# Patient Record
Sex: FEMALE | Race: WHITE | NOT HISPANIC OR LATINO | Employment: OTHER | URBAN - METROPOLITAN AREA
[De-identification: names, ages, dates, MRNs, and addresses within clinical notes are randomized per-mention and may not be internally consistent; named-entity substitution may affect disease eponyms.]

---

## 2017-01-12 ENCOUNTER — GENERIC CONVERSION - ENCOUNTER (OUTPATIENT)
Dept: OTHER | Facility: OTHER | Age: 72
End: 2017-01-12

## 2017-01-31 ENCOUNTER — ALLSCRIPTS OFFICE VISIT (OUTPATIENT)
Dept: OTHER | Facility: OTHER | Age: 72
End: 2017-01-31

## 2017-02-03 ENCOUNTER — LAB CONVERSION - ENCOUNTER (OUTPATIENT)
Dept: OTHER | Facility: OTHER | Age: 72
End: 2017-02-03

## 2017-02-14 ENCOUNTER — ALLSCRIPTS OFFICE VISIT (OUTPATIENT)
Dept: OTHER | Facility: OTHER | Age: 72
End: 2017-02-14

## 2017-03-21 ENCOUNTER — LAB CONVERSION - ENCOUNTER (OUTPATIENT)
Dept: OTHER | Facility: OTHER | Age: 72
End: 2017-03-21

## 2017-03-21 LAB
ALT SERPL W P-5'-P-CCNC: 25 U/L (ref 6–29)
AST SERPL W P-5'-P-CCNC: 34 U/L (ref 10–35)
BUN SERPL-MCNC: 50 MG/DL (ref 7–25)
BUN/CREA RATIO (HISTORICAL): 30 (CALC) (ref 6–22)
CALCIUM SERPL-MCNC: 9.8 MG/DL (ref 8.6–10.4)
CHLORIDE SERPL-SCNC: 106 MMOL/L (ref 98–110)
CO2 SERPL-SCNC: 28 MMOL/L (ref 20–31)
CREAT SERPL-MCNC: 1.64 MG/DL (ref 0.6–0.93)
DEPRECATED RDW RBC AUTO: 13.9 % (ref 11–15)
EGFR AFRICAN AMERICAN (HISTORICAL): 36 ML/MIN/1.73M2
EGFR-AMERICAN CALC (HISTORICAL): 31 ML/MIN/1.73M2
GLUCOSE (HISTORICAL): 67 MG/DL (ref 65–99)
HBA1C MFR BLD HPLC: 6.8 % OF TOTAL HGB
HCT VFR BLD AUTO: 37.3 % (ref 35–45)
HGB BLD-MCNC: 12.1 G/DL (ref 11.7–15.5)
MCH RBC QN AUTO: 30 PG (ref 27–33)
MCHC RBC AUTO-ENTMCNC: 32.5 G/DL (ref 32–36)
MCV RBC AUTO: 92.5 FL (ref 80–100)
PLATELET # BLD AUTO: 214 THOUSAND/UL (ref 140–400)
PMV BLD AUTO: 9.9 FL (ref 7.5–12.5)
POTASSIUM SERPL-SCNC: 4.5 MMOL/L (ref 3.5–5.3)
RBC # BLD AUTO: 4.03 MILLION/UL (ref 3.8–5.1)
SODIUM SERPL-SCNC: 142 MMOL/L (ref 135–146)
TSH SERPL DL<=0.05 MIU/L-ACNC: 4.05 MIU/L (ref 0.4–4.5)
WBC # BLD AUTO: 5.2 THOUSAND/UL (ref 3.8–10.8)

## 2017-03-22 ENCOUNTER — GENERIC CONVERSION - ENCOUNTER (OUTPATIENT)
Dept: OTHER | Facility: OTHER | Age: 72
End: 2017-03-22

## 2017-03-22 ENCOUNTER — ALLSCRIPTS OFFICE VISIT (OUTPATIENT)
Dept: OTHER | Facility: OTHER | Age: 72
End: 2017-03-22

## 2017-03-22 DIAGNOSIS — D63.8 ANEMIA IN OTHER CHRONIC DISEASES CLASSIFIED ELSEWHERE: ICD-10-CM

## 2017-03-22 DIAGNOSIS — N18.30 CHRONIC KIDNEY DISEASE, STAGE III (MODERATE) (HCC): ICD-10-CM

## 2017-03-22 DIAGNOSIS — I10 ESSENTIAL (PRIMARY) HYPERTENSION: ICD-10-CM

## 2017-03-22 DIAGNOSIS — E11.9 TYPE 2 DIABETES MELLITUS WITHOUT COMPLICATIONS (HCC): ICD-10-CM

## 2017-03-23 ENCOUNTER — GENERIC CONVERSION - ENCOUNTER (OUTPATIENT)
Dept: OTHER | Facility: OTHER | Age: 72
End: 2017-03-23

## 2017-03-24 ENCOUNTER — GENERIC CONVERSION - ENCOUNTER (OUTPATIENT)
Dept: OTHER | Facility: OTHER | Age: 72
End: 2017-03-24

## 2017-05-08 ENCOUNTER — ALLSCRIPTS OFFICE VISIT (OUTPATIENT)
Dept: OTHER | Facility: OTHER | Age: 72
End: 2017-05-08

## 2017-06-06 ENCOUNTER — GENERIC CONVERSION - ENCOUNTER (OUTPATIENT)
Dept: OTHER | Facility: OTHER | Age: 72
End: 2017-06-06

## 2017-06-30 ENCOUNTER — DOCTOR'S OFFICE (OUTPATIENT)
Dept: URBAN - METROPOLITAN AREA CLINIC 137 | Facility: CLINIC | Age: 72
Setting detail: OPHTHALMOLOGY
End: 2017-06-30
Payer: COMMERCIAL

## 2017-06-30 ENCOUNTER — GENERIC CONVERSION - ENCOUNTER (OUTPATIENT)
Dept: OTHER | Facility: OTHER | Age: 72
End: 2017-06-30

## 2017-06-30 DIAGNOSIS — H52.4: ICD-10-CM

## 2017-06-30 PROCEDURE — 92014 COMPRE OPH EXAM EST PT 1/>: CPT | Performed by: OPHTHALMOLOGY

## 2017-06-30 ASSESSMENT — REFRACTION_MANIFEST
OS_VA3: 20/
OS_VA2: 20/
OD_VA3: 20/
OU_VA: 20/
OD_VA1: 20/
OS_VA1: 20/
OS_VA1: 20/
OD_VA2: 20/
OS_VA2: 20/
OU_VA: 20/
OD_VA2: 20/
OD_VA3: 20/
OS_VA3: 20/
OD_VA1: 20/

## 2017-06-30 ASSESSMENT — AXIALLENGTH_DERIVED
OS_AL: 23.3278
OD_AL: 24.0559

## 2017-06-30 ASSESSMENT — REFRACTION_OUTSIDERX
OS_SPHERE: -0.75
OD_VA2: 20/30(J2)
OU_VA: 20/
OD_ADD: +2.50
OS_VA3: 20/
OD_SPHERE: -2.50
OS_CYLINDER: SPH
OS_VA1: 20/60-1
OS_ADD: +2.50
OD_VA3: 20/
OS_VA2: 20/30(J2)
OD_VA1: 20/30-1
OD_AXIS: 036
OD_CYLINDER: +0.50

## 2017-06-30 ASSESSMENT — SPHEQUIV_DERIVED
OD_SPHEQUIV: -1.875
OS_SPHEQUIV: -0.375

## 2017-06-30 ASSESSMENT — REFRACTION_AUTOREFRACTION
OS_SPHERE: -0.75
OD_AXIS: 025
OS_CYLINDER: +0.75
OD_SPHERE: -2.25
OD_CYLINDER: +0.75
OS_AXIS: 157

## 2017-06-30 ASSESSMENT — REFRACTION_CURRENTRX
OS_ADD: +3.00
OS_VPRISM_DIRECTION: TRF
OS_OVR_VA: 20/
OD_OVR_VA: 20/
OS_OVR_VA: 20/
OD_OVR_VA: 20/
OD_SPHERE: -2.00
OS_SPHERE: +0.50
OD_ADD: +3.00
OD_AXIS: 030
OS_OVR_VA: 20/
OD_OVR_VA: 20/
OD_VPRISM_DIRECTION: TRF
OD_CYLINDER: +0.25
OS_CYLINDER: SPH

## 2017-06-30 ASSESSMENT — KERATOMETRY
OD_AXISANGLE_DEGREES: 049
OD_K1POWER_DIOPTERS: 44.00
OS_AXISANGLE_DEGREES: 083
OS_K2POWER_DIOPTERS: 45.00
OD_K2POWER_DIOPTERS: 44.50
OS_K1POWER_DIOPTERS: 44.25

## 2017-06-30 ASSESSMENT — CONFRONTATIONAL VISUAL FIELD TEST (CVF)
OD_FINDINGS: FULL
OS_FINDINGS: FULL

## 2017-06-30 ASSESSMENT — VISUAL ACUITY
OD_BCVA: 20/80
OS_BCVA: 20/30-2

## 2017-07-03 ENCOUNTER — GENERIC CONVERSION - ENCOUNTER (OUTPATIENT)
Dept: OTHER | Facility: OTHER | Age: 72
End: 2017-07-03

## 2017-07-11 ENCOUNTER — LAB CONVERSION - ENCOUNTER (OUTPATIENT)
Dept: OTHER | Facility: OTHER | Age: 72
End: 2017-07-11

## 2017-07-11 LAB
BUN SERPL-MCNC: 56 MG/DL (ref 7–25)
BUN/CREA RATIO (HISTORICAL): 30 (CALC) (ref 6–22)
CALCIUM SERPL-MCNC: 9.9 MG/DL (ref 8.6–10.4)
CHLORIDE SERPL-SCNC: 105 MMOL/L (ref 98–110)
CO2 SERPL-SCNC: 25 MMOL/L (ref 20–31)
CREAT SERPL-MCNC: 1.85 MG/DL (ref 0.6–0.93)
EGFR AFRICAN AMERICAN (HISTORICAL): 31 ML/MIN/1.73M2
EGFR-AMERICAN CALC (HISTORICAL): 27 ML/MIN/1.73M2
GLUCOSE (HISTORICAL): 125 MG/DL (ref 65–99)
HBA1C MFR BLD HPLC: 6.6 % OF TOTAL HGB
POTASSIUM SERPL-SCNC: 4.7 MMOL/L (ref 3.5–5.3)
SODIUM SERPL-SCNC: 138 MMOL/L (ref 135–146)

## 2017-07-21 ENCOUNTER — ALLSCRIPTS OFFICE VISIT (OUTPATIENT)
Dept: OTHER | Facility: OTHER | Age: 72
End: 2017-07-21

## 2017-07-21 DIAGNOSIS — Z12.31 ENCOUNTER FOR SCREENING MAMMOGRAM FOR MALIGNANT NEOPLASM OF BREAST: ICD-10-CM

## 2017-07-27 ENCOUNTER — GENERIC CONVERSION - ENCOUNTER (OUTPATIENT)
Dept: OTHER | Facility: OTHER | Age: 72
End: 2017-07-27

## 2017-10-10 ENCOUNTER — LAB CONVERSION - ENCOUNTER (OUTPATIENT)
Dept: OTHER | Facility: OTHER | Age: 72
End: 2017-10-10

## 2017-10-10 ENCOUNTER — GENERIC CONVERSION - ENCOUNTER (OUTPATIENT)
Dept: OTHER | Facility: OTHER | Age: 72
End: 2017-10-10

## 2017-10-10 LAB
BUN SERPL-MCNC: 50 MG/DL (ref 7–25)
BUN/CREA RATIO (HISTORICAL): 27 (CALC) (ref 6–22)
CALCIUM SERPL-MCNC: 9.7 MG/DL (ref 8.6–10.4)
CHLORIDE SERPL-SCNC: 107 MMOL/L (ref 98–110)
CO2 SERPL-SCNC: 26 MMOL/L (ref 20–31)
CREAT SERPL-MCNC: 1.85 MG/DL (ref 0.6–0.93)
EGFR AFRICAN AMERICAN (HISTORICAL): 31 ML/MIN/1.73M2
EGFR-AMERICAN CALC (HISTORICAL): 26 ML/MIN/1.73M2
GLUCOSE (HISTORICAL): 95 MG/DL (ref 65–99)
HBA1C MFR BLD HPLC: 6.9 % OF TOTAL HGB
POTASSIUM SERPL-SCNC: 4.8 MMOL/L (ref 3.5–5.3)
SODIUM SERPL-SCNC: 140 MMOL/L (ref 135–146)
TSH SERPL DL<=0.05 MIU/L-ACNC: 2.81 MIU/L (ref 0.4–4.5)

## 2017-10-16 ENCOUNTER — ALLSCRIPTS OFFICE VISIT (OUTPATIENT)
Dept: OTHER | Facility: OTHER | Age: 72
End: 2017-10-16

## 2017-10-16 DIAGNOSIS — N18.30 CHRONIC KIDNEY DISEASE, STAGE III (MODERATE) (HCC): ICD-10-CM

## 2017-10-16 DIAGNOSIS — J45.20 MILD INTERMITTENT ASTHMA, UNCOMPLICATED: ICD-10-CM

## 2017-10-16 DIAGNOSIS — I10 ESSENTIAL (PRIMARY) HYPERTENSION: ICD-10-CM

## 2017-10-16 DIAGNOSIS — E03.9 HYPOTHYROIDISM: ICD-10-CM

## 2017-10-16 DIAGNOSIS — E11.9 TYPE 2 DIABETES MELLITUS WITHOUT COMPLICATIONS (HCC): ICD-10-CM

## 2017-10-16 DIAGNOSIS — E78.2 MIXED HYPERLIPIDEMIA: ICD-10-CM
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
## 2017-11-16 ENCOUNTER — GENERIC CONVERSION - ENCOUNTER (OUTPATIENT)
Dept: OTHER | Facility: OTHER | Age: 72
End: 2017-11-16

## 2018-01-05 ENCOUNTER — GENERIC CONVERSION - ENCOUNTER (OUTPATIENT)
Dept: OTHER | Facility: OTHER | Age: 73
End: 2018-01-05

## 2018-01-12 VITALS
TEMPERATURE: 97.6 F | BODY MASS INDEX: 30.67 KG/M2 | HEART RATE: 76 BPM | DIASTOLIC BLOOD PRESSURE: 60 MMHG | WEIGHT: 156.25 LBS | HEIGHT: 60 IN | SYSTOLIC BLOOD PRESSURE: 140 MMHG | OXYGEN SATURATION: 95 %

## 2018-01-12 VITALS
HEART RATE: 68 BPM | HEIGHT: 59 IN | OXYGEN SATURATION: 97 % | WEIGHT: 152 LBS | BODY MASS INDEX: 30.64 KG/M2 | TEMPERATURE: 97.6 F | DIASTOLIC BLOOD PRESSURE: 58 MMHG | SYSTOLIC BLOOD PRESSURE: 144 MMHG

## 2018-01-14 VITALS
HEART RATE: 80 BPM | SYSTOLIC BLOOD PRESSURE: 134 MMHG | DIASTOLIC BLOOD PRESSURE: 62 MMHG | RESPIRATION RATE: 16 BRPM | WEIGHT: 156 LBS | BODY MASS INDEX: 30.63 KG/M2 | HEIGHT: 60 IN

## 2018-01-14 VITALS
DIASTOLIC BLOOD PRESSURE: 78 MMHG | BODY MASS INDEX: 30.43 KG/M2 | HEART RATE: 68 BPM | WEIGHT: 155 LBS | OXYGEN SATURATION: 95 % | SYSTOLIC BLOOD PRESSURE: 130 MMHG | TEMPERATURE: 98 F | HEIGHT: 60 IN

## 2018-01-14 VITALS
SYSTOLIC BLOOD PRESSURE: 150 MMHG | WEIGHT: 156.25 LBS | HEART RATE: 81 BPM | HEIGHT: 60 IN | OXYGEN SATURATION: 96 % | BODY MASS INDEX: 30.67 KG/M2 | TEMPERATURE: 98.2 F | DIASTOLIC BLOOD PRESSURE: 60 MMHG

## 2018-01-14 VITALS
BODY MASS INDEX: 30.66 KG/M2 | WEIGHT: 157 LBS | TEMPERATURE: 96.6 F | HEART RATE: 72 BPM | SYSTOLIC BLOOD PRESSURE: 140 MMHG | OXYGEN SATURATION: 96 % | DIASTOLIC BLOOD PRESSURE: 70 MMHG
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
## 2018-01-15 NOTE — RESULT NOTES
Verified Results  (1) BASIC METABOLIC PROFILE 58EWZ2828 07:51AM Makayla Hobbs     Test Name Result Flag Reference   GLUCOSE 95 mg/dL  65-99   Fasting reference interval   UREA NITROGEN (BUN) 50 mg/dL H 7-25   CREATININE 1 85 mg/dL H 0 60-0 93   For patients >52years of age, the reference limit  for Creatinine is approximately 13% higher for people  identified as -American  eGFR NON-AFR  AMERICAN 26 mL/min/1 73m2 L > OR = 60   eGFR AFRICAN AMERICAN 31 mL/min/1 73m2 L > OR = 60   BUN/CREATININE RATIO 27 (calc) H 6-22   SODIUM 140 mmol/L  135-146   POTASSIUM 4 8 mmol/L  3 5-5 3   CHLORIDE 107 mmol/L     CARBON DIOXIDE 26 mmol/L  20-31   CALCIUM 9 7 mg/dL  8 6-10 4     (Q) HEMOGLOBIN A1c 09Oct2017 07:51AM Daniel Ratliff   REPORT COMMENT:  FASTING:YES     Test Name Result Flag Reference   HEMOGLOBIN A1c 6 9 % of total Hgb H <5 7   For someone without known diabetes, a hemoglobin A1c  value of 6 5% or greater indicates that they may have   diabetes and this should be confirmed with a follow-up   test      For someone with known diabetes, a value <7% indicates   that their diabetes is well controlled and a value   greater than or equal to 7% indicates suboptimal   control  A1c targets should be individualized based on   duration of diabetes, age, comorbid conditions, and   other considerations  Currently, no consensus exists regarding use of  hemoglobin A1c for diagnosis of diabetes for children       (Q) TSH, 3RD GENERATION W/REFLEX TO FT4 09Oct2017 07:51AM Makayla Hobbs     Test Name Result Flag Reference   TSH W/REFLEX TO FT4 2 81 mIU/L  0 40-4 50

## 2018-01-22 ENCOUNTER — LAB CONVERSION - ENCOUNTER (OUTPATIENT)
Dept: OTHER | Facility: OTHER | Age: 73
End: 2018-01-22

## 2018-01-22 LAB
BUN SERPL-MCNC: 53 MG/DL (ref 7–25)
BUN/CREA RATIO (HISTORICAL): 33 (CALC) (ref 6–22)
CALCIUM SERPL-MCNC: 9.7 MG/DL (ref 8.6–10.4)
CHLORIDE SERPL-SCNC: 104 MMOL/L (ref 98–110)
CHOLEST SERPL-MCNC: 119 MG/DL
CHOLEST/HDLC SERPL: 2.5 (CALC)
CO2 SERPL-SCNC: 29 MMOL/L (ref 20–31)
CREAT SERPL-MCNC: 1.59 MG/DL (ref 0.6–0.93)
EGFR AFRICAN AMERICAN (HISTORICAL): 37 ML/MIN/1.73M2
EGFR-AMERICAN CALC (HISTORICAL): 32 ML/MIN/1.73M2
GLUCOSE (HISTORICAL): 117 MG/DL (ref 65–99)
HDLC SERPL-MCNC: 48 MG/DL
LDL CHOLESTEROL (HISTORICAL): 47 MG/DL (CALC)
NON-HDL-CHOL (CHOL-HDL) (HISTORICAL): 71 MG/DL (CALC)
POTASSIUM SERPL-SCNC: 4.6 MMOL/L (ref 3.5–5.3)
SODIUM SERPL-SCNC: 140 MMOL/L (ref 135–146)
TRIGL SERPL-MCNC: 158 MG/DL

## 2018-01-23 ENCOUNTER — LAB CONVERSION - ENCOUNTER (OUTPATIENT)
Dept: OTHER | Facility: OTHER | Age: 73
End: 2018-01-23

## 2018-01-23 LAB
ALBUMIN/CREAT UR: 39 MCG/MG CREAT
ALT SERPL W P-5'-P-CCNC: 23 U/L (ref 6–29)
ALT SERPL-CCNC: 23 U/L (ref 6–29)
AST SERPL W P-5'-P-CCNC: 31 U/L (ref 10–35)
AST SERPL-CCNC: 31 U/L (ref 10–35)
BUN SERPL-MCNC: 53 MG/DL (ref 7–25)
BUN SERPL-MCNC: 53 MG/DL (ref 7–25)
BUN/CREA RATIO (HISTORICAL): 32 (CALC) (ref 6–22)
BUN/CREAT SERPL: 32 (CALC) (ref 6–22)
CALCIUM SERPL-MCNC: 9.7 MG/DL (ref 8.6–10.4)
CALCIUM SERPL-MCNC: 9.7 MG/DL (ref 8.6–10.4)
CHLORIDE SERPL-SCNC: 106 MMOL/L (ref 98–110)
CHLORIDE SERPL-SCNC: 106 MMOL/L (ref 98–110)
CHOLEST SERPL-MCNC: 119 MG/DL
CHOLEST SERPL-MCNC: 119 MG/DL
CHOLEST/HDLC SERPL: 2.5 (CALC)
CHOLEST/HDLC SERPL: 2.5 (CALC)
CO2 SERPL-SCNC: 27 MMOL/L (ref 20–31)
CO2 SERPL-SCNC: 27 MMOL/L (ref 20–31)
CREAT SERPL-MCNC: 1.64 MG/DL (ref 0.6–0.93)
CREAT SERPL-MCNC: 1.64 MG/DL (ref 0.6–0.93)
CREAT UR-MCNC: 88 MG/DL (ref 20–320)
CREATININE, RANDOM URINE (HISTORICAL): 88 MG/DL (ref 20–320)
EGFR AFRICAN AMERICAN (HISTORICAL): 35 ML/MIN/1.73M2
EGFR-AMERICAN CALC (HISTORICAL): 30 ML/MIN/1.73M2
ERYTHROCYTE [DISTWIDTH] IN BLOOD BY AUTOMATED COUNT: 12.4 % (ref 11–15)
GLUCOSE (HISTORICAL): 122 MG/DL (ref 65–99)
GLUCOSE SERPL-MCNC: 122 MG/DL (ref 65–99)
HBA1C MFR BLD HPLC: 6.4 % OF TOTAL HGB
HBA1C MFR BLD: 6.4 % OF TOTAL HGB
HCT VFR BLD AUTO: 37.3 % (ref 35–45)
HDLC SERPL-MCNC: 48 MG/DL
HDLC SERPL-MCNC: 48 MG/DL
HGB BLD-MCNC: 12 G/DL (ref 11.7–15.5)
LDL CHOLESTEROL (HISTORICAL): 47 MG/DL (CALC)
LDLC SERPL CALC-MCNC: 47 MG/DL (CALC)
MAGNESIUM, UR (HISTORICAL): 3.4 MG/DL
MCH RBC QN AUTO: 30.1 PG (ref 27–33)
MCHC RBC AUTO-ENTMCNC: 32.2 G/DL (ref 32–36)
MCV RBC AUTO: 93.5 FL (ref 80–100)
MICROALBUMIN UR-MCNC: 3.4 MG/DL
MICROALBUMIN/CREATININE RATIO (HISTORICAL): 39 MCG/MG CREAT
NON-HDL-CHOL (CHOL-HDL) (HISTORICAL): 71 MG/DL (CALC)
NONHDLC SERPL-MCNC: 71 MG/DL (CALC)
PLATELET # BLD AUTO: 207 THOUSAND/UL (ref 140–400)
PMV BLD REES-ECKER: 11.8 FL (ref 7.5–12.5)
POTASSIUM SERPL-SCNC: 4.6 MMOL/L (ref 3.5–5.3)
POTASSIUM SERPL-SCNC: 4.6 MMOL/L (ref 3.5–5.3)
RBC # BLD AUTO: 3.99 MILLION/UL (ref 3.8–5.1)
SL AMB EGFR AFRICAN AMERICAN: 35 ML/MIN/1.73M2
SL AMB EGFR NON AFRICAN AMERICAN: 30 ML/MIN/1.73M2
SODIUM SERPL-SCNC: 142 MMOL/L (ref 135–146)
SODIUM SERPL-SCNC: 142 MMOL/L (ref 135–146)
TRIGL SERPL-MCNC: 158 MG/DL
TRIGL SERPL-MCNC: 158 MG/DL
WBC # BLD AUTO: 4.2 THOUSAND/UL (ref 3.8–10.8)

## 2018-01-29 ENCOUNTER — OFFICE VISIT (OUTPATIENT)
Dept: INTERNAL MEDICINE CLINIC | Facility: CLINIC | Age: 73
End: 2018-01-29
Payer: COMMERCIAL

## 2018-01-29 VITALS
OXYGEN SATURATION: 96 % | HEIGHT: 60 IN | SYSTOLIC BLOOD PRESSURE: 132 MMHG | HEART RATE: 77 BPM | BODY MASS INDEX: 29.29 KG/M2 | DIASTOLIC BLOOD PRESSURE: 56 MMHG | TEMPERATURE: 97.7 F | WEIGHT: 149.2 LBS

## 2018-01-29 DIAGNOSIS — E11.22 TYPE 2 DIABETES MELLITUS WITH STAGE 3 CHRONIC KIDNEY DISEASE, WITH LONG-TERM CURRENT USE OF INSULIN (HCC): Primary | ICD-10-CM

## 2018-01-29 DIAGNOSIS — K21.9 GERD WITHOUT ESOPHAGITIS: ICD-10-CM

## 2018-01-29 DIAGNOSIS — E78.2 MIXED HYPERLIPIDEMIA: ICD-10-CM

## 2018-01-29 DIAGNOSIS — Z12.11 SCREENING FOR COLON CANCER: ICD-10-CM

## 2018-01-29 DIAGNOSIS — N93.9 VAGINAL BLEEDING: ICD-10-CM

## 2018-01-29 DIAGNOSIS — N18.30 TYPE 2 DIABETES MELLITUS WITH STAGE 3 CHRONIC KIDNEY DISEASE, WITH LONG-TERM CURRENT USE OF INSULIN (HCC): Primary | ICD-10-CM

## 2018-01-29 DIAGNOSIS — Z79.4 TYPE 2 DIABETES MELLITUS WITH STAGE 3 CHRONIC KIDNEY DISEASE, WITH LONG-TERM CURRENT USE OF INSULIN (HCC): Primary | ICD-10-CM

## 2018-01-29 DIAGNOSIS — I10 ESSENTIAL HYPERTENSION: ICD-10-CM

## 2018-01-29 PROCEDURE — 99214 OFFICE O/P EST MOD 30 MIN: CPT | Performed by: INTERNAL MEDICINE

## 2018-01-29 RX ORDER — CYCLOBENZAPRINE HCL 5 MG
1 TABLET ORAL 3 TIMES DAILY PRN
COMMUNITY
Start: 2016-02-02 | End: 2018-01-30 | Stop reason: SDUPTHER

## 2018-01-29 RX ORDER — QUINIDINE GLUCONATE 324 MG
1 TABLET, EXTENDED RELEASE ORAL DAILY
Status: ON HOLD | COMMUNITY
End: 2022-06-29 | Stop reason: ALTCHOICE

## 2018-01-29 RX ORDER — PANTOPRAZOLE SODIUM 40 MG/1
40 TABLET, DELAYED RELEASE ORAL DAILY
COMMUNITY
Start: 2013-12-19 | End: 2018-01-29 | Stop reason: ALTCHOICE

## 2018-01-29 RX ORDER — FEBUXOSTAT 40 MG/1
40 TABLET, FILM COATED ORAL EVERY OTHER DAY
COMMUNITY
Start: 2013-12-19 | End: 2018-01-30 | Stop reason: SDUPTHER

## 2018-01-29 RX ORDER — NITROGLYCERIN 0.4 MG/1
1 TABLET SUBLINGUAL
COMMUNITY
End: 2022-02-01 | Stop reason: SDUPTHER

## 2018-01-29 RX ORDER — SYRINGE-NEEDLE,INSULIN,0.5 ML 31 GX5/16"
SYRINGE, EMPTY DISPOSABLE MISCELLANEOUS
COMMUNITY
Start: 2017-11-30 | End: 2018-01-30 | Stop reason: SDUPTHER

## 2018-01-29 RX ORDER — LOSARTAN POTASSIUM 50 MG/1
1 TABLET ORAL DAILY
COMMUNITY
End: 2018-01-30 | Stop reason: SDUPTHER

## 2018-01-29 RX ORDER — BIOTIN 1 MG
1 TABLET ORAL DAILY
Status: ON HOLD | COMMUNITY
End: 2022-06-29 | Stop reason: ALTCHOICE

## 2018-01-29 RX ORDER — MONTELUKAST SODIUM 10 MG/1
10 TABLET ORAL
COMMUNITY
Start: 2013-12-19 | End: 2018-05-11 | Stop reason: SDUPTHER

## 2018-01-29 RX ORDER — BUMETANIDE 2 MG/1
2 TABLET ORAL DAILY
COMMUNITY
Start: 2013-12-19 | End: 2018-01-30 | Stop reason: SDUPTHER

## 2018-01-29 RX ORDER — TRAMADOL HYDROCHLORIDE 50 MG/1
50 TABLET ORAL DAILY
COMMUNITY
Start: 2013-12-19 | End: 2018-01-30 | Stop reason: SDUPTHER

## 2018-01-29 RX ORDER — FLUTICASONE PROPIONATE 50 MCG
2 SPRAY, SUSPENSION (ML) NASAL DAILY
COMMUNITY
End: 2018-05-11 | Stop reason: SDUPTHER

## 2018-01-29 RX ORDER — ATORVASTATIN CALCIUM 80 MG/1
80 TABLET, FILM COATED ORAL DAILY
COMMUNITY
Start: 2013-12-19 | End: 2018-06-01 | Stop reason: SDUPTHER

## 2018-01-29 RX ORDER — INSULIN GLARGINE 100 [IU]/ML
INJECTION, SOLUTION SUBCUTANEOUS 2 TIMES DAILY
COMMUNITY
End: 2018-01-29 | Stop reason: HOSPADM

## 2018-01-29 RX ORDER — MULTIVIT-MIN/FOLIC ACID/LUTEIN 500-250MCG
2 TABLET,CHEWABLE ORAL DAILY
Status: ON HOLD | COMMUNITY
End: 2022-06-29 | Stop reason: ALTCHOICE

## 2018-01-29 RX ORDER — ALBUTEROL SULFATE 90 UG/1
2 AEROSOL, METERED RESPIRATORY (INHALATION)
COMMUNITY
Start: 2017-10-16 | End: 2018-09-11 | Stop reason: SDUPTHER

## 2018-01-29 RX ORDER — METOPROLOL SUCCINATE 100 MG/1
100 TABLET, EXTENDED RELEASE ORAL DAILY
COMMUNITY
Start: 2013-12-19 | End: 2018-01-30 | Stop reason: SDUPTHER

## 2018-01-29 RX ORDER — LEVOTHYROXINE SODIUM 0.07 MG/1
1 TABLET ORAL DAILY
COMMUNITY
End: 2018-01-30 | Stop reason: SDUPTHER

## 2018-01-29 RX ORDER — ASCORBIC ACID 500 MG
1 TABLET ORAL DAILY
Status: ON HOLD | COMMUNITY
End: 2022-06-29 | Stop reason: ALTCHOICE

## 2018-01-29 RX ORDER — ASCORBIC ACID
1 CRYSTALS ORAL DAILY
Status: ON HOLD | COMMUNITY
End: 2022-06-29 | Stop reason: ALTCHOICE

## 2018-01-29 RX ORDER — FAMOTIDINE 20 MG/1
20 TABLET, FILM COATED ORAL DAILY
Qty: 30 TABLET | Refills: 6 | Status: SHIPPED | OUTPATIENT
Start: 2018-01-29 | End: 2018-03-02 | Stop reason: SDUPTHER

## 2018-01-29 RX ORDER — FENOFIBRATE 145 MG/1
145 TABLET, COATED ORAL DAILY
COMMUNITY
Start: 2013-12-19 | End: 2018-01-30 | Stop reason: SDUPTHER

## 2018-01-29 RX ORDER — DIPHENOXYLATE HYDROCHLORIDE AND ATROPINE SULFATE 2.5; .025 MG/1; MG/1
1 TABLET ORAL DAILY
Status: ON HOLD | COMMUNITY
Start: 2013-12-19 | End: 2022-06-29 | Stop reason: ALTCHOICE
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
## 2018-01-30 DIAGNOSIS — E78.2 MIXED HYPERLIPIDEMIA: ICD-10-CM

## 2018-01-30 DIAGNOSIS — E11.9 TYPE 2 DIABETES MELLITUS WITHOUT COMPLICATION, UNSPECIFIED LONG TERM INSULIN USE STATUS: ICD-10-CM

## 2018-01-30 DIAGNOSIS — I10 HYPERTENSION, UNSPECIFIED TYPE: ICD-10-CM

## 2018-01-30 DIAGNOSIS — E03.9 HYPOTHYROIDISM, UNSPECIFIED TYPE: ICD-10-CM

## 2018-01-30 DIAGNOSIS — M10.9 GOUT, UNSPECIFIED CAUSE, UNSPECIFIED CHRONICITY, UNSPECIFIED SITE: ICD-10-CM

## 2018-01-30 DIAGNOSIS — M54.5 LOW BACK PAIN, UNSPECIFIED BACK PAIN LATERALITY, UNSPECIFIED CHRONICITY, WITH SCIATICA PRESENCE UNSPECIFIED: Primary | ICD-10-CM

## 2018-01-30 RX ORDER — LEVOTHYROXINE SODIUM 0.07 MG/1
75 TABLET ORAL DAILY
Qty: 30 TABLET | Refills: 5 | Status: SHIPPED | OUTPATIENT
Start: 2018-01-30 | End: 2018-05-11 | Stop reason: SDUPTHER

## 2018-01-30 RX ORDER — BUMETANIDE 2 MG/1
2 TABLET ORAL DAILY
Qty: 30 TABLET | Refills: 5 | Status: SHIPPED | OUTPATIENT
Start: 2018-01-30 | End: 2018-08-23 | Stop reason: SDUPTHER

## 2018-01-30 RX ORDER — METOPROLOL SUCCINATE 100 MG/1
100 TABLET, EXTENDED RELEASE ORAL DAILY
Qty: 30 TABLET | Refills: 5 | Status: SHIPPED | OUTPATIENT
Start: 2018-01-30 | End: 2018-09-11 | Stop reason: SDUPTHER

## 2018-01-30 RX ORDER — SYRINGE-NEEDLE,INSULIN,0.5 ML 31 GX5/16"
SYRINGE, EMPTY DISPOSABLE MISCELLANEOUS 4 TIMES DAILY
Qty: 200 EACH | Refills: 5 | Status: SHIPPED | OUTPATIENT
Start: 2018-01-30 | End: 2018-09-11 | Stop reason: SDUPTHER

## 2018-01-30 RX ORDER — FEBUXOSTAT 40 MG/1
40 TABLET, FILM COATED ORAL EVERY OTHER DAY
Qty: 15 TABLET | Refills: 5 | Status: SHIPPED | OUTPATIENT
Start: 2018-01-30 | End: 2018-09-11 | Stop reason: SDUPTHER

## 2018-01-30 RX ORDER — CYCLOBENZAPRINE HCL 5 MG
5 TABLET ORAL 3 TIMES DAILY PRN
Qty: 30 TABLET | Refills: 5 | Status: SHIPPED | OUTPATIENT
Start: 2018-01-30 | End: 2018-05-11 | Stop reason: SDUPTHER

## 2018-01-30 RX ORDER — TRAMADOL HYDROCHLORIDE 50 MG/1
50 TABLET ORAL DAILY
Qty: 30 TABLET | Refills: 0 | Status: SHIPPED | OUTPATIENT
Start: 2018-01-30 | End: 2018-02-28 | Stop reason: SDUPTHER

## 2018-01-30 RX ORDER — FENOFIBRATE 145 MG/1
145 TABLET, COATED ORAL DAILY
Qty: 30 TABLET | Refills: 5 | Status: SHIPPED | OUTPATIENT
Start: 2018-01-30 | End: 2018-02-14 | Stop reason: CLARIF

## 2018-01-30 RX ORDER — LOSARTAN POTASSIUM 50 MG/1
50 TABLET ORAL DAILY
Qty: 30 TABLET | Refills: 5 | Status: SHIPPED | OUTPATIENT
Start: 2018-01-30 | End: 2018-08-07 | Stop reason: SDUPTHER

## 2018-02-01 DIAGNOSIS — N18.9 CHRONIC KIDNEY DISEASE: ICD-10-CM

## 2018-02-02 DIAGNOSIS — E11.9 TYPE 2 DIABETES MELLITUS WITHOUT COMPLICATION, UNSPECIFIED LONG TERM INSULIN USE STATUS: Primary | ICD-10-CM

## 2018-02-05 ENCOUNTER — TELEPHONE (OUTPATIENT)
Dept: INTERNAL MEDICINE CLINIC | Facility: CLINIC | Age: 73
End: 2018-02-05

## 2018-02-05 ENCOUNTER — OFFICE VISIT (OUTPATIENT)
Dept: GYNECOLOGY | Facility: CLINIC | Age: 73
End: 2018-02-05
Payer: COMMERCIAL

## 2018-02-05 VITALS
HEIGHT: 59 IN | BODY MASS INDEX: 30.16 KG/M2 | DIASTOLIC BLOOD PRESSURE: 60 MMHG | SYSTOLIC BLOOD PRESSURE: 132 MMHG | WEIGHT: 149.6 LBS

## 2018-02-05 DIAGNOSIS — N90.89 VULVAR LESION: ICD-10-CM

## 2018-02-05 DIAGNOSIS — N92.4 ABNORMAL PERIMENOPAUSAL BLEEDING: Primary | ICD-10-CM

## 2018-02-05 PROCEDURE — 99202 OFFICE O/P NEW SF 15 MIN: CPT | Performed by: NURSE PRACTITIONER

## 2018-02-05 RX ORDER — PANTOPRAZOLE SODIUM 40 MG/1
40 TABLET, DELAYED RELEASE ORAL DAILY
COMMUNITY
End: 2018-05-11 | Stop reason: ALTCHOICE

## 2018-02-05 NOTE — PROGRESS NOTES
Assessment/Plan:    No problem-specific Assessment & Plan notes found for this encounter  Diagnoses and all orders for this visit:    Abnormal perimenopausal bleeding  -     US pelvis complete non OB; Future    Vulvar lesion    Other orders  -     pantoprazole (PROTONIX) 40 mg tablet; Take 40 mg by mouth daily  -     traMADol-acetaminophen (ULTRACET) 37 5-325 mg per tablet; Take 1-2 tablets by mouth every 12 (twelve) hours as needed for moderate pain      RTO for vulvar biopsy  Review with PCP if she is allowed to discontinue her daily ASA prior to vulvar biopsy  Bathe once daily gently with liquid dial soap  Rinse well  Avoid scrubbing or scratching  Req given for TA TV US and am aware she may not be able to tolerate TV US  She can decline  Call office with any increase/worsening of bleeding  Subjective:      Patient ID: Krysta Moralez is a 74 y o  female  New patient here for problem visit  Last gyn exam was 18 years ago  LMP 20 years ago  State that she started with internal and external vaginal itching aprox 5 weeks ago  Denies scratching  Bathed daily with dial liquid soap  3 weeks ago she noticed blood on her underwear  Saturating a panty liner in 3 hours x 7 days then intermittent spotting until now  Denies pelvic pain, bloating or bowel changes,  complaints  Moves bowel every other day-formed, normal, brown  Takes daily stool softener  Does not believe blood is rectal  Admits to frequent occurrences of vuvar itching in her 30's and 40's with no treatment  Denies urinary incontinence  Not sexually active  Last coitus 14 years ago   x 56 years, Alfonso Aleman who is in poor health  Feels safe in her home  Vaginal Bleeding   The patient's pertinent negatives include no pelvic pain  Pertinent negatives include no abdominal pain, dysuria, hematuria, nausea, urgency or vomiting         The following portions of the patient's history were reviewed and updated as appropriate: allergies, current medications, past family history, past medical history, past social history, past surgical history and problem list     Review of Systems   Constitutional: Negative  Gastrointestinal: Negative for abdominal distention, abdominal pain, anal bleeding, blood in stool, nausea and vomiting  Genitourinary: Positive for vaginal bleeding  Negative for decreased urine volume, difficulty urinating, dysuria, hematuria, pelvic pain, urgency and vaginal pain  Skin: Negative  Hematological: Negative for adenopathy  Psychiatric/Behavioral: Negative  Objective:     Physical Exam   Constitutional: She is oriented to person, place, and time  She appears well-developed and well-nourished  Abdominal: Soft  Genitourinary: Uterus normal  Rectal exam shows no external hemorrhoid  Cervix exhibits no motion tenderness and no discharge  Right adnexum displays no mass, no tenderness and no fullness  Left adnexum displays no mass, no tenderness and no fullness  Genitourinary Comments: Atrophic vaginal  Guarding with use  Of pediatric speculum  No bleeding or other abnormalities  Unable to see full face of cervix due to guarding  1 cm round slightly blood lesion noted on left mid vestiblue  Labia minora are fused with majora 1-1 5 cm round slightly bloody lesion noted at base of vagina  Labia majora and minora are hypopigmented  Lymphadenopathy:        Right: No inguinal adenopathy present  Left: No inguinal adenopathy present  Neurological: She is alert and oriented to person, place, and time  Skin: Skin is warm and dry  Psychiatric: She has a normal mood and affect

## 2018-02-05 NOTE — TELEPHONE ENCOUNTER
Pt called indicating that her Cyclobenzaprine needed prior authorization  Called [facility] to confirm and they indicated that no prior [facility] is needed, pt just can't have med filled until 2/14/2018  That is the earliest med can be filled per her insurance  Rejection is coming back as refill too soon

## 2018-02-05 NOTE — PATIENT INSTRUCTIONS
RTO for vulvar biopsy  Review with PCP if she is allowed to discontinue her daily ASA prior to vulvar biopsy  Bathe once daily gently with liquid dial soap  Rinse well  Avoid scrubbing or scratching  Req given for TA TV US and am aware she may not be able to tolerate TV US  She can decline  Call office with any increase/worsening of bleeding

## 2018-02-09 ENCOUNTER — HOSPITAL ENCOUNTER (OUTPATIENT)
Dept: ULTRASOUND IMAGING | Facility: HOSPITAL | Age: 73
Discharge: HOME | End: 2018-02-09
Payer: COMMERCIAL

## 2018-02-09 DIAGNOSIS — N92.4 ABNORMAL PERIMENOPAUSAL BLEEDING: ICD-10-CM

## 2018-02-09 PROCEDURE — 76830 TRANSVAGINAL US NON-OB: CPT

## 2018-02-09 PROCEDURE — 76856 US EXAM PELVIC COMPLETE: CPT

## 2018-02-12 ENCOUNTER — OFFICE VISIT (OUTPATIENT)
Dept: INTERNAL MEDICINE CLINIC | Age: 73
End: 2018-02-12
Payer: COMMERCIAL

## 2018-02-12 VITALS
BODY MASS INDEX: 29.41 KG/M2 | TEMPERATURE: 97.7 F | OXYGEN SATURATION: 97 % | WEIGHT: 149.8 LBS | HEIGHT: 60 IN | SYSTOLIC BLOOD PRESSURE: 126 MMHG | DIASTOLIC BLOOD PRESSURE: 40 MMHG | HEART RATE: 69 BPM

## 2018-02-12 DIAGNOSIS — R68.89 FLU-LIKE SYMPTOMS: Primary | ICD-10-CM

## 2018-02-12 PROCEDURE — 99213 OFFICE O/P EST LOW 20 MIN: CPT | Performed by: NURSE PRACTITIONER

## 2018-02-12 RX ORDER — OSELTAMIVIR PHOSPHATE 75 MG/1
75 CAPSULE ORAL DAILY
Qty: 5 CAPSULE | Refills: 0 | Status: SHIPPED | OUTPATIENT
Start: 2018-02-12 | End: 2018-02-17

## 2018-02-12 NOTE — PATIENT INSTRUCTIONS
Flu-like Symtpoms:  Will start tamiflu  As discussed your dosing was reduced based on your Creat Clear of 26 (modified by weight)  Rest  Stay well hydrated  Return for new/worsening s/sx  Influenza   AMBULATORY CARE:   Influenza  (the flu) is an infection caused by the influenza virus  The flu is easily spread when an infected person coughs, sneezes, or has close contact with others  You may be able to spread the flu to others for 1 week or longer after signs or symptoms appear  Common signs and symptoms include the following:   · Fever and chills    · Headaches, body aches, and muscle or joint pain    · Cough, runny nose, and sore throat    · Loss of appetite, nausea, vomiting, or diarrhea    · Tiredness    · Trouble breathing  Call 911 for any of the following:   · You have trouble breathing, and your lips look purple or blue  · You have a seizure  Seek care immediately if:   · You are dizzy, or you are urinating less or not at all  · You have a headache with a stiff neck, and you feel tired or confused  · You have new pain or pressure in your chest     · Your symptoms, such as shortness of breath, vomiting, or diarrhea, get worse  · Your symptoms, such as fever and coughing, seem to get better, but then get worse  Contact your healthcare provider if:   · You have new muscle pain or weakness  · You have questions or concerns about your condition or care  Treatment for influenza  may include any of the following:  · Acetaminophen  decreases pain and fever  It is available without a doctor's order  Ask how much to take and how often to take it  Follow directions  Acetaminophen can cause liver damage if not taken correctly  · NSAIDs , such as ibuprofen, help decrease swelling, pain, and fever  This medicine is available with or without a doctor's order  NSAIDs can cause stomach bleeding or kidney problems in certain people   If you take blood thinner medicine, always ask your healthcare provider if NSAIDs are safe for you  Always read the medicine label and follow directions  · Antivirals  help fight a viral infection  Manage your symptoms:   · Rest  as much as you can to help you recover  · Drink liquids as directed  to help prevent dehydration  Ask how much liquid to drink each day and which liquids are best for you  Prevent the spread of the flu:   · Wash your hands often  Use soap and water  Wash your hands after you use the bathroom, change a child's diapers, or sneeze  Wash your hands before you prepare or eat food  Use gel hand cleanser when soap and water are not available  Do not touch your eyes, nose, or mouth unless you have washed your hands first            · Cover your mouth when you sneeze or cough  Cough into a tissue or the bend of your arm  · Clean shared items with a germ-killing   Clean table surfaces, doorknobs, and light switches  Do not share towels, silverware, and dishes with people who are sick  Wash bed sheets, towels, silverware, and dishes with soap and water  · Wear a mask  over your mouth and nose if you are sick or are near anyone who is sick  · Stay away from others  if you are sick  · Influenza vaccine  helps prevent influenza (flu)  Everyone older than 6 months should get a yearly influenza vaccine  Get the vaccine as soon as it is available, usually in September or October each year  Follow up with your healthcare provider as directed:  Write down your questions so you remember to ask them during your visits  © 2017 [employer] Information is for End User's use only and may not be sold, redistributed or otherwise used for commercial purposes  All illustrations and images included in CareNotes® are the copyrighted property of [employer]  or [employer]  The above information is an  only  It is not intended as medical advice for individual conditions or treatments   Talk to your doctor, nurse or pharmacist before following any medical regimen to see if it is safe and effective for you
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
## 2018-02-13 ENCOUNTER — TELEPHONE (OUTPATIENT)
Dept: INTERNAL MEDICINE CLINIC | Age: 73
End: 2018-02-13

## 2018-02-13 NOTE — TELEPHONE ENCOUNTER
Wanted to know what she should be taking, you prescribed her the Tramadol 50mg (generic for ultram) 1 tablet daily, and the old one she was taking was Tramadol/Acetaminoph 37 5-325mg (generic for ultracet) 1 to 2 tablets twice daily  She wants to know the reason for the change and what she should be taking  If you could please call her back and let her know, spoke with Raegan Walton and they wanted to hear from the Doctor  Thank you

## 2018-02-14 ENCOUNTER — OFFICE VISIT (OUTPATIENT)
Dept: NEPHROLOGY | Facility: CLINIC | Age: 73
End: 2018-02-14
Payer: COMMERCIAL

## 2018-02-14 VITALS
DIASTOLIC BLOOD PRESSURE: 52 MMHG | BODY MASS INDEX: 28.47 KG/M2 | HEIGHT: 61 IN | HEART RATE: 80 BPM | SYSTOLIC BLOOD PRESSURE: 112 MMHG | WEIGHT: 150.8 LBS

## 2018-02-14 DIAGNOSIS — N18.9 CHRONIC RENAL IMPAIRMENT, UNSPECIFIED CKD STAGE: Primary | ICD-10-CM

## 2018-02-14 PROBLEM — N18.30 CKD (CHRONIC KIDNEY DISEASE) STAGE 3, GFR 30-59 ML/MIN (HCC): Status: RESOLVED | Noted: 2018-01-29 | Resolved: 2018-02-14

## 2018-02-14 PROBLEM — I10 ESSENTIAL HYPERTENSION: Status: RESOLVED | Noted: 2018-01-29 | Resolved: 2018-02-14

## 2018-02-14 PROCEDURE — 99213 OFFICE O/P EST LOW 20 MIN: CPT | Performed by: INTERNAL MEDICINE

## 2018-02-14 RX ORDER — FENOFIBRATE 67 MG/1
67 CAPSULE ORAL
COMMUNITY
Start: 2018-02-06 | End: 2018-02-14 | Stop reason: SDUPTHER

## 2018-02-14 RX ORDER — INSULIN GLARGINE 100 [IU]/ML
INJECTION, SOLUTION SUBCUTANEOUS
COMMUNITY
Start: 2018-02-01 | End: 2018-05-11 | Stop reason: ALTCHOICE

## 2018-02-14 NOTE — LETTER
February 14, 2018     Glenny Courtney MD  [address] [place] [address]    Patient: Noris Dukes   YOB: 1945   Date of Visit: 2/14/2018       Dear Dr Stanislav Yu: Thank you for referring Mirna Ackerman to me for evaluation  Below are my notes for this consultation  If you have questions, please do not hesitate to call me  I look forward to following your patient along with you  Sincerely,        Jacob Figueroa MD        CC: No Recipients  Jacob Figueroa MD  2/14/2018 12:13 PM  Sign at close encounter  [address] 74 y o  female MRN: [id number]  Unit/Bed#:  Encounter: [id number]  Reason for Consult:   Chronic renal insufficiency    ASSESSMENT/PLAN:  1  Renal     Patient's chronic renal insufficiency due to nephrosclerosis and I say this was there is no significant proteinuria and in fact her urine microalbumin is still 30 %period% creatinine is 1 6 likely due to fluctuations due to effective volume depletion as her diuretics work better at other times and she also was likely ill with the flu at that time  Overall proteinuria has an increased continue current medications creatinine will likely fluctuate  She will continue with routine follow-up as scheduled  Her blood pressure is excellent  SUBJECTIVE:  Review of Systems   Constitution: Negative  HENT: Negative  Eyes: Negative  Cardiovascular: Negative  Respiratory: Positive for cough  Negative for shortness of breath  Chest congestion   Gastrointestinal: Negative  Genitourinary: Negative  Neurological: Negative  OBJECTIVE:  Current Weight: Weight - Scale: 68 4 kg (150 lb 12 8 oz)  [email or web address]     Blood pressure 112/52, pulse 80, height 5' 0 51" (1 537 m), weight 68 4 kg (150 lb 12 8 oz)  , Body mass index is 28 95 kg/m²      [unfilled]    Physical Exam: /52 (BP Location: Left arm, Patient Position: Sitting, Cuff Size: Standard)   Pulse 80  5' 0 51" (1 537 m)   Wt 68 4 kg (150 lb 12 8 oz)   BMI 28 95 kg/m²    Physical Exam   Constitutional: She is oriented to person, place, and time  She appears well-nourished  No distress  HENT:   Head: Atraumatic  Mouth/Throat: No oropharyngeal exudate  Eyes: Conjunctivae are normal    Neck: Neck supple  No JVD present  Cardiovascular: Normal rate  Exam reveals no friction rub  No edema  Pulmonary/Chest: Effort normal  No respiratory distress  She has no wheezes  She has no rales  Few rhonchi left side greater than right   Abdominal: Soft  Bowel sounds are normal  She exhibits no distension  There is no tenderness  There is no rebound  Neurological: She is alert and oriented to person, place, and time         Medications:    Current Outpatient Prescriptions:     albuterol (VENTOLIN HFA) 90 mcg/act inhaler, Inhale 2 puffs, Disp: , Rfl:     ascorbic acid (VITAMIN C) 500 mg tablet, Take 1 tablet by mouth daily, Disp: , Rfl:     aspirin 81 MG tablet, Take 1 tablet by mouth 2 (two) times a day, Disp: , Rfl:     atorvastatin (LIPITOR) 80 mg tablet, Take 80 mg by mouth daily, Disp: , Rfl:     bumetanide (BUMEX) 2 mg tablet, Take 1 tablet (2 mg total) by mouth daily, Disp: 30 tablet, Rfl: 5    Calcium Carbonate-Vit D-Min (CALCIUM 600+D PLUS MINERALS) 600-400 MG-UNIT CHEW, Chew 2 tablets daily, Disp: , Rfl:     Cholecalciferol (VITAMIN D3) 1000 units CAPS, Take 1 tablet by mouth daily, Disp: , Rfl:     cyclobenzaprine (FLEXERIL) 5 mg tablet, Take 1 tablet (5 mg total) by mouth 3 (three) times a day as needed for muscle spasms, Disp: 30 tablet, Rfl: 5    famotidine (PEPCID) 20 mg tablet, Take 1 tablet (20 mg total) by mouth daily for 90 days, Disp: 30 tablet, Rfl: 6    febuxostat (ULORIC) 40 mg tablet, Take 1 tablet (40 mg total) by mouth every other day (Patient taking differently: Take 40 mg by mouth daily  ), Disp: 15 tablet, Rfl: 5    ferrous gluconate (FERATE) 240 (27 FE) MG tablet, Take 1 tablet by mouth daily, Disp: , Rfl:     fluticasone (FLONASE) 50 mcg/act nasal spray, 2 sprays into each nostril daily, Disp: , Rfl:     fluticasone-salmeterol (ADVAIR DISKUS) 250-50 mcg/dose inhaler, Inhale 1 puff 2 (two) times a day, Disp: , Rfl:     Glucose Blood (ONETOUCH ULTRA BLUE VI), 2-3 application by In Vitro route daily, Disp: , Rfl:     insulin detemir (LEVEMIR FLEXTOUCH) subcutaneous injection pen 100 units/mL, Inject 35 Units under the skin 2 (two) times a day, Disp: 7 pen, Rfl: 0    insulin regular (HumuLIN R,NovoLIN R) 100 units/mL injection, Inject 10 Units under the skin 2 (two) times a day with lunch and dinner, Disp: , Rfl:     LANTUS 100 UNIT/ML subcutaneous injection, , Disp: , Rfl:     levothyroxine 75 mcg tablet, Take 1 tablet (75 mcg total) by mouth daily, Disp: 30 tablet, Rfl: 5    losartan (COZAAR) 50 mg tablet, Take 1 tablet (50 mg total) by mouth daily, Disp: 30 tablet, Rfl: 5    Magnesium 400 MG CAPS, Take 2 tablets by mouth daily, Disp: , Rfl:     metoprolol succinate (TOPROL-XL) 100 mg 24 hr tablet, Take 1 tablet (100 mg total) by mouth daily, Disp: 30 tablet, Rfl: 5    montelukast (SINGULAIR) 10 mg tablet, Take 10 mg by mouth daily at bedtime, Disp: , Rfl:     multivitamin (THERAGRAN) TABS, Take 1 tablet by mouth, Disp: , Rfl:     nitroglycerin (NITROSTAT) 0 4 mg SL tablet, Place 1 tablet under the tongue every 5 (five) minutes as needed, Disp: , Rfl:     Omega-3 Fatty Acids (OMEGA 3 500 PO), Take 1 capsule by mouth daily, Disp: , Rfl:     ONE TOUCH LANCETS MISC, by Does not apply route daily Test 2-3 times daily, Disp: , Rfl:     oseltamivir (TAMIFLU) 75 mg capsule, Take 1 capsule (75 mg total) by mouth daily for 5 days, Disp: 5 capsule, Rfl: 0    pantoprazole (PROTONIX) 40 mg tablet, Take 40 mg by mouth daily, Disp: , Rfl:     sitaGLIPtin (JANUVIA) 50 mg tablet, Take 1 tablet by mouth daily, Disp: , Rfl:     traMADol (ULTRAM) 50 mg tablet, Take 1 tablet (50 mg total) by mouth daily, Disp: 30 tablet, Rfl: 0    traMADol-acetaminophen (ULTRACET) 37 5-325 mg per tablet, Take 1-2 tablets by mouth every 12 (twelve) hours as needed for moderate pain, Disp: , Rfl:     TRUEPLUS INSULIN SYRINGE 31G X 5/16" 0 5 ML MISC, Inject under the skin 4 (four) times a day, Disp: 200 each, Rfl: 5    Vitamin E 200 units TABS, Take 1 capsule by mouth daily, Disp: , Rfl:     Current Facility-Administered Medications:     insulin detemir (LEVEMIR) subcutaneous injection 35 Units, 35 Units, Subcutaneous, Q12H [facility], Avi Reina MD    Laboratory Results:  Lab Results   Component Value Date    WBC 4 2 01/22/2018    HGB 12 0 01/22/2018    HGB 12 0 01/22/2018    HCT 37 3 01/22/2018    HCT 37 3 01/22/2018    MCV 93 5 01/22/2018    MCV 93 5 01/22/2018     01/22/2018     01/22/2018     Lab Results   Component Value Date    CALCIUM 9 7 01/22/2018    BUN 53 (H) 01/22/2018    CREATININE 1 64 (H) 01/22/2018     Lab Results   Component Value Date    CALCIUM 9 7 01/22/2018     No results found for: LABPROT

## 2018-02-14 NOTE — PROGRESS NOTES
NEPHROLOGY PROGRESS NOTE    Noris Dukes 74 y o  female MRN: [id number]  Unit/Bed#:  Encounter: [id number]  Reason for Consult:   Chronic renal insufficiency    ASSESSMENT/PLAN:  1  Renal     Patient's chronic renal insufficiency due to nephrosclerosis and I say this was there is no significant proteinuria and in fact her urine microalbumin is still 30 %period% creatinine is 1 6 likely due to fluctuations due to effective volume depletion as her diuretics work better at other times and she also was likely ill with the flu at that time  Overall proteinuria has an increased continue current medications creatinine will likely fluctuate  She will continue with routine follow-up as scheduled  Her blood pressure is excellent  SUBJECTIVE:  Review of Systems   Constitution: Negative  HENT: Negative  Eyes: Negative  Cardiovascular: Negative  Respiratory: Positive for cough  Negative for shortness of breath  Chest congestion   Gastrointestinal: Negative  Genitourinary: Negative  Neurological: Negative  OBJECTIVE:  Current Weight: Weight - Scale: 68 4 kg (150 lb 12 8 oz)  [email or web address]     Blood pressure 112/52, pulse 80, height 5' 0 51" (1 537 m), weight 68 4 kg (150 lb 12 8 oz)  , Body mass index is 28 95 kg/m²  [unfilled]    Physical Exam: /52 (BP Location: Left arm, Patient Position: Sitting, Cuff Size: Standard)   Pulse 80   Ht 5' 0 51" (1 537 m)   Wt 68 4 kg (150 lb 12 8 oz)   BMI 28 95 kg/m²   Physical Exam   Constitutional: She is oriented to person, place, and time  She appears well-nourished  No distress  HENT:   Head: Atraumatic  Mouth/Throat: No oropharyngeal exudate  Eyes: Conjunctivae are normal    Neck: Neck supple  No JVD present  Cardiovascular: Normal rate  Exam reveals no friction rub  No edema  Pulmonary/Chest: Effort normal  No respiratory distress  She has no wheezes  She has no rales     Few rhonchi left side greater than right Abdominal: Soft  Bowel sounds are normal  She exhibits no distension  There is no tenderness  There is no rebound  Neurological: She is alert and oriented to person, place, and time         Medications:    Current Outpatient Prescriptions:     albuterol (VENTOLIN HFA) 90 mcg/act inhaler, Inhale 2 puffs, Disp: , Rfl:     ascorbic acid (VITAMIN C) 500 mg tablet, Take 1 tablet by mouth daily, Disp: , Rfl:     aspirin 81 MG tablet, Take 1 tablet by mouth 2 (two) times a day, Disp: , Rfl:     atorvastatin (LIPITOR) 80 mg tablet, Take 80 mg by mouth daily, Disp: , Rfl:     bumetanide (BUMEX) 2 mg tablet, Take 1 tablet (2 mg total) by mouth daily, Disp: 30 tablet, Rfl: 5    Calcium Carbonate-Vit D-Min (CALCIUM 600+D PLUS MINERALS) 600-400 MG-UNIT CHEW, Chew 2 tablets daily, Disp: , Rfl:     Cholecalciferol (VITAMIN D3) 1000 units CAPS, Take 1 tablet by mouth daily, Disp: , Rfl:     cyclobenzaprine (FLEXERIL) 5 mg tablet, Take 1 tablet (5 mg total) by mouth 3 (three) times a day as needed for muscle spasms, Disp: 30 tablet, Rfl: 5    famotidine (PEPCID) 20 mg tablet, Take 1 tablet (20 mg total) by mouth daily for 90 days, Disp: 30 tablet, Rfl: 6    febuxostat (ULORIC) 40 mg tablet, Take 1 tablet (40 mg total) by mouth every other day (Patient taking differently: Take 40 mg by mouth daily  ), Disp: 15 tablet, Rfl: 5    ferrous gluconate (FERATE) 240 (27 FE) MG tablet, Take 1 tablet by mouth daily, Disp: , Rfl:     fluticasone (FLONASE) 50 mcg/act nasal spray, 2 sprays into each nostril daily, Disp: , Rfl:     fluticasone-salmeterol (ADVAIR DISKUS) 250-50 mcg/dose inhaler, Inhale 1 puff 2 (two) times a day, Disp: , Rfl:     Glucose Blood (ONETOUCH ULTRA BLUE VI), 2-3 application by In Vitro route daily, Disp: , Rfl:     insulin detemir (LEVEMIR FLEXTOUCH) subcutaneous injection pen 100 units/mL, Inject 35 Units under the skin 2 (two) times a day, Disp: 7 pen, Rfl: 0    insulin regular (HumuLIN R,NovoLIN R) 100 units/mL injection, Inject 10 Units under the skin 2 (two) times a day with lunch and dinner, Disp: , Rfl:     LANTUS 100 UNIT/ML subcutaneous injection, , Disp: , Rfl:     levothyroxine 75 mcg tablet, Take 1 tablet (75 mcg total) by mouth daily, Disp: 30 tablet, Rfl: 5    losartan (COZAAR) 50 mg tablet, Take 1 tablet (50 mg total) by mouth daily, Disp: 30 tablet, Rfl: 5    Magnesium 400 MG CAPS, Take 2 tablets by mouth daily, Disp: , Rfl:     metoprolol succinate (TOPROL-XL) 100 mg 24 hr tablet, Take 1 tablet (100 mg total) by mouth daily, Disp: 30 tablet, Rfl: 5    montelukast (SINGULAIR) 10 mg tablet, Take 10 mg by mouth daily at bedtime, Disp: , Rfl:     multivitamin (THERAGRAN) TABS, Take 1 tablet by mouth, Disp: , Rfl:     nitroglycerin (NITROSTAT) 0 4 mg SL tablet, Place 1 tablet under the tongue every 5 (five) minutes as needed, Disp: , Rfl:     Omega-3 Fatty Acids (OMEGA 3 500 PO), Take 1 capsule by mouth daily, Disp: , Rfl:     ONE TOUCH LANCETS MISC, by Does not apply route daily Test 2-3 times daily, Disp: , Rfl:     oseltamivir (TAMIFLU) 75 mg capsule, Take 1 capsule (75 mg total) by mouth daily for 5 days, Disp: 5 capsule, Rfl: 0    pantoprazole (PROTONIX) 40 mg tablet, Take 40 mg by mouth daily, Disp: , Rfl:     sitaGLIPtin (JANUVIA) 50 mg tablet, Take 1 tablet by mouth daily, Disp: , Rfl:     traMADol (ULTRAM) 50 mg tablet, Take 1 tablet (50 mg total) by mouth daily, Disp: 30 tablet, Rfl: 0    traMADol-acetaminophen (ULTRACET) 37 5-325 mg per tablet, Take 1-2 tablets by mouth every 12 (twelve) hours as needed for moderate pain, Disp: , Rfl:     TRUEPLUS INSULIN SYRINGE 31G X 5/16" 0 5 ML MISC, Inject under the skin 4 (four) times a day, Disp: 200 each, Rfl: 5    Vitamin E 200 units TABS, Take 1 capsule by mouth daily, Disp: , Rfl:     Current Facility-Administered Medications:     insulin detemir (LEVEMIR) subcutaneous injection 35 Units, 35 Units, Subcutaneous, Q12H [address], Walt Mckeon MD    Laboratory Results:  Lab Results   Component Value Date    WBC 4 2 01/22/2018    HGB 12 0 01/22/2018    HGB 12 0 01/22/2018    HCT 37 3 01/22/2018    HCT 37 3 01/22/2018    MCV 93 5 01/22/2018    MCV 93 5 01/22/2018     01/22/2018     01/22/2018     Lab Results   Component Value Date    CALCIUM 9 7 01/22/2018    BUN 53 (H) 01/22/2018    CREATININE 1 64 (H) 01/22/2018     Lab Results   Component Value Date    CALCIUM 9 7 01/22/2018     No results found for: LABPROT

## 2018-02-14 NOTE — PATIENT INSTRUCTIONS
As we discussed the creatinine level was 1 6 that is a measure of your kidney function  Last year was around 1 3 but there is no evidence of protein in the urine still so I do not see signs of aggressive kidney disease  I think the fluctuations could be due to medicines such as your diuretic as we discussed some days it works better than others an you might be little subtly dehydrated that can make a change  Other than that even doing very well  You also may have been ill around that time his urine output recovering from the flu  Otherwise look great blood pressure is great continue current medications and follow up with her family doctor and I will follow up with you as we discussed

## 2018-02-22 ENCOUNTER — TELEPHONE (OUTPATIENT)
Dept: INTERNAL MEDICINE CLINIC | Facility: CLINIC | Age: 73
End: 2018-02-22

## 2018-02-23 ENCOUNTER — PROCEDURE VISIT (OUTPATIENT)
Dept: GYNECOLOGY | Facility: CLINIC | Age: 73
End: 2018-02-23

## 2018-02-23 VITALS
DIASTOLIC BLOOD PRESSURE: 62 MMHG | HEART RATE: 78 BPM | WEIGHT: 146 LBS | HEIGHT: 60 IN | SYSTOLIC BLOOD PRESSURE: 132 MMHG | BODY MASS INDEX: 28.66 KG/M2

## 2018-02-23 NOTE — PROGRESS NOTES
Skin excision  Date/Time: 2/23/2018 2:05 PM  Performed by: Mc Oswald  Authorized by: Mc Oswald     Lesion 6:          Patient here for vulvar biopsy  LMP 20 years ago  State that she started with internal and external vaginal itching aprox 5 weeks ago  Denies scratching  Bathes daily with dial liquid soap  5 weeks ago she noticed blood on her underwear  Saturating a panty liner in 3 hours x 7 days then intermittent spotting until now  Denies pelvic pain, bloating or bowel changes,  complaints  Moves bowel every other day-formed, normal, brown  Takes daily stool softener  Does not believe blood is rectal  Admits to frequent occurrences of vuvar itching in her 30's and 40's with no treatment  Denies urinary incontinence  Not sexually active  Last coitus 14 years ago   x 56 years, Yue Duran who is in poor health  Pelvic US on 2/9/18 showed atrophic uterus with normal endo stripe with trace endometrial canal fluid likely due to a stenotic cervical os

## 2018-02-28 DIAGNOSIS — M54.5 LOW BACK PAIN, UNSPECIFIED BACK PAIN LATERALITY, UNSPECIFIED CHRONICITY, WITH SCIATICA PRESENCE UNSPECIFIED: ICD-10-CM

## 2018-02-28 DIAGNOSIS — E11.8 TYPE 2 DIABETES MELLITUS WITH COMPLICATION, WITH LONG-TERM CURRENT USE OF INSULIN (HCC): Primary | ICD-10-CM

## 2018-02-28 DIAGNOSIS — Z79.4 TYPE 2 DIABETES MELLITUS WITH COMPLICATION, WITH LONG-TERM CURRENT USE OF INSULIN (HCC): Primary | ICD-10-CM

## 2018-02-28 RX ORDER — TRAMADOL HYDROCHLORIDE 50 MG/1
50 TABLET ORAL 2 TIMES DAILY
Qty: 60 TABLET | Refills: 3 | Status: SHIPPED | OUTPATIENT
Start: 2018-02-28 | End: 2018-06-26 | Stop reason: SDUPTHER

## 2018-03-01 ENCOUNTER — TELEPHONE (OUTPATIENT)
Dept: INTERNAL MEDICINE CLINIC | Age: 73
End: 2018-03-01

## 2018-03-01 ENCOUNTER — TELEPHONE (OUTPATIENT)
Dept: GYNECOLOGY | Facility: CLINIC | Age: 73
End: 2018-03-01

## 2018-03-01 NOTE — TELEPHONE ENCOUNTER
Patient's spouse was in the [facility] office and wanted you to know that Pepcid is not strong enough for the patient  What can she do? Patient can be reached at home number  Thank you!

## 2018-03-02 DIAGNOSIS — K21.9 GERD WITHOUT ESOPHAGITIS: ICD-10-CM

## 2018-03-02 RX ORDER — FAMOTIDINE 20 MG/1
20 TABLET, FILM COATED ORAL 2 TIMES DAILY
Qty: 180 TABLET | Refills: 1 | Status: SHIPPED | OUTPATIENT
Start: 2018-03-02 | End: 2018-09-11 | Stop reason: SDUPTHER

## 2018-03-13 ENCOUNTER — TELEPHONE (OUTPATIENT)
Dept: INTERNAL MEDICINE CLINIC | Age: 73
End: 2018-03-13

## 2018-03-13 NOTE — TELEPHONE ENCOUNTER
[facility] and spoke to the pharmacist to find out what was going on with the pt's Rx  She indicated that a prior [address] is not required  She is not sure if the pt is confused b/c now the quantity has changed from 90 to 30  She said she was going to call the pt and call me back tomorrow

## 2018-03-13 NOTE — TELEPHONE ENCOUNTER
Renato Hernandez came in and said that the Cyclobenzapr tab 5MG is not covered through the insurance, She needs approval for coverage or an exception from coverage criteria  She stated that she is gets it from the pharmacy it will cost her over $60  If you could please look into this for Renato Hernandez and let her know  Thank you

## 2018-03-19 ENCOUNTER — TELEPHONE (OUTPATIENT)
Dept: INTERNAL MEDICINE CLINIC | Age: 73
End: 2018-03-19

## 2018-03-19 ENCOUNTER — TELEPHONE (OUTPATIENT)
Dept: GYNECOLOGY | Facility: CLINIC | Age: 73
End: 2018-03-19

## 2018-03-19 NOTE — TELEPHONE ENCOUNTER
Patient phoned to schedule appointment-we will call her at [phone] to scheduled the appointment once we receive the paperwork from her dentist's office

## 2018-03-19 NOTE — TELEPHONE ENCOUNTER
Discussed with Dr Renea Arzola  Will increase dosing to 36 units twice daily and monitor effect  LMOM to discuss

## 2018-03-19 NOTE — TELEPHONE ENCOUNTER
----- Message from Itzel Montanez MA sent at 3/2/2018  9:07 AM EST -----  Regarding: RE: Anesthesia  I called her dentist which is closed until 03/19/2018 due to remodeling  On the recording they gave another number to a different office incase of an emergency  I called that office and spoke to the  (gave her patient info) she said she will see if she can get a hold of someone from that office and then give me a call back  Waiting to hear back from them     ----- Message -----  From: Tasia Reina  Sent: 3/1/2018   4:48 PM  To: Cynthia [facility]  Subject: Anesthesia                                       Braden Chacon ([phone]) phoned to let you know she is working on locating the allergy in patient's file at the dentist's office  She will probably have the information on Monday, 3/5/2018

## 2018-03-19 NOTE — TELEPHONE ENCOUNTER
Patients of Dr Christiano Winchester switched her to Levemir and her sugar is not dropping  Last night it was 241 and this morning 162  Not sure what to do?   Please call her       Cell# [phone]  Pharmacy# [facility]

## 2018-03-21 NOTE — TELEPHONE ENCOUNTER
Called again and spoke to pharmacist at [facility] and he indicated that no prior [address] [facility] is needed for pt's medication  Pt's med went through the insurance with no problem

## 2018-03-26 ENCOUNTER — TELEPHONE (OUTPATIENT)
Dept: GYNECOLOGY | Facility: CLINIC | Age: 73
End: 2018-03-26

## 2018-03-27 ENCOUNTER — TELEPHONE (OUTPATIENT)
Dept: INTERNAL MEDICINE CLINIC | Facility: CLINIC | Age: 73
End: 2018-03-27

## 2018-04-09 ENCOUNTER — TELEPHONE (OUTPATIENT)
Dept: GYNECOLOGY | Facility: CLINIC | Age: 73
End: 2018-04-09

## 2018-04-09 NOTE — TELEPHONE ENCOUNTER
Please call the patient to schedule her vulvar biopsy  Please verify her current medications  I want to make sure she is not taking a blood thinner  Dentist office said she is not allergic to the anesthesia they gave her but it elevated her heart rate  (lidocaine with epi)  We will not use epinephrine

## 2018-04-17 ENCOUNTER — PROCEDURE VISIT (OUTPATIENT)
Dept: GYNECOLOGY | Facility: CLINIC | Age: 73
End: 2018-04-17
Payer: COMMERCIAL

## 2018-04-17 VITALS
HEART RATE: 70 BPM | RESPIRATION RATE: 14 BRPM | HEIGHT: 60 IN | SYSTOLIC BLOOD PRESSURE: 128 MMHG | BODY MASS INDEX: 28.35 KG/M2 | DIASTOLIC BLOOD PRESSURE: 62 MMHG | WEIGHT: 144.4 LBS

## 2018-04-17 DIAGNOSIS — N90.89 VULVAR LESION: Primary | ICD-10-CM

## 2018-04-17 PROCEDURE — 56605 BIOPSY OF VULVA/PERINEUM: CPT | Performed by: NURSE PRACTITIONER

## 2018-04-17 PROCEDURE — 88312 SPECIAL STAINS GROUP 1: CPT | Performed by: PATHOLOGY

## 2018-04-17 PROCEDURE — 88305 TISSUE EXAM BY PATHOLOGIST: CPT | Performed by: PATHOLOGY

## 2018-04-17 NOTE — PATIENT INSTRUCTIONS
Eligio Elsa presented for left lower vestibule biopsy  Area was cleaned with betadine  Local anesthesia was achieved with injecting a small amount of 2% lidocaine without epinephrine  A Keyes type sterile punch biopsy was obtained and submitted to pathology 4 mm sized  Hemostasis was obtained with pressure and use of silver nitrate  Porsha tolerated procedure well  Biopsy care discussed -keep surgical site clean and dry  Dry sterile dressing applied and extras given  Use antibacterial soap to clean once daily  Can use antibiotic ointment to area once daily  Call office with sudden increase in pain, bleeding, malodorous discharge  No sex until healed and avoid rubbing  Porsha instructed to call with signs or symptoms of infection and to follow up as instructed

## 2018-04-17 NOTE — PROGRESS NOTES
Biopsy  Date/Time: 4/17/2018 2:21 PM  Performed by: Sameera Spence  Authorized by: Sameera Spence     Procedure Details - Lesion Biopsy:     Skin lesion 1 location: left lower vestibule  Initial size (mm):  4    Final defect size (mm):  4    Destruction method: punch biopsy       Eligio Hunter presented for left lower vestibule biopsy  Area was cleaned with betadine  Local anesthesia was achieved with injecting a small amount of 2% lidocaine without epinephrine  A Keyes type sterile punch biopsy was obtained and submitted to pathology 4 mm sized  Hemostasis was obtained with pressure and use of silver nitrate  Porsha tolerated procedure well  Biopsy care discussed -keep surgical site clean and dry  Dry sterile dressing applied and extras given  Use antibacterial soap to clean once daily  Can use antibiotic ointment to area once daily  Call office with sudden increase in pain, bleeding, malodorous discharge  No sex until healed and avoid rubbing  Porsha instructed to call with signs or symptoms of infection and to follow up as instructed

## 2018-04-24 NOTE — PROGRESS NOTES
Assessment/Plan   Results of biopsy will be called  Keep area clean and dry  Apply antibiotic ointment twice daily until completely healed  Call with any signs of infection such as redness, pain, drainage of fever  Diagnoses and all orders for this visit:    Vulvar lesion          Subjective:      Patient ID: Rahul Trevino is a 74 y o  female  Patient here for follow up from left lower vestibule biopsy completed on 4/17/18  No complaints of pain, drainage or fever  She is bathing daily and applying antibiotic ointment  No concerns offered  The following portions of the patient's history were reviewed and updated as appropriate: allergies, current medications, past medical history and problem list     Review of Systems   Constitutional: Negative  Gastrointestinal: Negative for nausea and vomiting  Genitourinary: Negative for pelvic pain, vaginal bleeding, vaginal discharge and vaginal pain  Skin: Negative  Hematological: Negative for adenopathy  Objective: There were no vitals taken for this visit  Physical Exam   Constitutional: She is oriented to person, place, and time  She appears well-developed and well-nourished  Genitourinary:   Genitourinary Comments: Left lower vestibule biopsy site is clean and without signs of infection  Area cleaned with H2O2 and then NSS  Gently dried and antibiotic ointment applied  Neurological: She is alert and oriented to person, place, and time  Skin: Skin is warm and dry  Psychiatric: She has a normal mood and affect  Nursing note and vitals reviewed

## 2018-04-25 ENCOUNTER — OFFICE VISIT (OUTPATIENT)
Dept: GYNECOLOGY | Facility: CLINIC | Age: 73
End: 2018-04-25
Payer: COMMERCIAL

## 2018-04-25 VITALS
SYSTOLIC BLOOD PRESSURE: 122 MMHG | BODY MASS INDEX: 28.39 KG/M2 | HEIGHT: 60 IN | HEART RATE: 57 BPM | DIASTOLIC BLOOD PRESSURE: 60 MMHG | WEIGHT: 144.6 LBS

## 2018-04-25 DIAGNOSIS — N90.89 VULVAR LESION: Primary | ICD-10-CM

## 2018-04-25 PROCEDURE — 99212 OFFICE O/P EST SF 10 MIN: CPT | Performed by: NURSE PRACTITIONER

## 2018-04-25 NOTE — PATIENT INSTRUCTIONS
Results of biopsy will be called  Keep area clean and dry  Apply antibiotic ointment twice daily until completely healed  Call with any signs of infection such as redness, pain, drainage of fever

## 2018-05-02 ENCOUNTER — TELEPHONE (OUTPATIENT)
Dept: GYNECOLOGY | Facility: CLINIC | Age: 73
End: 2018-05-02

## 2018-05-02 DIAGNOSIS — N90.89 VULVAR LESION: Primary | ICD-10-CM

## 2018-05-02 RX ORDER — CLOBETASOL PROPIONATE 0.5 MG/G
OINTMENT TOPICAL 2 TIMES DAILY
Qty: 30 G | Refills: 0 | Status: SHIPPED | OUTPATIENT
Start: 2018-05-02 | End: 2018-06-28 | Stop reason: SDUPTHER

## 2018-05-02 NOTE — TELEPHONE ENCOUNTER
Clobetasol cream is not covered  They do no know what is covered, but asked that you try an alternative

## 2018-05-08 LAB
BASOPHILS # BLD AUTO: 19 CELLS/UL (ref 0–200)
BASOPHILS NFR BLD AUTO: 0.4 %
BUN SERPL-MCNC: 50 MG/DL (ref 7–25)
BUN/CREAT SERPL: 29 (CALC) (ref 6–22)
CALCIUM SERPL-MCNC: 10.2 MG/DL (ref 8.6–10.4)
CHLORIDE SERPL-SCNC: 102 MMOL/L (ref 98–110)
CO2 SERPL-SCNC: 29 MMOL/L (ref 20–31)
CREAT SERPL-MCNC: 1.74 MG/DL (ref 0.6–0.93)
EOSINOPHIL # BLD AUTO: 259 CELLS/UL (ref 15–500)
EOSINOPHIL NFR BLD AUTO: 5.4 %
ERYTHROCYTE [DISTWIDTH] IN BLOOD BY AUTOMATED COUNT: 12.4 % (ref 11–15)
GLUCOSE SERPL-MCNC: 89 MG/DL (ref 65–99)
HBA1C MFR BLD: 7.3 % OF TOTAL HGB
HCT VFR BLD AUTO: 38.1 % (ref 35–45)
HGB BLD-MCNC: 12.5 G/DL (ref 11.7–15.5)
LYMPHOCYTES # BLD AUTO: 1680 CELLS/UL (ref 850–3900)
LYMPHOCYTES NFR BLD AUTO: 35 %
MCH RBC QN AUTO: 30.6 PG (ref 27–33)
MCHC RBC AUTO-ENTMCNC: 32.8 G/DL (ref 32–36)
MCV RBC AUTO: 93.2 FL (ref 80–100)
MONOCYTES # BLD AUTO: 739 CELLS/UL (ref 200–950)
MONOCYTES NFR BLD AUTO: 15.4 %
NEUTROPHILS # BLD AUTO: 2102 CELLS/UL (ref 1500–7800)
NEUTROPHILS NFR BLD AUTO: 43.8 %
PLATELET # BLD AUTO: 234 THOUSAND/UL (ref 140–400)
PMV BLD REES-ECKER: 11.5 FL (ref 7.5–12.5)
POTASSIUM SERPL-SCNC: 4.7 MMOL/L (ref 3.5–5.3)
RBC # BLD AUTO: 4.09 MILLION/UL (ref 3.8–5.1)
SL AMB EGFR AFRICAN AMERICAN: 33 ML/MIN/1.73M2
SL AMB EGFR NON AFRICAN AMERICAN: 28 ML/MIN/1.73M2
SODIUM SERPL-SCNC: 137 MMOL/L (ref 135–146)
WBC # BLD AUTO: 4.8 THOUSAND/UL (ref 3.8–10.8)

## 2018-05-09 DIAGNOSIS — Z12.11 SCREENING FOR COLON CANCER: Primary | ICD-10-CM

## 2018-05-11 ENCOUNTER — OFFICE VISIT (OUTPATIENT)
Dept: INTERNAL MEDICINE CLINIC | Age: 73
End: 2018-05-11
Payer: COMMERCIAL

## 2018-05-11 VITALS
TEMPERATURE: 96.8 F | HEART RATE: 96 BPM | DIASTOLIC BLOOD PRESSURE: 64 MMHG | WEIGHT: 144.2 LBS | SYSTOLIC BLOOD PRESSURE: 106 MMHG | OXYGEN SATURATION: 96 % | BODY MASS INDEX: 29.07 KG/M2 | HEIGHT: 59 IN

## 2018-05-11 DIAGNOSIS — E03.9 HYPOTHYROIDISM, UNSPECIFIED TYPE: ICD-10-CM

## 2018-05-11 DIAGNOSIS — J31.0 RHINITIS, UNSPECIFIED TYPE: ICD-10-CM

## 2018-05-11 DIAGNOSIS — J45.41 MODERATE PERSISTENT ASTHMA WITH ACUTE EXACERBATION: ICD-10-CM

## 2018-05-11 DIAGNOSIS — M54.5 LOW BACK PAIN, UNSPECIFIED BACK PAIN LATERALITY, UNSPECIFIED CHRONICITY, WITH SCIATICA PRESENCE UNSPECIFIED: ICD-10-CM

## 2018-05-11 DIAGNOSIS — Z79.4 TYPE 2 DIABETES MELLITUS WITH COMPLICATION, WITH LONG-TERM CURRENT USE OF INSULIN (HCC): Primary | ICD-10-CM

## 2018-05-11 DIAGNOSIS — N18.30 CKD (CHRONIC KIDNEY DISEASE) STAGE 3, GFR 30-59 ML/MIN (HCC): ICD-10-CM

## 2018-05-11 DIAGNOSIS — E11.8 TYPE 2 DIABETES MELLITUS WITH COMPLICATION, WITH LONG-TERM CURRENT USE OF INSULIN (HCC): Primary | ICD-10-CM

## 2018-05-11 PROCEDURE — 1101F PT FALLS ASSESS-DOCD LE1/YR: CPT | Performed by: INTERNAL MEDICINE

## 2018-05-11 PROCEDURE — 99214 OFFICE O/P EST MOD 30 MIN: CPT | Performed by: INTERNAL MEDICINE

## 2018-05-11 RX ORDER — FLUTICASONE PROPIONATE 50 MCG
2 SPRAY, SUSPENSION (ML) NASAL DAILY
Qty: 16 G | Refills: 1 | Status: SHIPPED | OUTPATIENT
Start: 2018-05-11 | End: 2018-09-11 | Stop reason: SDUPTHER

## 2018-05-11 RX ORDER — MONTELUKAST SODIUM 10 MG/1
10 TABLET ORAL
Qty: 30 TABLET | Refills: 5 | Status: SHIPPED | OUTPATIENT
Start: 2018-05-11 | End: 2018-09-11 | Stop reason: SDUPTHER

## 2018-05-11 RX ORDER — LEVOTHYROXINE SODIUM 0.07 MG/1
75 TABLET ORAL DAILY
Qty: 30 TABLET | Refills: 3 | Status: SHIPPED | OUTPATIENT
Start: 2018-05-11 | End: 2018-09-11 | Stop reason: SDUPTHER

## 2018-05-11 RX ORDER — CYCLOBENZAPRINE HCL 5 MG
5 TABLET ORAL 3 TIMES DAILY PRN
Qty: 90 TABLET | Refills: 3 | Status: SHIPPED | OUTPATIENT
Start: 2018-05-11 | End: 2018-09-11 | Stop reason: SDUPTHER

## 2018-05-11 RX ORDER — FENOFIBRATE 145 MG/1
67 TABLET, COATED ORAL DAILY
Status: ON HOLD | COMMUNITY
End: 2022-06-29 | Stop reason: ALTCHOICE
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
## 2018-05-16 RX ORDER — ATORVASTATIN CALCIUM 80 MG/1
TABLET, FILM COATED ORAL
Qty: 30 TABLET | Refills: 5 | OUTPATIENT
Start: 2018-05-16

## 2018-06-01 DIAGNOSIS — E78.5 HYPERLIPIDEMIA, UNSPECIFIED HYPERLIPIDEMIA TYPE: Primary | ICD-10-CM

## 2018-06-01 RX ORDER — ATORVASTATIN CALCIUM 80 MG/1
80 TABLET, FILM COATED ORAL DAILY
Qty: 30 TABLET | Refills: 5 | Status: SHIPPED | OUTPATIENT
Start: 2018-06-01 | End: 2018-09-11 | Stop reason: SDUPTHER

## 2018-06-01 RX ORDER — ATORVASTATIN CALCIUM 80 MG/1
TABLET, FILM COATED ORAL
Qty: 30 TABLET | Refills: 5 | OUTPATIENT
Start: 2018-06-01

## 2018-06-14 ENCOUNTER — DOCTOR'S OFFICE (OUTPATIENT)
Dept: URBAN - METROPOLITAN AREA CLINIC 137 | Facility: CLINIC | Age: 73
Setting detail: OPHTHALMOLOGY
End: 2018-06-14
Payer: COMMERCIAL

## 2018-06-14 DIAGNOSIS — H52.4: ICD-10-CM

## 2018-06-14 PROCEDURE — 92014 COMPRE OPH EXAM EST PT 1/>: CPT | Performed by: OPHTHALMOLOGY

## 2018-06-14 ASSESSMENT — REFRACTION_MANIFEST
OD_VA2: 20/
OU_VA: 20/
OS_VA2: 20/
OD_VA1: 20/
OS_VA1: 20/
OS_VA3: 20/
OD_VA3: 20/

## 2018-06-14 ASSESSMENT — REFRACTION_OUTSIDERX
OD_VA1: 20/30-1
OS_VA1: 20/60-1
OD_VA1: 20/60+1
OS_VA3: 20/
OS_VA3: 20/
OU_VA: 20/60-1
OS_VA2: 20/30(J2)
OS_ADD: +2.50
OD_VA2: 20/25
OS_ADD: +3.25
OS_VA2: 20/25
OD_VA2: 20/30(J2)
OU_VA: 20/
OD_AXIS: 036
OS_VA1: 20/80+1
OD_CYLINDER: +0.50
OS_SPHERE: -0.75
OS_SPHERE: -0.75
OD_VA3: 20/
OD_CYLINDER: +0.50
OS_CYLINDER: SPH
OD_AXIS: 036
OD_VA3: 20/
OS_CYLINDER: SPH
OD_SPHERE: -2.50
OD_ADD: +3.25
OD_SPHERE: -2.75
OD_ADD: +2.50

## 2018-06-14 ASSESSMENT — REFRACTION_CURRENTRX
OD_OVR_VA: 20/
OD_SPHERE: -2.00
OD_SPHERE: -2.50
OS_SPHERE: +0.50
OD_VPRISM_DIRECTION: TRF
OS_CYLINDER: SPH
OS_VPRISM_DIRECTION: TRF
OS_OVR_VA: 20/
OD_AXIS: 036
OD_OVR_VA: 20/
OS_OVR_VA: 20/
OD_CYLINDER: +0.25
OS_OVR_VA: 20/
OD_ADD: +2.50
OD_VPRISM_DIRECTION: TRF
OS_SPHERE: -0.75
OD_OVR_VA: 20/
OS_ADD: +2.50
OS_ADD: +3.00
OD_AXIS: 030
OS_VPRISM_DIRECTION: TRF
OD_CYLINDER: +0.50
OD_ADD: +3.00
OS_CYLINDER: SPH

## 2018-06-14 ASSESSMENT — REFRACTION_AUTOREFRACTION
OD_AXIS: 025
OS_CYLINDER: +0.75
OD_SPHERE: -2.25
OS_SPHERE: -0.75
OS_AXIS: 157
OD_CYLINDER: +0.75

## 2018-06-14 ASSESSMENT — VISUAL ACUITY
OS_BCVA: 20/60
OD_BCVA: 20/80+2

## 2018-06-14 ASSESSMENT — CONFRONTATIONAL VISUAL FIELD TEST (CVF)
OS_FINDINGS: FULL
OD_FINDINGS: FULL

## 2018-06-14 ASSESSMENT — SPHEQUIV_DERIVED
OD_SPHEQUIV: -1.875
OS_SPHEQUIV: -0.375

## 2018-06-26 DIAGNOSIS — M54.5 LOW BACK PAIN, UNSPECIFIED BACK PAIN LATERALITY, UNSPECIFIED CHRONICITY, WITH SCIATICA PRESENCE UNSPECIFIED: ICD-10-CM

## 2018-06-26 DIAGNOSIS — J45.909 ASTHMA WITHOUT STATUS ASTHMATICUS WITHOUT COMPLICATION, UNSPECIFIED ASTHMA SEVERITY, UNSPECIFIED WHETHER PERSISTENT: Primary | ICD-10-CM

## 2018-06-26 RX ORDER — TRAMADOL HYDROCHLORIDE 50 MG/1
50 TABLET ORAL 2 TIMES DAILY
Qty: 60 TABLET | Refills: 0 | Status: SHIPPED | OUTPATIENT
Start: 2018-06-26 | End: 2018-07-25 | Stop reason: SDUPTHER

## 2018-06-28 ENCOUNTER — OFFICE VISIT (OUTPATIENT)
Dept: GYNECOLOGY | Facility: CLINIC | Age: 73
End: 2018-06-28
Payer: COMMERCIAL

## 2018-06-28 VITALS
HEIGHT: 58 IN | WEIGHT: 143.8 LBS | SYSTOLIC BLOOD PRESSURE: 130 MMHG | BODY MASS INDEX: 30.19 KG/M2 | DIASTOLIC BLOOD PRESSURE: 58 MMHG | HEART RATE: 70 BPM

## 2018-06-28 DIAGNOSIS — N90.89 VULVAR LESION: Primary | ICD-10-CM

## 2018-06-28 PROCEDURE — 99213 OFFICE O/P EST LOW 20 MIN: CPT | Performed by: NURSE PRACTITIONER

## 2018-06-28 RX ORDER — CLOBETASOL PROPIONATE 0.5 MG/G
OINTMENT TOPICAL 2 TIMES DAILY
Qty: 30 G | Refills: 1 | Status: ON HOLD | OUTPATIENT
Start: 2018-06-28 | End: 2022-06-29 | Stop reason: ALTCHOICE

## 2018-06-28 NOTE — PATIENT INSTRUCTIONS
Continue to use clobetasol ointment to affected area twice daily for 2 more months then 3 times per week  RTO in 2 months for re evaluation and annual visit  Call office with any concerns

## 2018-06-28 NOTE — PROGRESS NOTES
Assessment/Plan:     Continue to use clobetasol ointment to affected area twice daily for 2 more months then 3 times per week  RTO in 2 months for re evaluation and annual visit  Call office with any concerns  Diagnoses and all orders for this visit:    Vulvar lesion  -     clobetasol (TEMOVATE) 0 05 % ointment; Apply topically 2 (two) times a day Until skin texture normalizes another 2 months  then use three times weekly on affected area              Subjective:      Patient ID: Harjit Diaz is a 74 y o  female  Harjit Diaz is a 74 y o  female who is here today for a follow up visit  States vulva is "feeling much better with no bleeding" since using clobetasol twice daily  Pleased with improvement  Last annual gyn exam was > 18 years ago  No paps since then  The following portions of the patient's history were reviewed and updated as appropriate: allergies, current medications, past family history, past medical history, past social history, past surgical history and problem list     Review of Systems   Constitutional: Negative  Genitourinary: Negative for genital sores, pelvic pain, vaginal bleeding, vaginal discharge and vaginal pain  Hematological: Negative for adenopathy  Psychiatric/Behavioral: Negative  Objective:      /58 (BP Location: Left arm, Patient Position: Sitting)   Pulse 70   Ht 4' 10" (1 473 m)   Wt 65 2 kg (143 lb 12 8 oz)   LMP  (LMP Unknown)   BMI 30 05 kg/m²          Physical Exam   Constitutional: She is oriented to person, place, and time  She appears well-developed and well-nourished  Genitourinary: No labial fusion  There is lesion (areas of slight hypopigmentation on vestibule  No areas of excoriation  ) on the right labia  There is no rash, tenderness or injury on the right labia  There is lesion on the left labia  There is no rash, tenderness or injury on the left labia     Neurological: She is alert and oriented to person, place, and time    Skin: Skin is warm and dry  Psychiatric: She has a normal mood and affect  Nursing note and vitals reviewed

## 2018-07-12 LAB
LEFT EYE DIABETIC RETINOPATHY: NORMAL
RIGHT EYE DIABETIC RETINOPATHY: NORMAL

## 2018-07-12 PROCEDURE — 3072F LOW RISK FOR RETINOPATHY: CPT | Performed by: INTERNAL MEDICINE

## 2018-07-19 DIAGNOSIS — Z79.4 TYPE 2 DIABETES MELLITUS WITH COMPLICATION, WITH LONG-TERM CURRENT USE OF INSULIN (HCC): ICD-10-CM

## 2018-07-19 DIAGNOSIS — E11.8 TYPE 2 DIABETES MELLITUS WITH COMPLICATION, WITH LONG-TERM CURRENT USE OF INSULIN (HCC): ICD-10-CM

## 2018-07-25 DIAGNOSIS — M54.5 LOW BACK PAIN, UNSPECIFIED BACK PAIN LATERALITY, UNSPECIFIED CHRONICITY, WITH SCIATICA PRESENCE UNSPECIFIED: ICD-10-CM

## 2018-07-25 RX ORDER — TRAMADOL HYDROCHLORIDE 50 MG/1
50 TABLET ORAL 2 TIMES DAILY
Qty: 60 TABLET | Refills: 0 | Status: SHIPPED | OUTPATIENT
Start: 2018-07-25 | End: 2018-08-23 | Stop reason: SDUPTHER

## 2018-08-07 DIAGNOSIS — I10 HYPERTENSION, UNSPECIFIED TYPE: ICD-10-CM

## 2018-08-07 PROCEDURE — 4010F ACE/ARB THERAPY RXD/TAKEN: CPT | Performed by: INTERNAL MEDICINE

## 2018-08-07 RX ORDER — LOSARTAN POTASSIUM 50 MG/1
50 TABLET ORAL DAILY
Qty: 30 TABLET | Refills: 5 | Status: SHIPPED | OUTPATIENT
Start: 2018-08-07 | End: 2018-12-31 | Stop reason: SDUPTHER

## 2018-08-17 DIAGNOSIS — E11.8 TYPE 2 DIABETES MELLITUS WITH COMPLICATION, WITH LONG-TERM CURRENT USE OF INSULIN (HCC): ICD-10-CM

## 2018-08-17 DIAGNOSIS — Z79.4 TYPE 2 DIABETES MELLITUS WITH COMPLICATION, WITH LONG-TERM CURRENT USE OF INSULIN (HCC): ICD-10-CM

## 2018-08-17 RX ORDER — INSULIN DETEMIR 100 [IU]/ML
INJECTION, SOLUTION SUBCUTANEOUS
Qty: 20 ML | Refills: 1 | OUTPATIENT
Start: 2018-08-17

## 2018-08-23 DIAGNOSIS — M54.5 LOW BACK PAIN, UNSPECIFIED BACK PAIN LATERALITY, UNSPECIFIED CHRONICITY, WITH SCIATICA PRESENCE UNSPECIFIED: ICD-10-CM

## 2018-08-23 DIAGNOSIS — I10 HYPERTENSION, UNSPECIFIED TYPE: ICD-10-CM

## 2018-08-23 RX ORDER — TRAMADOL HYDROCHLORIDE 50 MG/1
50 TABLET ORAL 2 TIMES DAILY
Qty: 60 TABLET | Refills: 0 | Status: SHIPPED | OUTPATIENT
Start: 2018-08-23 | End: 2018-09-11 | Stop reason: SDUPTHER

## 2018-08-23 RX ORDER — BUMETANIDE 2 MG/1
2 TABLET ORAL DAILY
Qty: 30 TABLET | Refills: 5 | Status: SHIPPED | OUTPATIENT
Start: 2018-08-23 | End: 2018-12-31 | Stop reason: SDUPTHER

## 2018-08-28 ENCOUNTER — ANNUAL EXAM (OUTPATIENT)
Dept: GYNECOLOGY | Facility: CLINIC | Age: 73
End: 2018-08-28
Payer: COMMERCIAL

## 2018-08-28 VITALS
SYSTOLIC BLOOD PRESSURE: 122 MMHG | HEIGHT: 58 IN | HEART RATE: 69 BPM | BODY MASS INDEX: 30.77 KG/M2 | WEIGHT: 146.6 LBS | DIASTOLIC BLOOD PRESSURE: 70 MMHG

## 2018-08-28 DIAGNOSIS — Z01.411 ENCNTR FOR GYN EXAM (GENERAL) (ROUTINE) W ABNORMAL FINDINGS: Primary | ICD-10-CM

## 2018-08-28 DIAGNOSIS — N95.2 VAGINAL ATROPHY: ICD-10-CM

## 2018-08-28 DIAGNOSIS — Z12.31 ENCOUNTER FOR SCREENING MAMMOGRAM FOR BREAST CANCER: ICD-10-CM

## 2018-08-28 PROCEDURE — 3725F SCREEN DEPRESSION PERFORMED: CPT | Performed by: NURSE PRACTITIONER

## 2018-08-28 PROCEDURE — S0612 ANNUAL GYNECOLOGICAL EXAMINA: HCPCS | Performed by: NURSE PRACTITIONER

## 2018-08-28 PROCEDURE — G0145 SCR C/V CYTO,THINLAYER,RESCR: HCPCS | Performed by: NURSE PRACTITIONER

## 2018-08-28 PROCEDURE — 87624 HPV HI-RISK TYP POOLED RSLT: CPT | Performed by: NURSE PRACTITIONER

## 2018-08-28 NOTE — PROGRESS NOTES
Assessment/Plan:     Calcium 1200-1500mg + 1000 IU Vit D daily  Pap with HR HPV q 3 years-done due to irregular history  Annual mammogram  DEXA scan and colonoscopy-followed by PCP  Monthly BSE  Exercise as tolerated Kegels 20 times twice daily  Negative depression screen  Plans to have PCP order next DEXA  Diagnoses and all orders for this visit:    Encntr for gyn exam (general) (routine) w abnormal findings  -     Liquid-based pap, screening    Vaginal atrophy    Encounter for screening mammogram for breast cancer              Subjective:      Patient ID: Forest Marin is a 74 y o  female  Forest Marin is a 74 y o  female who is here today for her annual visit  No health concerns  Denies vaginal bleeding or abnormal vaginal discharge  States vulva has healed well with use of clobetasol 3 times per week  Forest Marin is not sexually active with male partner/Kevin of 62 years  He was just treated for poison ivy  Last pap > 18 years ago  Admits to paps every 3 years prior to that which were always normal  Mammo done annually and just [facility] with no results yet  Last colonoscopy 3 years ago  Due for next 12/18 due to benign polyps  DEXA scan done 2 years ago  Ordered by PCP  Does not exercise  Admits to unsteady gait at times  The following portions of the patient's history were reviewed and updated as appropriate: allergies, current medications, past family history, past medical history, past social history, past surgical history and problem list     Review of Systems   Constitutional: Negative  Negative for activity change, appetite change, chills, diaphoresis, fatigue, fever and unexpected weight change  HENT: Negative for congestion, dental problem, sneezing, sore throat and trouble swallowing  Dry mouth   Eyes: Negative for visual disturbance  Respiratory: Negative for chest tightness and shortness of breath      Cardiovascular: Negative for chest pain and leg swelling  Gastrointestinal: Negative for abdominal pain, constipation, diarrhea, nausea and vomiting  Genitourinary: Negative for difficulty urinating, dysuria, frequency, hematuria, pelvic pain, urgency, vaginal bleeding, vaginal discharge and vaginal pain  Musculoskeletal: Positive for gait problem (slightly unsteady at times, PCP evaluating)  Negative for back pain and neck pain  Skin: Negative  Allergic/Immunologic: Negative  Neurological: Negative for weakness and headaches  Hematological: Negative for adenopathy  Psychiatric/Behavioral: Negative  Objective:      /70 (BP Location: Left arm, Patient Position: Sitting)   Pulse 69   Ht 4' 10" (1 473 m)   Wt 66 5 kg (146 lb 9 6 oz)   LMP  (LMP Unknown)   BMI 30 64 kg/m²          Physical Exam   Constitutional: She is oriented to person, place, and time  She appears well-developed and well-nourished  HENT:   Head: Normocephalic and atraumatic  Eyes: Right eye exhibits no discharge  Left eye exhibits no discharge  Neck: Normal range of motion  Neck supple  Cardiovascular: Normal rate, regular rhythm, normal heart sounds and intact distal pulses  Pulmonary/Chest: Effort normal and breath sounds normal    Abdominal: Soft  Genitourinary: Uterus normal  Rectal exam shows no external hemorrhoid  No breast swelling, tenderness, discharge or bleeding  No labial fusion  There is no rash, tenderness, lesion or injury on the right labia  There is no rash, tenderness, lesion or injury on the left labia  Cervix exhibits no motion tenderness, no discharge and no friability  Right adnexum displays no mass, no tenderness and no fullness  Left adnexum displays no mass, no tenderness and no fullness  No erythema, tenderness or bleeding in the vagina  No foreign body in the vagina  No signs of injury around the vagina  No vaginal discharge found     Genitourinary Comments: Dense breast tissue  Vulvovaginal atrophy  Cervix posterior and faces posterior     Musculoskeletal: Normal range of motion  Lymphadenopathy:     She has no cervical adenopathy  Right: No inguinal adenopathy present  Left: No inguinal adenopathy present  Neurological: She is alert and oriented to person, place, and time  Skin: Skin is warm and dry  Psychiatric: She has a normal mood and affect  Nursing note and vitals reviewed

## 2018-08-28 NOTE — PATIENT INSTRUCTIONS
Calcium 1200-1500mg + 1000 IU Vit D daily  Pap with HR HPV q 3 years-done due to irregular history  Annual mammogram  DEXA scan and colonoscopy-followed by PCP  Monthly BSE  Exercise as tolerated Kegels 20 times twice daily  Negative depression screen  Plans to have PCP order next DEXA

## 2018-08-31 LAB
HPV HR 12 DNA CVX QL NAA+PROBE: NEGATIVE
HPV16 DNA CVX QL NAA+PROBE: NEGATIVE
HPV18 DNA CVX QL NAA+PROBE: NEGATIVE

## 2018-09-04 LAB
LAB AP GYN PRIMARY INTERPRETATION: NORMAL
Lab: NORMAL

## 2018-09-11 ENCOUNTER — OFFICE VISIT (OUTPATIENT)
Dept: INTERNAL MEDICINE CLINIC | Age: 73
End: 2018-09-11
Payer: COMMERCIAL

## 2018-09-11 VITALS
BODY MASS INDEX: 29.19 KG/M2 | OXYGEN SATURATION: 99 % | SYSTOLIC BLOOD PRESSURE: 132 MMHG | TEMPERATURE: 96.8 F | HEART RATE: 62 BPM | DIASTOLIC BLOOD PRESSURE: 56 MMHG | WEIGHT: 144.8 LBS | HEIGHT: 59 IN

## 2018-09-11 DIAGNOSIS — J31.0 RHINITIS, UNSPECIFIED TYPE: ICD-10-CM

## 2018-09-11 DIAGNOSIS — J45.41 MODERATE PERSISTENT ASTHMA WITH ACUTE EXACERBATION: ICD-10-CM

## 2018-09-11 DIAGNOSIS — I10 HYPERTENSION, UNSPECIFIED TYPE: ICD-10-CM

## 2018-09-11 DIAGNOSIS — E11.8 TYPE 2 DIABETES MELLITUS WITH COMPLICATION, WITH LONG-TERM CURRENT USE OF INSULIN (HCC): ICD-10-CM

## 2018-09-11 DIAGNOSIS — Z79.4 TYPE 2 DIABETES MELLITUS WITH COMPLICATION, WITH LONG-TERM CURRENT USE OF INSULIN (HCC): ICD-10-CM

## 2018-09-11 DIAGNOSIS — E03.9 HYPOTHYROIDISM, UNSPECIFIED TYPE: ICD-10-CM

## 2018-09-11 DIAGNOSIS — J45.909 ASTHMA WITHOUT STATUS ASTHMATICUS WITHOUT COMPLICATION, UNSPECIFIED ASTHMA SEVERITY, UNSPECIFIED WHETHER PERSISTENT: ICD-10-CM

## 2018-09-11 DIAGNOSIS — K21.9 GERD WITHOUT ESOPHAGITIS: ICD-10-CM

## 2018-09-11 DIAGNOSIS — Z23 NEED FOR INFLUENZA VACCINATION: ICD-10-CM

## 2018-09-11 DIAGNOSIS — E78.5 HYPERLIPIDEMIA, UNSPECIFIED HYPERLIPIDEMIA TYPE: ICD-10-CM

## 2018-09-11 DIAGNOSIS — Z13.820 SCREENING FOR OSTEOPOROSIS: Primary | ICD-10-CM

## 2018-09-11 DIAGNOSIS — M10.9 GOUT, UNSPECIFIED CAUSE, UNSPECIFIED CHRONICITY, UNSPECIFIED SITE: ICD-10-CM

## 2018-09-11 DIAGNOSIS — M54.5 LOW BACK PAIN, UNSPECIFIED BACK PAIN LATERALITY, UNSPECIFIED CHRONICITY, WITH SCIATICA PRESENCE UNSPECIFIED: ICD-10-CM

## 2018-09-11 LAB
BASOPHILS # BLD AUTO: 38 CELLS/UL (ref 0–200)
BASOPHILS NFR BLD AUTO: 0.6 %
BUN SERPL-MCNC: 56 MG/DL (ref 7–25)
BUN/CREAT SERPL: 30 (CALC) (ref 6–22)
CALCIUM SERPL-MCNC: 10.2 MG/DL (ref 8.6–10.4)
CHLORIDE SERPL-SCNC: 103 MMOL/L (ref 98–110)
CHOLEST SERPL-MCNC: 131 MG/DL
CHOLEST/HDLC SERPL: 2.2 (CALC)
CO2 SERPL-SCNC: 29 MMOL/L (ref 20–32)
CREAT SERPL-MCNC: 1.84 MG/DL (ref 0.6–0.93)
EOSINOPHIL # BLD AUTO: 202 CELLS/UL (ref 15–500)
EOSINOPHIL NFR BLD AUTO: 3.2 %
ERYTHROCYTE [DISTWIDTH] IN BLOOD BY AUTOMATED COUNT: 12.5 % (ref 11–15)
EST. AVERAGE GLUCOSE BLD GHB EST-MCNC: 137 (CALC)
EST. AVERAGE GLUCOSE BLD GHB EST-SCNC: 7.6 (CALC)
GLUCOSE SERPL-MCNC: 73 MG/DL (ref 65–99)
HBA1C MFR BLD: 6.4 % OF TOTAL HGB
HCT VFR BLD AUTO: 35.9 % (ref 35–45)
HDLC SERPL-MCNC: 59 MG/DL
HGB BLD-MCNC: 11.8 G/DL (ref 11.7–15.5)
LDLC SERPL CALC-MCNC: 49 MG/DL (CALC)
LYMPHOCYTES # BLD AUTO: 2048 CELLS/UL (ref 850–3900)
LYMPHOCYTES NFR BLD AUTO: 32.5 %
MCH RBC QN AUTO: 30.9 PG (ref 27–33)
MCHC RBC AUTO-ENTMCNC: 32.9 G/DL (ref 32–36)
MCV RBC AUTO: 94 FL (ref 80–100)
MONOCYTES # BLD AUTO: 844 CELLS/UL (ref 200–950)
MONOCYTES NFR BLD AUTO: 13.4 %
NEUTROPHILS # BLD AUTO: 3169 CELLS/UL (ref 1500–7800)
NEUTROPHILS NFR BLD AUTO: 50.3 %
NONHDLC SERPL-MCNC: 72 MG/DL (CALC)
PLATELET # BLD AUTO: 216 THOUSAND/UL (ref 140–400)
PMV BLD REES-ECKER: 11.5 FL (ref 7.5–12.5)
POTASSIUM SERPL-SCNC: 4.8 MMOL/L (ref 3.5–5.3)
RBC # BLD AUTO: 3.82 MILLION/UL (ref 3.8–5.1)
SL AMB EGFR AFRICAN AMERICAN: 31 ML/MIN/1.73M2
SL AMB EGFR NON AFRICAN AMERICAN: 26 ML/MIN/1.73M2
SODIUM SERPL-SCNC: 139 MMOL/L (ref 135–146)
TRIGL SERPL-MCNC: 142 MG/DL
WBC # BLD AUTO: 6.3 THOUSAND/UL (ref 3.8–10.8)

## 2018-09-11 PROCEDURE — 3078F DIAST BP <80 MM HG: CPT | Performed by: INTERNAL MEDICINE

## 2018-09-11 PROCEDURE — 1036F TOBACCO NON-USER: CPT | Performed by: INTERNAL MEDICINE

## 2018-09-11 PROCEDURE — 99214 OFFICE O/P EST MOD 30 MIN: CPT | Performed by: INTERNAL MEDICINE

## 2018-09-11 PROCEDURE — 3075F SYST BP GE 130 - 139MM HG: CPT | Performed by: INTERNAL MEDICINE

## 2018-09-11 PROCEDURE — 4040F PNEUMOC VAC/ADMIN/RCVD: CPT

## 2018-09-11 PROCEDURE — G0008 ADMIN INFLUENZA VIRUS VAC: HCPCS

## 2018-09-11 PROCEDURE — 90662 IIV NO PRSV INCREASED AG IM: CPT

## 2018-09-11 RX ORDER — METOPROLOL SUCCINATE 100 MG/1
100 TABLET, EXTENDED RELEASE ORAL DAILY
Qty: 30 TABLET | Refills: 5 | Status: SHIPPED | OUTPATIENT
Start: 2018-09-11 | End: 2018-12-31 | Stop reason: SDUPTHER

## 2018-09-11 RX ORDER — FAMOTIDINE 10 MG
10 TABLET ORAL 2 TIMES DAILY
Qty: 60 TABLET | Refills: 9 | Status: SHIPPED | OUTPATIENT
Start: 2018-09-11

## 2018-09-11 RX ORDER — ALBUTEROL SULFATE 90 UG/1
2 AEROSOL, METERED RESPIRATORY (INHALATION) 4 TIMES DAILY
Qty: 1 INHALER | Refills: 5 | Status: SHIPPED | OUTPATIENT
Start: 2018-09-11 | End: 2019-10-25 | Stop reason: SDUPTHER

## 2018-09-11 RX ORDER — ATORVASTATIN CALCIUM 80 MG/1
80 TABLET, FILM COATED ORAL DAILY
Qty: 30 TABLET | Refills: 5 | Status: SHIPPED | OUTPATIENT
Start: 2018-09-11 | End: 2018-12-31 | Stop reason: SDUPTHER

## 2018-09-11 RX ORDER — SYRINGE-NEEDLE,INSULIN,0.5 ML 31 GX5/16"
SYRINGE, EMPTY DISPOSABLE MISCELLANEOUS 4 TIMES DAILY
Qty: 200 EACH | Refills: 5 | Status: SHIPPED | OUTPATIENT
Start: 2018-09-11 | End: 2019-10-25 | Stop reason: SDUPTHER

## 2018-09-11 RX ORDER — LEVOTHYROXINE SODIUM 0.07 MG/1
75 TABLET ORAL DAILY
Qty: 30 TABLET | Refills: 5 | Status: SHIPPED | OUTPATIENT
Start: 2018-09-11 | End: 2018-12-31 | Stop reason: SDUPTHER

## 2018-09-11 RX ORDER — CYCLOBENZAPRINE HCL 5 MG
5 TABLET ORAL 3 TIMES DAILY PRN
Qty: 90 TABLET | Refills: 2 | Status: SHIPPED | OUTPATIENT
Start: 2018-09-11 | End: 2018-12-31 | Stop reason: SDUPTHER

## 2018-09-11 RX ORDER — FENOFIBRATE 67 MG/1
CAPSULE ORAL
COMMUNITY
Start: 2018-09-04 | End: 2020-07-08 | Stop reason: SDUPTHER

## 2018-09-11 RX ORDER — FEBUXOSTAT 40 MG/1
40 TABLET, FILM COATED ORAL EVERY OTHER DAY
Qty: 15 TABLET | Refills: 5 | Status: SHIPPED | OUTPATIENT
Start: 2018-09-11 | End: 2018-12-31 | Stop reason: SDUPTHER

## 2018-09-11 RX ORDER — TRAMADOL HYDROCHLORIDE 50 MG/1
50 TABLET ORAL 2 TIMES DAILY
Qty: 60 TABLET | Refills: 0 | Status: SHIPPED | OUTPATIENT
Start: 2018-09-11 | End: 2018-10-22 | Stop reason: SDUPTHER

## 2018-09-11 RX ORDER — FLUTICASONE PROPIONATE 50 MCG
2 SPRAY, SUSPENSION (ML) NASAL DAILY
Qty: 16 G | Refills: 5 | Status: SHIPPED | OUTPATIENT
Start: 2018-09-11 | End: 2018-12-31 | Stop reason: SDUPTHER

## 2018-09-11 RX ORDER — MONTELUKAST SODIUM 10 MG/1
10 TABLET ORAL
Qty: 30 TABLET | Refills: 5 | Status: SHIPPED | OUTPATIENT
Start: 2018-09-11 | End: 2018-12-31 | Stop reason: SDUPTHER
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
## 2018-09-19 ENCOUNTER — TELEPHONE (OUTPATIENT)
Dept: INTERNAL MEDICINE CLINIC | Age: 73
End: 2018-09-19

## 2018-09-19 NOTE — TELEPHONE ENCOUNTER
Patient came into the office today because she recently received a bill from her insurance company for a lipid panel test she got done  The insurance denied the bill because she had "reached her maximum number of allowances" for these tests for the year under her insurance  According to the denial letter she needs a note from you sent to the insurance explaining why this test was needed again before the year was up so it is covered  There is a copy of the letter in / [place] [phone] office  Please let us know if you can write the letter explaining the need of this for the patient

## 2018-10-04 ENCOUNTER — HOSPITAL ENCOUNTER (OUTPATIENT)
Dept: RADIOLOGY | Age: 73
Discharge: HOME | End: 2018-10-04
Payer: COMMERCIAL

## 2018-10-04 DIAGNOSIS — Z13.820 SCREENING FOR OSTEOPOROSIS: ICD-10-CM

## 2018-10-04 PROCEDURE — 77080 DXA BONE DENSITY AXIAL: CPT

## 2018-10-22 DIAGNOSIS — M54.5 LOW BACK PAIN, UNSPECIFIED BACK PAIN LATERALITY, UNSPECIFIED CHRONICITY, WITH SCIATICA PRESENCE UNSPECIFIED: ICD-10-CM

## 2018-10-23 RX ORDER — TRAMADOL HYDROCHLORIDE 50 MG/1
50 TABLET ORAL 2 TIMES DAILY
Qty: 60 TABLET | Refills: 0 | Status: SHIPPED | OUTPATIENT
Start: 2018-10-23 | End: 2018-11-23 | Stop reason: SDUPTHER

## 2018-11-23 DIAGNOSIS — M54.5 LOW BACK PAIN, UNSPECIFIED BACK PAIN LATERALITY, UNSPECIFIED CHRONICITY, WITH SCIATICA PRESENCE UNSPECIFIED: ICD-10-CM

## 2018-11-23 RX ORDER — TRAMADOL HYDROCHLORIDE 50 MG/1
50 TABLET ORAL 2 TIMES DAILY
Qty: 60 TABLET | Refills: 0 | Status: SHIPPED | OUTPATIENT
Start: 2018-11-23 | End: 2018-12-21 | Stop reason: SDUPTHER

## 2018-12-19 LAB
BASOPHILS # BLD AUTO: 32 CELLS/UL (ref 0–200)
BASOPHILS NFR BLD AUTO: 0.6 %
BUN SERPL-MCNC: 41 MG/DL (ref 7–25)
BUN/CREAT SERPL: 29 (CALC) (ref 6–22)
CALCIUM SERPL-MCNC: 10.5 MG/DL (ref 8.6–10.4)
CALCIUM SERPL-MCNC: 10.5 MG/DL (ref 8.6–10.4)
CHLORIDE SERPL-SCNC: 103 MMOL/L (ref 98–110)
CO2 SERPL-SCNC: 32 MMOL/L (ref 20–32)
CREAT SERPL-MCNC: 1.41 MG/DL (ref 0.6–0.93)
EOSINOPHIL # BLD AUTO: 159 CELLS/UL (ref 15–500)
EOSINOPHIL NFR BLD AUTO: 3 %
ERYTHROCYTE [DISTWIDTH] IN BLOOD BY AUTOMATED COUNT: 11.9 % (ref 11–15)
GLUCOSE SERPL-MCNC: 179 MG/DL (ref 65–99)
HBA1C MFR BLD: 7.1 % OF TOTAL HGB
HCT VFR BLD AUTO: 37 % (ref 35–45)
HGB BLD-MCNC: 12.3 G/DL (ref 11.7–15.5)
LYMPHOCYTES # BLD AUTO: 1638 CELLS/UL (ref 850–3900)
LYMPHOCYTES NFR BLD AUTO: 30.9 %
MAGNESIUM SERPL-MCNC: 2.2 MG/DL (ref 1.5–2.5)
MCH RBC QN AUTO: 30.7 PG (ref 27–33)
MCHC RBC AUTO-ENTMCNC: 33.2 G/DL (ref 32–36)
MCV RBC AUTO: 92.3 FL (ref 80–100)
MONOCYTES # BLD AUTO: 663 CELLS/UL (ref 200–950)
MONOCYTES NFR BLD AUTO: 12.5 %
NEUTROPHILS # BLD AUTO: 2809 CELLS/UL (ref 1500–7800)
NEUTROPHILS NFR BLD AUTO: 53 %
PHOSPHATE SERPL-MCNC: 4.6 MG/DL (ref 2.1–4.3)
PLATELET # BLD AUTO: 228 THOUSAND/UL (ref 140–400)
PMV BLD REES-ECKER: 11.6 FL (ref 7.5–12.5)
POTASSIUM SERPL-SCNC: 5.1 MMOL/L (ref 3.5–5.3)
PTH-INTACT SERPL-MCNC: 9 PG/ML (ref 14–64)
RBC # BLD AUTO: 4.01 MILLION/UL (ref 3.8–5.1)
SL AMB EGFR AFRICAN AMERICAN: 42 ML/MIN/1.73M2
SL AMB EGFR NON AFRICAN AMERICAN: 36 ML/MIN/1.73M2
SODIUM SERPL-SCNC: 141 MMOL/L (ref 135–146)
WBC # BLD AUTO: 5.3 THOUSAND/UL (ref 3.8–10.8)

## 2018-12-21 DIAGNOSIS — M54.5 LOW BACK PAIN, UNSPECIFIED BACK PAIN LATERALITY, UNSPECIFIED CHRONICITY, WITH SCIATICA PRESENCE UNSPECIFIED: ICD-10-CM

## 2018-12-21 RX ORDER — TRAMADOL HYDROCHLORIDE 50 MG/1
50 TABLET ORAL 2 TIMES DAILY
Qty: 60 TABLET | Refills: 0 | Status: SHIPPED | OUTPATIENT
Start: 2018-12-21 | End: 2019-01-21 | Stop reason: SDUPTHER

## 2018-12-31 ENCOUNTER — OFFICE VISIT (OUTPATIENT)
Dept: INTERNAL MEDICINE CLINIC | Age: 73
End: 2018-12-31
Payer: COMMERCIAL

## 2018-12-31 VITALS
HEART RATE: 72 BPM | DIASTOLIC BLOOD PRESSURE: 62 MMHG | WEIGHT: 145.6 LBS | HEIGHT: 59 IN | OXYGEN SATURATION: 98 % | BODY MASS INDEX: 29.35 KG/M2 | TEMPERATURE: 96.4 F | SYSTOLIC BLOOD PRESSURE: 144 MMHG

## 2018-12-31 DIAGNOSIS — N18.30 TYPE 2 DIABETES MELLITUS WITH STAGE 3 CHRONIC KIDNEY DISEASE, WITH LONG-TERM CURRENT USE OF INSULIN (HCC): ICD-10-CM

## 2018-12-31 DIAGNOSIS — M10.9 GOUT, UNSPECIFIED CAUSE, UNSPECIFIED CHRONICITY, UNSPECIFIED SITE: ICD-10-CM

## 2018-12-31 DIAGNOSIS — J45.41 MODERATE PERSISTENT ASTHMA WITH ACUTE EXACERBATION: ICD-10-CM

## 2018-12-31 DIAGNOSIS — Z12.11 SCREENING FOR COLON CANCER: Primary | ICD-10-CM

## 2018-12-31 DIAGNOSIS — J31.0 RHINITIS, UNSPECIFIED TYPE: ICD-10-CM

## 2018-12-31 DIAGNOSIS — E11.22 TYPE 2 DIABETES MELLITUS WITH STAGE 3 CHRONIC KIDNEY DISEASE, WITH LONG-TERM CURRENT USE OF INSULIN (HCC): ICD-10-CM

## 2018-12-31 DIAGNOSIS — E78.5 HYPERLIPIDEMIA, UNSPECIFIED HYPERLIPIDEMIA TYPE: ICD-10-CM

## 2018-12-31 DIAGNOSIS — E11.8 TYPE 2 DIABETES MELLITUS WITH COMPLICATION, WITH LONG-TERM CURRENT USE OF INSULIN (HCC): ICD-10-CM

## 2018-12-31 DIAGNOSIS — Z79.4 TYPE 2 DIABETES MELLITUS WITH COMPLICATION, WITH LONG-TERM CURRENT USE OF INSULIN (HCC): ICD-10-CM

## 2018-12-31 DIAGNOSIS — M54.5 LOW BACK PAIN, UNSPECIFIED BACK PAIN LATERALITY, UNSPECIFIED CHRONICITY, WITH SCIATICA PRESENCE UNSPECIFIED: ICD-10-CM

## 2018-12-31 DIAGNOSIS — E03.9 HYPOTHYROIDISM, UNSPECIFIED TYPE: ICD-10-CM

## 2018-12-31 DIAGNOSIS — J45.909 ASTHMA WITHOUT STATUS ASTHMATICUS WITHOUT COMPLICATION, UNSPECIFIED ASTHMA SEVERITY, UNSPECIFIED WHETHER PERSISTENT: ICD-10-CM

## 2018-12-31 DIAGNOSIS — N18.30 CKD (CHRONIC KIDNEY DISEASE) STAGE 3, GFR 30-59 ML/MIN (HCC): ICD-10-CM

## 2018-12-31 DIAGNOSIS — Z79.4 TYPE 2 DIABETES MELLITUS WITH STAGE 3 CHRONIC KIDNEY DISEASE, WITH LONG-TERM CURRENT USE OF INSULIN (HCC): ICD-10-CM

## 2018-12-31 DIAGNOSIS — I10 HYPERTENSION, UNSPECIFIED TYPE: ICD-10-CM

## 2018-12-31 DIAGNOSIS — E78.2 MIXED HYPERLIPIDEMIA: ICD-10-CM

## 2018-12-31 PROBLEM — M54.50 LOW BACK PAIN: Status: ACTIVE | Noted: 2018-12-31

## 2018-12-31 PROCEDURE — 99214 OFFICE O/P EST MOD 30 MIN: CPT | Performed by: INTERNAL MEDICINE

## 2018-12-31 RX ORDER — METOPROLOL SUCCINATE 100 MG/1
100 TABLET, EXTENDED RELEASE ORAL DAILY
Qty: 30 TABLET | Refills: 5 | Status: SHIPPED | OUTPATIENT
Start: 2018-12-31 | End: 2019-07-03 | Stop reason: SDUPTHER

## 2018-12-31 RX ORDER — FEBUXOSTAT 40 MG/1
40 TABLET, FILM COATED ORAL EVERY OTHER DAY
Qty: 15 TABLET | Refills: 5 | Status: SHIPPED | OUTPATIENT
Start: 2018-12-31 | End: 2019-09-11 | Stop reason: SDUPTHER

## 2018-12-31 RX ORDER — LEVOTHYROXINE SODIUM 0.07 MG/1
75 TABLET ORAL DAILY
Qty: 30 TABLET | Refills: 5 | Status: SHIPPED | OUTPATIENT
Start: 2018-12-31 | End: 2019-07-03 | Stop reason: SDUPTHER

## 2018-12-31 RX ORDER — FLUTICASONE PROPIONATE 50 MCG
2 SPRAY, SUSPENSION (ML) NASAL DAILY
Qty: 16 G | Refills: 1 | Status: SHIPPED | OUTPATIENT
Start: 2018-12-31 | End: 2019-04-02 | Stop reason: SDUPTHER

## 2018-12-31 RX ORDER — CYCLOBENZAPRINE HCL 5 MG
5 TABLET ORAL 3 TIMES DAILY PRN
Qty: 90 TABLET | Refills: 0 | Status: SHIPPED | OUTPATIENT
Start: 2018-12-31 | End: 2019-01-21 | Stop reason: SDUPTHER

## 2018-12-31 RX ORDER — MONTELUKAST SODIUM 10 MG/1
10 TABLET ORAL
Qty: 30 TABLET | Refills: 5 | Status: SHIPPED | OUTPATIENT
Start: 2018-12-31 | End: 2019-09-11 | Stop reason: SDUPTHER

## 2018-12-31 RX ORDER — BUMETANIDE 2 MG/1
2 TABLET ORAL DAILY
Qty: 30 TABLET | Refills: 5 | Status: SHIPPED | OUTPATIENT
Start: 2018-12-31 | End: 2019-07-03 | Stop reason: SDUPTHER

## 2018-12-31 RX ORDER — LOSARTAN POTASSIUM 50 MG/1
50 TABLET ORAL DAILY
Qty: 30 TABLET | Refills: 5 | Status: SHIPPED | OUTPATIENT
Start: 2018-12-31 | End: 2019-04-02 | Stop reason: SDUPTHER

## 2018-12-31 RX ORDER — ATORVASTATIN CALCIUM 80 MG/1
80 TABLET, FILM COATED ORAL DAILY
Qty: 30 TABLET | Refills: 5 | Status: SHIPPED | OUTPATIENT
Start: 2018-12-31 | End: 2019-07-03 | Stop reason: SDUPTHER
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
## 2019-01-21 DIAGNOSIS — M54.5 LOW BACK PAIN, UNSPECIFIED BACK PAIN LATERALITY, UNSPECIFIED CHRONICITY, WITH SCIATICA PRESENCE UNSPECIFIED: ICD-10-CM

## 2019-01-21 RX ORDER — CYCLOBENZAPRINE HCL 5 MG
5 TABLET ORAL 3 TIMES DAILY PRN
Qty: 90 TABLET | Refills: 0 | Status: SHIPPED | OUTPATIENT
Start: 2019-01-21 | End: 2019-04-02 | Stop reason: SDUPTHER

## 2019-01-21 RX ORDER — TRAMADOL HYDROCHLORIDE 50 MG/1
50 TABLET ORAL 2 TIMES DAILY
Qty: 60 TABLET | Refills: 0 | Status: SHIPPED | OUTPATIENT
Start: 2019-01-21 | End: 2019-02-22 | Stop reason: SDUPTHER

## 2019-01-29 LAB
ALBUMIN/CREAT UR: 71 MCG/MG CREAT
BUN SERPL-MCNC: 43 MG/DL (ref 7–25)
BUN/CREAT SERPL: 29 (CALC) (ref 6–22)
CALCIUM SERPL-MCNC: 9.6 MG/DL (ref 8.6–10.4)
CHLORIDE SERPL-SCNC: 105 MMOL/L (ref 98–110)
CO2 SERPL-SCNC: 31 MMOL/L (ref 20–32)
CREAT SERPL-MCNC: 1.47 MG/DL (ref 0.6–0.93)
CREAT UR-MCNC: 49 MG/DL (ref 20–275)
GLUCOSE SERPL-MCNC: 132 MG/DL (ref 65–99)
MICROALBUMIN UR-MCNC: 3.5 MG/DL
POTASSIUM SERPL-SCNC: 5.1 MMOL/L (ref 3.5–5.3)
SL AMB EGFR AFRICAN AMERICAN: 40 ML/MIN/1.73M2
SL AMB EGFR NON AFRICAN AMERICAN: 35 ML/MIN/1.73M2
SODIUM SERPL-SCNC: 141 MMOL/L (ref 135–146)

## 2019-02-13 ENCOUNTER — TELEPHONE (OUTPATIENT)
Dept: NEPHROLOGY | Facility: CLINIC | Age: 74
End: 2019-02-13

## 2019-02-14 ENCOUNTER — OFFICE VISIT (OUTPATIENT)
Dept: NEPHROLOGY | Facility: CLINIC | Age: 74
End: 2019-02-14
Payer: COMMERCIAL

## 2019-02-14 VITALS
DIASTOLIC BLOOD PRESSURE: 70 MMHG | SYSTOLIC BLOOD PRESSURE: 124 MMHG | HEART RATE: 70 BPM | WEIGHT: 145 LBS | HEIGHT: 60 IN | BODY MASS INDEX: 28.47 KG/M2

## 2019-02-14 DIAGNOSIS — N28.9 RENAL INSUFFICIENCY: Primary | ICD-10-CM

## 2019-02-14 PROBLEM — N18.30 CKD (CHRONIC KIDNEY DISEASE) STAGE 3, GFR 30-59 ML/MIN (HCC): Status: RESOLVED | Noted: 2018-01-29 | Resolved: 2019-02-14

## 2019-02-14 PROCEDURE — 99213 OFFICE O/P EST LOW 20 MIN: CPT | Performed by: INTERNAL MEDICINE

## 2019-02-14 NOTE — PROGRESS NOTES
NEPHROLOGY PROGRESS NOTE    Nona Stanford 74 y o  female MRN: [id number]  Unit/Bed#:  Encounter: [id number]  Reason for Consult:  Renal insufficiency    The patient is here for her yearly follow-up visit she has had a very good year and states she really did not have any health issues she could recall on no hospitalizations and she has been tolerating her medications  ASSESSMENT/PLAN:  1  Renal  The patient has renal insufficiency due to nephrosclerosis and her latest creatinine is 1 4 which is even a little better than it was a year ago when it was 1 6  I could explain this likely related to subtle changes in effective volume as she is on a loop diuretic  Protein estimation is still very low in the microalbumin range so I can say with certainty this is not diabetic kidney disease  For now continue with glycemic control continue current medications blood pressure control which is excellent  She will continue to see me yearly at the request I told her to call me if there is any problems in between the visits  She is also on losartan  SUBJECTIVE:  Review of Systems   Constitution: Negative  HENT: Negative  Eyes: Negative  Cardiovascular: Negative  Negative for chest pain, dyspnea on exertion, leg swelling and orthopnea  Respiratory: Negative  Negative for cough and shortness of breath  Gastrointestinal: Negative  Genitourinary: Negative  Neurological: Negative  OBJECTIVE:  Current Weight: Weight - Scale: 65 8 kg (145 lb)  [email or web address]:     Blood pressure 124/70, pulse 70, height 5' (1 524 m), weight 65 8 kg (145 lb)  , Body mass index is 28 32 kg/m²  [unfilled]    Physical Exam: /70 (BP Location: Right arm, Patient Position: Sitting, Cuff Size: Standard)   Pulse 70   Ht 5' (1 524 m)   Wt 65 8 kg (145 lb)   LMP  (LMP Unknown)   BMI 28 32 kg/m²   Physical Exam   Constitutional: She is oriented to person, place, and time  No distress  Neck: Neck supple  No JVD present  Cardiovascular: Normal rate and regular rhythm  Exam reveals no friction rub  No murmur heard  Pulmonary/Chest: Effort normal and breath sounds normal  No respiratory distress  She has no wheezes  She has no rales  Abdominal: Soft  Bowel sounds are normal  She exhibits no distension  There is no tenderness  Neurological: She is alert and oriented to person, place, and time         Medications:    Current Outpatient Medications:     albuterol (VENTOLIN HFA) 90 mcg/act inhaler, Inhale 2 puffs 4 (four) times a day, Disp: 1 Inhaler, Rfl: 5    ascorbic acid (VITAMIN C) 500 mg tablet, Take 1 tablet by mouth daily, Disp: , Rfl:     aspirin 81 MG tablet, Take 1 tablet by mouth 2 (two) times a day, Disp: , Rfl:     atorvastatin (LIPITOR) 80 mg tablet, Take 1 tablet (80 mg total) by mouth daily, Disp: 30 tablet, Rfl: 5    bumetanide (BUMEX) 2 mg tablet, Take 1 tablet (2 mg total) by mouth daily, Disp: 30 tablet, Rfl: 5    Calcium Carbonate-Vit D-Min (CALCIUM 600+D PLUS MINERALS) 600-400 MG-UNIT CHEW, Chew 2 tablets daily, Disp: , Rfl:     Cholecalciferol (VITAMIN D3) 1000 units CAPS, Take 1 tablet by mouth daily, Disp: , Rfl:     clobetasol (TEMOVATE) 0 05 % ointment, Apply topically 2 (two) times a day Until skin texture normalizes another 2 months  then use three times weekly on affected area, Disp: 30 g, Rfl: 1    cyclobenzaprine (FLEXERIL) 5 mg tablet, Take 1 tablet (5 mg total) by mouth 3 (three) times a day as needed for muscle spasms, Disp: 90 tablet, Rfl: 0    febuxostat (ULORIC) 40 mg tablet, Take 1 tablet (40 mg total) by mouth every other day, Disp: 15 tablet, Rfl: 5    fenofibrate (TRICOR) 145 mg tablet, Take by mouth daily  , Disp: , Rfl:     fenofibrate micronized (LOFIBRA) 67 MG capsule, , Disp: , Rfl:     ferrous gluconate (FERATE) 240 (27 FE) MG tablet, Take 1 tablet by mouth daily, Disp: , Rfl:     Ferrous Sulfate 27 MG TABS, Take 27 mg by mouth daily  , Disp: , Rfl:    fluticasone (FLONASE) 50 mcg/act nasal spray, 2 sprays into each nostril daily, Disp: 16 g, Rfl: 1    fluticasone-salmeterol (ADVAIR DISKUS) 250-50 mcg/dose inhaler, Inhale 1 puff 2 (two) times a day, Disp: 1 Inhaler, Rfl: 3    glucose blood (ONE TOUCH ULTRA TEST) test strip, Test blood sugars 3 to 4 times a day, Disp: 400 each, Rfl: 1    insulin detemir (LEVEMIR) 100 units/mL subcutaneous injection, Inject 36 Units under the skin 2 (two) times a day, Disp: 200 Units, Rfl: 5    insulin regular (HumuLIN R,NovoLIN R) 100 units/mL injection, Inject 0 1 mL (10 Units total) under the skin 2 (two) times a day with lunch and dinner, Disp: 10 mL, Rfl: 5    levothyroxine 75 mcg tablet, Take 1 tablet (75 mcg total) by mouth daily, Disp: 30 tablet, Rfl: 5    losartan (COZAAR) 50 mg tablet, Take 1 tablet (50 mg total) by mouth daily, Disp: 30 tablet, Rfl: 5    Magnesium 400 MG CAPS, Take 2 tablets by mouth daily, Disp: , Rfl:     metoprolol succinate (TOPROL-XL) 100 mg 24 hr tablet, Take 1 tablet (100 mg total) by mouth daily, Disp: 30 tablet, Rfl: 5    montelukast (SINGULAIR) 10 mg tablet, Take 1 tablet (10 mg total) by mouth daily at bedtime, Disp: 30 tablet, Rfl: 5    multivitamin (THERAGRAN) TABS, Take 1 tablet by mouth daily  , Disp: , Rfl:     nitroglycerin (NITROSTAT) 0 4 mg SL tablet, Place 1 tablet under the tongue every 5 (five) minutes as needed, Disp: , Rfl:     Omega-3 Fatty Acids (OMEGA 3 500 PO), Take 1 capsule by mouth daily, Disp: , Rfl:     ONE TOUCH LANCETS MISC, by Does not apply route daily Test 2-3 times daily, Disp: , Rfl:     sitaGLIPtin (JANUVIA) 50 mg tablet, Take 1 tablet (50 mg total) by mouth daily, Disp: 30 tablet, Rfl: 5    traMADol (ULTRAM) 50 mg tablet, Take 1 tablet (50 mg total) by mouth 2 (two) times a day Fill with directions from Dr Doris Miranda, Disp: 60 tablet, Rfl: 0    TRUEPLUS INSULIN SYRINGE 31G X 5/16" 0 5 ML MISC, Inject under the skin 4 (four) times a day, Disp: 200 each, Rfl: 5    Vitamin E 200 units TABS, Take 1 capsule by mouth daily, Disp: , Rfl:     famotidine (PEPCID) 10 mg tablet, Take 1 tablet (10 mg total) by mouth 2 (two) times a day for 90 days, Disp: 60 tablet, Rfl: 9    Current Facility-Administered Medications:     insulin detemir (LEVEMIR) subcutaneous injection 35 Units, 35 Units, Subcutaneous, Q12H [facility], Paras Lemus MD    Laboratory Results:  Lab Results   Component Value Date    WBC 5 3 12/18/2018    HGB 12 3 12/18/2018    HCT 37 0 12/18/2018    MCV 92 3 12/18/2018     12/18/2018     Lab Results   Component Value Date    CALCIUM 9 6 01/28/2019     01/22/2018    K 5 1 01/28/2019    CO2 31 01/28/2019     01/28/2019    BUN 43 (H) 01/28/2019    CREATININE 1 64 (H) 01/22/2018     Lab Results   Component Value Date    CALCIUM 9 6 01/28/2019     No results found for: LABPROT

## 2019-02-14 NOTE — LETTER
February 14, 2019     Crisitna Alonso MD  [address] [place] [address]    Patient: Damon Duran   YOB: 1945   Date of Visit: 2/14/2019       Dear Dr Doris Miranda: Thank you for referring Mikey Molina to me for evaluation  Below are my notes for this consultation  If you have questions, please do not hesitate to call me  I look forward to following your patient along with you  Sincerely,        Tracie Bautista MD        CC: No Recipients  Tracie Bautista MD  2/14/2019 12:04 PM  Sign at close encounter  [address] 74 y o  female MRN: [id number]  Unit/Bed#:  Encounter: [id number]  Reason for Consult:  Renal insufficiency    The patient is here for her yearly follow-up visit she has had a very good year and states she really did not have any health issues she could recall on no hospitalizations and she has been tolerating her medications  ASSESSMENT/PLAN:  1  Renal  The patient has renal insufficiency due to nephrosclerosis and her latest creatinine is 1 4 which is even a little better than it was a year ago when it was 1 6  I could explain this likely related to subtle changes in effective volume as she is on a loop diuretic  Protein estimation is still very low in the microalbumin range so I can say with certainty this is not diabetic kidney disease  For now continue with glycemic control continue current medications blood pressure control which is excellent  She will continue to see me yearly at the request I told her to call me if there is any problems in between the visits  She is also on losartan  SUBJECTIVE:  Review of Systems   Constitution: Negative  HENT: Negative  Eyes: Negative  Cardiovascular: Negative  Negative for chest pain, dyspnea on exertion, leg swelling and orthopnea  Respiratory: Negative  Negative for cough and shortness of breath  Gastrointestinal: Negative  Genitourinary: Negative  Neurological: Negative  OBJECTIVE:  Current Weight: Weight - Scale: 65 8 kg (145 lb)  [email or web address] com:     Blood pressure 124/70, pulse 70, height 5' (1 524 m), weight 65 8 kg (145 lb)  , Body mass index is 28 32 kg/m²  [unfilled]    Physical Exam: /70 (BP Location: Right arm, Patient Position: Sitting, Cuff Size: Standard)   Pulse 70   Ht 5' (1 524 m)   Wt 65 8 kg (145 lb)   LMP  (LMP Unknown)   BMI 28 32 kg/m²    Physical Exam   Constitutional: She is oriented to person, place, and time  No distress  Neck: Neck supple  No JVD present  Cardiovascular: Normal rate and regular rhythm  Exam reveals no friction rub  No murmur heard  Pulmonary/Chest: Effort normal and breath sounds normal  No respiratory distress  She has no wheezes  She has no rales  Abdominal: Soft  Bowel sounds are normal  She exhibits no distension  There is no tenderness  Neurological: She is alert and oriented to person, place, and time         Medications:    Current Outpatient Medications:     albuterol (VENTOLIN HFA) 90 mcg/act inhaler, Inhale 2 puffs 4 (four) times a day, Disp: 1 Inhaler, Rfl: 5    ascorbic acid (VITAMIN C) 500 mg tablet, Take 1 tablet by mouth daily, Disp: , Rfl:     aspirin 81 MG tablet, Take 1 tablet by mouth 2 (two) times a day, Disp: , Rfl:     atorvastatin (LIPITOR) 80 mg tablet, Take 1 tablet (80 mg total) by mouth daily, Disp: 30 tablet, Rfl: 5    bumetanide (BUMEX) 2 mg tablet, Take 1 tablet (2 mg total) by mouth daily, Disp: 30 tablet, Rfl: 5    Calcium Carbonate-Vit D-Min (CALCIUM 600+D PLUS MINERALS) 600-400 MG-UNIT CHEW, Chew 2 tablets daily, Disp: , Rfl:     Cholecalciferol (VITAMIN D3) 1000 units CAPS, Take 1 tablet by mouth daily, Disp: , Rfl:     clobetasol (TEMOVATE) 0 05 % ointment, Apply topically 2 (two) times a day Until skin texture normalizes another 2 months  then use three times weekly on affected area, Disp: 30 g, Rfl: 1    cyclobenzaprine (FLEXERIL) 5 mg tablet, Take 1 tablet (5 mg total) by mouth 3 (three) times a day as needed for muscle spasms, Disp: 90 tablet, Rfl: 0    febuxostat (ULORIC) 40 mg tablet, Take 1 tablet (40 mg total) by mouth every other day, Disp: 15 tablet, Rfl: 5    fenofibrate (TRICOR) 145 mg tablet, Take by mouth daily  , Disp: , Rfl:     fenofibrate micronized (LOFIBRA) 67 MG capsule, , Disp: , Rfl:     ferrous gluconate (FERATE) 240 (27 FE) MG tablet, Take 1 tablet by mouth daily, Disp: , Rfl:     Ferrous Sulfate 27 MG TABS, Take 27 mg by mouth daily  , Disp: , Rfl:     fluticasone (FLONASE) 50 mcg/act nasal spray, 2 sprays into each nostril daily, Disp: 16 g, Rfl: 1    fluticasone-salmeterol (ADVAIR DISKUS) 250-50 mcg/dose inhaler, Inhale 1 puff 2 (two) times a day, Disp: 1 Inhaler, Rfl: 3    glucose blood (ONE TOUCH ULTRA TEST) test strip, Test blood sugars 3 to 4 times a day, Disp: 400 each, Rfl: 1    insulin detemir (LEVEMIR) 100 units/mL subcutaneous injection, Inject 36 Units under the skin 2 (two) times a day, Disp: 200 Units, Rfl: 5    insulin regular (HumuLIN R,NovoLIN R) 100 units/mL injection, Inject 0 1 mL (10 Units total) under the skin 2 (two) times a day with lunch and dinner, Disp: 10 mL, Rfl: 5    levothyroxine 75 mcg tablet, Take 1 tablet (75 mcg total) by mouth daily, Disp: 30 tablet, Rfl: 5    losartan (COZAAR) 50 mg tablet, Take 1 tablet (50 mg total) by mouth daily, Disp: 30 tablet, Rfl: 5    Magnesium 400 MG CAPS, Take 2 tablets by mouth daily, Disp: , Rfl:     metoprolol succinate (TOPROL-XL) 100 mg 24 hr tablet, Take 1 tablet (100 mg total) by mouth daily, Disp: 30 tablet, Rfl: 5    montelukast (SINGULAIR) 10 mg tablet, Take 1 tablet (10 mg total) by mouth daily at bedtime, Disp: 30 tablet, Rfl: 5    multivitamin (THERAGRAN) TABS, Take 1 tablet by mouth daily  , Disp: , Rfl:     nitroglycerin (NITROSTAT) 0 4 mg SL tablet, Place 1 tablet under the tongue every 5 (five) minutes as needed, Disp: , Rfl:     Omega-3 Fatty Acids (OMEGA 3 500 PO), Take 1 capsule by mouth daily, Disp: , Rfl:     ONE TOUCH LANCETS MISC, by Does not apply route daily Test 2-3 times daily, Disp: , Rfl:     sitaGLIPtin (JANUVIA) 50 mg tablet, Take 1 tablet (50 mg total) by mouth daily, Disp: 30 tablet, Rfl: 5    traMADol (ULTRAM) 50 mg tablet, Take 1 tablet (50 mg total) by mouth 2 (two) times a day Fill with directions from Dr Aditya Braun, Disp: 60 tablet, Rfl: 0    TRUEPLUS INSULIN SYRINGE 31G X 5/16" 0 5 ML MISC, Inject under the skin 4 (four) times a day, Disp: 200 each, Rfl: 5    Vitamin E 200 units TABS, Take 1 capsule by mouth daily, Disp: , Rfl:     famotidine (PEPCID) 10 mg tablet, Take 1 tablet (10 mg total) by mouth 2 (two) times a day for 90 days, Disp: 60 tablet, Rfl: 9    Current Facility-Administered Medications:     insulin detemir (LEVEMIR) subcutaneous injection 35 Units, 35 Units, Subcutaneous, Q12H [facility], Jose M Syed MD    Laboratory Results:  Lab Results   Component Value Date    WBC 5 3 12/18/2018    HGB 12 3 12/18/2018    HCT 37 0 12/18/2018    MCV 92 3 12/18/2018     12/18/2018     Lab Results   Component Value Date    CALCIUM 9 6 01/28/2019     01/22/2018    K 5 1 01/28/2019    CO2 31 01/28/2019     01/28/2019    BUN 43 (H) 01/28/2019    CREATININE 1 64 (H) 01/22/2018     Lab Results   Component Value Date    CALCIUM 9 6 01/28/2019     No results found for: LABPROT

## 2019-02-22 DIAGNOSIS — M54.5 LOW BACK PAIN, UNSPECIFIED BACK PAIN LATERALITY, UNSPECIFIED CHRONICITY, WITH SCIATICA PRESENCE UNSPECIFIED: ICD-10-CM

## 2019-02-22 RX ORDER — TRAMADOL HYDROCHLORIDE 50 MG/1
50 TABLET ORAL 2 TIMES DAILY
Qty: 60 TABLET | Refills: 0 | Status: SHIPPED | OUTPATIENT
Start: 2019-02-22 | End: 2019-03-26 | Stop reason: SDUPTHER

## 2019-03-06 ENCOUNTER — TELEPHONE (OUTPATIENT)
Dept: INTERNAL MEDICINE CLINIC | Age: 74
End: 2019-03-06

## 2019-03-06 DIAGNOSIS — J45.41 MODERATE PERSISTENT ASTHMA WITH ACUTE EXACERBATION: Primary | ICD-10-CM

## 2019-03-06 DIAGNOSIS — J45.909 MODERATE ASTHMA WITHOUT COMPLICATION, UNSPECIFIED WHETHER PERSISTENT: Primary | ICD-10-CM

## 2019-03-06 RX ORDER — FLUTICASONE FUROATE AND VILANTEROL 100; 25 UG/1; UG/1
1 POWDER RESPIRATORY (INHALATION) DAILY
Qty: 1 INHALER | Refills: 5 | Status: SHIPPED | OUTPATIENT
Start: 2019-03-06 | End: 2019-10-25 | Stop reason: SDUPTHER

## 2019-03-06 NOTE — TELEPHONE ENCOUNTER
Received fax from [facility] Advair is not covered by insurance  Formulary alternatives are Thdemond Blare, Dulera, symbicort and Trelegy Ellipta    Please adivse what alternative to switch to     Pt  Uses [facility]     Thank you

## 2019-03-06 NOTE — TELEPHONE ENCOUNTER
Pt is followed by Dr Steve Luna  Will forward to him for his preference of alternative    Also cc'ing [facility] clinical pool as he is working in [address] [facility] today

## 2019-03-13 ENCOUNTER — TELEPHONE (OUTPATIENT)
Dept: INTERNAL MEDICINE CLINIC | Age: 74
End: 2019-03-13

## 2019-03-26 DIAGNOSIS — M54.5 LOW BACK PAIN, UNSPECIFIED BACK PAIN LATERALITY, UNSPECIFIED CHRONICITY, WITH SCIATICA PRESENCE UNSPECIFIED: ICD-10-CM

## 2019-03-26 RX ORDER — TRAMADOL HYDROCHLORIDE 50 MG/1
50 TABLET ORAL 2 TIMES DAILY
Qty: 60 TABLET | Refills: 0 | Status: SHIPPED | OUTPATIENT
Start: 2019-03-26 | End: 2019-04-30 | Stop reason: SDUPTHER

## 2019-03-28 LAB
BASOPHILS # BLD AUTO: 41 CELLS/UL (ref 0–200)
BASOPHILS NFR BLD AUTO: 0.8 %
BUN SERPL-MCNC: 49 MG/DL (ref 7–25)
BUN/CREAT SERPL: 30 (CALC) (ref 6–22)
CALCIUM SERPL-MCNC: 10.2 MG/DL (ref 8.6–10.4)
CHLORIDE SERPL-SCNC: 103 MMOL/L (ref 98–110)
CHOLEST SERPL-MCNC: 130 MG/DL
CHOLEST/HDLC SERPL: 2.5 (CALC)
CO2 SERPL-SCNC: 32 MMOL/L (ref 20–32)
CREAT SERPL-MCNC: 1.65 MG/DL (ref 0.6–0.93)
EOSINOPHIL # BLD AUTO: 179 CELLS/UL (ref 15–500)
EOSINOPHIL NFR BLD AUTO: 3.5 %
ERYTHROCYTE [DISTWIDTH] IN BLOOD BY AUTOMATED COUNT: 12 % (ref 11–15)
GLUCOSE SERPL-MCNC: 131 MG/DL (ref 65–99)
HBA1C MFR BLD: 7.5 % OF TOTAL HGB
HCT VFR BLD AUTO: 39.5 % (ref 35–45)
HDLC SERPL-MCNC: 53 MG/DL
HGB BLD-MCNC: 13.1 G/DL (ref 11.7–15.5)
LDLC SERPL CALC-MCNC: 52 MG/DL (CALC)
LYMPHOCYTES # BLD AUTO: 1423 CELLS/UL (ref 850–3900)
LYMPHOCYTES NFR BLD AUTO: 27.9 %
MCH RBC QN AUTO: 30.3 PG (ref 27–33)
MCHC RBC AUTO-ENTMCNC: 33.2 G/DL (ref 32–36)
MCV RBC AUTO: 91.4 FL (ref 80–100)
MONOCYTES # BLD AUTO: 587 CELLS/UL (ref 200–950)
MONOCYTES NFR BLD AUTO: 11.5 %
NEUTROPHILS # BLD AUTO: 2871 CELLS/UL (ref 1500–7800)
NEUTROPHILS NFR BLD AUTO: 56.3 %
NONHDLC SERPL-MCNC: 77 MG/DL (CALC)
PLATELET # BLD AUTO: 242 THOUSAND/UL (ref 140–400)
PMV BLD REES-ECKER: 11.5 FL (ref 7.5–12.5)
POTASSIUM SERPL-SCNC: 5.2 MMOL/L (ref 3.5–5.3)
RBC # BLD AUTO: 4.32 MILLION/UL (ref 3.8–5.1)
SL AMB EGFR AFRICAN AMERICAN: 35 ML/MIN/1.73M2
SL AMB EGFR NON AFRICAN AMERICAN: 30 ML/MIN/1.73M2
SODIUM SERPL-SCNC: 141 MMOL/L (ref 135–146)
TRIGL SERPL-MCNC: 175 MG/DL
TSH SERPL-ACNC: 3.01 MIU/L (ref 0.4–4.5)
WBC # BLD AUTO: 5.1 THOUSAND/UL (ref 3.8–10.8)

## 2019-04-02 ENCOUNTER — OFFICE VISIT (OUTPATIENT)
Dept: INTERNAL MEDICINE CLINIC | Age: 74
End: 2019-04-02
Payer: COMMERCIAL

## 2019-04-02 VITALS
TEMPERATURE: 96.5 F | DIASTOLIC BLOOD PRESSURE: 60 MMHG | BODY MASS INDEX: 28.2 KG/M2 | OXYGEN SATURATION: 96 % | SYSTOLIC BLOOD PRESSURE: 140 MMHG | WEIGHT: 144.4 LBS | HEART RATE: 68 BPM

## 2019-04-02 DIAGNOSIS — E11.8 TYPE 2 DIABETES MELLITUS WITH COMPLICATION, WITH LONG-TERM CURRENT USE OF INSULIN (HCC): ICD-10-CM

## 2019-04-02 DIAGNOSIS — M54.5 LOW BACK PAIN, UNSPECIFIED BACK PAIN LATERALITY, UNSPECIFIED CHRONICITY, WITH SCIATICA PRESENCE UNSPECIFIED: ICD-10-CM

## 2019-04-02 DIAGNOSIS — E78.2 MIXED HYPERLIPIDEMIA: ICD-10-CM

## 2019-04-02 DIAGNOSIS — Z79.4 TYPE 2 DIABETES MELLITUS WITH COMPLICATION, WITH LONG-TERM CURRENT USE OF INSULIN (HCC): ICD-10-CM

## 2019-04-02 DIAGNOSIS — J31.0 RHINITIS, UNSPECIFIED TYPE: ICD-10-CM

## 2019-04-02 DIAGNOSIS — N18.30 TYPE 2 DIABETES MELLITUS WITH STAGE 3 CHRONIC KIDNEY DISEASE, WITH LONG-TERM CURRENT USE OF INSULIN (HCC): Primary | ICD-10-CM

## 2019-04-02 DIAGNOSIS — Z79.4 TYPE 2 DIABETES MELLITUS WITH STAGE 3 CHRONIC KIDNEY DISEASE, WITH LONG-TERM CURRENT USE OF INSULIN (HCC): Primary | ICD-10-CM

## 2019-04-02 DIAGNOSIS — Z00.00 MEDICARE ANNUAL WELLNESS VISIT, INITIAL: ICD-10-CM

## 2019-04-02 DIAGNOSIS — N18.30 CKD (CHRONIC KIDNEY DISEASE) STAGE 3, GFR 30-59 ML/MIN (HCC): ICD-10-CM

## 2019-04-02 DIAGNOSIS — I10 HYPERTENSION, UNSPECIFIED TYPE: ICD-10-CM

## 2019-04-02 DIAGNOSIS — E11.22 TYPE 2 DIABETES MELLITUS WITH STAGE 3 CHRONIC KIDNEY DISEASE, WITH LONG-TERM CURRENT USE OF INSULIN (HCC): Primary | ICD-10-CM

## 2019-04-02 PROBLEM — N92.4 ABNORMAL PERIMENOPAUSAL BLEEDING: Status: RESOLVED | Noted: 2018-02-05 | Resolved: 2019-04-02

## 2019-04-02 PROBLEM — R68.89 FLU-LIKE SYMPTOMS: Status: RESOLVED | Noted: 2018-02-12 | Resolved: 2019-04-02

## 2019-04-02 PROBLEM — N28.9 RENAL INSUFFICIENCY: Status: RESOLVED | Noted: 2019-02-14 | Resolved: 2019-04-02

## 2019-04-02 PROCEDURE — 1160F RVW MEDS BY RX/DR IN RCRD: CPT | Performed by: INTERNAL MEDICINE

## 2019-04-02 PROCEDURE — 1125F AMNT PAIN NOTED PAIN PRSNT: CPT | Performed by: INTERNAL MEDICINE

## 2019-04-02 PROCEDURE — 1036F TOBACCO NON-USER: CPT | Performed by: INTERNAL MEDICINE

## 2019-04-02 PROCEDURE — G0438 PPPS, INITIAL VISIT: HCPCS | Performed by: INTERNAL MEDICINE

## 2019-04-02 PROCEDURE — 99214 OFFICE O/P EST MOD 30 MIN: CPT | Performed by: INTERNAL MEDICINE

## 2019-04-02 PROCEDURE — 1170F FXNL STATUS ASSESSED: CPT | Performed by: INTERNAL MEDICINE

## 2019-04-02 RX ORDER — CYCLOBENZAPRINE HCL 5 MG
5 TABLET ORAL 3 TIMES DAILY PRN
Qty: 90 TABLET | Refills: 0 | Status: SHIPPED | OUTPATIENT
Start: 2019-04-02 | End: 2019-04-30 | Stop reason: SDUPTHER

## 2019-04-02 RX ORDER — LOSARTAN POTASSIUM 50 MG/1
50 TABLET ORAL DAILY
Qty: 30 TABLET | Refills: 5 | Status: SHIPPED | OUTPATIENT
Start: 2019-04-02 | End: 2019-10-25 | Stop reason: SDUPTHER

## 2019-04-02 RX ORDER — FLUTICASONE PROPIONATE 50 MCG
2 SPRAY, SUSPENSION (ML) NASAL DAILY
Qty: 16 G | Refills: 1 | Status: SHIPPED | OUTPATIENT
Start: 2019-04-02 | End: 2019-07-03 | Stop reason: SDUPTHER

## 2019-04-27 DIAGNOSIS — M54.5 LOW BACK PAIN, UNSPECIFIED BACK PAIN LATERALITY, UNSPECIFIED CHRONICITY, WITH SCIATICA PRESENCE UNSPECIFIED: ICD-10-CM

## 2019-04-28 RX ORDER — TRAMADOL HYDROCHLORIDE 50 MG/1
TABLET ORAL
Qty: 60 TABLET | OUTPATIENT
Start: 2019-04-28

## 2019-04-28 RX ORDER — CYCLOBENZAPRINE HCL 5 MG
5 TABLET ORAL 3 TIMES DAILY PRN
Qty: 90 TABLET | Refills: 0 | OUTPATIENT
Start: 2019-04-28

## 2019-04-30 DIAGNOSIS — M54.5 LOW BACK PAIN, UNSPECIFIED BACK PAIN LATERALITY, UNSPECIFIED CHRONICITY, WITH SCIATICA PRESENCE UNSPECIFIED: ICD-10-CM

## 2019-04-30 RX ORDER — TRAMADOL HYDROCHLORIDE 50 MG/1
50 TABLET ORAL 2 TIMES DAILY
Qty: 60 TABLET | Refills: 0 | Status: SHIPPED | OUTPATIENT
Start: 2019-04-30 | End: 2019-05-29 | Stop reason: SDUPTHER

## 2019-04-30 RX ORDER — CYCLOBENZAPRINE HCL 5 MG
5 TABLET ORAL 3 TIMES DAILY PRN
Qty: 90 TABLET | Refills: 1 | Status: SHIPPED | OUTPATIENT
Start: 2019-04-30 | End: 2019-07-09 | Stop reason: SDUPTHER

## 2019-05-20 ENCOUNTER — APPOINTMENT (OUTPATIENT)
Dept: RADIOLOGY | Age: 74
End: 2019-05-20
Payer: COMMERCIAL

## 2019-05-20 ENCOUNTER — OFFICE VISIT (OUTPATIENT)
Dept: INTERNAL MEDICINE CLINIC | Age: 74
End: 2019-05-20
Payer: COMMERCIAL

## 2019-05-20 ENCOUNTER — TELEPHONE (OUTPATIENT)
Dept: INTERNAL MEDICINE CLINIC | Age: 74
End: 2019-05-20

## 2019-05-20 VITALS
HEIGHT: 60 IN | TEMPERATURE: 97.8 F | DIASTOLIC BLOOD PRESSURE: 62 MMHG | WEIGHT: 145.4 LBS | OXYGEN SATURATION: 98 % | HEART RATE: 77 BPM | BODY MASS INDEX: 28.54 KG/M2 | SYSTOLIC BLOOD PRESSURE: 110 MMHG

## 2019-05-20 DIAGNOSIS — Z79.4 TYPE 2 DIABETES MELLITUS WITH STAGE 3 CHRONIC KIDNEY DISEASE, WITH LONG-TERM CURRENT USE OF INSULIN (HCC): ICD-10-CM

## 2019-05-20 DIAGNOSIS — M25.511 ACUTE PAIN OF RIGHT SHOULDER DUE TO TRAUMA: ICD-10-CM
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
## 2019-05-20 DIAGNOSIS — G89.11 ACUTE PAIN OF RIGHT SHOULDER DUE TO TRAUMA: ICD-10-CM

## 2019-05-20 DIAGNOSIS — R20.2 NUMBNESS AND TINGLING OF BOTH UPPER EXTREMITIES: ICD-10-CM
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
## 2019-05-20 DIAGNOSIS — G89.11 ACUTE PAIN OF RIGHT SHOULDER DUE TO TRAUMA: Primary | ICD-10-CM

## 2019-05-20 DIAGNOSIS — M25.511 ACUTE PAIN OF RIGHT SHOULDER: ICD-10-CM

## 2019-05-20 DIAGNOSIS — N18.30 TYPE 2 DIABETES MELLITUS WITH STAGE 3 CHRONIC KIDNEY DISEASE, WITH LONG-TERM CURRENT USE OF INSULIN (HCC): ICD-10-CM

## 2019-05-20 DIAGNOSIS — M25.511 ACUTE PAIN OF RIGHT SHOULDER DUE TO TRAUMA: Primary | ICD-10-CM

## 2019-05-20 DIAGNOSIS — R26.89 IMBALANCE: ICD-10-CM

## 2019-05-20 DIAGNOSIS — M25.521 RIGHT ELBOW PAIN: ICD-10-CM

## 2019-05-20 DIAGNOSIS — R20.0 NUMBNESS AND TINGLING OF BOTH UPPER EXTREMITIES: ICD-10-CM

## 2019-05-20 DIAGNOSIS — E11.22 TYPE 2 DIABETES MELLITUS WITH STAGE 3 CHRONIC KIDNEY DISEASE, WITH LONG-TERM CURRENT USE OF INSULIN (HCC): ICD-10-CM

## 2019-05-20 PROCEDURE — 73030 X-RAY EXAM OF SHOULDER: CPT

## 2019-05-20 PROCEDURE — 99214 OFFICE O/P EST MOD 30 MIN: CPT | Performed by: INTERNAL MEDICINE

## 2019-05-20 PROCEDURE — 1160F RVW MEDS BY RX/DR IN RCRD: CPT | Performed by: INTERNAL MEDICINE

## 2019-05-23 ENCOUNTER — OFFICE VISIT (OUTPATIENT)
Dept: PODIATRY | Facility: CLINIC | Age: 74
End: 2019-05-23
Payer: COMMERCIAL

## 2019-05-23 VITALS
BODY MASS INDEX: 27.88 KG/M2 | HEIGHT: 60 IN | DIASTOLIC BLOOD PRESSURE: 60 MMHG | SYSTOLIC BLOOD PRESSURE: 131 MMHG | WEIGHT: 142 LBS

## 2019-05-23 DIAGNOSIS — E11.42 DIABETIC POLYNEUROPATHY ASSOCIATED WITH TYPE 2 DIABETES MELLITUS (HCC): Primary | ICD-10-CM

## 2019-05-23 PROCEDURE — 99213 OFFICE O/P EST LOW 20 MIN: CPT | Performed by: PODIATRIST

## 2019-05-29 DIAGNOSIS — M54.5 LOW BACK PAIN, UNSPECIFIED BACK PAIN LATERALITY, UNSPECIFIED CHRONICITY, WITH SCIATICA PRESENCE UNSPECIFIED: ICD-10-CM

## 2019-05-29 RX ORDER — TRAMADOL HYDROCHLORIDE 50 MG/1
50 TABLET ORAL 2 TIMES DAILY
Qty: 60 TABLET | Refills: 0 | Status: SHIPPED | OUTPATIENT
Start: 2019-05-29 | End: 2019-07-03 | Stop reason: SDUPTHER

## 2019-06-07 ENCOUNTER — DOCTOR'S OFFICE (OUTPATIENT)
Dept: URBAN - METROPOLITAN AREA CLINIC 137 | Facility: CLINIC | Age: 74
Setting detail: OPHTHALMOLOGY
End: 2019-06-07
Payer: COMMERCIAL

## 2019-06-07 ENCOUNTER — OPTICAL OFFICE (OUTPATIENT)
Dept: URBAN - METROPOLITAN AREA CLINIC 146 | Facility: CLINIC | Age: 74
Setting detail: OPHTHALMOLOGY
End: 2019-06-07
Payer: COMMERCIAL

## 2019-06-07 DIAGNOSIS — H52.4: ICD-10-CM

## 2019-06-07 DIAGNOSIS — H52.13: ICD-10-CM

## 2019-06-07 LAB
LEFT EYE DIABETIC RETINOPATHY: NORMAL
RIGHT EYE DIABETIC RETINOPATHY: NORMAL

## 2019-06-07 PROCEDURE — V2303 LENS SPHCY TRIFOCAL 4.0/.12-: HCPCS | Performed by: OPTOMETRIST

## 2019-06-07 PROCEDURE — V2025 EYEGLASSES DELUX FRAMES: HCPCS | Performed by: OPTOMETRIST

## 2019-06-07 PROCEDURE — V2744 TINT PHOTOCHROMATIC LENS/ES: HCPCS | Performed by: OPTOMETRIST

## 2019-06-07 PROCEDURE — 92015 DETERMINE REFRACTIVE STATE: CPT | Performed by: OPTOMETRIST

## 2019-06-07 PROCEDURE — V2020 VISION SVCS FRAMES PURCHASES: HCPCS | Performed by: OPTOMETRIST

## 2019-06-07 PROCEDURE — V2300 LENS SPHERE TRIFOCAL 4.00D: HCPCS | Performed by: OPTOMETRIST

## 2019-06-07 ASSESSMENT — REFRACTION_CURRENTRX
OD_ADD: +3.00
OD_SPHERE: -1.75
OD_VPRISM_DIRECTION: TRF
OD_AXIS: 166
OS_VPRISM_DIRECTION: TRF
OD_ADD: +2.50
OD_CYLINDER: -0.25
OD_CYLINDER: +0.50
OD_OVR_VA: 20/
OS_ADD: +2.50
OD_OVR_VA: 20/
OS_ADD: +3.00
OD_AXIS: 036
OS_OVR_VA: 20/
OS_SPHERE: +0.50
OS_CYLINDER: SPH
OD_OVR_VA: 20/
OS_OVR_VA: 20/
OS_SPHERE: -0.75
OD_SPHERE: -2.50
OS_VPRISM_DIRECTION: TRF
OD_VPRISM_DIRECTION: TRF
OS_CYLINDER: SPH
OS_OVR_VA: 20/

## 2019-06-07 ASSESSMENT — REFRACTION_MANIFEST
OS_VA2: 20/30(J2)
OD_VA2: 20/
OD_VA2: 20/30(J2)
OD_ADD: +2.75
OD_SPHERE: -1.75
OU_VA: 20/
OD_CYLINDER: -0.50
OS_ADD: +2.75
OS_CYLINDER: SPH
OS_VA2: 20/
OD_VA1: 20/40
OD_VA3: 20/
OU_VA: 20/
OD_VA3: 20/
OS_SPHERE: -0.75
OS_VA1: 20/70
OD_VA1: 20/
OD_AXIS: 090
OS_VA1: 20/
OS_VA3: 20/
OS_VA3: 20/

## 2019-06-07 ASSESSMENT — SPHEQUIV_DERIVED
OD_SPHEQUIV: -1.375
OS_SPHEQUIV: -0.75
OD_SPHEQUIV: -2

## 2019-06-07 ASSESSMENT — KERATOMETRY
OD_K1POWER_DIOPTERS: 44.00
OD_K2POWER_DIOPTERS: 44.50
OS_K1POWER_DIOPTERS: 44.25
OS_K2POWER_DIOPTERS: 45.00
OS_AXISANGLE_DEGREES: 083
OD_AXISANGLE_DEGREES: 049

## 2019-06-07 ASSESSMENT — VISUAL ACUITY
OS_BCVA: 20/50
OD_BCVA: 20/100

## 2019-06-07 ASSESSMENT — REFRACTION_AUTOREFRACTION
OD_CYLINDER: -0.75
OS_AXIS: 158
OD_SPHERE: -1.00
OS_SPHERE: -0.50
OD_AXIS: 92
OS_CYLINDER: -0.50

## 2019-06-07 ASSESSMENT — AXIALLENGTH_DERIVED
OD_AL: 24.1066
OS_AL: 23.4715
OD_AL: 23.855

## 2019-06-07 ASSESSMENT — CONFRONTATIONAL VISUAL FIELD TEST (CVF)
OS_FINDINGS: FULL
OD_FINDINGS: FULL

## 2019-06-25 LAB
BUN SERPL-MCNC: 47 MG/DL (ref 7–25)
BUN/CREAT SERPL: 35 (CALC) (ref 6–22)
CALCIUM SERPL-MCNC: 9.8 MG/DL (ref 8.6–10.4)
CHLORIDE SERPL-SCNC: 104 MMOL/L (ref 98–110)
CO2 SERPL-SCNC: 28 MMOL/L (ref 20–32)
CREAT SERPL-MCNC: 1.34 MG/DL (ref 0.6–0.93)
GLUCOSE SERPL-MCNC: 156 MG/DL (ref 65–99)
HBA1C MFR BLD: 7.1 % OF TOTAL HGB
MAGNESIUM SERPL-MCNC: 2.2 MG/DL (ref 1.5–2.5)
PHOSPHATE SERPL-MCNC: 3.9 MG/DL (ref 2.1–4.3)
POTASSIUM SERPL-SCNC: 4.5 MMOL/L (ref 3.5–5.3)
SL AMB EGFR AFRICAN AMERICAN: 45 ML/MIN/1.73M2
SL AMB EGFR NON AFRICAN AMERICAN: 39 ML/MIN/1.73M2
SODIUM SERPL-SCNC: 140 MMOL/L (ref 135–146)
VIT B12 SERPL-MCNC: 698 PG/ML (ref 200–1100)

## 2019-07-03 ENCOUNTER — OFFICE VISIT (OUTPATIENT)
Dept: INTERNAL MEDICINE CLINIC | Age: 74
End: 2019-07-03
Payer: COMMERCIAL

## 2019-07-03 VITALS
DIASTOLIC BLOOD PRESSURE: 58 MMHG | WEIGHT: 143 LBS | HEART RATE: 73 BPM | OXYGEN SATURATION: 95 % | TEMPERATURE: 97.4 F | SYSTOLIC BLOOD PRESSURE: 108 MMHG | BODY MASS INDEX: 27.93 KG/M2

## 2019-07-03 DIAGNOSIS — E78.5 HYPERLIPIDEMIA, UNSPECIFIED HYPERLIPIDEMIA TYPE: ICD-10-CM

## 2019-07-03 DIAGNOSIS — N18.30 CKD (CHRONIC KIDNEY DISEASE) STAGE 3, GFR 30-59 ML/MIN (HCC): ICD-10-CM

## 2019-07-03 DIAGNOSIS — M54.5 LOW BACK PAIN, UNSPECIFIED BACK PAIN LATERALITY, UNSPECIFIED CHRONICITY, WITH SCIATICA PRESENCE UNSPECIFIED: ICD-10-CM

## 2019-07-03 DIAGNOSIS — E03.9 HYPOTHYROIDISM, UNSPECIFIED TYPE: ICD-10-CM

## 2019-07-03 DIAGNOSIS — I10 HYPERTENSION, UNSPECIFIED TYPE: ICD-10-CM

## 2019-07-03 DIAGNOSIS — N18.30 TYPE 2 DIABETES MELLITUS WITH STAGE 3 CHRONIC KIDNEY DISEASE, WITH LONG-TERM CURRENT USE OF INSULIN (HCC): Primary | ICD-10-CM

## 2019-07-03 DIAGNOSIS — J31.0 RHINITIS, UNSPECIFIED TYPE: ICD-10-CM

## 2019-07-03 DIAGNOSIS — Z79.4 TYPE 2 DIABETES MELLITUS WITH STAGE 3 CHRONIC KIDNEY DISEASE, WITH LONG-TERM CURRENT USE OF INSULIN (HCC): Primary | ICD-10-CM

## 2019-07-03 DIAGNOSIS — E11.22 TYPE 2 DIABETES MELLITUS WITH STAGE 3 CHRONIC KIDNEY DISEASE, WITH LONG-TERM CURRENT USE OF INSULIN (HCC): Primary | ICD-10-CM

## 2019-07-03 DIAGNOSIS — E78.2 MIXED HYPERLIPIDEMIA: ICD-10-CM

## 2019-07-03 PROCEDURE — 1036F TOBACCO NON-USER: CPT | Performed by: INTERNAL MEDICINE

## 2019-07-03 PROCEDURE — 99214 OFFICE O/P EST MOD 30 MIN: CPT | Performed by: INTERNAL MEDICINE

## 2019-07-03 RX ORDER — ATORVASTATIN CALCIUM 80 MG/1
80 TABLET, FILM COATED ORAL DAILY
Qty: 30 TABLET | Refills: 5 | Status: SHIPPED | OUTPATIENT
Start: 2019-07-03 | End: 2020-01-22 | Stop reason: SDUPTHER

## 2019-07-03 RX ORDER — TRAMADOL HYDROCHLORIDE 50 MG/1
50 TABLET ORAL 2 TIMES DAILY
Qty: 60 TABLET | Refills: 0 | Status: SHIPPED | OUTPATIENT
Start: 2019-07-03 | End: 2019-07-31 | Stop reason: SDUPTHER

## 2019-07-03 RX ORDER — BUMETANIDE 2 MG/1
2 TABLET ORAL DAILY
Qty: 30 TABLET | Refills: 5 | Status: SHIPPED | OUTPATIENT
Start: 2019-07-03 | End: 2020-01-22 | Stop reason: SDUPTHER

## 2019-07-03 RX ORDER — LEVOTHYROXINE SODIUM 0.07 MG/1
75 TABLET ORAL DAILY
Qty: 30 TABLET | Refills: 5 | Status: SHIPPED | OUTPATIENT
Start: 2019-07-03 | End: 2020-01-22 | Stop reason: SDUPTHER

## 2019-07-03 RX ORDER — FLUTICASONE PROPIONATE 50 MCG
2 SPRAY, SUSPENSION (ML) NASAL DAILY
Qty: 16 G | Refills: 1 | Status: SHIPPED | OUTPATIENT
Start: 2019-07-03 | End: 2019-08-22 | Stop reason: SDUPTHER

## 2019-07-03 RX ORDER — METOPROLOL SUCCINATE 100 MG/1
100 TABLET, EXTENDED RELEASE ORAL DAILY
Qty: 30 TABLET | Refills: 5 | Status: SHIPPED | OUTPATIENT
Start: 2019-07-03 | End: 2020-01-22 | Stop reason: SDUPTHER
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
## 2019-07-03 NOTE — PROGRESS NOTES
Diabetic Foot Exam    Patient's shoes and socks removed  Right Foot/Ankle   Right Foot Inspection  Skin Exam: dry skin                            Sensory       Monofilament testing: diminished  Vascular    The right DP pulse is 2+  The right PT pulse is 2+  Left Foot/Ankle  Left Foot Inspection  Skin Exam: dry skin                                         Sensory       Monofilament: diminished  Vascular    The left DP pulse is 2+  The left PT pulse is 2+  Assign Risk Category:  No deformity present; Loss of protective sensation;  No weak pulses       Risk: 1

## 2019-07-08 DIAGNOSIS — M54.5 LOW BACK PAIN, UNSPECIFIED BACK PAIN LATERALITY, UNSPECIFIED CHRONICITY, WITH SCIATICA PRESENCE UNSPECIFIED: ICD-10-CM

## 2019-07-08 RX ORDER — CYCLOBENZAPRINE HCL 5 MG
5 TABLET ORAL 3 TIMES DAILY PRN
Qty: 90 TABLET | Refills: 0 | OUTPATIENT
Start: 2019-07-08

## 2019-07-09 DIAGNOSIS — M54.5 LOW BACK PAIN, UNSPECIFIED BACK PAIN LATERALITY, UNSPECIFIED CHRONICITY, WITH SCIATICA PRESENCE UNSPECIFIED: ICD-10-CM

## 2019-07-09 RX ORDER — CYCLOBENZAPRINE HCL 5 MG
5 TABLET ORAL 3 TIMES DAILY PRN
Qty: 90 TABLET | Refills: 0 | Status: SHIPPED | OUTPATIENT
Start: 2019-07-09 | End: 2019-07-31 | Stop reason: SDUPTHER

## 2019-07-09 NOTE — TELEPHONE ENCOUNTER
Patient stopped by the office and stated that she needs this medication Flexeril before the end of today  Patient will be out of the medication by tomorrow  It looks like it was sent to Dr Abdulaziz Castro, but can someone please send it to someone else or let Dr Abdulaziz Castro to send it to the pharmacy by today      Please call them back at the home number

## 2019-07-25 ENCOUNTER — OFFICE VISIT (OUTPATIENT)
Dept: PODIATRY | Facility: CLINIC | Age: 74
End: 2019-07-25
Payer: COMMERCIAL

## 2019-07-25 VITALS
DIASTOLIC BLOOD PRESSURE: 73 MMHG | WEIGHT: 140 LBS | HEIGHT: 60 IN | HEART RATE: 66 BPM | BODY MASS INDEX: 27.48 KG/M2 | SYSTOLIC BLOOD PRESSURE: 123 MMHG

## 2019-07-25 DIAGNOSIS — E11.42 DIABETIC POLYNEUROPATHY ASSOCIATED WITH TYPE 2 DIABETES MELLITUS (HCC): Primary | ICD-10-CM

## 2019-07-25 PROCEDURE — 99212 OFFICE O/P EST SF 10 MIN: CPT | Performed by: PODIATRIST

## 2019-07-25 NOTE — PROGRESS NOTES
Patient presents for palliative diabetic foot care  Patient has known diabetic neuropathy and poor circulation  There are no palpable pedal pulses  Chief complaint involves long toenails that she is unable to cut  There are no other foot problems bothering the patient today  Treatment consisted of nail trimming  Patient is rescheduled in 10 weeks

## 2019-07-31 ENCOUNTER — TELEPHONE (OUTPATIENT)
Dept: INTERNAL MEDICINE CLINIC | Age: 74
End: 2019-07-31

## 2019-07-31 DIAGNOSIS — M54.5 LOW BACK PAIN, UNSPECIFIED BACK PAIN LATERALITY, UNSPECIFIED CHRONICITY, WITH SCIATICA PRESENCE UNSPECIFIED: ICD-10-CM

## 2019-07-31 DIAGNOSIS — Z12.39 SCREENING FOR MALIGNANT NEOPLASM OF BREAST: Primary | ICD-10-CM

## 2019-07-31 RX ORDER — TRAMADOL HYDROCHLORIDE 50 MG/1
50 TABLET ORAL 2 TIMES DAILY
Qty: 60 TABLET | Refills: 0 | Status: SHIPPED | OUTPATIENT
Start: 2019-07-31 | End: 2019-09-04 | Stop reason: SDUPTHER

## 2019-07-31 RX ORDER — CYCLOBENZAPRINE HCL 5 MG
5 TABLET ORAL 3 TIMES DAILY PRN
Qty: 90 TABLET | Refills: 0 | Status: SHIPPED | OUTPATIENT
Start: 2019-07-31 | End: 2019-09-03 | Stop reason: SDUPTHER

## 2019-07-31 NOTE — TELEPHONE ENCOUNTER
Dr Ammy Hi, PCP not in office will send to [facility] provider pool to sign off on

## 2019-08-22 DIAGNOSIS — J31.0 RHINITIS, UNSPECIFIED TYPE: ICD-10-CM

## 2019-08-22 RX ORDER — FLUTICASONE PROPIONATE 50 MCG
2 SPRAY, SUSPENSION (ML) NASAL DAILY
Qty: 16 G | Refills: 3 | Status: SHIPPED | OUTPATIENT
Start: 2019-08-22 | End: 2020-03-31 | Stop reason: SDUPTHER

## 2019-09-03 DIAGNOSIS — M54.5 LOW BACK PAIN, UNSPECIFIED BACK PAIN LATERALITY, UNSPECIFIED CHRONICITY, WITH SCIATICA PRESENCE UNSPECIFIED: ICD-10-CM

## 2019-09-03 RX ORDER — CYCLOBENZAPRINE HCL 5 MG
5 TABLET ORAL 3 TIMES DAILY PRN
Qty: 90 TABLET | Refills: 0 | Status: SHIPPED | OUTPATIENT
Start: 2019-09-03 | End: 2019-10-01 | Stop reason: SDUPTHER

## 2019-09-04 DIAGNOSIS — M54.5 LOW BACK PAIN, UNSPECIFIED BACK PAIN LATERALITY, UNSPECIFIED CHRONICITY, WITH SCIATICA PRESENCE UNSPECIFIED: ICD-10-CM

## 2019-09-04 RX ORDER — TRAMADOL HYDROCHLORIDE 50 MG/1
50 TABLET ORAL 2 TIMES DAILY
Qty: 60 TABLET | Refills: 0 | Status: SHIPPED | OUTPATIENT
Start: 2019-09-04 | End: 2019-10-01 | Stop reason: SDUPTHER

## 2019-09-11 DIAGNOSIS — Z79.4 TYPE 2 DIABETES MELLITUS WITH COMPLICATION, WITH LONG-TERM CURRENT USE OF INSULIN (HCC): ICD-10-CM

## 2019-09-11 DIAGNOSIS — E11.8 TYPE 2 DIABETES MELLITUS WITH COMPLICATION, WITH LONG-TERM CURRENT USE OF INSULIN (HCC): ICD-10-CM

## 2019-09-11 DIAGNOSIS — M10.9 GOUT, UNSPECIFIED CAUSE, UNSPECIFIED CHRONICITY, UNSPECIFIED SITE: ICD-10-CM

## 2019-09-11 DIAGNOSIS — J45.41 MODERATE PERSISTENT ASTHMA WITH ACUTE EXACERBATION: ICD-10-CM

## 2019-09-11 RX ORDER — FEBUXOSTAT 40 MG/1
40 TABLET, FILM COATED ORAL EVERY OTHER DAY
Qty: 15 TABLET | Refills: 5 | Status: SHIPPED | OUTPATIENT
Start: 2019-09-11 | End: 2019-12-12

## 2019-09-11 RX ORDER — MONTELUKAST SODIUM 10 MG/1
10 TABLET ORAL
Qty: 30 TABLET | Refills: 5 | Status: SHIPPED | OUTPATIENT
Start: 2019-09-11 | End: 2020-01-22 | Stop reason: SDUPTHER

## 2019-09-24 DIAGNOSIS — E78.5 HYPERLIPIDEMIA, UNSPECIFIED HYPERLIPIDEMIA TYPE: ICD-10-CM

## 2019-09-24 DIAGNOSIS — E11.8 TYPE 2 DIABETES MELLITUS WITH COMPLICATION, WITH LONG-TERM CURRENT USE OF INSULIN (HCC): ICD-10-CM

## 2019-09-24 DIAGNOSIS — Z79.4 TYPE 2 DIABETES MELLITUS WITH COMPLICATION, WITH LONG-TERM CURRENT USE OF INSULIN (HCC): ICD-10-CM

## 2019-09-24 RX ORDER — ATORVASTATIN CALCIUM 80 MG/1
80 TABLET, FILM COATED ORAL DAILY
Qty: 30 TABLET | Refills: 5 | OUTPATIENT
Start: 2019-09-24

## 2019-10-01 DIAGNOSIS — M54.50 LOW BACK PAIN: ICD-10-CM

## 2019-10-01 RX ORDER — CYCLOBENZAPRINE HCL 5 MG
5 TABLET ORAL 3 TIMES DAILY PRN
Qty: 90 TABLET | Refills: 0 | Status: SHIPPED | OUTPATIENT
Start: 2019-10-01 | End: 2019-10-25 | Stop reason: SDUPTHER

## 2019-10-01 RX ORDER — TRAMADOL HYDROCHLORIDE 50 MG/1
50 TABLET ORAL 2 TIMES DAILY
Qty: 60 TABLET | Refills: 0 | Status: SHIPPED | OUTPATIENT
Start: 2019-10-01 | End: 2019-10-31 | Stop reason: SDUPTHER

## 2019-10-03 ENCOUNTER — OFFICE VISIT (OUTPATIENT)
Dept: PODIATRY | Facility: CLINIC | Age: 74
End: 2019-10-03
Payer: COMMERCIAL

## 2019-10-03 VITALS
BODY MASS INDEX: 27.48 KG/M2 | DIASTOLIC BLOOD PRESSURE: 69 MMHG | SYSTOLIC BLOOD PRESSURE: 121 MMHG | WEIGHT: 140 LBS | HEART RATE: 65 BPM | HEIGHT: 60 IN

## 2019-10-03 DIAGNOSIS — E11.42 DIABETIC POLYNEUROPATHY ASSOCIATED WITH TYPE 2 DIABETES MELLITUS (HCC): Primary | ICD-10-CM

## 2019-10-03 PROCEDURE — 99212 OFFICE O/P EST SF 10 MIN: CPT | Performed by: PODIATRIST

## 2019-10-03 NOTE — PROGRESS NOTES
Patient presents for palliative diabetic foot care  No acute disorder noted  Treatment consisted of nail trimming

## 2019-10-22 LAB
BASOPHILS # BLD AUTO: 32 CELLS/UL (ref 0–200)
BASOPHILS NFR BLD AUTO: 0.5 %
BUN SERPL-MCNC: 47 MG/DL (ref 7–25)
BUN/CREAT SERPL: 31 (CALC) (ref 6–22)
CALCIUM SERPL-MCNC: 9.7 MG/DL (ref 8.6–10.4)
CHLORIDE SERPL-SCNC: 102 MMOL/L (ref 98–110)
CHOLEST SERPL-MCNC: 110 MG/DL
CHOLEST/HDLC SERPL: 2.3 (CALC)
CO2 SERPL-SCNC: 31 MMOL/L (ref 20–32)
CREAT SERPL-MCNC: 1.53 MG/DL (ref 0.6–0.93)
EOSINOPHIL # BLD AUTO: 239 CELLS/UL (ref 15–500)
EOSINOPHIL NFR BLD AUTO: 3.8 %
ERYTHROCYTE [DISTWIDTH] IN BLOOD BY AUTOMATED COUNT: 12.3 % (ref 11–15)
GLUCOSE SERPL-MCNC: 100 MG/DL (ref 65–99)
HBA1C MFR BLD: 6.9 % OF TOTAL HGB
HCT VFR BLD AUTO: 35.9 % (ref 35–45)
HDLC SERPL-MCNC: 47 MG/DL
HGB BLD-MCNC: 11.6 G/DL (ref 11.7–15.5)
LDLC SERPL CALC-MCNC: 41 MG/DL (CALC)
LYMPHOCYTES # BLD AUTO: 1985 CELLS/UL (ref 850–3900)
LYMPHOCYTES NFR BLD AUTO: 31.5 %
MCH RBC QN AUTO: 30.2 PG (ref 27–33)
MCHC RBC AUTO-ENTMCNC: 32.3 G/DL (ref 32–36)
MCV RBC AUTO: 93.5 FL (ref 80–100)
MONOCYTES # BLD AUTO: 788 CELLS/UL (ref 200–950)
MONOCYTES NFR BLD AUTO: 12.5 %
NEUTROPHILS # BLD AUTO: 3257 CELLS/UL (ref 1500–7800)
NEUTROPHILS NFR BLD AUTO: 51.7 %
NONHDLC SERPL-MCNC: 63 MG/DL (CALC)
PLATELET # BLD AUTO: 231 THOUSAND/UL (ref 140–400)
PMV BLD REES-ECKER: 11.3 FL (ref 7.5–12.5)
POTASSIUM SERPL-SCNC: 5.3 MMOL/L (ref 3.5–5.3)
RBC # BLD AUTO: 3.84 MILLION/UL (ref 3.8–5.1)
SL AMB EGFR AFRICAN AMERICAN: 38 ML/MIN/1.73M2
SL AMB EGFR NON AFRICAN AMERICAN: 33 ML/MIN/1.73M2
SODIUM SERPL-SCNC: 139 MMOL/L (ref 135–146)
TRIGL SERPL-MCNC: 140 MG/DL
WBC # BLD AUTO: 6.3 THOUSAND/UL (ref 3.8–10.8)

## 2019-10-25 ENCOUNTER — OFFICE VISIT (OUTPATIENT)
Dept: INTERNAL MEDICINE CLINIC | Age: 74
End: 2019-10-25
Payer: COMMERCIAL

## 2019-10-25 VITALS
OXYGEN SATURATION: 98 % | TEMPERATURE: 97.1 F | SYSTOLIC BLOOD PRESSURE: 140 MMHG | HEART RATE: 60 BPM | DIASTOLIC BLOOD PRESSURE: 58 MMHG | WEIGHT: 140.8 LBS | BODY MASS INDEX: 27.64 KG/M2 | HEIGHT: 60 IN

## 2019-10-25 DIAGNOSIS — J45.41 MODERATE PERSISTENT ASTHMA WITH ACUTE EXACERBATION: ICD-10-CM

## 2019-10-25 DIAGNOSIS — Z79.4 TYPE 2 DIABETES MELLITUS WITH STAGE 3 CHRONIC KIDNEY DISEASE, WITH LONG-TERM CURRENT USE OF INSULIN (HCC): ICD-10-CM

## 2019-10-25 DIAGNOSIS — I10 HYPERTENSION, UNSPECIFIED TYPE: ICD-10-CM

## 2019-10-25 DIAGNOSIS — E78.2 MIXED HYPERLIPIDEMIA: ICD-10-CM

## 2019-10-25 DIAGNOSIS — N18.30 CKD (CHRONIC KIDNEY DISEASE) STAGE 3, GFR 30-59 ML/MIN (HCC): ICD-10-CM

## 2019-10-25 DIAGNOSIS — J45.909 ASTHMA WITHOUT STATUS ASTHMATICUS WITHOUT COMPLICATION, UNSPECIFIED ASTHMA SEVERITY, UNSPECIFIED WHETHER PERSISTENT: ICD-10-CM

## 2019-10-25 DIAGNOSIS — Z23 NEED FOR INFLUENZA VACCINATION: ICD-10-CM

## 2019-10-25 DIAGNOSIS — Z79.4 TYPE 2 DIABETES MELLITUS WITH COMPLICATION, WITH LONG-TERM CURRENT USE OF INSULIN (HCC): ICD-10-CM

## 2019-10-25 DIAGNOSIS — E11.8 TYPE 2 DIABETES MELLITUS WITH COMPLICATION, WITH LONG-TERM CURRENT USE OF INSULIN (HCC): ICD-10-CM

## 2019-10-25 DIAGNOSIS — E11.22 TYPE 2 DIABETES MELLITUS WITH STAGE 3 CHRONIC KIDNEY DISEASE, WITH LONG-TERM CURRENT USE OF INSULIN (HCC): ICD-10-CM

## 2019-10-25 DIAGNOSIS — N18.30 TYPE 2 DIABETES MELLITUS WITH STAGE 3 CHRONIC KIDNEY DISEASE, WITH LONG-TERM CURRENT USE OF INSULIN (HCC): ICD-10-CM

## 2019-10-25 DIAGNOSIS — M54.50 LOW BACK PAIN: ICD-10-CM

## 2019-10-25 DIAGNOSIS — Z12.11 SCREENING FOR COLON CANCER: Primary | ICD-10-CM

## 2019-10-25 PROCEDURE — 99214 OFFICE O/P EST MOD 30 MIN: CPT | Performed by: INTERNAL MEDICINE

## 2019-10-25 PROCEDURE — 90662 IIV NO PRSV INCREASED AG IM: CPT

## 2019-10-25 PROCEDURE — G0008 ADMIN INFLUENZA VIRUS VAC: HCPCS

## 2019-10-25 RX ORDER — ALBUTEROL SULFATE 90 UG/1
2 AEROSOL, METERED RESPIRATORY (INHALATION) 4 TIMES DAILY
Qty: 1 INHALER | Refills: 5 | Status: SHIPPED | OUTPATIENT
Start: 2019-10-25 | End: 2021-05-25 | Stop reason: SDUPTHER

## 2019-10-25 RX ORDER — CYCLOBENZAPRINE HCL 5 MG
5 TABLET ORAL 3 TIMES DAILY PRN
Qty: 90 TABLET | Refills: 0 | Status: SHIPPED | OUTPATIENT
Start: 2019-10-25 | End: 2019-12-04 | Stop reason: SDUPTHER

## 2019-10-25 RX ORDER — SYRINGE-NEEDLE,INSULIN,0.5 ML 31 GX5/16"
SYRINGE, EMPTY DISPOSABLE MISCELLANEOUS 4 TIMES DAILY
Qty: 200 EACH | Refills: 5 | Status: SHIPPED | OUTPATIENT
Start: 2019-10-25 | End: 2020-09-09 | Stop reason: SDUPTHER

## 2019-10-25 RX ORDER — FLUTICASONE FUROATE AND VILANTEROL 100; 25 UG/1; UG/1
1 POWDER RESPIRATORY (INHALATION) DAILY
Qty: 1 INHALER | Refills: 5 | Status: SHIPPED | OUTPATIENT
Start: 2019-10-25 | End: 2020-05-20

## 2019-10-25 RX ORDER — LOSARTAN POTASSIUM 50 MG/1
50 TABLET ORAL DAILY
Qty: 30 TABLET | Refills: 5 | Status: SHIPPED | OUTPATIENT
Start: 2019-10-25 | End: 2020-05-06 | Stop reason: SDUPTHER
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
## 2019-10-31 DIAGNOSIS — M54.50 LOW BACK PAIN: ICD-10-CM

## 2019-10-31 RX ORDER — TRAMADOL HYDROCHLORIDE 50 MG/1
50 TABLET ORAL 2 TIMES DAILY
Qty: 60 TABLET | Refills: 0 | Status: SHIPPED | OUTPATIENT
Start: 2019-10-31 | End: 2019-12-04 | Stop reason: SDUPTHER

## 2019-11-26 ENCOUNTER — TELEPHONE (OUTPATIENT)
Dept: INTERNAL MEDICINE CLINIC | Age: 74
End: 2019-11-26

## 2019-11-26 NOTE — TELEPHONE ENCOUNTER
Pt needs refills for   cyclobenzaprine (FLEXERIL) 5 mg tablet     traMADol (ULTRAM) 50 mg tablet     Sent to [employer]

## 2019-12-04 DIAGNOSIS — M54.50 LOW BACK PAIN: ICD-10-CM

## 2019-12-04 RX ORDER — TRAMADOL HYDROCHLORIDE 50 MG/1
50 TABLET ORAL 2 TIMES DAILY
Qty: 60 TABLET | Refills: 0 | Status: SHIPPED | OUTPATIENT
Start: 2019-12-04 | End: 2020-01-02 | Stop reason: SDUPTHER

## 2019-12-04 RX ORDER — CYCLOBENZAPRINE HCL 5 MG
5 TABLET ORAL 3 TIMES DAILY PRN
Qty: 90 TABLET | Refills: 0 | Status: SHIPPED | OUTPATIENT
Start: 2019-12-04 | End: 2020-01-02 | Stop reason: SDUPTHER

## 2019-12-11 ENCOUNTER — TELEPHONE (OUTPATIENT)
Dept: INTERNAL MEDICINE CLINIC | Age: 74
End: 2019-12-11

## 2019-12-11 NOTE — TELEPHONE ENCOUNTER
Pt received a letter, as of 2020 uloric is not covered under her insurance  She is asking that we put in an alternative   Pt provided a list of alternatives which include allopurinol sodium for inj 500, allopurinol tabs 100-300, colchicine w probenecid tab 0 5-500mg, cholecyst colchicine tabs 0 6 and probenecid tabs 500 mg

## 2019-12-12 ENCOUNTER — OFFICE VISIT (OUTPATIENT)
Dept: PODIATRY | Facility: CLINIC | Age: 74
End: 2019-12-12
Payer: COMMERCIAL

## 2019-12-12 VITALS
HEIGHT: 60 IN | WEIGHT: 138.2 LBS | DIASTOLIC BLOOD PRESSURE: 72 MMHG | BODY MASS INDEX: 27.13 KG/M2 | HEART RATE: 65 BPM | SYSTOLIC BLOOD PRESSURE: 107 MMHG

## 2019-12-12 DIAGNOSIS — E79.0 HYPERURICEMIA: Primary | ICD-10-CM

## 2019-12-12 DIAGNOSIS — E11.42 DIABETIC POLYNEUROPATHY ASSOCIATED WITH TYPE 2 DIABETES MELLITUS (HCC): Primary | ICD-10-CM

## 2019-12-12 PROCEDURE — 99212 OFFICE O/P EST SF 10 MIN: CPT | Performed by: PODIATRIST

## 2019-12-12 RX ORDER — ALLOPURINOL 100 MG/1
200 TABLET ORAL DAILY
Qty: 180 TABLET | Refills: 1 | Status: SHIPPED | OUTPATIENT
Start: 2019-12-12 | End: 2020-07-08 | Stop reason: SDUPTHER

## 2019-12-12 NOTE — PROGRESS NOTES
Patient presents for palliative diabetic foot care  No palpable pedal pulses  Toenails are elongated and heels are dry  Advised patient utilize a moisturizer b i d  Trimmed elongated toenails

## 2020-01-02 DIAGNOSIS — M54.50 LOW BACK PAIN: ICD-10-CM

## 2020-01-02 NOTE — TELEPHONE ENCOUNTER
Patient's is requesting the following medication and would need it done before the weekend:    Cyclobenzaprine HCL 5 mg one tablet three times a day    Tramadol HCL 50 mg one tablet twice a day      MICHELLE Pham

## 2020-01-03 RX ORDER — TRAMADOL HYDROCHLORIDE 50 MG/1
50 TABLET ORAL 2 TIMES DAILY
Qty: 60 TABLET | Refills: 0 | Status: SHIPPED | OUTPATIENT
Start: 2020-01-03 | End: 2020-01-31 | Stop reason: SDUPTHER

## 2020-01-03 RX ORDER — CYCLOBENZAPRINE HCL 5 MG
5 TABLET ORAL 3 TIMES DAILY PRN
Qty: 90 TABLET | Refills: 0 | Status: SHIPPED | OUTPATIENT
Start: 2020-01-03 | End: 2020-01-31 | Stop reason: SDUPTHER

## 2020-01-03 NOTE — TELEPHONE ENCOUNTER
1/2/20 pt last seen and reviewed 10/25/19, na 1/22/20, checked the PDMP, both last filled 12/4/19 cyclobenzaprine amt 90/0 takes tid, tramadol amt 60/0 takes bid , Dr Alexei Du and Dr Donald Herrera are not in office

## 2020-01-07 ENCOUNTER — TELEPHONE (OUTPATIENT)
Dept: INTERNAL MEDICINE CLINIC | Age: 75
End: 2020-01-07

## 2020-01-07 NOTE — TELEPHONE ENCOUNTER
Pt needs refill for   fluticasone-vilanterol (BREO ELLIPTA) 100-25 mcg/inh inhaler  losartan (COZAAR) 50 mg tablet   Sent to [facility] please

## 2020-01-07 NOTE — TELEPHONE ENCOUNTER
Last O/V: 10/25/19  Next O/V: 1/22/20  Pt has refills left on requested medication     Called pharmacy she has refills on file

## 2020-01-14 LAB
BASOPHILS # BLD AUTO: 58 CELLS/UL (ref 0–200)
BASOPHILS NFR BLD AUTO: 0.9 %
BUN SERPL-MCNC: 37 MG/DL (ref 7–25)
BUN/CREAT SERPL: 29 (CALC) (ref 6–22)
CALCIUM SERPL-MCNC: 9.9 MG/DL (ref 8.6–10.4)
CHLORIDE SERPL-SCNC: 104 MMOL/L (ref 98–110)
CO2 SERPL-SCNC: 31 MMOL/L (ref 20–32)
CREAT SERPL-MCNC: 1.26 MG/DL (ref 0.6–0.93)
EOSINOPHIL # BLD AUTO: 403 CELLS/UL (ref 15–500)
EOSINOPHIL NFR BLD AUTO: 6.3 %
ERYTHROCYTE [DISTWIDTH] IN BLOOD BY AUTOMATED COUNT: 12.6 % (ref 11–15)
GLUCOSE SERPL-MCNC: 70 MG/DL (ref 65–99)
HBA1C MFR BLD: 6.7 % OF TOTAL HGB
HCT VFR BLD AUTO: 36.4 % (ref 35–45)
HGB BLD-MCNC: 11.8 G/DL (ref 11.7–15.5)
LYMPHOCYTES # BLD AUTO: 2566 CELLS/UL (ref 850–3900)
LYMPHOCYTES NFR BLD AUTO: 40.1 %
MCH RBC QN AUTO: 30 PG (ref 27–33)
MCHC RBC AUTO-ENTMCNC: 32.4 G/DL (ref 32–36)
MCV RBC AUTO: 92.6 FL (ref 80–100)
MONOCYTES # BLD AUTO: 890 CELLS/UL (ref 200–950)
MONOCYTES NFR BLD AUTO: 13.9 %
NEUTROPHILS # BLD AUTO: 2483 CELLS/UL (ref 1500–7800)
NEUTROPHILS NFR BLD AUTO: 38.8 %
PLATELET # BLD AUTO: 197 THOUSAND/UL (ref 140–400)
PMV BLD REES-ECKER: 12.1 FL (ref 7.5–12.5)
POTASSIUM SERPL-SCNC: 4.6 MMOL/L (ref 3.5–5.3)
RBC # BLD AUTO: 3.93 MILLION/UL (ref 3.8–5.1)
SL AMB EGFR AFRICAN AMERICAN: 48 ML/MIN/1.73M2
SL AMB EGFR NON AFRICAN AMERICAN: 41 ML/MIN/1.73M2
SODIUM SERPL-SCNC: 141 MMOL/L (ref 135–146)
WBC # BLD AUTO: 6.4 THOUSAND/UL (ref 3.8–10.8)

## 2020-01-22 ENCOUNTER — OFFICE VISIT (OUTPATIENT)
Dept: INTERNAL MEDICINE CLINIC | Age: 75
End: 2020-01-22
Payer: COMMERCIAL

## 2020-01-22 VITALS
WEIGHT: 138.2 LBS | BODY MASS INDEX: 27.13 KG/M2 | DIASTOLIC BLOOD PRESSURE: 64 MMHG | SYSTOLIC BLOOD PRESSURE: 138 MMHG | HEIGHT: 60 IN | HEART RATE: 60 BPM | OXYGEN SATURATION: 97 % | TEMPERATURE: 96.4 F

## 2020-01-22 DIAGNOSIS — Z12.11 SCREENING FOR COLON CANCER: Primary | ICD-10-CM

## 2020-01-22 DIAGNOSIS — J45.41 MODERATE PERSISTENT ASTHMA WITH ACUTE EXACERBATION: ICD-10-CM

## 2020-01-22 DIAGNOSIS — E11.22 TYPE 2 DIABETES MELLITUS WITH STAGE 3 CHRONIC KIDNEY DISEASE, WITH LONG-TERM CURRENT USE OF INSULIN (HCC): ICD-10-CM

## 2020-01-22 DIAGNOSIS — N18.30 CKD (CHRONIC KIDNEY DISEASE) STAGE 3, GFR 30-59 ML/MIN (HCC): ICD-10-CM

## 2020-01-22 DIAGNOSIS — E78.5 HYPERLIPIDEMIA, UNSPECIFIED HYPERLIPIDEMIA TYPE: ICD-10-CM

## 2020-01-22 DIAGNOSIS — E03.9 HYPOTHYROIDISM, UNSPECIFIED TYPE: ICD-10-CM

## 2020-01-22 DIAGNOSIS — Z79.4 TYPE 2 DIABETES MELLITUS WITH COMPLICATION, WITH LONG-TERM CURRENT USE OF INSULIN (HCC): ICD-10-CM

## 2020-01-22 DIAGNOSIS — E11.8 TYPE 2 DIABETES MELLITUS WITH COMPLICATION, WITH LONG-TERM CURRENT USE OF INSULIN (HCC): ICD-10-CM

## 2020-01-22 DIAGNOSIS — Z79.4 TYPE 2 DIABETES MELLITUS WITH STAGE 3 CHRONIC KIDNEY DISEASE, WITH LONG-TERM CURRENT USE OF INSULIN (HCC): ICD-10-CM

## 2020-01-22 DIAGNOSIS — Z00.00 MEDICARE ANNUAL WELLNESS VISIT, SUBSEQUENT: ICD-10-CM

## 2020-01-22 DIAGNOSIS — E78.2 MIXED HYPERLIPIDEMIA: ICD-10-CM

## 2020-01-22 DIAGNOSIS — N18.30 TYPE 2 DIABETES MELLITUS WITH STAGE 3 CHRONIC KIDNEY DISEASE, WITH LONG-TERM CURRENT USE OF INSULIN (HCC): ICD-10-CM

## 2020-01-22 DIAGNOSIS — M54.50 LOW BACK PAIN, UNSPECIFIED BACK PAIN LATERALITY, UNSPECIFIED CHRONICITY, UNSPECIFIED WHETHER SCIATICA PRESENT: ICD-10-CM

## 2020-01-22 DIAGNOSIS — I10 HYPERTENSION, UNSPECIFIED TYPE: ICD-10-CM

## 2020-01-22 PROCEDURE — 1170F FXNL STATUS ASSESSED: CPT | Performed by: INTERNAL MEDICINE

## 2020-01-22 PROCEDURE — G0439 PPPS, SUBSEQ VISIT: HCPCS | Performed by: INTERNAL MEDICINE

## 2020-01-22 PROCEDURE — 3075F SYST BP GE 130 - 139MM HG: CPT | Performed by: INTERNAL MEDICINE

## 2020-01-22 PROCEDURE — 3725F SCREEN DEPRESSION PERFORMED: CPT | Performed by: INTERNAL MEDICINE

## 2020-01-22 PROCEDURE — 1160F RVW MEDS BY RX/DR IN RCRD: CPT | Performed by: INTERNAL MEDICINE

## 2020-01-22 PROCEDURE — 1125F AMNT PAIN NOTED PAIN PRSNT: CPT | Performed by: INTERNAL MEDICINE

## 2020-01-22 PROCEDURE — 3078F DIAST BP <80 MM HG: CPT | Performed by: INTERNAL MEDICINE

## 2020-01-22 PROCEDURE — 99214 OFFICE O/P EST MOD 30 MIN: CPT | Performed by: INTERNAL MEDICINE

## 2020-01-22 RX ORDER — METOPROLOL SUCCINATE 100 MG/1
100 TABLET, EXTENDED RELEASE ORAL DAILY
Qty: 30 TABLET | Refills: 5 | Status: SHIPPED | OUTPATIENT
Start: 2020-01-22 | End: 2020-08-10 | Stop reason: SDUPTHER

## 2020-01-22 RX ORDER — ATORVASTATIN CALCIUM 80 MG/1
80 TABLET, FILM COATED ORAL DAILY
Qty: 30 TABLET | Refills: 5 | Status: SHIPPED | OUTPATIENT
Start: 2020-01-22 | End: 2020-05-26 | Stop reason: SDUPTHER

## 2020-01-22 RX ORDER — MONTELUKAST SODIUM 10 MG/1
10 TABLET ORAL
Qty: 30 TABLET | Refills: 5 | Status: SHIPPED | OUTPATIENT
Start: 2020-01-22 | End: 2020-09-09 | Stop reason: SDUPTHER

## 2020-01-22 RX ORDER — BUMETANIDE 2 MG/1
2 TABLET ORAL DAILY
Qty: 30 TABLET | Refills: 5 | Status: SHIPPED | OUTPATIENT
Start: 2020-01-22 | End: 2020-08-18 | Stop reason: SDUPTHER

## 2020-01-22 RX ORDER — LEVOTHYROXINE SODIUM 0.07 MG/1
75 TABLET ORAL DAILY
Qty: 30 TABLET | Refills: 5 | Status: SHIPPED | OUTPATIENT
Start: 2020-01-22 | End: 2020-09-09 | Stop reason: SDUPTHER

## 2020-01-22 NOTE — PATIENT INSTRUCTIONS
Medicare Preventive Visit Patient Instructions  Thank you for completing your Welcome to Medicare Visit or Medicare Annual Wellness Visit today  Your next wellness visit will be due in one year (1/22/2021)  The screening/preventive services that you may require over the next 5-10 years are detailed below  Some tests may not apply to you based off risk factors and/or age  Screening tests ordered at today's visit but not completed yet may show as past due  Also, please note that scanned in results may not display below  Preventive Screenings:  Service Recommendations Previous Testing/Comments   Colorectal Cancer Screening  * Colonoscopy    * Fecal Occult Blood Test (FOBT)/Fecal Immunochemical Test (FIT)  * Fecal DNA/Cologuard Test  * Flexible Sigmoidoscopy Age: 54-65 years old   Colonoscopy: every 10 years (may be performed more frequently if at higher risk)  OR  FOBT/FIT: every 1 year  OR  Cologuard: every 3 years  OR  Sigmoidoscopy: every 5 years  Screening may be recommended earlier than age 48 if at higher risk for colorectal cancer  Also, an individualized decision between you and your healthcare provider will decide whether screening between the ages of 74-80 would be appropriate  Colonoscopy: 12/22/2015  FOBT/FIT: Not on file  Cologuard: Not on file  Sigmoidoscopy: Not on file         Breast Cancer Screening Age: 36 years old  Frequency: every 1-2 years  Not required if history of left and right mastectomy Mammogram: 06/11/2013       Cervical Cancer Screening Between the ages of 21-29, pap smear recommended once every 3 years  Between the ages of 33-67, can perform pap smear with HPV co-testing every 5 years     Recommendations may differ for women with a history of total hysterectomy, cervical cancer, or abnormal pap smears in past  Pap Smear: 08/28/2018    Screening Not Indicated   Hepatitis C Screening Once for adults born between 1945 and 1965  More frequently in patients at high risk for Hepatitis C Hep C Antibody: Not on file       Diabetes Screening 1-2 times per year if you're at risk for diabetes or have pre-diabetes Fasting glucose: No results in last 5 years   A1C: 6 7 % of total Hgb    Screening Not Indicated  History Diabetes   Cholesterol Screening Once every 5 years if you don't have a lipid disorder  May order more often based on risk factors  Lipid panel: 10/21/2019    Screening Not Indicated  History Lipid Disorder     Other Preventive Screenings Covered by Medicare:  1  Abdominal Aortic Aneurysm (AAA) Screening: covered once if your at risk  You're considered to be at risk if you have a family history of AAA  2  Lung Cancer Screening: covers low dose CT scan once per year if you meet all of the following conditions: (1) Age 50-69; (2) No signs or symptoms of lung cancer; (3) Current smoker or have quit smoking within the last 15 years; (4) You have a tobacco smoking history of at least 30 pack years (packs per day multiplied by number of years you smoked); (5) You get a written order from a healthcare provider  3  Glaucoma Screening: covered annually if you're considered high risk: (1) You have diabetes OR (2) Family history of glaucoma OR (3)  aged 48 and older OR (3)  American aged 72 and older  3  Osteoporosis Screening: covered every 2 years if you meet one of the following conditions: (1) You're estrogen deficient and at risk for osteoporosis based off medical history and other findings; (2) Have a vertebral abnormality; (3) On glucocorticoid therapy for more than 3 months; (4) Have primary hyperparathyroidism; (5) On osteoporosis medications and need to assess response to drug therapy  · Last bone density test (DXA Scan): 10/04/2018  5  HIV Screening: covered annually if you're between the age of 12-76  Also covered annually if you are younger than 13 and older than 72 with risk factors for HIV infection   For pregnant patients, it is covered up to 3 times per pregnancy  Immunizations:  Immunization Recommendations   Influenza Vaccine Annual influenza vaccination during flu season is recommended for all persons aged >= 6 months who do not have contraindications   Pneumococcal Vaccine (Prevnar and Pneumovax)  * Prevnar = PCV13  * Pneumovax = PPSV23   Adults 25-60 years old: 1-3 doses may be recommended based on certain risk factors  Adults 72 years old: Prevnar (PCV13) vaccine recommended followed by Pneumovax (PPSV23) vaccine  If already received PPSV23 since turning 65, then PCV13 recommended at least one year after PPSV23 dose  Hepatitis B Vaccine 3 dose series if at intermediate or high risk (ex: diabetes, end stage renal disease, liver disease)   Tetanus (Td) Vaccine - COST NOT COVERED BY MEDICARE PART B Following completion of primary series, a booster dose should be given every 10 years to maintain immunity against tetanus  Td may also be given as tetanus wound prophylaxis  Tdap Vaccine - COST NOT COVERED BY MEDICARE PART B Recommended at least once for all adults  For pregnant patients, recommended with each pregnancy  Shingles Vaccine (Shingrix) - COST NOT COVERED BY MEDICARE PART B  2 shot series recommended in those aged 48 and above     Health Maintenance Due:      Topic Date Due    CRC Screening: Colonoscopy  12/22/2018    DXA SCAN  10/04/2020     Immunizations Due:      Topic Date Due    Hepatitis B Vaccine (1 of 3 - Risk 3-dose series) 08/22/1964    DTaP,Tdap,and Td Vaccines (2 - Td) 11/12/2019     Advance Directives   What are advance directives? Advance directives are legal documents that state your wishes and plans for medical care  These plans are made ahead of time in case you lose your ability to make decisions for yourself  Advance directives can apply to any medical decision, such as the treatments you want, and if you want to donate organs  What are the types of advance directives?   There are many types of advance directives, and each state has rules about how to use them  You may choose a combination of any of the following:  · Living will: This is a written record of the treatment you want  You can also choose which treatments you do not want, which to limit, and which to stop at a certain time  This includes surgery, medicine, IV fluid, and tube feedings  · Durable power of  for healthcare [facility]): This is a written record that states who you want to make healthcare choices for you when you are unable to make them for yourself  This person, called a proxy, is usually a family member or a friend  You may choose more than 1 proxy  · Do not resuscitate (DNR) order:  A DNR order is used in case your heart stops beating or you stop breathing  It is a request not to have certain forms of treatment, such as CPR  A DNR order may be included in other types of advance directives  · Medical directive: This covers the care that you want if you are in a coma, near death, or unable to make decisions for yourself  You can list the treatments you want for each condition  Treatment may include pain medicine, surgery, blood transfusions, dialysis, IV or tube feedings, and a ventilator (breathing machine)  · Values history: This document has questions about your views, beliefs, and how you feel and think about life  This information can help others choose the care that you would choose  Why are advance directives important? An advance directive helps you control your care  Although spoken wishes may be used, it is better to have your wishes written down  Spoken wishes can be misunderstood, or not followed  Treatments may be given even if you do not want them  An advance directive may make it easier for your family to make difficult choices about your care  Fall Prevention    Fall prevention  includes ways to make your home and other areas safer  It also includes ways you can move more carefully to prevent a fall   Health conditions that cause changes in your blood pressure, vision, or muscle strength and coordination may increase your risk for falls  Medicines may also increase your risk for falls if they make you dizzy, weak, or sleepy  Fall prevention tips:   · Stand or sit up slowly  · Use assistive devices as directed  · Wear shoes that fit well and have soles that   · Wear a personal alarm  · Stay active  · Manage your medical conditions  Home Safety Tips:  · Add items to prevent falls in the bathroom  · Keep paths clear  · Install bright lights in your home  · Keep items you use often on shelves within reach  · Paint or place reflective tape on the edges of your stairs  Weight Management   Why it is important to manage your weight:  Being overweight increases your risk of health conditions such as heart disease, high blood pressure, type 2 diabetes, and certain types of cancer  It can also increase your risk for osteoarthritis, sleep apnea, and other respiratory problems  Aim for a slow, steady weight loss  Even a small amount of weight loss can lower your risk of health problems  How to lose weight safely:  A safe and healthy way to lose weight is to eat fewer calories and get regular exercise  You can lose up about 1 pound a week by decreasing the number of calories you eat by 500 calories each day  Healthy meal plan for weight management:  A healthy meal plan includes a variety of foods, contains fewer calories, and helps you stay healthy  A healthy meal plan includes the following:  · Eat whole-grain foods more often  A healthy meal plan should contain fiber  Fiber is the part of grains, fruits, and vegetables that is not broken down by your body  Whole-grain foods are healthy and provide extra fiber in your diet  Some examples of whole-grain foods are whole-wheat breads and pastas, oatmeal, brown rice, and bulgur  · Eat a variety of vegetables every day    Include dark, leafy greens such as spinach, kale, nadege greens, and mustard greens  Eat yellow and orange vegetables such as carrots, sweet potatoes, and winter squash  · Eat a variety of fruits every day  Choose fresh or canned fruit (canned in its own juice or light syrup) instead of juice  Fruit juice has very little or no fiber  · Eat low-fat dairy foods  Drink fat-free (skim) milk or 1% milk  Eat fat-free yogurt and low-fat cottage cheese  Try low-fat cheeses such as mozzarella and other reduced-fat cheeses  · Choose meat and other protein foods that are low in fat  Choose beans or other legumes such as split peas or lentils  Choose fish, skinless poultry (chicken or turkey), or lean cuts of red meat (beef or pork)  Before you cook meat or poultry, cut off any visible fat  · Use less fat and oil  Try baking foods instead of frying them  Add less fat, such as margarine, sour cream, regular salad dressing and mayonnaise to foods  Eat fewer high-fat foods  Some examples of high-fat foods include french fries, doughnuts, ice cream, and cakes  · Eat fewer sweets  Limit foods and drinks that are high in sugar  This includes candy, cookies, regular soda, and sweetened drinks  Exercise:  Exercise at least 30 minutes per day on most days of the week  Some examples of exercise include walking, biking, dancing, and swimming  You can also fit in more physical activity by taking the stairs instead of the elevator or parking farther away from stores  Ask your healthcare provider about the best exercise plan for you  © Copyright [employer] 2018 Information is for End User's use only and may not be sold, redistributed or otherwise used for commercial purposes   All illustrations and images included in CareNotes® are the copyrighted property of [employer]  or [address] [place] [address] [employer]
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
## 2020-01-31 DIAGNOSIS — M54.50 LOW BACK PAIN: ICD-10-CM

## 2020-02-03 RX ORDER — CYCLOBENZAPRINE HCL 5 MG
5 TABLET ORAL 3 TIMES DAILY PRN
Qty: 90 TABLET | Refills: 0 | Status: SHIPPED | OUTPATIENT
Start: 2020-02-03 | End: 2020-03-31 | Stop reason: SDUPTHER

## 2020-02-03 RX ORDER — TRAMADOL HYDROCHLORIDE 50 MG/1
50 TABLET ORAL 2 TIMES DAILY
Qty: 60 TABLET | Refills: 0 | Status: SHIPPED | OUTPATIENT
Start: 2020-02-03 | End: 2020-03-03 | Stop reason: SDUPTHER

## 2020-02-04 LAB
ALBUMIN/CREAT UR: 78 MCG/MG CREAT
BUN SERPL-MCNC: 51 MG/DL (ref 7–25)
BUN/CREAT SERPL: 34 (CALC) (ref 6–22)
CALCIUM SERPL-MCNC: 9.9 MG/DL (ref 8.6–10.4)
CHLORIDE SERPL-SCNC: 102 MMOL/L (ref 98–110)
CO2 SERPL-SCNC: 28 MMOL/L (ref 20–32)
CREAT SERPL-MCNC: 1.51 MG/DL (ref 0.6–0.93)
CREAT UR-MCNC: 54 MG/DL (ref 20–275)
GLUCOSE SERPL-MCNC: 151 MG/DL (ref 65–99)
MICROALBUMIN UR-MCNC: 4.2 MG/DL
POTASSIUM SERPL-SCNC: 4.5 MMOL/L (ref 3.5–5.3)
SL AMB EGFR AFRICAN AMERICAN: 39 ML/MIN/1.73M2
SL AMB EGFR NON AFRICAN AMERICAN: 33 ML/MIN/1.73M2
SODIUM SERPL-SCNC: 139 MMOL/L (ref 135–146)

## 2020-02-06 ENCOUNTER — OFFICE VISIT (OUTPATIENT)
Dept: NEPHROLOGY | Facility: CLINIC | Age: 75
End: 2020-02-06
Payer: COMMERCIAL

## 2020-02-06 VITALS
HEART RATE: 70 BPM | WEIGHT: 136 LBS | BODY MASS INDEX: 26.7 KG/M2 | RESPIRATION RATE: 16 BRPM | HEIGHT: 60 IN | DIASTOLIC BLOOD PRESSURE: 60 MMHG | SYSTOLIC BLOOD PRESSURE: 140 MMHG

## 2020-02-06 DIAGNOSIS — N18.9 CHRONIC RENAL IMPAIRMENT, UNSPECIFIED CKD STAGE: Primary | ICD-10-CM

## 2020-02-06 DIAGNOSIS — R80.9 MICROALBUMINURIA: ICD-10-CM

## 2020-02-06 PROBLEM — N18.30 CKD (CHRONIC KIDNEY DISEASE) STAGE 3, GFR 30-59 ML/MIN (HCC): Status: RESOLVED | Noted: 2018-01-29 | Resolved: 2020-02-06

## 2020-02-06 PROBLEM — E11.22 TYPE 2 DIABETES MELLITUS WITH STAGE 3 CHRONIC KIDNEY DISEASE, WITH LONG-TERM CURRENT USE OF INSULIN (HCC): Status: RESOLVED | Noted: 2018-01-29 | Resolved: 2020-02-06

## 2020-02-06 PROBLEM — Z79.4 TYPE 2 DIABETES MELLITUS WITH STAGE 3 CHRONIC KIDNEY DISEASE, WITH LONG-TERM CURRENT USE OF INSULIN (HCC): Status: RESOLVED | Noted: 2018-01-29 | Resolved: 2020-02-06

## 2020-02-06 PROBLEM — N18.30 TYPE 2 DIABETES MELLITUS WITH STAGE 3 CHRONIC KIDNEY DISEASE, WITH LONG-TERM CURRENT USE OF INSULIN (HCC): Status: RESOLVED | Noted: 2018-01-29 | Resolved: 2020-02-06

## 2020-02-06 PROCEDURE — 3066F NEPHROPATHY DOC TX: CPT | Performed by: INTERNAL MEDICINE

## 2020-02-06 PROCEDURE — 99214 OFFICE O/P EST MOD 30 MIN: CPT | Performed by: INTERNAL MEDICINE

## 2020-02-06 PROCEDURE — 3077F SYST BP >= 140 MM HG: CPT | Performed by: INTERNAL MEDICINE

## 2020-02-06 PROCEDURE — 1036F TOBACCO NON-USER: CPT | Performed by: INTERNAL MEDICINE

## 2020-02-06 PROCEDURE — 1160F RVW MEDS BY RX/DR IN RCRD: CPT | Performed by: INTERNAL MEDICINE

## 2020-02-06 PROCEDURE — 4040F PNEUMOC VAC/ADMIN/RCVD: CPT | Performed by: INTERNAL MEDICINE

## 2020-02-06 PROCEDURE — 3078F DIAST BP <80 MM HG: CPT | Performed by: INTERNAL MEDICINE

## 2020-02-06 PROCEDURE — 1170F FXNL STATUS ASSESSED: CPT | Performed by: INTERNAL MEDICINE

## 2020-02-06 PROCEDURE — 3008F BODY MASS INDEX DOCD: CPT | Performed by: INTERNAL MEDICINE

## 2020-02-06 NOTE — PATIENT INSTRUCTIONS
You are here for a yearly follow-up it sounds like you health has been good you had a good year no medication changes  Your creatinine which is the blood test for the kidney function was 1 5 which I explained you is just as good as it was 3 years ago so there has been no progression  I do not think you of diabetic kidney disease as I explained you have very very low levels of protein and for diabetes to really affect the kidney function patients have much more than that so that is a good thing  Regardless the treatment to prevent is blood pressure control sugar control and cholesterol control and your on losartan as well  Continue current medications no changes please call if there is any problems or questions before your next visit

## 2020-02-06 NOTE — PROGRESS NOTES
NEPHROLOGY PROGRESS NOTE    Brooke Stone 66 y o  female MRN: [id number]  Unit/Bed#:  Encounter: [id number]  Reason for Consult:  Renal insufficiency and microalbumin    The patient is here for her yearly follow-up she is here with her  states that the year has been good with respect to her health  She has had no hospitalizations no changes are medical condition and no new medicines  She has no specific complaints today and is here for review of her labs  ASSESSMENT/PLAN:  1  Renal    The patient is a longstanding diabetic and she was referred for renal insufficiency  At this point the patient still only has microalbumin levels of proteinuria so diabetes is not responsible for her elevated creatinine  Patients with diabetic nephropathy have overt proteinuria before the renal dysfunction begin so 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  Creatinine is stable at 1 5 from 3 years ago with no significant worsening  Microalbumin levels are still less than 100  She has excellent glycemic control as her A1cs less than 7%  Blood pressure is acceptable she is on an ARB and lipid management as well  She will continue with yearly follow-up in no changes today  SUBJECTIVE:  Review of Systems   Constitution: Negative for chills, decreased appetite, fever and malaise/fatigue  HENT: Negative  Eyes: Negative  Cardiovascular: Negative  Negative for chest pain, dyspnea on exertion, leg swelling, orthopnea and palpitations  Respiratory: Negative  Negative for cough, shortness of breath, sputum production and wheezing  Gastrointestinal: Negative  Negative for bloating, abdominal pain, diarrhea, nausea and vomiting  Genitourinary: Negative for bladder incontinence, dysuria, flank pain, hematuria and incomplete emptying  Neurological: Negative  Psychiatric/Behavioral: Negative  OBJECTIVE:  Current Weight: Weight - Scale: 61 7 kg (136 lb)  [email or web address] com:     Resp   rate 16, height 5' (1 524 m), weight 61 7 kg (136 lb)  , Body mass index is 26 56 kg/m²  [unfilled]    Physical Exam: Resp 16   Ht 5' (1 524 m)   Wt 61 7 kg (136 lb)   LMP  (LMP Unknown)   BMI 26 56 kg/m²   Physical Exam   Constitutional: She is oriented to person, place, and time  No distress  HENT:   Head: Atraumatic  Mouth/Throat: Oropharynx is clear and moist    Eyes: Conjunctivae and EOM are normal  No scleral icterus  Neck: Neck supple  No JVD present  Cardiovascular: Normal rate and regular rhythm  Exam reveals no gallop and no friction rub  No edema  Pulmonary/Chest: Effort normal and breath sounds normal  No respiratory distress  She has no wheezes  She has no rales  Abdominal: Soft  Bowel sounds are normal  She exhibits no distension  There is no tenderness  There is no rebound  Neurological: She is alert and oriented to person, place, and time  Skin: She is not diaphoretic  Psychiatric: She has a normal mood and affect         Medications:    Current Outpatient Medications:     albuterol (VENTOLIN HFA) 90 mcg/act inhaler, Inhale 2 puffs 4 (four) times a day, Disp: 1 Inhaler, Rfl: 5    allopurinol (ZYLOPRIM) 100 mg tablet, Take 2 tablets (200 mg total) by mouth daily, Disp: 180 tablet, Rfl: 1    ascorbic acid (VITAMIN C) 500 mg tablet, Take 1 tablet by mouth daily, Disp: , Rfl:     aspirin 81 MG tablet, Take 1 tablet by mouth 2 (two) times a day, Disp: , Rfl:     atorvastatin (LIPITOR) 80 mg tablet, Take 1 tablet (80 mg total) by mouth daily, Disp: 30 tablet, Rfl: 5    bumetanide (BUMEX) 2 mg tablet, Take 1 tablet (2 mg total) by mouth daily, Disp: 30 tablet, Rfl: 5    Calcium Carbonate-Vit D-Min (CALCIUM 600+D PLUS MINERALS) 600-400 MG-UNIT CHEW, Chew 2 tablets daily, Disp: , Rfl:     Cholecalciferol (VITAMIN D3) 1000 units CAPS, Take 1 tablet by mouth daily, Disp: , Rfl:     clobetasol (TEMOVATE) 0 05 % ointment, Apply topically 2 (two) times a day Until skin texture normalizes another 2 months  then use three times weekly on affected area, Disp: 30 g, Rfl: 1    cyclobenzaprine (FLEXERIL) 5 mg tablet, Take 1 tablet (5 mg total) by mouth 3 (three) times a day as needed for muscle spasms, Disp: 90 tablet, Rfl: 0    fenofibrate (TRICOR) 145 mg tablet, Take by mouth daily  , Disp: , Rfl:     fenofibrate micronized (LOFIBRA) 67 MG capsule, , Disp: , Rfl:     ferrous gluconate (FERATE) 240 (27 FE) MG tablet, Take 1 tablet by mouth daily, Disp: , Rfl:     Ferrous Sulfate 27 MG TABS, Take 27 mg by mouth daily  , Disp: , Rfl:     fluticasone (FLONASE) 50 mcg/act nasal spray, 2 sprays into each nostril daily, Disp: 16 g, Rfl: 3    fluticasone-vilanterol (BREO ELLIPTA) 100-25 mcg/inh inhaler, Inhale 1 puff daily Rinse mouth after use , Disp: 1 Inhaler, Rfl: 5    glucose blood (ONE TOUCH ULTRA TEST) test strip, Test blood sugars 3 to 4 times a day, Disp: 400 each, Rfl: 1    insulin detemir (LEVEMIR) 100 units/mL subcutaneous injection, Inject 36 Units under the skin 2 (two) times a day, Disp: 200 Units, Rfl: 5    insulin regular (HumuLIN R,NovoLIN R) 100 units/mL injection, Inject 0 1 mL (10 Units total) under the skin 2 (two) times a day with lunch and dinner, Disp: 10 mL, Rfl: 5    levothyroxine 75 mcg tablet, Take 1 tablet (75 mcg total) by mouth daily, Disp: 30 tablet, Rfl: 5    losartan (COZAAR) 50 mg tablet, Take 1 tablet (50 mg total) by mouth daily, Disp: 30 tablet, Rfl: 5    Magnesium 400 MG CAPS, Take 2 tablets by mouth daily, Disp: , Rfl:     metoprolol succinate (TOPROL-XL) 100 mg 24 hr tablet, Take 1 tablet (100 mg total) by mouth daily, Disp: 30 tablet, Rfl: 5    montelukast (SINGULAIR) 10 mg tablet, Take 1 tablet (10 mg total) by mouth daily at bedtime, Disp: 30 tablet, Rfl: 5    multivitamin (THERAGRAN) TABS, Take 1 tablet by mouth daily  , Disp: , Rfl:     Omega-3 Fatty Acids (OMEGA 3 500 PO), Take 1 capsule by mouth daily, Disp: , Rfl:     ONE TOUCH LANCETS MISC, by Does not apply route daily Test 2-3 times daily, Disp: , Rfl:     sitaGLIPtin (JANUVIA) 50 mg tablet, Take 1 tablet (50 mg total) by mouth daily, Disp: 30 tablet, Rfl: 5    traMADol (ULTRAM) 50 mg tablet, Take 1 tablet (50 mg total) by mouth 2 (two) times a day Fill with directions from Dr Eliot Guerrero, Disp: 60 tablet, Rfl: 0    TRUEPLUS INSULIN SYRINGE 31G X 5/16" 0 5 ML MISC, Inject under the skin 4 (four) times a day, Disp: 200 each, Rfl: 5    Vitamin E 200 units TABS, Take 1 capsule by mouth daily, Disp: , Rfl:     famotidine (PEPCID) 10 mg tablet, Take 1 tablet (10 mg total) by mouth 2 (two) times a day for 90 days, Disp: 60 tablet, Rfl: 9    nitroglycerin (NITROSTAT) 0 4 mg SL tablet, Place 1 tablet under the tongue every 5 (five) minutes as needed, Disp: , Rfl:     Laboratory Results:  Lab Results   Component Value Date    WBC 6 4 01/13/2020    HGB 11 8 01/13/2020    HCT 36 4 01/13/2020    MCV 92 6 01/13/2020     01/13/2020     Lab Results   Component Value Date    SODIUM 139 02/03/2020    K 4 5 02/03/2020     02/03/2020    CO2 28 02/03/2020    BUN 51 (H) 02/03/2020    CREATININE 1 51 (H) 02/03/2020    GLUC 151 (H) 02/03/2020    CALCIUM 9 9 02/03/2020     Lab Results   Component Value Date    CALCIUM 9 9 02/03/2020     No results found for: LABPROT

## 2020-02-06 NOTE — LETTER
February 6, 2020     Perico Vega MD  [address] [place] [address]    Patient: Ashkan Alejandre   YOB: 1945   Date of Visit: 2/6/2020       Dear Dr Jesu Henson: Thank you for referring Humberto Galvez to me for evaluation  Below are my notes for this consultation  If you have questions, please do not hesitate to call me  I look forward to following your patient along with you  Sincerely,        Rosario Alicea MD        CC: No Recipients  Rosario Alicea MD  2/6/2020 11:24 AM  Sign at close encounter  [address] 66 y o  female MRN: [id number]  Unit/Bed#:  Encounter: [id number]  Reason for Consult:  Renal insufficiency and microalbumin    The patient is here for her yearly follow-up she is here with her  states that the year has been good with respect to her health  She has had no hospitalizations no changes are medical condition and no new medicines  She has no specific complaints today and is here for review of her labs  ASSESSMENT/PLAN:  1  Renal    The patient is a longstanding diabetic and she was referred for renal insufficiency  At this point the patient still only has microalbumin levels of proteinuria so diabetes is not responsible for her elevated creatinine  Patients with diabetic nephropathy have overt proteinuria before the renal dysfunction begin so 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  Creatinine is stable at 1 5 from 3 years ago with no significant worsening  Microalbumin levels are still less than 100  She has excellent glycemic control as her A1cs less than 7%  Blood pressure is acceptable she is on an ARB and lipid management as well  She will continue with yearly follow-up in no changes today  SUBJECTIVE:  Review of Systems   Constitution: Negative for chills, decreased appetite, fever and malaise/fatigue  HENT: Negative  Eyes: Negative  Cardiovascular: Negative    Negative for chest pain, dyspnea on exertion, leg swelling, orthopnea and palpitations  Respiratory: Negative  Negative for cough, shortness of breath, sputum production and wheezing  Gastrointestinal: Negative  Negative for bloating, abdominal pain, diarrhea, nausea and vomiting  Genitourinary: Negative for bladder incontinence, dysuria, flank pain, hematuria and incomplete emptying  Neurological: Negative  Psychiatric/Behavioral: Negative  OBJECTIVE:  Current Weight: Weight - Scale: 61 7 kg (136 lb)  [email or web address] com:     Resp  rate 16, height 5' (1 524 m), weight 61 7 kg (136 lb)  , Body mass index is 26 56 kg/m²  [unfilled]    Physical Exam: Resp 16   Ht 5' (1 524 m)   Wt 61 7 kg (136 lb)   LMP  (LMP Unknown)   BMI 26 56 kg/m²    Physical Exam   Constitutional: She is oriented to person, place, and time  No distress  HENT:   Head: Atraumatic  Mouth/Throat: Oropharynx is clear and moist    Eyes: Conjunctivae and EOM are normal  No scleral icterus  Neck: Neck supple  No JVD present  Cardiovascular: Normal rate and regular rhythm  Exam reveals no gallop and no friction rub  No edema  Pulmonary/Chest: Effort normal and breath sounds normal  No respiratory distress  She has no wheezes  She has no rales  Abdominal: Soft  Bowel sounds are normal  She exhibits no distension  There is no tenderness  There is no rebound  Neurological: She is alert and oriented to person, place, and time  Skin: She is not diaphoretic  Psychiatric: She has a normal mood and affect         Medications:    Current Outpatient Medications:     albuterol (VENTOLIN HFA) 90 mcg/act inhaler, Inhale 2 puffs 4 (four) times a day, Disp: 1 Inhaler, Rfl: 5    allopurinol (ZYLOPRIM) 100 mg tablet, Take 2 tablets (200 mg total) by mouth daily, Disp: 180 tablet, Rfl: 1    ascorbic acid (VITAMIN C) 500 mg tablet, Take 1 tablet by mouth daily, Disp: , Rfl:     aspirin 81 MG tablet, Take 1 tablet by mouth 2 (two) times a day, Disp: , Rfl:     atorvastatin (LIPITOR) 80 mg tablet, Take 1 tablet (80 mg total) by mouth daily, Disp: 30 tablet, Rfl: 5    bumetanide (BUMEX) 2 mg tablet, Take 1 tablet (2 mg total) by mouth daily, Disp: 30 tablet, Rfl: 5    Calcium Carbonate-Vit D-Min (CALCIUM 600+D PLUS MINERALS) 600-400 MG-UNIT CHEW, Chew 2 tablets daily, Disp: , Rfl:     Cholecalciferol (VITAMIN D3) 1000 units CAPS, Take 1 tablet by mouth daily, Disp: , Rfl:     clobetasol (TEMOVATE) 0 05 % ointment, Apply topically 2 (two) times a day Until skin texture normalizes another 2 months  then use three times weekly on affected area, Disp: 30 g, Rfl: 1    cyclobenzaprine (FLEXERIL) 5 mg tablet, Take 1 tablet (5 mg total) by mouth 3 (three) times a day as needed for muscle spasms, Disp: 90 tablet, Rfl: 0    fenofibrate (TRICOR) 145 mg tablet, Take by mouth daily  , Disp: , Rfl:     fenofibrate micronized (LOFIBRA) 67 MG capsule, , Disp: , Rfl:     ferrous gluconate (FERATE) 240 (27 FE) MG tablet, Take 1 tablet by mouth daily, Disp: , Rfl:     Ferrous Sulfate 27 MG TABS, Take 27 mg by mouth daily  , Disp: , Rfl:     fluticasone (FLONASE) 50 mcg/act nasal spray, 2 sprays into each nostril daily, Disp: 16 g, Rfl: 3    fluticasone-vilanterol (BREO ELLIPTA) 100-25 mcg/inh inhaler, Inhale 1 puff daily Rinse mouth after use , Disp: 1 Inhaler, Rfl: 5    glucose blood (ONE TOUCH ULTRA TEST) test strip, Test blood sugars 3 to 4 times a day, Disp: 400 each, Rfl: 1    insulin detemir (LEVEMIR) 100 units/mL subcutaneous injection, Inject 36 Units under the skin 2 (two) times a day, Disp: 200 Units, Rfl: 5    insulin regular (HumuLIN R,NovoLIN R) 100 units/mL injection, Inject 0 1 mL (10 Units total) under the skin 2 (two) times a day with lunch and dinner, Disp: 10 mL, Rfl: 5    levothyroxine 75 mcg tablet, Take 1 tablet (75 mcg total) by mouth daily, Disp: 30 tablet, Rfl: 5    losartan (COZAAR) 50 mg tablet, Take 1 tablet (50 mg total) by mouth

## 2020-02-20 ENCOUNTER — OFFICE VISIT (OUTPATIENT)
Dept: PODIATRY | Facility: CLINIC | Age: 75
End: 2020-02-20
Payer: COMMERCIAL

## 2020-02-20 VITALS
WEIGHT: 138.8 LBS | DIASTOLIC BLOOD PRESSURE: 69 MMHG | HEART RATE: 64 BPM | HEIGHT: 60 IN | BODY MASS INDEX: 27.25 KG/M2 | SYSTOLIC BLOOD PRESSURE: 146 MMHG

## 2020-02-20 DIAGNOSIS — E11.42 DIABETIC POLYNEUROPATHY ASSOCIATED WITH TYPE 2 DIABETES MELLITUS (HCC): Primary | ICD-10-CM

## 2020-02-20 PROCEDURE — 4040F PNEUMOC VAC/ADMIN/RCVD: CPT | Performed by: PODIATRIST

## 2020-02-20 PROCEDURE — 1170F FXNL STATUS ASSESSED: CPT | Performed by: PODIATRIST

## 2020-02-20 PROCEDURE — 3077F SYST BP >= 140 MM HG: CPT | Performed by: PODIATRIST

## 2020-02-20 PROCEDURE — 1036F TOBACCO NON-USER: CPT | Performed by: PODIATRIST

## 2020-02-20 PROCEDURE — 1160F RVW MEDS BY RX/DR IN RCRD: CPT | Performed by: PODIATRIST

## 2020-02-20 PROCEDURE — 3078F DIAST BP <80 MM HG: CPT | Performed by: PODIATRIST

## 2020-02-20 PROCEDURE — 3066F NEPHROPATHY DOC TX: CPT | Performed by: PODIATRIST

## 2020-02-20 PROCEDURE — 3008F BODY MASS INDEX DOCD: CPT | Performed by: PODIATRIST

## 2020-02-20 PROCEDURE — 99212 OFFICE O/P EST SF 10 MIN: CPT | Performed by: PODIATRIST

## 2020-02-20 NOTE — PROGRESS NOTES
Patient presents for palliative foot care  No acute disorder noted  Trace pedal pulses are palpable  Treatment consisted of nail trimming  Reappoint 10 weeks

## 2020-03-03 DIAGNOSIS — M54.50 LOW BACK PAIN: ICD-10-CM

## 2020-03-03 RX ORDER — TRAMADOL HYDROCHLORIDE 50 MG/1
50 TABLET ORAL 2 TIMES DAILY
Qty: 60 TABLET | Refills: 0 | Status: SHIPPED | OUTPATIENT
Start: 2020-03-03 | End: 2020-03-31 | Stop reason: SDUPTHER

## 2020-03-03 NOTE — TELEPHONE ENCOUNTER
Patient needs Tramadol 50mg refilled for a 30 day supply -please send to [facility]

## 2020-03-31 DIAGNOSIS — M54.50 LOW BACK PAIN: ICD-10-CM

## 2020-03-31 DIAGNOSIS — J31.0 RHINITIS, UNSPECIFIED TYPE: ICD-10-CM

## 2020-04-03 RX ORDER — TRAMADOL HYDROCHLORIDE 50 MG/1
50 TABLET ORAL 2 TIMES DAILY
Qty: 60 TABLET | Refills: 0 | Status: SHIPPED | OUTPATIENT
Start: 2020-04-03 | End: 2020-04-29 | Stop reason: SDUPTHER

## 2020-04-03 RX ORDER — FLUTICASONE PROPIONATE 50 MCG
2 SPRAY, SUSPENSION (ML) NASAL DAILY
Qty: 16 G | Refills: 3 | Status: SHIPPED | OUTPATIENT
Start: 2020-04-03 | End: 2020-09-09 | Stop reason: SDUPTHER

## 2020-04-03 RX ORDER — CYCLOBENZAPRINE HCL 5 MG
5 TABLET ORAL 3 TIMES DAILY PRN
Qty: 90 TABLET | Refills: 0 | Status: SHIPPED | OUTPATIENT
Start: 2020-04-03 | End: 2020-04-29 | Stop reason: SDUPTHER

## 2020-04-29 DIAGNOSIS — M54.50 LOW BACK PAIN: ICD-10-CM

## 2020-04-29 RX ORDER — CYCLOBENZAPRINE HCL 5 MG
5 TABLET ORAL 3 TIMES DAILY PRN
Qty: 90 TABLET | Refills: 0 | Status: SHIPPED | OUTPATIENT
Start: 2020-04-29 | End: 2020-05-26 | Stop reason: SDUPTHER

## 2020-04-29 RX ORDER — TRAMADOL HYDROCHLORIDE 50 MG/1
50 TABLET ORAL 2 TIMES DAILY
Qty: 60 TABLET | Refills: 0 | Status: SHIPPED | OUTPATIENT
Start: 2020-04-29 | End: 2020-05-26 | Stop reason: SDUPTHER

## 2020-05-05 LAB
BASOPHILS # BLD AUTO: 28 CELLS/UL (ref 0–200)
BASOPHILS NFR BLD AUTO: 0.6 %
BUN SERPL-MCNC: 52 MG/DL (ref 7–25)
BUN/CREAT SERPL: 32 (CALC) (ref 6–22)
CALCIUM SERPL-MCNC: 9.4 MG/DL (ref 8.6–10.4)
CHLORIDE SERPL-SCNC: 103 MMOL/L (ref 98–110)
CHOLEST SERPL-MCNC: 121 MG/DL
CHOLEST/HDLC SERPL: 2.7 (CALC)
CO2 SERPL-SCNC: 30 MMOL/L (ref 20–32)
CREAT SERPL-MCNC: 1.62 MG/DL (ref 0.6–0.93)
EOSINOPHIL # BLD AUTO: 212 CELLS/UL (ref 15–500)
EOSINOPHIL NFR BLD AUTO: 4.5 %
ERYTHROCYTE [DISTWIDTH] IN BLOOD BY AUTOMATED COUNT: 13.3 % (ref 11–15)
GLUCOSE SERPL-MCNC: 188 MG/DL (ref 65–99)
HBA1C MFR BLD: 7.7 % OF TOTAL HGB
HCT VFR BLD AUTO: 35.4 % (ref 35–45)
HDLC SERPL-MCNC: 45 MG/DL
HGB BLD-MCNC: 11.4 G/DL (ref 11.7–15.5)
LDLC SERPL CALC-MCNC: 49 MG/DL (CALC)
LYMPHOCYTES # BLD AUTO: 1706 CELLS/UL (ref 850–3900)
LYMPHOCYTES NFR BLD AUTO: 36.3 %
MCH RBC QN AUTO: 30.2 PG (ref 27–33)
MCHC RBC AUTO-ENTMCNC: 32.2 G/DL (ref 32–36)
MCV RBC AUTO: 93.9 FL (ref 80–100)
MONOCYTES # BLD AUTO: 644 CELLS/UL (ref 200–950)
MONOCYTES NFR BLD AUTO: 13.7 %
NEUTROPHILS # BLD AUTO: 2110 CELLS/UL (ref 1500–7800)
NEUTROPHILS NFR BLD AUTO: 44.9 %
NONHDLC SERPL-MCNC: 76 MG/DL (CALC)
PLATELET # BLD AUTO: 191 THOUSAND/UL (ref 140–400)
PMV BLD REES-ECKER: 12.1 FL (ref 7.5–12.5)
POTASSIUM SERPL-SCNC: 4.4 MMOL/L (ref 3.5–5.3)
RBC # BLD AUTO: 3.77 MILLION/UL (ref 3.8–5.1)
SL AMB EGFR AFRICAN AMERICAN: 35 ML/MIN/1.73M2
SL AMB EGFR NON AFRICAN AMERICAN: 31 ML/MIN/1.73M2
SODIUM SERPL-SCNC: 140 MMOL/L (ref 135–146)
TRIGL SERPL-MCNC: 208 MG/DL
WBC # BLD AUTO: 4.7 THOUSAND/UL (ref 3.8–10.8)

## 2020-05-06 ENCOUNTER — TELEMEDICINE (OUTPATIENT)
Dept: INTERNAL MEDICINE CLINIC | Facility: CLINIC | Age: 75
End: 2020-05-06
Payer: COMMERCIAL

## 2020-05-06 VITALS
HEART RATE: 58 BPM | SYSTOLIC BLOOD PRESSURE: 159 MMHG | DIASTOLIC BLOOD PRESSURE: 59 MMHG | WEIGHT: 137 LBS | BODY MASS INDEX: 26.76 KG/M2 | TEMPERATURE: 96.2 F

## 2020-05-06 DIAGNOSIS — E78.2 MIXED HYPERLIPIDEMIA: Primary | ICD-10-CM

## 2020-05-06 DIAGNOSIS — N18.30 TYPE 2 DIABETES MELLITUS WITH STAGE 3 CHRONIC KIDNEY DISEASE, WITH LONG-TERM CURRENT USE OF INSULIN (HCC): ICD-10-CM

## 2020-05-06 DIAGNOSIS — I10 HYPERTENSION, UNSPECIFIED TYPE: ICD-10-CM

## 2020-05-06 DIAGNOSIS — E11.22 TYPE 2 DIABETES MELLITUS WITH STAGE 3 CHRONIC KIDNEY DISEASE, WITH LONG-TERM CURRENT USE OF INSULIN (HCC): ICD-10-CM

## 2020-05-06 DIAGNOSIS — Z79.4 TYPE 2 DIABETES MELLITUS WITH STAGE 3 CHRONIC KIDNEY DISEASE, WITH LONG-TERM CURRENT USE OF INSULIN (HCC): ICD-10-CM

## 2020-05-06 PROCEDURE — 99214 OFFICE O/P EST MOD 30 MIN: CPT | Performed by: INTERNAL MEDICINE

## 2020-05-06 PROCEDURE — 1160F RVW MEDS BY RX/DR IN RCRD: CPT | Performed by: INTERNAL MEDICINE

## 2020-05-06 RX ORDER — LOSARTAN POTASSIUM 50 MG/1
50 TABLET ORAL DAILY
Qty: 30 TABLET | Refills: 5 | Status: SHIPPED | OUTPATIENT
Start: 2020-05-06 | End: 2020-07-08 | Stop reason: SDUPTHER

## 2020-05-19 ENCOUNTER — TELEPHONE (OUTPATIENT)
Dept: INTERNAL MEDICINE CLINIC | Facility: CLINIC | Age: 75
End: 2020-05-19

## 2020-05-19 DIAGNOSIS — J45.909 ASTHMA WITHOUT STATUS ASTHMATICUS WITHOUT COMPLICATION, UNSPECIFIED ASTHMA SEVERITY, UNSPECIFIED WHETHER PERSISTENT: Primary | ICD-10-CM

## 2020-05-20 RX ORDER — BUDESONIDE AND FORMOTEROL FUMARATE DIHYDRATE 160; 4.5 UG/1; UG/1
2 AEROSOL RESPIRATORY (INHALATION) 2 TIMES DAILY
Qty: 1 INHALER | Refills: 1 | Status: SHIPPED | OUTPATIENT
Start: 2020-05-20 | End: 2020-08-10 | Stop reason: SDUPTHER

## 2020-05-26 DIAGNOSIS — E78.5 HYPERLIPIDEMIA, UNSPECIFIED HYPERLIPIDEMIA TYPE: ICD-10-CM

## 2020-05-26 DIAGNOSIS — M54.50 LOW BACK PAIN: ICD-10-CM

## 2020-05-26 RX ORDER — ATORVASTATIN CALCIUM 80 MG/1
80 TABLET, FILM COATED ORAL DAILY
Qty: 30 TABLET | Refills: 5 | Status: SHIPPED | OUTPATIENT
Start: 2020-05-26 | End: 2020-09-09 | Stop reason: SDUPTHER

## 2020-05-26 RX ORDER — TRAMADOL HYDROCHLORIDE 50 MG/1
50 TABLET ORAL 2 TIMES DAILY
Qty: 60 TABLET | Refills: 0 | Status: SHIPPED | OUTPATIENT
Start: 2020-05-26 | End: 2020-06-25 | Stop reason: SDUPTHER

## 2020-05-26 RX ORDER — CYCLOBENZAPRINE HCL 5 MG
5 TABLET ORAL 3 TIMES DAILY PRN
Qty: 90 TABLET | Refills: 0 | Status: SHIPPED | OUTPATIENT
Start: 2020-05-26 | End: 2020-06-25 | Stop reason: SDUPTHER

## 2020-06-02 LAB
LEFT EYE DIABETIC RETINOPATHY: NORMAL
RIGHT EYE DIABETIC RETINOPATHY: NORMAL

## 2020-06-11 ENCOUNTER — DOCTOR'S OFFICE (OUTPATIENT)
Dept: URBAN - METROPOLITAN AREA CLINIC 137 | Facility: CLINIC | Age: 75
Setting detail: OPHTHALMOLOGY
End: 2020-06-11
Payer: COMMERCIAL

## 2020-06-11 VITALS — HEIGHT: 55 IN

## 2020-06-11 DIAGNOSIS — H52.4: ICD-10-CM

## 2020-06-11 PROBLEM — H04.123 DRY EYE; BOTH EYES: Status: ACTIVE | Noted: 2017-06-30

## 2020-06-11 PROBLEM — Z96.1 PSEUDOPHAKIA ; BOTH EYES: Status: ACTIVE | Noted: 2017-06-30

## 2020-06-11 PROCEDURE — 92014 COMPRE OPH EXAM EST PT 1/>: CPT | Performed by: OPTOMETRIST

## 2020-06-11 ASSESSMENT — REFRACTION_MANIFEST
OD_ADD: +2.75
OS_CYLINDER: SPH
OD_CYLINDER: -0.50
OS_ADD: +2.75
OS_SPHERE: -0.50
OD_AXIS: 90
OD_ADD: +2.75
OD_AXIS: 090
OD_SPHERE: -1.75
OS_ADD: +2.75
OS_VA2: 20/30(J2)
OS_VA1: 20/60
OD_CYLINDER: -1.00
OS_VA1: 20/70
OD_VA2: 20/30(J2)
OD_SPHERE: -1.25
OS_SPHERE: -0.75
OD_VA1: 20/40
OS_CYLINDER: SPH
OD_VA1: 20/40

## 2020-06-11 ASSESSMENT — REFRACTION_CURRENTRX
OD_OVR_VA: 20/
OD_CYLINDER: +0.50
OD_AXIS: 036
OS_ADD: +2.50
OS_OVR_VA: 20/
OD_ADD: +2.50
OD_VPRISM_DIRECTION: TRF
OS_VPRISM_DIRECTION: TRF
OS_ADD: +2.50
OD_VPRISM_DIRECTION: TRF
OS_SPHERE: -0.75
OD_CYLINDER: SPH
OD_SPHERE: -2.00
OS_CYLINDER: SPH
OD_ADD: +2.50
OS_VPRISM_DIRECTION: TRF
OS_OVR_VA: 20/
OD_OVR_VA: 20/
OS_CYLINDER: SPH
OD_SPHERE: -2.50
OS_SPHERE: -0.75

## 2020-06-11 ASSESSMENT — KERATOMETRY
OD_K1POWER_DIOPTERS: 44.00
OS_K2POWER_DIOPTERS: 45.00
OD_K2POWER_DIOPTERS: 44.50
OS_AXISANGLE_DEGREES: 083
OS_K1POWER_DIOPTERS: 44.25
OD_AXISANGLE_DEGREES: 049

## 2020-06-11 ASSESSMENT — REFRACTION_AUTOREFRACTION
OD_CYLINDER: -0.50
OS_CYLINDER: SPH
OD_AXIS: 072
OD_SPHERE: -1.25
OS_SPHERE: -1.00

## 2020-06-11 ASSESSMENT — AXIALLENGTH_DERIVED
OD_AL: 24.1066
OD_AL: 23.9049
OD_AL: 24.0054

## 2020-06-11 ASSESSMENT — SPHEQUIV_DERIVED
OD_SPHEQUIV: -1.5
OD_SPHEQUIV: -2
OD_SPHEQUIV: -1.75

## 2020-06-11 ASSESSMENT — CONFRONTATIONAL VISUAL FIELD TEST (CVF)
OD_FINDINGS: FULL
OS_FINDINGS: FULL

## 2020-06-11 ASSESSMENT — VISUAL ACUITY
OS_BCVA: 20/50-2
OD_BCVA: 20/200

## 2020-06-16 ENCOUNTER — OFFICE VISIT (OUTPATIENT)
Dept: PODIATRY | Facility: CLINIC | Age: 75
End: 2020-06-16
Payer: COMMERCIAL

## 2020-06-16 VITALS — HEIGHT: 60 IN | BODY MASS INDEX: 26.7 KG/M2 | WEIGHT: 136 LBS

## 2020-06-16 DIAGNOSIS — E11.42 DIABETIC POLYNEUROPATHY ASSOCIATED WITH TYPE 2 DIABETES MELLITUS (HCC): Primary | ICD-10-CM

## 2020-06-16 PROCEDURE — 3078F DIAST BP <80 MM HG: CPT | Performed by: PODIATRIST

## 2020-06-16 PROCEDURE — 2026F EYE IMG VALID EVC RTNOPTHY: CPT | Performed by: PODIATRIST

## 2020-06-16 PROCEDURE — 3077F SYST BP >= 140 MM HG: CPT | Performed by: PODIATRIST

## 2020-06-16 PROCEDURE — 99213 OFFICE O/P EST LOW 20 MIN: CPT | Performed by: PODIATRIST

## 2020-06-16 PROCEDURE — 3008F BODY MASS INDEX DOCD: CPT | Performed by: PODIATRIST

## 2020-06-16 PROCEDURE — 3066F NEPHROPATHY DOC TX: CPT | Performed by: PODIATRIST

## 2020-06-16 PROCEDURE — 4040F PNEUMOC VAC/ADMIN/RCVD: CPT | Performed by: PODIATRIST

## 2020-06-16 PROCEDURE — 1036F TOBACCO NON-USER: CPT | Performed by: PODIATRIST

## 2020-06-16 PROCEDURE — 1160F RVW MEDS BY RX/DR IN RCRD: CPT | Performed by: PODIATRIST

## 2020-06-25 DIAGNOSIS — M54.50 LOW BACK PAIN: ICD-10-CM

## 2020-06-25 RX ORDER — CYCLOBENZAPRINE HCL 5 MG
5 TABLET ORAL 3 TIMES DAILY PRN
Qty: 90 TABLET | Refills: 0 | Status: SHIPPED | OUTPATIENT
Start: 2020-06-25 | End: 2020-07-28 | Stop reason: SDUPTHER

## 2020-06-25 RX ORDER — TRAMADOL HYDROCHLORIDE 50 MG/1
50 TABLET ORAL 2 TIMES DAILY
Qty: 60 TABLET | Refills: 0 | Status: SHIPPED | OUTPATIENT
Start: 2020-06-25 | End: 2020-07-28 | Stop reason: SDUPTHER

## 2020-07-08 ENCOUNTER — OFFICE VISIT (OUTPATIENT)
Dept: INTERNAL MEDICINE CLINIC | Age: 75
End: 2020-07-08
Payer: COMMERCIAL

## 2020-07-08 VITALS
TEMPERATURE: 97.2 F | DIASTOLIC BLOOD PRESSURE: 60 MMHG | WEIGHT: 138.4 LBS | OXYGEN SATURATION: 98 % | HEART RATE: 84 BPM | BODY MASS INDEX: 27.17 KG/M2 | SYSTOLIC BLOOD PRESSURE: 126 MMHG | HEIGHT: 60 IN

## 2020-07-08 DIAGNOSIS — N18.30 TYPE 2 DIABETES MELLITUS WITH STAGE 3 CHRONIC KIDNEY DISEASE, WITH LONG-TERM CURRENT USE OF INSULIN (HCC): Primary | ICD-10-CM

## 2020-07-08 DIAGNOSIS — I10 HYPERTENSION, UNSPECIFIED TYPE: ICD-10-CM

## 2020-07-08 DIAGNOSIS — E11.22 TYPE 2 DIABETES MELLITUS WITH STAGE 3 CHRONIC KIDNEY DISEASE, WITH LONG-TERM CURRENT USE OF INSULIN (HCC): Primary | ICD-10-CM

## 2020-07-08 DIAGNOSIS — Z79.4 TYPE 2 DIABETES MELLITUS WITH STAGE 3 CHRONIC KIDNEY DISEASE, WITH LONG-TERM CURRENT USE OF INSULIN (HCC): Primary | ICD-10-CM

## 2020-07-08 DIAGNOSIS — Z79.4 TYPE 2 DIABETES MELLITUS WITH COMPLICATION, WITH LONG-TERM CURRENT USE OF INSULIN (HCC): ICD-10-CM

## 2020-07-08 DIAGNOSIS — E11.8 TYPE 2 DIABETES MELLITUS WITH COMPLICATION, WITH LONG-TERM CURRENT USE OF INSULIN (HCC): ICD-10-CM

## 2020-07-08 DIAGNOSIS — E78.2 MIXED HYPERLIPIDEMIA: ICD-10-CM

## 2020-07-08 DIAGNOSIS — E79.0 HYPERURICEMIA: ICD-10-CM

## 2020-07-08 DIAGNOSIS — N18.30 CKD (CHRONIC KIDNEY DISEASE) STAGE 3, GFR 30-59 ML/MIN (HCC): ICD-10-CM

## 2020-07-08 PROCEDURE — 2026F EYE IMG VALID EVC RTNOPTHY: CPT | Performed by: INTERNAL MEDICINE

## 2020-07-08 PROCEDURE — 1036F TOBACCO NON-USER: CPT | Performed by: INTERNAL MEDICINE

## 2020-07-08 PROCEDURE — 3078F DIAST BP <80 MM HG: CPT | Performed by: INTERNAL MEDICINE

## 2020-07-08 PROCEDURE — 4010F ACE/ARB THERAPY RXD/TAKEN: CPT | Performed by: INTERNAL MEDICINE

## 2020-07-08 PROCEDURE — 3074F SYST BP LT 130 MM HG: CPT | Performed by: INTERNAL MEDICINE

## 2020-07-08 PROCEDURE — 3008F BODY MASS INDEX DOCD: CPT | Performed by: INTERNAL MEDICINE

## 2020-07-08 PROCEDURE — 4040F PNEUMOC VAC/ADMIN/RCVD: CPT | Performed by: INTERNAL MEDICINE

## 2020-07-08 PROCEDURE — 1160F RVW MEDS BY RX/DR IN RCRD: CPT | Performed by: INTERNAL MEDICINE

## 2020-07-08 PROCEDURE — 3066F NEPHROPATHY DOC TX: CPT | Performed by: INTERNAL MEDICINE

## 2020-07-08 PROCEDURE — 99214 OFFICE O/P EST MOD 30 MIN: CPT | Performed by: INTERNAL MEDICINE

## 2020-07-08 RX ORDER — ALLOPURINOL 100 MG/1
200 TABLET ORAL DAILY
Qty: 180 TABLET | Refills: 1 | Status: SHIPPED | OUTPATIENT
Start: 2020-07-08 | End: 2020-08-03

## 2020-07-08 RX ORDER — LOSARTAN POTASSIUM 50 MG/1
50 TABLET ORAL DAILY
Qty: 30 TABLET | Refills: 5 | Status: SHIPPED | OUTPATIENT
Start: 2020-07-08 | End: 2020-12-29 | Stop reason: SDUPTHER
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
## 2020-07-28 DIAGNOSIS — M54.50 LOW BACK PAIN: ICD-10-CM

## 2020-07-28 RX ORDER — TRAMADOL HYDROCHLORIDE 50 MG/1
50 TABLET ORAL 2 TIMES DAILY
Qty: 60 TABLET | Refills: 0 | Status: SHIPPED | OUTPATIENT
Start: 2020-07-28 | End: 2020-08-31 | Stop reason: SDUPTHER

## 2020-07-28 RX ORDER — CYCLOBENZAPRINE HCL 5 MG
5 TABLET ORAL 3 TIMES DAILY PRN
Qty: 90 TABLET | Refills: 0 | Status: SHIPPED | OUTPATIENT
Start: 2020-07-28 | End: 2020-08-31 | Stop reason: SDUPTHER

## 2020-07-30 DIAGNOSIS — E11.8 TYPE 2 DIABETES MELLITUS WITH COMPLICATION, WITH LONG-TERM CURRENT USE OF INSULIN (HCC): ICD-10-CM

## 2020-07-30 DIAGNOSIS — E79.0 HYPERURICEMIA: ICD-10-CM

## 2020-07-30 DIAGNOSIS — Z79.4 TYPE 2 DIABETES MELLITUS WITH COMPLICATION, WITH LONG-TERM CURRENT USE OF INSULIN (HCC): ICD-10-CM

## 2020-08-02 RX ORDER — SITAGLIPTIN 50 MG/1
TABLET, FILM COATED ORAL
Qty: 30 TABLET | Refills: 4 | OUTPATIENT
Start: 2020-08-02

## 2020-08-03 RX ORDER — ALLOPURINOL 100 MG/1
200 TABLET ORAL DAILY
Qty: 180 TABLET | Refills: 0 | Status: SHIPPED | OUTPATIENT
Start: 2020-08-03 | End: 2020-09-09 | Stop reason: SDUPTHER

## 2020-08-10 DIAGNOSIS — I10 HYPERTENSION, UNSPECIFIED TYPE: ICD-10-CM

## 2020-08-10 DIAGNOSIS — J45.909 ASTHMA WITHOUT STATUS ASTHMATICUS WITHOUT COMPLICATION, UNSPECIFIED ASTHMA SEVERITY, UNSPECIFIED WHETHER PERSISTENT: ICD-10-CM

## 2020-08-10 RX ORDER — METOPROLOL SUCCINATE 100 MG/1
100 TABLET, EXTENDED RELEASE ORAL DAILY
Qty: 30 TABLET | Refills: 5 | Status: SHIPPED | OUTPATIENT
Start: 2020-08-10 | End: 2020-12-29 | Stop reason: SDUPTHER

## 2020-08-10 RX ORDER — BUDESONIDE AND FORMOTEROL FUMARATE DIHYDRATE 160; 4.5 UG/1; UG/1
2 AEROSOL RESPIRATORY (INHALATION) 2 TIMES DAILY
Qty: 1 INHALER | Refills: 2 | Status: SHIPPED | OUTPATIENT
Start: 2020-08-10 | End: 2020-11-02 | Stop reason: SDUPTHER

## 2020-08-13 ENCOUNTER — TELEPHONE (OUTPATIENT)
Dept: INTERNAL MEDICINE CLINIC | Age: 75
End: 2020-08-13

## 2020-08-13 NOTE — TELEPHONE ENCOUNTER
PT has her mammogram scheduled but needs the script put into the system  Can we please put mammogram script into her chart?

## 2020-08-14 DIAGNOSIS — Z12.39 BREAST CANCER SCREENING: Primary | ICD-10-CM

## 2020-08-14 NOTE — TELEPHONE ENCOUNTER
Order placed for mammogram     In future these kind of of orders can be placed by nurse  So please refer to appropriate personal   Thanks

## 2020-08-18 DIAGNOSIS — I10 HYPERTENSION, UNSPECIFIED TYPE: ICD-10-CM

## 2020-08-18 RX ORDER — BUMETANIDE 2 MG/1
2 TABLET ORAL DAILY
Qty: 30 TABLET | Refills: 4 | OUTPATIENT
Start: 2020-08-18

## 2020-08-18 RX ORDER — BUMETANIDE 2 MG/1
2 TABLET ORAL DAILY
Qty: 30 TABLET | Refills: 5 | Status: SHIPPED | OUTPATIENT
Start: 2020-08-18 | End: 2020-12-29 | Stop reason: SDUPTHER

## 2020-08-25 ENCOUNTER — OFFICE VISIT (OUTPATIENT)
Dept: PODIATRY | Facility: CLINIC | Age: 75
End: 2020-08-25
Payer: COMMERCIAL

## 2020-08-25 VITALS
BODY MASS INDEX: 27.17 KG/M2 | SYSTOLIC BLOOD PRESSURE: 146 MMHG | HEART RATE: 79 BPM | WEIGHT: 138.4 LBS | TEMPERATURE: 97 F | DIASTOLIC BLOOD PRESSURE: 72 MMHG | HEIGHT: 60 IN

## 2020-08-25 DIAGNOSIS — E11.42 DIABETIC POLYNEUROPATHY ASSOCIATED WITH TYPE 2 DIABETES MELLITUS (HCC): Primary | ICD-10-CM

## 2020-08-25 PROCEDURE — 3066F NEPHROPATHY DOC TX: CPT | Performed by: PODIATRIST

## 2020-08-25 PROCEDURE — 3008F BODY MASS INDEX DOCD: CPT | Performed by: PODIATRIST

## 2020-08-25 PROCEDURE — 4040F PNEUMOC VAC/ADMIN/RCVD: CPT | Performed by: PODIATRIST

## 2020-08-25 PROCEDURE — 1160F RVW MEDS BY RX/DR IN RCRD: CPT | Performed by: PODIATRIST

## 2020-08-25 PROCEDURE — 99212 OFFICE O/P EST SF 10 MIN: CPT | Performed by: PODIATRIST

## 2020-08-25 PROCEDURE — 2026F EYE IMG VALID EVC RTNOPTHY: CPT | Performed by: PODIATRIST

## 2020-08-25 PROCEDURE — 1036F TOBACCO NON-USER: CPT | Performed by: PODIATRIST

## 2020-08-25 PROCEDURE — 3078F DIAST BP <80 MM HG: CPT | Performed by: PODIATRIST

## 2020-08-25 PROCEDURE — 3077F SYST BP >= 140 MM HG: CPT | Performed by: PODIATRIST

## 2020-08-25 NOTE — PROGRESS NOTES
Patient presents for palliative diabetic foot care  No acute disorder noted  Trace pedal pulses present bilateral   Treatment consisted of nail trimming

## 2020-08-31 DIAGNOSIS — E11.8 TYPE 2 DIABETES MELLITUS WITH COMPLICATION, WITH LONG-TERM CURRENT USE OF INSULIN (HCC): ICD-10-CM

## 2020-08-31 DIAGNOSIS — M54.50 LOW BACK PAIN: ICD-10-CM

## 2020-08-31 DIAGNOSIS — Z79.4 TYPE 2 DIABETES MELLITUS WITH COMPLICATION, WITH LONG-TERM CURRENT USE OF INSULIN (HCC): ICD-10-CM

## 2020-09-01 RX ORDER — INSULIN DETEMIR 100 [IU]/ML
36 INJECTION, SOLUTION SUBCUTANEOUS 2 TIMES DAILY
Qty: 200 UNITS | Refills: 5 | Status: SHIPPED | OUTPATIENT
Start: 2020-09-01 | End: 2021-05-25 | Stop reason: SDUPTHER

## 2020-09-01 RX ORDER — TRAMADOL HYDROCHLORIDE 50 MG/1
50 TABLET ORAL 2 TIMES DAILY
Qty: 60 TABLET | Refills: 0 | Status: SHIPPED | OUTPATIENT
Start: 2020-09-01 | End: 2020-10-01 | Stop reason: SDUPTHER

## 2020-09-01 RX ORDER — CYCLOBENZAPRINE HCL 5 MG
5 TABLET ORAL 3 TIMES DAILY PRN
Qty: 90 TABLET | Refills: 0 | Status: SHIPPED | OUTPATIENT
Start: 2020-09-01 | End: 2020-10-01 | Stop reason: SDUPTHER

## 2020-09-02 LAB
BASOPHILS # BLD AUTO: 39 CELLS/UL (ref 0–200)
BASOPHILS NFR BLD AUTO: 0.7 %
BUN SERPL-MCNC: 42 MG/DL (ref 7–25)
BUN/CREAT SERPL: 30 (CALC) (ref 6–22)
CALCIUM SERPL-MCNC: 10 MG/DL (ref 8.6–10.4)
CHLORIDE SERPL-SCNC: 105 MMOL/L (ref 98–110)
CHOLEST SERPL-MCNC: 103 MG/DL
CHOLEST/HDLC SERPL: 2.3 (CALC)
CO2 SERPL-SCNC: 30 MMOL/L (ref 20–32)
CREAT SERPL-MCNC: 1.4 MG/DL (ref 0.6–0.93)
EOSINOPHIL # BLD AUTO: 274 CELLS/UL (ref 15–500)
EOSINOPHIL NFR BLD AUTO: 4.9 %
ERYTHROCYTE [DISTWIDTH] IN BLOOD BY AUTOMATED COUNT: 13.2 % (ref 11–15)
GLUCOSE SERPL-MCNC: 117 MG/DL (ref 65–99)
HBA1C MFR BLD: 7.4 % OF TOTAL HGB
HCT VFR BLD AUTO: 35.6 % (ref 35–45)
HDLC SERPL-MCNC: 44 MG/DL
HGB BLD-MCNC: 11.6 G/DL (ref 11.7–15.5)
LDLC SERPL CALC-MCNC: 39 MG/DL (CALC)
LYMPHOCYTES # BLD AUTO: 1854 CELLS/UL (ref 850–3900)
LYMPHOCYTES NFR BLD AUTO: 33.1 %
MCH RBC QN AUTO: 30.9 PG (ref 27–33)
MCHC RBC AUTO-ENTMCNC: 32.6 G/DL (ref 32–36)
MCV RBC AUTO: 94.9 FL (ref 80–100)
MONOCYTES # BLD AUTO: 678 CELLS/UL (ref 200–950)
MONOCYTES NFR BLD AUTO: 12.1 %
NEUTROPHILS # BLD AUTO: 2755 CELLS/UL (ref 1500–7800)
NEUTROPHILS NFR BLD AUTO: 49.2 %
NONHDLC SERPL-MCNC: 59 MG/DL (CALC)
PLATELET # BLD AUTO: 212 THOUSAND/UL (ref 140–400)
PMV BLD REES-ECKER: 12 FL (ref 7.5–12.5)
POTASSIUM SERPL-SCNC: 5 MMOL/L (ref 3.5–5.3)
RBC # BLD AUTO: 3.75 MILLION/UL (ref 3.8–5.1)
SL AMB EGFR AFRICAN AMERICAN: 42 ML/MIN/1.73M2
SL AMB EGFR NON AFRICAN AMERICAN: 36 ML/MIN/1.73M2
SODIUM SERPL-SCNC: 141 MMOL/L (ref 135–146)
TRIGL SERPL-MCNC: 115 MG/DL
WBC # BLD AUTO: 5.6 THOUSAND/UL (ref 3.8–10.8)

## 2020-09-03 ENCOUNTER — TELEPHONE (OUTPATIENT)
Dept: INTERNAL MEDICINE CLINIC | Age: 75
End: 2020-09-03

## 2020-09-03 DIAGNOSIS — Z12.39 BREAST CANCER SCREENING: ICD-10-CM

## 2020-09-03 NOTE — TELEPHONE ENCOUNTER
The following medication needs a prior authorization, cyclobenzaprine (FLEXERIL) 5 mg tablet, from [facility]

## 2020-09-09 ENCOUNTER — OFFICE VISIT (OUTPATIENT)
Dept: INTERNAL MEDICINE CLINIC | Age: 75
End: 2020-09-09
Payer: COMMERCIAL

## 2020-09-09 VITALS
WEIGHT: 137 LBS | TEMPERATURE: 98.4 F | BODY MASS INDEX: 26.9 KG/M2 | HEIGHT: 60 IN | OXYGEN SATURATION: 98 % | SYSTOLIC BLOOD PRESSURE: 130 MMHG | DIASTOLIC BLOOD PRESSURE: 58 MMHG | HEART RATE: 70 BPM

## 2020-09-09 DIAGNOSIS — J31.0 RHINITIS, UNSPECIFIED TYPE: ICD-10-CM

## 2020-09-09 DIAGNOSIS — J45.41 MODERATE PERSISTENT ASTHMA WITH ACUTE EXACERBATION: ICD-10-CM

## 2020-09-09 DIAGNOSIS — M54.50 LOW BACK PAIN, UNSPECIFIED BACK PAIN LATERALITY, UNSPECIFIED CHRONICITY, UNSPECIFIED WHETHER SCIATICA PRESENT: ICD-10-CM

## 2020-09-09 DIAGNOSIS — Z13.820 ENCOUNTER FOR SCREENING FOR OSTEOPOROSIS: Primary | ICD-10-CM

## 2020-09-09 DIAGNOSIS — E78.5 HYPERLIPIDEMIA, UNSPECIFIED HYPERLIPIDEMIA TYPE: ICD-10-CM

## 2020-09-09 DIAGNOSIS — E11.8 TYPE 2 DIABETES MELLITUS WITH COMPLICATION, WITH LONG-TERM CURRENT USE OF INSULIN (HCC): ICD-10-CM

## 2020-09-09 DIAGNOSIS — E03.9 HYPOTHYROIDISM, UNSPECIFIED TYPE: ICD-10-CM

## 2020-09-09 DIAGNOSIS — Z79.4 TYPE 2 DIABETES MELLITUS WITH COMPLICATION, WITH LONG-TERM CURRENT USE OF INSULIN (HCC): ICD-10-CM

## 2020-09-09 DIAGNOSIS — E79.0 HYPERURICEMIA: ICD-10-CM

## 2020-09-09 PROCEDURE — 1160F RVW MEDS BY RX/DR IN RCRD: CPT | Performed by: INTERNAL MEDICINE

## 2020-09-09 PROCEDURE — 1036F TOBACCO NON-USER: CPT | Performed by: INTERNAL MEDICINE

## 2020-09-09 PROCEDURE — 3078F DIAST BP <80 MM HG: CPT | Performed by: INTERNAL MEDICINE

## 2020-09-09 PROCEDURE — 3075F SYST BP GE 130 - 139MM HG: CPT | Performed by: INTERNAL MEDICINE

## 2020-09-09 PROCEDURE — 99214 OFFICE O/P EST MOD 30 MIN: CPT | Performed by: INTERNAL MEDICINE

## 2020-09-09 RX ORDER — SYRINGE-NEEDLE,INSULIN,0.5 ML 31 GX5/16"
SYRINGE, EMPTY DISPOSABLE MISCELLANEOUS 4 TIMES DAILY
Qty: 200 EACH | Refills: 5 | Status: SHIPPED | OUTPATIENT
Start: 2020-09-09 | End: 2021-05-25 | Stop reason: SDUPTHER

## 2020-09-09 RX ORDER — MONTELUKAST SODIUM 10 MG/1
10 TABLET ORAL
Qty: 30 TABLET | Refills: 4 | OUTPATIENT
Start: 2020-09-09

## 2020-09-09 RX ORDER — MONTELUKAST SODIUM 10 MG/1
10 TABLET ORAL
Qty: 30 TABLET | Refills: 5 | Status: SHIPPED | OUTPATIENT
Start: 2020-09-09 | End: 2020-12-29 | Stop reason: SDUPTHER

## 2020-09-09 RX ORDER — LEVOTHYROXINE SODIUM 0.07 MG/1
75 TABLET ORAL DAILY
Qty: 30 TABLET | Refills: 5 | Status: SHIPPED | OUTPATIENT
Start: 2020-09-09 | End: 2020-12-29 | Stop reason: SDUPTHER

## 2020-09-09 RX ORDER — FLUTICASONE PROPIONATE 50 MCG
SPRAY, SUSPENSION (ML) NASAL
Refills: 2 | OUTPATIENT
Start: 2020-09-09

## 2020-09-09 RX ORDER — ATORVASTATIN CALCIUM 80 MG/1
80 TABLET, FILM COATED ORAL DAILY
Qty: 30 TABLET | Refills: 5 | Status: SHIPPED | OUTPATIENT
Start: 2020-09-09 | End: 2020-12-29 | Stop reason: SDUPTHER

## 2020-09-09 RX ORDER — LEVOTHYROXINE SODIUM 0.07 MG/1
75 TABLET ORAL DAILY
Qty: 30 TABLET | Refills: 4 | OUTPATIENT
Start: 2020-09-09

## 2020-09-09 RX ORDER — FLUTICASONE PROPIONATE 50 MCG
2 SPRAY, SUSPENSION (ML) NASAL DAILY
Qty: 16 G | Refills: 3 | Status: SHIPPED | OUTPATIENT
Start: 2020-09-09 | End: 2020-12-29 | Stop reason: SDUPTHER

## 2020-09-09 RX ORDER — ALLOPURINOL 100 MG/1
200 TABLET ORAL DAILY
Qty: 180 TABLET | Refills: 0 | Status: SHIPPED | OUTPATIENT
Start: 2020-09-09 | End: 2020-12-29 | Stop reason: SDUPTHER
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
## 2020-09-10 RX ORDER — INSULIN HUMAN 100 [IU]/ML
INJECTION, SOLUTION PARENTERAL
Qty: 10 ML | Refills: 4 | OUTPATIENT
Start: 2020-09-10

## 2020-10-01 DIAGNOSIS — M54.50 LOW BACK PAIN: ICD-10-CM

## 2020-10-02 RX ORDER — CYCLOBENZAPRINE HCL 5 MG
5 TABLET ORAL 3 TIMES DAILY PRN
Qty: 90 TABLET | Refills: 0 | Status: SHIPPED | OUTPATIENT
Start: 2020-10-02 | End: 2020-11-02 | Stop reason: SDUPTHER

## 2020-10-02 RX ORDER — TRAMADOL HYDROCHLORIDE 50 MG/1
50 TABLET ORAL 2 TIMES DAILY
Qty: 60 TABLET | Refills: 0 | Status: SHIPPED | OUTPATIENT
Start: 2020-10-02 | End: 2020-11-02 | Stop reason: SDUPTHER

## 2020-10-08 ENCOUNTER — CLINICAL SUPPORT (OUTPATIENT)
Dept: INTERNAL MEDICINE CLINIC | Age: 75
End: 2020-10-08
Payer: COMMERCIAL

## 2020-10-08 DIAGNOSIS — Z23 NEED FOR INFLUENZA VACCINATION: Primary | ICD-10-CM

## 2020-10-08 PROCEDURE — 90662 IIV NO PRSV INCREASED AG IM: CPT

## 2020-10-08 PROCEDURE — G0008 ADMIN INFLUENZA VIRUS VAC: HCPCS

## 2020-10-20 ENCOUNTER — HOSPITAL ENCOUNTER (OUTPATIENT)
Dept: RADIOLOGY | Age: 75
Discharge: HOME | End: 2020-10-20
Payer: COMMERCIAL

## 2020-10-20 DIAGNOSIS — Z13.820 ENCOUNTER FOR SCREENING FOR OSTEOPOROSIS: ICD-10-CM

## 2020-10-20 PROCEDURE — 77080 DXA BONE DENSITY AXIAL: CPT

## 2020-10-26 DIAGNOSIS — E11.8 TYPE 2 DIABETES MELLITUS WITH COMPLICATION, WITH LONG-TERM CURRENT USE OF INSULIN (HCC): ICD-10-CM

## 2020-10-26 DIAGNOSIS — J45.41 MODERATE PERSISTENT ASTHMA WITH ACUTE EXACERBATION: ICD-10-CM

## 2020-10-26 DIAGNOSIS — Z79.4 TYPE 2 DIABETES MELLITUS WITH COMPLICATION, WITH LONG-TERM CURRENT USE OF INSULIN (HCC): ICD-10-CM

## 2020-10-27 RX ORDER — SITAGLIPTIN 50 MG/1
TABLET, FILM COATED ORAL
Qty: 30 TABLET | Refills: 4 | OUTPATIENT
Start: 2020-10-27

## 2020-10-27 RX ORDER — BLOOD SUGAR DIAGNOSTIC
STRIP MISCELLANEOUS
Refills: 1 | OUTPATIENT
Start: 2020-10-27

## 2020-10-27 RX ORDER — FLUTICASONE FUROATE AND VILANTEROL TRIFENATATE 100; 25 UG/1; UG/1
POWDER RESPIRATORY (INHALATION)
Qty: 60 INHALER | Refills: 4 | OUTPATIENT
Start: 2020-10-27

## 2020-11-02 DIAGNOSIS — J45.909 ASTHMA WITHOUT STATUS ASTHMATICUS WITHOUT COMPLICATION, UNSPECIFIED ASTHMA SEVERITY, UNSPECIFIED WHETHER PERSISTENT: ICD-10-CM

## 2020-11-02 DIAGNOSIS — M54.50 LOW BACK PAIN: ICD-10-CM

## 2020-11-03 ENCOUNTER — OFFICE VISIT (OUTPATIENT)
Dept: PODIATRY | Facility: CLINIC | Age: 75
End: 2020-11-03
Payer: COMMERCIAL

## 2020-11-03 VITALS
HEIGHT: 60 IN | BODY MASS INDEX: 26.97 KG/M2 | HEART RATE: 73 BPM | WEIGHT: 137.4 LBS | SYSTOLIC BLOOD PRESSURE: 156 MMHG | DIASTOLIC BLOOD PRESSURE: 68 MMHG

## 2020-11-03 DIAGNOSIS — E11.42 DIABETIC POLYNEUROPATHY ASSOCIATED WITH TYPE 2 DIABETES MELLITUS (HCC): Primary | ICD-10-CM

## 2020-11-03 PROCEDURE — 1160F RVW MEDS BY RX/DR IN RCRD: CPT | Performed by: PODIATRIST

## 2020-11-03 PROCEDURE — 3077F SYST BP >= 140 MM HG: CPT | Performed by: PODIATRIST

## 2020-11-03 PROCEDURE — 3078F DIAST BP <80 MM HG: CPT | Performed by: PODIATRIST

## 2020-11-03 PROCEDURE — 1036F TOBACCO NON-USER: CPT | Performed by: PODIATRIST

## 2020-11-03 PROCEDURE — 99212 OFFICE O/P EST SF 10 MIN: CPT | Performed by: PODIATRIST

## 2020-11-03 RX ORDER — CYCLOBENZAPRINE HCL 5 MG
5 TABLET ORAL 3 TIMES DAILY PRN
Qty: 90 TABLET | Refills: 0 | Status: SHIPPED | OUTPATIENT
Start: 2020-11-03 | End: 2020-12-03 | Stop reason: SDUPTHER

## 2020-11-03 RX ORDER — TRAMADOL HYDROCHLORIDE 50 MG/1
50 TABLET ORAL 2 TIMES DAILY
Qty: 60 TABLET | Refills: 0 | Status: SHIPPED | OUTPATIENT
Start: 2020-11-03 | End: 2020-12-03 | Stop reason: SDUPTHER

## 2020-11-03 RX ORDER — BUDESONIDE AND FORMOTEROL FUMARATE DIHYDRATE 160; 4.5 UG/1; UG/1
2 AEROSOL RESPIRATORY (INHALATION) 2 TIMES DAILY
Qty: 1 INHALER | Refills: 2 | Status: SHIPPED | OUTPATIENT
Start: 2020-11-03 | End: 2020-11-12

## 2020-11-12 DIAGNOSIS — J45.909 ASTHMA WITHOUT STATUS ASTHMATICUS WITHOUT COMPLICATION, UNSPECIFIED ASTHMA SEVERITY, UNSPECIFIED WHETHER PERSISTENT: ICD-10-CM

## 2020-11-12 RX ORDER — BUDESONIDE AND FORMOTEROL FUMARATE DIHYDRATE 160; 4.5 UG/1; UG/1
AEROSOL RESPIRATORY (INHALATION)
Qty: 10.2 INHALER | Refills: 1 | Status: SHIPPED | OUTPATIENT
Start: 2020-11-12 | End: 2020-12-29 | Stop reason: SDUPTHER

## 2020-12-03 DIAGNOSIS — M54.50 LOW BACK PAIN: ICD-10-CM

## 2020-12-07 RX ORDER — CYCLOBENZAPRINE HCL 5 MG
5 TABLET ORAL 3 TIMES DAILY PRN
Qty: 90 TABLET | Refills: 0 | Status: SHIPPED | OUTPATIENT
Start: 2020-12-07 | End: 2021-01-04 | Stop reason: SDUPTHER

## 2020-12-07 RX ORDER — TRAMADOL HYDROCHLORIDE 50 MG/1
50 TABLET ORAL 2 TIMES DAILY
Qty: 60 TABLET | Refills: 0 | Status: SHIPPED | OUTPATIENT
Start: 2020-12-07 | End: 2021-01-04 | Stop reason: SDUPTHER

## 2020-12-09 ENCOUNTER — OFFICE VISIT (OUTPATIENT)
Dept: INTERNAL MEDICINE CLINIC | Facility: CLINIC | Age: 75
End: 2020-12-09
Payer: COMMERCIAL

## 2020-12-09 VITALS
HEIGHT: 60 IN | DIASTOLIC BLOOD PRESSURE: 66 MMHG | HEART RATE: 66 BPM | BODY MASS INDEX: 27.05 KG/M2 | WEIGHT: 137.8 LBS | TEMPERATURE: 96.6 F | SYSTOLIC BLOOD PRESSURE: 134 MMHG | OXYGEN SATURATION: 99 %

## 2020-12-09 DIAGNOSIS — Z79.4 TYPE 2 DIABETES MELLITUS WITH STAGE 3B CHRONIC KIDNEY DISEASE, WITH LONG-TERM CURRENT USE OF INSULIN (HCC): ICD-10-CM

## 2020-12-09 DIAGNOSIS — S52.032B: Primary | ICD-10-CM

## 2020-12-09 DIAGNOSIS — N18.32 TYPE 2 DIABETES MELLITUS WITH STAGE 3B CHRONIC KIDNEY DISEASE, WITH LONG-TERM CURRENT USE OF INSULIN (HCC): ICD-10-CM

## 2020-12-09 DIAGNOSIS — M54.50 LOW BACK PAIN, UNSPECIFIED BACK PAIN LATERALITY, UNSPECIFIED CHRONICITY, UNSPECIFIED WHETHER SCIATICA PRESENT: ICD-10-CM

## 2020-12-09 DIAGNOSIS — N18.32 STAGE 3B CHRONIC KIDNEY DISEASE (HCC): ICD-10-CM

## 2020-12-09 DIAGNOSIS — M70.22 OLECRANON BURSITIS OF LEFT ELBOW: ICD-10-CM

## 2020-12-09 DIAGNOSIS — E11.22 TYPE 2 DIABETES MELLITUS WITH STAGE 3B CHRONIC KIDNEY DISEASE, WITH LONG-TERM CURRENT USE OF INSULIN (HCC): ICD-10-CM

## 2020-12-09 PROCEDURE — 99214 OFFICE O/P EST MOD 30 MIN: CPT | Performed by: INTERNAL MEDICINE

## 2020-12-09 PROCEDURE — 1036F TOBACCO NON-USER: CPT | Performed by: INTERNAL MEDICINE

## 2020-12-09 PROCEDURE — 3075F SYST BP GE 130 - 139MM HG: CPT | Performed by: INTERNAL MEDICINE

## 2020-12-09 PROCEDURE — 3078F DIAST BP <80 MM HG: CPT | Performed by: INTERNAL MEDICINE

## 2020-12-09 PROCEDURE — 1160F RVW MEDS BY RX/DR IN RCRD: CPT | Performed by: INTERNAL MEDICINE

## 2020-12-09 RX ORDER — FENOFIBRATE 67 MG/1
CAPSULE ORAL
COMMUNITY
Start: 2020-12-07 | End: 2021-03-31 | Stop reason: CLARIF

## 2020-12-15 LAB
LEFT EYE DIABETIC RETINOPATHY: NORMAL
RIGHT EYE DIABETIC RETINOPATHY: NORMAL

## 2020-12-23 LAB
BASOPHILS # BLD AUTO: 33 CELLS/UL (ref 0–200)
BASOPHILS NFR BLD AUTO: 0.6 %
BUN SERPL-MCNC: 48 MG/DL (ref 7–25)
BUN/CREAT SERPL: 39 (CALC) (ref 6–22)
CALCIUM SERPL-MCNC: 10 MG/DL (ref 8.6–10.4)
CHLORIDE SERPL-SCNC: 103 MMOL/L (ref 98–110)
CO2 SERPL-SCNC: 31 MMOL/L (ref 20–32)
CREAT SERPL-MCNC: 1.22 MG/DL (ref 0.6–0.93)
EOSINOPHIL # BLD AUTO: 292 CELLS/UL (ref 15–500)
EOSINOPHIL NFR BLD AUTO: 5.3 %
ERYTHROCYTE [DISTWIDTH] IN BLOOD BY AUTOMATED COUNT: 13.1 % (ref 11–15)
GLUCOSE SERPL-MCNC: 174 MG/DL (ref 65–99)
HBA1C MFR BLD: 7.2 % OF TOTAL HGB
HCT VFR BLD AUTO: 36.8 % (ref 35–45)
HGB BLD-MCNC: 11.9 G/DL (ref 11.7–15.5)
LYMPHOCYTES # BLD AUTO: 1837 CELLS/UL (ref 850–3900)
LYMPHOCYTES NFR BLD AUTO: 33.4 %
MCH RBC QN AUTO: 30.7 PG (ref 27–33)
MCHC RBC AUTO-ENTMCNC: 32.3 G/DL (ref 32–36)
MCV RBC AUTO: 94.8 FL (ref 80–100)
MONOCYTES # BLD AUTO: 677 CELLS/UL (ref 200–950)
MONOCYTES NFR BLD AUTO: 12.3 %
NEUTROPHILS # BLD AUTO: 2662 CELLS/UL (ref 1500–7800)
NEUTROPHILS NFR BLD AUTO: 48.4 %
PLATELET # BLD AUTO: 206 THOUSAND/UL (ref 140–400)
PMV BLD REES-ECKER: 12.2 FL (ref 7.5–12.5)
POTASSIUM SERPL-SCNC: 4.6 MMOL/L (ref 3.5–5.3)
RBC # BLD AUTO: 3.88 MILLION/UL (ref 3.8–5.1)
SL AMB EGFR AFRICAN AMERICAN: 49 ML/MIN/1.73M2
SL AMB EGFR NON AFRICAN AMERICAN: 43 ML/MIN/1.73M2
SODIUM SERPL-SCNC: 141 MMOL/L (ref 135–146)
T4 FREE SERPL-MCNC: 1.4 NG/DL (ref 0.8–1.8)
TSH SERPL-ACNC: 4.59 MIU/L (ref 0.4–4.5)
WBC # BLD AUTO: 5.5 THOUSAND/UL (ref 3.8–10.8)

## 2020-12-29 ENCOUNTER — OFFICE VISIT (OUTPATIENT)
Dept: INTERNAL MEDICINE CLINIC | Age: 75
End: 2020-12-29
Payer: COMMERCIAL

## 2020-12-29 VITALS
DIASTOLIC BLOOD PRESSURE: 50 MMHG | HEIGHT: 60 IN | OXYGEN SATURATION: 98 % | TEMPERATURE: 98.3 F | HEART RATE: 72 BPM | BODY MASS INDEX: 27.09 KG/M2 | WEIGHT: 138 LBS | SYSTOLIC BLOOD PRESSURE: 110 MMHG

## 2020-12-29 DIAGNOSIS — K21.9 GERD WITHOUT ESOPHAGITIS: ICD-10-CM

## 2020-12-29 DIAGNOSIS — J45.41 MODERATE PERSISTENT ASTHMA WITH ACUTE EXACERBATION: ICD-10-CM

## 2020-12-29 DIAGNOSIS — E11.8 TYPE 2 DIABETES MELLITUS WITH COMPLICATION, WITH LONG-TERM CURRENT USE OF INSULIN (HCC): ICD-10-CM

## 2020-12-29 DIAGNOSIS — E79.0 HYPERURICEMIA: ICD-10-CM

## 2020-12-29 DIAGNOSIS — M54.50 LOW BACK PAIN: ICD-10-CM

## 2020-12-29 DIAGNOSIS — Z79.4 TYPE 2 DIABETES MELLITUS WITH COMPLICATION, WITH LONG-TERM CURRENT USE OF INSULIN (HCC): ICD-10-CM

## 2020-12-29 DIAGNOSIS — Z79.4 TYPE 2 DIABETES MELLITUS WITH STAGE 3B CHRONIC KIDNEY DISEASE, WITH LONG-TERM CURRENT USE OF INSULIN (HCC): Primary | ICD-10-CM

## 2020-12-29 DIAGNOSIS — E78.2 MIXED HYPERLIPIDEMIA: ICD-10-CM

## 2020-12-29 DIAGNOSIS — J31.0 RHINITIS, UNSPECIFIED TYPE: ICD-10-CM

## 2020-12-29 DIAGNOSIS — I10 HYPERTENSION, UNSPECIFIED TYPE: ICD-10-CM

## 2020-12-29 DIAGNOSIS — E78.5 HYPERLIPIDEMIA, UNSPECIFIED HYPERLIPIDEMIA TYPE: ICD-10-CM

## 2020-12-29 DIAGNOSIS — E11.22 TYPE 2 DIABETES MELLITUS WITH STAGE 3B CHRONIC KIDNEY DISEASE, WITH LONG-TERM CURRENT USE OF INSULIN (HCC): Primary | ICD-10-CM

## 2020-12-29 DIAGNOSIS — N18.32 TYPE 2 DIABETES MELLITUS WITH STAGE 3B CHRONIC KIDNEY DISEASE, WITH LONG-TERM CURRENT USE OF INSULIN (HCC): Primary | ICD-10-CM

## 2020-12-29 DIAGNOSIS — J45.909 ASTHMA WITHOUT STATUS ASTHMATICUS WITHOUT COMPLICATION, UNSPECIFIED ASTHMA SEVERITY, UNSPECIFIED WHETHER PERSISTENT: ICD-10-CM

## 2020-12-29 DIAGNOSIS — N18.32 STAGE 3B CHRONIC KIDNEY DISEASE (HCC): ICD-10-CM

## 2020-12-29 DIAGNOSIS — M54.50 LOW BACK PAIN, UNSPECIFIED BACK PAIN LATERALITY, UNSPECIFIED CHRONICITY, UNSPECIFIED WHETHER SCIATICA PRESENT: ICD-10-CM

## 2020-12-29 DIAGNOSIS — E03.9 HYPOTHYROIDISM, UNSPECIFIED TYPE: ICD-10-CM

## 2020-12-29 PROCEDURE — 99214 OFFICE O/P EST MOD 30 MIN: CPT | Performed by: INTERNAL MEDICINE

## 2020-12-29 PROCEDURE — T1015 CLINIC SERVICE: HCPCS | Performed by: INTERNAL MEDICINE

## 2020-12-29 RX ORDER — CYCLOBENZAPRINE HCL 5 MG
5 TABLET ORAL 3 TIMES DAILY PRN
Qty: 90 TABLET | Refills: 0 | Status: CANCELLED | OUTPATIENT
Start: 2020-12-29

## 2020-12-29 RX ORDER — BUMETANIDE 2 MG/1
2 TABLET ORAL DAILY
Qty: 30 TABLET | Refills: 5 | Status: SHIPPED | OUTPATIENT
Start: 2020-12-29 | End: 2021-05-25 | Stop reason: SDUPTHER

## 2020-12-29 RX ORDER — ATORVASTATIN CALCIUM 80 MG/1
80 TABLET, FILM COATED ORAL DAILY
Qty: 30 TABLET | Refills: 5 | Status: SHIPPED | OUTPATIENT
Start: 2020-12-29 | End: 2021-08-30 | Stop reason: SDUPTHER

## 2020-12-29 RX ORDER — ALBUTEROL SULFATE 90 UG/1
2 AEROSOL, METERED RESPIRATORY (INHALATION) 4 TIMES DAILY
Qty: 1 INHALER | Refills: 5 | Status: CANCELLED | OUTPATIENT
Start: 2020-12-29

## 2020-12-29 RX ORDER — METOPROLOL SUCCINATE 100 MG/1
100 TABLET, EXTENDED RELEASE ORAL DAILY
Qty: 30 TABLET | Refills: 5 | Status: SHIPPED | OUTPATIENT
Start: 2020-12-29 | End: 2021-01-02

## 2020-12-29 RX ORDER — INSULIN DETEMIR 100 [IU]/ML
36 INJECTION, SOLUTION SUBCUTANEOUS 2 TIMES DAILY
Qty: 20 ML | Refills: 5 | Status: CANCELLED | OUTPATIENT
Start: 2020-12-29

## 2020-12-29 RX ORDER — MONTELUKAST SODIUM 10 MG/1
10 TABLET ORAL
Qty: 30 TABLET | Refills: 5 | Status: SHIPPED | OUTPATIENT
Start: 2020-12-29 | End: 2021-05-25 | Stop reason: SDUPTHER

## 2020-12-29 RX ORDER — LOSARTAN POTASSIUM 50 MG/1
50 TABLET ORAL DAILY
Qty: 30 TABLET | Refills: 5 | Status: SHIPPED | OUTPATIENT
Start: 2020-12-29 | End: 2021-05-25 | Stop reason: SDUPTHER

## 2020-12-29 RX ORDER — FAMOTIDINE 10 MG
10 TABLET ORAL DAILY
Qty: 30 TABLET | Refills: 5 | Status: CANCELLED | OUTPATIENT
Start: 2020-12-29 | End: 2021-03-29

## 2020-12-29 RX ORDER — LEVOTHYROXINE SODIUM 88 UG/1
88 TABLET ORAL DAILY
Qty: 90 TABLET | Refills: 2 | Status: SHIPPED | OUTPATIENT
Start: 2020-12-29 | End: 2021-05-25 | Stop reason: SDUPTHER

## 2020-12-29 RX ORDER — BUDESONIDE AND FORMOTEROL FUMARATE DIHYDRATE 160; 4.5 UG/1; UG/1
2 AEROSOL RESPIRATORY (INHALATION) 2 TIMES DAILY
Qty: 3 INHALER | Refills: 1 | Status: SHIPPED | OUTPATIENT
Start: 2020-12-29 | End: 2020-12-30

## 2020-12-29 RX ORDER — FLUTICASONE PROPIONATE 50 MCG
2 SPRAY, SUSPENSION (ML) NASAL DAILY
Qty: 16 G | Refills: 3 | Status: SHIPPED | OUTPATIENT
Start: 2020-12-29 | End: 2021-05-25 | Stop reason: SDUPTHER

## 2020-12-29 RX ORDER — ALLOPURINOL 100 MG/1
200 TABLET ORAL DAILY
Qty: 60 TABLET | Refills: 5 | Status: SHIPPED | OUTPATIENT
Start: 2020-12-29 | End: 2021-10-27 | Stop reason: SDUPTHER

## 2020-12-30 RX ORDER — BUDESONIDE AND FORMOTEROL FUMARATE DIHYDRATE 160; 4.5 UG/1; UG/1
AEROSOL RESPIRATORY (INHALATION)
Qty: 10.2 INHALER | Refills: 0 | Status: SHIPPED | OUTPATIENT
Start: 2020-12-30 | End: 2021-09-27 | Stop reason: SDUPTHER

## 2020-12-31 DIAGNOSIS — I10 HYPERTENSION, UNSPECIFIED TYPE: ICD-10-CM

## 2020-12-31 DIAGNOSIS — J31.0 RHINITIS, UNSPECIFIED TYPE: ICD-10-CM

## 2021-01-01 RX ORDER — LOSARTAN POTASSIUM 50 MG/1
50 TABLET ORAL DAILY
Qty: 30 TABLET | Refills: 4 | OUTPATIENT
Start: 2021-01-01

## 2021-01-01 RX ORDER — FLUTICASONE PROPIONATE 50 MCG
SPRAY, SUSPENSION (ML) NASAL
Refills: 2 | OUTPATIENT
Start: 2021-01-01

## 2021-01-02 RX ORDER — METOPROLOL SUCCINATE 100 MG/1
100 TABLET, EXTENDED RELEASE ORAL DAILY
Qty: 30 TABLET | Refills: 4 | Status: SHIPPED | OUTPATIENT
Start: 2021-01-02 | End: 2021-05-25 | Stop reason: SDUPTHER

## 2021-01-04 DIAGNOSIS — M54.50 LOW BACK PAIN: ICD-10-CM

## 2021-01-04 RX ORDER — CYCLOBENZAPRINE HCL 5 MG
5 TABLET ORAL 3 TIMES DAILY PRN
Qty: 90 TABLET | Refills: 0 | Status: SHIPPED | OUTPATIENT
Start: 2021-01-04 | End: 2021-03-04 | Stop reason: SDUPTHER

## 2021-01-04 RX ORDER — TRAMADOL HYDROCHLORIDE 50 MG/1
50 TABLET ORAL 2 TIMES DAILY
Qty: 60 TABLET | Refills: 0 | Status: SHIPPED | OUTPATIENT
Start: 2021-01-04 | End: 2021-02-04 | Stop reason: SDUPTHER

## 2021-01-12 ENCOUNTER — OFFICE VISIT (OUTPATIENT)
Dept: PODIATRY | Facility: CLINIC | Age: 76
End: 2021-01-12
Payer: COMMERCIAL

## 2021-01-12 VITALS — WEIGHT: 140.4 LBS | HEIGHT: 60 IN | BODY MASS INDEX: 27.56 KG/M2

## 2021-01-12 DIAGNOSIS — E11.42 DIABETIC POLYNEUROPATHY ASSOCIATED WITH TYPE 2 DIABETES MELLITUS (HCC): Primary | ICD-10-CM

## 2021-01-12 PROCEDURE — 1036F TOBACCO NON-USER: CPT | Performed by: PODIATRIST

## 2021-01-12 PROCEDURE — 1160F RVW MEDS BY RX/DR IN RCRD: CPT | Performed by: PODIATRIST

## 2021-01-12 PROCEDURE — 99212 OFFICE O/P EST SF 10 MIN: CPT | Performed by: PODIATRIST

## 2021-01-12 NOTE — PROGRESS NOTES
Patient presents for palliative diabetic foot care  No acute disorder noted  Treatment consisted of nail trimming  No palpable pedal pulses

## 2021-01-21 DIAGNOSIS — Z79.4 TYPE 2 DIABETES MELLITUS WITH COMPLICATION, WITH LONG-TERM CURRENT USE OF INSULIN (HCC): ICD-10-CM

## 2021-01-21 DIAGNOSIS — E11.8 TYPE 2 DIABETES MELLITUS WITH COMPLICATION, WITH LONG-TERM CURRENT USE OF INSULIN (HCC): ICD-10-CM

## 2021-01-21 RX ORDER — BLOOD SUGAR DIAGNOSTIC
STRIP MISCELLANEOUS
Refills: 5 | OUTPATIENT
Start: 2021-01-21

## 2021-02-04 DIAGNOSIS — M54.50 LOW BACK PAIN: ICD-10-CM

## 2021-02-04 NOTE — TELEPHONE ENCOUNTER
Med Refill Request    MED: traMADol (ULTRAM) 50 mg tablet  SUPPLY: 30 day  PHARMACY: [facility]

## 2021-02-05 RX ORDER — TRAMADOL HYDROCHLORIDE 50 MG/1
50 TABLET ORAL 2 TIMES DAILY
Qty: 60 TABLET | Refills: 0 | Status: SHIPPED | OUTPATIENT
Start: 2021-02-05 | End: 2021-03-04 | Stop reason: SDUPTHER

## 2021-02-16 LAB
ALBUMIN/CREAT UR: 258 MCG/MG CREAT
BUN SERPL-MCNC: 49 MG/DL (ref 7–25)
BUN/CREAT SERPL: 37 (CALC) (ref 6–22)
CALCIUM SERPL-MCNC: 9.9 MG/DL (ref 8.6–10.4)
CHLORIDE SERPL-SCNC: 102 MMOL/L (ref 98–110)
CO2 SERPL-SCNC: 31 MMOL/L (ref 20–32)
CREAT SERPL-MCNC: 1.33 MG/DL (ref 0.6–0.93)
CREAT UR-MCNC: 40 MG/DL (ref 20–275)
GLUCOSE SERPL-MCNC: 236 MG/DL (ref 65–99)
MICROALBUMIN UR-MCNC: 10.3 MG/DL
POTASSIUM SERPL-SCNC: 4.9 MMOL/L (ref 3.5–5.3)
SL AMB EGFR AFRICAN AMERICAN: 45 ML/MIN/1.73M2
SL AMB EGFR NON AFRICAN AMERICAN: 38 ML/MIN/1.73M2
SODIUM SERPL-SCNC: 139 MMOL/L (ref 135–146)

## 2021-02-26 ENCOUNTER — TELEPHONE (OUTPATIENT)
Dept: INTERNAL MEDICINE CLINIC | Age: 76
End: 2021-02-26

## 2021-02-26 DIAGNOSIS — E11.8 TYPE 2 DIABETES MELLITUS WITH COMPLICATION, WITH LONG-TERM CURRENT USE OF INSULIN (HCC): ICD-10-CM

## 2021-02-26 DIAGNOSIS — Z79.4 TYPE 2 DIABETES MELLITUS WITH COMPLICATION, WITH LONG-TERM CURRENT USE OF INSULIN (HCC): ICD-10-CM

## 2021-02-26 NOTE — TELEPHONE ENCOUNTER
Received a request from [place] for the this patient for medical records to be sent to them     Sent the request to [facility] for them to process this request

## 2021-02-26 NOTE — TELEPHONE ENCOUNTER
Patient's insurance is not covering the Humlin R vials anymore  They will cover the Novolin R Vials instead with her plan  Can we send a new RX to the [facility] for her to       Please advise     Patient is aware that you are not in till Monday and does have enough of the medication     Please call her back and leave a message per patient if she doesn't answer the phone

## 2021-03-01 ENCOUNTER — IMMUNIZATIONS (OUTPATIENT)
Dept: FAMILY MEDICINE CLINIC | Facility: HOSPITAL | Age: 76
End: 2021-03-01

## 2021-03-01 DIAGNOSIS — Z23 ENCOUNTER FOR IMMUNIZATION: Primary | ICD-10-CM

## 2021-03-01 PROCEDURE — 91300 SARS-COV-2 / COVID-19 MRNA VACCINE (PFIZER-BIONTECH) 30 MCG: CPT

## 2021-03-01 PROCEDURE — 0001A SARS-COV-2 / COVID-19 MRNA VACCINE (PFIZER-BIONTECH) 30 MCG: CPT

## 2021-03-04 DIAGNOSIS — M54.50 LOW BACK PAIN: ICD-10-CM

## 2021-03-04 NOTE — TELEPHONE ENCOUNTER
Please send all refill requests to clinical pool  Will forward  Please complete and send to provider refill pool

## 2021-03-04 NOTE — TELEPHONE ENCOUNTER
stopped in to request two medications to be refilled for patient  traMADol (ULTRAM) 50 mg tablet Take 1 tablet (50 mg total) by mouth 2 (two) times a day Fill with directions from Dr Lisa Vizcaino and cyclobenzaprine (FLEXERIL) 5 mg tablet Take 1 tablet (5 mg total) by mouth 3 (three) times a day as needed for muscle spasms  Please send to [address]

## 2021-03-09 RX ORDER — TRAMADOL HYDROCHLORIDE 50 MG/1
50 TABLET ORAL 2 TIMES DAILY
Qty: 60 TABLET | Refills: 0 | Status: SHIPPED | OUTPATIENT
Start: 2021-03-09 | End: 2021-04-06 | Stop reason: SDUPTHER

## 2021-03-09 RX ORDER — CYCLOBENZAPRINE HCL 5 MG
5 TABLET ORAL 3 TIMES DAILY PRN
Qty: 90 TABLET | Refills: 1 | Status: SHIPPED | OUTPATIENT
Start: 2021-03-09 | End: 2021-05-04 | Stop reason: SDUPTHER

## 2021-03-22 ENCOUNTER — IMMUNIZATIONS (OUTPATIENT)
Dept: FAMILY MEDICINE CLINIC | Facility: HOSPITAL | Age: 76
End: 2021-03-22

## 2021-03-22 DIAGNOSIS — Z23 ENCOUNTER FOR IMMUNIZATION: Primary | ICD-10-CM

## 2021-03-22 PROCEDURE — 91300 SARS-COV-2 / COVID-19 MRNA VACCINE (PFIZER-BIONTECH) 30 MCG: CPT

## 2021-03-22 PROCEDURE — 0002A SARS-COV-2 / COVID-19 MRNA VACCINE (PFIZER-BIONTECH) 30 MCG: CPT

## 2021-03-23 ENCOUNTER — OFFICE VISIT (OUTPATIENT)
Dept: PODIATRY | Facility: CLINIC | Age: 76
End: 2021-03-23
Payer: COMMERCIAL

## 2021-03-23 VITALS
DIASTOLIC BLOOD PRESSURE: 71 MMHG | WEIGHT: 143 LBS | HEART RATE: 87 BPM | BODY MASS INDEX: 28.07 KG/M2 | SYSTOLIC BLOOD PRESSURE: 156 MMHG | HEIGHT: 60 IN

## 2021-03-23 DIAGNOSIS — E11.42 DIABETIC POLYNEUROPATHY ASSOCIATED WITH TYPE 2 DIABETES MELLITUS (HCC): Primary | ICD-10-CM

## 2021-03-23 PROCEDURE — 99212 OFFICE O/P EST SF 10 MIN: CPT | Performed by: PODIATRIST

## 2021-03-23 NOTE — PROGRESS NOTES
Patient presents for palliative diabetic foot care  There are no palpable pedal pulses  Patient has a painful corn on the tip of the left 2nd toe that was trimmed  All elongated toenails were trimmed  Patient is rescheduled in 10 weeks

## 2021-03-31 ENCOUNTER — OFFICE VISIT (OUTPATIENT)
Dept: NEPHROLOGY | Facility: CLINIC | Age: 76
End: 2021-03-31
Payer: COMMERCIAL

## 2021-03-31 VITALS
DIASTOLIC BLOOD PRESSURE: 72 MMHG | RESPIRATION RATE: 16 BRPM | WEIGHT: 140 LBS | HEART RATE: 70 BPM | BODY MASS INDEX: 27.48 KG/M2 | SYSTOLIC BLOOD PRESSURE: 130 MMHG | HEIGHT: 60 IN

## 2021-03-31 DIAGNOSIS — N18.9 CHRONIC RENAL IMPAIRMENT, UNSPECIFIED CKD STAGE: Primary | ICD-10-CM

## 2021-03-31 PROCEDURE — 1036F TOBACCO NON-USER: CPT | Performed by: INTERNAL MEDICINE

## 2021-03-31 PROCEDURE — 3078F DIAST BP <80 MM HG: CPT | Performed by: INTERNAL MEDICINE

## 2021-03-31 PROCEDURE — 99214 OFFICE O/P EST MOD 30 MIN: CPT | Performed by: INTERNAL MEDICINE

## 2021-03-31 PROCEDURE — 3075F SYST BP GE 130 - 139MM HG: CPT | Performed by: INTERNAL MEDICINE

## 2021-03-31 PROCEDURE — 1160F RVW MEDS BY RX/DR IN RCRD: CPT | Performed by: INTERNAL MEDICINE

## 2021-03-31 NOTE — LETTER
March 31, 2021     Pawan Jerez MD  [address] [place] [address]    Patient: Regina Gray   YOB: 1945   Date of Visit: 3/31/2021       Dear Dr Dmitry Mojica: Thank you for referring Jose Lim to me for evaluation  Below are my notes for this consultation  If you have questions, please do not hesitate to call me  I look forward to following your patient along with you  Sincerely,        Paramjit Roca MD        CC: No Recipients  Paramjit Roca MD  3/31/2021  5:21 PM  Sign when Signing Visit  NEPHROLOGY PROGRESS NOTE    Regina Gray 66 y o  female MRN: [id number]  Unit/Bed#:  Encounter: [id number]  Reason for Consult:  Renal insufficiency    Patient is here for routine follow-up for her yearly visit with her   Patient has been doing well reports that she staying safe with COVID-19 pandemic and has received her vaccine  Other than that no hospitalizations she has monitor her sugars and they are usually running very well  We reviewed her medications  ASSESSMENT/PLAN:  1  Renal    Patient has mild renal insufficiency due to nephrosclerosis  Even though she is a diabetic she does not have diabetic nephropathy as her protein estimation is are still in the microalbumin range  Even though this once a little bit higher it still below 300 mg  Her creatinine stable at her baseline of around 1 3 and she likely just has underlying nephrosclerosis  She will continue with her current medications I encouraged her to continue with good glycemic control as her A1c is around 7%  Blood pressure is well controlled she is on losartan  We will continue to monitor on current treatment and with follow-up in 1 year at her request     Continue current medications  BMP in urine microalbumin screen in 1 year    She was told to call if there is any problems or concerns before next visit      SUBJECTIVE:  Review of Systems   Constitution: Negative for chills, fever, malaise/fatigue and night sweats  HENT: Negative  Eyes: Negative  Cardiovascular: Negative for chest pain, dyspnea on exertion, leg swelling and orthopnea  Respiratory: Negative  Negative for cough, shortness of breath, sputum production and wheezing  Gastrointestinal: Negative for abdominal pain, diarrhea, nausea and vomiting  Genitourinary: Negative for dysuria, flank pain, hematuria and incomplete emptying  Neurological: Negative for dizziness, focal weakness, headaches and weakness  Psychiatric/Behavioral: Negative for altered mental status, depression, hallucinations and hypervigilance  OBJECTIVE:  Current Weight: Weight - Scale: 63 5 kg (140 lb)  [email or web address]:     Blood pressure 130/72, pulse 70, resp  rate 16, height 5' (1 524 m), weight 63 5 kg (140 lb)  , Body mass index is 27 34 kg/m²  [unfilled]    Physical Exam: /72 (BP Location: Left arm, Patient Position: Sitting, Cuff Size: Standard)   Pulse 70   Resp 16   Ht 5' (1 524 m)   Wt 63 5 kg (140 lb)   LMP  (LMP Unknown)   BMI 27 34 kg/m²   Physical Exam  Constitutional:       General: She is not in acute distress  Appearance: She is not ill-appearing or diaphoretic  HENT:      Head: Normocephalic and atraumatic  Nose:      Comments: Mask     Mouth/Throat:      Comments: Mask  Eyes:      General: No scleral icterus  Extraocular Movements: Extraocular movements intact  Neck:      Musculoskeletal: Normal range of motion and neck supple  Cardiovascular:      Rate and Rhythm: Normal rate and regular rhythm  Heart sounds: No friction rub  No gallop  Comments: No edema  Pulmonary:      Effort: Pulmonary effort is normal  No respiratory distress  Breath sounds: No wheezing, rhonchi or rales  Abdominal:      General: Bowel sounds are normal  There is no distension  Palpations: Abdomen is soft  Tenderness: There is no abdominal tenderness  There is no rebound     Neurological: General: No focal deficit present  Mental Status: She is alert and oriented to person, place, and time  Mental status is at baseline  Psychiatric:         Mood and Affect: Mood normal          Behavior: Behavior normal          Thought Content:  Thought content normal          Judgment: Judgment normal          Medications:    Current Outpatient Medications:     albuterol (VENTOLIN HFA) 90 mcg/act inhaler, Inhale 2 puffs 4 (four) times a day, Disp: 1 Inhaler, Rfl: 5    allopurinol (ZYLOPRIM) 100 mg tablet, Take 2 tablets (200 mg total) by mouth daily, Disp: 60 tablet, Rfl: 5    ascorbic acid (VITAMIN C) 500 mg tablet, Take 1 tablet by mouth daily, Disp: , Rfl:     aspirin 81 MG tablet, Take 1 tablet by mouth daily , Disp: , Rfl:     atorvastatin (LIPITOR) 80 mg tablet, Take 1 tablet (80 mg total) by mouth daily, Disp: 30 tablet, Rfl: 5    bumetanide (BUMEX) 2 mg tablet, Take 1 tablet (2 mg total) by mouth daily, Disp: 30 tablet, Rfl: 5    Calcium Carbonate-Vit D-Min (CALCIUM 600+D PLUS MINERALS) 600-400 MG-UNIT CHEW, Chew 2 tablets daily, Disp: , Rfl:     Cholecalciferol (VITAMIN D3) 1000 units CAPS, Take 1 tablet by mouth daily, Disp: , Rfl:     clobetasol (TEMOVATE) 0 05 % ointment, Apply topically 2 (two) times a day Until skin texture normalizes another 2 months  then use three times weekly on affected area, Disp: 30 g, Rfl: 1    cyclobenzaprine (FLEXERIL) 5 mg tablet, Take 1 tablet (5 mg total) by mouth 3 (three) times a day as needed for muscle spasms, Disp: 90 tablet, Rfl: 1    famotidine (PEPCID) 10 mg tablet, Take 1 tablet (10 mg total) by mouth 2 (two) times a day for 90 days (Patient taking differently: Take 10 mg by mouth daily ), Disp: 60 tablet, Rfl: 9    fenofibrate (TRICOR) 145 mg tablet, Take 145 mg by mouth daily , Disp: , Rfl:     Ferrous Sulfate 27 MG TABS, Take 27 mg by mouth daily  , Disp: , Rfl:     fluticasone (FLONASE) 50 mcg/act nasal spray, 2 sprays into each nostril daily, Disp: 16 g, Rfl: 3    glucose blood (ONE TOUCH ULTRA TEST) test strip, Test blood sugars 3 to 4 times a day, Disp: 400 each, Rfl: 1    insulin aspart (NovoLOG) 100 units/mL injection, Inject 50 Units under the skin 2 (two) times a day with meals, Disp: , Rfl:     insulin detemir (Levemir) 100 units/mL subcutaneous injection, Inject 36 Units under the skin 2 (two) times a day, Disp: 200 Units, Rfl: 5    levothyroxine 88 mcg tablet, Take 1 tablet (88 mcg total) by mouth daily, Disp: 90 tablet, Rfl: 2    losartan (COZAAR) 50 mg tablet, Take 1 tablet (50 mg total) by mouth daily, Disp: 30 tablet, Rfl: 5    Magnesium 400 MG CAPS, Take 1 tablet by mouth daily , Disp: , Rfl:     metoprolol succinate (TOPROL-XL) 100 mg 24 hr tablet, TAKE 1 TABLET (100 MG TOTAL) BY MOUTH DAILY, Disp: 30 tablet, Rfl: 4    montelukast (SINGULAIR) 10 mg tablet, Take 1 tablet (10 mg total) by mouth daily at bedtime, Disp: 30 tablet, Rfl: 5    multivitamin (THERAGRAN) TABS, Take 1 tablet by mouth daily  , Disp: , Rfl:     nitroglycerin (NITROSTAT) 0 4 mg SL tablet, Place 1 tablet under the tongue every 5 (five) minutes as needed, Disp: , Rfl:     Omega-3 Fatty Acids (OMEGA 3 500 PO), Take 1 capsule by mouth daily, Disp: , Rfl:     ONE TOUCH LANCETS MISC, by Does not apply route daily Test 2-3 times daily, Disp: , Rfl:     sitaGLIPtin (Januvia) 50 mg tablet, Take 1 tablet (50 mg total) by mouth daily, Disp: 30 tablet, Rfl: 5    Symbicort 160-4 5 MCG/ACT inhaler, INHALE 2 PUFFS 2 (TWO) TIMES A DAY RINSE MOUTH AFTER USE , Disp: 10 2 Inhaler, Rfl: 0    traMADol (ULTRAM) 50 mg tablet, Take 1 tablet (50 mg total) by mouth 2 (two) times a day Fill with directions from Dr Simeon Vasquez, Disp: 60 tablet, Rfl: 0    TRUEplus Insulin Syringe 31G X 5/16" 0 5 ML MISC, Inject under the skin 4 (four) times a day, Disp: 200 each, Rfl: 5    Vitamin E 200 units TABS, Take 1 capsule by mouth daily, Disp: , Rfl:     ferrous gluconate (FERATE) 240 (27 FE) MG tablet, Take 1 tablet by mouth daily, Disp: , Rfl:     insulin regular (HumuLIN R,NovoLIN R) 100 units/mL injection, Inject 0 1 mL (10 Units total) under the skin 2 (two) times a day with lunch and dinner (Patient not taking: Reported on 3/31/2021), Disp: 10 mL, Rfl: 5    Laboratory Results:  Lab Results   Component Value Date    WBC 5 5 12/22/2020    HGB 11 9 12/22/2020    HCT 36 8 12/22/2020    MCV 94 8 12/22/2020     12/22/2020     Lab Results   Component Value Date    SODIUM 139 02/15/2021    K 4 9 02/15/2021     02/15/2021    CO2 31 02/15/2021    BUN 49 (H) 02/15/2021    CREATININE 1 33 (H) 02/15/2021    GLUC 236 (H) 02/15/2021    CALCIUM 9 9 02/15/2021     Lab Results   Component Value Date    CALCIUM 9 9 02/15/2021     No results found for: LABPROT

## 2021-03-31 NOTE — PROGRESS NOTES
NEPHROLOGY PROGRESS NOTE    Yinka Calderón 66 y o  female MRN: [id number]  Unit/Bed#:  Encounter: [id number]  Reason for Consult:  Renal insufficiency    Patient is here for routine follow-up for her yearly visit with her   Patient has been doing well reports that she staying safe with COVID-19 pandemic and has received her vaccine  Other than that no hospitalizations she has monitor her sugars and they are usually running very well  We reviewed her medications  ASSESSMENT/PLAN:  1  Renal    Patient has mild renal insufficiency due to nephrosclerosis  Even though she is a diabetic she does not have diabetic nephropathy as her protein estimation is are still in the microalbumin range  Even though this once a little bit higher it still below 300 mg  Her creatinine stable at her baseline of around 1 3 and she likely just has underlying nephrosclerosis  She will continue with her current medications I encouraged her to continue with good glycemic control as her A1c is around 7%  Blood pressure is well controlled she is on losartan  We will continue to monitor on current treatment and with follow-up in 1 year at her request     Continue current medications  BMP in urine microalbumin screen in 1 year    She was told to call if there is any problems or concerns before next visit  SUBJECTIVE:  Review of Systems   Constitution: Negative for chills, fever, malaise/fatigue and night sweats  HENT: Negative  Eyes: Negative  Cardiovascular: Negative for chest pain, dyspnea on exertion, leg swelling and orthopnea  Respiratory: Negative  Negative for cough, shortness of breath, sputum production and wheezing  Gastrointestinal: Negative for abdominal pain, diarrhea, nausea and vomiting  Genitourinary: Negative for dysuria, flank pain, hematuria and incomplete emptying  Neurological: Negative for dizziness, focal weakness, headaches and weakness     Psychiatric/Behavioral: Negative for altered mental status, depression, hallucinations and hypervigilance  OBJECTIVE:  Current Weight: Weight - Scale: 63 5 kg (140 lb)  [email or web address]     Blood pressure 130/72, pulse 70, resp  rate 16, height 5' (1 524 m), weight 63 5 kg (140 lb)  , Body mass index is 27 34 kg/m²  [unfilled]    Physical Exam: /72 (BP Location: Left arm, Patient Position: Sitting, Cuff Size: Standard)   Pulse 70   Resp 16   Ht 5' (1 524 m)   Wt 63 5 kg (140 lb)   LMP  (LMP Unknown)   BMI 27 34 kg/m²   Physical Exam  Constitutional:       General: She is not in acute distress  Appearance: She is not ill-appearing or diaphoretic  HENT:      Head: Normocephalic and atraumatic  Nose:      Comments: Mask     Mouth/Throat:      Comments: Mask  Eyes:      General: No scleral icterus  Extraocular Movements: Extraocular movements intact  Neck:      Musculoskeletal: Normal range of motion and neck supple  Cardiovascular:      Rate and Rhythm: Normal rate and regular rhythm  Heart sounds: No friction rub  No gallop  Comments: No edema  Pulmonary:      Effort: Pulmonary effort is normal  No respiratory distress  Breath sounds: No wheezing, rhonchi or rales  Abdominal:      General: Bowel sounds are normal  There is no distension  Palpations: Abdomen is soft  Tenderness: There is no abdominal tenderness  There is no rebound  Neurological:      General: No focal deficit present  Mental Status: She is alert and oriented to person, place, and time  Mental status is at baseline  Psychiatric:         Mood and Affect: Mood normal          Behavior: Behavior normal          Thought Content:  Thought content normal          Judgment: Judgment normal          Medications:    Current Outpatient Medications:     albuterol (VENTOLIN HFA) 90 mcg/act inhaler, Inhale 2 puffs 4 (four) times a day, Disp: 1 Inhaler, Rfl: 5    allopurinol (ZYLOPRIM) 100 mg tablet, Take 2 tablets (200 mg total) by mouth daily, Disp: 60 tablet, Rfl: 5    ascorbic acid (VITAMIN C) 500 mg tablet, Take 1 tablet by mouth daily, Disp: , Rfl:     aspirin 81 MG tablet, Take 1 tablet by mouth daily , Disp: , Rfl:     atorvastatin (LIPITOR) 80 mg tablet, Take 1 tablet (80 mg total) by mouth daily, Disp: 30 tablet, Rfl: 5    bumetanide (BUMEX) 2 mg tablet, Take 1 tablet (2 mg total) by mouth daily, Disp: 30 tablet, Rfl: 5    Calcium Carbonate-Vit D-Min (CALCIUM 600+D PLUS MINERALS) 600-400 MG-UNIT CHEW, Chew 2 tablets daily, Disp: , Rfl:     Cholecalciferol (VITAMIN D3) 1000 units CAPS, Take 1 tablet by mouth daily, Disp: , Rfl:     clobetasol (TEMOVATE) 0 05 % ointment, Apply topically 2 (two) times a day Until skin texture normalizes another 2 months  then use three times weekly on affected area, Disp: 30 g, Rfl: 1    cyclobenzaprine (FLEXERIL) 5 mg tablet, Take 1 tablet (5 mg total) by mouth 3 (three) times a day as needed for muscle spasms, Disp: 90 tablet, Rfl: 1    famotidine (PEPCID) 10 mg tablet, Take 1 tablet (10 mg total) by mouth 2 (two) times a day for 90 days (Patient taking differently: Take 10 mg by mouth daily ), Disp: 60 tablet, Rfl: 9    fenofibrate (TRICOR) 145 mg tablet, Take 145 mg by mouth daily , Disp: , Rfl:     Ferrous Sulfate 27 MG TABS, Take 27 mg by mouth daily  , Disp: , Rfl:     fluticasone (FLONASE) 50 mcg/act nasal spray, 2 sprays into each nostril daily, Disp: 16 g, Rfl: 3    glucose blood (ONE TOUCH ULTRA TEST) test strip, Test blood sugars 3 to 4 times a day, Disp: 400 each, Rfl: 1    insulin aspart (NovoLOG) 100 units/mL injection, Inject 50 Units under the skin 2 (two) times a day with meals, Disp: , Rfl:     insulin detemir (Levemir) 100 units/mL subcutaneous injection, Inject 36 Units under the skin 2 (two) times a day, Disp: 200 Units, Rfl: 5    levothyroxine 88 mcg tablet, Take 1 tablet (88 mcg total) by mouth daily, Disp: 90 tablet, Rfl: 2    losartan (COZAAR) 50 mg tablet, Take 1 tablet (50 mg total) by mouth daily, Disp: 30 tablet, Rfl: 5    Magnesium 400 MG CAPS, Take 1 tablet by mouth daily , Disp: , Rfl:     metoprolol succinate (TOPROL-XL) 100 mg 24 hr tablet, TAKE 1 TABLET (100 MG TOTAL) BY MOUTH DAILY, Disp: 30 tablet, Rfl: 4    montelukast (SINGULAIR) 10 mg tablet, Take 1 tablet (10 mg total) by mouth daily at bedtime, Disp: 30 tablet, Rfl: 5    multivitamin (THERAGRAN) TABS, Take 1 tablet by mouth daily  , Disp: , Rfl:     nitroglycerin (NITROSTAT) 0 4 mg SL tablet, Place 1 tablet under the tongue every 5 (five) minutes as needed, Disp: , Rfl:     Omega-3 Fatty Acids (OMEGA 3 500 PO), Take 1 capsule by mouth daily, Disp: , Rfl:     ONE TOUCH LANCETS MISC, by Does not apply route daily Test 2-3 times daily, Disp: , Rfl:     sitaGLIPtin (Januvia) 50 mg tablet, Take 1 tablet (50 mg total) by mouth daily, Disp: 30 tablet, Rfl: 5    Symbicort 160-4 5 MCG/ACT inhaler, INHALE 2 PUFFS 2 (TWO) TIMES A DAY RINSE MOUTH AFTER USE , Disp: 10 2 Inhaler, Rfl: 0    traMADol (ULTRAM) 50 mg tablet, Take 1 tablet (50 mg total) by mouth 2 (two) times a day Fill with directions from Dr Onur Campbell, Disp: 60 tablet, Rfl: 0    TRUEplus Insulin Syringe 31G X 5/16" 0 5 ML MISC, Inject under the skin 4 (four) times a day, Disp: 200 each, Rfl: 5    Vitamin E 200 units TABS, Take 1 capsule by mouth daily, Disp: , Rfl:     ferrous gluconate (FERATE) 240 (27 FE) MG tablet, Take 1 tablet by mouth daily, Disp: , Rfl:     insulin regular (HumuLIN R,NovoLIN R) 100 units/mL injection, Inject 0 1 mL (10 Units total) under the skin 2 (two) times a day with lunch and dinner (Patient not taking: Reported on 3/31/2021), Disp: 10 mL, Rfl: 5    Laboratory Results:  Lab Results   Component Value Date    WBC 5 5 12/22/2020    HGB 11 9 12/22/2020    HCT 36 8 12/22/2020    MCV 94 8 12/22/2020     12/22/2020     Lab Results   Component Value Date    SODIUM 139 02/15/2021    K 4 9 02/15/2021    CL 102 02/15/2021    CO2 31 02/15/2021    BUN 49 (H) 02/15/2021    CREATININE 1 33 (H) 02/15/2021    GLUC 236 (H) 02/15/2021    CALCIUM 9 9 02/15/2021     Lab Results   Component Value Date    CALCIUM 9 9 02/15/2021     No results found for: LABPROT

## 2021-03-31 NOTE — PATIENT INSTRUCTIONS
You are here for follow-up for your yearly visit  You look great and glad to hear that her health has been good there has been no other illnesses or changes  I reviewed her medications no changes  Your doing well with her blood sugar control because the A1c was around 7%  With respect your kidney function the creatinine which is the blood test is 1 3 which is stable so it is not worse  Normal is a 1 so it is not very bad  I do not think that you have diabetic kidney disease as we discussed because you have low levels of protein in the urine which is a good thing  So for now continue with sugar control blood pressure control lipid treatment and we will monitor this yearly  Labs and follow-up as scheduled  Please call if you have any questions or concerns before the next visit

## 2021-03-31 NOTE — LETTER
March 31, 2021     Tamanna Nation MD  [address] [place] [address]    Patient: Carrie Tovar   YOB: 1945   Date of Visit: 3/31/2021       Dear Dr Carlos Valles: Thank you for referring Curtis Rubio to me for evaluation  Below are my notes for this consultation  If you have questions, please do not hesitate to call me  I look forward to following your patient along with you  Sincerely,        Ethan Lara MD        CC: No Recipients  Ethan Lara MD  3/31/2021  5:21 PM  Sign when Signing Visit  NEPHROLOGY PROGRESS NOTE    Carrie Tovar 66 y o  female MRN: [id number]  Unit/Bed#:  Encounter: [id number]  Reason for Consult:  Renal insufficiency    Patient is here for routine follow-up for her yearly visit with her   Patient has been doing well reports that she staying safe with COVID-19 pandemic and has received her vaccine  Other than that no hospitalizations she has monitor her sugars and they are usually running very well  We reviewed her medications  ASSESSMENT/PLAN:  1  Renal    Patient has mild renal insufficiency due to nephrosclerosis  Even though she is a diabetic she does not have diabetic nephropathy as her protein estimation is are still in the microalbumin range  Even though this once a little bit higher it still below 300 mg  Her creatinine stable at her baseline of around 1 3 and she likely just has underlying nephrosclerosis  She will continue with her current medications I encouraged her to continue with good glycemic control as her A1c is around 7%  Blood pressure is well controlled she is on losartan  We will continue to monitor on current treatment and with follow-up in 1 year at her request     Continue current medications  BMP in urine microalbumin screen in 1 year    She was told to call if there is any problems or concerns before next visit      SUBJECTIVE:  Review of Systems   Constitution: Negative for chills, fever, malaise/fatigue and night sweats  HENT: Negative  Eyes: Negative  Cardiovascular: Negative for chest pain, dyspnea on exertion, leg swelling and orthopnea  Respiratory: Negative  Negative for cough, shortness of breath, sputum production and wheezing  Gastrointestinal: Negative for abdominal pain, diarrhea, nausea and vomiting  Genitourinary: Negative for dysuria, flank pain, hematuria and incomplete emptying  Neurological: Negative for dizziness, focal weakness, headaches and weakness  Psychiatric/Behavioral: Negative for altered mental status, depression, hallucinations and hypervigilance  OBJECTIVE:  Current Weight: Weight - Scale: 63 5 kg (140 lb)  [email or web address] com:     Blood pressure 130/72, pulse 70, resp  rate 16, height 5' (1 524 m), weight 63 5 kg (140 lb)  , Body mass index is 27 34 kg/m²  [unfilled]    Physical Exam: /72 (BP Location: Left arm, Patient Position: Sitting, Cuff Size: Standard)   Pulse 70   Resp 16   Ht 5' (1 524 m)   Wt 63 5 kg (140 lb)   LMP  (LMP Unknown)   BMI 27 34 kg/m²   Physical Exam  Constitutional:       General: She is not in acute distress  Appearance: She is not ill-appearing or diaphoretic  HENT:      Head: Normocephalic and atraumatic  Nose:      Comments: Mask     Mouth/Throat:      Comments: Mask  Eyes:      General: No scleral icterus  Extraocular Movements: Extraocular movements intact  Neck:      Musculoskeletal: Normal range of motion and neck supple  Cardiovascular:      Rate and Rhythm: Normal rate and regular rhythm  Heart sounds: No friction rub  No gallop  Comments: No edema  Pulmonary:      Effort: Pulmonary effort is normal  No respiratory distress  Breath sounds: No wheezing, rhonchi or rales  Abdominal:      General: Bowel sounds are normal  There is no distension  Palpations: Abdomen is soft  Tenderness: There is no abdominal tenderness  There is no rebound     Neurological: General: No focal deficit present  Mental Status: She is alert and oriented to person, place, and time  Mental status is at baseline  Psychiatric:         Mood and Affect: Mood normal          Behavior: Behavior normal          Thought Content:  Thought content normal          Judgment: Judgment normal          Medications:    Current Outpatient Medications:     albuterol (VENTOLIN HFA) 90 mcg/act inhaler, Inhale 2 puffs 4 (four) times a day, Disp: 1 Inhaler, Rfl: 5    allopurinol (ZYLOPRIM) 100 mg tablet, Take 2 tablets (200 mg total) by mouth daily, Disp: 60 tablet, Rfl: 5    ascorbic acid (VITAMIN C) 500 mg tablet, Take 1 tablet by mouth daily, Disp: , Rfl:     aspirin 81 MG tablet, Take 1 tablet by mouth daily , Disp: , Rfl:     atorvastatin (LIPITOR) 80 mg tablet, Take 1 tablet (80 mg total) by mouth daily, Disp: 30 tablet, Rfl: 5    bumetanide (BUMEX) 2 mg tablet, Take 1 tablet (2 mg total) by mouth daily, Disp: 30 tablet, Rfl: 5    Calcium Carbonate-Vit D-Min (CALCIUM 600+D PLUS MINERALS) 600-400 MG-UNIT CHEW, Chew 2 tablets daily, Disp: , Rfl:     Cholecalciferol (VITAMIN D3) 1000 units CAPS, Take 1 tablet by mouth daily, Disp: , Rfl:     clobetasol (TEMOVATE) 0 05 % ointment, Apply topically 2 (two) times a day Until skin texture normalizes another 2 months  then use three times weekly on affected area, Disp: 30 g, Rfl: 1    cyclobenzaprine (FLEXERIL) 5 mg tablet, Take 1 tablet (5 mg total) by mouth 3 (three) times a day as needed for muscle spasms, Disp: 90 tablet, Rfl: 1    famotidine (PEPCID) 10 mg tablet, Take 1 tablet (10 mg total) by mouth 2 (two) times a day for 90 days (Patient taking differently: Take 10 mg by mouth daily ), Disp: 60 tablet, Rfl: 9    fenofibrate (TRICOR) 145 mg tablet, Take 145 mg by mouth daily , Disp: , Rfl:     Ferrous Sulfate 27 MG TABS, Take 27 mg by mouth daily  , Disp: , Rfl:     fluticasone (FLONASE) 50 mcg/act nasal spray, 2 sprays into each nostril daily, Disp: 16 g, Rfl: 3    glucose blood (ONE TOUCH ULTRA TEST) test strip, Test blood sugars 3 to 4 times a day, Disp: 400 each, Rfl: 1    insulin aspart (NovoLOG) 100 units/mL injection, Inject 50 Units under the skin 2 (two) times a day with meals, Disp: , Rfl:     insulin detemir (Levemir) 100 units/mL subcutaneous injection, Inject 36 Units under the skin 2 (two) times a day, Disp: 200 Units, Rfl: 5    levothyroxine 88 mcg tablet, Take 1 tablet (88 mcg total) by mouth daily, Disp: 90 tablet, Rfl: 2    losartan (COZAAR) 50 mg tablet, Take 1 tablet (50 mg total) by mouth daily, Disp: 30 tablet, Rfl: 5    Magnesium 400 MG CAPS, Take 1 tablet by mouth daily , Disp: , Rfl:     metoprolol succinate (TOPROL-XL) 100 mg 24 hr tablet, TAKE 1 TABLET (100 MG TOTAL) BY MOUTH DAILY, Disp: 30 tablet, Rfl: 4    montelukast (SINGULAIR) 10 mg tablet, Take 1 tablet (10 mg total) by mouth daily at bedtime, Disp: 30 tablet, Rfl: 5    multivitamin (THERAGRAN) TABS, Take 1 tablet by mouth daily  , Disp: , Rfl:     nitroglycerin (NITROSTAT) 0 4 mg SL tablet, Place 1 tablet under the tongue every 5 (five) minutes as needed, Disp: , Rfl:     Omega-3 Fatty Acids (OMEGA 3 500 PO), Take 1 capsule by mouth daily, Disp: , Rfl:     ONE TOUCH LANCETS MISC, by Does not apply route daily Test 2-3 times daily, Disp: , Rfl:     sitaGLIPtin (Januvia) 50 mg tablet, Take 1 tablet (50 mg total) by mouth daily, Disp: 30 tablet, Rfl: 5    Symbicort 160-4 5 MCG/ACT inhaler, INHALE 2 PUFFS 2 (TWO) TIMES A DAY RINSE MOUTH AFTER USE , Disp: 10 2 Inhaler, Rfl: 0    traMADol (ULTRAM) 50 mg tablet, Take 1 tablet (50 mg total) by mouth 2 (two) times a day Fill with directions from Dr Deedee Mishra, Disp: 60 tablet, Rfl: 0    TRUEplus Insulin Syringe 31G X 5/16" 0 5 ML MISC, Inject under the skin 4 (four) times a day, Disp: 200 each, Rfl: 5    Vitamin E 200 units TABS, Take 1 capsule by mouth daily, Disp: , Rfl:     ferrous gluconate (FERATE) 240 (27 FE) MG tablet, Take 1 tablet by mouth daily, Disp: , Rfl:     insulin regular (HumuLIN R,NovoLIN R) 100 units/mL injection, Inject 0 1 mL (10 Units total) under the skin 2 (two) times a day with lunch and dinner (Patient not taking: Reported on 3/31/2021), Disp: 10 mL, Rfl: 5    Laboratory Results:  Lab Results   Component Value Date    WBC 5 5 12/22/2020    HGB 11 9 12/22/2020    HCT 36 8 12/22/2020    MCV 94 8 12/22/2020     12/22/2020     Lab Results   Component Value Date    SODIUM 139 02/15/2021    K 4 9 02/15/2021     02/15/2021    CO2 31 02/15/2021    BUN 49 (H) 02/15/2021    CREATININE 1 33 (H) 02/15/2021    GLUC 236 (H) 02/15/2021    CALCIUM 9 9 02/15/2021     Lab Results   Component Value Date    CALCIUM 9 9 02/15/2021     No results found for: LABPROT

## 2021-04-05 DIAGNOSIS — M54.50 LOW BACK PAIN: ICD-10-CM

## 2021-04-05 NOTE — TELEPHONE ENCOUNTER
Patient needs refill on tramadol 50 mg and cyclobenzapine 5 mg to be sent to [facility]

## 2021-04-06 RX ORDER — TRAMADOL HYDROCHLORIDE 50 MG/1
50 TABLET ORAL 2 TIMES DAILY
Qty: 60 TABLET | Refills: 0 | Status: SHIPPED | OUTPATIENT
Start: 2021-04-06 | End: 2021-05-04 | Stop reason: SDUPTHER

## 2021-05-04 DIAGNOSIS — M54.50 LOW BACK PAIN: ICD-10-CM

## 2021-05-04 DIAGNOSIS — Z79.4 TYPE 2 DIABETES MELLITUS WITH COMPLICATION, WITH LONG-TERM CURRENT USE OF INSULIN (HCC): ICD-10-CM

## 2021-05-04 DIAGNOSIS — E11.8 TYPE 2 DIABETES MELLITUS WITH COMPLICATION, WITH LONG-TERM CURRENT USE OF INSULIN (HCC): ICD-10-CM

## 2021-05-04 RX ORDER — SITAGLIPTIN 50 MG/1
TABLET, FILM COATED ORAL
Qty: 30 TABLET | Refills: 4 | OUTPATIENT
Start: 2021-05-04

## 2021-05-04 RX ORDER — TRAMADOL HYDROCHLORIDE 50 MG/1
50 TABLET ORAL 2 TIMES DAILY
Qty: 60 TABLET | Refills: 0 | Status: SHIPPED | OUTPATIENT
Start: 2021-05-04 | End: 2021-06-02 | Stop reason: SDUPTHER

## 2021-05-04 RX ORDER — CYCLOBENZAPRINE HCL 5 MG
5 TABLET ORAL 3 TIMES DAILY PRN
Qty: 90 TABLET | Refills: 1 | Status: SHIPPED | OUTPATIENT
Start: 2021-05-04 | End: 2021-07-01 | Stop reason: SDUPTHER

## 2021-05-18 ENCOUNTER — RA CDI HCC (OUTPATIENT)
Dept: OTHER | Facility: HOSPITAL | Age: 76
End: 2021-05-18

## 2021-05-18 NOTE — PROGRESS NOTES
[place] [id number]  coding opportunities             Chart reviewed, (number of) suggestions sent to provider: 5     Problem listed updated   Provider Accepted, (number of) suggestions accepted: 5        Patients insurance company: [employer] (Medicare Advantage and Commercial)   all dx sent are on the bill  Visit status: Patient arrived for their scheduled appointment        [facility]  coding opportunities             Chart reviewed, (number of) suggestions sent to provider: 5      DX:  E11 22-Type 2 diabetes mellitus with diabetic chronic kidney disease  N18 32-Chronic kidney disease, stage 3b-Gfr-[id number]  E11 42-Type 2 diabetes mellitus with diabetic polyneuropathy-per podiatry notes  Z79 4-Long term (current) use of insulin  E11 3553-Type 2 diabetes mellitus with stable proliferative diabetic retinopathy, bilateral-per opth note Dec-2020       Patients insurance company: [employer] ([employer])

## 2021-05-19 LAB
BUN SERPL-MCNC: 50 MG/DL (ref 7–25)
BUN/CREAT SERPL: 41 (CALC) (ref 6–22)
CALCIUM SERPL-MCNC: 10 MG/DL (ref 8.6–10.4)
CHLORIDE SERPL-SCNC: 103 MMOL/L (ref 98–110)
CHOLEST SERPL-MCNC: 132 MG/DL
CHOLEST/HDLC SERPL: 2.6 (CALC)
CO2 SERPL-SCNC: 30 MMOL/L (ref 20–32)
CREAT SERPL-MCNC: 1.21 MG/DL (ref 0.6–0.93)
GLUCOSE SERPL-MCNC: 158 MG/DL (ref 65–99)
HBA1C MFR BLD: 8.3 % OF TOTAL HGB
HDLC SERPL-MCNC: 50 MG/DL
LDLC SERPL CALC-MCNC: 55 MG/DL (CALC)
NONHDLC SERPL-MCNC: 82 MG/DL (CALC)
POTASSIUM SERPL-SCNC: 5 MMOL/L (ref 3.5–5.3)
SL AMB EGFR AFRICAN AMERICAN: 50 ML/MIN/1.73M2
SL AMB EGFR NON AFRICAN AMERICAN: 43 ML/MIN/1.73M2
SODIUM SERPL-SCNC: 140 MMOL/L (ref 135–146)
TRIGL SERPL-MCNC: 203 MG/DL
TSH SERPL-ACNC: 3.87 MIU/L (ref 0.4–4.5)

## 2021-05-20 PROBLEM — E11.22 TYPE 2 DIABETES MELLITUS WITH DIABETIC CHRONIC KIDNEY DISEASE (HCC): Status: ACTIVE | Noted: 2021-05-20

## 2021-05-20 PROBLEM — E11.42 TYPE 2 DIABETES MELLITUS WITH DIABETIC POLYNEUROPATHY (HCC): Status: ACTIVE | Noted: 2021-05-20

## 2021-05-20 PROBLEM — Z79.4 LONG TERM (CURRENT) USE OF INSULIN (HCC): Status: ACTIVE | Noted: 2021-05-20

## 2021-05-20 PROBLEM — E11.3553: Status: ACTIVE | Noted: 2021-05-20

## 2021-05-20 PROBLEM — N18.32 CHRONIC KIDNEY DISEASE, STAGE 3B (HCC): Status: ACTIVE | Noted: 2021-05-20

## 2021-05-25 ENCOUNTER — OFFICE VISIT (OUTPATIENT)
Dept: INTERNAL MEDICINE CLINIC | Facility: CLINIC | Age: 76
End: 2021-05-25
Payer: COMMERCIAL

## 2021-05-25 VITALS
WEIGHT: 141.2 LBS | HEART RATE: 79 BPM | BODY MASS INDEX: 27.72 KG/M2 | OXYGEN SATURATION: 98 % | DIASTOLIC BLOOD PRESSURE: 60 MMHG | HEIGHT: 60 IN | TEMPERATURE: 97.7 F | SYSTOLIC BLOOD PRESSURE: 144 MMHG

## 2021-05-25 DIAGNOSIS — I10 HYPERTENSION, UNSPECIFIED TYPE: ICD-10-CM

## 2021-05-25 DIAGNOSIS — Z79.4 TYPE 2 DIABETES MELLITUS WITH DIABETIC POLYNEUROPATHY, WITH LONG-TERM CURRENT USE OF INSULIN (HCC): ICD-10-CM

## 2021-05-25 DIAGNOSIS — J45.41 MODERATE PERSISTENT ASTHMA WITH ACUTE EXACERBATION: ICD-10-CM

## 2021-05-25 DIAGNOSIS — E11.8 TYPE 2 DIABETES MELLITUS WITH COMPLICATION, WITH LONG-TERM CURRENT USE OF INSULIN (HCC): ICD-10-CM

## 2021-05-25 DIAGNOSIS — Z00.00 MEDICARE ANNUAL WELLNESS VISIT, SUBSEQUENT: ICD-10-CM

## 2021-05-25 DIAGNOSIS — Z79.4 TYPE 2 DIABETES MELLITUS WITH COMPLICATION, WITH LONG-TERM CURRENT USE OF INSULIN (HCC): ICD-10-CM

## 2021-05-25 DIAGNOSIS — Z12.11 ENCOUNTER FOR SCREENING FOR MALIGNANT NEOPLASM OF COLON: Primary | ICD-10-CM

## 2021-05-25 DIAGNOSIS — E11.22 TYPE 2 DIABETES MELLITUS WITH STAGE 3B CHRONIC KIDNEY DISEASE, WITH LONG-TERM CURRENT USE OF INSULIN (HCC): ICD-10-CM

## 2021-05-25 DIAGNOSIS — Z79.4 TYPE 2 DIABETES MELLITUS WITH STABLE PROLIFERATIVE RETINOPATHY OF BOTH EYES, WITH LONG-TERM CURRENT USE OF INSULIN (HCC): ICD-10-CM

## 2021-05-25 DIAGNOSIS — Z79.4 TYPE 2 DIABETES MELLITUS WITH STAGE 3B CHRONIC KIDNEY DISEASE, WITH LONG-TERM CURRENT USE OF INSULIN (HCC): ICD-10-CM

## 2021-05-25 DIAGNOSIS — R80.9 MICROALBUMINURIA: ICD-10-CM

## 2021-05-25 DIAGNOSIS — N18.32 TYPE 2 DIABETES MELLITUS WITH STAGE 3B CHRONIC KIDNEY DISEASE, WITH LONG-TERM CURRENT USE OF INSULIN (HCC): ICD-10-CM

## 2021-05-25 DIAGNOSIS — N18.32 STAGE 3B CHRONIC KIDNEY DISEASE (HCC): ICD-10-CM

## 2021-05-25 DIAGNOSIS — M54.50 LOW BACK PAIN, UNSPECIFIED BACK PAIN LATERALITY, UNSPECIFIED CHRONICITY, UNSPECIFIED WHETHER SCIATICA PRESENT: ICD-10-CM

## 2021-05-25 DIAGNOSIS — J45.909 ASTHMA WITHOUT STATUS ASTHMATICUS WITHOUT COMPLICATION, UNSPECIFIED ASTHMA SEVERITY, UNSPECIFIED WHETHER PERSISTENT: ICD-10-CM

## 2021-05-25 DIAGNOSIS — E11.3553 TYPE 2 DIABETES MELLITUS WITH STABLE PROLIFERATIVE RETINOPATHY OF BOTH EYES, WITH LONG-TERM CURRENT USE OF INSULIN (HCC): ICD-10-CM

## 2021-05-25 DIAGNOSIS — E03.9 HYPOTHYROIDISM, UNSPECIFIED TYPE: ICD-10-CM

## 2021-05-25 DIAGNOSIS — E11.42 TYPE 2 DIABETES MELLITUS WITH DIABETIC POLYNEUROPATHY, WITH LONG-TERM CURRENT USE OF INSULIN (HCC): ICD-10-CM

## 2021-05-25 DIAGNOSIS — Z86.010 HISTORY OF COLON POLYPS: ICD-10-CM

## 2021-05-25 DIAGNOSIS — E78.2 MIXED HYPERLIPIDEMIA: ICD-10-CM

## 2021-05-25 DIAGNOSIS — J31.0 RHINITIS, UNSPECIFIED TYPE: ICD-10-CM

## 2021-05-25 PROCEDURE — G0439 PPPS, SUBSEQ VISIT: HCPCS | Performed by: INTERNAL MEDICINE

## 2021-05-25 PROCEDURE — 99214 OFFICE O/P EST MOD 30 MIN: CPT | Performed by: INTERNAL MEDICINE

## 2021-05-25 PROCEDURE — 1125F AMNT PAIN NOTED PAIN PRSNT: CPT | Performed by: INTERNAL MEDICINE

## 2021-05-25 PROCEDURE — 3078F DIAST BP <80 MM HG: CPT | Performed by: INTERNAL MEDICINE

## 2021-05-25 PROCEDURE — 3725F SCREEN DEPRESSION PERFORMED: CPT | Performed by: INTERNAL MEDICINE

## 2021-05-25 PROCEDURE — 3288F FALL RISK ASSESSMENT DOCD: CPT | Performed by: INTERNAL MEDICINE

## 2021-05-25 PROCEDURE — 3077F SYST BP >= 140 MM HG: CPT | Performed by: INTERNAL MEDICINE

## 2021-05-25 PROCEDURE — 1170F FXNL STATUS ASSESSED: CPT | Performed by: INTERNAL MEDICINE

## 2021-05-25 RX ORDER — LEVOTHYROXINE SODIUM 88 UG/1
88 TABLET ORAL DAILY
Qty: 30 TABLET | Refills: 5 | Status: SHIPPED | OUTPATIENT
Start: 2021-05-25 | End: 2021-08-30 | Stop reason: SDUPTHER

## 2021-05-25 RX ORDER — MONTELUKAST SODIUM 10 MG/1
10 TABLET ORAL
Qty: 30 TABLET | Refills: 5 | Status: SHIPPED | OUTPATIENT
Start: 2021-05-25 | End: 2022-02-01 | Stop reason: SDUPTHER

## 2021-05-25 RX ORDER — ALBUTEROL SULFATE 90 UG/1
2 AEROSOL, METERED RESPIRATORY (INHALATION) 4 TIMES DAILY
Qty: 18 G | Refills: 5 | Status: SHIPPED | OUTPATIENT
Start: 2021-05-25

## 2021-05-25 RX ORDER — FLUTICASONE PROPIONATE 50 MCG
2 SPRAY, SUSPENSION (ML) NASAL DAILY
Qty: 16 G | Refills: 3 | Status: SHIPPED | OUTPATIENT
Start: 2021-05-25 | End: 2021-09-27 | Stop reason: SDUPTHER

## 2021-05-25 RX ORDER — METOPROLOL SUCCINATE 100 MG/1
100 TABLET, EXTENDED RELEASE ORAL DAILY
Qty: 30 TABLET | Refills: 5 | Status: SHIPPED | OUTPATIENT
Start: 2021-05-25 | End: 2022-02-01 | Stop reason: SDUPTHER

## 2021-05-25 RX ORDER — LOSARTAN POTASSIUM 50 MG/1
50 TABLET ORAL DAILY
Qty: 30 TABLET | Refills: 5 | Status: SHIPPED | OUTPATIENT
Start: 2021-05-25 | End: 2021-12-27 | Stop reason: SDUPTHER

## 2021-05-25 RX ORDER — FENOFIBRATE 67 MG/1
CAPSULE ORAL
COMMUNITY
Start: 2021-05-11

## 2021-05-25 RX ORDER — INSULIN DETEMIR 100 [IU]/ML
40 INJECTION, SOLUTION SUBCUTANEOUS EVERY 12 HOURS SCHEDULED
Qty: 10 ML | Refills: 5 | Status: SHIPPED | OUTPATIENT
Start: 2021-05-25 | End: 2022-02-01 | Stop reason: SDUPTHER

## 2021-05-25 RX ORDER — BUMETANIDE 2 MG/1
2 TABLET ORAL DAILY
Qty: 30 TABLET | Refills: 5 | Status: SHIPPED | OUTPATIENT
Start: 2021-05-25 | End: 2021-12-30 | Stop reason: SDUPTHER

## 2021-05-25 RX ORDER — SYRINGE-NEEDLE,INSULIN,0.5 ML 31 GX5/16"
SYRINGE, EMPTY DISPOSABLE MISCELLANEOUS 4 TIMES DAILY
Qty: 200 EACH | Refills: 5 | Status: SHIPPED | OUTPATIENT
Start: 2021-05-25 | End: 2022-06-08 | Stop reason: SDUPTHER
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
## 2021-05-25 NOTE — PATIENT INSTRUCTIONS
Medicare Preventive Visit Patient Instructions  Thank you for completing your Welcome to Medicare Visit or Medicare Annual Wellness Visit today  Your next wellness visit will be due in one year (5/26/2022)  The screening/preventive services that you may require over the next 5-10 years are detailed below  Some tests may not apply to you based off risk factors and/or age  Screening tests ordered at today's visit but not completed yet may show as past due  Also, please note that scanned in results may not display below  Preventive Screenings:  Service Recommendations Previous Testing/Comments   Colorectal Cancer Screening  * Colonoscopy    * Fecal Occult Blood Test (FOBT)/Fecal Immunochemical Test (FIT)  * Fecal DNA/Cologuard Test  * Flexible Sigmoidoscopy Age: 54-65 years old   Colonoscopy: every 10 years (may be performed more frequently if at higher risk)  OR  FOBT/FIT: every 1 year  OR  Cologuard: every 3 years  OR  Sigmoidoscopy: every 5 years  Screening may be recommended earlier than age 48 if at higher risk for colorectal cancer  Also, an individualized decision between you and your healthcare provider will decide whether screening between the ages of 74-80 would be appropriate  Colonoscopy: 12/22/2015  FOBT/FIT: Not on file  Cologuard: Not on file  Sigmoidoscopy: Not on file          Breast Cancer Screening Age: 36 years old  Frequency: every 1-2 years  Not required if history of left and right mastectomy Mammogram: 08/31/2020    Screening Current   Cervical Cancer Screening Between the ages of 21-29, pap smear recommended once every 3 years  Between the ages of 33-67, can perform pap smear with HPV co-testing every 5 years     Recommendations may differ for women with a history of total hysterectomy, cervical cancer, or abnormal pap smears in past  Pap Smear: 08/28/2018    Screening Not Indicated   Hepatitis C Screening Once for adults born between 1945 and 1965  More frequently in patients at high risk for Hepatitis C Hep C Antibody: Not on file        Diabetes Screening 1-2 times per year if you're at risk for diabetes or have pre-diabetes Fasting glucose: No results in last 5 years   A1C: 8 3 % of total Hgb    Screening Not Indicated  History Diabetes   Cholesterol Screening Once every 5 years if you don't have a lipid disorder  May order more often based on risk factors  Lipid panel: 05/18/2021    Screening Not Indicated  History Lipid Disorder     Other Preventive Screenings Covered by Medicare:  1  Abdominal Aortic Aneurysm (AAA) Screening: covered once if your at risk  You're considered to be at risk if you have a family history of AAA  2  Lung Cancer Screening: covers low dose CT scan once per year if you meet all of the following conditions: (1) Age 50-69; (2) No signs or symptoms of lung cancer; (3) Current smoker or have quit smoking within the last 15 years; (4) You have a tobacco smoking history of at least 30 pack years (packs per day multiplied by number of years you smoked); (5) You get a written order from a healthcare provider  3  Glaucoma Screening: covered annually if you're considered high risk: (1) You have diabetes OR (2) Family history of glaucoma OR (3)  aged 48 and older OR (3)  American aged 72 and older  3  Osteoporosis Screening: covered every 2 years if you meet one of the following conditions: (1) You're estrogen deficient and at risk for osteoporosis based off medical history and other findings; (2) Have a vertebral abnormality; (3) On glucocorticoid therapy for more than 3 months; (4) Have primary hyperparathyroidism; (5) On osteoporosis medications and need to assess response to drug therapy  · Last bone density test (DXA Scan): 10/20/2020   5  HIV Screening: covered annually if you're between the age of 15-65  Also covered annually if you are younger than 13 and older than 72 with risk factors for HIV infection   For pregnant patients, it is covered up to 3 times per pregnancy  Immunizations:  Immunization Recommendations   Influenza Vaccine Annual influenza vaccination during flu season is recommended for all persons aged >= 6 months who do not have contraindications   Pneumococcal Vaccine (Prevnar and Pneumovax)  * Prevnar = PCV13  * Pneumovax = PPSV23   Adults 25-60 years old: 1-3 doses may be recommended based on certain risk factors  Adults 72 years old: Prevnar (PCV13) vaccine recommended followed by Pneumovax (PPSV23) vaccine  If already received PPSV23 since turning 65, then PCV13 recommended at least one year after PPSV23 dose  Hepatitis B Vaccine 3 dose series if at intermediate or high risk (ex: diabetes, end stage renal disease, liver disease)   Tetanus (Td) Vaccine - COST NOT COVERED BY MEDICARE PART B Following completion of primary series, a booster dose should be given every 10 years to maintain immunity against tetanus  Td may also be given as tetanus wound prophylaxis  Tdap Vaccine - COST NOT COVERED BY MEDICARE PART B Recommended at least once for all adults  For pregnant patients, recommended with each pregnancy  Shingles Vaccine (Shingrix) - COST NOT COVERED BY MEDICARE PART B  2 shot series recommended in those aged 48 and above     Health Maintenance Due:      Topic Date Due    Colonoscopy Surveillance  12/22/2018    DXA SCAN  10/20/2022     Immunizations Due:      Topic Date Due    DTaP,Tdap,and Td Vaccines (2 - Td) 11/12/2019     Advance Directives   What are advance directives? Advance directives are legal documents that state your wishes and plans for medical care  These plans are made ahead of time in case you lose your ability to make decisions for yourself  Advance directives can apply to any medical decision, such as the treatments you want, and if you want to donate organs  What are the types of advance directives? There are many types of advance directives, and each state has rules about how to use them   You may choose a combination of any of the following:  · Living will: This is a written record of the treatment you want  You can also choose which treatments you do not want, which to limit, and which to stop at a certain time  This includes surgery, medicine, IV fluid, and tube feedings  · Durable power of  for healthcare [employer] [facility]): This is a written record that states who you want to make healthcare choices for you when you are unable to make them for yourself  This person, called a proxy, is usually a family member or a friend  You may choose more than 1 proxy  · Do not resuscitate (DNR) order:  A DNR order is used in case your heart stops beating or you stop breathing  It is a request not to have certain forms of treatment, such as CPR  A DNR order may be included in other types of advance directives  · Medical directive: This covers the care that you want if you are in a coma, near death, or unable to make decisions for yourself  You can list the treatments you want for each condition  Treatment may include pain medicine, surgery, blood transfusions, dialysis, IV or tube feedings, and a ventilator (breathing machine)  · Values history: This document has questions about your views, beliefs, and how you feel and think about life  This information can help others choose the care that you would choose  Why are advance directives important? An advance directive helps you control your care  Although spoken wishes may be used, it is better to have your wishes written down  Spoken wishes can be misunderstood, or not followed  Treatments may be given even if you do not want them  An advance directive may make it easier for your family to make difficult choices about your care  Weight Management   Why it is important to manage your weight:  Being overweight increases your risk of health conditions such as heart disease, high blood pressure, type 2 diabetes, and certain types of cancer   It can also increase your risk for osteoarthritis, sleep apnea, and other respiratory problems  Aim for a slow, steady weight loss  Even a small amount of weight loss can lower your risk of health problems  How to lose weight safely:  A safe and healthy way to lose weight is to eat fewer calories and get regular exercise  You can lose up about 1 pound a week by decreasing the number of calories you eat by 500 calories each day  Healthy meal plan for weight management:  A healthy meal plan includes a variety of foods, contains fewer calories, and helps you stay healthy  A healthy meal plan includes the following:  · Eat whole-grain foods more often  A healthy meal plan should contain fiber  Fiber is the part of grains, fruits, and vegetables that is not broken down by your body  Whole-grain foods are healthy and provide extra fiber in your diet  Some examples of whole-grain foods are whole-wheat breads and pastas, oatmeal, brown rice, and bulgur  · Eat a variety of vegetables every day  Include dark, leafy greens such as spinach, kale, nadege greens, and mustard greens  Eat yellow and orange vegetables such as carrots, sweet potatoes, and winter squash  · Eat a variety of fruits every day  Choose fresh or canned fruit (canned in its own juice or light syrup) instead of juice  Fruit juice has very little or no fiber  · Eat low-fat dairy foods  Drink fat-free (skim) milk or 1% milk  Eat fat-free yogurt and low-fat cottage cheese  Try low-fat cheeses such as mozzarella and other reduced-fat cheeses  · Choose meat and other protein foods that are low in fat  Choose beans or other legumes such as split peas or lentils  Choose fish, skinless poultry (chicken or turkey), or lean cuts of red meat (beef or pork)  Before you cook meat or poultry, cut off any visible fat  · Use less fat and oil  Try baking foods instead of frying them  Add less fat, such as margarine, sour cream, regular salad dressing and mayonnaise to foods   Eat fewer high-fat foods  Some examples of high-fat foods include french fries, doughnuts, ice cream, and cakes  · Eat fewer sweets  Limit foods and drinks that are high in sugar  This includes candy, cookies, regular soda, and sweetened drinks  Exercise:  Exercise at least 30 minutes per day on most days of the week  Some examples of exercise include walking, biking, dancing, and swimming  You can also fit in more physical activity by taking the stairs instead of the elevator or parking farther away from stores  Ask your healthcare provider about the best exercise plan for you  Narcotic (Opioid) Safety    Use narcotics safely:  · Take prescribed narcotics exactly as directed  · Do not give narcotics to others or take narcotics that belong to someone else  · Do not mix narcotics without medicines or alcohol  · Do not drive or operate heavy machinery after you take the narcotic  · Monitor for side effects and notify your healthcare provider if you experienced side effects such as nausea, sleepiness, itching, or trouble thinking clearly  Manage constipation:    Constipation is the most common side effect of narcotic medicine  Constipation is when you have hard, dry bowel movements, or you go longer than usual between bowel movements  Tell your healthcare provider about all changes in your bowel movements while you are taking narcotics  He or she may recommend laxative medicine to help you have a bowel movement  He or she may also change the kind of narcotic you are taking, or change when you take it  The following are more ways you can prevent or relieve constipation:    · Drink liquids as directed  You may need to drink extra liquids to help soften and move your bowels  Ask how much liquid to drink each day and which liquids are best for you  · Eat high-fiber foods  This may help decrease constipation by adding bulk to your bowel movements   High-fiber foods include fruits, vegetables, whole-grain breads and cereals, and beans  Your healthcare provider or dietitian can help you create a high-fiber meal plan  Your provider may also recommend a fiber supplement if you cannot get enough fiber from food  · Exercise regularly  Regular physical activity can help stimulate your intestines  Walking is a good exercise to prevent or relieve constipation  Ask which exercises are best for you  · Schedule a time each day to have a bowel movement  This may help train your body to have regular bowel movements  Bend forward while you are on the toilet to help move the bowel movement out  Sit on the toilet for at least 10 minutes, even if you do not have a bowel movement  Store narcotics safely:   · Store narcotics where others cannot easily get them  Keep them in a locked cabinet or secure area  Do not  keep them in a purse or other bag you carry with you  A person may be looking for something else and find the narcotics  · Make sure narcotics are stored out of the reach of children  A child can easily overdose on narcotics  Narcotics may look like candy to a small child  The best way to dispose of narcotics: The laws vary by country and area  In the [facility], the best way is to return the narcotics through a take-back program  This program is offered by the [employer]  The following are options for using the program:  · Take the narcotics to a ROWDY collection site  The site is often a law enforcement center  Call your local law enforcement center for scheduled take-back days in your area  You will be given information on where to go if the collection site is in a different location  · Take the narcotics to an approved pharmacy or hospital   A pharmacy or hospital may be set up as a collection site  You will need to ask if it is a ROWDY collection site if you were not directed there  A pharmacy or doctor's office may not be able to take back narcotics unless it is a ROWDY site    · Use a mail-back system  This means you are given containers to put the narcotics into  You will then mail them in the containers  · Use a take-back drop box  This is a place to leave the narcotics at any time  People and animals will not be able to get into the box  Your local law enforcement agency can tell you where to find a drop box in your area  Other ways to manage pain:   · Ask your healthcare provider about non-narcotic medicines to control pain  Nonprescription medicines include NSAIDs (such as ibuprofen) and acetaminophen  Prescription medicines include muscle relaxers, antidepressants, and steroids  · Pain may be managed without any medicines  Some ways to relieve pain include massage, aromatherapy, or meditation  Physical or occupational therapy may also help  For more information:   · Drug Enforcement Administration  [facility]  [address] [place] [address]  Phone: 3- 833 - 326-9809  Web Address: [facility]/drug_disposal/    · [address]  and Drug Administration  [facility] , [address] [place] [address]  Phone: 1- 114 - 757-3478  Web Address: http://[employer]/     © Copyright [employer] 2018 Information is for End User's use only and may not be sold, redistributed or otherwise used for commercial purposes   All illustrations and images included in CareNotes® are the copyrighted property of A D A M , Inc  or [address] [place] [address] [employer]

## 2021-05-26 RX ORDER — INSULIN DETEMIR 100 [IU]/ML
INJECTION, SOLUTION SUBCUTANEOUS
Qty: 200 ML | Refills: 7 | OUTPATIENT
Start: 2021-05-26

## 2021-05-27 ENCOUNTER — TELEPHONE (OUTPATIENT)
Dept: ADMINISTRATIVE | Facility: OTHER | Age: 76
End: 2021-05-27

## 2021-05-27 NOTE — TELEPHONE ENCOUNTER
----- Message from Kailee Akers sent at 5/25/2021  3:49 PM EDT -----  Regarding: colo  05/25/21 3:49 PM    Hello, our patient Rigoberto Rondon has had CRC: Colonoscopy completed/performed  Please assist in updating the patient chart by making an External outreach to DR FRANKO SELBY [facility] facility located in [place], [place]  The date of service is [address]OX 2016      Thank you,  Stefani Double, [address]   [address]

## 2021-06-02 DIAGNOSIS — Z79.4 TYPE 2 DIABETES MELLITUS WITH COMPLICATION, WITH LONG-TERM CURRENT USE OF INSULIN (HCC): ICD-10-CM

## 2021-06-02 DIAGNOSIS — M54.50 LOW BACK PAIN: ICD-10-CM

## 2021-06-02 DIAGNOSIS — E11.8 TYPE 2 DIABETES MELLITUS WITH COMPLICATION, WITH LONG-TERM CURRENT USE OF INSULIN (HCC): ICD-10-CM

## 2021-06-02 RX ORDER — TRAMADOL HYDROCHLORIDE 50 MG/1
50 TABLET ORAL 2 TIMES DAILY
Qty: 60 TABLET | Refills: 0 | Status: SHIPPED | OUTPATIENT
Start: 2021-06-02 | End: 2021-07-01 | Stop reason: SDUPTHER

## 2021-06-02 RX ORDER — BLOOD SUGAR DIAGNOSTIC
STRIP MISCELLANEOUS
Qty: 400 STRIP | Refills: 0 | OUTPATIENT
Start: 2021-06-02

## 2021-06-02 NOTE — TELEPHONE ENCOUNTER
Last O/V: 5/25/21  Next O/V: 9/27/21    Cyclobenzaprine  Not due for refill last filled 5/4/21 #90 with 1 refill

## 2021-06-02 NOTE — TELEPHONE ENCOUNTER
Upon review of the request/inquiry, we are reaching out to you to ask for assistance  We have reviewed all Chart Review tabs, Care Everywhere (CE), completed a chart search and were unable to locate requested item(s)  I have reached to our MR dept as Dr Gabriel Mtz is an [facility] physician to assure I did not overlook result  To respond with requested information, open this Encounter, navigate to the Routing section, add your response to the routing comments, add my name to the Recipient field, and select Send and Close Workspace      Thank you  Mayra Kang good

## 2021-06-02 NOTE — TELEPHONE ENCOUNTER
Upon review of the In Basket request we were not able to locate a more recent result than the one attached to HM  Received note back from [address]  Attached to this emcounter  Any additional questions or concerns should be emailed to the Practice Liaisons via [email or web address] email, please do not reply via In Basket      Thank you  Katerina Allen

## 2021-06-02 NOTE — TELEPHONE ENCOUNTER
Patient needs refills of Cyclobenzaprine 5mg tablet and Tramadol 50mg tablet   Please send to [facility]

## 2021-06-03 ENCOUNTER — OFFICE VISIT (OUTPATIENT)
Dept: PODIATRY | Facility: CLINIC | Age: 76
End: 2021-06-03
Payer: COMMERCIAL

## 2021-06-03 VITALS
SYSTOLIC BLOOD PRESSURE: 165 MMHG | BODY MASS INDEX: 27.68 KG/M2 | HEIGHT: 60 IN | DIASTOLIC BLOOD PRESSURE: 76 MMHG | HEART RATE: 75 BPM | WEIGHT: 141 LBS

## 2021-06-03 DIAGNOSIS — E11.42 DIABETIC POLYNEUROPATHY ASSOCIATED WITH TYPE 2 DIABETES MELLITUS (HCC): Primary | ICD-10-CM

## 2021-06-03 PROCEDURE — 1036F TOBACCO NON-USER: CPT | Performed by: PODIATRIST

## 2021-06-03 PROCEDURE — 1160F RVW MEDS BY RX/DR IN RCRD: CPT | Performed by: PODIATRIST

## 2021-06-03 PROCEDURE — 99213 OFFICE O/P EST LOW 20 MIN: CPT | Performed by: PODIATRIST

## 2021-06-03 NOTE — PROGRESS NOTES
Assessment/Plan:     Discussed principles of diabetic foot care  Today, vascular status is within normal limits and sensorium is intact  In prior visits sensorium was diminished and there were no palpable pedal pulses  Treatment consisted of lesion trimming  Patient knows to refrain from walking barefoot  She will be reassessed in 10 weeks  No problem-specific Assessment & Plan notes found for this encounter  Diagnoses and all orders for this visit:    Diabetic polyneuropathy associated with type 2 diabetes mellitus ([facility] [address] )          Subjective:      Patient ID: Tomer Sanchez is a 66 y o  female  HPI       Patient, a 75-year-old female who is a type 2 diabetic presents for her yearly diabetic foot exam and care  Patient denies any foot disorders  She denies numbness or tingling in her feet  She does have elongated toenails that she has difficulty trimming  I personally reviewed A1c dated 05/18/2021  It was elevated at 8 3  I personally reviewed basic metabolic panel dated 67/44/6861  BUN is markedly elevated at 50 and creatinine is 1 21  Blood glucose was 158  The following portions of the patient's history were reviewed and updated as appropriate: allergies, current medications, past family history, past medical history, past social history, past surgical history and problem list     Review of Systems   Genitourinary:        Stage III renal disease   Musculoskeletal: Positive for back pain  Neurological: Negative for numbness  Objective:      /76   Pulse 75   Ht 5' (1 524 m)   Wt 64 kg (141 lb)   LMP  (LMP Unknown)   BMI 27 54 kg/m²          Physical Exam  Cardiovascular:      Pulses: no weak pulses          Dorsalis pedis pulses are 2+ on the right side and 2+ on the left side  Posterior tibial pulses are 2+ on the right side and 2+ on the left side     Feet:      Right foot:      Skin integrity: No ulcer, skin breakdown, erythema, warmth, callus or dry skin  Left foot:      Skin integrity: No ulcer, skin breakdown, erythema, warmth, callus or dry skin  Diabetic Foot Exam    Patient's shoes and socks removed  Right Foot/Ankle   Right Foot Inspection  Skin Exam: skin normal and skin intact no dry skin, no warmth, no callus, no erythema, no maceration, no abnormal color, no pre-ulcer, no ulcer and no callus                          Toe Exam: ROM and strength within normal limits  Sensory   Vibration: intact  Proprioception: intact   Monofilament testing: intact  Vascular  Capillary refills: < 3 seconds  The right DP pulse is 2+  The right PT pulse is 2+  Right Toe  - Comprehensive Exam  Ecchymosis: none  Arch: normal  Hammertoes: absent  Claw Toes: absent  Swelling: none   Tenderness: none         Left Foot/Ankle  Left Foot Inspection  Skin Exam: skin normal and skin intactno dry skin, no warmth, no erythema, no maceration, normal color, no pre-ulcer, no ulcer and no callus                         Toe Exam: ROM and strength within normal limits                   Sensory   Vibration: intact  Proprioception: intact  Monofilament: intact  Vascular  Capillary refills: < 3 seconds  The left DP pulse is 2+  The left PT pulse is 2+  Left Toe  - Comprehensive Exam  Ecchymosis: none  Arch: normal  Hammertoes: absent  Claw toes: absent  Swelling: none   Tenderness: none       Assign Risk Category:  No deformity present; No loss of protective sensation;  No weak pulses       Risk: 0

## 2021-06-15 LAB
LEFT EYE DIABETIC RETINOPATHY: NORMAL
RIGHT EYE DIABETIC RETINOPATHY: NORMAL

## 2021-07-01 DIAGNOSIS — M54.50 LOW BACK PAIN: ICD-10-CM

## 2021-07-01 RX ORDER — TRAMADOL HYDROCHLORIDE 50 MG/1
50 TABLET ORAL 2 TIMES DAILY
Qty: 60 TABLET | Refills: 0 | Status: SHIPPED | OUTPATIENT
Start: 2021-07-01 | End: 2021-07-29 | Stop reason: SDUPTHER

## 2021-07-01 RX ORDER — CYCLOBENZAPRINE HCL 5 MG
5 TABLET ORAL 3 TIMES DAILY PRN
Qty: 90 TABLET | Refills: 1 | Status: SHIPPED | OUTPATIENT
Start: 2021-07-01 | End: 2021-08-30 | Stop reason: SDUPTHER

## 2021-07-29 DIAGNOSIS — M54.50 LOW BACK PAIN: ICD-10-CM

## 2021-07-29 DIAGNOSIS — E78.5 HYPERLIPIDEMIA, UNSPECIFIED HYPERLIPIDEMIA TYPE: ICD-10-CM

## 2021-07-29 RX ORDER — ATORVASTATIN CALCIUM 80 MG/1
80 TABLET, FILM COATED ORAL DAILY
Qty: 30 TABLET | Refills: 4 | OUTPATIENT
Start: 2021-07-29

## 2021-07-30 RX ORDER — TRAMADOL HYDROCHLORIDE 50 MG/1
50 TABLET ORAL 2 TIMES DAILY
Qty: 60 TABLET | Refills: 0 | Status: SHIPPED | OUTPATIENT
Start: 2021-07-30 | End: 2021-08-30 | Stop reason: SDUPTHER

## 2021-08-12 ENCOUNTER — OFFICE VISIT (OUTPATIENT)
Dept: PODIATRY | Facility: CLINIC | Age: 76
End: 2021-08-12
Payer: COMMERCIAL

## 2021-08-12 VITALS
BODY MASS INDEX: 27.54 KG/M2 | DIASTOLIC BLOOD PRESSURE: 68 MMHG | HEART RATE: 71 BPM | HEIGHT: 60 IN | SYSTOLIC BLOOD PRESSURE: 151 MMHG

## 2021-08-12 DIAGNOSIS — E11.42 DIABETIC POLYNEUROPATHY ASSOCIATED WITH TYPE 2 DIABETES MELLITUS (HCC): Primary | ICD-10-CM

## 2021-08-12 PROCEDURE — 3078F DIAST BP <80 MM HG: CPT | Performed by: PODIATRIST

## 2021-08-12 PROCEDURE — 3077F SYST BP >= 140 MM HG: CPT | Performed by: PODIATRIST

## 2021-08-12 PROCEDURE — 99212 OFFICE O/P EST SF 10 MIN: CPT | Performed by: PODIATRIST

## 2021-08-12 NOTE — PROGRESS NOTES
Patient presents for palliative diabetic foot care  No acute disorder noted  Treatment consisted of nail trimming  No palpable pedal pulses  Patient notes that she stepped on a sharp piece of plastic in the shower approximately 2 weeks ago  She had slight bleeding  There is no evidence of infection at the present time nor evidence of a retained foreign body  Patient told to contact me should she developed symptoms

## 2021-08-30 DIAGNOSIS — E78.5 HYPERLIPIDEMIA, UNSPECIFIED HYPERLIPIDEMIA TYPE: ICD-10-CM

## 2021-08-30 DIAGNOSIS — E03.9 HYPOTHYROIDISM, UNSPECIFIED TYPE: ICD-10-CM

## 2021-08-30 DIAGNOSIS — M54.50 LOW BACK PAIN: ICD-10-CM

## 2021-08-30 RX ORDER — LEVOTHYROXINE SODIUM 88 UG/1
88 TABLET ORAL DAILY
Qty: 90 TABLET | Refills: 1 | OUTPATIENT
Start: 2021-08-30

## 2021-08-30 RX ORDER — LEVOTHYROXINE SODIUM 88 UG/1
88 TABLET ORAL DAILY
Qty: 30 TABLET | Refills: 5 | Status: SHIPPED | OUTPATIENT
Start: 2021-08-30 | End: 2022-02-01 | Stop reason: SDUPTHER

## 2021-08-30 RX ORDER — CYCLOBENZAPRINE HCL 5 MG
5 TABLET ORAL 3 TIMES DAILY PRN
Qty: 90 TABLET | Refills: 1 | Status: SHIPPED | OUTPATIENT
Start: 2021-08-30 | End: 2021-09-27 | Stop reason: SDUPTHER

## 2021-08-30 RX ORDER — ATORVASTATIN CALCIUM 80 MG/1
80 TABLET, FILM COATED ORAL DAILY
Qty: 90 TABLET | Refills: 1 | Status: SHIPPED | OUTPATIENT
Start: 2021-08-30 | End: 2021-09-27 | Stop reason: SDUPTHER

## 2021-08-30 RX ORDER — TRAMADOL HYDROCHLORIDE 50 MG/1
50 TABLET ORAL 2 TIMES DAILY
Qty: 60 TABLET | Refills: 0 | Status: SHIPPED | OUTPATIENT
Start: 2021-08-30 | End: 2021-09-27 | Stop reason: SDUPTHER

## 2021-08-30 RX ORDER — ATORVASTATIN CALCIUM 80 MG/1
80 TABLET, FILM COATED ORAL DAILY
Qty: 30 TABLET | Refills: 4 | OUTPATIENT
Start: 2021-08-30

## 2021-09-07 ENCOUNTER — TELEPHONE (OUTPATIENT)
Dept: INTERNAL MEDICINE CLINIC | Age: 76
End: 2021-09-07

## 2021-09-07 DIAGNOSIS — Z12.31 ENCOUNTER FOR SCREENING MAMMOGRAM FOR MALIGNANT NEOPLASM OF BREAST: Primary | ICD-10-CM

## 2021-09-21 LAB
BASOPHILS # BLD AUTO: 42 CELLS/UL (ref 0–200)
BASOPHILS NFR BLD AUTO: 0.7 %
BUN SERPL-MCNC: 49 MG/DL (ref 7–25)
BUN/CREAT SERPL: 34 (CALC) (ref 6–22)
CALCIUM SERPL-MCNC: 10 MG/DL (ref 8.6–10.4)
CHLORIDE SERPL-SCNC: 102 MMOL/L (ref 98–110)
CO2 SERPL-SCNC: 30 MMOL/L (ref 20–32)
CREAT SERPL-MCNC: 1.43 MG/DL (ref 0.6–0.93)
EOSINOPHIL # BLD AUTO: 306 CELLS/UL (ref 15–500)
EOSINOPHIL NFR BLD AUTO: 5.1 %
ERYTHROCYTE [DISTWIDTH] IN BLOOD BY AUTOMATED COUNT: 13.7 % (ref 11–15)
GLUCOSE SERPL-MCNC: 141 MG/DL (ref 65–99)
HBA1C MFR BLD: 7.4 % OF TOTAL HGB
HCT VFR BLD AUTO: 36.8 % (ref 35–45)
HGB BLD-MCNC: 11.9 G/DL (ref 11.7–15.5)
LYMPHOCYTES # BLD AUTO: 2004 CELLS/UL (ref 850–3900)
LYMPHOCYTES NFR BLD AUTO: 33.4 %
MCH RBC QN AUTO: 30.5 PG (ref 27–33)
MCHC RBC AUTO-ENTMCNC: 32.3 G/DL (ref 32–36)
MCV RBC AUTO: 94.4 FL (ref 80–100)
MONOCYTES # BLD AUTO: 816 CELLS/UL (ref 200–950)
MONOCYTES NFR BLD AUTO: 13.6 %
NEUTROPHILS # BLD AUTO: 2832 CELLS/UL (ref 1500–7800)
NEUTROPHILS NFR BLD AUTO: 47.2 %
PLATELET # BLD AUTO: 199 THOUSAND/UL (ref 140–400)
PMV BLD REES-ECKER: 12.2 FL (ref 7.5–12.5)
POTASSIUM SERPL-SCNC: 4.6 MMOL/L (ref 3.5–5.3)
RBC # BLD AUTO: 3.9 MILLION/UL (ref 3.8–5.1)
SL AMB EGFR AFRICAN AMERICAN: 41 ML/MIN/1.73M2
SL AMB EGFR NON AFRICAN AMERICAN: 35 ML/MIN/1.73M2
SODIUM SERPL-SCNC: 138 MMOL/L (ref 135–146)
WBC # BLD AUTO: 6 THOUSAND/UL (ref 3.8–10.8)

## 2021-09-27 ENCOUNTER — OFFICE VISIT (OUTPATIENT)
Dept: INTERNAL MEDICINE CLINIC | Facility: CLINIC | Age: 76
End: 2021-09-27
Payer: COMMERCIAL

## 2021-09-27 VITALS
HEART RATE: 73 BPM | TEMPERATURE: 98.2 F | WEIGHT: 140.2 LBS | DIASTOLIC BLOOD PRESSURE: 62 MMHG | SYSTOLIC BLOOD PRESSURE: 136 MMHG | BODY MASS INDEX: 27.52 KG/M2 | HEIGHT: 60 IN | OXYGEN SATURATION: 98 %

## 2021-09-27 DIAGNOSIS — E11.3553 TYPE 2 DIABETES MELLITUS WITH STABLE PROLIFERATIVE RETINOPATHY OF BOTH EYES, WITH LONG-TERM CURRENT USE OF INSULIN (HCC): ICD-10-CM

## 2021-09-27 DIAGNOSIS — E11.42 TYPE 2 DIABETES MELLITUS WITH DIABETIC POLYNEUROPATHY, WITH LONG-TERM CURRENT USE OF INSULIN (HCC): ICD-10-CM

## 2021-09-27 DIAGNOSIS — J31.0 RHINITIS, UNSPECIFIED TYPE: ICD-10-CM

## 2021-09-27 DIAGNOSIS — E11.8 TYPE 2 DIABETES MELLITUS WITH COMPLICATION, WITH LONG-TERM CURRENT USE OF INSULIN (HCC): ICD-10-CM

## 2021-09-27 DIAGNOSIS — Z23 NEEDS FLU SHOT: ICD-10-CM

## 2021-09-27 DIAGNOSIS — N18.32 STAGE 3B CHRONIC KIDNEY DISEASE (HCC): ICD-10-CM

## 2021-09-27 DIAGNOSIS — Z79.4 TYPE 2 DIABETES MELLITUS WITH STABLE PROLIFERATIVE RETINOPATHY OF BOTH EYES, WITH LONG-TERM CURRENT USE OF INSULIN (HCC): ICD-10-CM

## 2021-09-27 DIAGNOSIS — J45.909 ASTHMA WITHOUT STATUS ASTHMATICUS WITHOUT COMPLICATION, UNSPECIFIED ASTHMA SEVERITY, UNSPECIFIED WHETHER PERSISTENT: ICD-10-CM

## 2021-09-27 DIAGNOSIS — Z79.4 TYPE 2 DIABETES MELLITUS WITH COMPLICATION, WITH LONG-TERM CURRENT USE OF INSULIN (HCC): ICD-10-CM

## 2021-09-27 DIAGNOSIS — Z79.4 TYPE 2 DIABETES MELLITUS WITH DIABETIC POLYNEUROPATHY, WITH LONG-TERM CURRENT USE OF INSULIN (HCC): ICD-10-CM

## 2021-09-27 DIAGNOSIS — Z79.4 TYPE 2 DIABETES MELLITUS WITH STAGE 3B CHRONIC KIDNEY DISEASE, WITH LONG-TERM CURRENT USE OF INSULIN (HCC): Primary | ICD-10-CM

## 2021-09-27 DIAGNOSIS — N18.32 TYPE 2 DIABETES MELLITUS WITH STAGE 3B CHRONIC KIDNEY DISEASE, WITH LONG-TERM CURRENT USE OF INSULIN (HCC): Primary | ICD-10-CM

## 2021-09-27 DIAGNOSIS — R80.9 MICROALBUMINURIA: ICD-10-CM

## 2021-09-27 DIAGNOSIS — E11.22 TYPE 2 DIABETES MELLITUS WITH STAGE 3B CHRONIC KIDNEY DISEASE, WITH LONG-TERM CURRENT USE OF INSULIN (HCC): Primary | ICD-10-CM

## 2021-09-27 DIAGNOSIS — E03.9 HYPOTHYROIDISM, UNSPECIFIED TYPE: ICD-10-CM

## 2021-09-27 DIAGNOSIS — M54.50 LOW BACK PAIN: ICD-10-CM

## 2021-09-27 DIAGNOSIS — E78.2 MIXED HYPERLIPIDEMIA: ICD-10-CM

## 2021-09-27 DIAGNOSIS — E78.5 HYPERLIPIDEMIA, UNSPECIFIED HYPERLIPIDEMIA TYPE: ICD-10-CM

## 2021-09-27 PROCEDURE — G0008 ADMIN INFLUENZA VIRUS VAC: HCPCS

## 2021-09-27 PROCEDURE — 1036F TOBACCO NON-USER: CPT | Performed by: INTERNAL MEDICINE

## 2021-09-27 PROCEDURE — 3075F SYST BP GE 130 - 139MM HG: CPT | Performed by: INTERNAL MEDICINE

## 2021-09-27 PROCEDURE — 3078F DIAST BP <80 MM HG: CPT | Performed by: INTERNAL MEDICINE

## 2021-09-27 PROCEDURE — 90662 IIV NO PRSV INCREASED AG IM: CPT

## 2021-09-27 PROCEDURE — 99214 OFFICE O/P EST MOD 30 MIN: CPT | Performed by: INTERNAL MEDICINE

## 2021-09-27 PROCEDURE — 1160F RVW MEDS BY RX/DR IN RCRD: CPT | Performed by: INTERNAL MEDICINE

## 2021-09-27 RX ORDER — CYCLOBENZAPRINE HCL 5 MG
5 TABLET ORAL 3 TIMES DAILY PRN
Qty: 90 TABLET | Refills: 1 | Status: SHIPPED | OUTPATIENT
Start: 2021-09-27 | End: 2021-10-27 | Stop reason: SDUPTHER

## 2021-09-27 RX ORDER — TRAMADOL HYDROCHLORIDE 50 MG/1
50 TABLET ORAL 2 TIMES DAILY
Qty: 60 TABLET | Refills: 0 | Status: SHIPPED | OUTPATIENT
Start: 2021-09-27 | End: 2021-10-27 | Stop reason: SDUPTHER

## 2021-09-27 RX ORDER — FLUTICASONE PROPIONATE 50 MCG
2 SPRAY, SUSPENSION (ML) NASAL DAILY
Qty: 16 G | Refills: 3 | Status: SHIPPED | OUTPATIENT
Start: 2021-09-27 | End: 2022-06-08 | Stop reason: SDUPTHER

## 2021-09-27 RX ORDER — ATORVASTATIN CALCIUM 80 MG/1
80 TABLET, FILM COATED ORAL DAILY
Qty: 90 TABLET | Refills: 1 | Status: SHIPPED | OUTPATIENT
Start: 2021-09-27 | End: 2022-06-08 | Stop reason: SDUPTHER

## 2021-09-27 RX ORDER — BUDESONIDE AND FORMOTEROL FUMARATE DIHYDRATE 160; 4.5 UG/1; UG/1
2 AEROSOL RESPIRATORY (INHALATION) 2 TIMES DAILY
Qty: 120 G | Refills: 1 | Status: SHIPPED | OUTPATIENT
Start: 2021-09-27 | End: 2022-03-23 | Stop reason: SDUPTHER
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
## 2021-10-21 ENCOUNTER — OFFICE VISIT (OUTPATIENT)
Dept: PODIATRY | Facility: CLINIC | Age: 76
End: 2021-10-21
Payer: COMMERCIAL

## 2021-10-21 VITALS
WEIGHT: 142 LBS | HEART RATE: 71 BPM | DIASTOLIC BLOOD PRESSURE: 60 MMHG | BODY MASS INDEX: 27.73 KG/M2 | SYSTOLIC BLOOD PRESSURE: 147 MMHG

## 2021-10-21 DIAGNOSIS — E11.42 DIABETIC POLYNEUROPATHY ASSOCIATED WITH TYPE 2 DIABETES MELLITUS (HCC): Primary | ICD-10-CM

## 2021-10-21 DIAGNOSIS — I73.9 PERIPHERAL VASCULAR DISEASE, UNSPECIFIED (HCC): ICD-10-CM

## 2021-10-21 PROCEDURE — 3078F DIAST BP <80 MM HG: CPT | Performed by: PODIATRIST

## 2021-10-21 PROCEDURE — 99212 OFFICE O/P EST SF 10 MIN: CPT | Performed by: PODIATRIST

## 2021-10-21 PROCEDURE — 3077F SYST BP >= 140 MM HG: CPT | Performed by: PODIATRIST

## 2021-10-21 PROCEDURE — 1036F TOBACCO NON-USER: CPT | Performed by: PODIATRIST

## 2021-10-21 PROCEDURE — 1160F RVW MEDS BY RX/DR IN RCRD: CPT | Performed by: PODIATRIST

## 2021-10-27 DIAGNOSIS — E79.0 HYPERURICEMIA: ICD-10-CM

## 2021-10-27 DIAGNOSIS — M54.50 LOW BACK PAIN: ICD-10-CM

## 2021-10-27 RX ORDER — TRAMADOL HYDROCHLORIDE 50 MG/1
50 TABLET ORAL 2 TIMES DAILY
Qty: 60 TABLET | Refills: 0 | Status: SHIPPED | OUTPATIENT
Start: 2021-10-27 | End: 2021-12-07 | Stop reason: SDUPTHER

## 2021-10-27 RX ORDER — CYCLOBENZAPRINE HCL 5 MG
5 TABLET ORAL 3 TIMES DAILY PRN
Qty: 90 TABLET | Refills: 1 | Status: SHIPPED | OUTPATIENT
Start: 2021-10-27 | End: 2021-11-05 | Stop reason: ALTCHOICE

## 2021-10-27 RX ORDER — ALLOPURINOL 100 MG/1
200 TABLET ORAL DAILY
Qty: 60 TABLET | Refills: 5 | Status: SHIPPED | OUTPATIENT
Start: 2021-10-27 | End: 2022-04-21 | Stop reason: SDUPTHER

## 2021-10-28 ENCOUNTER — TELEPHONE (OUTPATIENT)
Dept: INTERNAL MEDICINE CLINIC | Age: 76
End: 2021-10-28

## 2021-10-28 DIAGNOSIS — M54.50 ACUTE MIDLINE LOW BACK PAIN, UNSPECIFIED WHETHER SCIATICA PRESENT: Primary | ICD-10-CM

## 2021-11-01 ENCOUNTER — TELEPHONE (OUTPATIENT)
Dept: INTERNAL MEDICINE CLINIC | Age: 76
End: 2021-11-01

## 2021-11-05 RX ORDER — METHOCARBAMOL 500 MG/1
500 TABLET, FILM COATED ORAL 2 TIMES DAILY
Qty: 60 TABLET | Refills: 1 | Status: SHIPPED | OUTPATIENT
Start: 2021-11-05 | End: 2021-12-27 | Stop reason: SDUPTHER

## 2021-11-23 DIAGNOSIS — Z79.4 TYPE 2 DIABETES MELLITUS WITH COMPLICATION, WITH LONG-TERM CURRENT USE OF INSULIN (HCC): ICD-10-CM

## 2021-11-23 DIAGNOSIS — E11.8 TYPE 2 DIABETES MELLITUS WITH COMPLICATION, WITH LONG-TERM CURRENT USE OF INSULIN (HCC): ICD-10-CM

## 2021-12-07 DIAGNOSIS — M54.50 LOW BACK PAIN: ICD-10-CM

## 2021-12-07 RX ORDER — TRAMADOL HYDROCHLORIDE 50 MG/1
50 TABLET ORAL 2 TIMES DAILY
Qty: 60 TABLET | Refills: 0 | Status: SHIPPED | OUTPATIENT
Start: 2021-12-07 | End: 2022-01-06 | Stop reason: SDUPTHER

## 2021-12-21 ENCOUNTER — OFFICE VISIT (OUTPATIENT)
Dept: URGENT CARE | Facility: MEDICAL CENTER | Age: 76
End: 2021-12-21
Payer: COMMERCIAL

## 2021-12-21 VITALS — OXYGEN SATURATION: 99 % | TEMPERATURE: 97.6 F | HEART RATE: 84 BPM

## 2021-12-21 DIAGNOSIS — R68.89 FLU-LIKE SYMPTOMS: Primary | ICD-10-CM

## 2021-12-21 PROCEDURE — G0382 LEV 3 HOSP TYPE B ED VISIT: HCPCS | Performed by: PHYSICIAN ASSISTANT

## 2021-12-21 PROCEDURE — S9083 URGENT CARE CENTER GLOBAL: HCPCS | Performed by: PHYSICIAN ASSISTANT

## 2021-12-21 PROCEDURE — 0241U HB NFCT DS VIR RESP RNA 4 TRGT: CPT | Performed by: PHYSICIAN ASSISTANT

## 2021-12-21 RX ORDER — OSELTAMIVIR PHOSPHATE 75 MG/1
75 CAPSULE ORAL 2 TIMES DAILY
Qty: 10 CAPSULE | Refills: 0 | Status: SHIPPED | OUTPATIENT
Start: 2021-12-21 | End: 2021-12-26

## 2021-12-21 RX ORDER — ONDANSETRON 4 MG/1
4 TABLET, FILM COATED ORAL EVERY 8 HOURS PRN
Qty: 20 TABLET | Refills: 0 | Status: SHIPPED | OUTPATIENT
Start: 2021-12-21

## 2021-12-23 LAB
FLUAV RNA RESP QL NAA+PROBE: NEGATIVE
FLUBV RNA RESP QL NAA+PROBE: NEGATIVE
RSV RNA RESP QL NAA+PROBE: NEGATIVE
SARS-COV-2 RNA RESP QL NAA+PROBE: NEGATIVE

## 2021-12-27 ENCOUNTER — OFFICE VISIT (OUTPATIENT)
Dept: INTERNAL MEDICINE CLINIC | Facility: CLINIC | Age: 76
End: 2021-12-27
Payer: COMMERCIAL

## 2021-12-27 VITALS
DIASTOLIC BLOOD PRESSURE: 68 MMHG | OXYGEN SATURATION: 98 % | HEIGHT: 60 IN | WEIGHT: 137 LBS | BODY MASS INDEX: 26.9 KG/M2 | TEMPERATURE: 98 F | SYSTOLIC BLOOD PRESSURE: 158 MMHG | HEART RATE: 76 BPM

## 2021-12-27 DIAGNOSIS — F11.20 CONTINUOUS OPIOID DEPENDENCE (HCC): ICD-10-CM

## 2021-12-27 DIAGNOSIS — E03.9 HYPOTHYROIDISM, UNSPECIFIED TYPE: Primary | ICD-10-CM

## 2021-12-27 DIAGNOSIS — M54.50 LOW BACK PAIN, UNSPECIFIED BACK PAIN LATERALITY, UNSPECIFIED CHRONICITY, UNSPECIFIED WHETHER SCIATICA PRESENT: ICD-10-CM

## 2021-12-27 DIAGNOSIS — E78.2 MIXED HYPERLIPIDEMIA: ICD-10-CM

## 2021-12-27 DIAGNOSIS — M54.50 ACUTE MIDLINE LOW BACK PAIN, UNSPECIFIED WHETHER SCIATICA PRESENT: ICD-10-CM

## 2021-12-27 DIAGNOSIS — N18.32 STAGE 3B CHRONIC KIDNEY DISEASE (HCC): ICD-10-CM

## 2021-12-27 DIAGNOSIS — I10 HYPERTENSION, UNSPECIFIED TYPE: ICD-10-CM

## 2021-12-27 DIAGNOSIS — I10 PRIMARY HYPERTENSION: ICD-10-CM

## 2021-12-27 PROCEDURE — 1160F RVW MEDS BY RX/DR IN RCRD: CPT | Performed by: INTERNAL MEDICINE

## 2021-12-27 PROCEDURE — 99214 OFFICE O/P EST MOD 30 MIN: CPT | Performed by: INTERNAL MEDICINE

## 2021-12-27 PROCEDURE — 3725F SCREEN DEPRESSION PERFORMED: CPT | Performed by: INTERNAL MEDICINE

## 2021-12-27 PROCEDURE — 3078F DIAST BP <80 MM HG: CPT | Performed by: INTERNAL MEDICINE

## 2021-12-27 PROCEDURE — 1036F TOBACCO NON-USER: CPT | Performed by: INTERNAL MEDICINE

## 2021-12-27 PROCEDURE — 3077F SYST BP >= 140 MM HG: CPT | Performed by: INTERNAL MEDICINE

## 2021-12-27 RX ORDER — METHOCARBAMOL 500 MG/1
500 TABLET, FILM COATED ORAL 3 TIMES DAILY
Qty: 90 TABLET | Refills: 1 | Status: SHIPPED | OUTPATIENT
Start: 2021-12-27 | End: 2022-03-31 | Stop reason: SDUPTHER

## 2021-12-27 RX ORDER — LOSARTAN POTASSIUM 100 MG/1
100 TABLET ORAL DAILY
Qty: 90 TABLET | Refills: 1 | Status: SHIPPED | OUTPATIENT
Start: 2021-12-27 | End: 2022-06-08 | Stop reason: SDUPTHER

## 2021-12-30 DIAGNOSIS — I10 HYPERTENSION, UNSPECIFIED TYPE: ICD-10-CM

## 2021-12-30 RX ORDER — BUMETANIDE 2 MG/1
2 TABLET ORAL DAILY
Qty: 30 TABLET | Refills: 5 | Status: SHIPPED | OUTPATIENT
Start: 2021-12-30 | End: 2022-06-08 | Stop reason: SDUPTHER

## 2022-01-06 ENCOUNTER — VBI (OUTPATIENT)
Dept: ADMINISTRATIVE | Facility: OTHER | Age: 77
End: 2022-01-06

## 2022-01-06 DIAGNOSIS — M54.50 LOW BACK PAIN: ICD-10-CM

## 2022-01-07 RX ORDER — TRAMADOL HYDROCHLORIDE 50 MG/1
50 TABLET ORAL 2 TIMES DAILY
Qty: 60 TABLET | Refills: 0 | Status: SHIPPED | OUTPATIENT
Start: 2022-01-07 | End: 2022-02-01 | Stop reason: SDUPTHER

## 2022-01-25 LAB
ALT SERPL-CCNC: 21 U/L (ref 6–29)
AST SERPL-CCNC: 29 U/L (ref 10–35)
BUN SERPL-MCNC: 39 MG/DL (ref 7–25)
BUN/CREAT SERPL: 38 (CALC) (ref 6–22)
CALCIUM SERPL-MCNC: 9.9 MG/DL (ref 8.6–10.4)
CHLORIDE SERPL-SCNC: 106 MMOL/L (ref 98–110)
CHOLEST SERPL-MCNC: 132 MG/DL
CHOLEST/HDLC SERPL: 2.8 (CALC)
CO2 SERPL-SCNC: 30 MMOL/L (ref 20–32)
CREAT SERPL-MCNC: 1.04 MG/DL (ref 0.6–0.93)
GLUCOSE SERPL-MCNC: 153 MG/DL (ref 65–99)
HBA1C MFR BLD: 7.3 % OF TOTAL HGB
HDLC SERPL-MCNC: 48 MG/DL
LDLC SERPL CALC-MCNC: 59 MG/DL (CALC)
NONHDLC SERPL-MCNC: 84 MG/DL (CALC)
POTASSIUM SERPL-SCNC: 4.5 MMOL/L (ref 3.5–5.3)
SL AMB EGFR AFRICAN AMERICAN: 60 ML/MIN/1.73M2
SL AMB EGFR NON AFRICAN AMERICAN: 51 ML/MIN/1.73M2
SODIUM SERPL-SCNC: 141 MMOL/L (ref 135–146)
T4 FREE SERPL-MCNC: 1.2 NG/DL (ref 0.8–1.8)
TRIGL SERPL-MCNC: 177 MG/DL
TSH SERPL-ACNC: 4.69 MIU/L (ref 0.4–4.5)

## 2022-01-27 ENCOUNTER — OFFICE VISIT (OUTPATIENT)
Dept: PODIATRY | Facility: CLINIC | Age: 77
End: 2022-01-27
Payer: COMMERCIAL

## 2022-01-27 VITALS
WEIGHT: 138.6 LBS | DIASTOLIC BLOOD PRESSURE: 68 MMHG | HEART RATE: 77 BPM | BODY MASS INDEX: 27.21 KG/M2 | HEIGHT: 60 IN | SYSTOLIC BLOOD PRESSURE: 173 MMHG

## 2022-01-27 DIAGNOSIS — E11.42 DIABETIC POLYNEUROPATHY ASSOCIATED WITH TYPE 2 DIABETES MELLITUS (HCC): Primary | ICD-10-CM

## 2022-01-27 DIAGNOSIS — I73.9 PERIPHERAL VASCULAR DISEASE, UNSPECIFIED (HCC): ICD-10-CM

## 2022-01-27 PROCEDURE — 1160F RVW MEDS BY RX/DR IN RCRD: CPT | Performed by: PODIATRIST

## 2022-01-27 PROCEDURE — 99212 OFFICE O/P EST SF 10 MIN: CPT | Performed by: PODIATRIST

## 2022-01-27 PROCEDURE — 3077F SYST BP >= 140 MM HG: CPT | Performed by: PODIATRIST

## 2022-01-27 PROCEDURE — 1036F TOBACCO NON-USER: CPT | Performed by: PODIATRIST

## 2022-01-27 PROCEDURE — 3078F DIAST BP <80 MM HG: CPT | Performed by: PODIATRIST

## 2022-01-27 NOTE — PROGRESS NOTES
Patient presents for palliative foot care  No acute disorder noted  There are no palpable pedal pulses  Chief podiatric complaint involves long toenails  Treatment consisted of nail trimming

## 2022-02-01 ENCOUNTER — OFFICE VISIT (OUTPATIENT)
Dept: INTERNAL MEDICINE CLINIC | Facility: OTHER | Age: 77
End: 2022-02-01
Payer: COMMERCIAL

## 2022-02-01 VITALS
HEART RATE: 62 BPM | DIASTOLIC BLOOD PRESSURE: 70 MMHG | TEMPERATURE: 98.3 F | BODY MASS INDEX: 27.29 KG/M2 | OXYGEN SATURATION: 97 % | HEIGHT: 60 IN | WEIGHT: 139 LBS | SYSTOLIC BLOOD PRESSURE: 140 MMHG

## 2022-02-01 DIAGNOSIS — F11.20 CONTINUOUS OPIOID DEPENDENCE (HCC): ICD-10-CM

## 2022-02-01 DIAGNOSIS — E11.22 TYPE 2 DIABETES MELLITUS WITH STAGE 3B CHRONIC KIDNEY DISEASE, WITH LONG-TERM CURRENT USE OF INSULIN (HCC): ICD-10-CM

## 2022-02-01 DIAGNOSIS — K63.5 POLYP OF COLON, UNSPECIFIED PART OF COLON, UNSPECIFIED TYPE: Primary | ICD-10-CM

## 2022-02-01 DIAGNOSIS — J45.41 MODERATE PERSISTENT ASTHMA WITH ACUTE EXACERBATION: ICD-10-CM

## 2022-02-01 DIAGNOSIS — Z79.4 TYPE 2 DIABETES MELLITUS WITH COMPLICATION, WITH LONG-TERM CURRENT USE OF INSULIN (HCC): ICD-10-CM

## 2022-02-01 DIAGNOSIS — Z79.4 TYPE 2 DIABETES MELLITUS WITH STABLE PROLIFERATIVE RETINOPATHY OF BOTH EYES, WITH LONG-TERM CURRENT USE OF INSULIN (HCC): ICD-10-CM

## 2022-02-01 DIAGNOSIS — E11.3553 TYPE 2 DIABETES MELLITUS WITH STABLE PROLIFERATIVE RETINOPATHY OF BOTH EYES, WITH LONG-TERM CURRENT USE OF INSULIN (HCC): ICD-10-CM

## 2022-02-01 DIAGNOSIS — E03.9 HYPOTHYROIDISM, UNSPECIFIED TYPE: ICD-10-CM

## 2022-02-01 DIAGNOSIS — I10 PRIMARY HYPERTENSION: ICD-10-CM

## 2022-02-01 DIAGNOSIS — E11.42 TYPE 2 DIABETES MELLITUS WITH DIABETIC POLYNEUROPATHY, WITH LONG-TERM CURRENT USE OF INSULIN (HCC): ICD-10-CM

## 2022-02-01 DIAGNOSIS — N18.32 STAGE 3B CHRONIC KIDNEY DISEASE (HCC): ICD-10-CM

## 2022-02-01 DIAGNOSIS — M54.50 LOW BACK PAIN: ICD-10-CM

## 2022-02-01 DIAGNOSIS — N18.32 TYPE 2 DIABETES MELLITUS WITH STAGE 3B CHRONIC KIDNEY DISEASE, WITH LONG-TERM CURRENT USE OF INSULIN (HCC): ICD-10-CM

## 2022-02-01 DIAGNOSIS — Z79.4 TYPE 2 DIABETES MELLITUS WITH STAGE 3B CHRONIC KIDNEY DISEASE, WITH LONG-TERM CURRENT USE OF INSULIN (HCC): ICD-10-CM

## 2022-02-01 DIAGNOSIS — R80.9 MICROALBUMINURIA: ICD-10-CM

## 2022-02-01 DIAGNOSIS — I10 HYPERTENSION, UNSPECIFIED TYPE: ICD-10-CM

## 2022-02-01 DIAGNOSIS — E11.8 TYPE 2 DIABETES MELLITUS WITH COMPLICATION, WITH LONG-TERM CURRENT USE OF INSULIN (HCC): ICD-10-CM

## 2022-02-01 DIAGNOSIS — Z79.4 TYPE 2 DIABETES MELLITUS WITH DIABETIC POLYNEUROPATHY, WITH LONG-TERM CURRENT USE OF INSULIN (HCC): ICD-10-CM

## 2022-02-01 DIAGNOSIS — E78.2 MIXED HYPERLIPIDEMIA: ICD-10-CM

## 2022-02-01 PROCEDURE — 99214 OFFICE O/P EST MOD 30 MIN: CPT | Performed by: INTERNAL MEDICINE

## 2022-02-01 RX ORDER — MONTELUKAST SODIUM 10 MG/1
10 TABLET ORAL
Qty: 30 TABLET | Refills: 5 | Status: SHIPPED | OUTPATIENT
Start: 2022-02-01 | End: 2022-06-08 | Stop reason: SDUPTHER

## 2022-02-01 RX ORDER — TRAMADOL HYDROCHLORIDE 50 MG/1
50 TABLET ORAL 2 TIMES DAILY
Qty: 60 TABLET | Refills: 0 | Status: SHIPPED | OUTPATIENT
Start: 2022-02-01 | End: 2022-03-03 | Stop reason: SDUPTHER

## 2022-02-01 RX ORDER — INSULIN DETEMIR 100 [IU]/ML
40 INJECTION, SOLUTION SUBCUTANEOUS EVERY 12 HOURS SCHEDULED
Qty: 10 ML | Refills: 5 | Status: SHIPPED | OUTPATIENT
Start: 2022-02-01 | End: 2022-04-21 | Stop reason: SDUPTHER

## 2022-02-01 RX ORDER — METOPROLOL SUCCINATE 100 MG/1
100 TABLET, EXTENDED RELEASE ORAL DAILY
Qty: 30 TABLET | Refills: 5 | Status: SHIPPED | OUTPATIENT
Start: 2022-02-01 | End: 2022-03-23 | Stop reason: ALTCHOICE

## 2022-02-01 RX ORDER — NITROGLYCERIN 0.4 MG/1
0.4 TABLET SUBLINGUAL
Qty: 10 TABLET | Refills: 0 | Status: SHIPPED | OUTPATIENT
Start: 2022-02-01

## 2022-02-01 RX ORDER — LEVOTHYROXINE SODIUM 88 UG/1
88 TABLET ORAL DAILY
Qty: 30 TABLET | Refills: 5 | Status: SHIPPED | OUTPATIENT
Start: 2022-02-01
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
## 2022-02-01 NOTE — PATIENT INSTRUCTIONS
10% - bad control"> 10% - bad control,Hemoglobin A1c (HbA1c) greater than 10% indicating poor diabetic control,Haemoglobin A1c greater than 10% indicating poor diabetic control">   Diabetes Mellitus Type 2 in Adults, Ambulatory Care   GENERAL INFORMATION:   Diabetes mellitus type 2  is a disease that affects how your body uses glucose (sugar)  Insulin helps move sugar out of the blood so it can be used for energy  Normally, when the blood sugar level increases, the pancreas makes more insulin  Type 2 diabetes develops because either the body cannot make enough insulin, or it cannot use the insulin correctly  After many years, your pancreas may stop making insulin  Common symptoms include the following:   · More hunger or thirst than usual     · Frequent urination     · Weight loss without trying     · Blurred vision  Seek immediate care for the following symptoms:   · Severe abdominal pain, or pain that spreads to your back  You may also be vomiting  · Trouble staying awake or focusing    · Shaking or sweating    · Blurred or double vision    · Breath has a fruity, sweet smell    · Breathing is deep and labored, or rapid and shallow    · Heartbeat is fast and weak  Treatment for diabetes mellitus type 2  includes keeping your blood sugar at a normal level  You must eat the right foods, and exercise regularly  You may also need medicine if you cannot control your blood sugar level with nutrition and exercise  Manage diabetes mellitus type 2:   · Check your blood sugar level  You will be taught how to check a small drop of blood in a glucose monitor  Ask your healthcare provider when and how often to check during the day  Ask your healthcare provider what your blood sugar levels should be when you check them  · Keep track of carbohydrates (sugar and starchy foods)  Your blood sugar level can get too high if you eat too many carbohydrates   Your dietitian will help you plan meals and snacks that have the right amount of carbohydrates  · Eat low-fat foods  Some examples are skinless chicken and low-fat milk  · Eat less sodium (salt)  Some examples of high-sodium foods to limit are soy sauce, potato chips, and soup  Do not add salt to food you cook  Limit your use of table salt  · Eat high-fiber foods  Foods that are a good source of fiber include vegetables, whole grain bread, and beans  · Limit alcohol  Alcohol affects your blood sugar level and can make it harder to manage your diabetes  Women should limit alcohol to 1 drink a day  Men should limit alcohol to 2 drinks a day  A drink of alcohol is 12 ounces of beer, 5 ounces of wine, or 1½ ounces of liquor  · Get regular exercise  Exercise can help keep your blood sugar level steady, decrease your risk of heart disease, and help you lose weight  Exercise for at least 30 minutes, 5 days a week  Include muscle strengthening activities 2 days each week  Work with your healthcare provider to create an exercise plan  · Check your feet each day  for injuries or open sores  Ask your healthcare provider for activities you can do if you have an open sore  · Quit smoking  If you smoke, it is never too late to quit  Smoking can worsen the problems that may occur with diabetes  Ask your healthcare provider for information about how to stop smoking if you are having trouble quitting  · Ask about your weight:  Ask healthcare providers if you need to lose weight, and how much to lose  Ask them to help you with a weight loss program  Even a 10 to 15 pound weight loss can help you manage your blood sugar level  · Carry medical alert identification  Wear medical alert jewelry or carry a card that says you have diabetes  Ask your healthcare provider where to get these items  · Ask about vaccines  Diabetes puts you at risk of serious illness if you get the flu, pneumonia, or hepatitis   Ask your healthcare provider if you should get a flu, pneumonia, or hepatitis B vaccine, and when to get the vaccine  Follow up with your healthcare provider as directed:  Write down your questions so you remember to ask them during your visits  CARE AGREEMENT:   You have the right to help plan your care  Learn about your health condition and how it may be treated  Discuss treatment options with your caregivers to decide what care you want to receive  You always have the right to refuse treatment  The above information is an  only  It is not intended as medical advice for individual conditions or treatments  Talk to your doctor, nurse or pharmacist before following any medical regimen to see if it is safe and effective for you  © 2014 [address] is for End User's use only and may not be sold, redistributed or otherwise used for commercial purposes  All illustrations and images included in CareNotes® are the copyrighted property of A D A M , Inc  or Corby Coronado

## 2022-02-02 ENCOUNTER — TELEPHONE (OUTPATIENT)
Dept: ADMINISTRATIVE | Facility: OTHER | Age: 77
End: 2022-02-02

## 2022-02-02 NOTE — TELEPHONE ENCOUNTER
----- Message from Wadsworth Amanda, [address] [place] sent at 2/1/2022  1:04 PM EST -----  Regarding: MAMMO  02/01/22 1:04 PM    Hello, our patient Khang Gilmore has had Mammogram completed/performed  Please assist in updating the patient chart by pulling the Care Everywhere (CE) document  The date of service is 09/2021       Thank you,  Cinthia Amanda, [address] [place]   Doming[address] Krishnamurthy [address]

## 2022-02-02 NOTE — TELEPHONE ENCOUNTER
Upon review of the In Basket request we were able to locate, review, and update the patient chart as requested for Mammogram     Any additional questions or concerns should be emailed to the Practice Liaisons via [email or web address] email, please do not reply via In Basket      Thank you  José Cooper

## 2022-02-03 ENCOUNTER — DOCUMENTATION (OUTPATIENT)
Dept: NEPHROLOGY | Facility: CLINIC | Age: 77
End: 2022-02-03

## 2022-02-03 PROBLEM — N18.30 BENIGN HYPERTENSION WITH CKD (CHRONIC KIDNEY DISEASE) STAGE III (HCC): Status: ACTIVE | Noted: 2022-02-03

## 2022-02-03 PROBLEM — I12.9 BENIGN HYPERTENSION WITH CKD (CHRONIC KIDNEY DISEASE) STAGE III (HCC): Status: ACTIVE | Noted: 2022-02-03

## 2022-02-03 LAB
CHOLEST SERPL-MCNC: 132 MG/DL
CHOLEST/HDLC SERPL: 2.8 {RATIO}
EXT GLUCOSE BLD: 153
EXTERNAL BUN: 39
EXTERNAL CALCIUM: 9.9
EXTERNAL CHLORIDE: 106
EXTERNAL CO2: 30
EXTERNAL CREATININE: 1.04
EXTERNAL EGFR: 51
EXTERNAL POTASSIUM: 4.5
EXTERNAL SODIUM: 141
HBA1C MFR BLD HPLC: 7.3 %
HDLC SERPL-MCNC: 48 MG/DL (ref 50–?)
LDLC SERPL DIRECT ASSAY-MCNC: 59 MG/DL
NONHDLC SERPL-MCNC: 84 MG/DL
TRIGL SERPL-MCNC: 177 MG/DL (ref ?–150)
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
## 2022-02-03 NOTE — PATIENT INSTRUCTIONS
 All questions asked and answered  The patient has been instructed to call office at [phone] with any questions or concerns  Please obtain prescribed blood work and urine studies prior to next appointment  Avoid NSAID products which include:  Motrin, Advil, Ibuprofen, Aleve, Naprosyn, Naproxen, Mobic or Celebrex   2 g sodium diet no more than 2000 mg salt per day  A kidney function remains stable along with electrolytes  Return in one year with updated blood work and urine studies  Monitor BP at home and with family physician

## 2022-02-04 ENCOUNTER — CONSULT (OUTPATIENT)
Dept: GASTROENTEROLOGY | Facility: AMBULARY SURGERY CENTER | Age: 77
End: 2022-02-04
Payer: COMMERCIAL

## 2022-02-04 ENCOUNTER — TELEPHONE (OUTPATIENT)
Dept: NEPHROLOGY | Facility: CLINIC | Age: 77
End: 2022-02-04

## 2022-02-04 VITALS
SYSTOLIC BLOOD PRESSURE: 120 MMHG | OXYGEN SATURATION: 100 % | BODY MASS INDEX: 27.82 KG/M2 | HEART RATE: 52 BPM | WEIGHT: 138 LBS | HEIGHT: 59 IN | DIASTOLIC BLOOD PRESSURE: 62 MMHG

## 2022-02-04 DIAGNOSIS — K21.9 GASTROESOPHAGEAL REFLUX DISEASE, UNSPECIFIED WHETHER ESOPHAGITIS PRESENT: Primary | ICD-10-CM

## 2022-02-04 DIAGNOSIS — K63.5 POLYP OF COLON, UNSPECIFIED PART OF COLON, UNSPECIFIED TYPE: ICD-10-CM

## 2022-02-04 PROCEDURE — 99204 OFFICE O/P NEW MOD 45 MIN: CPT | Performed by: INTERNAL MEDICINE
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
## 2022-02-04 NOTE — TELEPHONE ENCOUNTER
Appointment Confirmation   Person confirmed appointment with  If not patient, name of the person Patient's    Date and time of appointment 02/07/22 1:00   Patient acknowledged and will be at appointment Yes   Additional Information       COVID Screening   When was screening done? 02/04/22   Does patient have any symptoms? No   Was patient told to let us know if they develop symptoms?  Yes   Patient voiced understanding
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
## 2022-02-07 ENCOUNTER — OFFICE VISIT (OUTPATIENT)
Dept: NEPHROLOGY | Facility: CLINIC | Age: 77
End: 2022-02-07
Payer: COMMERCIAL

## 2022-02-07 ENCOUNTER — TELEPHONE (OUTPATIENT)
Dept: GASTROENTEROLOGY | Facility: AMBULARY SURGERY CENTER | Age: 77
End: 2022-02-07

## 2022-02-07 VITALS
WEIGHT: 138 LBS | HEIGHT: 59 IN | BODY MASS INDEX: 27.82 KG/M2 | SYSTOLIC BLOOD PRESSURE: 176 MMHG | DIASTOLIC BLOOD PRESSURE: 72 MMHG | HEART RATE: 66 BPM

## 2022-02-07 DIAGNOSIS — Z79.4 TYPE 2 DIABETES MELLITUS WITH STAGE 3B CHRONIC KIDNEY DISEASE, WITH LONG-TERM CURRENT USE OF INSULIN (HCC): ICD-10-CM

## 2022-02-07 DIAGNOSIS — N18.32 TYPE 2 DIABETES MELLITUS WITH STAGE 3B CHRONIC KIDNEY DISEASE, WITH LONG-TERM CURRENT USE OF INSULIN (HCC): ICD-10-CM

## 2022-02-07 DIAGNOSIS — N18.30 BENIGN HYPERTENSION WITH CKD (CHRONIC KIDNEY DISEASE) STAGE III (HCC): ICD-10-CM

## 2022-02-07 DIAGNOSIS — I12.9 BENIGN HYPERTENSION WITH CKD (CHRONIC KIDNEY DISEASE) STAGE III (HCC): ICD-10-CM

## 2022-02-07 DIAGNOSIS — R80.9 MICROALBUMINURIA: ICD-10-CM

## 2022-02-07 DIAGNOSIS — E11.22 TYPE 2 DIABETES MELLITUS WITH STAGE 3B CHRONIC KIDNEY DISEASE, WITH LONG-TERM CURRENT USE OF INSULIN (HCC): ICD-10-CM

## 2022-02-07 DIAGNOSIS — N18.32 CHRONIC KIDNEY DISEASE, STAGE 3B (HCC): Primary | ICD-10-CM

## 2022-02-07 PROCEDURE — 99213 OFFICE O/P EST LOW 20 MIN: CPT | Performed by: NURSE PRACTITIONER

## 2022-02-07 NOTE — PATIENT INSTRUCTIONS
Scheduled date of EGD/colonoscopy (as of today):4/25/2022  Physician performing EGD/colonoscopy:DR BYRNES  Location of EGD[facility]colonoscopy[facility]  Desired bowel prep reviewed with patient:MIRALAX/MAG CITRATE PER DR YOUNG  Instructions reviewed with patient by:CIELO BIRD  Clearances:  REQUEST FOR CARDIAC CLEARANCE FAXED TO DR GARCIA ( CARDIOLOGY)

## 2022-02-07 NOTE — Clinical Note
Patient was seen in Nephrology office today  Blood pressure is elevated however patient reports being under stress with her son in the hospital   She would like to follow up with you in a couple weeks for blood pressure check    Please review office note for recommendation

## 2022-02-07 NOTE — TELEPHONE ENCOUNTER
Request for cardiac clearance faxed to Dr Stanislav Sanchez ([facility]) per dr Easton Bagley is scheduled for egd/colon 4/25/2022 @ [facility]

## 2022-02-23 ENCOUNTER — OFFICE VISIT (OUTPATIENT)
Dept: INTERNAL MEDICINE CLINIC | Age: 77
End: 2022-02-23
Payer: COMMERCIAL

## 2022-02-23 VITALS
WEIGHT: 139.3 LBS | TEMPERATURE: 97.8 F | HEIGHT: 59 IN | HEART RATE: 68 BPM | SYSTOLIC BLOOD PRESSURE: 174 MMHG | OXYGEN SATURATION: 100 % | BODY MASS INDEX: 28.08 KG/M2 | DIASTOLIC BLOOD PRESSURE: 72 MMHG

## 2022-02-23 DIAGNOSIS — N18.32 CHRONIC KIDNEY DISEASE, STAGE 3B (HCC): ICD-10-CM

## 2022-02-23 DIAGNOSIS — Z79.4 TYPE 2 DIABETES MELLITUS WITH STAGE 3B CHRONIC KIDNEY DISEASE, WITH LONG-TERM CURRENT USE OF INSULIN (HCC): ICD-10-CM

## 2022-02-23 DIAGNOSIS — I12.9 BENIGN HYPERTENSION WITH CKD (CHRONIC KIDNEY DISEASE) STAGE III (HCC): ICD-10-CM

## 2022-02-23 DIAGNOSIS — N18.30 BENIGN HYPERTENSION WITH CKD (CHRONIC KIDNEY DISEASE) STAGE III (HCC): ICD-10-CM

## 2022-02-23 DIAGNOSIS — E03.9 HYPOTHYROIDISM, UNSPECIFIED TYPE: ICD-10-CM

## 2022-02-23 DIAGNOSIS — E11.22 TYPE 2 DIABETES MELLITUS WITH STAGE 3B CHRONIC KIDNEY DISEASE, WITH LONG-TERM CURRENT USE OF INSULIN (HCC): ICD-10-CM

## 2022-02-23 DIAGNOSIS — K21.9 GASTROESOPHAGEAL REFLUX DISEASE, UNSPECIFIED WHETHER ESOPHAGITIS PRESENT: Primary | ICD-10-CM

## 2022-02-23 DIAGNOSIS — F11.20 CONTINUOUS OPIOID DEPENDENCE (HCC): ICD-10-CM

## 2022-02-23 DIAGNOSIS — N18.32 TYPE 2 DIABETES MELLITUS WITH STAGE 3B CHRONIC KIDNEY DISEASE, WITH LONG-TERM CURRENT USE OF INSULIN (HCC): ICD-10-CM

## 2022-02-23 PROCEDURE — 1160F RVW MEDS BY RX/DR IN RCRD: CPT | Performed by: INTERNAL MEDICINE

## 2022-02-23 PROCEDURE — 99213 OFFICE O/P EST LOW 20 MIN: CPT | Performed by: INTERNAL MEDICINE

## 2022-02-23 PROCEDURE — 3078F DIAST BP <80 MM HG: CPT | Performed by: INTERNAL MEDICINE

## 2022-02-23 PROCEDURE — 3077F SYST BP >= 140 MM HG: CPT | Performed by: INTERNAL MEDICINE

## 2022-02-23 PROCEDURE — 1036F TOBACCO NON-USER: CPT | Performed by: INTERNAL MEDICINE

## 2022-02-23 RX ORDER — AMLODIPINE BESYLATE 5 MG/1
5 TABLET ORAL DAILY
Qty: 90 TABLET | Refills: 2 | Status: SHIPPED | OUTPATIENT
Start: 2022-02-23 | End: 2022-04-21 | Stop reason: SDUPTHER
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
## 2022-03-03 DIAGNOSIS — M54.50 LOW BACK PAIN: ICD-10-CM

## 2022-03-03 RX ORDER — TRAMADOL HYDROCHLORIDE 50 MG/1
50 TABLET ORAL 2 TIMES DAILY
Qty: 60 TABLET | Refills: 0 | Status: SHIPPED | OUTPATIENT
Start: 2022-03-03 | End: 2022-03-31 | Stop reason: SDUPTHER

## 2022-03-23 ENCOUNTER — OFFICE VISIT (OUTPATIENT)
Dept: INTERNAL MEDICINE CLINIC | Age: 77
End: 2022-03-23
Payer: COMMERCIAL

## 2022-03-23 VITALS
BODY MASS INDEX: 28.48 KG/M2 | HEIGHT: 59 IN | SYSTOLIC BLOOD PRESSURE: 160 MMHG | OXYGEN SATURATION: 98 % | DIASTOLIC BLOOD PRESSURE: 70 MMHG | WEIGHT: 141.3 LBS | TEMPERATURE: 98.1 F | HEART RATE: 68 BPM

## 2022-03-23 DIAGNOSIS — E78.2 MIXED HYPERLIPIDEMIA: ICD-10-CM

## 2022-03-23 DIAGNOSIS — I10 PRIMARY HYPERTENSION: Primary | ICD-10-CM

## 2022-03-23 DIAGNOSIS — N18.32 STAGE 3B CHRONIC KIDNEY DISEASE (HCC): ICD-10-CM

## 2022-03-23 DIAGNOSIS — E03.9 HYPOTHYROIDISM, UNSPECIFIED TYPE: ICD-10-CM

## 2022-03-23 DIAGNOSIS — J45.909 ASTHMA WITHOUT STATUS ASTHMATICUS WITHOUT COMPLICATION, UNSPECIFIED ASTHMA SEVERITY, UNSPECIFIED WHETHER PERSISTENT: ICD-10-CM

## 2022-03-23 PROCEDURE — 3078F DIAST BP <80 MM HG: CPT | Performed by: STUDENT IN AN ORGANIZED HEALTH CARE EDUCATION/TRAINING PROGRAM

## 2022-03-23 PROCEDURE — 99214 OFFICE O/P EST MOD 30 MIN: CPT | Performed by: STUDENT IN AN ORGANIZED HEALTH CARE EDUCATION/TRAINING PROGRAM

## 2022-03-23 PROCEDURE — 1036F TOBACCO NON-USER: CPT | Performed by: STUDENT IN AN ORGANIZED HEALTH CARE EDUCATION/TRAINING PROGRAM

## 2022-03-23 PROCEDURE — 1160F RVW MEDS BY RX/DR IN RCRD: CPT | Performed by: STUDENT IN AN ORGANIZED HEALTH CARE EDUCATION/TRAINING PROGRAM

## 2022-03-23 PROCEDURE — 3077F SYST BP >= 140 MM HG: CPT | Performed by: STUDENT IN AN ORGANIZED HEALTH CARE EDUCATION/TRAINING PROGRAM

## 2022-03-23 RX ORDER — BUDESONIDE AND FORMOTEROL FUMARATE DIHYDRATE 160; 4.5 UG/1; UG/1
2 AEROSOL RESPIRATORY (INHALATION) 2 TIMES DAILY
Qty: 120 G | Refills: 1 | Status: SHIPPED | OUTPATIENT
Start: 2022-03-23 | End: 2022-06-08 | Stop reason: SDUPTHER

## 2022-03-23 RX ORDER — LOSARTAN POTASSIUM 50 MG/1
TABLET ORAL
COMMUNITY
Start: 2022-03-17 | End: 2022-04-21 | Stop reason: DRUGHIGH

## 2022-03-23 RX ORDER — NEBIVOLOL 20 MG/1
20 TABLET ORAL DAILY
Qty: 60 TABLET | Refills: 2 | Status: ON HOLD | OUTPATIENT
Start: 2022-03-23 | End: 2022-06-29 | Stop reason: ALTCHOICE
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
## 2022-03-23 NOTE — PATIENT INSTRUCTIONS
Your blood pressure was elevated at today's visit  Continue taking Losartan 100 mg daily along with amlodipine 5 mg  Stop taking Metoprolol Also start taking Bystolic 20 mg daily

## 2022-03-31 DIAGNOSIS — M54.50 LOW BACK PAIN: ICD-10-CM

## 2022-03-31 DIAGNOSIS — M54.50 ACUTE MIDLINE LOW BACK PAIN, UNSPECIFIED WHETHER SCIATICA PRESENT: ICD-10-CM

## 2022-03-31 DIAGNOSIS — J45.41 MODERATE PERSISTENT ASTHMA WITH ACUTE EXACERBATION: ICD-10-CM

## 2022-03-31 NOTE — TELEPHONE ENCOUNTER
Patient's  stopped by with a refill request for the following two medication    Tramadol 50 mg 1-2 tablets a day PRN    Methocarbamol 500 mg 1 tablet 3 times a day    MICHELLE Pham

## 2022-04-01 RX ORDER — METHOCARBAMOL 500 MG/1
500 TABLET, FILM COATED ORAL 3 TIMES DAILY
Qty: 90 TABLET | Refills: 1 | Status: SHIPPED | OUTPATIENT
Start: 2022-04-01 | End: 2022-06-08 | Stop reason: SDUPTHER

## 2022-04-01 RX ORDER — TRAMADOL HYDROCHLORIDE 50 MG/1
50 TABLET ORAL 2 TIMES DAILY
Qty: 60 TABLET | Refills: 0 | Status: SHIPPED | OUTPATIENT
Start: 2022-04-01 | End: 2022-05-09 | Stop reason: SDUPTHER

## 2022-04-07 ENCOUNTER — OFFICE VISIT (OUTPATIENT)
Dept: PODIATRY | Facility: CLINIC | Age: 77
End: 2022-04-07
Payer: COMMERCIAL

## 2022-04-07 VITALS
WEIGHT: 143 LBS | HEIGHT: 59 IN | SYSTOLIC BLOOD PRESSURE: 163 MMHG | DIASTOLIC BLOOD PRESSURE: 62 MMHG | HEART RATE: 67 BPM | BODY MASS INDEX: 28.83 KG/M2

## 2022-04-07 DIAGNOSIS — E11.42 DIABETIC POLYNEUROPATHY ASSOCIATED WITH TYPE 2 DIABETES MELLITUS (HCC): Primary | ICD-10-CM

## 2022-04-07 DIAGNOSIS — I73.9 PERIPHERAL VASCULAR DISEASE, UNSPECIFIED (HCC): ICD-10-CM

## 2022-04-07 PROCEDURE — 99212 OFFICE O/P EST SF 10 MIN: CPT | Performed by: PODIATRIST

## 2022-04-07 NOTE — PROGRESS NOTES
Patient presents for palliative foot care  No acute disorder noted  There are no palpable pedal pulses  Treatment consisted of nail trimming

## 2022-04-11 LAB
ALDOST SERPL-MCNC: <5 NG/DL
ALDOST/RENIN PLAS-RTO: NORMAL RATIO (ref 0.9–28.9)
BUN SERPL-MCNC: 51 MG/DL (ref 7–25)
BUN/CREAT SERPL: 39 (CALC) (ref 6–22)
CALCIUM SERPL-MCNC: 9.7 MG/DL (ref 8.6–10.4)
CHLORIDE SERPL-SCNC: 106 MMOL/L (ref 98–110)
CO2 SERPL-SCNC: 28 MMOL/L (ref 20–32)
CREAT SERPL-MCNC: 1.31 MG/DL (ref 0.6–0.93)
GLUCOSE SERPL-MCNC: 108 MG/DL (ref 65–99)
POTASSIUM SERPL-SCNC: 4.4 MMOL/L (ref 3.5–5.3)
RENIN PLAS-CCNC: 0.8 NG/ML/H (ref 0.25–5.82)
SL AMB EGFR AFRICAN AMERICAN: 45 ML/MIN/1.73M2
SL AMB EGFR NON AFRICAN AMERICAN: 39 ML/MIN/1.73M2
SODIUM SERPL-SCNC: 141 MMOL/L (ref 135–146)

## 2022-04-18 ENCOUNTER — TELEPHONE (OUTPATIENT)
Dept: GASTROENTEROLOGY | Facility: CLINIC | Age: 77
End: 2022-04-18

## 2022-04-18 NOTE — TELEPHONE ENCOUNTER
Patient canceled EGD/colonoscopy with Dr Nelly López at [address] [place] [address] on 4/25/22 due to blood pressure issues  Patient will call back to reschedule

## 2022-04-21 ENCOUNTER — OFFICE VISIT (OUTPATIENT)
Dept: INTERNAL MEDICINE CLINIC | Age: 77
End: 2022-04-21
Payer: COMMERCIAL

## 2022-04-21 VITALS
WEIGHT: 141 LBS | SYSTOLIC BLOOD PRESSURE: 180 MMHG | DIASTOLIC BLOOD PRESSURE: 66 MMHG | TEMPERATURE: 98.7 F | BODY MASS INDEX: 28.43 KG/M2 | HEIGHT: 59 IN | HEART RATE: 67 BPM | OXYGEN SATURATION: 99 %

## 2022-04-21 DIAGNOSIS — E11.42 TYPE 2 DIABETES MELLITUS WITH DIABETIC POLYNEUROPATHY, WITH LONG-TERM CURRENT USE OF INSULIN (HCC): ICD-10-CM

## 2022-04-21 DIAGNOSIS — Z79.4 TYPE 2 DIABETES MELLITUS WITH COMPLICATION, WITH LONG-TERM CURRENT USE OF INSULIN (HCC): ICD-10-CM

## 2022-04-21 DIAGNOSIS — E11.22 TYPE 2 DIABETES MELLITUS WITH STAGE 3B CHRONIC KIDNEY DISEASE, WITH LONG-TERM CURRENT USE OF INSULIN (HCC): Primary | ICD-10-CM

## 2022-04-21 DIAGNOSIS — Z79.4 TYPE 2 DIABETES MELLITUS WITH DIABETIC POLYNEUROPATHY, WITH LONG-TERM CURRENT USE OF INSULIN (HCC): ICD-10-CM

## 2022-04-21 DIAGNOSIS — E79.0 HYPERURICEMIA: ICD-10-CM

## 2022-04-21 DIAGNOSIS — Z79.4 TYPE 2 DIABETES MELLITUS WITH STAGE 3B CHRONIC KIDNEY DISEASE, WITH LONG-TERM CURRENT USE OF INSULIN (HCC): Primary | ICD-10-CM

## 2022-04-21 DIAGNOSIS — M54.50 LOW BACK PAIN, UNSPECIFIED BACK PAIN LATERALITY, UNSPECIFIED CHRONICITY, UNSPECIFIED WHETHER SCIATICA PRESENT: ICD-10-CM

## 2022-04-21 DIAGNOSIS — E11.8 TYPE 2 DIABETES MELLITUS WITH COMPLICATION, WITH LONG-TERM CURRENT USE OF INSULIN (HCC): ICD-10-CM

## 2022-04-21 DIAGNOSIS — N18.32 TYPE 2 DIABETES MELLITUS WITH STAGE 3B CHRONIC KIDNEY DISEASE, WITH LONG-TERM CURRENT USE OF INSULIN (HCC): Primary | ICD-10-CM

## 2022-04-21 DIAGNOSIS — I12.9 BENIGN HYPERTENSION WITH CKD (CHRONIC KIDNEY DISEASE) STAGE III (HCC): ICD-10-CM

## 2022-04-21 DIAGNOSIS — N18.30 BENIGN HYPERTENSION WITH CKD (CHRONIC KIDNEY DISEASE) STAGE III (HCC): ICD-10-CM

## 2022-04-21 PROCEDURE — 3725F SCREEN DEPRESSION PERFORMED: CPT | Performed by: INTERNAL MEDICINE

## 2022-04-21 PROCEDURE — 3077F SYST BP >= 140 MM HG: CPT | Performed by: INTERNAL MEDICINE

## 2022-04-21 PROCEDURE — 1036F TOBACCO NON-USER: CPT | Performed by: INTERNAL MEDICINE

## 2022-04-21 PROCEDURE — 1160F RVW MEDS BY RX/DR IN RCRD: CPT | Performed by: INTERNAL MEDICINE

## 2022-04-21 PROCEDURE — 99214 OFFICE O/P EST MOD 30 MIN: CPT | Performed by: INTERNAL MEDICINE

## 2022-04-21 PROCEDURE — 3078F DIAST BP <80 MM HG: CPT | Performed by: INTERNAL MEDICINE

## 2022-04-21 RX ORDER — ALLOPURINOL 100 MG/1
200 TABLET ORAL DAILY
Qty: 60 TABLET | Refills: 5 | Status: SHIPPED | OUTPATIENT
Start: 2022-04-21

## 2022-04-21 RX ORDER — AMLODIPINE BESYLATE 10 MG/1
5 TABLET ORAL DAILY
Qty: 90 TABLET | Refills: 1 | Status: SHIPPED | OUTPATIENT
Start: 2022-04-21 | End: 2022-04-25 | Stop reason: DRUGHIGH

## 2022-04-21 RX ORDER — INSULIN DETEMIR 100 [IU]/ML
40 INJECTION, SOLUTION SUBCUTANEOUS EVERY 12 HOURS SCHEDULED
Qty: 20 ML | Refills: 5 | Status: SHIPPED | OUTPATIENT
Start: 2022-04-21

## 2022-04-21 NOTE — PATIENT INSTRUCTIONS
10% - bad control"> 10% - bad control,Hemoglobin A1c (HbA1c) greater than 10% indicating poor diabetic control,Haemoglobin A1c greater than 10% indicating poor diabetic control">   Diabetes Mellitus Type 2 in Adults, Ambulatory Care   GENERAL INFORMATION:   Diabetes mellitus type 2  is a disease that affects how your body uses glucose (sugar)  Insulin helps move sugar out of the blood so it can be used for energy  Normally, when the blood sugar level increases, the pancreas makes more insulin  Type 2 diabetes develops because either the body cannot make enough insulin, or it cannot use the insulin correctly  After many years, your pancreas may stop making insulin  Common symptoms include the following:   · More hunger or thirst than usual     · Frequent urination     · Weight loss without trying     · Blurred vision  Seek immediate care for the following symptoms:   · Severe abdominal pain, or pain that spreads to your back  You may also be vomiting  · Trouble staying awake or focusing    · Shaking or sweating    · Blurred or double vision    · Breath has a fruity, sweet smell    · Breathing is deep and labored, or rapid and shallow    · Heartbeat is fast and weak  Treatment for diabetes mellitus type 2  includes keeping your blood sugar at a normal level  You must eat the right foods, and exercise regularly  You may also need medicine if you cannot control your blood sugar level with nutrition and exercise  Manage diabetes mellitus type 2:   · Check your blood sugar level  You will be taught how to check a small drop of blood in a glucose monitor  Ask your healthcare provider when and how often to check during the day  Ask your healthcare provider what your blood sugar levels should be when you check them  · Keep track of carbohydrates (sugar and starchy foods)  Your blood sugar level can get too high if you eat too many carbohydrates   Your dietitian will help you plan meals and snacks that have the right amount of carbohydrates  · Eat low-fat foods  Some examples are skinless chicken and low-fat milk  · Eat less sodium (salt)  Some examples of high-sodium foods to limit are soy sauce, potato chips, and soup  Do not add salt to food you cook  Limit your use of table salt  · Eat high-fiber foods  Foods that are a good source of fiber include vegetables, whole grain bread, and beans  · Limit alcohol  Alcohol affects your blood sugar level and can make it harder to manage your diabetes  Women should limit alcohol to 1 drink a day  Men should limit alcohol to 2 drinks a day  A drink of alcohol is 12 ounces of beer, 5 ounces of wine, or 1½ ounces of liquor  · Get regular exercise  Exercise can help keep your blood sugar level steady, decrease your risk of heart disease, and help you lose weight  Exercise for at least 30 minutes, 5 days a week  Include muscle strengthening activities 2 days each week  Work with your healthcare provider to create an exercise plan  · Check your feet each day  for injuries or open sores  Ask your healthcare provider for activities you can do if you have an open sore  · Quit smoking  If you smoke, it is never too late to quit  Smoking can worsen the problems that may occur with diabetes  Ask your healthcare provider for information about how to stop smoking if you are having trouble quitting  · Ask about your weight:  Ask healthcare providers if you need to lose weight, and how much to lose  Ask them to help you with a weight loss program  Even a 10 to 15 pound weight loss can help you manage your blood sugar level  · Carry medical alert identification  Wear medical alert jewelry or carry a card that says you have diabetes  Ask your healthcare provider where to get these items  · Ask about vaccines  Diabetes puts you at risk of serious illness if you get the flu, pneumonia, or hepatitis   Ask your healthcare provider if you should get a flu, pneumonia, or hepatitis B vaccine, and when to get the vaccine  Follow up with your healthcare provider as directed:  Write down your questions so you remember to ask them during your visits  CARE AGREEMENT:   You have the right to help plan your care  Learn about your health condition and how it may be treated  Discuss treatment options with your caregivers to decide what care you want to receive  You always have the right to refuse treatment  The above information is an  only  It is not intended as medical advice for individual conditions or treatments  Talk to your doctor, nurse or pharmacist before following any medical regimen to see if it is safe and effective for you  © 2014 [address] is for End User's use only and may not be sold, redistributed or otherwise used for commercial purposes  All illustrations and images included in CareNotes® are the copyrighted property of A D A M , Inc  or Corby Coronado
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
## 2022-04-21 NOTE — PROGRESS NOTES
Diabetic Foot Exam    Patient's shoes and socks removed  Right Foot/Ankle   Right Foot Inspection      Sensory   Monofilament testing: intact    Vascular  The right DP pulse is 2+  The right PT pulse is 2+  Left Foot/Ankle  Left Foot Inspection      Sensory   Monofilament testing: diminished    Vascular  The left DP pulse is 2+  The left PT pulse is 2+       Assign Risk Category  No deformity present  Loss of protective sensation  No weak pulses  Risk: 1

## 2022-04-22 ENCOUNTER — TELEPHONE (OUTPATIENT)
Dept: INTERNAL MEDICINE CLINIC | Age: 77
End: 2022-04-22

## 2022-04-22 ENCOUNTER — TELEPHONE (OUTPATIENT)
Dept: NEPHROLOGY | Facility: CLINIC | Age: 77
End: 2022-04-22

## 2022-04-22 NOTE — TELEPHONE ENCOUNTER
Patient is calling back to let you know that she did call the Nephrologist office to make the appointment  The first available date that they have is not till 07/07/2022 and that is with a CRNP      Please call her back to let her know if this is ok or not    Home phone

## 2022-04-25 ENCOUNTER — TELEPHONE (OUTPATIENT)
Dept: NEPHROLOGY | Facility: CLINIC | Age: 77
End: 2022-04-25

## 2022-04-25 DIAGNOSIS — I12.9 BENIGN HYPERTENSION WITH CKD (CHRONIC KIDNEY DISEASE) STAGE III (HCC): ICD-10-CM

## 2022-04-25 DIAGNOSIS — N18.30 BENIGN HYPERTENSION WITH CKD (CHRONIC KIDNEY DISEASE) STAGE III (HCC): ICD-10-CM

## 2022-04-25 DIAGNOSIS — I12.9 BENIGN HYPERTENSION WITH CKD (CHRONIC KIDNEY DISEASE) STAGE III (HCC): Primary | ICD-10-CM

## 2022-04-25 DIAGNOSIS — N18.30 BENIGN HYPERTENSION WITH CKD (CHRONIC KIDNEY DISEASE) STAGE III (HCC): Primary | ICD-10-CM

## 2022-04-25 RX ORDER — AMLODIPINE BESYLATE 10 MG/1
10 TABLET ORAL DAILY
Qty: 90 TABLET | Refills: 1 | Status: ON HOLD | OUTPATIENT
Start: 2022-04-25 | End: 2022-06-29 | Stop reason: ALTCHOICE

## 2022-04-25 RX ORDER — AMLODIPINE BESYLATE 10 MG/1
10 TABLET ORAL DAILY
COMMUNITY
End: 2022-04-25 | Stop reason: SDUPTHER

## 2022-04-25 NOTE — TELEPHONE ENCOUNTER
I received a call today from Hannah Salinas- Dr Adenike Ritter- asking that Porsha's appt 7/7 be moved up due to her labs  HELP- there is nothing open AO, KADI or EO with you or an AP   Please advise

## 2022-04-25 NOTE — TELEPHONE ENCOUNTER
Good morning, please try to call nephrologist office and try to get her as early as possible      Thanks

## 2022-04-25 NOTE — TELEPHONE ENCOUNTER
Called the nephrologist office and spoke with Anil Ferrari  She will look at all the offices and see if there is any openings  If she can't find any, she will send her  a message and also the provider to find out where they can put her in    She will call me to let me know and then call the patient

## 2022-04-26 NOTE — TELEPHONE ENCOUNTER
Spoke with Kaitlin Pruitt today and she stated that Dr Adelaida Ragland states that patient doesn't need an earlier appointment with them  She can keep the one in July  She would need a repeat BMP done in May      Will call patient and let her know this as well

## 2022-05-09 DIAGNOSIS — M54.50 LOW BACK PAIN: ICD-10-CM

## 2022-05-09 RX ORDER — TRAMADOL HYDROCHLORIDE 50 MG/1
50 TABLET ORAL 2 TIMES DAILY
Qty: 60 TABLET | Refills: 0 | Status: SHIPPED | OUTPATIENT
Start: 2022-05-09 | End: 2022-06-08 | Stop reason: SDUPTHER

## 2022-05-16 ENCOUNTER — OFFICE VISIT (OUTPATIENT)
Dept: INTERNAL MEDICINE CLINIC | Age: 77
End: 2022-05-16
Payer: COMMERCIAL

## 2022-05-16 VITALS
TEMPERATURE: 98.2 F | WEIGHT: 144 LBS | BODY MASS INDEX: 29.03 KG/M2 | SYSTOLIC BLOOD PRESSURE: 162 MMHG | HEIGHT: 59 IN | HEART RATE: 74 BPM | DIASTOLIC BLOOD PRESSURE: 68 MMHG | OXYGEN SATURATION: 97 %

## 2022-05-16 DIAGNOSIS — I12.9 BENIGN HYPERTENSION WITH CKD (CHRONIC KIDNEY DISEASE) STAGE III (HCC): ICD-10-CM

## 2022-05-16 DIAGNOSIS — E03.9 ACQUIRED HYPOTHYROIDISM: ICD-10-CM

## 2022-05-16 DIAGNOSIS — N18.30 BENIGN HYPERTENSION WITH CKD (CHRONIC KIDNEY DISEASE) STAGE III (HCC): ICD-10-CM

## 2022-05-16 DIAGNOSIS — R60.0 PEDAL EDEMA: Primary | ICD-10-CM

## 2022-05-16 DIAGNOSIS — N18.32 CHRONIC KIDNEY DISEASE, STAGE 3B (HCC): ICD-10-CM

## 2022-05-16 PROCEDURE — 1101F PT FALLS ASSESS-DOCD LE1/YR: CPT | Performed by: INTERNAL MEDICINE

## 2022-05-16 PROCEDURE — 99214 OFFICE O/P EST MOD 30 MIN: CPT | Performed by: INTERNAL MEDICINE

## 2022-05-16 PROCEDURE — 3077F SYST BP >= 140 MM HG: CPT | Performed by: INTERNAL MEDICINE

## 2022-05-16 PROCEDURE — 1036F TOBACCO NON-USER: CPT | Performed by: INTERNAL MEDICINE

## 2022-05-16 PROCEDURE — 1160F RVW MEDS BY RX/DR IN RCRD: CPT | Performed by: INTERNAL MEDICINE

## 2022-05-16 PROCEDURE — 3288F FALL RISK ASSESSMENT DOCD: CPT | Performed by: INTERNAL MEDICINE

## 2022-05-16 PROCEDURE — 3078F DIAST BP <80 MM HG: CPT | Performed by: INTERNAL MEDICINE

## 2022-05-16 PROCEDURE — 3725F SCREEN DEPRESSION PERFORMED: CPT | Performed by: INTERNAL MEDICINE

## 2022-05-16 NOTE — PROGRESS NOTES
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 conditions, and   other considerations  Currently, no consensus exists regarding use of  hemoglobin A1c for diagnosis of diabetes for children  Orders Only on 06/15/2021   Component Date Value Ref Range Status    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   Final    Left Eye Diabetic Retinopathy 06/15/2021 Proliferative   Final   Orders Only on 05/18/2021   Component Date Value Ref Range Status    Total Cholesterol 05/18/2021 132  <200 mg/dL Final    HDL 05/18/2021 50  > OR = 50 mg/dL Final    Triglycerides 05/18/2021 203 (A) <150 mg/dL Final    Comment:    If a non-fasting specimen was collected, consider  repeat triglyceride testing on a fasting specimen  if clinically indicated  Hilaria Thomas et al  J  of Clin  Lipidol  [id number]   LDL Calculated 05/18/2021 55  mg/dL (calc) Final    Comment: Reference range: <100     Desirable range <100 mg/dL for primary prevention;    <70 mg/dL for patients with CHD or diabetic patients   with > or = 2 CHD risk factors       LDL-C is now calculated using the Mark-Altman   calculation, which is a validated novel method providing   better accuracy than the Friedewald equation in the   estimation of LDL-C  Wong Burns  [place] [id number];801(35): 7630-9113   (http://[employer]/faq/EVQ602)      Chol HDLC Ratio 05/18/2021 2 6  <5 0 (calc) Final    Non-HDL Cholesterol 05/18/2021 82  <130 mg/dL (calc) Final    Comment: For patients with diabetes plus 1 major ASCVD risk   factor, treating to a non-HDL-C goal of <100 mg/dL   (LDL-C of <70 mg/dL) is considered a therapeutic   option   Glucose, Random 05/18/2021 158 (A) 65 - 99 mg/dL Final    Comment:               Fasting reference interval     For someone without known diabetes, a glucose  value >125 mg/dL indicates that they may have  diabetes and this should be confirmed with a  follow-up test          BUN 05/18/2021 50 (A) 7 - 25 mg/dL Final    Creatinine 05/18/2021 1 21 (A) 0 60 - 0 93 mg/dL Final    Comment: For patients >52years of age, the reference limit  for Creatinine is approximately 13% higher for people  identified as -American           eGFR Non  05/18/2021 43 (A) > OR = 60 mL/min/1 73m2 Final    eGFR  05/18/2021 50 (A) > OR = 60 mL/min/1 73m2 Final    SL AMB BUN/CREATININE RATIO 05/18/2021 41 (A) 6 - 22 (calc) Final    Sodium 05/18/2021 140  135 - 146 mmol/L Final    Potassium 05/18/2021 5 0  3 5 - 5 3 mmol/L Final    Chloride 05/18/2021 103  98 - 110 mmol/L Final    CO2 05/18/2021 30  20 - 32 mmol/L Final    Calcium 05/18/2021 10 0  8 6 - 10 4 mg/dL Final    TSH W/RFX TO FREE T4 05/18/2021 3 87  0 40 - 4 50 mIU/L Final    Hemoglobin A1C 05/18/2021 8 3 (A) <5 7 % of total Hgb Final    Comment: For someone without known diabetes, a hemoglobin A1c  value of 6 5% or greater indicates that they may have   diabetes and this should be confirmed with a follow-up   test      For someone with known diabetes, a value <7% indicates   that their diabetes is well controlled and a value   greater than or equal to 7% indicates suboptimal   control  A1c targets should be individualized based on   duration of diabetes, age, comorbid conditions, and   other considerations  Currently, no consensus exists regarding use of  hemoglobin A1c for diagnosis of diabetes for children

## 2022-05-27 ENCOUNTER — RA CDI HCC (OUTPATIENT)
Dept: OTHER | Facility: HOSPITAL | Age: 77
End: 2022-05-27

## 2022-05-27 NOTE — PROGRESS NOTES
[facility]  coding opportunities       Chart reviewed, no opportunity found: CHART REVIEWED, NO OPPORTUNITY FOUND        Patients Insurance     Medicare Insurance: [facility] Advantage

## 2022-05-31 LAB
BUN SERPL-MCNC: 62 MG/DL (ref 7–25)
BUN/CREAT SERPL: 42 (CALC) (ref 6–22)
CALCIUM SERPL-MCNC: 10.2 MG/DL (ref 8.6–10.4)
CHLORIDE SERPL-SCNC: 105 MMOL/L (ref 98–110)
CO2 SERPL-SCNC: 28 MMOL/L (ref 20–32)
CREAT SERPL-MCNC: 1.49 MG/DL (ref 0.6–0.93)
GLUCOSE SERPL-MCNC: 154 MG/DL (ref 65–99)
HBA1C MFR BLD: 6.1 % OF TOTAL HGB
POTASSIUM SERPL-SCNC: 4.9 MMOL/L (ref 3.5–5.3)
SL AMB EGFR AFRICAN AMERICAN: 39 ML/MIN/1.73M2
SL AMB EGFR NON AFRICAN AMERICAN: 33 ML/MIN/1.73M2
SODIUM SERPL-SCNC: 139 MMOL/L (ref 135–146)

## 2022-06-08 ENCOUNTER — OFFICE VISIT (OUTPATIENT)
Dept: INTERNAL MEDICINE CLINIC | Age: 77
End: 2022-06-08
Payer: COMMERCIAL

## 2022-06-08 VITALS
TEMPERATURE: 98.3 F | OXYGEN SATURATION: 96 % | SYSTOLIC BLOOD PRESSURE: 180 MMHG | BODY MASS INDEX: 29.11 KG/M2 | HEART RATE: 75 BPM | HEIGHT: 59 IN | DIASTOLIC BLOOD PRESSURE: 72 MMHG | WEIGHT: 144.4 LBS

## 2022-06-08 DIAGNOSIS — E11.22 TYPE 2 DIABETES MELLITUS WITH STAGE 3B CHRONIC KIDNEY DISEASE, WITH LONG-TERM CURRENT USE OF INSULIN (HCC): ICD-10-CM

## 2022-06-08 DIAGNOSIS — E78.2 MIXED HYPERLIPIDEMIA: ICD-10-CM

## 2022-06-08 DIAGNOSIS — M54.50 LOW BACK PAIN: ICD-10-CM

## 2022-06-08 DIAGNOSIS — J45.909 ASTHMA WITHOUT STATUS ASTHMATICUS WITHOUT COMPLICATION, UNSPECIFIED ASTHMA SEVERITY, UNSPECIFIED WHETHER PERSISTENT: ICD-10-CM

## 2022-06-08 DIAGNOSIS — Z79.4 TYPE 2 DIABETES MELLITUS WITH STAGE 3B CHRONIC KIDNEY DISEASE, WITH LONG-TERM CURRENT USE OF INSULIN (HCC): ICD-10-CM

## 2022-06-08 DIAGNOSIS — J31.0 RHINITIS, UNSPECIFIED TYPE: ICD-10-CM

## 2022-06-08 DIAGNOSIS — J45.41 MODERATE PERSISTENT ASTHMA WITH ACUTE EXACERBATION: ICD-10-CM

## 2022-06-08 DIAGNOSIS — I10 HYPERTENSION, UNSPECIFIED TYPE: ICD-10-CM

## 2022-06-08 DIAGNOSIS — E78.5 HYPERLIPIDEMIA, UNSPECIFIED HYPERLIPIDEMIA TYPE: ICD-10-CM

## 2022-06-08 DIAGNOSIS — Z00.00 MEDICARE ANNUAL WELLNESS VISIT, SUBSEQUENT: ICD-10-CM

## 2022-06-08 DIAGNOSIS — N18.32 STAGE 3B CHRONIC KIDNEY DISEASE (HCC): ICD-10-CM

## 2022-06-08 DIAGNOSIS — E11.3553 TYPE 2 DIABETES MELLITUS WITH STABLE PROLIFERATIVE RETINOPATHY OF BOTH EYES, WITH LONG-TERM CURRENT USE OF INSULIN (HCC): ICD-10-CM

## 2022-06-08 DIAGNOSIS — Z79.4 TYPE 2 DIABETES MELLITUS WITH STABLE PROLIFERATIVE RETINOPATHY OF BOTH EYES, WITH LONG-TERM CURRENT USE OF INSULIN (HCC): ICD-10-CM

## 2022-06-08 DIAGNOSIS — N18.32 TYPE 2 DIABETES MELLITUS WITH STAGE 3B CHRONIC KIDNEY DISEASE, WITH LONG-TERM CURRENT USE OF INSULIN (HCC): ICD-10-CM

## 2022-06-08 DIAGNOSIS — E03.9 ACQUIRED HYPOTHYROIDISM: ICD-10-CM

## 2022-06-08 DIAGNOSIS — K21.9 GASTROESOPHAGEAL REFLUX DISEASE, UNSPECIFIED WHETHER ESOPHAGITIS PRESENT: Primary | ICD-10-CM

## 2022-06-08 DIAGNOSIS — N18.30 BENIGN HYPERTENSION WITH CKD (CHRONIC KIDNEY DISEASE) STAGE III (HCC): ICD-10-CM

## 2022-06-08 DIAGNOSIS — I12.9 BENIGN HYPERTENSION WITH CKD (CHRONIC KIDNEY DISEASE) STAGE III (HCC): ICD-10-CM

## 2022-06-08 DIAGNOSIS — M54.50 ACUTE MIDLINE LOW BACK PAIN, UNSPECIFIED WHETHER SCIATICA PRESENT: ICD-10-CM

## 2022-06-08 DIAGNOSIS — E11.8 TYPE 2 DIABETES MELLITUS WITH COMPLICATION, WITH LONG-TERM CURRENT USE OF INSULIN (HCC): ICD-10-CM

## 2022-06-08 DIAGNOSIS — Z79.4 TYPE 2 DIABETES MELLITUS WITH COMPLICATION, WITH LONG-TERM CURRENT USE OF INSULIN (HCC): ICD-10-CM

## 2022-06-08 PROCEDURE — 1125F AMNT PAIN NOTED PAIN PRSNT: CPT | Performed by: INTERNAL MEDICINE

## 2022-06-08 PROCEDURE — 1170F FXNL STATUS ASSESSED: CPT | Performed by: INTERNAL MEDICINE

## 2022-06-08 PROCEDURE — 99214 OFFICE O/P EST MOD 30 MIN: CPT | Performed by: INTERNAL MEDICINE

## 2022-06-08 PROCEDURE — 3725F SCREEN DEPRESSION PERFORMED: CPT | Performed by: INTERNAL MEDICINE

## 2022-06-08 PROCEDURE — 3288F FALL RISK ASSESSMENT DOCD: CPT | Performed by: INTERNAL MEDICINE

## 2022-06-08 PROCEDURE — G0439 PPPS, SUBSEQ VISIT: HCPCS | Performed by: INTERNAL MEDICINE

## 2022-06-08 RX ORDER — SYRINGE-NEEDLE,INSULIN,0.5 ML 31 GX5/16"
SYRINGE, EMPTY DISPOSABLE MISCELLANEOUS 4 TIMES DAILY
Qty: 200 EACH | Refills: 5 | Status: SHIPPED | OUTPATIENT
Start: 2022-06-08

## 2022-06-08 RX ORDER — ATORVASTATIN CALCIUM 80 MG/1
80 TABLET, FILM COATED ORAL DAILY
Qty: 90 TABLET | Refills: 1 | Status: SHIPPED | OUTPATIENT
Start: 2022-06-08

## 2022-06-08 RX ORDER — CARVEDILOL 12.5 MG/1
12.5 TABLET ORAL 2 TIMES DAILY WITH MEALS
COMMUNITY
Start: 2022-06-03 | End: 2023-06-03

## 2022-06-08 RX ORDER — METHOCARBAMOL 500 MG/1
500 TABLET, FILM COATED ORAL 3 TIMES DAILY
Qty: 90 TABLET | Refills: 1 | Status: ON HOLD | OUTPATIENT
Start: 2022-06-08 | End: 2022-07-03 | Stop reason: SDUPTHER

## 2022-06-08 RX ORDER — NIFEDIPINE 60 MG/1
60 TABLET, EXTENDED RELEASE ORAL DAILY
Status: ON HOLD | COMMUNITY
Start: 2022-06-03 | End: 2022-06-29 | Stop reason: ALTCHOICE

## 2022-06-08 RX ORDER — MONTELUKAST SODIUM 10 MG/1
10 TABLET ORAL
Qty: 30 TABLET | Refills: 5 | Status: SHIPPED | OUTPATIENT
Start: 2022-06-08

## 2022-06-08 RX ORDER — BUMETANIDE 2 MG/1
2 TABLET ORAL DAILY
Qty: 30 TABLET | Refills: 5 | Status: SHIPPED | OUTPATIENT
Start: 2022-06-08

## 2022-06-08 RX ORDER — FLUTICASONE PROPIONATE 50 MCG
2 SPRAY, SUSPENSION (ML) NASAL DAILY
Qty: 16 G | Refills: 3 | Status: SHIPPED | OUTPATIENT
Start: 2022-06-08

## 2022-06-08 RX ORDER — DOXAZOSIN 2 MG/1
2 TABLET ORAL
COMMUNITY
Start: 2022-05-20 | End: 2023-05-20

## 2022-06-08 RX ORDER — TRAMADOL HYDROCHLORIDE 50 MG/1
50 TABLET ORAL 2 TIMES DAILY
Qty: 60 TABLET | Refills: 0 | Status: SHIPPED | OUTPATIENT
Start: 2022-06-08 | End: 2022-07-14 | Stop reason: SDUPTHER

## 2022-06-08 RX ORDER — LOSARTAN POTASSIUM 100 MG/1
100 TABLET ORAL DAILY
Qty: 90 TABLET | Refills: 1 | Status: SHIPPED | OUTPATIENT
Start: 2022-06-08

## 2022-06-08 RX ORDER — BUDESONIDE AND FORMOTEROL FUMARATE DIHYDRATE 160; 4.5 UG/1; UG/1
2 AEROSOL RESPIRATORY (INHALATION) 2 TIMES DAILY
Qty: 120 G | Refills: 1 | Status: SHIPPED | OUTPATIENT
Start: 2022-06-08 | End: 2022-09-06

## 2022-06-08 NOTE — PATIENT INSTRUCTIONS
Medicare Preventive Visit Patient Instructions  Thank you for completing your Welcome to Medicare Visit or Medicare Annual Wellness Visit today  Your next wellness visit will be due in one year (6/9/2023)  The screening/preventive services that you may require over the next 5-10 years are detailed below  Some tests may not apply to you based off risk factors and/or age  Screening tests ordered at today's visit but not completed yet may show as past due  Also, please note that scanned in results may not display below  Preventive Screenings:  Service Recommendations Previous Testing/Comments   Colorectal Cancer Screening  * Colonoscopy    * Fecal Occult Blood Test (FOBT)/Fecal Immunochemical Test (FIT)  * Fecal DNA/Cologuard Test  * Flexible Sigmoidoscopy Age: 54-65 years old   Colonoscopy: every 10 years (may be performed more frequently if at higher risk)  OR  FOBT/FIT: every 1 year  OR  Cologuard: every 3 years  OR  Sigmoidoscopy: every 5 years  Screening may be recommended earlier than age 48 if at higher risk for colorectal cancer  Also, an individualized decision between you and your healthcare provider will decide whether screening between the ages of 74-80 would be appropriate  Colonoscopy: 12/22/2015  FOBT/FIT: Not on file  Cologuard: Not on file  Sigmoidoscopy: Not on file          Breast Cancer Screening Age: 36 years old  Frequency: every 1-2 years  Not required if history of left and right mastectomy Mammogram: 09/14/2021    Screening Current   Cervical Cancer Screening Between the ages of 21-29, pap smear recommended once every 3 years  Between the ages of 33-67, can perform pap smear with HPV co-testing every 5 years     Recommendations may differ for women with a history of total hysterectomy, cervical cancer, or abnormal pap smears in past  Pap Smear: 08/28/2018    Screening Not Indicated   Hepatitis C Screening Once for adults born between 1945 and 1965  More frequently in patients at high risk for Hepatitis C Hep C Antibody: Not on file        Diabetes Screening 1-2 times per year if you're at risk for diabetes or have pre-diabetes Fasting glucose: No results in last 5 years   A1C: 6 1 % of total Hgb    Screening Not Indicated  History Diabetes   Cholesterol Screening Once every 5 years if you don't have a lipid disorder  May order more often based on risk factors  Lipid panel: 01/25/2022    Screening Not Indicated  History Lipid Disorder     Other Preventive Screenings Covered by Medicare:  1  Abdominal Aortic Aneurysm (AAA) Screening: covered once if your at risk  You're considered to be at risk if you have a family history of AAA  2  Lung Cancer Screening: covers low dose CT scan once per year if you meet all of the following conditions: (1) Age 50-69; (2) No signs or symptoms of lung cancer; (3) Current smoker or have quit smoking within the last 15 years; (4) You have a tobacco smoking history of at least 30 pack years (packs per day multiplied by number of years you smoked); (5) You get a written order from a healthcare provider  3  Glaucoma Screening: covered annually if you're considered high risk: (1) You have diabetes OR (2) Family history of glaucoma OR (3)  aged 48 and older OR (3)  American aged 72 and older  3  Osteoporosis Screening: covered every 2 years if you meet one of the following conditions: (1) You're estrogen deficient and at risk for osteoporosis based off medical history and other findings; (2) Have a vertebral abnormality; (3) On glucocorticoid therapy for more than 3 months; (4) Have primary hyperparathyroidism; (5) On osteoporosis medications and need to assess response to drug therapy  · Last bone density test (DXA Scan): 10/20/2020   5  HIV Screening: covered annually if you're between the age of 15-65  Also covered annually if you are younger than 13 and older than 72 with risk factors for HIV infection   For pregnant patients, it is covered up to 3 times per pregnancy  Immunizations:  Immunization Recommendations   Influenza Vaccine Annual influenza vaccination during flu season is recommended for all persons aged >= 6 months who do not have contraindications   Pneumococcal Vaccine (Prevnar and Pneumovax)  * Prevnar = PCV13  * Pneumovax = PPSV23   Adults 25-60 years old: 1-3 doses may be recommended based on certain risk factors  Adults 72 years old: Prevnar (PCV13) vaccine recommended followed by Pneumovax (PPSV23) vaccine  If already received PPSV23 since turning 65, then PCV13 recommended at least one year after PPSV23 dose  Hepatitis B Vaccine 3 dose series if at intermediate or high risk (ex: diabetes, end stage renal disease, liver disease)   Tetanus (Td) Vaccine - COST NOT COVERED BY MEDICARE PART B Following completion of primary series, a booster dose should be given every 10 years to maintain immunity against tetanus  Td may also be given as tetanus wound prophylaxis  Tdap Vaccine - COST NOT COVERED BY MEDICARE PART B Recommended at least once for all adults  For pregnant patients, recommended with each pregnancy  Shingles Vaccine (Shingrix) - COST NOT COVERED BY MEDICARE PART B  2 shot series recommended in those aged 48 and above     Health Maintenance Due:      Topic Date Due    Hepatitis C Screening  Never done    Colorectal Cancer Screening  12/22/2018    Breast Cancer Screening: Mammogram  09/14/2022    DXA SCAN  10/20/2022     Immunizations Due:      Topic Date Due    DTaP,Tdap,and Td Vaccines (2 - Td or Tdap) 11/12/2019     Advance Directives   What are advance directives? Advance directives are legal documents that state your wishes and plans for medical care  These plans are made ahead of time in case you lose your ability to make decisions for yourself  Advance directives can apply to any medical decision, such as the treatments you want, and if you want to donate organs  What are the types of advance directives? There are many types of advance directives, and each state has rules about how to use them  You may choose a combination of any of the following:  · Living will: This is a written record of the treatment you want  You can also choose which treatments you do not want, which to limit, and which to stop at a certain time  This includes surgery, medicine, IV fluid, and tube feedings  · Durable power of  for healthcare [facility]): This is a written record that states who you want to make healthcare choices for you when you are unable to make them for yourself  This person, called a proxy, is usually a family member or a friend  You may choose more than 1 proxy  · Do not resuscitate (DNR) order:  A DNR order is used in case your heart stops beating or you stop breathing  It is a request not to have certain forms of treatment, such as CPR  A DNR order may be included in other types of advance directives  · Medical directive: This covers the care that you want if you are in a coma, near death, or unable to make decisions for yourself  You can list the treatments you want for each condition  Treatment may include pain medicine, surgery, blood transfusions, dialysis, IV or tube feedings, and a ventilator (breathing machine)  · Values history: This document has questions about your views, beliefs, and how you feel and think about life  This information can help others choose the care that you would choose  Why are advance directives important? An advance directive helps you control your care  Although spoken wishes may be used, it is better to have your wishes written down  Spoken wishes can be misunderstood, or not followed  Treatments may be given even if you do not want them  An advance directive may make it easier for your family to make difficult choices about your care     Weight Management   Why it is important to manage your weight:  Being overweight increases your risk of health conditions such as heart disease, high blood pressure, type 2 diabetes, and certain types of cancer  It can also increase your risk for osteoarthritis, sleep apnea, and other respiratory problems  Aim for a slow, steady weight loss  Even a small amount of weight loss can lower your risk of health problems  How to lose weight safely:  A safe and healthy way to lose weight is to eat fewer calories and get regular exercise  You can lose up about 1 pound a week by decreasing the number of calories you eat by 500 calories each day  Healthy meal plan for weight management:  A healthy meal plan includes a variety of foods, contains fewer calories, and helps you stay healthy  A healthy meal plan includes the following:  · Eat whole-grain foods more often  A healthy meal plan should contain fiber  Fiber is the part of grains, fruits, and vegetables that is not broken down by your body  Whole-grain foods are healthy and provide extra fiber in your diet  Some examples of whole-grain foods are whole-wheat breads and pastas, oatmeal, brown rice, and bulgur  · Eat a variety of vegetables every day  Include dark, leafy greens such as spinach, kale, nadege greens, and mustard greens  Eat yellow and orange vegetables such as carrots, sweet potatoes, and winter squash  · Eat a variety of fruits every day  Choose fresh or canned fruit (canned in its own juice or light syrup) instead of juice  Fruit juice has very little or no fiber  · Eat low-fat dairy foods  Drink fat-free (skim) milk or 1% milk  Eat fat-free yogurt and low-fat cottage cheese  Try low-fat cheeses such as mozzarella and other reduced-fat cheeses  · Choose meat and other protein foods that are low in fat  Choose beans or other legumes such as split peas or lentils  Choose fish, skinless poultry (chicken or turkey), or lean cuts of red meat (beef or pork)  Before you cook meat or poultry, cut off any visible fat  · Use less fat and oil  Try baking foods instead of frying them  Add less fat, such as margarine, sour cream, regular salad dressing and mayonnaise to foods  Eat fewer high-fat foods  Some examples of high-fat foods include french fries, doughnuts, ice cream, and cakes  · Eat fewer sweets  Limit foods and drinks that are high in sugar  This includes candy, cookies, regular soda, and sweetened drinks  Exercise:  Exercise at least 30 minutes per day on most days of the week  Some examples of exercise include walking, biking, dancing, and swimming  You can also fit in more physical activity by taking the stairs instead of the elevator or parking farther away from stores  Ask your healthcare provider about the best exercise plan for you  Narcotic (Opioid) Safety    Use narcotics safely:  · Take prescribed narcotics exactly as directed  · Do not give narcotics to others or take narcotics that belong to someone else  · Do not mix narcotics without medicines or alcohol  · Do not drive or operate heavy machinery after you take the narcotic  · Monitor for side effects and notify your healthcare provider if you experienced side effects such as nausea, sleepiness, itching, or trouble thinking clearly  Manage constipation:    Constipation is the most common side effect of narcotic medicine  Constipation is when you have hard, dry bowel movements, or you go longer than usual between bowel movements  Tell your healthcare provider about all changes in your bowel movements while you are taking narcotics  He or she may recommend laxative medicine to help you have a bowel movement  He or she may also change the kind of narcotic you are taking, or change when you take it  The following are more ways you can prevent or relieve constipation:    · Drink liquids as directed  You may need to drink extra liquids to help soften and move your bowels  Ask how much liquid to drink each day and which liquids are best for you  · Eat high-fiber foods    This may help decrease constipation by adding bulk to your bowel movements  High-fiber foods include fruits, vegetables, whole-grain breads and cereals, and beans  Your healthcare provider or dietitian can help you create a high-fiber meal plan  Your provider may also recommend a fiber supplement if you cannot get enough fiber from food  · Exercise regularly  Regular physical activity can help stimulate your intestines  Walking is a good exercise to prevent or relieve constipation  Ask which exercises are best for you  · Schedule a time each day to have a bowel movement  This may help train your body to have regular bowel movements  Bend forward while you are on the toilet to help move the bowel movement out  Sit on the toilet for at least 10 minutes, even if you do not have a bowel movement  Store narcotics safely:   · Store narcotics where others cannot easily get them  Keep them in a locked cabinet or secure area  Do not  keep them in a purse or other bag you carry with you  A person may be looking for something else and find the narcotics  · Make sure narcotics are stored out of the reach of children  A child can easily overdose on narcotics  Narcotics may look like candy to a small child  The best way to dispose of narcotics: The laws vary by country and area  In the [place], the best way is to return the narcotics through a take-back program  This program is offered by the [employer])  The following are options for using the program:  · Take the narcotics to a ROWDY collection site  The site is often a law enforcement center  Call your local law enforcement center for scheduled take-back days in your area  You will be given information on where to go if the collection site is in a different location  · Take the narcotics to an approved pharmacy or hospital   A pharmacy or hospital may be set up as a collection site  You will need to ask if it is a ROWDY collection site if you were not directed there   A pharmacy or doctor's office may not be able to take back narcotics unless it is a ROWDY site  · Use a mail-back system  This means you are given containers to put the narcotics into  You will then mail them in the containers  · Use a take-back drop box  This is a place to leave the narcotics at any time  People and animals will not be able to get into the box  Your local law enforcement agency can tell you where to find a drop box in your area  Other ways to manage pain:   · Ask your healthcare provider about non-narcotic medicines to control pain  Nonprescription medicines include NSAIDs (such as ibuprofen) and acetaminophen  Prescription medicines include muscle relaxers, antidepressants, and steroids  · Pain may be managed without any medicines  Some ways to relieve pain include massage, aromatherapy, or meditation  Physical or occupational therapy may also help  For more information:   · Drug Enforcement Administration  [facility] [address]  Phone: 1- 018 - [phone]  Web Address: [facility]/drug_disposal/    · [address]  and Drug Administration  [facility] , [address] [place] [address]  Phone: 3- 542 - [phone]  Web Address: http://SYSTRAN/     © Copyright [employer] 2018 Information is for End User's use only and may not be sold, redistributed or otherwise used for commercial purposes  All illustrations and images included in CareNotes® are the copyrighted property of [employer]  or Jose Mora     10% - bad control"> 10% - bad control,Hemoglobin A1c (HbA1c) greater than 10% indicating poor diabetic control,Haemoglobin A1c greater than 10% indicating poor diabetic control">   Diabetes Mellitus Type 2 in Adults, Ambulatory Care   GENERAL INFORMATION:   Diabetes mellitus type 2  is a disease that affects how your body uses glucose (sugar)  Insulin helps move sugar out of the blood so it can be used for energy   Normally, when the blood sugar level increases, the pancreas makes more insulin  Type 2 diabetes develops because either the body cannot make enough insulin, or it cannot use the insulin correctly  After many years, your pancreas may stop making insulin  Common symptoms include the following:   · More hunger or thirst than usual     · Frequent urination     · Weight loss without trying     · Blurred vision  Seek immediate care for the following symptoms:   · Severe abdominal pain, or pain that spreads to your back  You may also be vomiting  · Trouble staying awake or focusing    · Shaking or sweating    · Blurred or double vision    · Breath has a fruity, sweet smell    · Breathing is deep and labored, or rapid and shallow    · Heartbeat is fast and weak  Treatment for diabetes mellitus type 2  includes keeping your blood sugar at a normal level  You must eat the right foods, and exercise regularly  You may also need medicine if you cannot control your blood sugar level with nutrition and exercise  Manage diabetes mellitus type 2:   · Check your blood sugar level  You will be taught how to check a small drop of blood in a glucose monitor  Ask your healthcare provider when and how often to check during the day  Ask your healthcare provider what your blood sugar levels should be when you check them  · Keep track of carbohydrates (sugar and starchy foods)  Your blood sugar level can get too high if you eat too many carbohydrates  Your dietitian will help you plan meals and snacks that have the right amount of carbohydrates  · Eat low-fat foods  Some examples are skinless chicken and low-fat milk  · Eat less sodium (salt)  Some examples of high-sodium foods to limit are soy sauce, potato chips, and soup  Do not add salt to food you cook  Limit your use of table salt  · Eat high-fiber foods  Foods that are a good source of fiber include vegetables, whole grain bread, and beans  · Limit alcohol    Alcohol affects your blood sugar level and can make it harder to manage your diabetes  Women should limit alcohol to 1 drink a day  Men should limit alcohol to 2 drinks a day  A drink of alcohol is 12 ounces of beer, 5 ounces of wine, or 1½ ounces of liquor  · Get regular exercise  Exercise can help keep your blood sugar level steady, decrease your risk of heart disease, and help you lose weight  Exercise for at least 30 minutes, 5 days a week  Include muscle strengthening activities 2 days each week  Work with your healthcare provider to create an exercise plan  · Check your feet each day  for injuries or open sores  Ask your healthcare provider for activities you can do if you have an open sore  · Quit smoking  If you smoke, it is never too late to quit  Smoking can worsen the problems that may occur with diabetes  Ask your healthcare provider for information about how to stop smoking if you are having trouble quitting  · Ask about your weight:  Ask healthcare providers if you need to lose weight, and how much to lose  Ask them to help you with a weight loss program  Even a 10 to 15 pound weight loss can help you manage your blood sugar level  · Carry medical alert identification  Wear medical alert jewelry or carry a card that says you have diabetes  Ask your healthcare provider where to get these items  · Ask about vaccines  Diabetes puts you at risk of serious illness if you get the flu, pneumonia, or hepatitis  Ask your healthcare provider if you should get a flu, pneumonia, or hepatitis B vaccine, and when to get the vaccine  Follow up with your healthcare provider as directed:  Write down your questions so you remember to ask them during your visits  CARE AGREEMENT:   You have the right to help plan your care  Learn about your health condition and how it may be treated  Discuss treatment options with your caregivers to decide what care you want to receive   You always have the right to refuse treatment  The above information is an  only  It is not intended as medical advice for individual conditions or treatments  Talk to your doctor, nurse or pharmacist before following any medical regimen to see if it is safe and effective for you  © 2014 [address] is for End User's use only and may not be sold, redistributed or otherwise used for commercial purposes  All illustrations and images included in CareNotes® are the copyrighted property of [employer]  or Corby Coronado
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
## 2022-06-16 ENCOUNTER — OFFICE VISIT (OUTPATIENT)
Dept: PODIATRY | Facility: CLINIC | Age: 77
End: 2022-06-16
Payer: COMMERCIAL

## 2022-06-16 VITALS
HEIGHT: 59 IN | SYSTOLIC BLOOD PRESSURE: 160 MMHG | WEIGHT: 144 LBS | DIASTOLIC BLOOD PRESSURE: 63 MMHG | BODY MASS INDEX: 29.03 KG/M2 | HEART RATE: 79 BPM

## 2022-06-16 DIAGNOSIS — E11.42 DIABETIC POLYNEUROPATHY ASSOCIATED WITH TYPE 2 DIABETES MELLITUS (HCC): Primary | ICD-10-CM

## 2022-06-16 PROCEDURE — 1036F TOBACCO NON-USER: CPT | Performed by: PODIATRIST

## 2022-06-16 PROCEDURE — 99212 OFFICE O/P EST SF 10 MIN: CPT | Performed by: PODIATRIST

## 2022-06-16 PROCEDURE — 1160F RVW MEDS BY RX/DR IN RCRD: CPT | Performed by: PODIATRIST

## 2022-06-16 RX ORDER — HYDRALAZINE HYDROCHLORIDE 25 MG/1
25 TABLET, FILM COATED ORAL
COMMUNITY
Start: 2022-06-09 | End: 2022-07-03

## 2022-06-16 NOTE — PROGRESS NOTES
Patient presents for palliative diabetic foot care  No acute disorder noted  Pedal pulses are within normal limits today  Treatment consisted of nail trimming

## 2022-06-25 ENCOUNTER — APPOINTMENT (EMERGENCY)
Dept: RADIOLOGY | Facility: HOSPITAL | Age: 77
DRG: 948 | End: 2022-06-25
Payer: COMMERCIAL

## 2022-06-25 ENCOUNTER — HOSPITAL ENCOUNTER (EMERGENCY)
Facility: HOSPITAL | Age: 77
Discharge: HOME | DRG: 948 | End: 2022-06-25
Attending: EMERGENCY MEDICINE | Admitting: EMERGENCY MEDICINE
Payer: COMMERCIAL

## 2022-06-25 VITALS
HEIGHT: 59 IN | RESPIRATION RATE: 18 BRPM | HEART RATE: 79 BPM | WEIGHT: 144 LBS | OXYGEN SATURATION: 98 % | DIASTOLIC BLOOD PRESSURE: 63 MMHG | BODY MASS INDEX: 29.03 KG/M2 | SYSTOLIC BLOOD PRESSURE: 138 MMHG | TEMPERATURE: 100.9 F

## 2022-06-25 DIAGNOSIS — K52.9 GASTROENTERITIS: ICD-10-CM

## 2022-06-25 DIAGNOSIS — R50.9 FEVER: Primary | ICD-10-CM

## 2022-06-25 LAB
2HR DELTA HS TROPONIN: 3 NG/L
ALBUMIN SERPL BCP-MCNC: 3.4 G/DL (ref 3.5–5)
ALP SERPL-CCNC: 71 U/L (ref 46–116)
ALT SERPL W P-5'-P-CCNC: 162 U/L (ref 12–78)
ANION GAP SERPL CALCULATED.3IONS-SCNC: 9 MMOL/L (ref 4–13)
APTT PPP: 26 SECONDS (ref 23–37)
AST SERPL W P-5'-P-CCNC: 189 U/L (ref 5–45)
ATRIAL RATE: 90 BPM
BACTERIA UR QL AUTO: NORMAL /HPF
BASOPHILS # BLD AUTO: 0 THOUSANDS/ΜL (ref 0–0.1)
BASOPHILS NFR BLD AUTO: 0 % (ref 0–1)
BILIRUB SERPL-MCNC: 0.43 MG/DL (ref 0.2–1)
BILIRUB UR QL STRIP: NEGATIVE
BUN SERPL-MCNC: 54 MG/DL (ref 5–25)
CALCIUM ALBUM COR SERPL-MCNC: 10 MG/DL (ref 8.3–10.1)
CALCIUM SERPL-MCNC: 9.5 MG/DL (ref 8.3–10.1)
CARDIAC TROPONIN I PNL SERPL HS: 19 NG/L
CARDIAC TROPONIN I PNL SERPL HS: 22 NG/L
CHLORIDE SERPL-SCNC: 103 MMOL/L (ref 100–108)
CLARITY UR: CLEAR
CO2 SERPL-SCNC: 24 MMOL/L (ref 21–32)
COLOR UR: ABNORMAL
CREAT SERPL-MCNC: 1.88 MG/DL (ref 0.6–1.3)
EOSINOPHIL # BLD AUTO: 0.19 THOUSAND/ΜL (ref 0–0.61)
EOSINOPHIL NFR BLD AUTO: 4 % (ref 0–6)
ERYTHROCYTE [DISTWIDTH] IN BLOOD BY AUTOMATED COUNT: 14.3 % (ref 11.6–15.1)
GFR SERPL CREATININE-BSD FRML MDRD: 25 ML/MIN/1.73SQ M
GLUCOSE SERPL-MCNC: 139 MG/DL (ref 65–140)
GLUCOSE UR STRIP-MCNC: NEGATIVE MG/DL
HCT VFR BLD AUTO: 30.9 % (ref 34.8–46.1)
HGB BLD-MCNC: 10.2 G/DL (ref 11.5–15.4)
HGB UR QL STRIP.AUTO: NEGATIVE
IMM GRANULOCYTES # BLD AUTO: 0.03 THOUSAND/UL (ref 0–0.2)
IMM GRANULOCYTES NFR BLD AUTO: 1 % (ref 0–2)
INR PPP: 1.1 (ref 0.84–1.19)
KETONES UR STRIP-MCNC: NEGATIVE MG/DL
LACTATE SERPL-SCNC: 1 MMOL/L (ref 0.5–2)
LEUKOCYTE ESTERASE UR QL STRIP: NEGATIVE
LYMPHOCYTES # BLD AUTO: 0.3 THOUSANDS/ΜL (ref 0.6–4.47)
LYMPHOCYTES NFR BLD AUTO: 7 % (ref 14–44)
MCH RBC QN AUTO: 30.9 PG (ref 26.8–34.3)
MCHC RBC AUTO-ENTMCNC: 33 G/DL (ref 31.4–37.4)
MCV RBC AUTO: 94 FL (ref 82–98)
MONOCYTES # BLD AUTO: 0.39 THOUSAND/ΜL (ref 0.17–1.22)
MONOCYTES NFR BLD AUTO: 8 % (ref 4–12)
NEUTROPHILS # BLD AUTO: 3.71 THOUSANDS/ΜL (ref 1.85–7.62)
NEUTS SEG NFR BLD AUTO: 80 % (ref 43–75)
NITRITE UR QL STRIP: NEGATIVE
NON-SQ EPI CELLS URNS QL MICRO: NORMAL /HPF
NRBC BLD AUTO-RTO: 0 /100 WBCS
P AXIS: 17 DEGREES
PH UR STRIP.AUTO: 7 [PH]
PLATELET # BLD AUTO: 133 THOUSANDS/UL (ref 149–390)
PMV BLD AUTO: 11.1 FL (ref 8.9–12.7)
POTASSIUM SERPL-SCNC: 4 MMOL/L (ref 3.5–5.3)
PR INTERVAL: 212 MS
PROCALCITONIN SERPL-MCNC: 0.29 NG/ML
PROT SERPL-MCNC: 6.8 G/DL (ref 6.4–8.2)
PROT UR STRIP-MCNC: ABNORMAL MG/DL
PROTHROMBIN TIME: 13.8 SECONDS (ref 11.6–14.5)
QRS AXIS: -10 DEGREES
QRSD INTERVAL: 82 MS
QT INTERVAL: 326 MS
QTC INTERVAL: 398 MS
RBC # BLD AUTO: 3.3 MILLION/UL (ref 3.81–5.12)
RBC #/AREA URNS AUTO: NORMAL /HPF
SODIUM SERPL-SCNC: 136 MMOL/L (ref 136–145)
SP GR UR STRIP.AUTO: 1.01 (ref 1–1.03)
T WAVE AXIS: 179 DEGREES
UROBILINOGEN UR STRIP-ACNC: <2 MG/DL
VENTRICULAR RATE: 90 BPM
WBC # BLD AUTO: 4.62 THOUSAND/UL (ref 4.31–10.16)
WBC #/AREA URNS AUTO: NORMAL /HPF

## 2022-06-25 PROCEDURE — 83605 ASSAY OF LACTIC ACID: CPT | Performed by: EMERGENCY MEDICINE

## 2022-06-25 PROCEDURE — 93005 ELECTROCARDIOGRAM TRACING: CPT

## 2022-06-25 PROCEDURE — 81001 URINALYSIS AUTO W/SCOPE: CPT | Performed by: EMERGENCY MEDICINE

## 2022-06-25 PROCEDURE — G1004 CDSM NDSC: HCPCS

## 2022-06-25 PROCEDURE — 99285 EMERGENCY DEPT VISIT HI MDM: CPT | Performed by: EMERGENCY MEDICINE

## 2022-06-25 PROCEDURE — 84484 ASSAY OF TROPONIN QUANT: CPT | Performed by: EMERGENCY MEDICINE

## 2022-06-25 PROCEDURE — 99284 EMERGENCY DEPT VISIT MOD MDM: CPT

## 2022-06-25 PROCEDURE — 96374 THER/PROPH/DIAG INJ IV PUSH: CPT

## 2022-06-25 PROCEDURE — 96361 HYDRATE IV INFUSION ADD-ON: CPT

## 2022-06-25 PROCEDURE — 80053 COMPREHEN METABOLIC PANEL: CPT | Performed by: EMERGENCY MEDICINE

## 2022-06-25 PROCEDURE — 74176 CT ABD & PELVIS W/O CONTRAST: CPT

## 2022-06-25 PROCEDURE — 87040 BLOOD CULTURE FOR BACTERIA: CPT | Performed by: EMERGENCY MEDICINE

## 2022-06-25 PROCEDURE — 84145 PROCALCITONIN (PCT): CPT | Performed by: EMERGENCY MEDICINE

## 2022-06-25 PROCEDURE — 85730 THROMBOPLASTIN TIME PARTIAL: CPT | Performed by: EMERGENCY MEDICINE

## 2022-06-25 PROCEDURE — 71045 X-RAY EXAM CHEST 1 VIEW: CPT

## 2022-06-25 PROCEDURE — 85610 PROTHROMBIN TIME: CPT | Performed by: EMERGENCY MEDICINE

## 2022-06-25 PROCEDURE — 93010 ELECTROCARDIOGRAM REPORT: CPT | Performed by: INTERNAL MEDICINE

## 2022-06-25 PROCEDURE — 85025 COMPLETE CBC W/AUTO DIFF WBC: CPT | Performed by: EMERGENCY MEDICINE

## 2022-06-25 PROCEDURE — 36415 COLL VENOUS BLD VENIPUNCTURE: CPT | Performed by: EMERGENCY MEDICINE

## 2022-06-25 RX ORDER — MORPHINE SULFATE 4 MG/ML
4 INJECTION, SOLUTION INTRAMUSCULAR; INTRAVENOUS ONCE
Status: COMPLETED | OUTPATIENT
Start: 2022-06-25 | End: 2022-06-25

## 2022-06-25 RX ORDER — ACETAMINOPHEN 325 MG/1
975 TABLET ORAL ONCE
Status: COMPLETED | OUTPATIENT
Start: 2022-06-25 | End: 2022-06-25

## 2022-06-25 RX ADMIN — MORPHINE SULFATE 4 MG: 4 INJECTION INTRAVENOUS at 19:35

## 2022-06-25 RX ADMIN — SODIUM CHLORIDE 1000 ML: 0.9 INJECTION, SOLUTION INTRAVENOUS at 19:36

## 2022-06-25 RX ADMIN — ACETAMINOPHEN 975 MG: 325 TABLET, FILM COATED ORAL at 19:35

## 2022-06-25 NOTE — ED ATTENDING ATTESTATION
6/25/2022  Andrea Singh DO, saw and evaluated the patient  I have discussed the patient with the resident/non-physician practitioner and agree with the resident's/non-physician practitioner's findings, Plan of Care, and MDM as documented in the resident's/non-physician practitioner's note, except where noted  All available labs and Radiology studies were reviewed  I was present for key portions of any procedure(s) performed by the resident/non-physician practitioner and I was immediately available to provide assistance  At this point I agree with the current assessment done in the Emergency Department  I have conducted an independent evaluation of this patient a history and physical is as follows:      63 yo female c/o 2d hx back pain, mostly L side, radiates toward LLQ  Doesn't report any fever/chills but febrile here in ED  Has been having change in stools saying "they just aren't right" but can't elaborate any further  Symptoms moderate to severe intensity  abd snd mod TTP LUQ, suprapubic area  No r/g bs+ has some L CVAT  No C/T/L spine TTP    Imp: low back, abd pain  ddx broad plan:  CT abd/pelvis, tx sx, abd labs, UA, CXR      ED Course         Critical Care Time  Procedures

## 2022-06-25 NOTE — ED NOTES
Patient transported to [facility], [address] [facility]  06/25/22 1914
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
## 2022-06-26 ENCOUNTER — APPOINTMENT (EMERGENCY)
Dept: RADIOLOGY | Facility: HOSPITAL | Age: 77
DRG: 948 | End: 2022-06-26
Payer: COMMERCIAL

## 2022-06-26 ENCOUNTER — HOSPITAL ENCOUNTER (INPATIENT)
Facility: HOSPITAL | Age: 77
LOS: 5 days | Discharge: HOME | DRG: 948 | End: 2022-07-03
Attending: EMERGENCY MEDICINE | Admitting: INTERNAL MEDICINE
Payer: COMMERCIAL

## 2022-06-26 ENCOUNTER — OFFICE VISIT (OUTPATIENT)
Dept: URGENT CARE | Facility: MEDICAL CENTER | Age: 77
End: 2022-06-26
Payer: COMMERCIAL

## 2022-06-26 VITALS
TEMPERATURE: 98.3 F | HEART RATE: 86 BPM | WEIGHT: 144 LBS | OXYGEN SATURATION: 95 % | BODY MASS INDEX: 28.27 KG/M2 | DIASTOLIC BLOOD PRESSURE: 72 MMHG | HEIGHT: 60 IN | RESPIRATION RATE: 20 BRPM | SYSTOLIC BLOOD PRESSURE: 162 MMHG

## 2022-06-26 DIAGNOSIS — R79.89 ELEVATED LFTS: ICD-10-CM

## 2022-06-26 DIAGNOSIS — E56.9 VITAMIN DEFICIENCY: ICD-10-CM

## 2022-06-26 DIAGNOSIS — M54.50 ACUTE MIDLINE LOW BACK PAIN, UNSPECIFIED WHETHER SCIATICA PRESENT: ICD-10-CM

## 2022-06-26 DIAGNOSIS — N17.9 AKI (ACUTE KIDNEY INJURY) (HCC): ICD-10-CM

## 2022-06-26 DIAGNOSIS — R10.9 INTRACTABLE ABDOMINAL PAIN: Primary | ICD-10-CM

## 2022-06-26 DIAGNOSIS — D64.9 ANEMIA, UNSPECIFIED TYPE: ICD-10-CM

## 2022-06-26 DIAGNOSIS — K81.0 CHOLECYSTITIS, ACUTE: ICD-10-CM

## 2022-06-26 DIAGNOSIS — N18.30 STAGE 3 CHRONIC KIDNEY DISEASE, UNSPECIFIED WHETHER STAGE 3A OR 3B CKD (HCC): ICD-10-CM

## 2022-06-26 DIAGNOSIS — R10.84 GENERALIZED ABDOMINAL PAIN: Primary | ICD-10-CM

## 2022-06-26 LAB
ALBUMIN SERPL BCP-MCNC: 3.2 G/DL (ref 3.5–5)
ALP SERPL-CCNC: 75 U/L (ref 46–116)
ALT SERPL W P-5'-P-CCNC: 264 U/L (ref 12–78)
ANION GAP SERPL CALCULATED.3IONS-SCNC: 8 MMOL/L (ref 4–13)
AST SERPL W P-5'-P-CCNC: 294 U/L (ref 5–45)
BASOPHILS # BLD AUTO: 0.01 THOUSANDS/ΜL (ref 0–0.1)
BASOPHILS NFR BLD AUTO: 0 % (ref 0–1)
BILIRUB SERPL-MCNC: 1.11 MG/DL (ref 0.2–1)
BUN SERPL-MCNC: 48 MG/DL (ref 5–25)
CALCIUM ALBUM COR SERPL-MCNC: 9.6 MG/DL (ref 8.3–10.1)
CALCIUM SERPL-MCNC: 9 MG/DL (ref 8.3–10.1)
CHLORIDE SERPL-SCNC: 102 MMOL/L (ref 100–108)
CO2 SERPL-SCNC: 25 MMOL/L (ref 21–32)
CREAT SERPL-MCNC: 1.91 MG/DL (ref 0.6–1.3)
EOSINOPHIL # BLD AUTO: 0.1 THOUSAND/ΜL (ref 0–0.61)
EOSINOPHIL NFR BLD AUTO: 2 % (ref 0–6)
ERYTHROCYTE [DISTWIDTH] IN BLOOD BY AUTOMATED COUNT: 14.2 % (ref 11.6–15.1)
FLUAV RNA RESP QL NAA+PROBE: NEGATIVE
FLUBV RNA RESP QL NAA+PROBE: NEGATIVE
GFR SERPL CREATININE-BSD FRML MDRD: 24 ML/MIN/1.73SQ M
GLUCOSE SERPL-MCNC: 154 MG/DL (ref 65–140)
HCT VFR BLD AUTO: 30.6 % (ref 34.8–46.1)
HGB BLD-MCNC: 10.1 G/DL (ref 11.5–15.4)
IMM GRANULOCYTES # BLD AUTO: 0.03 THOUSAND/UL (ref 0–0.2)
IMM GRANULOCYTES NFR BLD AUTO: 1 % (ref 0–2)
LACTATE SERPL-SCNC: 0.9 MMOL/L (ref 0.5–2)
LIPASE SERPL-CCNC: 115 U/L (ref 73–393)
LYMPHOCYTES # BLD AUTO: 0.15 THOUSANDS/ΜL (ref 0.6–4.47)
LYMPHOCYTES NFR BLD AUTO: 4 % (ref 14–44)
MCH RBC QN AUTO: 31.1 PG (ref 26.8–34.3)
MCHC RBC AUTO-ENTMCNC: 33 G/DL (ref 31.4–37.4)
MCV RBC AUTO: 94 FL (ref 82–98)
MONOCYTES # BLD AUTO: 0.31 THOUSAND/ΜL (ref 0.17–1.22)
MONOCYTES NFR BLD AUTO: 7 % (ref 4–12)
NEUTROPHILS # BLD AUTO: 3.65 THOUSANDS/ΜL (ref 1.85–7.62)
NEUTS SEG NFR BLD AUTO: 86 % (ref 43–75)
NRBC BLD AUTO-RTO: 0 /100 WBCS
PLATELET # BLD AUTO: 116 THOUSANDS/UL (ref 149–390)
PMV BLD AUTO: 11.4 FL (ref 8.9–12.7)
POTASSIUM SERPL-SCNC: 4.1 MMOL/L (ref 3.5–5.3)
PROCALCITONIN SERPL-MCNC: 0.6 NG/ML
PROT SERPL-MCNC: 6.6 G/DL (ref 6.4–8.2)
RBC # BLD AUTO: 3.25 MILLION/UL (ref 3.81–5.12)
RSV RNA RESP QL NAA+PROBE: NEGATIVE
SARS-COV-2 RNA RESP QL NAA+PROBE: NEGATIVE
SODIUM SERPL-SCNC: 135 MMOL/L (ref 136–145)
WBC # BLD AUTO: 4.25 THOUSAND/UL (ref 4.31–10.16)

## 2022-06-26 PROCEDURE — 96375 TX/PRO/DX INJ NEW DRUG ADDON: CPT

## 2022-06-26 PROCEDURE — 85046 RETICYTE/HGB CONCENTRATE: CPT | Performed by: STUDENT IN AN ORGANIZED HEALTH CARE EDUCATION/TRAINING PROGRAM

## 2022-06-26 PROCEDURE — 83605 ASSAY OF LACTIC ACID: CPT | Performed by: EMERGENCY MEDICINE

## 2022-06-26 PROCEDURE — 84145 PROCALCITONIN (PCT): CPT | Performed by: EMERGENCY MEDICINE

## 2022-06-26 PROCEDURE — 36415 COLL VENOUS BLD VENIPUNCTURE: CPT | Performed by: EMERGENCY MEDICINE

## 2022-06-26 PROCEDURE — 96365 THER/PROPH/DIAG IV INF INIT: CPT

## 2022-06-26 PROCEDURE — 83690 ASSAY OF LIPASE: CPT | Performed by: EMERGENCY MEDICINE

## 2022-06-26 PROCEDURE — 80053 COMPREHEN METABOLIC PANEL: CPT | Performed by: EMERGENCY MEDICINE

## 2022-06-26 PROCEDURE — 99285 EMERGENCY DEPT VISIT HI MDM: CPT | Performed by: EMERGENCY MEDICINE

## 2022-06-26 PROCEDURE — 99285 EMERGENCY DEPT VISIT HI MDM: CPT

## 2022-06-26 PROCEDURE — 74176 CT ABD & PELVIS W/O CONTRAST: CPT

## 2022-06-26 PROCEDURE — 0241U HB NFCT DS VIR RESP RNA 4 TRGT: CPT | Performed by: EMERGENCY MEDICINE

## 2022-06-26 PROCEDURE — 99213 OFFICE O/P EST LOW 20 MIN: CPT | Performed by: PHYSICIAN ASSISTANT

## 2022-06-26 PROCEDURE — 85025 COMPLETE CBC W/AUTO DIFF WBC: CPT | Performed by: EMERGENCY MEDICINE

## 2022-06-26 PROCEDURE — 96366 THER/PROPH/DIAG IV INF ADDON: CPT

## 2022-06-26 RX ORDER — FENTANYL CITRATE 50 UG/ML
50 INJECTION, SOLUTION INTRAMUSCULAR; INTRAVENOUS ONCE
Status: COMPLETED | OUTPATIENT
Start: 2022-06-26 | End: 2022-06-26

## 2022-06-26 RX ORDER — SODIUM CHLORIDE, SODIUM GLUCONATE, SODIUM ACETATE, POTASSIUM CHLORIDE, MAGNESIUM CHLORIDE, SODIUM PHOSPHATE, DIBASIC, AND POTASSIUM PHOSPHATE .53; .5; .37; .037; .03; .012; .00082 G/100ML; G/100ML; G/100ML; G/100ML; G/100ML; G/100ML; G/100ML
1000 INJECTION, SOLUTION INTRAVENOUS ONCE
Status: COMPLETED | OUTPATIENT
Start: 2022-06-26 | End: 2022-06-26

## 2022-06-26 RX ADMIN — FENTANYL CITRATE 50 MCG: 50 INJECTION INTRAMUSCULAR; INTRAVENOUS at 21:21

## 2022-06-26 RX ADMIN — SODIUM CHLORIDE, SODIUM GLUCONATE, SODIUM ACETATE, POTASSIUM CHLORIDE, MAGNESIUM CHLORIDE, SODIUM PHOSPHATE, DIBASIC, AND POTASSIUM PHOSPHATE 1000 ML: .53; .5; .37; .037; .03; .012; .00082 INJECTION, SOLUTION INTRAVENOUS at 22:13

## 2022-06-26 NOTE — DISCHARGE INSTRUCTIONS
You were seen in the ED today for your symptoms  Continue tylenol every 6 hours for your fever and pain  Follow up with your Primary care provider  Return to the ED for chest pain, shortness of breath, or worsening abdominal pain, if you have vomiting that is continuous
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
## 2022-06-27 ENCOUNTER — APPOINTMENT (OUTPATIENT)
Dept: RADIOLOGY | Facility: HOSPITAL | Age: 77
DRG: 948 | End: 2022-06-27
Payer: COMMERCIAL

## 2022-06-27 ENCOUNTER — APPOINTMENT (EMERGENCY)
Dept: RADIOLOGY | Facility: HOSPITAL | Age: 77
DRG: 948 | End: 2022-06-27
Payer: COMMERCIAL

## 2022-06-27 PROBLEM — N17.9 AKI (ACUTE KIDNEY INJURY) (HCC): Status: ACTIVE | Noted: 2022-06-27

## 2022-06-27 PROBLEM — D61.818 PANCYTOPENIA (HCC): Status: ACTIVE | Noted: 2022-06-27

## 2022-06-27 PROBLEM — R79.89 ELEVATED LFTS: Status: ACTIVE | Noted: 2022-06-27

## 2022-06-27 LAB
BILIRUB DIRECT SERPL-MCNC: 0.75 MG/DL (ref 0–0.2)
GLUCOSE SERPL-MCNC: 102 MG/DL (ref 65–140)
GLUCOSE SERPL-MCNC: 111 MG/DL (ref 65–140)
GLUCOSE SERPL-MCNC: 113 MG/DL (ref 65–140)
HAV IGM SER QL: NORMAL
HBV CORE IGM SER QL: NORMAL
HBV SURFACE AG SER QL: NORMAL
HCV AB SER QL: NORMAL
HGB RETIC QN AUTO: 32.7 PG (ref 30–38.3)
IMM EOSINOPHIL NFR BLD MANUAL: 5 % (ref 0–6)
IMM RETICS NFR: 8.8 % (ref 0–14)
INR PPP: 1.31 (ref 0.84–1.19)
LACTATE SERPL-SCNC: 0.8 MMOL/L (ref 0.5–2)
LYMPHOCYTES NFR BLD: 7 % (ref 14–44)
MONOCYTES NFR BLD AUTO: 7 % (ref 4–12)
NEUTS BAND NFR BLD MANUAL: 15 THOUSAND/UL
NEUTS SEG NFR BLD AUTO: 66 % (ref 45–77)
OVALOCYTES BLD QL SMEAR: PRESENT
PLATELET BLD QL SMEAR: ABNORMAL
POIKILOCYTOSIS BLD QL SMEAR: PRESENT
PROTHROMBIN TIME: 15.7 SECONDS (ref 11.6–14.5)
RETICS # AUTO: NORMAL 10*3/UL (ref 14097–95744)
RETICS # CALC: 0.92 % (ref 0.37–1.87)
TOTAL CELLS COUNTED SPEC: 100

## 2022-06-27 PROCEDURE — 86664 EPSTEIN-BARR NUCLEAR ANTIGEN: CPT | Performed by: STUDENT IN AN ORGANIZED HEALTH CARE EDUCATION/TRAINING PROGRAM

## 2022-06-27 PROCEDURE — 86665 EPSTEIN-BARR CAPSID VCA: CPT | Performed by: STUDENT IN AN ORGANIZED HEALTH CARE EDUCATION/TRAINING PROGRAM

## 2022-06-27 PROCEDURE — 82248 BILIRUBIN DIRECT: CPT | Performed by: STUDENT IN AN ORGANIZED HEALTH CARE EDUCATION/TRAINING PROGRAM

## 2022-06-27 PROCEDURE — 85610 PROTHROMBIN TIME: CPT | Performed by: STUDENT IN AN ORGANIZED HEALTH CARE EDUCATION/TRAINING PROGRAM

## 2022-06-27 PROCEDURE — 80074 ACUTE HEPATITIS PANEL: CPT | Performed by: STUDENT IN AN ORGANIZED HEALTH CARE EDUCATION/TRAINING PROGRAM

## 2022-06-27 PROCEDURE — 36415 COLL VENOUS BLD VENIPUNCTURE: CPT | Performed by: STUDENT IN AN ORGANIZED HEALTH CARE EDUCATION/TRAINING PROGRAM

## 2022-06-27 PROCEDURE — 85007 BL SMEAR W/DIFF WBC COUNT: CPT | Performed by: STUDENT IN AN ORGANIZED HEALTH CARE EDUCATION/TRAINING PROGRAM

## 2022-06-27 PROCEDURE — G1004 CDSM NDSC: HCPCS

## 2022-06-27 PROCEDURE — 86663 EPSTEIN-BARR ANTIBODY: CPT | Performed by: STUDENT IN AN ORGANIZED HEALTH CARE EDUCATION/TRAINING PROGRAM

## 2022-06-27 PROCEDURE — 82948 REAGENT STRIP/BLOOD GLUCOSE: CPT

## 2022-06-27 PROCEDURE — NC001 PR NO CHARGE: Performed by: INTERNAL MEDICINE

## 2022-06-27 PROCEDURE — 74174 CTA ABD&PLVS W/CONTRAST: CPT

## 2022-06-27 PROCEDURE — 76705 ECHO EXAM OF ABDOMEN: CPT

## 2022-06-27 PROCEDURE — 87040 BLOOD CULTURE FOR BACTERIA: CPT | Performed by: STUDENT IN AN ORGANIZED HEALTH CARE EDUCATION/TRAINING PROGRAM

## 2022-06-27 PROCEDURE — 99222 1ST HOSP IP/OBS MODERATE 55: CPT | Performed by: STUDENT IN AN ORGANIZED HEALTH CARE EDUCATION/TRAINING PROGRAM

## 2022-06-27 PROCEDURE — 99218 PR INITIAL OBSERVATION CARE/DAY 30 MINUTES: CPT | Performed by: INTERNAL MEDICINE

## 2022-06-27 RX ORDER — HYDRALAZINE HYDROCHLORIDE 50 MG/1
50 TABLET, FILM COATED ORAL EVERY 12 HOURS
Status: DISCONTINUED | OUTPATIENT
Start: 2022-06-27 | End: 2022-07-02

## 2022-06-27 RX ORDER — FENOFIBRATE 48 MG/1
48 TABLET, COATED ORAL DAILY
Status: DISCONTINUED | OUTPATIENT
Start: 2022-06-27 | End: 2022-06-27

## 2022-06-27 RX ORDER — CHLORAL HYDRATE 500 MG
1000 CAPSULE ORAL DAILY
Status: DISCONTINUED | OUTPATIENT
Start: 2022-06-27 | End: 2022-07-03 | Stop reason: HOSPADM

## 2022-06-27 RX ORDER — FENOFIBRATE 48 MG/1
67 TABLET, COATED ORAL DAILY
Status: DISCONTINUED | OUTPATIENT
Start: 2022-06-27 | End: 2022-06-29

## 2022-06-27 RX ORDER — SODIUM CHLORIDE 9 MG/ML
125 INJECTION, SOLUTION INTRAVENOUS CONTINUOUS
Status: DISCONTINUED | OUTPATIENT
Start: 2022-06-27 | End: 2022-06-29

## 2022-06-27 RX ORDER — LEVOTHYROXINE SODIUM 0.07 MG/1
75 TABLET ORAL
Status: DISCONTINUED | OUTPATIENT
Start: 2022-06-27 | End: 2022-07-03 | Stop reason: HOSPADM

## 2022-06-27 RX ORDER — NITROGLYCERIN 0.4 MG/1
0.4 TABLET SUBLINGUAL
Status: DISCONTINUED | OUTPATIENT
Start: 2022-06-27 | End: 2022-07-03 | Stop reason: HOSPADM

## 2022-06-27 RX ORDER — BUMETANIDE 2 MG/1
2 TABLET ORAL DAILY
Status: DISCONTINUED | OUTPATIENT
Start: 2022-06-27 | End: 2022-06-27

## 2022-06-27 RX ORDER — PANTOPRAZOLE SODIUM 40 MG/1
40 TABLET, DELAYED RELEASE ORAL
Status: DISCONTINUED | OUTPATIENT
Start: 2022-06-27 | End: 2022-07-03 | Stop reason: HOSPADM

## 2022-06-27 RX ORDER — MELATONIN
1000 DAILY
Status: DISCONTINUED | OUTPATIENT
Start: 2022-06-28 | End: 2022-07-03 | Stop reason: HOSPADM

## 2022-06-27 RX ORDER — ALLOPURINOL 100 MG/1
200 TABLET ORAL DAILY
Status: DISCONTINUED | OUTPATIENT
Start: 2022-06-28 | End: 2022-06-27

## 2022-06-27 RX ORDER — LEVOTHYROXINE SODIUM 88 UG/1
88 TABLET ORAL DAILY
Status: DISCONTINUED | OUTPATIENT
Start: 2022-06-27 | End: 2022-06-27

## 2022-06-27 RX ORDER — INSULIN LISPRO 100 [IU]/ML
1-5 INJECTION, SOLUTION INTRAVENOUS; SUBCUTANEOUS
Status: DISCONTINUED | OUTPATIENT
Start: 2022-06-27 | End: 2022-06-27

## 2022-06-27 RX ORDER — DOXAZOSIN 2 MG/1
2 TABLET ORAL DAILY
Status: DISCONTINUED | OUTPATIENT
Start: 2022-06-27 | End: 2022-06-27

## 2022-06-27 RX ORDER — NIFEDIPINE 60 MG/1
60 TABLET, EXTENDED RELEASE ORAL DAILY
Status: DISCONTINUED | OUTPATIENT
Start: 2022-06-27 | End: 2022-06-28

## 2022-06-27 RX ORDER — INSULIN LISPRO 100 [IU]/ML
15 INJECTION, SOLUTION INTRAVENOUS; SUBCUTANEOUS
Status: DISCONTINUED | OUTPATIENT
Start: 2022-06-27 | End: 2022-06-27

## 2022-06-27 RX ORDER — FLUTICASONE PROPIONATE 50 MCG
2 SPRAY, SUSPENSION (ML) NASAL DAILY
Status: DISCONTINUED | OUTPATIENT
Start: 2022-06-27 | End: 2022-07-03 | Stop reason: HOSPADM

## 2022-06-27 RX ORDER — MONTELUKAST SODIUM 10 MG/1
10 TABLET ORAL
Status: DISCONTINUED | OUTPATIENT
Start: 2022-06-27 | End: 2022-07-03 | Stop reason: HOSPADM

## 2022-06-27 RX ORDER — METHOCARBAMOL 500 MG/1
500 TABLET, FILM COATED ORAL 3 TIMES DAILY
Status: DISCONTINUED | OUTPATIENT
Start: 2022-06-27 | End: 2022-07-03 | Stop reason: HOSPADM

## 2022-06-27 RX ORDER — HEPARIN SODIUM 5000 [USP'U]/ML
5000 INJECTION, SOLUTION INTRAVENOUS; SUBCUTANEOUS EVERY 8 HOURS SCHEDULED
Status: DISCONTINUED | OUTPATIENT
Start: 2022-06-27 | End: 2022-07-03 | Stop reason: HOSPADM

## 2022-06-27 RX ORDER — DOXYCYCLINE HYCLATE 50 MG/1
325 CAPSULE, GELATIN COATED ORAL DAILY
Status: DISCONTINUED | OUTPATIENT
Start: 2022-06-27 | End: 2022-07-03 | Stop reason: HOSPADM

## 2022-06-27 RX ORDER — ATORVASTATIN CALCIUM 80 MG/1
80 TABLET, FILM COATED ORAL
Status: DISCONTINUED | OUTPATIENT
Start: 2022-06-27 | End: 2022-06-29

## 2022-06-27 RX ORDER — CARVEDILOL 12.5 MG/1
12.5 TABLET ORAL DAILY
Status: DISCONTINUED | OUTPATIENT
Start: 2022-06-27 | End: 2022-06-27

## 2022-06-27 RX ORDER — ASPIRIN 81 MG/1
81 TABLET ORAL DAILY
Status: DISCONTINUED | OUTPATIENT
Start: 2022-06-27 | End: 2022-07-03 | Stop reason: HOSPADM

## 2022-06-27 RX ORDER — HYDRALAZINE HYDROCHLORIDE 20 MG/ML
5 INJECTION INTRAMUSCULAR; INTRAVENOUS EVERY 6 HOURS PRN
Status: DISCONTINUED | OUTPATIENT
Start: 2022-06-27 | End: 2022-07-03 | Stop reason: HOSPADM

## 2022-06-27 RX ORDER — FAMOTIDINE 20 MG/1
10 TABLET, FILM COATED ORAL 2 TIMES DAILY
Status: DISCONTINUED | OUTPATIENT
Start: 2022-06-27 | End: 2022-07-03 | Stop reason: HOSPADM

## 2022-06-27 RX ORDER — BUDESONIDE AND FORMOTEROL FUMARATE DIHYDRATE 160; 4.5 UG/1; UG/1
2 AEROSOL RESPIRATORY (INHALATION) 2 TIMES DAILY
Status: DISCONTINUED | OUTPATIENT
Start: 2022-06-27 | End: 2022-07-03 | Stop reason: HOSPADM

## 2022-06-27 RX ORDER — DOXAZOSIN 2 MG/1
2 TABLET ORAL
Status: DISCONTINUED | OUTPATIENT
Start: 2022-06-27 | End: 2022-07-03 | Stop reason: HOSPADM

## 2022-06-27 RX ORDER — ALBUTEROL SULFATE 90 UG/1
2 AEROSOL, METERED RESPIRATORY (INHALATION) 4 TIMES DAILY
Status: DISCONTINUED | OUTPATIENT
Start: 2022-06-27 | End: 2022-06-29

## 2022-06-27 RX ORDER — ALLOPURINOL 100 MG/1
100 TABLET ORAL DAILY
Status: DISCONTINUED | OUTPATIENT
Start: 2022-06-28 | End: 2022-07-03 | Stop reason: HOSPADM

## 2022-06-27 RX ORDER — HYDRALAZINE HYDROCHLORIDE 25 MG/1
25 TABLET, FILM COATED ORAL DAILY
Status: DISCONTINUED | OUTPATIENT
Start: 2022-06-27 | End: 2022-06-27

## 2022-06-27 RX ORDER — TRAMADOL HYDROCHLORIDE 50 MG/1
50 TABLET ORAL 2 TIMES DAILY
Status: DISCONTINUED | OUTPATIENT
Start: 2022-06-27 | End: 2022-06-29

## 2022-06-27 RX ORDER — LOSARTAN POTASSIUM 50 MG/1
100 TABLET ORAL DAILY
Status: DISCONTINUED | OUTPATIENT
Start: 2022-06-27 | End: 2022-06-27

## 2022-06-27 RX ORDER — INSULIN LISPRO 100 [IU]/ML
1-5 INJECTION, SOLUTION INTRAVENOUS; SUBCUTANEOUS EVERY 6 HOURS SCHEDULED
Status: DISCONTINUED | OUTPATIENT
Start: 2022-06-28 | End: 2022-06-28

## 2022-06-27 RX ORDER — ASCORBIC ACID 500 MG
500 TABLET ORAL DAILY
Status: DISCONTINUED | OUTPATIENT
Start: 2022-06-27 | End: 2022-07-03 | Stop reason: HOSPADM

## 2022-06-27 RX ORDER — INSULIN LISPRO 100 [IU]/ML
40 INJECTION, SOLUTION INTRAVENOUS; SUBCUTANEOUS 2 TIMES DAILY
Status: DISCONTINUED | OUTPATIENT
Start: 2022-06-27 | End: 2022-06-27

## 2022-06-27 RX ORDER — CARVEDILOL 12.5 MG/1
12.5 TABLET ORAL EVERY 12 HOURS
Status: DISCONTINUED | OUTPATIENT
Start: 2022-06-27 | End: 2022-07-03 | Stop reason: HOSPADM

## 2022-06-27 RX ADMIN — BUDESONIDE AND FORMOTEROL FUMARATE DIHYDRATE 2 PUFF: 160; 4.5 AEROSOL RESPIRATORY (INHALATION) at 23:25

## 2022-06-27 RX ADMIN — FAMOTIDINE 10 MG: 20 TABLET ORAL at 17:09

## 2022-06-27 RX ADMIN — MONTELUKAST 10 MG: 10 TABLET, FILM COATED ORAL at 20:46

## 2022-06-27 RX ADMIN — HEPARIN SODIUM 5000 UNITS: 5000 INJECTION INTRAVENOUS; SUBCUTANEOUS at 09:17

## 2022-06-27 RX ADMIN — PANTOPRAZOLE SODIUM 40 MG: 40 TABLET, DELAYED RELEASE ORAL at 09:17

## 2022-06-27 RX ADMIN — HYDRALAZINE HYDROCHLORIDE 50 MG: 50 TABLET, FILM COATED ORAL at 08:46

## 2022-06-27 RX ADMIN — MAGNESIUM OXIDE TAB 400 MG (241.3 MG ELEMENTAL MG) 400 MG: 400 (241.3 MG) TAB at 08:46

## 2022-06-27 RX ADMIN — INSULIN DETEMIR 10 UNITS: 100 INJECTION, SOLUTION SUBCUTANEOUS at 20:46

## 2022-06-27 RX ADMIN — CARVEDILOL 12.5 MG: 12.5 TABLET, FILM COATED ORAL at 09:17

## 2022-06-27 RX ADMIN — METHOCARBAMOL 500 MG: 500 TABLET, FILM COATED ORAL at 08:52

## 2022-06-27 RX ADMIN — TRAMADOL HYDROCHLORIDE 50 MG: 50 TABLET, COATED ORAL at 08:52

## 2022-06-27 RX ADMIN — FAMOTIDINE 10 MG: 20 TABLET ORAL at 08:52

## 2022-06-27 RX ADMIN — FENOFIBRATE 72 MG: 48 TABLET ORAL at 10:03

## 2022-06-27 RX ADMIN — HYDRALAZINE HYDROCHLORIDE 50 MG: 50 TABLET, FILM COATED ORAL at 20:46

## 2022-06-27 RX ADMIN — DOXAZOSIN 2 MG: 2 TABLET ORAL at 20:46

## 2022-06-27 RX ADMIN — OMEGA-3 FATTY ACIDS CAP 1000 MG 1000 MG: 1000 CAP at 08:46

## 2022-06-27 RX ADMIN — TRAMADOL HYDROCHLORIDE 50 MG: 50 TABLET, COATED ORAL at 17:09

## 2022-06-27 RX ADMIN — ATORVASTATIN CALCIUM 80 MG: 80 TABLET, FILM COATED ORAL at 17:09

## 2022-06-27 RX ADMIN — LEVOTHYROXINE SODIUM 75 MCG: 75 TABLET ORAL at 08:46

## 2022-06-27 RX ADMIN — HEPARIN SODIUM 5000 UNITS: 5000 INJECTION INTRAVENOUS; SUBCUTANEOUS at 16:04

## 2022-06-27 RX ADMIN — FERROUS GLUCONATE 325 MG: 324 TABLET ORAL at 08:52

## 2022-06-27 RX ADMIN — METHOCARBAMOL 500 MG: 500 TABLET, FILM COATED ORAL at 16:04

## 2022-06-27 RX ADMIN — ALBUTEROL SULFATE 2 PUFF: 90 AEROSOL, METERED RESPIRATORY (INHALATION) at 09:23

## 2022-06-27 RX ADMIN — HEPARIN SODIUM 5000 UNITS: 5000 INJECTION INTRAVENOUS; SUBCUTANEOUS at 20:47

## 2022-06-27 RX ADMIN — ASPIRIN 81 MG: 81 TABLET, COATED ORAL at 08:46

## 2022-06-27 RX ADMIN — Medication 1 TABLET: at 08:46

## 2022-06-27 RX ADMIN — OXYCODONE HYDROCHLORIDE AND ACETAMINOPHEN 500 MG: 500 TABLET ORAL at 08:52

## 2022-06-27 RX ADMIN — ALBUTEROL SULFATE 2 PUFF: 90 AEROSOL, METERED RESPIRATORY (INHALATION) at 23:25

## 2022-06-27 RX ADMIN — IOHEXOL 65 ML: 350 INJECTION, SOLUTION INTRAVENOUS at 00:11

## 2022-06-27 RX ADMIN — CARVEDILOL 12.5 MG: 12.5 TABLET, FILM COATED ORAL at 20:46

## 2022-06-27 RX ADMIN — METHOCARBAMOL 500 MG: 500 TABLET, FILM COATED ORAL at 20:46

## 2022-06-27 RX ADMIN — SODIUM CHLORIDE 125 ML/HR: 0.9 INJECTION, SOLUTION INTRAVENOUS at 09:17

## 2022-06-27 NOTE — ED ATTENDING ATTESTATION
6/26/2022  IAndressa DO, saw and evaluated the patient  I have discussed the patient with the resident/non-physician practitioner and agree with the resident's/non-physician practitioner's findings, Plan of Care, and MDM as documented in the resident's/non-physician practitioner's note, except where noted  All available labs and Radiology studies were reviewed  I was present for key portions of any procedure(s) performed by the resident/non-physician practitioner and I was immediately available to provide assistance  At this point I agree with the current assessment done in the Emergency Department  I have conducted an independent evaluation of this patient a history and physical is as follows:    68-year-old female presents with fever and abdominal pain  Patient was seen here yesterday and diagnosed with possible gastroenteritis  Patient states she has not had a bowel movement for 4-5 days, has nausea, has passed very little gas  On exam-no acute distress, heart regular, no respiratory distress, abdomen soft with diffuse tenderness    Plan-check labs, check urine, CT abdomen    ED Course         Critical Care Time  Procedures

## 2022-06-27 NOTE — ASSESSMENT & PLAN NOTE
Blood pressure ranging from 136W-548N systolic, 73N-54B diastolic over interval period      - Continue home coreg 12 5 mg q12 hr, cardura 2 mg HS,   - Stopped hydralazine 50 mg q12h (per patient's son request) and Procardia XL 60 mg qd  - Given work of breathing and edema, restarted on Bumex 1mg PO daily, will follow BMPs  - Restarted home Losartan 100mg daily as kidney function stable  - PRN hydralazine for SBP >180
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
## 2022-06-27 NOTE — ASSESSMENT & PLAN NOTE
Pre-renal CINDY on CKD 2/2 dehydration and decreased p o  intake is now resolved  Creatinine at 1 32 today  Baseline Cr around 1-1 4, 1 91 on admission       Started patient on 2 mg bumex 6/29, then transitioned to 1 mg bumex due to renal function     -Holding IVF at this time  -Trend BMP  -Replete to keep K>4  -Monitor I&O and daily weights  -Restarted home losartan

## 2022-06-27 NOTE — PROGRESS NOTES
INTERNAL MEDICINE RESIDENCY PROGRESS NOTE     Name: Sweetie Andrews   Age & Sex: 64 y o  female   MRN: [id number]  Unit/Bed#: ED 25   Encounter: [id number]  Team: SOD Team C     PATIENT INFORMATION     Name: Sweetie Andrews   Age & Sex: 64 y o  female   MRN: [id number]  Hospital Stay Days: 0    ASSESSMENT/PLAN     Principal Problem:    Elevated LFTs  Active Problems:    Hypertension    Mixed hyperlipidemia    Type 2 diabetes mellitus with stage 3 chronic kidney disease, with long-term current use of insulin ([facility])    CKD (chronic kidney disease) stage 3, GFR 30-59 ml/min ([facility])    Low back pain    Continuous opioid dependence ([facility])    Gastroesophageal reflux disease    CINDY (acute kidney injury) ([facility] )    Pancytopenia ([facility])      Pancytopenia ([facility])  Assessment & Plan  Baseline CBC WNL  Likely 2/2 acute viral illness  F/u peripheral smear and reticulocyte count    CINDY (acute kidney injury) ([facility] )  Assessment & Plan  Pre-renal CINDY on CKD 2/2 dehydration and decreased p o  intake  Baseline Cr around 1-1 4, 1 91 on admission  Gentle IVF hydration with NS  Trend BMP  Monitor I&O and daily weights  Hold home losartan 75 mg qd and bumex 2 mg qd until Cr starts to trend down      Gastroesophageal reflux disease  Assessment & Plan  Continue pepcid 10 mg bid and protonix 40    Continuous opioid dependence ([facility] )  Assessment & Plan  On tramadol outpatient for chronic back pain    Low back pain  Assessment & Plan  Chronic, on tramadol and robaxin daily  Pain currently at baseline  CKD (chronic kidney disease) stage 3, GFR 30-59 ml/min ([facility])  Assessment & Plan  In setting of T2DM and HTN  See plans for DM, HTN, and CINDY    Type 2 diabetes mellitus with stage 3 chronic kidney disease, with long-term current use of insulin ([facility])  Assessment & Plan  A1c 6 1%, Fingerstick glucose 102 this morning    Outpt: detemir 40 units BID; novolin 15 units bid meals (lunch and dinner)    Plan:  - on admission, and patient to be made NPO for abdominal pain and GI work-up, so will hold scheduled meal-time novolin and reduce basal to 10 units q12 hr with SSI coverage  -If surgery clears for clear liquid diet, increase detemir to 16 units, Q12h, with sliding scale   -goal -180 inpt    Mixed hyperlipidemia  Assessment & Plan  Continue lipitor and Tricor    Hypertension  Assessment & Plan  -Continue home coreg 12 5 mg q12 hr, cardura 2 mg HS, hydralazine 50 mg q12h, and Procardia XL 60 mg qd  -holding home bumex 2 mg qd and losartan 75 mg qd until CINDY improves  -PRN hydralazine for SBP >180    * Elevated LFTs  Assessment & Plan  Presenting with 4-5 days diffuse abdominal pain, worse after eating, with constipation  Patient able to pass small BM this morning 6/27  Febrile 102 3F  , , alk phos 75, t bili 1 11  lipase and lactic WNL  Developing mild pancytopenia  CTA unremarkable for mesenteric ischemia, cholecystitis, or constipation  CTabd/pelvis from 6/25 showed possible gastroenteritis vs developing SBO  CTabd/pelvis 6/26 showed no evidence of acute intra-abdominal or pelvic pathology  US RUQ 6/27 unremarkable, except for mild gallbladder wall thickening without cholelithiasis and a partially visualized small pericardial effusion  Acute hepatitis negative  Direct bilirubin 0 75, total bilirubin 1 1      Plan:  -hepatocellular pattern LFT elevation, along with fever and mild pancytopenia  Suspect viral etiology  -RUQ ultrasound  -NPO for now with abdominal exams  -IVF  -bowel regimen  -blood cultures pending  -gen surg consulted  - pending Gen surg recommendations, may require HIDA scan to rule out acalculous cholecystitis        Disposition: Pending eval from surgery     SUBJECTIVE     Patient seen and examined  No acute events overnight  Patient appeared comfortable and endorsed some abdominal pain, rated at a 5/10, as well as nausea  She had a small BM this morning  Denies any recent diarrhea aside from couple weeks ago  Patient denies headaches, lightheadedness, dizziness, chest pain, shortness of breath  OBJECTIVE     Vitals:    22 0449 22 0900 22 1002 22 1156   BP: 165/73 (!) 171/74 127/60 161/58   BP Location: Right arm  Right arm Right arm   Pulse: 84 72 66 77   Resp: 16  16 16   Temp:       TempSrc:       SpO2: 96% 97% 96% 96%   Weight:          Temperature:   Temp (24hrs), Av 1 °F (37 8 °C), Min:98 3 °F (36 8 °C), Max:102 3 °F (39 1 °C)    Temperature: 99 6 °F (37 6 °C)  Intake & Output:  I/O     None        Weights:        Body mass index is 28 12 kg/m²  Weight (last 2 days)     Date/Time Weight    22 65 3 (144)        Physical Exam  Constitutional:       Appearance: Normal appearance  HENT:      Head: Normocephalic and atraumatic  Eyes:      Conjunctiva/sclera: Conjunctivae normal    Cardiovascular:      Rate and Rhythm: Normal rate and regular rhythm  Pulses: Normal pulses  Heart sounds: Normal heart sounds  Pulmonary:      Effort: Pulmonary effort is normal  No respiratory distress  Breath sounds: Normal breath sounds  No wheezing or rales  Abdominal:      General: Abdomen is flat  Bowel sounds are normal  There is no distension  Palpations: Abdomen is soft  Tenderness: There is abdominal tenderness (diffusely but most tender in bilateral lower quadrants)  There is no guarding or rebound  Comments: Tenderness in all 4 quadrants  Musculoskeletal:         General: No swelling  Right lower leg: No edema  Left lower leg: No edema  Skin:     General: Skin is warm and dry  Capillary Refill: Capillary refill takes less than 2 seconds  Neurological:      General: No focal deficit present  Mental Status: She is alert  Psychiatric:         Mood and Affect: Mood normal          Behavior: Behavior normal        LABORATORY DATA     Labs: I have personally reviewed pertinent reports    Results from last 7 days   Lab Units 06/26/22 2118 06/25/22  1842   WBC Thousand/uL 4 25* 4 62   HEMOGLOBIN g/dL 10 1* 10 2*   HEMATOCRIT % 30 6* 30 9*   PLATELETS Thousands/uL 116* 133*   NEUTROS PCT % 86* 80*   MONOS PCT % 7 8      Results from last 7 days   Lab Units 06/26/22  2118 06/25/22  1842   POTASSIUM mmol/L 4 1 4 0   CHLORIDE mmol/L 102 103   CO2 mmol/L 25 24   BUN mg/dL 48* 54*   CREATININE mg/dL 1 91* 1 88*   CALCIUM mg/dL 9 0 9 5   ALK PHOS U/L 75 71   ALT U/L 264* 162*   AST U/L 294* 189*              Results from last 7 days   Lab Units 06/27/22  0840 06/25/22  1842   INR  1 31* 1 10   PTT seconds  --  26     Results from last 7 days   Lab Units 06/26/22  2352   LACTIC ACID mmol/L 0 8           IMAGING & DIAGNOSTIC TESTING     Radiology Results: I have personally reviewed pertinent reports  CT abdomen pelvis wo contrast    Result Date: 6/26/2022  Impression: No evidence of acute intra-abdominal or pelvic pathology  Findings suggesting cellulitis versus chronic injections throughout the anterior abdominal wall  Workstation performed: [id number]     CTA abdomen pelvis w wo contrast    Result Date: 6/27/2022  Impression: Unremarkable CT arteriography of the abdomen and pelvis  No evidence of acute pathology throughout the abdomen or pelvis Workstation performed: [id number]     US right upper quadrant    Result Date: 6/27/2022  Impression: Mild gallbladder wall thickening without cholelithiasis is nonspecific  It may be sequela of trace perihepatic free fluid  If there is clinical concern for acalculous cholecystitis, consider follow-up with a HIDA scan if it would alter patient management  Partially visualized small pericardial effusion  Remainder of the examination is normal  Workstation performed: [id number]     Other Diagnostic Testing: I have personally reviewed pertinent reports      ACTIVE MEDICATIONS     Current Facility-Administered Medications   Medication Dose Route Frequency    albuterol (PROVENTIL HFA,VENTOLIN HFA) inhaler 2 puff  2 puff Inhalation 4x Daily    [START ON 6/28/2022] allopurinol (ZYLOPRIM) tablet 100 mg  100 mg Oral Daily    ascorbic acid (VITAMIN C) tablet 500 mg  500 mg Oral Daily    aspirin (ECOTRIN LOW STRENGTH) EC tablet 81 mg  81 mg Oral Daily    atorvastatin (LIPITOR) tablet 80 mg  80 mg Oral After Dinner    budesonide-formoterol (SYMBICORT) 160-4 5 mcg/act inhaler 2 puff  2 puff Inhalation BID    calcium carbonate-vitamin D (OSCAL-D) 500 mg-200 units per tablet 1 tablet  1 tablet Oral Daily With Breakfast    carvedilol (COREG) tablet 12 5 mg  12 5 mg Oral Q12H    [START ON 6/28/2022] cholecalciferol (VITAMIN D3) tablet 1,000 Units  1,000 Units Oral Daily    doxazosin (CARDURA) tablet 2 mg  2 mg Oral HS    famotidine (PEPCID) tablet 10 mg  10 mg Oral BID    fenofibrate (TRICOR) tablet 72 mg  72 mg Oral Daily    ferrous gluconate (FERGON) tablet 325 mg  325 mg Oral Daily    fish oil capsule 1,000 mg  1,000 mg Oral Daily    fluticasone (FLONASE) 50 mcg/act nasal spray 2 spray  2 spray Nasal Daily    heparin (porcine) subcutaneous injection 5,000 Units  5,000 Units Subcutaneous Q8H [address]    hydrALAZINE (APRESOLINE) injection 5 mg  5 mg Intravenous Q6H PRN    hydrALAZINE (APRESOLINE) tablet 50 mg  50 mg Oral Q12H    insulin detemir (LEVEMIR) subcutaneous injection 10 Units  10 Units Subcutaneous Q12H [address]    insulin lispro (HumaLOG) 100 units/mL subcutaneous injection 1-5 Units  1-5 Units Subcutaneous TID AC    insulin lispro (HumaLOG) 100 units/mL subcutaneous injection 1-5 Units  1-5 Units Subcutaneous HS    levothyroxine tablet 75 mcg  75 mcg Oral Early Morning    magnesium oxide (MAG-OX) tablet 400 mg  400 mg Oral Daily    methocarbamol (ROBAXIN) tablet 500 mg  500 mg Oral TID    montelukast (SINGULAIR) tablet 10 mg  10 mg Oral HS    [START ON 6/28/2022] multivitamin stress formula tablet 1 tablet  1 tablet Oral Daily    NIFEdipine (PROCARDIA XL) 24 hr tablet 60 mg  60 mg Oral Daily    nitroglycerin (NITROSTAT) SL tablet 0 4 mg  0 4 mg Sublingual Q5 Min PRN    pantoprazole (PROTONIX) EC tablet 40 mg  40 mg Oral Early Morning    sodium chloride 0 9 % infusion  125 mL/hr Intravenous Continuous    traMADol (ULTRAM) tablet 50 mg  50 mg Oral BID       VTE Pharmacologic Prophylaxis: Heparin  VTE Mechanical Prophylaxis: sequential compression device    Portions of the record may have been created with voice recognition software  Occasional wrong word or "sound a like" substitutions may have occurred due to the inherent limitations of voice recognition software    Read the chart carefully and recognize, using context, where substitutions have occurred   ==  Ondina Quijano, [address]  Internal Medicine Residency PGY-1
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
## 2022-06-27 NOTE — PLAN OF CARE
Problem: PAIN - ADULT  Goal: Verbalizes/displays adequate comfort level or baseline comfort level  Description: Interventions:  - Encourage patient to monitor pain and request assistance  - Assess pain using appropriate pain scale  - Administer analgesics based on type and severity of pain and evaluate response  - Implement non-pharmacological measures as appropriate and evaluate response  - Consider cultural and social influences on pain and pain management  - Notify physician/advanced practitioner if interventions unsuccessful or patient reports new pain  Outcome: Progressing     Problem: INFECTION - ADULT  Goal: Absence or prevention of progression during hospitalization  Description: INTERVENTIONS:  - Assess and monitor for signs and symptoms of infection  - Monitor lab/diagnostic results  - Monitor all insertion sites, i e  indwelling lines, tubes, and drains  - Monitor endotracheal if appropriate and nasal secretions for changes in amount and color  - [place] appropriate cooling/warming therapies per order  - Administer medications as ordered  - Instruct and encourage patient and family to use good hand hygiene technique  - Identify and instruct in appropriate isolation precautions for identified infection/condition  Outcome: Progressing  Goal: Absence of fever/infection during neutropenic period  Description: INTERVENTIONS:  - Monitor WBC    Outcome: Progressing     Problem: SAFETY ADULT  Goal: Patient will remain free of falls  Description: INTERVENTIONS:  - Educate patient/family on patient safety including physical limitations  - Instruct patient to call for assistance with activity   - Consult OT/PT to assist with strengthening/mobility   - Keep Call bell within reach  - Keep bed low and locked with side rails adjusted as appropriate  - Keep care items and personal belongings within reach  - Initiate and maintain comfort rounds  - Make Fall Risk Sign visible to staff  - Apply yellow socks and bracelet for high fall risk patients  - Consider moving patient to room near nurses station  Outcome: Progressing  Goal: Maintain or return to baseline ADL function  Description: INTERVENTIONS:  -  Assess patient's ability to carry out ADLs; assess patient's baseline for ADL function and identify physical deficits which impact ability to perform ADLs (bathing, care of mouth/teeth, toileting, grooming, dressing, etc )  - Assess/evaluate cause of self-care deficits   - Assess range of motion  - Assess patient's mobility; develop plan if impaired  - Assess patient's need for assistive devices and provide as appropriate  - Encourage maximum independence but intervene and supervise when necessary  - Involve family in performance of ADLs  - Assess for home care needs following discharge   - Consider OT consult to assist with ADL evaluation and planning for discharge  - Provide patient education as appropriate  Outcome: Progressing  Goal: Maintains/Returns to pre admission functional level  Description: INTERVENTIONS:  - Perform BMAT or MOVE assessment daily    - Set and communicate daily mobility goal to care team and patient/family/caregiver     - Collaborate with rehabilitation services on mobility goals if consulted  - Out of bed for toileting  - Record patient progress and toleration of activity level   Outcome: Progressing     Problem: DISCHARGE PLANNING  Goal: Discharge to home or other facility with appropriate resources  Description: INTERVENTIONS:  - Identify barriers to discharge w/patient and caregiver  - Arrange for needed discharge resources and transportation as appropriate  - Identify discharge learning needs (meds, wound care, etc )  - Arrange for interpretive services to assist at discharge as needed  - Refer to Case Management Department for coordinating discharge planning if the patient needs post-hospital services based on physician/advanced practitioner order or complex needs related to functional status, cognitive ability, or social support system  Outcome: Progressing     Problem: Knowledge Deficit  Goal: Patient/family/caregiver demonstrates understanding of disease process, treatment plan, medications, and discharge instructions  Description: Complete learning assessment and assess knowledge base    Interventions:  - Provide teaching at level of understanding  - Provide teaching via preferred learning methods  Outcome: Progressing
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
## 2022-06-27 NOTE — ASSESSMENT & PLAN NOTE
A1c 6 1%, symptomatic hypoglycemia 7/1 AM with glucose reading of 51  Glucose increased to 98 after given some food  Detemir subsequently decreased to 10U  Current glucose range 104-301, labile control but goal to avoid hypoglycemic event  Outpt regimen: detemir 40 units BID; novolin 15 units bid meals (lunch and dinner)    Plan:  - Soft diet  - Continue Detemir 10 units, Q12h, with sliding scale     - Monitor glucose levels if too low due to poor PO intake, adjust detemir dose   - goal -180 inpt

## 2022-06-27 NOTE — ASSESSMENT & PLAN NOTE
Presenting with 4-5 days diffuse abdominal pain, worse after eating, with constipation  Patient able to pass small BM on 6/27 am and was given prune juice on 6/28, and was able to pass loose/watery stools after  Had 2 bowel movements 6/30  Constipation resolved 7/3  Current temp at 98 5  MRI/MRCP unremarkable  Labs:  · , up from 114,  was 150 yesterday, alk phos 279, up from 200  Consistently elevated transaminases  · Lipase normal  · Total bili up to 3 01, previously 2 91 and 3 25   · Acute hepatitis panel negative  · EBV showing positive IgG but no IgM - old infection/vaccination  · CMV showing positive IgG but no IgM- old infection/vaccination  · Ceruloplasmin 30 6  · Anti-smooth muscle antibodies normal  · Anti-mitochondrial antibodies normal  · MARIZA negative  · BCx NG  - IgG 1,2,3, and 4 negative    Imaging:  · Developing mild pancytopenia, with Hgb down to 8 4, down from 9 0 on 6/29  · CTA unremarkable for mesenteric ischemia, cholecystitis, or constipation  · CTabd/pelvis from 6/25 showed possible gastroenteritis vs developing SBO  · CTabd/pelvis 6/26 showed no evidence of acute intra-abdominal or pelvic pathology  · US RUQ 6/27 unremarkable, except for mild gallbladder wall thickening without cholelithiasis and a partially visualized small pericardial effusion  · HIDA scan unremarkable, negative for acalculous cholecystitis  · EGD showed mild erythematous mucosa in the stomach  Normal esophagus, normal 1st and 2nd portion of duodenum  The esophagus appeared normal  Biopsy of the stomach, incisura and antrum done to R/O H  Pylori  Biopsies of 1st and 2nd part of duodenum taken to R/O celiac disease  · MRCP showed small bilateral pleural effusions, trace ascites and mild body wall edema  Otherwise unremarkable examination without choledocholithiasis or biliary ductal dilation  Plan:  - hepatocellular pattern LFT elevation, along with fever and mild pancytopenia   Suspect viral etiology    - Start soft diet  - Holding IVF  - bowel regimen of senna glycoside and docusate, scheduled miralax, PRN dulcalax  - GI consulted, patient ok to discharge for outpatient follow up with GI   -  "Suspect DILI vs infection Might need liver biopsy in the future depending on the clinical course"   - Ceftriaxone and flagyl discontinued with urine cultures showing E coli

## 2022-06-28 ENCOUNTER — APPOINTMENT (INPATIENT)
Dept: RADIOLOGY | Facility: HOSPITAL | Age: 77
DRG: 948 | End: 2022-06-28
Payer: COMMERCIAL

## 2022-06-28 LAB
ALBUMIN SERPL BCP-MCNC: 2.5 G/DL (ref 3.5–5)
ALP SERPL-CCNC: 83 U/L (ref 46–116)
ALT SERPL W P-5'-P-CCNC: 195 U/L (ref 12–78)
ANION GAP SERPL CALCULATED.3IONS-SCNC: 13 MMOL/L (ref 4–13)
AST SERPL W P-5'-P-CCNC: 175 U/L (ref 5–45)
BASOPHILS # BLD AUTO: 0.01 THOUSANDS/ΜL (ref 0–0.1)
BASOPHILS NFR BLD AUTO: 0 % (ref 0–1)
BILIRUB SERPL-MCNC: 1.46 MG/DL (ref 0.2–1)
BUN SERPL-MCNC: 46 MG/DL (ref 5–25)
CALCIUM ALBUM COR SERPL-MCNC: 9.5 MG/DL (ref 8.3–10.1)
CALCIUM SERPL-MCNC: 8.3 MG/DL (ref 8.3–10.1)
CHLORIDE SERPL-SCNC: 109 MMOL/L (ref 100–108)
CO2 SERPL-SCNC: 20 MMOL/L (ref 21–32)
CREAT SERPL-MCNC: 1.63 MG/DL (ref 0.6–1.3)
EBV NA IGG SER IA-ACNC: <18 U/ML (ref 0–17.9)
EBV VCA IGG SER IA-ACNC: 102 U/ML (ref 0–17.9)
EBV VCA IGM SER IA-ACNC: <36 U/ML (ref 0–35.9)
EOSINOPHIL # BLD AUTO: 0.1 THOUSAND/ΜL (ref 0–0.61)
EOSINOPHIL NFR BLD AUTO: 2 % (ref 0–6)
ERYTHROCYTE [DISTWIDTH] IN BLOOD BY AUTOMATED COUNT: 14.4 % (ref 11.6–15.1)
GFR SERPL CREATININE-BSD FRML MDRD: 29 ML/MIN/1.73SQ M
GLUCOSE P FAST SERPL-MCNC: 120 MG/DL (ref 65–99)
GLUCOSE SERPL-MCNC: 120 MG/DL (ref 65–140)
GLUCOSE SERPL-MCNC: 121 MG/DL (ref 65–140)
GLUCOSE SERPL-MCNC: 140 MG/DL (ref 65–140)
GLUCOSE SERPL-MCNC: 146 MG/DL (ref 65–140)
GLUCOSE SERPL-MCNC: 150 MG/DL (ref 65–140)
GLUCOSE SERPL-MCNC: 297 MG/DL (ref 65–140)
HCT VFR BLD AUTO: 27.6 % (ref 34.8–46.1)
HGB BLD-MCNC: 8.8 G/DL (ref 11.5–15.4)
IMM GRANULOCYTES # BLD AUTO: 0.02 THOUSAND/UL (ref 0–0.2)
IMM GRANULOCYTES NFR BLD AUTO: 0 % (ref 0–2)
INTERPRETATION: ABNORMAL
LYMPHOCYTES # BLD AUTO: 0.34 THOUSANDS/ΜL (ref 0.6–4.47)
LYMPHOCYTES NFR BLD AUTO: 7 % (ref 14–44)
MCH RBC QN AUTO: 30.9 PG (ref 26.8–34.3)
MCHC RBC AUTO-ENTMCNC: 31.9 G/DL (ref 31.4–37.4)
MCV RBC AUTO: 97 FL (ref 82–98)
MONOCYTES # BLD AUTO: 0.27 THOUSAND/ΜL (ref 0.17–1.22)
MONOCYTES NFR BLD AUTO: 6 % (ref 4–12)
NEUTROPHILS # BLD AUTO: 4.17 THOUSANDS/ΜL (ref 1.85–7.62)
NEUTS SEG NFR BLD AUTO: 85 % (ref 43–75)
NRBC BLD AUTO-RTO: 0 /100 WBCS
PMV BLD AUTO: 12.1 FL (ref 8.9–12.7)
POTASSIUM SERPL-SCNC: 3.9 MMOL/L (ref 3.5–5.3)
PROT SERPL-MCNC: 5.4 G/DL (ref 6.4–8.2)
RBC # BLD AUTO: 2.85 MILLION/UL (ref 3.81–5.12)
SODIUM SERPL-SCNC: 142 MMOL/L (ref 136–145)
WBC # BLD AUTO: 4.91 THOUSAND/UL (ref 4.31–10.16)

## 2022-06-28 PROCEDURE — G1004 CDSM NDSC: HCPCS

## 2022-06-28 PROCEDURE — A9537 TC99M MEBROFENIN: HCPCS

## 2022-06-28 PROCEDURE — 80053 COMPREHEN METABOLIC PANEL: CPT | Performed by: STUDENT IN AN ORGANIZED HEALTH CARE EDUCATION/TRAINING PROGRAM

## 2022-06-28 PROCEDURE — 82948 REAGENT STRIP/BLOOD GLUCOSE: CPT

## 2022-06-28 PROCEDURE — 99232 SBSQ HOSP IP/OBS MODERATE 35: CPT | Performed by: INTERNAL MEDICINE

## 2022-06-28 PROCEDURE — 99232 SBSQ HOSP IP/OBS MODERATE 35: CPT | Performed by: SURGERY

## 2022-06-28 PROCEDURE — 85025 COMPLETE CBC W/AUTO DIFF WBC: CPT | Performed by: STUDENT IN AN ORGANIZED HEALTH CARE EDUCATION/TRAINING PROGRAM

## 2022-06-28 PROCEDURE — 78226 HEPATOBILIARY SYSTEM IMAGING: CPT

## 2022-06-28 RX ORDER — INSULIN LISPRO 100 [IU]/ML
1-5 INJECTION, SOLUTION INTRAVENOUS; SUBCUTANEOUS
Status: DISCONTINUED | OUTPATIENT
Start: 2022-06-29 | End: 2022-06-28

## 2022-06-28 RX ORDER — BISACODYL 10 MG
10 SUPPOSITORY, RECTAL RECTAL DAILY PRN
Status: DISCONTINUED | OUTPATIENT
Start: 2022-06-28 | End: 2022-07-03 | Stop reason: HOSPADM

## 2022-06-28 RX ORDER — AMOXICILLIN 250 MG
1 CAPSULE ORAL 2 TIMES DAILY
Status: DISCONTINUED | OUTPATIENT
Start: 2022-06-28 | End: 2022-07-03 | Stop reason: HOSPADM

## 2022-06-28 RX ORDER — INSULIN LISPRO 100 [IU]/ML
1-5 INJECTION, SOLUTION INTRAVENOUS; SUBCUTANEOUS
Status: DISCONTINUED | OUTPATIENT
Start: 2022-06-29 | End: 2022-07-03 | Stop reason: HOSPADM

## 2022-06-28 RX ORDER — ACETAMINOPHEN 325 MG/1
650 TABLET ORAL EVERY 6 HOURS PRN
Status: DISCONTINUED | OUTPATIENT
Start: 2022-06-28 | End: 2022-07-03 | Stop reason: HOSPADM

## 2022-06-28 RX ADMIN — HEPARIN SODIUM 5000 UNITS: 5000 INJECTION INTRAVENOUS; SUBCUTANEOUS at 05:15

## 2022-06-28 RX ADMIN — ACETAMINOPHEN 650 MG: 325 TABLET, FILM COATED ORAL at 02:43

## 2022-06-28 RX ADMIN — SODIUM CHLORIDE 125 ML/HR: 0.9 INJECTION, SOLUTION INTRAVENOUS at 12:00

## 2022-06-28 RX ADMIN — B-COMPLEX W/ C & FOLIC ACID TAB 1 TABLET: TAB at 08:34

## 2022-06-28 RX ADMIN — FENOFIBRATE 72 MG: 48 TABLET ORAL at 08:33

## 2022-06-28 RX ADMIN — SENNOSIDES AND DOCUSATE SODIUM 1 TABLET: 8.6; 5 TABLET ORAL at 16:56

## 2022-06-28 RX ADMIN — FAMOTIDINE 10 MG: 20 TABLET ORAL at 16:57

## 2022-06-28 RX ADMIN — ALBUTEROL SULFATE 2 PUFF: 90 AEROSOL, METERED RESPIRATORY (INHALATION) at 08:35

## 2022-06-28 RX ADMIN — ATORVASTATIN CALCIUM 80 MG: 80 TABLET, FILM COATED ORAL at 16:56

## 2022-06-28 RX ADMIN — BUDESONIDE AND FORMOTEROL FUMARATE DIHYDRATE 2 PUFF: 160; 4.5 AEROSOL RESPIRATORY (INHALATION) at 16:58

## 2022-06-28 RX ADMIN — MONTELUKAST 10 MG: 10 TABLET, FILM COATED ORAL at 21:28

## 2022-06-28 RX ADMIN — HYDRALAZINE HYDROCHLORIDE 50 MG: 50 TABLET, FILM COATED ORAL at 20:39

## 2022-06-28 RX ADMIN — LEVOTHYROXINE SODIUM 75 MCG: 75 TABLET ORAL at 05:15

## 2022-06-28 RX ADMIN — METHOCARBAMOL 500 MG: 500 TABLET, FILM COATED ORAL at 16:57

## 2022-06-28 RX ADMIN — CARVEDILOL 12.5 MG: 12.5 TABLET, FILM COATED ORAL at 08:34

## 2022-06-28 RX ADMIN — Medication 1 TABLET: at 08:34

## 2022-06-28 RX ADMIN — METHOCARBAMOL 500 MG: 500 TABLET, FILM COATED ORAL at 08:34

## 2022-06-28 RX ADMIN — ALBUTEROL SULFATE 2 PUFF: 90 AEROSOL, METERED RESPIRATORY (INHALATION) at 11:08

## 2022-06-28 RX ADMIN — HEPARIN SODIUM 5000 UNITS: 5000 INJECTION INTRAVENOUS; SUBCUTANEOUS at 21:28

## 2022-06-28 RX ADMIN — TRAMADOL HYDROCHLORIDE 50 MG: 50 TABLET, COATED ORAL at 16:57

## 2022-06-28 RX ADMIN — HYDRALAZINE HYDROCHLORIDE 50 MG: 50 TABLET, FILM COATED ORAL at 08:34

## 2022-06-28 RX ADMIN — CARVEDILOL 12.5 MG: 12.5 TABLET, FILM COATED ORAL at 20:39

## 2022-06-28 RX ADMIN — INSULIN DETEMIR 10 UNITS: 100 INJECTION, SOLUTION SUBCUTANEOUS at 21:28

## 2022-06-28 RX ADMIN — TRAMADOL HYDROCHLORIDE 50 MG: 50 TABLET, COATED ORAL at 08:34

## 2022-06-28 RX ADMIN — OMEGA-3 FATTY ACIDS CAP 1000 MG 1000 MG: 1000 CAP at 08:33

## 2022-06-28 RX ADMIN — SODIUM CHLORIDE 125 ML/HR: 0.9 INJECTION, SOLUTION INTRAVENOUS at 20:39

## 2022-06-28 RX ADMIN — ASPIRIN 81 MG: 81 TABLET, COATED ORAL at 08:33

## 2022-06-28 RX ADMIN — Medication 1000 UNITS: at 08:34

## 2022-06-28 RX ADMIN — PANTOPRAZOLE SODIUM 40 MG: 40 TABLET, DELAYED RELEASE ORAL at 05:15

## 2022-06-28 RX ADMIN — METHOCARBAMOL 500 MG: 500 TABLET, FILM COATED ORAL at 21:28

## 2022-06-28 RX ADMIN — OXYCODONE HYDROCHLORIDE AND ACETAMINOPHEN 500 MG: 500 TABLET ORAL at 08:33

## 2022-06-28 RX ADMIN — ALBUTEROL SULFATE 2 PUFF: 90 AEROSOL, METERED RESPIRATORY (INHALATION) at 16:58

## 2022-06-28 RX ADMIN — MAGNESIUM OXIDE TAB 400 MG (241.3 MG ELEMENTAL MG) 400 MG: 400 (241.3 MG) TAB at 08:35

## 2022-06-28 RX ADMIN — DOXAZOSIN 2 MG: 2 TABLET ORAL at 21:28

## 2022-06-28 RX ADMIN — FERROUS GLUCONATE 325 MG: 324 TABLET ORAL at 08:35

## 2022-06-28 RX ADMIN — ALLOPURINOL 100 MG: 100 TABLET ORAL at 08:33

## 2022-06-28 RX ADMIN — BUDESONIDE AND FORMOTEROL FUMARATE DIHYDRATE 2 PUFF: 160; 4.5 AEROSOL RESPIRATORY (INHALATION) at 08:35

## 2022-06-28 RX ADMIN — FAMOTIDINE 10 MG: 20 TABLET ORAL at 08:33

## 2022-06-28 NOTE — PLAN OF CARE
Problem: PAIN - ADULT  Goal: Verbalizes/displays adequate comfort level or baseline comfort level  Description: Interventions:  - Encourage patient to monitor pain and request assistance  - Assess pain using appropriate pain scale  - Administer analgesics based on type and severity of pain and evaluate response  - Implement non-pharmacological measures as appropriate and evaluate response  - Consider cultural and social influences on pain and pain management  - Notify physician/advanced practitioner if interventions unsuccessful or patient reports new pain  Outcome: Progressing     Problem: INFECTION - ADULT  Goal: Absence or prevention of progression during hospitalization  Description: INTERVENTIONS:  - Assess and monitor for signs and symptoms of infection  - Monitor lab/diagnostic results  - Monitor all insertion sites, i e  indwelling lines, tubes, and drains  - Monitor endotracheal if appropriate and nasal secretions for changes in amount and color  - [place] appropriate cooling/warming therapies per order  - Administer medications as ordered  - Instruct and encourage patient and family to use good hand hygiene technique  - Identify and instruct in appropriate isolation precautions for identified infection/condition  Outcome: Progressing  Goal: Absence of fever/infection during neutropenic period  Description: INTERVENTIONS:  - Monitor WBC    Outcome: Progressing     Problem: DISCHARGE PLANNING  Goal: Discharge to home or other facility with appropriate resources  Description: INTERVENTIONS:  - Identify barriers to discharge w/patient and caregiver  - Arrange for needed discharge resources and transportation as appropriate  - Identify discharge learning needs (meds, wound care, etc )  - Arrange for interpretive services to assist at discharge as needed  - Refer to Case Management Department for coordinating discharge planning if the patient needs post-hospital services based on physician/advanced practitioner order or complex needs related to functional status, cognitive ability, or social support system  Outcome: Progressing     Problem: Knowledge Deficit  Goal: Patient/family/caregiver demonstrates understanding of disease process, treatment plan, medications, and discharge instructions  Description: Complete learning assessment and assess knowledge base    Interventions:  - Provide teaching at level of understanding  - Provide teaching via preferred learning methods  Outcome: Progressing

## 2022-06-28 NOTE — PLAN OF CARE
Problem: PAIN - ADULT  Goal: Verbalizes/displays adequate comfort level or baseline comfort level  Description: Interventions:  - Encourage patient to monitor pain and request assistance  - Assess pain using appropriate pain scale  - Administer analgesics based on type and severity of pain and evaluate response  - Implement non-pharmacological measures as appropriate and evaluate response  - Consider cultural and social influences on pain and pain management  - Notify physician/advanced practitioner if interventions unsuccessful or patient reports new pain  Outcome: Progressing     Problem: INFECTION - ADULT  Goal: Absence or prevention of progression during hospitalization  Description: INTERVENTIONS:  - Assess and monitor for signs and symptoms of infection  - Monitor lab/diagnostic results  - Monitor all insertion sites, i e  indwelling lines, tubes, and drains  - Monitor endotracheal if appropriate and nasal secretions for changes in amount and color  - [place] appropriate cooling/warming therapies per order  - Administer medications as ordered  - Instruct and encourage patient and family to use good hand hygiene technique  - Identify and instruct in appropriate isolation precautions for identified infection/condition  Outcome: Progressing     Problem: DISCHARGE PLANNING  Goal: Discharge to home or other facility with appropriate resources  Description: INTERVENTIONS:  - Identify barriers to discharge w/patient and caregiver  - Arrange for needed discharge resources and transportation as appropriate  - Identify discharge learning needs (meds, wound care, etc )  - Arrange for interpretive services to assist at discharge as needed  - Refer to Case Management Department for coordinating discharge planning if the patient needs post-hospital services based on physician/advanced practitioner order or complex needs related to functional status, cognitive ability, or social support system  Outcome: Progressing Problem: Knowledge Deficit  Goal: Patient/family/caregiver demonstrates understanding of disease process, treatment plan, medications, and discharge instructions  Description: Complete learning assessment and assess knowledge base    Interventions:  - Provide teaching at level of understanding  - Provide teaching via preferred learning methods  Outcome: Progressing     Problem: GASTROINTESTINAL - ADULT  Goal: Maintains or returns to baseline bowel function  Description: INTERVENTIONS:  - Assess bowel function  - Encourage oral fluids to ensure adequate hydration  - Administer IV fluids if ordered to ensure adequate hydration  - Administer ordered medications as needed  - Encourage mobilization and activity  - Consider nutritional services referral to assist patient with adequate nutrition and appropriate food choices  Outcome: Progressing  Goal: Maintains adequate nutritional intake  Description: INTERVENTIONS:  - Monitor percentage of each meal consumed  - Identify factors contributing to decreased intake, treat as appropriate  - Assist with meals as needed  - Monitor I&O, weight, and lab values if indicated  - Obtain nutrition services referral as needed  Outcome: Progressing     Problem: METABOLIC, FLUID AND ELECTROLYTES - ADULT  Goal: Electrolytes maintained within normal limits  Description: INTERVENTIONS:  - Monitor labs and assess patient for signs and symptoms of electrolyte imbalances  - Administer electrolyte replacement as ordered  - Monitor response to electrolyte replacements, including repeat lab results as appropriate  - Instruct patient on fluid and nutrition as appropriate  Outcome: Progressing
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
## 2022-06-28 NOTE — PROGRESS NOTES
INTERNAL MEDICINE RESIDENCY PROGRESS NOTE     Name: Kike Daugherty   Age & Sex: 64 y o  female   MRN: [id number]  [facility]   Encounter: [id number]  Team: SOD Team C     PATIENT INFORMATION     Name: Kike Daugherty   Age & Sex: 64 y o  female   MRN: [id number]  Hospital Stay Days: 0    ASSESSMENT/PLAN     Principal Problem:    Elevated LFTs  Active Problems:    Hypertension    Mixed hyperlipidemia    Type 2 diabetes mellitus with stage 3 chronic kidney disease, with long-term current use of insulin ([facility])    CKD (chronic kidney disease) stage 3, GFR 30-59 ml/min ([facility])    Low back pain    Continuous opioid dependence ([facility])    Gastroesophageal reflux disease    CINDY (acute kidney injury) ([facility] [address] )    Pancytopenia ([facility])      Pancytopenia ([facility])  Assessment & Plan  Baseline CBC WNL  Likely 2/2 acute viral illness  F/u peripheral smear and reticulocyte count    CINDY (acute kidney injury) ([facility] )  Assessment & Plan  Pre-renal CINDY on CKD 2/2 dehydration and decreased p o  intake  Baseline Cr around 1-1 4, 1 91 on admission  Creatinine down to 1 63 today  Gentle IVF hydration with NS  Trend BMP  Monitor I&O and daily weights  Hold home losartan 75 mg qd and bumex 2 mg qd until Cr starts to trend down      Gastroesophageal reflux disease  Assessment & Plan  Continue pepcid 10 mg bid and protonix 40    Continuous opioid dependence ([facility] )  Assessment & Plan  On tramadol outpatient for chronic back pain    Low back pain  Assessment & Plan  Chronic, on tramadol and robaxin daily  Pain currently at baseline  CKD (chronic kidney disease) stage 3, GFR 30-59 ml/min ([facility])  Assessment & Plan  In setting of T2DM and HTN  See plans for DM, HTN, and CINDY    Type 2 diabetes mellitus with stage 3 chronic kidney disease, with long-term current use of insulin ([facility])  Assessment & Plan  A1c 6 1%, Glucose 120 this morning    Outpt: detemir 40 units BID; novolin 15 units bid meals (lunch and dinner)    Plan:  - on admission, and patient to be made NPO for abdominal pain and GI work-up, so will hold scheduled meal-time novolin and reduce basal to 10 units q12 hr with SSI coverage  -If surgery clears for clear liquid diet, increase detemir to 16 units, Q12h, with sliding scale   -goal -180 inpt    Mixed hyperlipidemia  Assessment & Plan  Continue lipitor and Tricor    Hypertension  Assessment & Plan  Blood pressure 134/47 this am, was up to 195/68 yesterday     -Continue home coreg 12 5 mg q12 hr, cardura 2 mg HS, hydralazine 50 mg q12h, and Procardia XL 60 mg qd  -holding home bumex 2 mg qd and losartan 75 mg qd until CINDY improves  -PRN hydralazine for SBP >180    * Elevated LFTs  Assessment & Plan  Presenting with 4-5 days diffuse abdominal pain, worse after eating, with constipation  Patient able to pass small BM on 6/27 am, but none since  Fever down to 97 3F, with a Tmax overnight of 102 2F  , down from 294, ALT  195 , down from 264, alk phos 83  lipase and lactic WNL  Developing mild pancytopenia, with Hgb down to 8 8 from 10 1 on 6/26  CTA unremarkable for mesenteric ischemia, cholecystitis, or constipation  CTabd/pelvis from 6/25 showed possible gastroenteritis vs developing SBO  CTabd/pelvis 6/26 showed no evidence of acute intra-abdominal or pelvic pathology  US RUQ 6/27 unremarkable, except for mild gallbladder wall thickening without cholelithiasis and a partially visualized small pericardial effusion  Acute hepatitis negative  Direct bilirubin 0 75, total bilirubin 1 46    EBV showing positive IgG but no IgM - old infection/vaccination  BCx NG    Plan:  -hepatocellular pattern LFT elevation, along with fever and mild pancytopenia  Suspect viral etiology  -NPO for now with abdominal exams  -IVF  -bowel regimen of senna glycoside and docusate with PRN miralax  Consider enema   -gen surg consulted  -HIDA scan today to rule out acalculous cholecystitis  -Waiting on blood culture results          Disposition: Pending HIDA    SUBJECTIVE     Patient seen and examined  No acute events overnight  Patient appears comfortable and in no distress  She endorses abdominal pain at 4/10, as well as some nausea on and off and an achy headache  She has not had another bowel movement since yesterday morning  She denies vomiting, chest pain, shortness of breath  OBJECTIVE     Vitals:    22 0206 22 0620 22 0723 22 0742   BP:   (!) 134/47    BP Location:       Pulse: 89 92 85    Resp:   12    Temp:  97 6 °F (36 4 °C) (!) 97 3 °F (36 3 °C)    TempSrc:       SpO2: 92% 96% 93%    Weight:    66 7 kg (147 lb 0 8 oz)   Height:          Temperature:   Temp (24hrs), Av 4 °F (37 4 °C), Min:97 3 °F (36 3 °C), Max:102 1 °F (38 9 °C)    Temperature: (!) 97 3 °F (36 3 °C)  Intake & Output:  I/O        0701   0700  0701   0700  0701   0700    P  O   0 0    I V  (mL/kg)  2375 (36 5)     Total Intake(mL/kg)  2375 (36 5) 0 (0)    Urine (mL/kg/hr)  650 (0 4)     Total Output  650     Net  +1725 0           Unmeasured Urine Occurrence  1 x         Weights:        Body mass index is 28 72 kg/m²  Weight (last 2 days)     Date/Time Weight    22 0742 66 7 (147 05)    22 1500 65 (143 3)    22 1940 65 3 (144)        Physical Exam  Vitals reviewed  Constitutional:       General: She is not in acute distress  Appearance: Normal appearance  She is not ill-appearing  HENT:      Head: Normocephalic and atraumatic  Eyes:      Conjunctiva/sclera: Conjunctivae normal    Cardiovascular:      Rate and Rhythm: Normal rate and regular rhythm  Pulses: Normal pulses  Heart sounds: Normal heart sounds  No murmur heard  Pulmonary:      Effort: Pulmonary effort is normal  No respiratory distress  Breath sounds: Normal breath sounds  No wheezing, rhonchi or rales  Abdominal:      General: Abdomen is flat  Bowel sounds are normal  There is no distension        Palpations: Abdomen is soft  Tenderness: There is abdominal tenderness  There is no guarding  Comments: Hyperactive bowel sounds  Tenderness to palpation in all 4 quadrants, slightly more tender in RUQ  Musculoskeletal:         General: No swelling  Normal range of motion  Cervical back: No tenderness  Right lower leg: No edema  Left lower leg: No edema  Lymphadenopathy:      Cervical: No cervical adenopathy  Skin:     General: Skin is warm and dry  Capillary Refill: Capillary refill takes less than 2 seconds  Coloration: Skin is not jaundiced  Neurological:      General: No focal deficit present  Mental Status: She is alert  Psychiatric:         Mood and Affect: Mood normal          Behavior: Behavior normal        LABORATORY DATA     Labs: I have personally reviewed pertinent reports  Results from last 7 days   Lab Units 06/28/22  0513 06/26/22 2118 06/25/22  1842   WBC Thousand/uL 4 91 4 25* 4 62   HEMOGLOBIN g/dL 8 8* 10 1* 10 2*   HEMATOCRIT % 27 6* 30 6* 30 9*   PLATELETS Thousands/uL  --  116* 133*   NEUTROS PCT % 85* 86* 80*   MONOS PCT % 6 7 8      Results from last 7 days   Lab Units 06/28/22  0513 06/26/22 2118 06/25/22  1842   POTASSIUM mmol/L 3 9 4 1 4 0   CHLORIDE mmol/L 109* 102 103   CO2 mmol/L 20* 25 24   BUN mg/dL 46* 48* 54*   CREATININE mg/dL 1 63* 1 91* 1 88*   CALCIUM mg/dL 8 3 9 0 9 5   ALK PHOS U/L 83 75 71   ALT U/L 195* 264* 162*   AST U/L 175* 294* 189*              Results from last 7 days   Lab Units 06/27/22  0840 06/25/22  1842   INR  1 31* 1 10   PTT seconds  --  26     Results from last 7 days   Lab Units 06/26/22  2352   LACTIC ACID mmol/L 0 8           IMAGING & DIAGNOSTIC TESTING     Radiology Results: I have personally reviewed pertinent reports  CT abdomen pelvis wo contrast    Result Date: 6/26/2022  Impression: No evidence of acute intra-abdominal or pelvic pathology   Findings suggesting cellulitis versus chronic injections throughout the anterior abdominal wall  Workstation performed: [id number]     CTA abdomen pelvis w wo contrast    Result Date: 6/27/2022  Impression: Unremarkable CT arteriography of the abdomen and pelvis  No evidence of acute pathology throughout the abdomen or pelvis Workstation performed: [id number]     US right upper quadrant    Result Date: 6/27/2022  Impression: Mild gallbladder wall thickening without cholelithiasis is nonspecific  It may be sequela of trace perihepatic free fluid  If there is clinical concern for acalculous cholecystitis, consider follow-up with a HIDA scan if it would alter patient management  Partially visualized small pericardial effusion  Remainder of the examination is normal  Workstation performed: [id number]     Other Diagnostic Testing: I have personally reviewed pertinent reports      ACTIVE MEDICATIONS     Current Facility-Administered Medications   Medication Dose Route Frequency    acetaminophen (TYLENOL) tablet 650 mg  650 mg Oral Q6H PRN    albuterol (PROVENTIL HFA,VENTOLIN HFA) inhaler 2 puff  2 puff Inhalation 4x Daily    allopurinol (ZYLOPRIM) tablet 100 mg  100 mg Oral Daily    ascorbic acid (VITAMIN C) tablet 500 mg  500 mg Oral Daily    aspirin (ECOTRIN LOW STRENGTH) EC tablet 81 mg  81 mg Oral Daily    atorvastatin (LIPITOR) tablet 80 mg  80 mg Oral After Dinner    bisacodyl (DULCOLAX) rectal suppository 10 mg  10 mg Rectal Daily PRN    budesonide-formoterol (SYMBICORT) 160-4 5 mcg/act inhaler 2 puff  2 puff Inhalation BID    calcium carbonate-vitamin D (OSCAL-D) 500 mg-200 units per tablet 1 tablet  1 tablet Oral Daily With Breakfast    carvedilol (COREG) tablet 12 5 mg  12 5 mg Oral Q12H    cholecalciferol (VITAMIN D3) tablet 1,000 Units  1,000 Units Oral Daily    doxazosin (CARDURA) tablet 2 mg  2 mg Oral HS    famotidine (PEPCID) tablet 10 mg  10 mg Oral BID    fenofibrate (TRICOR) tablet 72 mg  72 mg Oral Daily    ferrous gluconate (FERGON) tablet 325 mg  325 mg Oral Daily    fish oil capsule 1,000 mg  1,000 mg Oral Daily    fluticasone (FLONASE) 50 mcg/act nasal spray 2 spray  2 spray Nasal Daily    heparin (porcine) subcutaneous injection 5,000 Units  5,000 Units Subcutaneous Q8H [address]    hydrALAZINE (APRESOLINE) injection 5 mg  5 mg Intravenous Q6H PRN    hydrALAZINE (APRESOLINE) tablet 50 mg  50 mg Oral Q12H    insulin detemir (LEVEMIR) subcutaneous injection 10 Units  10 Units Subcutaneous Q12H [address]    insulin lispro (HumaLOG) 100 units/mL subcutaneous injection 1-5 Units  1-5 Units Subcutaneous Q6H [address]    levothyroxine tablet 75 mcg  75 mcg Oral Early Morning    magnesium oxide (MAG-OX) tablet 400 mg  400 mg Oral Daily    methocarbamol (ROBAXIN) tablet 500 mg  500 mg Oral TID    montelukast (SINGULAIR) tablet 10 mg  10 mg Oral HS    multivitamin stress formula tablet 1 tablet  1 tablet Oral Daily    nitroglycerin (NITROSTAT) SL tablet 0 4 mg  0 4 mg Sublingual Q5 Min PRN    pantoprazole (PROTONIX) EC tablet 40 mg  40 mg Oral Early Morning    senna-docusate sodium (SENOKOT S) 8 6-50 mg per tablet 1 tablet  1 tablet Oral BID    sodium chloride 0 9 % infusion  125 mL/hr Intravenous Continuous    traMADol (ULTRAM) tablet 50 mg  50 mg Oral BID       VTE Pharmacologic Prophylaxis: Heparin  VTE Mechanical Prophylaxis: sequential compression device    Portions of the record may have been created with voice recognition software  Occasional wrong word or "sound a like" substitutions may have occurred due to the inherent limitations of voice recognition software    Read the chart carefully and recognize, using context, where substitutions have occurred   ==  Alka Panchal, [address] [facility]  Internal Medicine Residency PGY-1

## 2022-06-28 NOTE — PLAN OF CARE
Problem: PAIN - ADULT  Goal: Verbalizes/displays adequate comfort level or baseline comfort level  Description: Interventions:  - Encourage patient to monitor pain and request assistance  - Assess pain using appropriate pain scale  - Administer analgesics based on type and severity of pain and evaluate response  - Implement non-pharmacological measures as appropriate and evaluate response  - Consider cultural and social influences on pain and pain management  - Notify physician/advanced practitioner if interventions unsuccessful or patient reports new pain  Outcome: Progressing     Problem: INFECTION - ADULT  Goal: Absence or prevention of progression during hospitalization  Description: INTERVENTIONS:  - Assess and monitor for signs and symptoms of infection  - Monitor lab/diagnostic results  - Monitor all insertion sites, i e  indwelling lines, tubes, and drains  - Monitor endotracheal if appropriate and nasal secretions for changes in amount and color  - [place] appropriate cooling/warming therapies per order  - Administer medications as ordered  - Instruct and encourage patient and family to use good hand hygiene technique  - Identify and instruct in appropriate isolation precautions for identified infection/condition  Outcome: Progressing     Problem: DISCHARGE PLANNING  Goal: Discharge to home or other facility with appropriate resources  Description: INTERVENTIONS:  - Identify barriers to discharge w/patient and caregiver  - Arrange for needed discharge resources and transportation as appropriate  - Identify discharge learning needs (meds, wound care, etc )  - Arrange for interpretive services to assist at discharge as needed  - Refer to Case Management Department for coordinating discharge planning if the patient needs post-hospital services based on physician/advanced practitioner order or complex needs related to functional status, cognitive ability, or social support system  Outcome: Progressing Problem: Knowledge Deficit  Goal: Patient/family/caregiver demonstrates understanding of disease process, treatment plan, medications, and discharge instructions  Description: Complete learning assessment and assess knowledge base    Interventions:  - Provide teaching at level of understanding  - Provide teaching via preferred learning methods  Outcome: Progressing     Problem: GASTROINTESTINAL - ADULT  Goal: Maintains or returns to baseline bowel function  Description: INTERVENTIONS:  - Assess bowel function  - Encourage oral fluids to ensure adequate hydration  - Administer IV fluids if ordered to ensure adequate hydration  - Administer ordered medications as needed  - Encourage mobilization and activity  - Consider nutritional services referral to assist patient with adequate nutrition and appropriate food choices  Outcome: Progressing  Goal: Maintains adequate nutritional intake  Description: INTERVENTIONS:  - Monitor percentage of each meal consumed  - Identify factors contributing to decreased intake, treat as appropriate  - Assist with meals as needed  - Monitor I&O, weight, and lab values if indicated  - Obtain nutrition services referral as needed  Outcome: Progressing     Problem: METABOLIC, FLUID AND ELECTROLYTES - ADULT  Goal: Electrolytes maintained within normal limits  Description: INTERVENTIONS:  - Monitor labs and assess patient for signs and symptoms of electrolyte imbalances  - Administer electrolyte replacement as ordered  - Monitor response to electrolyte replacements, including repeat lab results as appropriate  - Instruct patient on fluid and nutrition as appropriate  Outcome: Progressing
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
## 2022-06-29 ENCOUNTER — APPOINTMENT (INPATIENT)
Dept: NON INVASIVE DIAGNOSTICS | Facility: HOSPITAL | Age: 77
DRG: 948 | End: 2022-06-29
Payer: COMMERCIAL

## 2022-06-29 ENCOUNTER — APPOINTMENT (INPATIENT)
Dept: RADIOLOGY | Facility: HOSPITAL | Age: 77
DRG: 948 | End: 2022-06-29
Payer: COMMERCIAL

## 2022-06-29 PROBLEM — R06.02 SHORTNESS OF BREATH: Status: ACTIVE | Noted: 2022-06-29

## 2022-06-29 PROBLEM — R30.0 DYSURIA: Status: ACTIVE | Noted: 2022-06-29

## 2022-06-29 LAB
ALBUMIN SERPL BCP-MCNC: 2.5 G/DL (ref 3.5–5)
ALP SERPL-CCNC: 135 U/L (ref 46–116)
ALT SERPL W P-5'-P-CCNC: 191 U/L (ref 12–78)
ANION GAP SERPL CALCULATED.3IONS-SCNC: 10 MMOL/L (ref 4–13)
APICAL FOUR CHAMBER EJECTION FRACTION: 62 %
AST SERPL W P-5'-P-CCNC: 148 U/L (ref 5–45)
BACTERIA UR QL AUTO: ABNORMAL /HPF
BASOPHILS # BLD AUTO: 0.02 THOUSANDS/ΜL (ref 0–0.1)
BASOPHILS NFR BLD AUTO: 0 % (ref 0–1)
BILIRUB DIRECT SERPL-MCNC: 1.25 MG/DL (ref 0–0.2)
BILIRUB SERPL-MCNC: 1.71 MG/DL (ref 0.2–1)
BILIRUB UR QL STRIP: NEGATIVE
BUN SERPL-MCNC: 48 MG/DL (ref 5–25)
CALCIUM ALBUM COR SERPL-MCNC: 9.4 MG/DL (ref 8.3–10.1)
CALCIUM SERPL-MCNC: 8.2 MG/DL (ref 8.3–10.1)
CHLORIDE SERPL-SCNC: 110 MMOL/L (ref 100–108)
CLARITY UR: ABNORMAL
CO2 SERPL-SCNC: 19 MMOL/L (ref 21–32)
COLOR UR: YELLOW
CREAT SERPL-MCNC: 1.5 MG/DL (ref 0.6–1.3)
DOP CALC MV VTI: 44.7 CM
E WAVE DECELERATION TIME: 130 MS
EOSINOPHIL # BLD AUTO: 0.06 THOUSAND/ΜL (ref 0–0.61)
EOSINOPHIL NFR BLD AUTO: 1 % (ref 0–6)
ERYTHROCYTE [DISTWIDTH] IN BLOOD BY AUTOMATED COUNT: 14.6 % (ref 11.6–15.1)
FERRITIN SERPL-MCNC: 942 NG/ML (ref 8–388)
GFR SERPL CREATININE-BSD FRML MDRD: 33 ML/MIN/1.73SQ M
GLUCOSE SERPL-MCNC: 233 MG/DL (ref 65–140)
GLUCOSE SERPL-MCNC: 254 MG/DL (ref 65–140)
GLUCOSE SERPL-MCNC: 271 MG/DL (ref 65–140)
GLUCOSE SERPL-MCNC: 330 MG/DL (ref 65–140)
GLUCOSE SERPL-MCNC: 340 MG/DL (ref 65–140)
GLUCOSE UR STRIP-MCNC: ABNORMAL MG/DL
HCT VFR BLD AUTO: 27.4 % (ref 34.8–46.1)
HGB BLD-MCNC: 9 G/DL (ref 11.5–15.4)
HGB UR QL STRIP.AUTO: ABNORMAL
HYALINE CASTS #/AREA URNS LPF: ABNORMAL /LPF
IMM GRANULOCYTES # BLD AUTO: 0.04 THOUSAND/UL (ref 0–0.2)
IMM GRANULOCYTES NFR BLD AUTO: 1 % (ref 0–2)
INR PPP: 1.23 (ref 0.84–1.19)
IRON SATN MFR SERPL: 6 % (ref 15–50)
IRON SERPL-MCNC: 15 UG/DL (ref 50–170)
KETONES UR STRIP-MCNC: NEGATIVE MG/DL
LEUKOCYTE ESTERASE UR QL STRIP: ABNORMAL
LYMPHOCYTES # BLD AUTO: 0.47 THOUSANDS/ΜL (ref 0.6–4.47)
LYMPHOCYTES NFR BLD AUTO: 7 % (ref 14–44)
MCH RBC QN AUTO: 30.3 PG (ref 26.8–34.3)
MCHC RBC AUTO-ENTMCNC: 32.8 G/DL (ref 31.4–37.4)
MCV RBC AUTO: 92 FL (ref 82–98)
MONOCYTES # BLD AUTO: 0.56 THOUSAND/ΜL (ref 0.17–1.22)
MONOCYTES NFR BLD AUTO: 9 % (ref 4–12)
MV MEAN GRADIENT: 6 MMHG
MV PEAK A VEL: 1.64 M/S
MV PEAK E VEL: 148 CM/S
MV PEAK GRADIENT: 17 MMHG
MV STENOSIS PRESSURE HALF TIME: 38 MS
MV VALVE AREA P 1/2 METHOD: 5.79 CM2
NEUTROPHILS # BLD AUTO: 5.41 THOUSANDS/ΜL (ref 1.85–7.62)
NEUTS SEG NFR BLD AUTO: 82 % (ref 43–75)
NITRITE UR QL STRIP: NEGATIVE
NON-SQ EPI CELLS URNS QL MICRO: ABNORMAL /HPF
NRBC BLD AUTO-RTO: 0 /100 WBCS
PH UR STRIP.AUTO: 5.5 [PH]
PLATELET # BLD AUTO: 108 THOUSANDS/UL (ref 149–390)
PMV BLD AUTO: 12.2 FL (ref 8.9–12.7)
POTASSIUM SERPL-SCNC: 4.1 MMOL/L (ref 3.5–5.3)
PROCALCITONIN SERPL-MCNC: 0.78 NG/ML
PROT SERPL-MCNC: 5.8 G/DL (ref 6.4–8.2)
PROT UR STRIP-MCNC: ABNORMAL MG/DL
PROTHROMBIN TIME: 15 SECONDS (ref 11.6–14.5)
RBC # BLD AUTO: 2.97 MILLION/UL (ref 3.81–5.12)
RBC #/AREA URNS AUTO: ABNORMAL /HPF
SL CV LV EF: 70
SODIUM SERPL-SCNC: 139 MMOL/L (ref 136–145)
SP GR UR STRIP.AUTO: 1.01 (ref 1–1.03)
TIBC SERPL-MCNC: 257 UG/DL (ref 250–450)
UROBILINOGEN UR STRIP-ACNC: <2 MG/DL
WBC # BLD AUTO: 6.56 THOUSAND/UL (ref 4.31–10.16)
WBC #/AREA URNS AUTO: ABNORMAL /HPF
WBC CLUMPS # UR AUTO: PRESENT /UL

## 2022-06-29 PROCEDURE — 85610 PROTHROMBIN TIME: CPT | Performed by: STUDENT IN AN ORGANIZED HEALTH CARE EDUCATION/TRAINING PROGRAM

## 2022-06-29 PROCEDURE — 87040 BLOOD CULTURE FOR BACTERIA: CPT | Performed by: STUDENT IN AN ORGANIZED HEALTH CARE EDUCATION/TRAINING PROGRAM

## 2022-06-29 PROCEDURE — 82787 IGG 1 2 3 OR 4 EACH: CPT | Performed by: INTERNAL MEDICINE

## 2022-06-29 PROCEDURE — 83540 ASSAY OF IRON: CPT | Performed by: STUDENT IN AN ORGANIZED HEALTH CARE EDUCATION/TRAINING PROGRAM

## 2022-06-29 PROCEDURE — 82948 REAGENT STRIP/BLOOD GLUCOSE: CPT

## 2022-06-29 PROCEDURE — 86381 MITOCHONDRIAL ANTIBODY EACH: CPT | Performed by: INTERNAL MEDICINE

## 2022-06-29 PROCEDURE — 71045 X-RAY EXAM CHEST 1 VIEW: CPT

## 2022-06-29 PROCEDURE — 80053 COMPREHEN METABOLIC PANEL: CPT | Performed by: STUDENT IN AN ORGANIZED HEALTH CARE EDUCATION/TRAINING PROGRAM

## 2022-06-29 PROCEDURE — 82728 ASSAY OF FERRITIN: CPT | Performed by: STUDENT IN AN ORGANIZED HEALTH CARE EDUCATION/TRAINING PROGRAM

## 2022-06-29 PROCEDURE — 82784 ASSAY IGA/IGD/IGG/IGM EACH: CPT | Performed by: INTERNAL MEDICINE

## 2022-06-29 PROCEDURE — 86015 ACTIN ANTIBODY EACH: CPT | Performed by: INTERNAL MEDICINE

## 2022-06-29 PROCEDURE — 83550 IRON BINDING TEST: CPT | Performed by: STUDENT IN AN ORGANIZED HEALTH CARE EDUCATION/TRAINING PROGRAM

## 2022-06-29 PROCEDURE — 87086 URINE CULTURE/COLONY COUNT: CPT | Performed by: STUDENT IN AN ORGANIZED HEALTH CARE EDUCATION/TRAINING PROGRAM

## 2022-06-29 PROCEDURE — 81001 URINALYSIS AUTO W/SCOPE: CPT | Performed by: STUDENT IN AN ORGANIZED HEALTH CARE EDUCATION/TRAINING PROGRAM

## 2022-06-29 PROCEDURE — 99232 SBSQ HOSP IP/OBS MODERATE 35: CPT | Performed by: SURGERY

## 2022-06-29 PROCEDURE — 85025 COMPLETE CBC W/AUTO DIFF WBC: CPT | Performed by: STUDENT IN AN ORGANIZED HEALTH CARE EDUCATION/TRAINING PROGRAM

## 2022-06-29 PROCEDURE — 86038 ANTINUCLEAR ANTIBODIES: CPT | Performed by: INTERNAL MEDICINE

## 2022-06-29 PROCEDURE — 86644 CMV ANTIBODY: CPT | Performed by: INTERNAL MEDICINE

## 2022-06-29 PROCEDURE — 93325 DOPPLER ECHO COLOR FLOW MAPG: CPT | Performed by: INTERNAL MEDICINE

## 2022-06-29 PROCEDURE — 87077 CULTURE AEROBIC IDENTIFY: CPT | Performed by: STUDENT IN AN ORGANIZED HEALTH CARE EDUCATION/TRAINING PROGRAM

## 2022-06-29 PROCEDURE — 93308 TTE F-UP OR LMTD: CPT

## 2022-06-29 PROCEDURE — 93321 DOPPLER ECHO F-UP/LMTD STD: CPT

## 2022-06-29 PROCEDURE — 84145 PROCALCITONIN (PCT): CPT | Performed by: STUDENT IN AN ORGANIZED HEALTH CARE EDUCATION/TRAINING PROGRAM

## 2022-06-29 PROCEDURE — 86645 CMV ANTIBODY IGM: CPT | Performed by: INTERNAL MEDICINE

## 2022-06-29 PROCEDURE — 82390 ASSAY OF CERULOPLASMIN: CPT | Performed by: INTERNAL MEDICINE

## 2022-06-29 PROCEDURE — 82248 BILIRUBIN DIRECT: CPT | Performed by: STUDENT IN AN ORGANIZED HEALTH CARE EDUCATION/TRAINING PROGRAM

## 2022-06-29 PROCEDURE — 93321 DOPPLER ECHO F-UP/LMTD STD: CPT | Performed by: INTERNAL MEDICINE

## 2022-06-29 PROCEDURE — 87186 SC STD MICRODIL/AGAR DIL: CPT | Performed by: STUDENT IN AN ORGANIZED HEALTH CARE EDUCATION/TRAINING PROGRAM

## 2022-06-29 PROCEDURE — 99222 1ST HOSP IP/OBS MODERATE 55: CPT | Performed by: INTERNAL MEDICINE

## 2022-06-29 PROCEDURE — 99232 SBSQ HOSP IP/OBS MODERATE 35: CPT | Performed by: INTERNAL MEDICINE

## 2022-06-29 PROCEDURE — 93325 DOPPLER ECHO COLOR FLOW MAPG: CPT

## 2022-06-29 PROCEDURE — 93308 TTE F-UP OR LMTD: CPT | Performed by: INTERNAL MEDICINE

## 2022-06-29 RX ORDER — BUMETANIDE 1 MG/1
1 TABLET ORAL DAILY
Status: DISCONTINUED | OUTPATIENT
Start: 2022-06-30 | End: 2022-06-29

## 2022-06-29 RX ORDER — INSULIN LISPRO 100 [IU]/ML
1-5 INJECTION, SOLUTION INTRAVENOUS; SUBCUTANEOUS
Status: DISCONTINUED | OUTPATIENT
Start: 2022-06-29 | End: 2022-07-03 | Stop reason: HOSPADM

## 2022-06-29 RX ORDER — BUMETANIDE 0.25 MG/ML
2 INJECTION, SOLUTION INTRAMUSCULAR; INTRAVENOUS ONCE
Status: COMPLETED | OUTPATIENT
Start: 2022-06-29 | End: 2022-06-29

## 2022-06-29 RX ORDER — METRONIDAZOLE 500 MG/1
500 TABLET ORAL EVERY 8 HOURS SCHEDULED
Status: DISCONTINUED | OUTPATIENT
Start: 2022-06-29 | End: 2022-07-01

## 2022-06-29 RX ORDER — ALBUTEROL SULFATE 2.5 MG/3ML
2.5 SOLUTION RESPIRATORY (INHALATION) EVERY 6 HOURS PRN
Status: DISCONTINUED | OUTPATIENT
Start: 2022-06-29 | End: 2022-07-03 | Stop reason: HOSPADM

## 2022-06-29 RX ORDER — POLYETHYLENE GLYCOL 3350 17 G/17G
17 POWDER, FOR SOLUTION ORAL DAILY
Status: DISCONTINUED | OUTPATIENT
Start: 2022-06-29 | End: 2022-07-03 | Stop reason: HOSPADM

## 2022-06-29 RX ORDER — LEVALBUTEROL 1.25 MG/.5ML
1.25 SOLUTION, CONCENTRATE RESPIRATORY (INHALATION) EVERY 6 HOURS PRN
Status: DISCONTINUED | OUTPATIENT
Start: 2022-06-29 | End: 2022-06-29

## 2022-06-29 RX ORDER — BUMETANIDE 2 MG/1
2 TABLET ORAL DAILY
Status: DISCONTINUED | OUTPATIENT
Start: 2022-06-30 | End: 2022-06-29

## 2022-06-29 RX ORDER — ALBUTEROL SULFATE 90 UG/1
2 AEROSOL, METERED RESPIRATORY (INHALATION) EVERY 4 HOURS PRN
Status: DISCONTINUED | OUTPATIENT
Start: 2022-06-29 | End: 2022-07-03 | Stop reason: HOSPADM

## 2022-06-29 RX ORDER — BUMETANIDE 2 MG/1
2 TABLET ORAL DAILY
Status: DISCONTINUED | OUTPATIENT
Start: 2022-06-30 | End: 2022-06-30

## 2022-06-29 RX ADMIN — BUDESONIDE AND FORMOTEROL FUMARATE DIHYDRATE 2 PUFF: 160; 4.5 AEROSOL RESPIRATORY (INHALATION) at 18:48

## 2022-06-29 RX ADMIN — LEVOTHYROXINE SODIUM 75 MCG: 75 TABLET ORAL at 05:18

## 2022-06-29 RX ADMIN — PANTOPRAZOLE SODIUM 40 MG: 40 TABLET, DELAYED RELEASE ORAL at 05:18

## 2022-06-29 RX ADMIN — MAGNESIUM OXIDE TAB 400 MG (241.3 MG ELEMENTAL MG) 400 MG: 400 (241.3 MG) TAB at 08:21

## 2022-06-29 RX ADMIN — BUDESONIDE AND FORMOTEROL FUMARATE DIHYDRATE 2 PUFF: 160; 4.5 AEROSOL RESPIRATORY (INHALATION) at 08:22

## 2022-06-29 RX ADMIN — OXYCODONE HYDROCHLORIDE AND ACETAMINOPHEN 500 MG: 500 TABLET ORAL at 08:21

## 2022-06-29 RX ADMIN — ALLOPURINOL 100 MG: 100 TABLET ORAL at 08:21

## 2022-06-29 RX ADMIN — FAMOTIDINE 10 MG: 20 TABLET ORAL at 08:22

## 2022-06-29 RX ADMIN — ALBUTEROL SULFATE 2 PUFF: 90 AEROSOL, METERED RESPIRATORY (INHALATION) at 08:22

## 2022-06-29 RX ADMIN — CARVEDILOL 12.5 MG: 12.5 TABLET, FILM COATED ORAL at 08:21

## 2022-06-29 RX ADMIN — B-COMPLEX W/ C & FOLIC ACID TAB 1 TABLET: TAB at 08:22

## 2022-06-29 RX ADMIN — METHOCARBAMOL 500 MG: 500 TABLET, FILM COATED ORAL at 08:21

## 2022-06-29 RX ADMIN — METRONIDAZOLE 500 MG: 500 TABLET ORAL at 22:21

## 2022-06-29 RX ADMIN — SENNOSIDES AND DOCUSATE SODIUM 1 TABLET: 8.6; 5 TABLET ORAL at 18:50

## 2022-06-29 RX ADMIN — SENNOSIDES AND DOCUSATE SODIUM 1 TABLET: 8.6; 5 TABLET ORAL at 08:21

## 2022-06-29 RX ADMIN — INSULIN LISPRO 4 UNITS: 100 INJECTION, SOLUTION INTRAVENOUS; SUBCUTANEOUS at 12:28

## 2022-06-29 RX ADMIN — OMEGA-3 FATTY ACIDS CAP 1000 MG 1000 MG: 1000 CAP at 08:22

## 2022-06-29 RX ADMIN — METRONIDAZOLE 500 MG: 500 TABLET ORAL at 15:38

## 2022-06-29 RX ADMIN — Medication 1 TABLET: at 08:21

## 2022-06-29 RX ADMIN — INSULIN DETEMIR 20 UNITS: 100 INJECTION, SOLUTION SUBCUTANEOUS at 22:21

## 2022-06-29 RX ADMIN — Medication 1000 UNITS: at 08:22

## 2022-06-29 RX ADMIN — HYDRALAZINE HYDROCHLORIDE 50 MG: 50 TABLET, FILM COATED ORAL at 08:22

## 2022-06-29 RX ADMIN — DOXAZOSIN 2 MG: 2 TABLET ORAL at 22:21

## 2022-06-29 RX ADMIN — CARVEDILOL 12.5 MG: 12.5 TABLET, FILM COATED ORAL at 18:53

## 2022-06-29 RX ADMIN — INSULIN LISPRO 3 UNITS: 100 INJECTION, SOLUTION INTRAVENOUS; SUBCUTANEOUS at 18:49

## 2022-06-29 RX ADMIN — INSULIN LISPRO 4 UNITS: 100 INJECTION, SOLUTION INTRAVENOUS; SUBCUTANEOUS at 08:23

## 2022-06-29 RX ADMIN — BUMETANIDE 2 MG: 0.25 INJECTION INTRAMUSCULAR; INTRAVENOUS at 06:20

## 2022-06-29 RX ADMIN — FERROUS GLUCONATE 325 MG: 324 TABLET ORAL at 08:23

## 2022-06-29 RX ADMIN — PERFLUTREN 0.8 ML/MIN: 6.52 INJECTION, SUSPENSION INTRAVENOUS at 14:52

## 2022-06-29 RX ADMIN — INSULIN DETEMIR 20 UNITS: 100 INJECTION, SOLUTION SUBCUTANEOUS at 08:20

## 2022-06-29 RX ADMIN — METHOCARBAMOL 500 MG: 500 TABLET, FILM COATED ORAL at 22:25

## 2022-06-29 RX ADMIN — HEPARIN SODIUM 5000 UNITS: 5000 INJECTION INTRAVENOUS; SUBCUTANEOUS at 22:21

## 2022-06-29 RX ADMIN — FENOFIBRATE 72 MG: 48 TABLET ORAL at 08:21

## 2022-06-29 RX ADMIN — HYDRALAZINE HYDROCHLORIDE 50 MG: 50 TABLET, FILM COATED ORAL at 18:51

## 2022-06-29 RX ADMIN — HEPARIN SODIUM 5000 UNITS: 5000 INJECTION INTRAVENOUS; SUBCUTANEOUS at 05:18

## 2022-06-29 RX ADMIN — INSULIN LISPRO 3 UNITS: 100 INJECTION, SOLUTION INTRAVENOUS; SUBCUTANEOUS at 22:22

## 2022-06-29 RX ADMIN — METHOCARBAMOL 500 MG: 500 TABLET, FILM COATED ORAL at 15:39

## 2022-06-29 RX ADMIN — MONTELUKAST 10 MG: 10 TABLET, FILM COATED ORAL at 22:22

## 2022-06-29 RX ADMIN — TRAMADOL HYDROCHLORIDE 50 MG: 50 TABLET, COATED ORAL at 08:21

## 2022-06-29 RX ADMIN — ACETAMINOPHEN 650 MG: 325 TABLET, FILM COATED ORAL at 22:26

## 2022-06-29 RX ADMIN — FAMOTIDINE 10 MG: 20 TABLET ORAL at 18:50

## 2022-06-29 RX ADMIN — CEFTRIAXONE SODIUM 1000 MG: 10 INJECTION, POWDER, FOR SOLUTION INTRAVENOUS at 15:45

## 2022-06-29 RX ADMIN — ASPIRIN 81 MG: 81 TABLET, COATED ORAL at 08:21

## 2022-06-29 RX ADMIN — SODIUM CHLORIDE 125 ML/HR: 0.9 INJECTION, SOLUTION INTRAVENOUS at 04:45
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
## 2022-06-29 NOTE — PROGRESS NOTES
INTERNAL MEDICINE RESIDENCY PROGRESS NOTE     Name: Sweetie Andrews   Age & Sex: 64 y o  female   MRN: [id number]  Unit/Bed#: [facility] 819-01   Encounter: [id number]  Team: SOD Team C     PATIENT INFORMATION     Name: Sweetie Andrews   Age & Sex: 64 y o  female   MRN: [id number]  Hospital Stay Days: 1    ASSESSMENT/PLAN     Principal Problem:    Elevated LFTs  Active Problems:    Hypertension    Mixed hyperlipidemia    Type 2 diabetes mellitus with stage 3 chronic kidney disease, with long-term current use of insulin ([facility])    CKD (chronic kidney disease) stage 3, GFR 30-59 ml/min ([facility])    Low back pain    Continuous opioid dependence ([facility])    Gastroesophageal reflux disease    CINDY (acute kidney injury) ([facility] )    Pancytopenia ([facility])    Shortness of breath    Dysuria      Dysuria  Assessment & Plan  Patient endorses 2 days of dysuria and feeling of incomplete bladder emptying  Urine appears cloudy and has a foul-smelling odor  UA and urine culture ordered  UA 1+ protein, positive for leukocytes, trace glucose, and small amount of blood  10-20 RBC/hpf and innumerable WBCs with hyaline casts  Waiting urine culture results  Started on ceftriaxone 1g q24 and Flagyl 500mg q8 PO for concerns of UTI vs intraabdominal infection  Shortness of breath  Assessment & Plan  Patient began having SOB overnight and felt increasing wheezing while laying flat  Patient has been on 2 L nasal cannula since  SpO2 96% this morning  Patient also given 2 mg Bumex due to apparent fluid-overloaded state  Chest X-ray showed mild pulmonary venous congestion with trace effusions     -Continue respiratory protocol with Xopenex nebulizer PRN  -Monitor O2   -Echocardiogram to assess heart function        Pancytopenia ([facility] )  Assessment & Plan  Baseline CBC WNL  Likely 2/2 acute viral illness  F/u peripheral smear and reticulocyte count    CINDY (acute kidney injury) ([facility] )  Assessment & Plan  Pre-renal CINDY on CKD 2/2 dehydration and decreased p o  intake  Baseline Cr around 1-1 4, 1 91 on admission  Creatinine down to 1 50 today  Started patient on 2 mg bumex    -Holding IVF at this time  -Trend BMP  -Monitor I&O and daily weights  -Hold home losartan 75 mg qd   - Restart Bumex 2mg PO daily starting tomorrow      Gastroesophageal reflux disease  Assessment & Plan  Continue pepcid 10 mg bid and protonix 40    Continuous opioid dependence ([facility] )  Assessment & Plan  On tramadol outpatient for chronic back pain    Low back pain  Assessment & Plan  Chronic, on tramadol and robaxin daily  Holding tramadol at this time  Pain currently at baseline  CKD (chronic kidney disease) stage 3, GFR 30-59 ml/min ([facility])  Assessment & Plan  In setting of T2DM and HTN  See plans for DM, HTN, and CINDY    Type 2 diabetes mellitus with stage 3 chronic kidney disease, with long-term current use of insulin ([facility])  Assessment & Plan  A1c 6 1%, Glucose 330 this morning  Outpt: detemir 40 units BID; novolin 15 units bid meals (lunch and dinner)    Plan:  -On clear liquid diet, increase detemir to 20 units, Q12h, with sliding scale   -goal -180 inpt    Mixed hyperlipidemia  Assessment & Plan  Continue lipitor and Tricor    Hypertension  Assessment & Plan  Blood pressure 149/96 this am, was up to 173/63 last night     -Continue home coreg 12 5 mg q12 hr, cardura 2 mg HS, hydralazine 50 mg q12h, and Procardia XL 60 mg qd  -Given work of breathing and edema, restarted on Bumex 2mg PO daily, will follow BMPs  - Can restart on home Losartan tomorrow if kidney function stable  -PRN hydralazine for SBP >180    * Elevated LFTs  Assessment & Plan  Presenting with 4-5 days diffuse abdominal pain, worse after eating, with constipation  Patient able to pass small BM on 6/27 am and was given prune juice on 6/28, and was able to pass loose/watery stools after  Fever at 100 2  , down from 175, , down from 195, alk phos increase to 135 from 83   lipase and lactic WNL   Developing mild pancytopenia, with Hgb down to 9 0 from 10 1 on 6/26  CTA unremarkable for mesenteric ischemia, cholecystitis, or constipation  CTabd/pelvis from 6/25 showed possible gastroenteritis vs developing SBO  CTabd/pelvis 6/26 showed no evidence of acute intra-abdominal or pelvic pathology  US RUQ 6/27 unremarkable, except for mild gallbladder wall thickening without cholelithiasis and a partially visualized small pericardial effusion  Acute hepatitis negative  Direct bilirubin 1 25, total bilirubin 1 71  HIDA scan unremarkable, negative for acalculous cholecystitis  EBV showing positive IgG but no IgM - old infection/vaccination  BCx NG    Plan:  -hepatocellular pattern LFT elevation, along with fever and mild pancytopenia  Suspect viral etiology    -Continue Clear liquid diet  - Holding IVF  -bowel regimen of senna glycoside and docusate with PRN miralax  Consider enema   -Consult GI, recs appreciated  -Repeat blood cultures  - Started on ceftriaxone 1g q24 and Flagyl 500mg q8 PO for concerns of UTI vs intraabdominal infection  Disposition: Continue inpatient management     SUBJECTIVE     Patient seen and examined  Overnight patient experienced SOB and increase work of breathing and was given 2 L O2 on nasal cannula, since then the patient has not had any SOB and continued to be on oxygen supplement this morning  Patient also began reporting dysuria and incomplete bladder emptying for the last 1-2 days  This morning, the patient endorsed feeling bloated and pressure in her abdomen  She does have intermittent headache since coming to the hospital  She denies nausea, vomiting, shortness of breath, chest pain        OBJECTIVE     Vitals:    06/29/22 0552 06/29/22 0600 06/29/22 0721 06/29/22 0752   BP:   149/96    Pulse: 96  90    Resp:   16    Temp:   100 2 °F (37 9 °C)    TempSrc:       SpO2: 94%  96%    Weight:  68 6 kg (151 lb 3 8 oz)  68 8 kg (151 lb 10 8 oz)   Height: Temperature:   Temp (24hrs), Av °F (37 2 °C), Min:98 2 °F (36 8 °C), Max:100 2 °F (37 9 °C)    Temperature: 100 2 °F (37 9 °C)  Intake & Output:  I/O        0701   0700  0701   07 0701   0700    P  O  0 300 810    I V  (mL/kg) 2375 (36 5) 2960 4 (43 2)     Total Intake(mL/kg) 2375 (36 5) 3260 4 (47 5) 810 (11 8)    Urine (mL/kg/hr) 650 (0 4) 300 (0 2) 200 (0 5)    Stool  0     Total Output 650 300 200    Net +1725 +2960 4 +610           Unmeasured Urine Occurrence 1 x      Unmeasured Stool Occurrence  1 x         Weights:        Body mass index is 29 62 kg/m²  Weight (last 2 days)     Date/Time Weight    22 0752 68 8 (151 68)    22 0600 68 6 (151 24)    22 0742 66 7 (147 05)    22 1500 65 (143 3)        Physical Exam  Constitutional:       Appearance: Normal appearance  Cardiovascular:      Rate and Rhythm: Normal rate and regular rhythm  Pulses: Normal pulses  Heart sounds: Normal heart sounds  Pulmonary:      Effort: Pulmonary effort is normal       Breath sounds: Wheezing present  Comments: Bilateral wheezing with crackles heard in RLL  Abdominal:      Palpations: Abdomen is soft  Tenderness: There is abdominal tenderness  Comments: Mild tenderness to palpation diffusely  Musculoskeletal:      Right lower leg: Edema present  Left lower leg: Edema present  Comments: Pitting edema also noted in bilateral hands  Skin:     General: Skin is warm and dry  Neurological:      General: No focal deficit present  Mental Status: She is alert  Psychiatric:         Mood and Affect: Mood normal          Behavior: Behavior normal        LABORATORY DATA     Labs: I have personally reviewed pertinent reports    Results from last 7 days   Lab Units 22  0610 22  0513 228 22  1842   WBC Thousand/uL 6 56 4 91 4 25* 4 62   HEMOGLOBIN g/dL 9 0* 8 8* 10 1* 10 2*   HEMATOCRIT % 27 4* 27 6* 30 6* 30 9*   PLATELETS Thousands/uL 108*  --  116* 133*   NEUTROS PCT % 82* 85* 86* 80*   MONOS PCT % 9 6 7 8      Results from last 7 days   Lab Units 06/29/22  0610 06/28/22  0513 06/26/22  2118   POTASSIUM mmol/L 4 1 3 9 4 1   CHLORIDE mmol/L 110* 109* 102   CO2 mmol/L 19* 20* 25   BUN mg/dL 48* 46* 48*   CREATININE mg/dL 1 50* 1 63* 1 91*   CALCIUM mg/dL 8 2* 8 3 9 0   ALK PHOS U/L 135* 83 75   ALT U/L 191* 195* 264*   AST U/L 148* 175* 294*              Results from last 7 days   Lab Units 06/29/22  0908 06/27/22  0840 06/25/22  1842   INR  1 23* 1 31* 1 10   PTT seconds  --   --  26     Results from last 7 days   Lab Units 06/26/22  2352   LACTIC ACID mmol/L 0 8           IMAGING & DIAGNOSTIC TESTING     Radiology Results: I have personally reviewed pertinent reports  CT abdomen pelvis wo contrast    Result Date: 6/26/2022  Impression: No evidence of acute intra-abdominal or pelvic pathology  Findings suggesting cellulitis versus chronic injections throughout the anterior abdominal wall  Workstation performed: [id number]     XR chest portable    Result Date: 6/29/2022  Impression: Mild pulmonary venous congestion with trace effusions  Workstation performed: [id number]     CTA abdomen pelvis w wo contrast    Result Date: 6/27/2022  Impression: Unremarkable CT arteriography of the abdomen and pelvis  No evidence of acute pathology throughout the abdomen or pelvis Workstation performed: [id number]     NM hepatobiliary    Result Date: 6/28/2022  Impression: There is visualization of the gallbladder indicating a patent cystic duct  Workstation performed: [id number]     US right upper quadrant    Result Date: 6/27/2022  Impression: Mild gallbladder wall thickening without cholelithiasis is nonspecific  It may be sequela of trace perihepatic free fluid  If there is clinical concern for acalculous cholecystitis, consider follow-up with a HIDA scan if it would alter patient management   Partially visualized small pericardial effusion  Remainder of the examination is normal  Workstation performed: [id number]     Other Diagnostic Testing: I have personally reviewed pertinent reports      ACTIVE MEDICATIONS     Current Facility-Administered Medications   Medication Dose Route Frequency    acetaminophen (TYLENOL) tablet 650 mg  650 mg Oral Q6H PRN    albuterol (PROVENTIL HFA,VENTOLIN HFA) inhaler 2 puff  2 puff Inhalation Q4H PRN    albuterol inhalation solution 2 5 mg  2 5 mg Nebulization Q6H PRN    allopurinol (ZYLOPRIM) tablet 100 mg  100 mg Oral Daily    ascorbic acid (VITAMIN C) tablet 500 mg  500 mg Oral Daily    aspirin (ECOTRIN LOW STRENGTH) EC tablet 81 mg  81 mg Oral Daily    bisacodyl (DULCOLAX) rectal suppository 10 mg  10 mg Rectal Daily PRN    budesonide-formoterol (SYMBICORT) 160-4 5 mcg/act inhaler 2 puff  2 puff Inhalation BID    [START ON 6/30/2022] bumetanide (BUMEX) tablet 2 mg  2 mg Oral Daily    calcium carbonate-vitamin D (OSCAL-D) 500 mg-200 units per tablet 1 tablet  1 tablet Oral Daily With Breakfast    carvedilol (COREG) tablet 12 5 mg  12 5 mg Oral Q12H    cefTRIAXone (ROCEPHIN) 1,000 mg in dextrose 5 % 50 mL IVPB  1,000 mg Intravenous Q24H    cholecalciferol (VITAMIN D3) tablet 1,000 Units  1,000 Units Oral Daily    doxazosin (CARDURA) tablet 2 mg  2 mg Oral HS    famotidine (PEPCID) tablet 10 mg  10 mg Oral BID    ferrous gluconate (FERGON) tablet 325 mg  325 mg Oral Daily    fish oil capsule 1,000 mg  1,000 mg Oral Daily    fluticasone (FLONASE) 50 mcg/act nasal spray 2 spray  2 spray Nasal Daily    heparin (porcine) subcutaneous injection 5,000 Units  5,000 Units Subcutaneous Q8H [address]    hydrALAZINE (APRESOLINE) injection 5 mg  5 mg Intravenous Q6H PRN    hydrALAZINE (APRESOLINE) tablet 50 mg  50 mg Oral Q12H    insulin detemir (LEVEMIR) subcutaneous injection 20 Units  20 Units Subcutaneous Q12H KORI    insulin lispro (HumaLOG) 100 units/mL subcutaneous injection 1-5 Units 1-5 Units Subcutaneous TID AC    insulin lispro (HumaLOG) 100 units/mL subcutaneous injection 1-5 Units  1-5 Units Subcutaneous HS    levothyroxine tablet 75 mcg  75 mcg Oral Early Morning    magnesium oxide (MAG-OX) tablet 400 mg  400 mg Oral Daily    methocarbamol (ROBAXIN) tablet 500 mg  500 mg Oral TID    metroNIDAZOLE (FLAGYL) tablet 500 mg  500 mg Oral Q8H [facility]    montelukast (SINGULAIR) tablet 10 mg  10 mg Oral HS    multivitamin stress formula tablet 1 tablet  1 tablet Oral Daily    nitroglycerin (NITROSTAT) SL tablet 0 4 mg  0 4 mg Sublingual Q5 Min PRN    pantoprazole (PROTONIX) EC tablet 40 mg  40 mg Oral Early Morning    polyethylene glycol (MIRALAX) packet 17 g  17 g Oral Daily    senna-docusate sodium (SENOKOT S) 8 6-50 mg per tablet 1 tablet  1 tablet Oral BID       VTE Pharmacologic Prophylaxis: Heparin  VTE Mechanical Prophylaxis: sequential compression device    Portions of the record may have been created with voice recognition software  Occasional wrong word or "sound a like" substitutions may have occurred due to the inherent limitations of voice recognition software    Read the chart carefully and recognize, using context, where substitutions have occurred   ==  Amaury Blanton, [facility]  Internal Medicine Residency PGY-1

## 2022-06-29 NOTE — PLAN OF CARE
Problem: PAIN - ADULT  Goal: Verbalizes/displays adequate comfort level or baseline comfort level  Description: Interventions:  - Encourage patient to monitor pain and request assistance  - Assess pain using appropriate pain scale  - Administer analgesics based on type and severity of pain and evaluate response  - Implement non-pharmacological measures as appropriate and evaluate response  - Consider cultural and social influences on pain and pain management  - Notify physician/advanced practitioner if interventions unsuccessful or patient reports new pain  Outcome: Progressing     Problem: INFECTION - ADULT  Goal: Absence or prevention of progression during hospitalization  Description: INTERVENTIONS:  - Assess and monitor for signs and symptoms of infection  - Monitor lab/diagnostic results  - Monitor all insertion sites, i e  indwelling lines, tubes, and drains  - Monitor endotracheal if appropriate and nasal secretions for changes in amount and color  - [place] appropriate cooling/warming therapies per order  - Administer medications as ordered  - Instruct and encourage patient and family to use good hand hygiene technique  - Identify and instruct in appropriate isolation precautions for identified infection/condition  Outcome: Progressing     Problem: DISCHARGE PLANNING  Goal: Discharge to home or other facility with appropriate resources  Description: INTERVENTIONS:  - Identify barriers to discharge w/patient and caregiver  - Arrange for needed discharge resources and transportation as appropriate  - Identify discharge learning needs (meds, wound care, etc )  - Arrange for interpretive services to assist at discharge as needed  - Refer to Case Management Department for coordinating discharge planning if the patient needs post-hospital services based on physician/advanced practitioner order or complex needs related to functional status, cognitive ability, or social support system  Outcome: Progressing Problem: Knowledge Deficit  Goal: Patient/family/caregiver demonstrates understanding of disease process, treatment plan, medications, and discharge instructions  Description: Complete learning assessment and assess knowledge base    Interventions:  - Provide teaching at level of understanding  - Provide teaching via preferred learning methods  Outcome: Progressing     Problem: GASTROINTESTINAL - ADULT  Goal: Maintains or returns to baseline bowel function  Description: INTERVENTIONS:  - Assess bowel function  - Encourage oral fluids to ensure adequate hydration  - Administer IV fluids if ordered to ensure adequate hydration  - Administer ordered medications as needed  - Encourage mobilization and activity  - Consider nutritional services referral to assist patient with adequate nutrition and appropriate food choices  Outcome: Progressing  Goal: Maintains adequate nutritional intake  Description: INTERVENTIONS:  - Monitor percentage of each meal consumed  - Identify factors contributing to decreased intake, treat as appropriate  - Assist with meals as needed  - Monitor I&O, weight, and lab values if indicated  - Obtain nutrition services referral as needed  Outcome: Progressing     Problem: METABOLIC, FLUID AND ELECTROLYTES - ADULT  Goal: Electrolytes maintained within normal limits  Description: INTERVENTIONS:  - Monitor labs and assess patient for signs and symptoms of electrolyte imbalances  - Administer electrolyte replacement as ordered  - Monitor response to electrolyte replacements, including repeat lab results as appropriate  - Instruct patient on fluid and nutrition as appropriate  Outcome: Progressing

## 2022-06-29 NOTE — ASSESSMENT & PLAN NOTE
Patient endorses 2 days of dysuria and feeling of incomplete bladder emptying  Urine appears cloudy and has a foul-smelling odor  Dysuria has resolved  UA 1+ protein, positive for leukocytes, trace glucose, and small amount of blood  10-20 RBC/hpf and innumerable WBCs with hyaline casts  Urine culture grew >100,000 Gram negative rods, further speciated to E coli that is pan-sensitive       - Flagyl discontinued, completed CTX course

## 2022-06-29 NOTE — NURSING NOTE
Notified SOD Intern, Dr Keen Rising of patient newly requiring 2L O2 via NC  Pt  Was SOB upon returning from bathroom  Pt 's urine also noted to be increasingly cloudy and malodorous  As per physician will hold IVFs at this time  Obtain UA with next patient void  Awaiting orders for respiratory to see patient, as well
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
## 2022-06-29 NOTE — QUICK NOTE
Nursing reported that patient feeling more SOB and transiently requiring O2 after exertion  Home bumex was being held given CINDY, however Cr has since started to trend down  Basilar crackles with wheezes on exam   Hold additionally IVF for now  2 mg bumex once  Respiratory protocol with Xopenex nebs  Patient also with cloudy, smelly urine x2 days  Now endorsing dysuria and incomplete emptying  Sending UA and culture

## 2022-06-29 NOTE — ED ATTENDING ATTESTATION
6/26/2022  IJavier DO, saw and evaluated the patient  I have discussed the patient with the resident/non-physician practitioner and agree with the resident's/non-physician practitioner's findings, Plan of Care, and MDM as documented in the resident's/non-physician practitioner's note, except where noted  All available labs and Radiology studies were reviewed  I was present for key portions of any procedure(s) performed by the resident/non-physician practitioner and I was immediately available to provide assistance  At this point I agree with the current assessment done in the Emergency Department  I have conducted an independent evaluation of this patient a history and physical is as follows:    49-year-old female presents with abdominal pain  Patient was seen emergency department yesterday and urgent care today and was referred to emergency department due to worsening abdominal pain  Also complains of chills, constipation and fever  Denies chest pain, no shortness of breath, no vomiting  On exam-no acute distress, heart regular, no respiratory distress, abdomen soft with diffuse tenderness    Plan-CT abdomen, check labs, IV fluids, pain control and reassess    ED Course         Critical Care Time  Procedures

## 2022-06-29 NOTE — ASSESSMENT & PLAN NOTE
Patient began having SOB overnight on 6/29 and felt increasing wheezing while laying flat  Patient has been on 2 L nasal cannula since  SpO2 97% this morning  Patient also initially given 2 mg Bumex due to apparent fluid-overloaded state  Transitioned to 1mg Bumex due to renal function  Echo normal     Chest X-ray showed mild pulmonary venous congestion with trace effusions  Can continue home respiratory treatments post discharge
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
## 2022-06-30 ENCOUNTER — APPOINTMENT (INPATIENT)
Dept: GASTROENTEROLOGY | Facility: HOSPITAL | Age: 77
DRG: 948 | End: 2022-06-30
Payer: COMMERCIAL

## 2022-06-30 ENCOUNTER — ANESTHESIA (INPATIENT)
Dept: GASTROENTEROLOGY | Facility: HOSPITAL | Age: 77
DRG: 948 | End: 2022-06-30
Payer: COMMERCIAL

## 2022-06-30 ENCOUNTER — ANESTHESIA (OUTPATIENT)
Dept: ANESTHESIOLOGY | Facility: HOSPITAL | Age: 77
End: 2022-06-30

## 2022-06-30 ENCOUNTER — ANESTHESIA EVENT (OUTPATIENT)
Dept: ANESTHESIOLOGY | Facility: HOSPITAL | Age: 77
End: 2022-06-30

## 2022-06-30 ENCOUNTER — ANESTHESIA EVENT (INPATIENT)
Dept: GASTROENTEROLOGY | Facility: HOSPITAL | Age: 77
DRG: 948 | End: 2022-06-30
Payer: COMMERCIAL

## 2022-06-30 LAB
ACTIN IGG SERPL-ACNC: 7 UNITS (ref 0–19)
ALBUMIN SERPL BCP-MCNC: 2.4 G/DL (ref 3.5–5)
ALP SERPL-CCNC: 124 U/L (ref 46–116)
ALT SERPL W P-5'-P-CCNC: 165 U/L (ref 12–78)
ANION GAP SERPL CALCULATED.3IONS-SCNC: 6 MMOL/L (ref 4–13)
AST SERPL W P-5'-P-CCNC: 128 U/L (ref 5–45)
BASOPHILS # BLD AUTO: 0.01 THOUSANDS/ΜL (ref 0–0.1)
BASOPHILS NFR BLD AUTO: 0 % (ref 0–1)
BILIRUB SERPL-MCNC: 2.54 MG/DL (ref 0.2–1)
BUN SERPL-MCNC: 55 MG/DL (ref 5–25)
CALCIUM ALBUM COR SERPL-MCNC: 9.8 MG/DL (ref 8.3–10.1)
CALCIUM SERPL-MCNC: 8.5 MG/DL (ref 8.3–10.1)
CERULOPLASMIN SERPL-MCNC: 30.6 MG/DL (ref 19–39)
CHLORIDE SERPL-SCNC: 112 MMOL/L (ref 100–108)
CMV IGG SERPL IA-ACNC: 9.9 U/ML (ref 0–0.59)
CMV IGM SERPL IA-ACNC: <30 AU/ML (ref 0–29.9)
CO2 SERPL-SCNC: 23 MMOL/L (ref 21–32)
CREAT SERPL-MCNC: 1.95 MG/DL (ref 0.6–1.3)
EOSINOPHIL # BLD AUTO: 0.2 THOUSAND/ΜL (ref 0–0.61)
EOSINOPHIL NFR BLD AUTO: 4 % (ref 0–6)
ERYTHROCYTE [DISTWIDTH] IN BLOOD BY AUTOMATED COUNT: 14.9 % (ref 11.6–15.1)
GFR SERPL CREATININE-BSD FRML MDRD: 24 ML/MIN/1.73SQ M
GLUCOSE SERPL-MCNC: 124 MG/DL (ref 65–140)
GLUCOSE SERPL-MCNC: 142 MG/DL (ref 65–140)
GLUCOSE SERPL-MCNC: 74 MG/DL (ref 65–140)
GLUCOSE SERPL-MCNC: 79 MG/DL (ref 65–140)
GLUCOSE SERPL-MCNC: 91 MG/DL (ref 65–140)
HCT VFR BLD AUTO: 25.8 % (ref 34.8–46.1)
HGB BLD-MCNC: 8.4 G/DL (ref 11.5–15.4)
IMM GRANULOCYTES # BLD AUTO: 0.03 THOUSAND/UL (ref 0–0.2)
IMM GRANULOCYTES NFR BLD AUTO: 1 % (ref 0–2)
LYMPHOCYTES # BLD AUTO: 0.39 THOUSANDS/ΜL (ref 0.6–4.47)
LYMPHOCYTES NFR BLD AUTO: 8 % (ref 14–44)
MCH RBC QN AUTO: 30.3 PG (ref 26.8–34.3)
MCHC RBC AUTO-ENTMCNC: 32.6 G/DL (ref 31.4–37.4)
MCV RBC AUTO: 93 FL (ref 82–98)
MITOCHONDRIA M2 IGG SER-ACNC: <20 UNITS (ref 0–20)
MONOCYTES # BLD AUTO: 0.35 THOUSAND/ΜL (ref 0.17–1.22)
MONOCYTES NFR BLD AUTO: 7 % (ref 4–12)
NEUTROPHILS # BLD AUTO: 4.22 THOUSANDS/ΜL (ref 1.85–7.62)
NEUTS SEG NFR BLD AUTO: 80 % (ref 43–75)
NRBC BLD AUTO-RTO: 0 /100 WBCS
PLATELET # BLD AUTO: 108 THOUSANDS/UL (ref 149–390)
PMV BLD AUTO: 11.7 FL (ref 8.9–12.7)
POTASSIUM SERPL-SCNC: 3.5 MMOL/L (ref 3.5–5.3)
PROT SERPL-MCNC: 5.6 G/DL (ref 6.4–8.2)
RBC # BLD AUTO: 2.77 MILLION/UL (ref 3.81–5.12)
SODIUM SERPL-SCNC: 141 MMOL/L (ref 136–145)
WBC # BLD AUTO: 5.2 THOUSAND/UL (ref 4.31–10.16)

## 2022-06-30 PROCEDURE — 0DB98ZX EXCISION OF DUODENUM, VIA NATURAL OR ARTIFICIAL OPENING ENDOSCOPIC, DIAGNOSTIC: ICD-10-PCS | Performed by: INTERNAL MEDICINE

## 2022-06-30 PROCEDURE — 82948 REAGENT STRIP/BLOOD GLUCOSE: CPT

## 2022-06-30 PROCEDURE — 85025 COMPLETE CBC W/AUTO DIFF WBC: CPT | Performed by: STUDENT IN AN ORGANIZED HEALTH CARE EDUCATION/TRAINING PROGRAM

## 2022-06-30 PROCEDURE — 80053 COMPREHEN METABOLIC PANEL: CPT | Performed by: STUDENT IN AN ORGANIZED HEALTH CARE EDUCATION/TRAINING PROGRAM

## 2022-06-30 PROCEDURE — 88305 TISSUE EXAM BY PATHOLOGIST: CPT | Performed by: PATHOLOGY

## 2022-06-30 PROCEDURE — 99232 SBSQ HOSP IP/OBS MODERATE 35: CPT | Performed by: INTERNAL MEDICINE

## 2022-06-30 PROCEDURE — 0DB68ZX EXCISION OF STOMACH, VIA NATURAL OR ARTIFICIAL OPENING ENDOSCOPIC, DIAGNOSTIC: ICD-10-PCS | Performed by: INTERNAL MEDICINE

## 2022-06-30 PROCEDURE — 43239 EGD BIOPSY SINGLE/MULTIPLE: CPT | Performed by: INTERNAL MEDICINE

## 2022-06-30 RX ORDER — LIDOCAINE HYDROCHLORIDE 20 MG/ML
INJECTION, SOLUTION EPIDURAL; INFILTRATION; INTRACAUDAL; PERINEURAL AS NEEDED
Status: DISCONTINUED | OUTPATIENT
Start: 2022-06-30 | End: 2022-06-30

## 2022-06-30 RX ORDER — PROPOFOL 10 MG/ML
INJECTION, EMULSION INTRAVENOUS AS NEEDED
Status: DISCONTINUED | OUTPATIENT
Start: 2022-06-30 | End: 2022-06-30

## 2022-06-30 RX ORDER — BUMETANIDE 1 MG/1
1 TABLET ORAL DAILY
Status: DISCONTINUED | OUTPATIENT
Start: 2022-07-01 | End: 2022-07-03 | Stop reason: HOSPADM

## 2022-06-30 RX ORDER — MAGNESIUM CARB/ALUMINUM HYDROX 105-160MG
296 TABLET,CHEWABLE ORAL ONCE
Status: DISCONTINUED | OUTPATIENT
Start: 2022-06-30 | End: 2022-07-03 | Stop reason: HOSPADM

## 2022-06-30 RX ORDER — SODIUM CHLORIDE 9 MG/ML
INJECTION, SOLUTION INTRAVENOUS CONTINUOUS PRN
Status: DISCONTINUED | OUTPATIENT
Start: 2022-06-30 | End: 2022-06-30

## 2022-06-30 RX ADMIN — CARVEDILOL 12.5 MG: 12.5 TABLET, FILM COATED ORAL at 08:53

## 2022-06-30 RX ADMIN — HEPARIN SODIUM 5000 UNITS: 5000 INJECTION INTRAVENOUS; SUBCUTANEOUS at 13:26

## 2022-06-30 RX ADMIN — INSULIN DETEMIR 20 UNITS: 100 INJECTION, SOLUTION SUBCUTANEOUS at 21:00

## 2022-06-30 RX ADMIN — CARVEDILOL 12.5 MG: 12.5 TABLET, FILM COATED ORAL at 19:29

## 2022-06-30 RX ADMIN — OMEGA-3 FATTY ACIDS CAP 1000 MG 1000 MG: 1000 CAP at 08:53

## 2022-06-30 RX ADMIN — PROPOFOL 25 MG: 10 INJECTION, EMULSION INTRAVENOUS at 11:05

## 2022-06-30 RX ADMIN — BUDESONIDE AND FORMOTEROL FUMARATE DIHYDRATE 2 PUFF: 160; 4.5 AEROSOL RESPIRATORY (INHALATION) at 08:57

## 2022-06-30 RX ADMIN — METHOCARBAMOL 500 MG: 500 TABLET, FILM COATED ORAL at 08:53

## 2022-06-30 RX ADMIN — HYDRALAZINE HYDROCHLORIDE 50 MG: 50 TABLET, FILM COATED ORAL at 08:50

## 2022-06-30 RX ADMIN — HYDRALAZINE HYDROCHLORIDE 50 MG: 50 TABLET, FILM COATED ORAL at 19:30

## 2022-06-30 RX ADMIN — CEFTRIAXONE SODIUM 1000 MG: 10 INJECTION, POWDER, FOR SOLUTION INTRAVENOUS at 15:55

## 2022-06-30 RX ADMIN — MAGNESIUM OXIDE TAB 400 MG (241.3 MG ELEMENTAL MG) 400 MG: 400 (241.3 MG) TAB at 08:51

## 2022-06-30 RX ADMIN — SENNOSIDES AND DOCUSATE SODIUM 1 TABLET: 8.6; 5 TABLET ORAL at 19:34

## 2022-06-30 RX ADMIN — HEPARIN SODIUM 5000 UNITS: 5000 INJECTION INTRAVENOUS; SUBCUTANEOUS at 05:37

## 2022-06-30 RX ADMIN — METHOCARBAMOL 500 MG: 500 TABLET, FILM COATED ORAL at 19:28

## 2022-06-30 RX ADMIN — METRONIDAZOLE 500 MG: 500 TABLET ORAL at 05:37

## 2022-06-30 RX ADMIN — SENNOSIDES AND DOCUSATE SODIUM 1 TABLET: 8.6; 5 TABLET ORAL at 08:50

## 2022-06-30 RX ADMIN — ASPIRIN 81 MG: 81 TABLET, COATED ORAL at 08:52

## 2022-06-30 RX ADMIN — METRONIDAZOLE 500 MG: 500 TABLET ORAL at 13:26

## 2022-06-30 RX ADMIN — POLYETHYLENE GLYCOL 3350 17 G: 17 POWDER, FOR SOLUTION ORAL at 08:49

## 2022-06-30 RX ADMIN — PROPOFOL 25 MG: 10 INJECTION, EMULSION INTRAVENOUS at 11:06

## 2022-06-30 RX ADMIN — DOXAZOSIN 2 MG: 2 TABLET ORAL at 21:11

## 2022-06-30 RX ADMIN — HEPARIN SODIUM 5000 UNITS: 5000 INJECTION INTRAVENOUS; SUBCUTANEOUS at 21:00

## 2022-06-30 RX ADMIN — BUMETANIDE 2 MG: 2 TABLET ORAL at 08:53

## 2022-06-30 RX ADMIN — MONTELUKAST 10 MG: 10 TABLET, FILM COATED ORAL at 21:11

## 2022-06-30 RX ADMIN — FAMOTIDINE 10 MG: 20 TABLET ORAL at 19:29

## 2022-06-30 RX ADMIN — BUDESONIDE AND FORMOTEROL FUMARATE DIHYDRATE 2 PUFF: 160; 4.5 AEROSOL RESPIRATORY (INHALATION) at 19:30

## 2022-06-30 RX ADMIN — FAMOTIDINE 10 MG: 20 TABLET ORAL at 08:52

## 2022-06-30 RX ADMIN — PANTOPRAZOLE SODIUM 40 MG: 40 TABLET, DELAYED RELEASE ORAL at 05:37

## 2022-06-30 RX ADMIN — Medication 1000 UNITS: at 08:50

## 2022-06-30 RX ADMIN — ACETAMINOPHEN 650 MG: 325 TABLET, FILM COATED ORAL at 13:26

## 2022-06-30 RX ADMIN — OXYCODONE HYDROCHLORIDE AND ACETAMINOPHEN 500 MG: 500 TABLET ORAL at 08:52

## 2022-06-30 RX ADMIN — ALLOPURINOL 100 MG: 100 TABLET ORAL at 08:52

## 2022-06-30 RX ADMIN — Medication 1 TABLET: at 08:50

## 2022-06-30 RX ADMIN — LEVOTHYROXINE SODIUM 75 MCG: 75 TABLET ORAL at 05:37

## 2022-06-30 RX ADMIN — FERROUS GLUCONATE 325 MG: 324 TABLET ORAL at 08:58

## 2022-06-30 RX ADMIN — SODIUM CHLORIDE: 0.9 INJECTION, SOLUTION INTRAVENOUS at 10:20

## 2022-06-30 RX ADMIN — LIDOCAINE HYDROCHLORIDE 80 MG: 20 INJECTION, SOLUTION EPIDURAL; INFILTRATION; INTRACAUDAL; PERINEURAL at 11:05

## 2022-06-30 RX ADMIN — B-COMPLEX W/ C & FOLIC ACID TAB 1 TABLET: TAB at 08:52

## 2022-06-30 RX ADMIN — METRONIDAZOLE 500 MG: 500 TABLET ORAL at 21:11

## 2022-06-30 NOTE — ANESTHESIA PREPROCEDURE EVALUATION
Procedure:  EGD    Relevant Problems   CARDIO   (+) Hypertension   (+) Mixed hyperlipidemia      ENDO   (+) Hypothyroidism   (+) Type 2 diabetes mellitus with diabetic chronic kidney disease (HCC)   (+) Type 2 diabetes mellitus with diabetic polyneuropathy (HCC)   (+) Type 2 diabetes mellitus with stable proliferative diabetic retinopathy, bilateral (HCC)   (+) Type 2 diabetes mellitus with stage 3 chronic kidney disease, with long-term current use of insulin (HCC)      GI/HEPATIC   (+) Gastroesophageal reflux disease      /RENAL   (+) CINDY (acute kidney injury) (HCC)   (+) Benign hypertension with CKD (chronic kidney disease) stage III (HCC)   (+) CKD (chronic kidney disease) stage 3, GFR 30-59 ml/min (HCC)   (+) CRI (chronic renal insufficiency)   (+) Chronic kidney disease, stage 3b (HCC)      HEMATOLOGY   (+) Pancytopenia (HCC)      MUSCULOSKELETAL   (+) Low back pain      NEURO/PSYCH   (+) Continuous opioid dependence (HCC)      PULMONARY   (+) Asthma without status asthmaticus without complication   (+) Shortness of breath     52-year-old female with type 2 diabetes, hypertension, CKD seen for abdominal pain and elevated LFTs  Reports abdominal bloating, fullness and gas  6/29/22:  Chest x-ray compatible with mild congestive heart failure  Will continue with diuretics  Will request echocardiogram      Left Ventricle The left ventricular ejection fraction is 70%  Systolic function is vigorous  Wall motion cannot be accurately assessed, even with the use of an LV opacification agent  Right Ventricle Systolic function is low normal  Wall motion is abnormal  There is hypokinesis of the mid to apical free wall  Aortic Valve There is trace regurgitation  Mitral Valve There is no evidence of stenosis  IVC/SVC The inferior vena cava is dilated  Pericardium There is no pericardial effusion       EKG  Narrative & Impression    Age and gender specific ECG analysis   Sinus rhythm with 1st degree A-V block  ST & T wave abnormality, consider inferior ischemia  ST & T wave abnormality, consider anterolateral ischemia  Abnormal ECG  When compared with ECG of 22-FEB-2009 00:13,  CT interval has increased  T wave inversion now evident in Anterior leads  Confirmed by Kevin Valenzuela ([id number]) on 6/25/2022 8:20:13 PM         Recent labs personally reviewed:  Lab Results   Component Value Date    WBC 5 20 06/30/2022    HGB 8 4 (L) 06/30/2022     (L) 06/30/2022     Lab Results   Component Value Date     01/22/2018    K 3 5 06/30/2022    BUN 55 (H) 06/30/2022    CREATININE 1 95 (H) 06/30/2022     Lab Results   Component Value Date    PTT 26 06/25/2022      Lab Results   Component Value Date    INR 1 23 (H) 06/29/2022       Lab Results   Component Value Date    HGBA1C 6 1 (H) 05/31/2022         Physical Exam    Airway    Mallampati score: III  TM Distance: >3 FB  Neck ROM: full     Dental       Cardiovascular  Cardiovascular exam normal    Pulmonary  Pulmonary exam normal     Other Findings        Anesthesia Plan  ASA Score- 3     Anesthesia Type- IV sedation with anesthesia with ASA Monitors  Additional Monitors:   Airway Plan:     Comment: Supplemental O2, etco2 monitoring  Patient currently being treated for HF, echo reviewed, new O2 requirement, discussion with GI re: patient's optimization, GI states will be very quick procedure  Given patient's severe abdominal pain and liver function benefit of egd outweighs risk  Patient in agreement          Plan Factors-Exercise tolerance (METS): <4 METS  Chart reviewed  EKG reviewed  Imaging results reviewed  Existing labs reviewed  Patient summary reviewed  Patient is not a current smoker  Induction-     Postoperative Plan-     Informed Consent- Anesthetic plan and risks discussed with patient  I personally reviewed this patient with the CRNA  Discussed and agreed on the Anesthesia Plan with the CRNA  Tomas Qureshi

## 2022-06-30 NOTE — PROGRESS NOTES
INTERNAL MEDICINE RESIDENCY PROGRESS NOTE     Name: Nona Stanford   Age & Sex: 64 y o  female   MRN: [id number]  Unit/Bed#: [facility] 819-01   Encounter: [id number]  Team: SOD Team C     PATIENT INFORMATION     Name: Nona Stanford   Age & Sex: 64 y o  female   MRN: [id number]  Hospital Stay Days: 2    ASSESSMENT/PLAN     Principal Problem:    Elevated LFTs  Active Problems:    Hypertension    Mixed hyperlipidemia    Type 2 diabetes mellitus with stage 3 chronic kidney disease, with long-term current use of insulin (HCC)    CKD (chronic kidney disease) stage 3, GFR 30-59 ml/min (HCC)    Low back pain    Continuous opioid dependence (HCC)    Gastroesophageal reflux disease    CINDY (acute kidney injury) ([facility] [address] )    Pancytopenia ([facility])    Shortness of breath    Dysuria      * Elevated LFTs  Assessment & Plan  Presenting with 4-5 days diffuse abdominal pain, worse after eating, with constipation  Patient able to pass small BM on 6/27 am and was given prune juice on 6/28, and was able to pass loose/watery stools after  Has not had a bowel movement since  Current temp at 98 5 with TMax 100 3    Labs:  · , down from 148, , down from 191, alk phos 124, down from 124  lipase and lactic WNL  · Total bili up to 2 54  · Acute hepatitis panel negative  · EBV showing positive IgG but no IgM - old infection/vaccination  · CMV showing positive IgG but no IgM- old infection/vaccination  · Ceruloplasmin 30 6  · BCx NG    Imaging:  · Developing mild pancytopenia, with Hgb down to 8 4, down from 9 0 on 6/29  · CTA unremarkable for mesenteric ischemia, cholecystitis, or constipation  · CTabd/pelvis from 6/25 showed possible gastroenteritis vs developing SBO  · CTabd/pelvis 6/26 showed no evidence of acute intra-abdominal or pelvic pathology  · US RUQ 6/27 unremarkable, except for mild gallbladder wall thickening without cholelithiasis and a partially visualized small pericardial effusion    · HIDA scan unremarkable, negative for acalculous cholecystitis  · EGD showed mild erythematous mucosa in the stomach  Normal esophagus, normal 1st and 2nd portion of duodenum  The esophagus appeared normal  Biopsy of the stomach, incisura and antrum done to R/O H  Pylori  Biopsies of 1st and 2nd part of duodenum taken to R/O celiac disease  Plan:  -hepatocellular pattern LFT elevation, along with fever and mild pancytopenia  Suspect viral etiology    -Continue Clear liquid diet  - Holding IVF  -bowel regimen of senna glycoside and docusate with PRN miralax  Consider enema  -GI consulted, MRI/MRCP tonight  -Repeat blood cultures  - Started on ceftriaxone 1g q24 and Flagyl 500mg q8 PO for concerns of UTI vs intraabdominal infection   -Waiting results of Anti-smooth muscle antibody, MARIZA, IgG 1,2,3, and 4, and AMA      Dysuria  Assessment & Plan  Patient endorses 2 days of dysuria and feeling of incomplete bladder emptying  Urine appears cloudy and has a foul-smelling odor  UA and urine culture ordered  UA 1+ protein, positive for leukocytes, trace glucose, and small amount of blood  10-20 RBC/hpf and innumerable WBCs with hyaline casts  Urine culture grew >100,000 Gram negative rods     -Started on ceftriaxone 1g q24 and Flagyl 500mg q8 PO for concerns of UTI vs intraabdominal infection  Shortness of breath  Assessment & Plan  Patient began having SOB overnight on 6/29 and felt increasing wheezing while laying flat  Patient has been on 2 L nasal cannula since  SpO2 98% this morning  Patient also given 2 mg Bumex due to apparent fluid-overloaded state  Chest X-ray showed mild pulmonary venous congestion with trace effusions     -Continue respiratory protocol with Xopenex nebulizer PRN  -Monitor O2   -Echocardiogram to assess heart function        Pancytopenia ([facility] [address] )  Assessment & Plan  Baseline CBC WNL  Likely 2/2 acute viral illness  F/u peripheral smear and reticulocyte count    CINDY (acute kidney injury) ([facility] [address] )  Assessment & Plan  Pre-renal CINDY on CKD 2/2 dehydration and decreased p o  intake  Baseline Cr around 1-1 4, 1 91 on admission  Creatinine up to 1 95 today  BUN 55  Started patient on 2 mg bumex yesterday     -Holding IVF at this time  -Trend BMP  -Monitor I&O and daily weights  -Hold home losartan 75 mg qd   -Reduce Bumex to 1 mg PO daily      Gastroesophageal reflux disease  Assessment & Plan  Continue pepcid 10 mg bid and protonix 40    Continuous opioid dependence ([facility] [address] )  Assessment & Plan  On tramadol outpatient for chronic back pain    Low back pain  Assessment & Plan  Chronic, on tramadol and robaxin daily  Holding tramadol at this time  Pain currently at baseline  CKD (chronic kidney disease) stage 3, GFR 30-59 ml/min ([facility])  Assessment & Plan  In setting of T2DM and HTN  See plans for DM, HTN, and CINDY    Type 2 diabetes mellitus with stage 3 chronic kidney disease, with long-term current use of insulin ([facility])  Assessment & Plan  A1c 6 1%, Glucose 74 this morning  NPO prior to EGD this morning, held morning lantus  Outpt: detemir 40 units BID; novolin 15 units bid meals (lunch and dinner)    Plan:  -Restart clear liquid diet, increase detemir to 20 units, Q12h, with sliding scale  Monitor glucose levels if too low due to poor PO intake, adjust detemir dose   -goal -180 inpt    Mixed hyperlipidemia  Assessment & Plan  Continue lipitor and Tricor    Hypertension  Assessment & Plan  Blood pressure 137/52 this am     -Continue home coreg 12 5 mg q12 hr, cardura 2 mg HS, hydralazine 50 mg q12h, and Procardia XL 60 mg qd  -Given work of breathing and edema, restarted on Bumex 2mg PO daily, will follow BMPs  - Can restart on home Losartan tomorrow if kidney function stable  -PRN hydralazine for SBP >180      Disposition: continue inpatient management - ongoing evaluation of abdominal pain and elevated liver enzymes    SUBJECTIVE     Patient seen and examined  No acute events overnight   This morning the patient was comfortable, but looked jaundice and lethargic  She endorsed minor abdominal pain, but primarily felt bloated and pressure in her abdomen  She endorsed some SOB, but oxygen saturation was 98% and she did not appear to be in any distress, so the bed was elevated to help her feel less SOB  She reported no bowel movements since the night of , but says she is releasing a lot of gas  She reports improvement in her dysuria  She denies any nausea, vomiting, chest pain  Her headache is still present, but has decreased in severity  OBJECTIVE     Vitals:    22 1124 22 1125 22 1141 22 1223   BP: 155/65 129/59 130/92 153/59   Pulse: 80 72 72    Resp: (!) 24 18 18    Temp: 99 2 °F (37 3 °C) 99 2 °F (37 3 °C)     TempSrc: Tympanic Tympanic     SpO2: 99% 100% 96%    Weight:       Height:          Temperature:   Temp (24hrs), Av 8 °F (37 1 °C), Min:98 °F (36 7 °C), Max:100 3 °F (37 9 °C)    Temperature: 99 2 °F (37 3 °C)  Intake & Output:  I/O        0701   0700  0701   07 0701   0700    P  O  300 1410 0    I V  (mL/kg) 2960 4 (43 2)  200 (2 8)    Total Intake(mL/kg) 3260 4 (47 5) 1410 (19 5) 200 (2 8)    Urine (mL/kg/hr) 300 (0 2) 200 (0 1)     Stool 0      Total Output 300 200     Net +2960 4 +1210 +200           Unmeasured Stool Occurrence 1 x          Weights:        Body mass index is 31 13 kg/m²  Weight (last 2 days)     Date/Time Weight    22 0533 72 3 (159 4)    22 1400 68 5 (151)    22 0752 68 8 (151 68)    22 0600 68 6 (151 24)    22 0742 66 7 (147 05)        Physical Exam  Vitals reviewed  Constitutional:       General: She is not in acute distress  Appearance: Normal appearance  She is ill-appearing  She is not toxic-appearing or diaphoretic  Comments: Patient appearing jaundice and fatigued  HENT:      Head: Normocephalic and atraumatic        Right Ear: External ear normal       Left Ear: External ear normal  Nose: Nose normal    Eyes:      General:         Right eye: No discharge  Left eye: No discharge  Conjunctiva/sclera: Conjunctivae normal    Cardiovascular:      Rate and Rhythm: Normal rate and regular rhythm  Pulses: Normal pulses  Heart sounds: Normal heart sounds  Pulmonary:      Effort: Pulmonary effort is normal  No respiratory distress  Breath sounds: Wheezing present  Comments: Bilateral wheezing  Crackles in RLL  Abdominal:      General: Bowel sounds are normal  There is no distension  Palpations: Abdomen is soft  Tenderness: There is abdominal tenderness  Comments: Tenderness more in lower quadrants and RUQ  Musculoskeletal:         General: Swelling present  No tenderness  Right lower leg: Edema present  Left lower leg: Edema present  Comments: Pitting edema also noted in bilateral hands  Skin:     General: Skin is warm and dry  Coloration: Skin is jaundiced  Findings: No bruising  Neurological:      General: No focal deficit present  Mental Status: She is alert and oriented to person, place, and time  Motor: Weakness (diffuse generalized) present  Psychiatric:         Mood and Affect: Mood normal          Behavior: Behavior normal        LABORATORY DATA     Labs: I have personally reviewed pertinent reports    Results from last 7 days   Lab Units 06/30/22  0909 06/29/22  0610 06/28/22  0513 06/26/22  2118   WBC Thousand/uL 5 20 6 56 4 91 4 25*   HEMOGLOBIN g/dL 8 4* 9 0* 8 8* 10 1*   HEMATOCRIT % 25 8* 27 4* 27 6* 30 6*   PLATELETS Thousands/uL 108* 108*  --  116*   NEUTROS PCT % 80* 82* 85* 86*   MONOS PCT % 7 9 6 7      Results from last 7 days   Lab Units 06/30/22  0909 06/29/22  0610 06/28/22  0513   POTASSIUM mmol/L 3 5 4 1 3 9   CHLORIDE mmol/L 112* 110* 109*   CO2 mmol/L 23 19* 20*   BUN mg/dL 55* 48* 46*   CREATININE mg/dL 1 95* 1 50* 1 63*   CALCIUM mg/dL 8 5 8 2* 8 3   ALK PHOS U/L 124* 135* 83 ALT U/L 165* 191* 195*   AST U/L 128* 148* 175*              Results from last 7 days   Lab Units 06/29/22  0908 06/27/22  0840 06/25/22  1842   INR  1 23* 1 31* 1 10   PTT seconds  --   --  26     Results from last 7 days   Lab Units 06/26/22  2352   LACTIC ACID mmol/L 0 8           IMAGING & DIAGNOSTIC TESTING     Radiology Results: I have personally reviewed pertinent reports  CT abdomen pelvis wo contrast    Result Date: 6/26/2022  Impression: No evidence of acute intra-abdominal or pelvic pathology  Findings suggesting cellulitis versus chronic injections throughout the anterior abdominal wall  Workstation performed: [id number]     XR chest portable    Result Date: 6/29/2022  Impression: Mild pulmonary venous congestion with trace effusions  Workstation performed: [id number]     CTA abdomen pelvis w wo contrast    Result Date: 6/27/2022  Impression: Unremarkable CT arteriography of the abdomen and pelvis  No evidence of acute pathology throughout the abdomen or pelvis Workstation performed: [id number]     NM hepatobiliary    Result Date: 6/28/2022  Impression: There is visualization of the gallbladder indicating a patent cystic duct  Workstation performed: [id number]     US right upper quadrant    Result Date: 6/27/2022  Impression: Mild gallbladder wall thickening without cholelithiasis is nonspecific  It may be sequela of trace perihepatic free fluid  If there is clinical concern for acalculous cholecystitis, consider follow-up with a HIDA scan if it would alter patient management  Partially visualized small pericardial effusion  Remainder of the examination is normal  Workstation performed: [id number]     Other Diagnostic Testing: I have personally reviewed pertinent reports      ACTIVE MEDICATIONS     Current Facility-Administered Medications   Medication Dose Route Frequency    acetaminophen (TYLENOL) tablet 650 mg  650 mg Oral Q6H PRN    albuterol (PROVENTIL HFA,VENTOLIN HFA) inhaler 2 puff  2 puff Inhalation Q4H PRN    albuterol inhalation solution 2 5 mg  2 5 mg Nebulization Q6H PRN    allopurinol (ZYLOPRIM) tablet 100 mg  100 mg Oral Daily    ascorbic acid (VITAMIN C) tablet 500 mg  500 mg Oral Daily    aspirin (ECOTRIN LOW STRENGTH) EC tablet 81 mg  81 mg Oral Daily    bisacodyl (DULCOLAX) rectal suppository 10 mg  10 mg Rectal Daily PRN    budesonide-formoterol (SYMBICORT) 160-4 5 mcg/act inhaler 2 puff  2 puff Inhalation BID    [START ON 7/1/2022] bumetanide (BUMEX) tablet 1 mg  1 mg Oral Daily    calcium carbonate-vitamin D (OSCAL-D) 500 mg-200 units per tablet 1 tablet  1 tablet Oral Daily With Breakfast    carvedilol (COREG) tablet 12 5 mg  12 5 mg Oral Q12H    cefTRIAXone (ROCEPHIN) 1,000 mg in dextrose 5 % 50 mL IVPB  1,000 mg Intravenous Q24H    cholecalciferol (VITAMIN D3) tablet 1,000 Units  1,000 Units Oral Daily    doxazosin (CARDURA) tablet 2 mg  2 mg Oral HS    famotidine (PEPCID) tablet 10 mg  10 mg Oral BID    ferrous gluconate (FERGON) tablet 325 mg  325 mg Oral Daily    fish oil capsule 1,000 mg  1,000 mg Oral Daily    fluticasone (FLONASE) 50 mcg/act nasal spray 2 spray  2 spray Nasal Daily    heparin (porcine) subcutaneous injection 5,000 Units  5,000 Units Subcutaneous Q8H [address]    hydrALAZINE (APRESOLINE) injection 5 mg  5 mg Intravenous Q6H PRN    hydrALAZINE (APRESOLINE) tablet 50 mg  50 mg Oral Q12H    insulin detemir (LEVEMIR) subcutaneous injection 20 Units  20 Units Subcutaneous Q12H [address]    insulin lispro (HumaLOG) 100 units/mL subcutaneous injection 1-5 Units  1-5 Units Subcutaneous TID AC    insulin lispro (HumaLOG) 100 units/mL subcutaneous injection 1-5 Units  1-5 Units Subcutaneous HS    levothyroxine tablet 75 mcg  75 mcg Oral Early Morning    magnesium citrate (CITROMA) oral solution 296 mL  296 mL Oral Once    magnesium oxide (MAG-OX) tablet 400 mg  400 mg Oral Daily    methocarbamol (ROBAXIN) tablet 500 mg  500 mg Oral TID    metroNIDAZOLE (FLAGYL) tablet 500 mg  500 mg Oral Q8H [facility]    montelukast (SINGULAIR) tablet 10 mg  10 mg Oral HS    multivitamin stress formula tablet 1 tablet  1 tablet Oral Daily    nitroglycerin (NITROSTAT) SL tablet 0 4 mg  0 4 mg Sublingual Q5 Min PRN    pantoprazole (PROTONIX) EC tablet 40 mg  40 mg Oral Early Morning    polyethylene glycol (MIRALAX) packet 17 g  17 g Oral Daily    senna-docusate sodium (SENOKOT S) 8 6-50 mg per tablet 1 tablet  1 tablet Oral BID       VTE Pharmacologic Prophylaxis: Heparin  VTE Mechanical Prophylaxis: sequential compression device    Portions of the record may have been created with voice recognition software  Occasional wrong word or "sound a like" substitutions may have occurred due to the inherent limitations of voice recognition software    Read the chart carefully and recognize, using context, where substitutions have occurred   ==  Bebe Cuevas, [address] [facility]  Internal Medicine Residency PGY-2

## 2022-06-30 NOTE — ANESTHESIA POSTPROCEDURE EVALUATION
Post-Op Assessment Note    CV Status:  Stable    Pain management: adequate     Mental Status:  Awake and sleepy   Hydration Status:  Euvolemic   PONV Controlled:  Controlled   Airway Patency:  Patent      Post Op Vitals Reviewed: Yes      Staff: CRNA, Anesthesiologist         No complications documented      /65 (06/30/22 1124)    Temp 99 2 °F (37 3 °C) (06/30/22 1124)    Pulse 80 (06/30/22 1124)   Resp (!) 24 (06/30/22 1124)    SpO2 99 % (06/30/22 1124)

## 2022-06-30 NOTE — RESTORATIVE TECHNICIAN NOTE
Restorative Technician Note      Patient Name: Kike Daugherty     Restorative Tech Visit Date: 6/30/2022  Note Type: Mobility  Patient Position Upon Consult: Supine  Activity Performed: Ambulated  Assistive Device: Other (Comment) (HHAx1)  Patient Position at End of Consult: All needs within reach; Other (comment) (seated on toilet; PCA aware; pt will call for assistance when finished)    Attempted further ambulation; pt limited by frequent BMs       Suzan Wisdom  DPT, Restorative Technician

## 2022-07-01 LAB
ALBUMIN SERPL BCP-MCNC: 2.3 G/DL (ref 3.5–5)
ALP SERPL-CCNC: 158 U/L (ref 46–116)
ALT SERPL W P-5'-P-CCNC: 166 U/L (ref 12–78)
ANION GAP SERPL CALCULATED.3IONS-SCNC: 8 MMOL/L (ref 4–13)
AST SERPL W P-5'-P-CCNC: 125 U/L (ref 5–45)
BACTERIA BLD CULT: NORMAL
BACTERIA BLD CULT: NORMAL
BACTERIA UR CULT: ABNORMAL
BASOPHILS # BLD AUTO: 0.01 THOUSANDS/ΜL (ref 0–0.1)
BASOPHILS NFR BLD AUTO: 0 % (ref 0–1)
BILIRUB SERPL-MCNC: 3.25 MG/DL (ref 0.2–1)
BUN SERPL-MCNC: 49 MG/DL (ref 5–25)
CALCIUM ALBUM COR SERPL-MCNC: 10 MG/DL (ref 8.3–10.1)
CALCIUM SERPL-MCNC: 8.6 MG/DL (ref 8.3–10.1)
CHLORIDE SERPL-SCNC: 111 MMOL/L (ref 100–108)
CO2 SERPL-SCNC: 24 MMOL/L (ref 21–32)
CREAT SERPL-MCNC: 1.67 MG/DL (ref 0.6–1.3)
EOSINOPHIL # BLD AUTO: 0.16 THOUSAND/ΜL (ref 0–0.61)
EOSINOPHIL NFR BLD AUTO: 2 % (ref 0–6)
ERYTHROCYTE [DISTWIDTH] IN BLOOD BY AUTOMATED COUNT: 14.6 % (ref 11.6–15.1)
GFR SERPL CREATININE-BSD FRML MDRD: 29 ML/MIN/1.73SQ M
GLUCOSE SERPL-MCNC: 115 MG/DL (ref 65–140)
GLUCOSE SERPL-MCNC: 152 MG/DL (ref 65–140)
GLUCOSE SERPL-MCNC: 158 MG/DL (ref 65–140)
GLUCOSE SERPL-MCNC: 51 MG/DL (ref 65–140)
GLUCOSE SERPL-MCNC: 73 MG/DL (ref 65–140)
GLUCOSE SERPL-MCNC: 84 MG/DL (ref 65–140)
GLUCOSE SERPL-MCNC: 98 MG/DL (ref 65–140)
HCT VFR BLD AUTO: 26.2 % (ref 34.8–46.1)
HGB BLD-MCNC: 8.6 G/DL (ref 11.5–15.4)
IMM GRANULOCYTES # BLD AUTO: 0.05 THOUSAND/UL (ref 0–0.2)
IMM GRANULOCYTES NFR BLD AUTO: 1 % (ref 0–2)
INR PPP: 1.23 (ref 0.84–1.19)
LYMPHOCYTES # BLD AUTO: 0.25 THOUSANDS/ΜL (ref 0.6–4.47)
LYMPHOCYTES NFR BLD AUTO: 4 % (ref 14–44)
MCH RBC QN AUTO: 30.5 PG (ref 26.8–34.3)
MCHC RBC AUTO-ENTMCNC: 32.8 G/DL (ref 31.4–37.4)
MCV RBC AUTO: 93 FL (ref 82–98)
MONOCYTES # BLD AUTO: 0.3 THOUSAND/ΜL (ref 0.17–1.22)
MONOCYTES NFR BLD AUTO: 5 % (ref 4–12)
NEUTROPHILS # BLD AUTO: 5.86 THOUSANDS/ΜL (ref 1.85–7.62)
NEUTS SEG NFR BLD AUTO: 88 % (ref 43–75)
NRBC BLD AUTO-RTO: 0 /100 WBCS
PLATELET # BLD AUTO: 155 THOUSANDS/UL (ref 149–390)
PMV BLD AUTO: 11.9 FL (ref 8.9–12.7)
POTASSIUM SERPL-SCNC: 3.1 MMOL/L (ref 3.5–5.3)
PROT SERPL-MCNC: 5.7 G/DL (ref 6.4–8.2)
PROTHROMBIN TIME: 15 SECONDS (ref 11.6–14.5)
RBC # BLD AUTO: 2.82 MILLION/UL (ref 3.81–5.12)
RYE IGE QN: NEGATIVE
SODIUM SERPL-SCNC: 143 MMOL/L (ref 136–145)
WBC # BLD AUTO: 6.63 THOUSAND/UL (ref 4.31–10.16)

## 2022-07-01 PROCEDURE — 85610 PROTHROMBIN TIME: CPT | Performed by: INTERNAL MEDICINE

## 2022-07-01 PROCEDURE — 82948 REAGENT STRIP/BLOOD GLUCOSE: CPT

## 2022-07-01 PROCEDURE — 99232 SBSQ HOSP IP/OBS MODERATE 35: CPT | Performed by: INTERNAL MEDICINE

## 2022-07-01 PROCEDURE — 94760 N-INVAS EAR/PLS OXIMETRY 1: CPT

## 2022-07-01 PROCEDURE — 80053 COMPREHEN METABOLIC PANEL: CPT | Performed by: INTERNAL MEDICINE

## 2022-07-01 PROCEDURE — 85025 COMPLETE CBC W/AUTO DIFF WBC: CPT | Performed by: INTERNAL MEDICINE

## 2022-07-01 RX ORDER — POTASSIUM CHLORIDE 20 MEQ/1
40 TABLET, EXTENDED RELEASE ORAL ONCE
Status: COMPLETED | OUTPATIENT
Start: 2022-07-01 | End: 2022-07-01

## 2022-07-01 RX ORDER — DICYCLOMINE HYDROCHLORIDE 10 MG/1
10 CAPSULE ORAL
Status: DISCONTINUED | OUTPATIENT
Start: 2022-07-01 | End: 2022-07-03 | Stop reason: HOSPADM

## 2022-07-01 RX ORDER — POTASSIUM CHLORIDE 20MEQ/15ML
20 LIQUID (ML) ORAL ONCE
Status: COMPLETED | OUTPATIENT
Start: 2022-07-01 | End: 2022-07-01

## 2022-07-01 RX ADMIN — METHOCARBAMOL 500 MG: 500 TABLET, FILM COATED ORAL at 09:12

## 2022-07-01 RX ADMIN — BUDESONIDE AND FORMOTEROL FUMARATE DIHYDRATE 2 PUFF: 160; 4.5 AEROSOL RESPIRATORY (INHALATION) at 17:01

## 2022-07-01 RX ADMIN — HEPARIN SODIUM 5000 UNITS: 5000 INJECTION INTRAVENOUS; SUBCUTANEOUS at 22:36

## 2022-07-01 RX ADMIN — METHOCARBAMOL 500 MG: 500 TABLET, FILM COATED ORAL at 22:38

## 2022-07-01 RX ADMIN — FERROUS GLUCONATE 325 MG: 324 TABLET ORAL at 09:12

## 2022-07-01 RX ADMIN — INSULIN DETEMIR 20 UNITS: 100 INJECTION, SOLUTION SUBCUTANEOUS at 09:24

## 2022-07-01 RX ADMIN — MAGNESIUM OXIDE TAB 400 MG (241.3 MG ELEMENTAL MG) 400 MG: 400 (241.3 MG) TAB at 09:12

## 2022-07-01 RX ADMIN — CARVEDILOL 12.5 MG: 12.5 TABLET, FILM COATED ORAL at 22:37

## 2022-07-01 RX ADMIN — Medication 1 TABLET: at 09:12

## 2022-07-01 RX ADMIN — OXYCODONE HYDROCHLORIDE AND ACETAMINOPHEN 500 MG: 500 TABLET ORAL at 09:09

## 2022-07-01 RX ADMIN — MONTELUKAST 10 MG: 10 TABLET, FILM COATED ORAL at 22:38

## 2022-07-01 RX ADMIN — LEVOTHYROXINE SODIUM 75 MCG: 75 TABLET ORAL at 05:16

## 2022-07-01 RX ADMIN — FLUTICASONE PROPIONATE 2 SPRAY: 50 SPRAY, METERED NASAL at 09:13

## 2022-07-01 RX ADMIN — HYDRALAZINE HYDROCHLORIDE 50 MG: 50 TABLET, FILM COATED ORAL at 09:12

## 2022-07-01 RX ADMIN — DICYCLOMINE HYDROCHLORIDE 10 MG: 10 CAPSULE ORAL at 16:52

## 2022-07-01 RX ADMIN — SENNOSIDES AND DOCUSATE SODIUM 1 TABLET: 8.6; 5 TABLET ORAL at 17:00

## 2022-07-01 RX ADMIN — DOXAZOSIN 2 MG: 2 TABLET ORAL at 22:38

## 2022-07-01 RX ADMIN — POTASSIUM CHLORIDE 40 MEQ: 1500 TABLET, EXTENDED RELEASE ORAL at 10:39

## 2022-07-01 RX ADMIN — HYDRALAZINE HYDROCHLORIDE 50 MG: 50 TABLET, FILM COATED ORAL at 22:38

## 2022-07-01 RX ADMIN — CARVEDILOL 12.5 MG: 12.5 TABLET, FILM COATED ORAL at 09:09

## 2022-07-01 RX ADMIN — ACETAMINOPHEN 650 MG: 325 TABLET, FILM COATED ORAL at 22:37

## 2022-07-01 RX ADMIN — INSULIN LISPRO 1 UNITS: 100 INJECTION, SOLUTION INTRAVENOUS; SUBCUTANEOUS at 16:52

## 2022-07-01 RX ADMIN — CEFTRIAXONE SODIUM 1000 MG: 10 INJECTION, POWDER, FOR SOLUTION INTRAVENOUS at 12:14

## 2022-07-01 RX ADMIN — ALLOPURINOL 100 MG: 100 TABLET ORAL at 09:12

## 2022-07-01 RX ADMIN — BUMETANIDE 1 MG: 1 TABLET ORAL at 09:09

## 2022-07-01 RX ADMIN — FAMOTIDINE 10 MG: 20 TABLET ORAL at 17:01

## 2022-07-01 RX ADMIN — OMEGA-3 FATTY ACIDS CAP 1000 MG 1000 MG: 1000 CAP at 09:12

## 2022-07-01 RX ADMIN — DICYCLOMINE HYDROCHLORIDE 10 MG: 10 CAPSULE ORAL at 22:38

## 2022-07-01 RX ADMIN — HEPARIN SODIUM 5000 UNITS: 5000 INJECTION INTRAVENOUS; SUBCUTANEOUS at 05:16

## 2022-07-01 RX ADMIN — SENNOSIDES AND DOCUSATE SODIUM 1 TABLET: 8.6; 5 TABLET ORAL at 09:12

## 2022-07-01 RX ADMIN — ASPIRIN 81 MG: 81 TABLET, COATED ORAL at 09:12

## 2022-07-01 RX ADMIN — METRONIDAZOLE 500 MG: 500 TABLET ORAL at 05:16

## 2022-07-01 RX ADMIN — HEPARIN SODIUM 5000 UNITS: 5000 INJECTION INTRAVENOUS; SUBCUTANEOUS at 15:18

## 2022-07-01 RX ADMIN — B-COMPLEX W/ C & FOLIC ACID TAB 1 TABLET: TAB at 09:09

## 2022-07-01 RX ADMIN — BUDESONIDE AND FORMOTEROL FUMARATE DIHYDRATE 2 PUFF: 160; 4.5 AEROSOL RESPIRATORY (INHALATION) at 09:15

## 2022-07-01 RX ADMIN — INSULIN LISPRO 1 UNITS: 100 INJECTION, SOLUTION INTRAVENOUS; SUBCUTANEOUS at 22:37

## 2022-07-01 RX ADMIN — FAMOTIDINE 10 MG: 20 TABLET ORAL at 09:09

## 2022-07-01 RX ADMIN — POTASSIUM CHLORIDE 20 MEQ: 20 SOLUTION ORAL at 22:37

## 2022-07-01 RX ADMIN — METHOCARBAMOL 500 MG: 500 TABLET, FILM COATED ORAL at 17:00

## 2022-07-01 RX ADMIN — Medication 1000 UNITS: at 09:09

## 2022-07-01 RX ADMIN — PANTOPRAZOLE SODIUM 40 MG: 40 TABLET, DELAYED RELEASE ORAL at 05:16

## 2022-07-01 NOTE — PROGRESS NOTES
INTERNAL MEDICINE RESIDENCY PROGRESS NOTE     Name: Del Borden   Age & Sex: 64 y o  female   MRN: [id number]  Unit/Bed#: [facility] 819-01   Encounter: [id number]  Team: SOD Team C     PATIENT INFORMATION     Name: Del Borden   Age & Sex: 64 y o  female   MRN: [id number]  Hospital Stay Days: 3    ASSESSMENT/PLAN     Principal Problem:    Elevated LFTs  Active Problems:    Dysuria    Shortness of breath    CINDY (acute kidney injury) ([facility] [address] )    Type 2 diabetes mellitus with stage 3 chronic kidney disease, with long-term current use of insulin (HCC)    Hypertension    Pancytopenia (HCC)    Mixed hyperlipidemia    CKD (chronic kidney disease) stage 3, GFR 30-59 ml/min (HCC)    Low back pain    Continuous opioid dependence (HCC)    Gastroesophageal reflux disease      * Elevated LFTs  Assessment & Plan  Presenting with 4-5 days diffuse abdominal pain, worse after eating, with constipation  Patient able to pass small BM on 6/27 am and was given prune juice on 6/28, and was able to pass loose/watery stools after  Had 2 bowel movements 6/30  Current temp at 99 9 with TMax 100  Patient pending MRI/MRCP  Labs:  · , down from 128, , was 165 yesterday, alk phos 158, up from 124  lipase and lactic WNL  · Total bili up to 3 25, from 2 54 yesterday  · Acute hepatitis panel negative  · EBV showing positive IgG but no IgM - old infection/vaccination  · CMV showing positive IgG but no IgM- old infection/vaccination  · Ceruloplasmin 30 6  · Anti-smooth muscle antibodies normal  · Anti-mitochondrial antibodies normal  · MARIZA negative  · BCx NG    Imaging:  · Developing mild pancytopenia, with Hgb down to 8 4, down from 9 0 on 6/29  · CTA unremarkable for mesenteric ischemia, cholecystitis, or constipation  · CTabd/pelvis from 6/25 showed possible gastroenteritis vs developing SBO  · CTabd/pelvis 6/26 showed no evidence of acute intra-abdominal or pelvic pathology    · US RUQ 6/27 unremarkable, except for mild gallbladder wall thickening without cholelithiasis and a partially visualized small pericardial effusion  · HIDA scan unremarkable, negative for acalculous cholecystitis  · EGD showed mild erythematous mucosa in the stomach  Normal esophagus, normal 1st and 2nd portion of duodenum  The esophagus appeared normal  Biopsy of the stomach, incisura and antrum done to R/O H  Pylori  Biopsies of 1st and 2nd part of duodenum taken to R/O celiac disease  Plan:  -hepatocellular pattern LFT elevation, along with fever and mild pancytopenia  Suspect viral etiology    -Start soft diet  - Holding IVF  -bowel regimen of senna glycoside and docusate, scheduled miralax, PRN dulcalax  -GI consulted, MRI/MRCP order placed and pending completion  - Ceftriaxone and flagyl discontinued with urine cultures showing E coli  -Waiting results of IgG 1,2,3, and 4,      Dysuria  Assessment & Plan  Patient endorses 2 days of dysuria and feeling of incomplete bladder emptying  Urine appears cloudy and has a foul-smelling odor  Dysuria has improved  UA 1+ protein, positive for leukocytes, trace glucose, and small amount of blood  10-20 RBC/hpf and innumerable WBCs with hyaline casts  Urine culture grew >100,000 Gram negative rods, further speciated to E coli that is pan-sensitive  -D/C Flagyl, last dose of ceftriaxone today  Shortness of breath  Assessment & Plan  Patient began having SOB overnight on 6/29 and felt increasing wheezing while laying flat  Patient has been on 2 L nasal cannula since  SpO2 97% this morning  Patient also initially given 2 mg Bumex due to apparent fluid-overloaded state  Transitioned to 1mg Bumex due to renal function  Chest X-ray showed mild pulmonary venous congestion with trace effusions     -Continue respiratory protocol with Xopenex nebulizer PRN  -Monitor O2   -Plan for echocardiogram to assess heart function        CINDY (acute kidney injury) ([facility] [address] )  Assessment & Plan  Pre-renal CINDY on CKD 2/2 dehydration and decreased p o  intake  Baseline Cr around 1-1 4, 1 91 on admission  Creatinine at 1 67 today  BUN 49  CINDY resolved  Started patient on 2 mg bumex 6/29, then transitioned to 1 mg bumex due to renal function  K+ at 3 1 today     -Holding IVF at this time  -Trend BMP  -Replete with 50mEq PO K+  -Monitor I&O and daily weights  -Hold home losartan 75 mg qd       Type 2 diabetes mellitus with stage 3 chronic kidney disease, with long-term current use of insulin ([facility])  Assessment & Plan  A1c 6 1%, symptomatic hypoglycemia 7/1 AM with glucose reading of 51  Glucose increased to 98 after given some food  Outpt regimen: detemir 40 units BID; novolin 15 units bid meals (lunch and dinner)    Plan:  - Soft diet  - Decrease detemir to 10 units, Q12h, with sliding scale  - Monitor glucose levels if too low due to poor PO intake, adjust detemir dose   - goal -180 inpt    Hypertension  Assessment & Plan  Blood pressure ranging from 249-817 systolic, 95-14 diastolic over interval period      - Continue home coreg 12 5 mg q12 hr, cardura 2 mg HS, hydralazine 50 mg q12h, and Procardia XL 60 mg qd  - Given work of breathing and edema, restarted on Bumex 2mg PO daily, will follow BMPs  - Can restart on home Losartan tomorrow if kidney function stable  - PRN hydralazine for SBP >180    Pancytopenia (HCC)  Assessment & Plan  Baseline CBC WNL  Likely 2/2 acute viral illness  F/u peripheral smear and reticulocyte count    Gastroesophageal reflux disease  Assessment & Plan  Continue pepcid 10 mg bid and protonix 40    Continuous opioid dependence ([facility] [address] )  Assessment & Plan  On tramadol outpatient for chronic back pain    Low back pain  Assessment & Plan  Chronic, on tramadol and robaxin daily  Holding tramadol at this time  Pain currently at baseline      CKD (chronic kidney disease) stage 3, GFR 30-59 ml/min ([facility])  Assessment & Plan  In setting of T2DM and HTN  See plans for DM, HTN, and CINDY    Mixed hyperlipidemia  Assessment & Plan  Continue lipitor and Tricor      Disposition: Inpatient pending GI workup     SUBJECTIVE     Patient seen and examined  No acute events overnight  MRI/MRCP remains pending  Patient appears jaundice and with lethargy that improved significantly with food  She endorses persistent abdominal pain that worsens with food intake, but of low severity  Nursing staff reports that she passed 2 BMs yesterday  Denies chest pain  OBJECTIVE     Vitals:    22 2300 22 0312 22 0637 22 0710   BP:   156/56    Pulse:   78    Resp:   18    Temp:   99 9 °F (37 7 °C)    TempSrc:       SpO2: 97%  97% 96%   Weight:  70 2 kg (154 lb 12 2 oz)     Height:          Temperature:   Temp (24hrs), Av 9 °F (37 7 °C), Min:99 7 °F (37 6 °C), Max:100 °F (37 8 °C)    Temperature: 99 9 °F (37 7 °C)  Intake & Output:  I/O        0701   0700  0701   0700  0701   0700    P  O  1410 380     I V  (mL/kg)  200 (2 8)     Total Intake(mL/kg) 1410 (19 5) 580 (8 3)     Urine (mL/kg/hr) 200 (0 1) 300 (0 2)     Stool  0     Total Output 200 300     Net +1210 +280            Unmeasured Urine Occurrence  2 x     Unmeasured Stool Occurrence  2 x         Weights:        Body mass index is 30 23 kg/m²  Weight (last 2 days)     Date/Time Weight    22 0312 70 2 (154 76)    22 0533 72 3 (159 4)    22 1400 68 5 (151)    22 0752 68 8 (151 68)    22 0600 68 6 (151 24)        Physical Exam  Constitutional:       Appearance: She is ill-appearing  Comments: Patient appears lethargic, later improved with food intake  HENT:      Head: Normocephalic and atraumatic  Eyes:      Extraocular Movements: Extraocular movements intact  Cardiovascular:      Rate and Rhythm: Normal rate and regular rhythm  Pulses: Normal pulses  Heart sounds: Normal heart sounds     Pulmonary:      Effort: Pulmonary effort is normal       Breath sounds: Wheezing present  Comments: Bilateral wheezing, with crackles in the RLL  Abdominal:      General: Bowel sounds are normal       Palpations: Abdomen is soft  Tenderness: There is abdominal tenderness  Musculoskeletal:         General: Swelling present  Comments: Bilateral edema in hands  Edema in lower extremities improving  Skin:     Coloration: Skin is jaundiced  Neurological:      Mental Status: She is oriented to person, place, and time  Mental status is at baseline  LABORATORY DATA     Labs: I have personally reviewed pertinent reports  Results from last 7 days   Lab Units 07/01/22  0519 06/30/22  0909 06/29/22  0610   WBC Thousand/uL 6 63 5 20 6 56   HEMOGLOBIN g/dL 8 6* 8 4* 9 0*   HEMATOCRIT % 26 2* 25 8* 27 4*   PLATELETS Thousands/uL 155 108* 108*   NEUTROS PCT % 88* 80* 82*   MONOS PCT % 5 7 9      Results from last 7 days   Lab Units 07/01/22  0519 06/30/22  0909 06/29/22  0610   POTASSIUM mmol/L 3 1* 3 5 4 1   CHLORIDE mmol/L 111* 112* 110*   CO2 mmol/L 24 23 19*   BUN mg/dL 49* 55* 48*   CREATININE mg/dL 1 67* 1 95* 1 50*   CALCIUM mg/dL 8 6 8 5 8 2*   ALK PHOS U/L 158* 124* 135*   ALT U/L 166* 165* 191*   AST U/L 125* 128* 148*              Results from last 7 days   Lab Units 07/01/22  0519 06/29/22  0908 06/27/22  0840 06/25/22  1842   INR  1 23* 1 23* 1 31* 1 10   PTT seconds  --   --   --  26     Results from last 7 days   Lab Units 06/26/22  2352   LACTIC ACID mmol/L 0 8           IMAGING & DIAGNOSTIC TESTING     Radiology Results: I have personally reviewed pertinent reports  CT abdomen pelvis wo contrast    Result Date: 6/26/2022  Impression: No evidence of acute intra-abdominal or pelvic pathology  Findings suggesting cellulitis versus chronic injections throughout the anterior abdominal wall  Workstation performed: [id number]     XR chest portable    Result Date: 6/29/2022  Impression: Mild pulmonary venous congestion with trace effusions   Workstation performed: [id number] CTA abdomen pelvis w wo contrast    Result Date: 6/27/2022  Impression: Unremarkable CT arteriography of the abdomen and pelvis  No evidence of acute pathology throughout the abdomen or pelvis Workstation performed: [id number]     NM hepatobiliary    Result Date: 6/28/2022  Impression: There is visualization of the gallbladder indicating a patent cystic duct  Workstation performed: [id number]     US right upper quadrant    Result Date: 6/27/2022  Impression: Mild gallbladder wall thickening without cholelithiasis is nonspecific  It may be sequela of trace perihepatic free fluid  If there is clinical concern for acalculous cholecystitis, consider follow-up with a HIDA scan if it would alter patient management  Partially visualized small pericardial effusion  Remainder of the examination is normal  Workstation performed: [id number]     Other Diagnostic Testing: I have personally reviewed pertinent reports      ACTIVE MEDICATIONS     Current Facility-Administered Medications   Medication Dose Route Frequency    acetaminophen (TYLENOL) tablet 650 mg  650 mg Oral Q6H PRN    albuterol (PROVENTIL HFA,VENTOLIN HFA) inhaler 2 puff  2 puff Inhalation Q4H PRN    albuterol inhalation solution 2 5 mg  2 5 mg Nebulization Q6H PRN    allopurinol (ZYLOPRIM) tablet 100 mg  100 mg Oral Daily    ascorbic acid (VITAMIN C) tablet 500 mg  500 mg Oral Daily    aspirin (ECOTRIN LOW STRENGTH) EC tablet 81 mg  81 mg Oral Daily    bisacodyl (DULCOLAX) rectal suppository 10 mg  10 mg Rectal Daily PRN    budesonide-formoterol (SYMBICORT) 160-4 5 mcg/act inhaler 2 puff  2 puff Inhalation BID    bumetanide (BUMEX) tablet 1 mg  1 mg Oral Daily    calcium carbonate-vitamin D (OSCAL-D) 500 mg-200 units per tablet 1 tablet  1 tablet Oral Daily With Breakfast    carvedilol (COREG) tablet 12 5 mg  12 5 mg Oral Q12H    cholecalciferol (VITAMIN D3) tablet 1,000 Units  1,000 Units Oral Daily    dicyclomine (BENTYL) capsule 10 mg  10 mg Oral 4x Daily (AC & HS)    doxazosin (CARDURA) tablet 2 mg  2 mg Oral HS    famotidine (PEPCID) tablet 10 mg  10 mg Oral BID    ferrous gluconate (FERGON) tablet 325 mg  325 mg Oral Daily    fish oil capsule 1,000 mg  1,000 mg Oral Daily    fluticasone (FLONASE) 50 mcg/act nasal spray 2 spray  2 spray Nasal Daily    heparin (porcine) subcutaneous injection 5,000 Units  5,000 Units Subcutaneous Q8H [address]    hydrALAZINE (APRESOLINE) injection 5 mg  5 mg Intravenous Q6H PRN    hydrALAZINE (APRESOLINE) tablet 50 mg  50 mg Oral Q12H    insulin detemir (LEVEMIR) subcutaneous injection 15 Units  15 Units Subcutaneous Q12H [address]    insulin lispro (HumaLOG) 100 units/mL subcutaneous injection 1-5 Units  1-5 Units Subcutaneous TID AC    insulin lispro (HumaLOG) 100 units/mL subcutaneous injection 1-5 Units  1-5 Units Subcutaneous HS    levothyroxine tablet 75 mcg  75 mcg Oral Early Morning    magnesium citrate (CITROMA) oral solution 296 mL  296 mL Oral Once    magnesium oxide (MAG-OX) tablet 400 mg  400 mg Oral Daily    methocarbamol (ROBAXIN) tablet 500 mg  500 mg Oral TID    montelukast (SINGULAIR) tablet 10 mg  10 mg Oral HS    multivitamin stress formula tablet 1 tablet  1 tablet Oral Daily    nitroglycerin (NITROSTAT) SL tablet 0 4 mg  0 4 mg Sublingual Q5 Min PRN    pantoprazole (PROTONIX) EC tablet 40 mg  40 mg Oral Early Morning    polyethylene glycol (MIRALAX) packet 17 g  17 g Oral Daily    senna-docusate sodium (SENOKOT S) 8 6-50 mg per tablet 1 tablet  1 tablet Oral BID       VTE Pharmacologic Prophylaxis: Heparin  VTE Mechanical Prophylaxis: sequential compression device    Portions of the record may have been created with voice recognition software  Occasional wrong word or "sound a like" substitutions may have occurred due to the inherent limitations of voice recognition software    Read the chart carefully and recognize, using context, where substitutions have occurred   ==    Obed Ritter PGY1  Internal Medicine Residency Program

## 2022-07-01 NOTE — PROGRESS NOTES
Gastroenterology Progress Note      PATIENT INFORMATION      Patient: Nona Stanford 64 y o  female   MRN: [id number]  PCP: Bertha Dewey MD  Unit/Bed#: [facility] 631-17 Encounter: [id number]  Date Of Visit: 07/01/22  Current Length of Stay: 3 day(s)     ASSESSMENTS & PLAN    Nona Stanford is a 64 y o  old female with PMH of type 2 diabetes, hypertension, CKD, gout who presents with abdominal pain and fever      Gastroenterology has been consulted for assistance with management of abdominal pain, elevated liver enzymes, constipation     Abdominal pain  · Patient presented with diffuse abdominal pain likely in the setting of constipation vs cystitis  · Completed treatment for cystitis and had bowel movements resulting in resolution of pain    Elevated liver enzymes  · Patient is being evaluated for abnormal liver chemistry  · The patient has acute Mild elevation of liver enzymes (2-5x ULN)  · Jaundice present: no  · R- ratio suggests- Hepatocellular  · Signs of acute liver failure?- no  · Likely etiology includes: viral hepatitis vs drug induced liver injury  · Does the patient consume excessive alcohol?- no  · Is the patient on any new medications?- yes- hydralazine, carvedilol  · Does the patient use herbal products?- no  · Does the patient use excessive tylenol?- no  · Is the patient critically ill or have signs of ischemia?- no  · Imaging tests done: CT scan of the abdomen, USG within normal limits, MRI/MRCP   · Laboratory evaluation included:   · Viral hepatitis: Negative  · EBV, CMV: Negative  · Ceruloplasmin: Normal  · MARIZA, ASMA, Anti-LKM, IgG, AMA- Negative  · Iron panel: Negative  · Discontinue hepatotoxic medications  ·     Avoid alcohol consumption   · MRI/MRCP was negative   · Outpatient follow-up after repeating liver chemistry and if still elevated a liver biopsy can be considered  · Patient is asymptomatic and wishes to be discharged home      Iron deficiency anemia  · Will need outpatient colonoscopy due to personal history of colon polyps   · EGD done in the hospital showing gastritis, biopsies pending  · Patient already scheduled colonoscopy at [facility]      Disposition: Stable for discharge, we will sign off      SUBJECTIVE     Patient denies abdominal pain, nausea, vomiting, heartburn, dysphagia, constipation, diarrhea, blood in stools  OBJECTIVE     Vitals:   Temp (24hrs), Av 9 °F (37 7 °C), Min:99 7 °F (37 6 °C), Max:100 °F (37 8 °C)    Temp:  [99 7 °F (37 6 °C)-100 °F (37 8 °C)] 99 9 °F (37 7 °C)  HR:  [78-82] 78  Resp:  [18-22] 18  BP: (148-156)/(56) 156/56  SpO2:  [88 %-98 %] 96 %  Body mass index is 30 23 kg/m²  Input and Output Summary (last 24 hours): Intake/Output Summary (Last 24 hours) at 2022 1328  Last data filed at 2022 0526  Gross per 24 hour   Intake 200 ml   Output 300 ml   Net -100 ml       Physical Exam:   GENERAL: Non toxic appearing  HEENT:  Normocephalic, atraumatic, pupils bilaterally reactive to light  CARDIAC:  Regular rate and rhythm, S1, S2 heard, no murmurs  PULMONARY:  CTA B/L, no wheezing/rales/rhonci, non-labored breathing  ABDOMEN:  Soft, NT/ND, +BS, no rebound/guarding/rigidity  Extremities:  2+ Pulses in DP/PT  No edema, cyanosis, or clubbing  NEUROLOGIC:  Alert/oriented x3     SKIN:  No rashes or erythema          ADDITIONAL DATA     Labs & Recent Cultures:     Results from last 7 days   Lab Units 22  0519   WBC Thousand/uL 6 63   HEMOGLOBIN g/dL 8 6*   HEMATOCRIT % 26 2*   PLATELETS Thousands/uL 155   NEUTROS PCT % 88*   LYMPHS PCT % 4*   MONOS PCT % 5   EOS PCT % 2     Results from last 7 days   Lab Units 22  0519   POTASSIUM mmol/L 3 1*   CHLORIDE mmol/L 111*   CO2 mmol/L 24   BUN mg/dL 49*   CREATININE mg/dL 1 67*   CALCIUM mg/dL 8 6   ALK PHOS U/L 158*   ALT U/L 166*   AST U/L 125*     Results from last 7 days   Lab Units 22  0519   INR  1 23*         Results from last 7 days   Lab Units 22  1444 06/29/22  0909 06/27/22  0840 06/25/22  1842 06/25/22  1835   BLOOD CULTURE  No Growth at 24 hrs  No Growth at 24 hrs   --  No Growth After 4 Days  No Growth After 4 Days  No Growth After 5 Days  No Growth After 5 Days  URINE CULTURE   --  >100,000 cfu/ml Escherichia coli*  --   --   --          Nutrition:  Diet GI; Non Ulcerogenic  Radiology Results:   XR chest portable   Final Result by Mando Suh MD (06/29 1124)      Mild pulmonary venous congestion with trace effusions  Workstation performed: [id number]         NM hepatobiliary   Final Result by Nehemiah Pritchard MD (06/28 1604)      There is visualization of the gallbladder indicating a patent cystic duct  Workstation performed: [id number]         US right upper quadrant   Final Result by Jermain Glass MD (06/27 2979)      Mild gallbladder wall thickening without cholelithiasis is nonspecific  It may be sequela of trace perihepatic free fluid  If there is clinical concern for acalculous cholecystitis, consider follow-up with a HIDA scan if it would alter patient    management  Partially visualized small pericardial effusion  Remainder of the examination is normal       Workstation performed: [id number]         CTA abdomen pelvis w wo contrast   Final Result by Lorraine Alberto MD (06/27 0143)      Unremarkable CT arteriography of the abdomen and pelvis  No evidence of acute pathology throughout the abdomen or pelvis         Workstation performed: [id number]         CT abdomen pelvis wo contrast   Final Result by Lorraine Alberto MD (06/26 2221)      No evidence of acute intra-abdominal or pelvic pathology  Findings suggesting cellulitis versus chronic injections throughout the anterior abdominal wall              Workstation performed: [id number]         MRI abdomen w wo contrast and mrcp    (Results Pending)     Scheduled Medications:  allopurinol, 100 mg, Daily  ascorbic acid, 500 mg, Daily  aspirin, 81 mg, Daily  budesonide-formoterol, 2 puff, BID  bumetanide, 1 mg, Daily  calcium carbonate-vitamin D, 1 tablet, Daily With Breakfast  carvedilol, 12 5 mg, Q12H  cholecalciferol, 1,000 Units, Daily  doxazosin, 2 mg, HS  famotidine, 10 mg, BID  ferrous gluconate, 325 mg, Daily  fish oil, 1,000 mg, Daily  fluticasone, 2 spray, Daily  heparin (porcine), 5,000 Units, Q8H KORI  hydrALAZINE, 50 mg, Q12H  insulin detemir, 15 Units, Q12H KORI  insulin lispro, 1-5 Units, TID AC  insulin lispro, 1-5 Units, HS  levothyroxine, 75 mcg, Early Morning  magnesium citrate, 296 mL, Once  magnesium oxide, 400 mg, Daily  methocarbamol, 500 mg, TID  montelukast, 10 mg, HS  multivitamin stress formula, 1 tablet, Daily  pantoprazole, 40 mg, Early Morning  polyethylene glycol, 17 g, Daily  senna-docusate sodium, 1 tablet, BID        PRN MEDS:  acetaminophen, 650 mg, Q6H PRN  albuterol, 2 puff, Q4H PRN  albuterol, 2 5 mg, Q6H PRN  bisacodyl, 10 mg, Daily PRN  hydrALAZINE, 5 mg, Q6H PRN  nitroglycerin, 0 4 mg, Q5 Min PRN        Last 24 Hours Medication List:   Current Facility-Administered Medications   Medication Dose Route Frequency Provider Last Rate    acetaminophen  650 mg Oral Q6H PRN Knute Bernheim, MD      albuterol  2 puff Inhalation Q4H PRN Familia Mckeon MD      albuterol  2 5 mg Nebulization Q6H PRN Familia Mckeon MD      allopurinol  100 mg Oral Daily Caprice Kalani Moody, DO      ascorbic acid  500 mg Oral Daily Elayne Jones, DO      aspirin  81 mg Oral Daily Elayne Jones, DO      bisacodyl  10 mg Rectal Daily PRN Jv Beltrán, DO      budesonide-formoterol  2 puff Inhalation BID Elayne Jones, DO      bumetanide  1 mg Oral Daily Syliva Jay, DO      calcium carbonate-vitamin D  1 tablet Oral Daily With Breakfast Elayne Jones, DO      carvedilol  12 5 mg Oral Q12H Elayne Jones       cholecalciferol  1,000 Units Oral Daily Caprice Robert, DO      doxazosin  2 mg Oral HS Caprice Robert, DO  famotidine  10 mg Oral BID Kp Budd, DO      ferrous gluconate  325 mg Oral Daily Capricderian Jones, DO      fish oil  1,000 mg Oral Daily Emmanuelricderian Jones, DO      fluticasone  2 spray Nasal Daily Capricderian Jones, DO      heparin (porcine)  5,000 Units Subcutaneous Q8H [address] Capricderian Jones, DO      hydrALAZINE  5 mg Intravenous Q6H PRN Emmanuelricderian Jones, DO      hydrALAZINE  50 mg Oral Q12H Capricderian Jones, DO      insulin detemir  15 Units Subcutaneous Q12H [address] Gavino Aiad, DO      insulin lispro  1-5 Units Subcutaneous TID [facility] Viktor Villar MD      insulin lispro  1-5 Units Subcutaneous HS Jimmy Alcazar, DO      levothyroxine  75 mcg Oral Early Morning Emmanuelricderian Jones, DO      magnesium citrate  296 mL Oral Once Shaan Thompson MD      magnesium oxide  400 mg Oral Daily Elayne Willis, DO      methocarbamol  500 mg Oral TID Kp Budd, DO      montelukast  10 mg Oral HS Elayne Jones, DO      multivitamin stress formula  1 tablet Oral Daily Elayne Jones, DO      nitroglycerin  0 4 mg Sublingual Q5 Min PRN Elayne Jones, DO      pantoprazole  40 mg Oral Early Morning Elayne Jones, DO      polyethylene glycol  17 g Oral Daily Shaan Thompson MD      senna-docusate sodium  1 tablet Oral BID Abbe Marques DO            Time Spent for Care: 30 mins spent in total   More than 50% of total time spent on counseling and coordination of care as described above  Current Length of Stay: 3 day(s)      Code Status: Level 1 - Full Code          ** Please Note: This note is constructed using a voice recognition dictation system   **

## 2022-07-01 NOTE — PLAN OF CARE
Problem: PAIN - ADULT  Goal: Verbalizes/displays adequate comfort level or baseline comfort level  Description: Interventions:  - Encourage patient to monitor pain and request assistance  - Assess pain using appropriate pain scale  - Administer analgesics based on type and severity of pain and evaluate response  - Implement non-pharmacological measures as appropriate and evaluate response  - Consider cultural and social influences on pain and pain management  - Notify physician/advanced practitioner if interventions unsuccessful or patient reports new pain  Outcome: Progressing     Problem: INFECTION - ADULT  Goal: Absence or prevention of progression during hospitalization  Description: INTERVENTIONS:  - Assess and monitor for signs and symptoms of infection  - Monitor lab/diagnostic results  - Monitor all insertion sites, i e  indwelling lines, tubes, and drains  - Monitor endotracheal if appropriate and nasal secretions for changes in amount and color  - [place] appropriate cooling/warming therapies per order  - Administer medications as ordered  - Instruct and encourage patient and family to use good hand hygiene technique  - Identify and instruct in appropriate isolation precautions for identified infection/condition  Outcome: Progressing     Problem: DISCHARGE PLANNING  Goal: Discharge to home or other facility with appropriate resources  Description: INTERVENTIONS:  - Identify barriers to discharge w/patient and caregiver  - Arrange for needed discharge resources and transportation as appropriate  - Identify discharge learning needs (meds, wound care, etc )  - Arrange for interpretive services to assist at discharge as needed  - Refer to Case Management Department for coordinating discharge planning if the patient needs post-hospital services based on physician/advanced practitioner order or complex needs related to functional status, cognitive ability, or social support system  Outcome: Progressing Problem: Knowledge Deficit  Goal: Patient/family/caregiver demonstrates understanding of disease process, treatment plan, medications, and discharge instructions  Description: Complete learning assessment and assess knowledge base  Interventions:  - Provide teaching at level of understanding  - Provide teaching via preferred learning methods  Outcome: Progressing     Problem: GASTROINTESTINAL - ADULT  Goal: Maintains or returns to baseline bowel function  Description: INTERVENTIONS:  - Assess bowel function  - Encourage oral fluids to ensure adequate hydration  - Administer IV fluids if ordered to ensure adequate hydration  - Administer ordered medications as needed  - Encourage mobilization and activity  - Consider nutritional services referral to assist patient with adequate nutrition and appropriate food choices  Outcome: Progressing  Goal: Maintains adequate nutritional intake  Description: INTERVENTIONS:  - Monitor percentage of each meal consumed  - Identify factors contributing to decreased intake, treat as appropriate  - Assist with meals as needed  - Monitor I&O, weight, and lab values if indicated  - Obtain nutrition services referral as needed  Outcome: Progressing     Problem: METABOLIC, FLUID AND ELECTROLYTES - ADULT  Goal: Electrolytes maintained within normal limits  Description: INTERVENTIONS:  - Monitor labs and assess patient for signs and symptoms of electrolyte imbalances  - Administer electrolyte replacement as ordered  - Monitor response to electrolyte replacements, including repeat lab results as appropriate  - Instruct patient on fluid and nutrition as appropriate  Outcome: Progressing     Problem: Nutrition/Hydration-ADULT  Goal: Nutrient/Hydration intake appropriate for improving, restoring or maintaining nutritional needs  Description: Monitor and assess patient's nutrition/hydration status for malnutrition   Collaborate with interdisciplinary team and initiate plan and interventions as ordered  Monitor patient's weight and dietary intake as ordered or per policy  Utilize nutrition screening tool and intervene as necessary  Determine patient's food preferences and provide high-protein, high-caloric foods as appropriate       INTERVENTIONS:  - Monitor oral intake, urinary output, labs, and treatment plans  - Assess nutrition and hydration status and recommend course of action  - Evaluate amount of meals eaten  - Assist patient with eating if necessary   - Allow adequate time for meals  - Recommend/ encourage appropriate diets, oral nutritional supplements, and vitamin/mineral supplements  - Order, calculate, and assess calorie counts as needed  - Recommend, monitor, and adjust tube feedings and TPN/PPN based on assessed needs  - Assess need for intravenous fluids  - Provide specific nutrition/hydration education as appropriate  - Include patient/family/caregiver in decisions related to nutrition  Outcome: Progressing     Problem: Prexisting or High Potential for Compromised Skin Integrity  Goal: Skin integrity is maintained or improved  Description: INTERVENTIONS:  - Identify patients at risk for skin breakdown  - Assess and monitor skin integrity  - Assess and monitor nutrition and hydration status  - Monitor labs   - Assess for incontinence   - Turn and reposition patient  - Assist with mobility/ambulation  - Relieve pressure over bony prominences  - Avoid friction and shearing  - Provide appropriate hygiene as needed including keeping skin clean and dry  - Evaluate need for skin moisturizer/barrier cream  - Collaborate with interdisciplinary team   - Patient/family teaching  - Consider wound care consult   Outcome: Progressing

## 2022-07-02 ENCOUNTER — APPOINTMENT (INPATIENT)
Dept: RADIOLOGY | Facility: HOSPITAL | Age: 77
DRG: 948 | End: 2022-07-02
Payer: COMMERCIAL

## 2022-07-02 LAB
ALBUMIN SERPL BCP-MCNC: 2.1 G/DL (ref 3.5–5)
ALP SERPL-CCNC: 200 U/L (ref 46–116)
ALT SERPL W P-5'-P-CCNC: 150 U/L (ref 12–78)
ANION GAP SERPL CALCULATED.3IONS-SCNC: 4 MMOL/L (ref 4–13)
AST SERPL W P-5'-P-CCNC: 114 U/L (ref 5–45)
BACTERIA BLD CULT: NORMAL
BACTERIA BLD CULT: NORMAL
BASOPHILS # BLD AUTO: 0.01 THOUSANDS/ΜL (ref 0–0.1)
BASOPHILS NFR BLD AUTO: 0 % (ref 0–1)
BILIRUB SERPL-MCNC: 2.91 MG/DL (ref 0.2–1)
BUN SERPL-MCNC: 56 MG/DL (ref 5–25)
CALCIUM ALBUM COR SERPL-MCNC: 10.2 MG/DL (ref 8.3–10.1)
CALCIUM SERPL-MCNC: 8.7 MG/DL (ref 8.3–10.1)
CHLORIDE SERPL-SCNC: 117 MMOL/L (ref 100–108)
CO2 SERPL-SCNC: 24 MMOL/L (ref 21–32)
CREAT SERPL-MCNC: 1.56 MG/DL (ref 0.6–1.3)
EOSINOPHIL # BLD AUTO: 0.2 THOUSAND/ΜL (ref 0–0.61)
EOSINOPHIL NFR BLD AUTO: 4 % (ref 0–6)
ERYTHROCYTE [DISTWIDTH] IN BLOOD BY AUTOMATED COUNT: 14.9 % (ref 11.6–15.1)
GFR SERPL CREATININE-BSD FRML MDRD: 31 ML/MIN/1.73SQ M
GLUCOSE SERPL-MCNC: 104 MG/DL (ref 65–140)
GLUCOSE SERPL-MCNC: 280 MG/DL (ref 65–140)
GLUCOSE SERPL-MCNC: 301 MG/DL (ref 65–140)
GLUCOSE SERPL-MCNC: 92 MG/DL (ref 65–140)
HCT VFR BLD AUTO: 24.7 % (ref 34.8–46.1)
HGB BLD-MCNC: 8.1 G/DL (ref 11.5–15.4)
IGG SERPL-MCNC: 706 MG/DL (ref 586–1602)
IGG1 SER-MCNC: 410 MG/DL (ref 248–810)
IGG2 SER-MCNC: 123 MG/DL (ref 130–555)
IGG3 SER-MCNC: 35 MG/DL (ref 15–102)
IGG4 SER-MCNC: 3 MG/DL (ref 2–96)
IMM GRANULOCYTES # BLD AUTO: 0.04 THOUSAND/UL (ref 0–0.2)
IMM GRANULOCYTES NFR BLD AUTO: 1 % (ref 0–2)
LYMPHOCYTES # BLD AUTO: 0.32 THOUSANDS/ΜL (ref 0.6–4.47)
LYMPHOCYTES NFR BLD AUTO: 6 % (ref 14–44)
MAGNESIUM SERPL-MCNC: 2.5 MG/DL (ref 1.6–2.6)
MCH RBC QN AUTO: 30.5 PG (ref 26.8–34.3)
MCHC RBC AUTO-ENTMCNC: 32.8 G/DL (ref 31.4–37.4)
MCV RBC AUTO: 93 FL (ref 82–98)
MONOCYTES # BLD AUTO: 0.29 THOUSAND/ΜL (ref 0.17–1.22)
MONOCYTES NFR BLD AUTO: 6 % (ref 4–12)
NEUTROPHILS # BLD AUTO: 4.42 THOUSANDS/ΜL (ref 1.85–7.62)
NEUTS SEG NFR BLD AUTO: 83 % (ref 43–75)
NRBC BLD AUTO-RTO: 0 /100 WBCS
PLATELET # BLD AUTO: 161 THOUSANDS/UL (ref 149–390)
PMV BLD AUTO: 11.8 FL (ref 8.9–12.7)
POTASSIUM SERPL-SCNC: 3.7 MMOL/L (ref 3.5–5.3)
PROT SERPL-MCNC: 5.2 G/DL (ref 6.4–8.2)
RBC # BLD AUTO: 2.66 MILLION/UL (ref 3.81–5.12)
SODIUM SERPL-SCNC: 145 MMOL/L (ref 136–145)
WBC # BLD AUTO: 5.28 THOUSAND/UL (ref 4.31–10.16)

## 2022-07-02 PROCEDURE — 74183 MRI ABD W/O CNTR FLWD CNTR: CPT

## 2022-07-02 PROCEDURE — 99232 SBSQ HOSP IP/OBS MODERATE 35: CPT | Performed by: INTERNAL MEDICINE

## 2022-07-02 PROCEDURE — 82948 REAGENT STRIP/BLOOD GLUCOSE: CPT

## 2022-07-02 PROCEDURE — 94760 N-INVAS EAR/PLS OXIMETRY 1: CPT

## 2022-07-02 PROCEDURE — A9585 GADOBUTROL INJECTION: HCPCS | Performed by: INTERNAL MEDICINE

## 2022-07-02 PROCEDURE — 85025 COMPLETE CBC W/AUTO DIFF WBC: CPT

## 2022-07-02 PROCEDURE — 80053 COMPREHEN METABOLIC PANEL: CPT

## 2022-07-02 PROCEDURE — 83735 ASSAY OF MAGNESIUM: CPT | Performed by: STUDENT IN AN ORGANIZED HEALTH CARE EDUCATION/TRAINING PROGRAM

## 2022-07-02 RX ORDER — LOSARTAN POTASSIUM 50 MG/1
100 TABLET ORAL DAILY
Status: DISCONTINUED | OUTPATIENT
Start: 2022-07-02 | End: 2022-07-03 | Stop reason: HOSPADM

## 2022-07-02 RX ORDER — TRAMADOL HYDROCHLORIDE 50 MG/1
50 TABLET ORAL EVERY 12 HOURS PRN
Status: DISCONTINUED | OUTPATIENT
Start: 2022-07-02 | End: 2022-07-03 | Stop reason: HOSPADM

## 2022-07-02 RX ADMIN — ASPIRIN 81 MG: 81 TABLET, COATED ORAL at 10:32

## 2022-07-02 RX ADMIN — BUMETANIDE 1 MG: 1 TABLET ORAL at 10:31

## 2022-07-02 RX ADMIN — METHOCARBAMOL 500 MG: 500 TABLET, FILM COATED ORAL at 17:00

## 2022-07-02 RX ADMIN — DICYCLOMINE HYDROCHLORIDE 10 MG: 10 CAPSULE ORAL at 05:35

## 2022-07-02 RX ADMIN — FERROUS GLUCONATE 324 MG: 324 TABLET ORAL at 10:33

## 2022-07-02 RX ADMIN — INSULIN DETEMIR 10 UNITS: 100 INJECTION, SOLUTION SUBCUTANEOUS at 10:46

## 2022-07-02 RX ADMIN — DICYCLOMINE HYDROCHLORIDE 10 MG: 10 CAPSULE ORAL at 21:11

## 2022-07-02 RX ADMIN — MONTELUKAST 10 MG: 10 TABLET, FILM COATED ORAL at 21:11

## 2022-07-02 RX ADMIN — Medication 1 TABLET: at 10:31

## 2022-07-02 RX ADMIN — CARVEDILOL 12.5 MG: 12.5 TABLET, FILM COATED ORAL at 21:11

## 2022-07-02 RX ADMIN — LEVOTHYROXINE SODIUM 75 MCG: 75 TABLET ORAL at 05:35

## 2022-07-02 RX ADMIN — DICYCLOMINE HYDROCHLORIDE 10 MG: 10 CAPSULE ORAL at 10:31

## 2022-07-02 RX ADMIN — BUDESONIDE AND FORMOTEROL FUMARATE DIHYDRATE 2 PUFF: 160; 4.5 AEROSOL RESPIRATORY (INHALATION) at 18:09

## 2022-07-02 RX ADMIN — LOSARTAN POTASSIUM 100 MG: 50 TABLET, FILM COATED ORAL at 10:46

## 2022-07-02 RX ADMIN — GADOBUTROL 7 ML: 604.72 INJECTION INTRAVENOUS at 11:18

## 2022-07-02 RX ADMIN — CARVEDILOL 12.5 MG: 12.5 TABLET, FILM COATED ORAL at 10:31

## 2022-07-02 RX ADMIN — FAMOTIDINE 10 MG: 20 TABLET ORAL at 10:32

## 2022-07-02 RX ADMIN — INSULIN LISPRO 3 UNITS: 100 INJECTION, SOLUTION INTRAVENOUS; SUBCUTANEOUS at 17:30

## 2022-07-02 RX ADMIN — INSULIN LISPRO 3 UNITS: 100 INJECTION, SOLUTION INTRAVENOUS; SUBCUTANEOUS at 21:11

## 2022-07-02 RX ADMIN — METHOCARBAMOL 500 MG: 500 TABLET, FILM COATED ORAL at 10:50

## 2022-07-02 RX ADMIN — B-COMPLEX W/ C & FOLIC ACID TAB 1 TABLET: TAB at 10:33

## 2022-07-02 RX ADMIN — HEPARIN SODIUM 5000 UNITS: 5000 INJECTION INTRAVENOUS; SUBCUTANEOUS at 21:12

## 2022-07-02 RX ADMIN — SENNOSIDES AND DOCUSATE SODIUM 1 TABLET: 8.6; 5 TABLET ORAL at 18:08

## 2022-07-02 RX ADMIN — FAMOTIDINE 10 MG: 20 TABLET ORAL at 18:07

## 2022-07-02 RX ADMIN — INSULIN DETEMIR 10 UNITS: 100 INJECTION, SOLUTION SUBCUTANEOUS at 21:11

## 2022-07-02 RX ADMIN — DOXAZOSIN 2 MG: 2 TABLET ORAL at 21:11

## 2022-07-02 RX ADMIN — OXYCODONE HYDROCHLORIDE AND ACETAMINOPHEN 500 MG: 500 TABLET ORAL at 10:31

## 2022-07-02 RX ADMIN — SENNOSIDES AND DOCUSATE SODIUM 1 TABLET: 8.6; 5 TABLET ORAL at 10:31

## 2022-07-02 RX ADMIN — ALLOPURINOL 100 MG: 100 TABLET ORAL at 10:32

## 2022-07-02 RX ADMIN — HEPARIN SODIUM 5000 UNITS: 5000 INJECTION INTRAVENOUS; SUBCUTANEOUS at 15:00

## 2022-07-02 RX ADMIN — Medication 1000 UNITS: at 10:31

## 2022-07-02 RX ADMIN — HEPARIN SODIUM 5000 UNITS: 5000 INJECTION INTRAVENOUS; SUBCUTANEOUS at 05:35

## 2022-07-02 RX ADMIN — MAGNESIUM OXIDE TAB 400 MG (241.3 MG ELEMENTAL MG) 400 MG: 400 (241.3 MG) TAB at 10:31

## 2022-07-02 RX ADMIN — OMEGA-3 FATTY ACIDS CAP 1000 MG 1000 MG: 1000 CAP at 10:31

## 2022-07-02 RX ADMIN — DICYCLOMINE HYDROCHLORIDE 10 MG: 10 CAPSULE ORAL at 18:07

## 2022-07-02 RX ADMIN — TRAMADOL HYDROCHLORIDE 50 MG: 50 TABLET, COATED ORAL at 17:40

## 2022-07-02 RX ADMIN — PANTOPRAZOLE SODIUM 40 MG: 40 TABLET, DELAYED RELEASE ORAL at 05:35

## 2022-07-02 RX ADMIN — METHOCARBAMOL 500 MG: 500 TABLET, FILM COATED ORAL at 21:11

## 2022-07-02 RX ADMIN — ACETAMINOPHEN 650 MG: 325 TABLET, FILM COATED ORAL at 10:32

## 2022-07-02 NOTE — PLAN OF CARE
Problem: PAIN - ADULT  Goal: Verbalizes/displays adequate comfort level or baseline comfort level  Description: Interventions:  - Encourage patient to monitor pain and request assistance  - Assess pain using appropriate pain scale  - Administer analgesics based on type and severity of pain and evaluate response  - Implement non-pharmacological measures as appropriate and evaluate response  - Consider cultural and social influences on pain and pain management  - Notify physician/advanced practitioner if interventions unsuccessful or patient reports new pain  Outcome: Progressing     Problem: INFECTION - ADULT  Goal: Absence or prevention of progression during hospitalization  Description: INTERVENTIONS:  - Assess and monitor for signs and symptoms of infection  - Monitor lab/diagnostic results  - Monitor all insertion sites, i e  indwelling lines, tubes, and drains  - Monitor endotracheal if appropriate and nasal secretions for changes in amount and color  - [place] appropriate cooling/warming therapies per order  - Administer medications as ordered  - Instruct and encourage patient and family to use good hand hygiene technique  - Identify and instruct in appropriate isolation precautions for identified infection/condition  Outcome: Progressing     Problem: DISCHARGE PLANNING  Goal: Discharge to home or other facility with appropriate resources  Description: INTERVENTIONS:  - Identify barriers to discharge w/patient and caregiver  - Arrange for needed discharge resources and transportation as appropriate  - Identify discharge learning needs (meds, wound care, etc )  - Arrange for interpretive services to assist at discharge as needed  - Refer to Case Management Department for coordinating discharge planning if the patient needs post-hospital services based on physician/advanced practitioner order or complex needs related to functional status, cognitive ability, or social support system  Outcome: Progressing Problem: Knowledge Deficit  Goal: Patient/family/caregiver demonstrates understanding of disease process, treatment plan, medications, and discharge instructions  Description: Complete learning assessment and assess knowledge base  Interventions:  - Provide teaching at level of understanding  - Provide teaching via preferred learning methods  Outcome: Progressing     Problem: GASTROINTESTINAL - ADULT  Goal: Maintains or returns to baseline bowel function  Description: INTERVENTIONS:  - Assess bowel function  - Encourage oral fluids to ensure adequate hydration  - Administer IV fluids if ordered to ensure adequate hydration  - Administer ordered medications as needed  - Encourage mobilization and activity  - Consider nutritional services referral to assist patient with adequate nutrition and appropriate food choices  Outcome: Progressing  Goal: Maintains adequate nutritional intake  Description: INTERVENTIONS:  - Monitor percentage of each meal consumed  - Identify factors contributing to decreased intake, treat as appropriate  - Assist with meals as needed  - Monitor I&O, weight, and lab values if indicated  - Obtain nutrition services referral as needed  Outcome: Progressing     Problem: METABOLIC, FLUID AND ELECTROLYTES - ADULT  Goal: Electrolytes maintained within normal limits  Description: INTERVENTIONS:  - Monitor labs and assess patient for signs and symptoms of electrolyte imbalances  - Administer electrolyte replacement as ordered  - Monitor response to electrolyte replacements, including repeat lab results as appropriate  - Instruct patient on fluid and nutrition as appropriate  Outcome: Progressing     Problem: Nutrition/Hydration-ADULT  Goal: Nutrient/Hydration intake appropriate for improving, restoring or maintaining nutritional needs  Description: Monitor and assess patient's nutrition/hydration status for malnutrition   Collaborate with interdisciplinary team and initiate plan and interventions as ordered  Monitor patient's weight and dietary intake as ordered or per policy  Utilize nutrition screening tool and intervene as necessary  Determine patient's food preferences and provide high-protein, high-caloric foods as appropriate       INTERVENTIONS:  - Monitor oral intake, urinary output, labs, and treatment plans  - Assess nutrition and hydration status and recommend course of action  - Evaluate amount of meals eaten  - Assist patient with eating if necessary   - Allow adequate time for meals  - Recommend/ encourage appropriate diets, oral nutritional supplements, and vitamin/mineral supplements  - Order, calculate, and assess calorie counts as needed  - Recommend, monitor, and adjust tube feedings and TPN/PPN based on assessed needs  - Assess need for intravenous fluids  - Provide specific nutrition/hydration education as appropriate  - Include patient/family/caregiver in decisions related to nutrition  Outcome: Progressing

## 2022-07-02 NOTE — QUICK NOTE
Met with the patient's son at the bedside, deandre expressed his unsatisfactory in that no final diagnosis available yet to explain the patient's symptoms, updated on the current assessment and plan pending workup as well as scheduled MRI, and GI team following, unfortunately there is some misunderstanding and he expressed his thoughts that the providers prolonging the diagnostic process "to make money" , his concern addressed appropriately with assurance that workup and diagnostic labs and imaging in process and some results takes time to come back, was asked to provide his phone number so my attending or GI team contact him directly to address any further questions and give him update tomorrow but he refused, also he was very focused on the possibility that hydralazine which was started few weeks ago per him for the patient's blood pressure could be causing all her symptoms and asked repeatedly if no diagnosis made tomorrow he wants us to trial off/hold hydralazine, explained to him while that is very less likely, but his wish will be respected and passed to the morning team for further discussion

## 2022-07-02 NOTE — PROGRESS NOTES
INTERNAL MEDICINE RESIDENCY PROGRESS NOTE     Name: Venkat Ramirez   Age & Sex: 64 y o  female   MRN: [id number]  Unit/Bed#: [facility] 819-01   Encounter: [id number]  Team: SOD Team C     PATIENT INFORMATION     Name: Venkat Ramirez   Age & Sex: 64 y o  female   MRN: [id number]  Hospital Stay Days: 4    ASSESSMENT/PLAN     Principal Problem:    Elevated LFTs  Active Problems:    Hypertension    Mixed hyperlipidemia    Type 2 diabetes mellitus with stage 3 chronic kidney disease, with long-term current use of insulin (HCC)    CKD (chronic kidney disease) stage 3, GFR 30-59 ml/min (HCC)    Low back pain    Continuous opioid dependence (HCC)    Gastroesophageal reflux disease    CINDY (acute kidney injury) ([facility] [address] )    Pancytopenia (HCC)    Shortness of breath    Dysuria      * Elevated LFTs  Assessment & Plan  Presenting with 4-5 days diffuse abdominal pain, worse after eating, with constipation  Patient able to pass small BM on 6/27 am and was given prune juice on 6/28, and was able to pass loose/watery stools after  Had 2 bowel movements 6/30  Current temp at 99 9 with TMax 100  Patient pending MRI/MRCP  Labs:  · , down from 128, , was 165 yesterday, alk phos 158, up from 124  lipase and lactic WNL  · Total bili up to 3 25, from 2 54 yesterday  · Acute hepatitis panel negative  · EBV showing positive IgG but no IgM - old infection/vaccination  · CMV showing positive IgG but no IgM- old infection/vaccination  · Ceruloplasmin 30 6  · Anti-smooth muscle antibodies normal  · Anti-mitochondrial antibodies normal  · MARIZA negative  · BCx NG    Imaging:  · Developing mild pancytopenia, with Hgb down to 8 4, down from 9 0 on 6/29  · CTA unremarkable for mesenteric ischemia, cholecystitis, or constipation  · CTabd/pelvis from 6/25 showed possible gastroenteritis vs developing SBO  · CTabd/pelvis 6/26 showed no evidence of acute intra-abdominal or pelvic pathology    · US RUQ 6/27 unremarkable, except for mild gallbladder wall thickening without cholelithiasis and a partially visualized small pericardial effusion  · HIDA scan unremarkable, negative for acalculous cholecystitis  · EGD showed mild erythematous mucosa in the stomach  Normal esophagus, normal 1st and 2nd portion of duodenum  The esophagus appeared normal  Biopsy of the stomach, incisura and antrum done to R/O H  Pylori  Biopsies of 1st and 2nd part of duodenum taken to R/O celiac disease  · MRCP showed small bilateral pleural effusions, trace ascites and mild body wall edema  Otherwise unremarkable examination without choledocholithiasis or biliary ductal dilation  Plan:  - hepatocellular pattern LFT elevation, along with fever and mild pancytopenia  Suspect viral etiology    - Start soft diet  - Holding IVF  - bowel regimen of senna glycoside and docusate, scheduled miralax, PRN dulcalax  - GI consulted, appreciate recommendations  - Ceftriaxone and flagyl discontinued with urine cultures showing E coli  - Waiting results of IgG 1,2,3, and 4      Hypertension  Assessment & Plan  Blood pressure ranging from 028-474 systolic, 37-42 diastolic over interval period      - Continue home coreg 12 5 mg q12 hr, cardura 2 mg HS,   - Stopped hydralazine 50 mg q12h (per patient's son request) and Procardia XL 60 mg qd  - Given work of breathing and edema, restarted on Bumex 2mg PO daily, will follow BMPs  - Restarted home Losartan 100mg daily as kidney function stable  - PRN hydralazine for SBP >180    Dysuria  Assessment & Plan  Patient endorses 2 days of dysuria and feeling of incomplete bladder emptying  Urine appears cloudy and has a foul-smelling odor  Dysuria has improved  UA 1+ protein, positive for leukocytes, trace glucose, and small amount of blood  10-20 RBC/hpf and innumerable WBCs with hyaline casts  Urine culture grew >100,000 Gram negative rods, further speciated to E coli that is pan-sensitive       -D/C Flagyl, completed CTX course    Shortness of breath  Assessment & Plan  Patient began having SOB overnight on 6/29 and felt increasing wheezing while laying flat  Patient has been on 2 L nasal cannula since  SpO2 97% this morning  Patient also initially given 2 mg Bumex due to apparent fluid-overloaded state  Transitioned to 1mg Bumex due to renal function  Echo normal     Chest X-ray showed mild pulmonary venous congestion with trace effusions     -Continue respiratory protocol with Xopenex nebulizer PRN  -Monitor O2        Pancytopenia ([facility])  Assessment & Plan  Baseline CBC WNL  Likely 2/2 acute viral illness  Follow with daily CBC    [facility]  Assessment & Plan  Pre-renal CINDY on CKD 2/2 dehydration and decreased p o  intake  Baseline Cr around 1-1 4, 1 91 on admission  Creatinine at 1 56 today (down trending)  Started patient on 2 mg bumex 6/29, then transitioned to 1 mg bumex due to renal function     -Holding IVF at this time  -Trend BMP  -Replete to keep K>4  -Monitor I&O and daily weights  -Restarted home losartan      Gastroesophageal reflux disease  Assessment & Plan  Continue pepcid 10 mg bid and protonix 40    Continuous [facility] dependence [facility]  Assessment & Plan  On tramadol outpatient for chronic back pain    Low back pain  Assessment & Plan  Chronic, on tramadol and robaxin daily  Restart PRN tramadol tabs  Pain currently controlled  CKD (chronic kidney disease) stage 3, GFR 30-59 ml/min ([facility])  Assessment & Plan  In setting of T2DM and HTN  See plans for DM, HTN, and CINDY    Type 2 diabetes mellitus with stage 3 chronic kidney disease, with long-term current use of insulin ([facility])  Assessment & Plan  A1c 6 1%, symptomatic hypoglycemia 7/1 AM with glucose reading of 51  Glucose increased to 98 after given some food  Detemir subsequently decreased to 10U    Outpt regimen: detemir 40 units BID; novolin 15 units bid meals (lunch and dinner)    Plan:  - Soft diet     - Continue Detemir 10 units, Q12h, with sliding scale  - Monitor glucose levels if too low due to poor PO intake, adjust detemir dose   - goal -180 inpt    Mixed hyperlipidemia  Assessment & Plan  Continue lipitor and Tricor      Disposition: pending GI evaluation with MRI/MRCP     SUBJECTIVE     Patient seen and examined  No acute events overnight  Patient reports exacerbation of chronic back pain will sitting in bedside chair  Otherwise denies abdominal pain, nausea/vomiting  Does say stools are loose but still somewhat formed (from what she can tell, she does not look)  OBJECTIVE     Vitals:    22 1445 22 2230 22 0801 22 1522   BP: 136/57 146/53 144/51 146/52   Pulse: 79 80 72 78   Resp: 20 18     Temp: 98 5 °F (36 9 °C) 99 3 °F (37 4 °C) 98 °F (36 7 °C) 98 2 °F (36 8 °C)   TempSrc:       SpO2: 97% 93% 96% 95%   Weight:       Height:          Temperature:   Temp (24hrs), Av 5 °F (36 9 °C), Min:98 °F (36 7 °C), Max:99 3 °F (37 4 °C)    Temperature: 98 2 °F (36 8 °C)  Intake & Output:  I/O        07 0700  07 07 0701   0700    P  O  380 177     I V  (mL/kg) 200 (2 8)      IV Piggyback  90     Total Intake(mL/kg) 580 (8 3) 267 (3 8)     Urine (mL/kg/hr) 300 (0 2) 1100 (0 7)     Stool 0 0     Total Output 300 1100     Net +280 -833            Unmeasured Urine Occurrence 2 x      Unmeasured Stool Occurrence 2 x 1 x         Weights:        Body mass index is 30 23 kg/m²  Weight (last 2 days)     Date/Time Weight    22 0312 70 2 (154 76)    22 0533 72 3 (159 4)        Physical Exam  Vitals and nursing note reviewed  Constitutional:       General: She is not in acute distress  Appearance: Normal appearance  She is well-developed  HENT:      Head: Normocephalic and atraumatic  Eyes:      Conjunctiva/sclera: Conjunctivae normal    Cardiovascular:      Rate and Rhythm: Normal rate and regular rhythm        Heart sounds: S1 normal and S2 normal  No murmur heard  Pulmonary:      Effort: Pulmonary effort is normal  No respiratory distress  Breath sounds: Normal breath sounds  Abdominal:      General: Bowel sounds are normal  There is no distension  Palpations: Abdomen is soft  Tenderness: There is no abdominal tenderness  Musculoskeletal:      Cervical back: Neck supple  Right lower leg: No edema  Left lower leg: No edema  Skin:     General: Skin is warm and dry  Neurological:      Mental Status: She is alert and oriented to person, place, and time  Psychiatric:         Mood and Affect: Mood normal          Thought Content: Thought content normal        LABORATORY DATA     Labs: I have personally reviewed pertinent reports  Results from last 7 days   Lab Units 07/02/22  0515 07/01/22  0519 06/30/22  0909   WBC Thousand/uL 5 28 6 63 5 20   HEMOGLOBIN g/dL 8 1* 8 6* 8 4*   HEMATOCRIT % 24 7* 26 2* 25 8*   PLATELETS Thousands/uL 161 155 108*   NEUTROS PCT % 83* 88* 80*   MONOS PCT % 6 5 7      Results from last 7 days   Lab Units 07/02/22  0500 07/01/22  0519 06/30/22  0909   POTASSIUM mmol/L 3 7 3 1* 3 5   CHLORIDE mmol/L 117* 111* 112*   CO2 mmol/L 24 24 23   BUN mg/dL 56* 49* 55*   CREATININE mg/dL 1 56* 1 67* 1 95*   CALCIUM mg/dL 8 7 8 6 8 5   ALK PHOS U/L 200* 158* 124*   ALT U/L 150* 166* 165*   AST U/L 114* 125* 128*     Results from last 7 days   Lab Units 07/02/22  0500   MAGNESIUM mg/dL 2 5          Results from last 7 days   Lab Units 07/01/22  0519 06/29/22  0908 06/27/22  0840 06/25/22  1842   INR  1 23* 1 23* 1 31* 1 10   PTT seconds  --   --   --  26     Results from last 7 days   Lab Units 06/26/22  2352   LACTIC ACID mmol/L 0 8           IMAGING & DIAGNOSTIC TESTING     Radiology Results: I have personally reviewed pertinent reports  CT abdomen pelvis wo contrast    Result Date: 6/26/2022  Impression: No evidence of acute intra-abdominal or pelvic pathology   Findings suggesting cellulitis versus chronic injections throughout the anterior abdominal wall  Workstation performed: [id number]     XR chest portable    Result Date: 6/29/2022  Impression: Mild pulmonary venous congestion with trace effusions  Workstation performed: [id number]     CTA abdomen pelvis w wo contrast    Result Date: 6/27/2022  Impression: Unremarkable CT arteriography of the abdomen and pelvis  No evidence of acute pathology throughout the abdomen or pelvis Workstation performed: [id number]     NM hepatobiliary    Result Date: 6/28/2022  Impression: There is visualization of the gallbladder indicating a patent cystic duct  Workstation performed: [id number]     US right upper quadrant    Result Date: 6/27/2022  Impression: Mild gallbladder wall thickening without cholelithiasis is nonspecific  It may be sequela of trace perihepatic free fluid  If there is clinical concern for acalculous cholecystitis, consider follow-up with a HIDA scan if it would alter patient management  Partially visualized small pericardial effusion  Remainder of the examination is normal  Workstation performed: [id number]     Other Diagnostic Testing: I have personally reviewed pertinent reports      ACTIVE MEDICATIONS     Current Facility-Administered Medications   Medication Dose Route Frequency    acetaminophen (TYLENOL) tablet 650 mg  650 mg Oral Q6H PRN    albuterol (PROVENTIL HFA,VENTOLIN HFA) inhaler 2 puff  2 puff Inhalation Q4H PRN    albuterol inhalation solution 2 5 mg  2 5 mg Nebulization Q6H PRN    allopurinol (ZYLOPRIM) tablet 100 mg  100 mg Oral Daily    ascorbic acid (VITAMIN C) tablet 500 mg  500 mg Oral Daily    aspirin (ECOTRIN LOW STRENGTH) EC tablet 81 mg  81 mg Oral Daily    bisacodyl (DULCOLAX) rectal suppository 10 mg  10 mg Rectal Daily PRN    budesonide-formoterol (SYMBICORT) 160-4 5 mcg/act inhaler 2 puff  2 puff Inhalation BID    bumetanide (BUMEX) tablet 1 mg  1 mg Oral Daily    calcium carbonate-vitamin D (OSCAL-D) 500 mg-200 units per tablet 1 tablet  1 tablet Oral Daily With Breakfast    carvedilol (COREG) tablet 12 5 mg  12 5 mg Oral Q12H    cholecalciferol (VITAMIN D3) tablet 1,000 Units  1,000 Units Oral Daily    dicyclomine (BENTYL) capsule 10 mg  10 mg Oral 4x Daily (AC & HS)    doxazosin (CARDURA) tablet 2 mg  2 mg Oral HS    famotidine (PEPCID) tablet 10 mg  10 mg Oral BID    ferrous gluconate (FERGON) tablet 325 mg  325 mg Oral Daily    fish oil capsule 1,000 mg  1,000 mg Oral Daily    fluticasone (FLONASE) 50 mcg/act nasal spray 2 spray  2 spray Nasal Daily    heparin (porcine) subcutaneous injection 5,000 Units  5,000 Units Subcutaneous Q8H [facility]    hydrALAZINE (APRESOLINE) injection 5 mg  5 mg Intravenous Q6H PRN    insulin detemir (LEVEMIR) subcutaneous injection 10 Units  10 Units Subcutaneous Q12H KORI    insulin lispro (HumaLOG) 100 units/mL subcutaneous injection 1-5 Units  1-5 Units Subcutaneous TID AC    insulin lispro (HumaLOG) 100 units/mL subcutaneous injection 1-5 Units  1-5 Units Subcutaneous HS    levothyroxine tablet 75 mcg  75 mcg Oral Early Morning    losartan (COZAAR) tablet 100 mg  100 mg Oral Daily    magnesium citrate (CITROMA) oral solution 296 mL  296 mL Oral Once    magnesium oxide (MAG-OX) tablet 400 mg  400 mg Oral Daily    methocarbamol (ROBAXIN) tablet 500 mg  500 mg Oral TID    montelukast (SINGULAIR) tablet 10 mg  10 mg Oral HS    multivitamin stress formula tablet 1 tablet  1 tablet Oral Daily    nitroglycerin (NITROSTAT) SL tablet 0 4 mg  0 4 mg Sublingual Q5 Min PRN    pantoprazole (PROTONIX) EC tablet 40 mg  40 mg Oral Early Morning    polyethylene glycol (MIRALAX) packet 17 g  17 g Oral Daily    senna-docusate sodium (SENOKOT S) 8 6-50 mg per tablet 1 tablet  1 tablet Oral BID    traMADol (ULTRAM) tablet 50 mg  50 mg Oral Q12H PRN       VTE Pharmacologic Prophylaxis: Heparin  VTE Mechanical Prophylaxis: sequential compression device    Portions of the record may have been created with voice recognition software  Occasional wrong word or "sound a like" substitutions may have occurred due to the inherent limitations of voice recognition software    Read the chart carefully and recognize, using context, where substitutions have occurred   ==  Carisa Swan, [address] [facility]  Internal Medicine Residency PGY-2

## 2022-07-03 VITALS
DIASTOLIC BLOOD PRESSURE: 56 MMHG | HEART RATE: 71 BPM | RESPIRATION RATE: 24 BRPM | HEIGHT: 60 IN | WEIGHT: 154.76 LBS | BODY MASS INDEX: 30.38 KG/M2 | OXYGEN SATURATION: 94 % | TEMPERATURE: 98.5 F | SYSTOLIC BLOOD PRESSURE: 156 MMHG

## 2022-07-03 PROBLEM — D64.9 ANEMIA: Status: ACTIVE | Noted: 2022-07-03

## 2022-07-03 PROBLEM — E56.9 VITAMIN DEFICIENCY: Status: ACTIVE | Noted: 2022-07-03

## 2022-07-03 LAB
ALBUMIN SERPL BCP-MCNC: 2.2 G/DL (ref 3.5–5)
ALP SERPL-CCNC: 279 U/L (ref 46–116)
ALT SERPL W P-5'-P-CCNC: 168 U/L (ref 12–78)
ANION GAP SERPL CALCULATED.3IONS-SCNC: 6 MMOL/L (ref 4–13)
AST SERPL W P-5'-P-CCNC: 122 U/L (ref 5–45)
BILIRUB SERPL-MCNC: 3.01 MG/DL (ref 0.2–1)
BUN SERPL-MCNC: 49 MG/DL (ref 5–25)
CALCIUM ALBUM COR SERPL-MCNC: 10.1 MG/DL (ref 8.3–10.1)
CALCIUM SERPL-MCNC: 8.7 MG/DL (ref 8.3–10.1)
CHLORIDE SERPL-SCNC: 114 MMOL/L (ref 100–108)
CO2 SERPL-SCNC: 24 MMOL/L (ref 21–32)
CREAT SERPL-MCNC: 1.32 MG/DL (ref 0.6–1.3)
ERYTHROCYTE [DISTWIDTH] IN BLOOD BY AUTOMATED COUNT: 15.2 % (ref 11.6–15.1)
FOLATE SERPL-MCNC: >20 NG/ML (ref 3.1–17.5)
GFR SERPL CREATININE-BSD FRML MDRD: 38 ML/MIN/1.73SQ M
GLUCOSE SERPL-MCNC: 209 MG/DL (ref 65–140)
GLUCOSE SERPL-MCNC: 261 MG/DL (ref 65–140)
GLUCOSE SERPL-MCNC: 293 MG/DL (ref 65–140)
HCT VFR BLD AUTO: 25 % (ref 34.8–46.1)
HGB BLD-MCNC: 8.1 G/DL (ref 11.5–15.4)
LDH SERPL-CCNC: 223 U/L (ref 81–234)
MCH RBC QN AUTO: 29.9 PG (ref 26.8–34.3)
MCHC RBC AUTO-ENTMCNC: 32.4 G/DL (ref 31.4–37.4)
MCV RBC AUTO: 92 FL (ref 82–98)
PLATELET # BLD AUTO: 199 THOUSANDS/UL (ref 149–390)
PMV BLD AUTO: 12.1 FL (ref 8.9–12.7)
POTASSIUM SERPL-SCNC: 3.7 MMOL/L (ref 3.5–5.3)
PROT SERPL-MCNC: 5.3 G/DL (ref 6.4–8.2)
RBC # BLD AUTO: 2.71 MILLION/UL (ref 3.81–5.12)
RETICS # AUTO: NORMAL 10*3/UL (ref 14097–95744)
RETICS # CALC: 1.81 % (ref 0.37–1.87)
SODIUM SERPL-SCNC: 144 MMOL/L (ref 136–145)
TSH SERPL DL<=0.05 MIU/L-ACNC: 1.42 UIU/ML (ref 0.45–4.5)
VIT B12 SERPL-MCNC: 4288 PG/ML (ref 100–900)
WBC # BLD AUTO: 4.24 THOUSAND/UL (ref 4.31–10.16)

## 2022-07-03 PROCEDURE — 80053 COMPREHEN METABOLIC PANEL: CPT | Performed by: STUDENT IN AN ORGANIZED HEALTH CARE EDUCATION/TRAINING PROGRAM

## 2022-07-03 PROCEDURE — 85027 COMPLETE CBC AUTOMATED: CPT | Performed by: STUDENT IN AN ORGANIZED HEALTH CARE EDUCATION/TRAINING PROGRAM

## 2022-07-03 PROCEDURE — 82746 ASSAY OF FOLIC ACID SERUM: CPT | Performed by: STUDENT IN AN ORGANIZED HEALTH CARE EDUCATION/TRAINING PROGRAM

## 2022-07-03 PROCEDURE — 83615 LACTATE (LD) (LDH) ENZYME: CPT | Performed by: STUDENT IN AN ORGANIZED HEALTH CARE EDUCATION/TRAINING PROGRAM

## 2022-07-03 PROCEDURE — NC001 PR NO CHARGE: Performed by: INTERNAL MEDICINE

## 2022-07-03 PROCEDURE — 82607 VITAMIN B-12: CPT | Performed by: STUDENT IN AN ORGANIZED HEALTH CARE EDUCATION/TRAINING PROGRAM

## 2022-07-03 PROCEDURE — 82948 REAGENT STRIP/BLOOD GLUCOSE: CPT

## 2022-07-03 PROCEDURE — 85045 AUTOMATED RETICULOCYTE COUNT: CPT | Performed by: STUDENT IN AN ORGANIZED HEALTH CARE EDUCATION/TRAINING PROGRAM

## 2022-07-03 PROCEDURE — 84443 ASSAY THYROID STIM HORMONE: CPT | Performed by: STUDENT IN AN ORGANIZED HEALTH CARE EDUCATION/TRAINING PROGRAM

## 2022-07-03 PROCEDURE — 99238 HOSP IP/OBS DSCHRG MGMT 30/<: CPT | Performed by: INTERNAL MEDICINE

## 2022-07-03 PROCEDURE — 99232 SBSQ HOSP IP/OBS MODERATE 35: CPT | Performed by: INTERNAL MEDICINE

## 2022-07-03 RX ORDER — QUINIDINE GLUCONATE 324 MG
1 TABLET, EXTENDED RELEASE ORAL EVERY OTHER DAY
Qty: 45 TABLET | Refills: 0 | Status: SHIPPED | OUTPATIENT
Start: 2022-07-03 | End: 2022-10-01

## 2022-07-03 RX ORDER — MULTIVIT-MIN/FOLIC ACID/LUTEIN 500-250MCG
2 TABLET,CHEWABLE ORAL DAILY
Qty: 180 TABLET | Refills: 0 | Status: SHIPPED | OUTPATIENT
Start: 2022-07-03 | End: 2022-10-01

## 2022-07-03 RX ORDER — DIPHENOXYLATE HYDROCHLORIDE AND ATROPINE SULFATE 2.5; .025 MG/1; MG/1
1 TABLET ORAL DAILY
Qty: 90 TABLET | Refills: 0 | Status: SHIPPED | OUTPATIENT
Start: 2022-07-03 | End: 2022-10-01

## 2022-07-03 RX ORDER — METHOCARBAMOL 500 MG/1
500 TABLET, FILM COATED ORAL 3 TIMES DAILY
Qty: 21 TABLET | Refills: 0 | Status: SHIPPED | OUTPATIENT
Start: 2022-07-03 | End: 2022-10-12 | Stop reason: SDUPTHER

## 2022-07-03 RX ORDER — ASCORBIC ACID 500 MG
500 TABLET ORAL DAILY
Qty: 90 TABLET | Refills: 0 | Status: SHIPPED | OUTPATIENT
Start: 2022-07-03 | End: 2022-10-01

## 2022-07-03 RX ADMIN — SENNOSIDES AND DOCUSATE SODIUM 1 TABLET: 8.6; 5 TABLET ORAL at 08:41

## 2022-07-03 RX ADMIN — BUDESONIDE AND FORMOTEROL FUMARATE DIHYDRATE 2 PUFF: 160; 4.5 AEROSOL RESPIRATORY (INHALATION) at 08:36

## 2022-07-03 RX ADMIN — B-COMPLEX W/ C & FOLIC ACID TAB 1 TABLET: TAB at 08:41

## 2022-07-03 RX ADMIN — ALLOPURINOL 100 MG: 100 TABLET ORAL at 08:42

## 2022-07-03 RX ADMIN — DICYCLOMINE HYDROCHLORIDE 10 MG: 10 CAPSULE ORAL at 11:06

## 2022-07-03 RX ADMIN — OXYCODONE HYDROCHLORIDE AND ACETAMINOPHEN 500 MG: 500 TABLET ORAL at 08:42

## 2022-07-03 RX ADMIN — MAGNESIUM OXIDE TAB 400 MG (241.3 MG ELEMENTAL MG) 400 MG: 400 (241.3 MG) TAB at 08:41

## 2022-07-03 RX ADMIN — BUMETANIDE 1 MG: 1 TABLET ORAL at 08:42

## 2022-07-03 RX ADMIN — Medication 1000 UNITS: at 08:42

## 2022-07-03 RX ADMIN — FAMOTIDINE 10 MG: 20 TABLET ORAL at 08:42

## 2022-07-03 RX ADMIN — PANTOPRAZOLE SODIUM 40 MG: 40 TABLET, DELAYED RELEASE ORAL at 05:29

## 2022-07-03 RX ADMIN — Medication 1 TABLET: at 08:42

## 2022-07-03 RX ADMIN — DICYCLOMINE HYDROCHLORIDE 10 MG: 10 CAPSULE ORAL at 05:29

## 2022-07-03 RX ADMIN — OMEGA-3 FATTY ACIDS CAP 1000 MG 1000 MG: 1000 CAP at 08:41

## 2022-07-03 RX ADMIN — METHOCARBAMOL 500 MG: 500 TABLET, FILM COATED ORAL at 08:41

## 2022-07-03 RX ADMIN — INSULIN DETEMIR 10 UNITS: 100 INJECTION, SOLUTION SUBCUTANEOUS at 08:41

## 2022-07-03 RX ADMIN — CARVEDILOL 12.5 MG: 12.5 TABLET, FILM COATED ORAL at 08:42

## 2022-07-03 RX ADMIN — INSULIN LISPRO 3 UNITS: 100 INJECTION, SOLUTION INTRAVENOUS; SUBCUTANEOUS at 11:06

## 2022-07-03 RX ADMIN — ASPIRIN 81 MG: 81 TABLET, COATED ORAL at 08:42

## 2022-07-03 RX ADMIN — POLYETHYLENE GLYCOL 3350 17 G: 17 POWDER, FOR SOLUTION ORAL at 08:41

## 2022-07-03 RX ADMIN — LEVOTHYROXINE SODIUM 75 MCG: 75 TABLET ORAL at 05:29

## 2022-07-03 RX ADMIN — LOSARTAN POTASSIUM 100 MG: 50 TABLET, FILM COATED ORAL at 08:41

## 2022-07-03 RX ADMIN — INSULIN LISPRO 2 UNITS: 100 INJECTION, SOLUTION INTRAVENOUS; SUBCUTANEOUS at 08:35

## 2022-07-03 RX ADMIN — FERROUS GLUCONATE 325 MG: 324 TABLET ORAL at 08:36

## 2022-07-03 RX ADMIN — HEPARIN SODIUM 5000 UNITS: 5000 INJECTION INTRAVENOUS; SUBCUTANEOUS at 05:29

## 2022-07-03 NOTE — PROGRESS NOTES
INTERNAL MEDICINE RESIDENCY PROGRESS NOTE     Name: Noris Dukes   Age & Sex: 64 y o  female   MRN: [id number]  Unit/Bed#: [facility] 819-01   Encounter: [id number]  Team: SOD Team C     PATIENT INFORMATION     Name: Noris Dukes   Age & Sex: 64 y o  female   MRN: [id number]  Hospital Stay Days: 5    ASSESSMENT/PLAN     Principal Problem:    Elevated LFTs  Active Problems:    Shortness of breath    CINDY (acute kidney injury) ([facility], [place] [address] )    Type 2 diabetes mellitus with stage 3 chronic kidney disease, with long-term current use of insulin (HCC)    Anemia    Hypertension    Pancytopenia (HCC)    Dysuria    Mixed hyperlipidemia    CKD (chronic kidney disease) stage 3, GFR 30-59 ml/min (HCC)    Low back pain    Continuous opioid dependence (HCC)    Gastroesophageal reflux disease      * Elevated LFTs  Assessment & Plan  Presenting with 4-5 days diffuse abdominal pain, worse after eating, with constipation  Patient able to pass small BM on 6/27 am and was given prune juice on 6/28, and was able to pass loose/watery stools after  Had 2 bowel movements 6/30  Constipation resolved 7/3  Current temp at 98 5  MRI/MRCP unremarkable  Labs:  · , up from 114,  was 150 yesterday, alk phos 279, up from 200  Consistently elevated transaminases  · Lipase normal  · Total bili up to 3 01, previously 2 91 and 3 25   · Acute hepatitis panel negative  · EBV showing positive IgG but no IgM - old infection/vaccination  · CMV showing positive IgG but no IgM- old infection/vaccination  · Ceruloplasmin 30 6  · Anti-smooth muscle antibodies normal  · Anti-mitochondrial antibodies normal  · MARIZA negative  · BCx NG    Imaging:  · Developing mild pancytopenia, with Hgb down to 8 4, down from 9 0 on 6/29  · CTA unremarkable for mesenteric ischemia, cholecystitis, or constipation  · CTabd/pelvis from 6/25 showed possible gastroenteritis vs developing SBO    · CTabd/pelvis 6/26 showed no evidence of acute intra-abdominal or pelvic pathology  · US RUQ 6/27 unremarkable, except for mild gallbladder wall thickening without cholelithiasis and a partially visualized small pericardial effusion  · HIDA scan unremarkable, negative for acalculous cholecystitis  · EGD showed mild erythematous mucosa in the stomach  Normal esophagus, normal 1st and 2nd portion of duodenum  The esophagus appeared normal  Biopsy of the stomach, incisura and antrum done to R/O H  Pylori  Biopsies of 1st and 2nd part of duodenum taken to R/O celiac disease  · MRCP showed small bilateral pleural effusions, trace ascites and mild body wall edema  Otherwise unremarkable examination without choledocholithiasis or biliary ductal dilation  Plan:  - hepatocellular pattern LFT elevation, along with fever and mild pancytopenia  Suspect viral etiology    - Start soft diet  - Holding IVF  - bowel regimen of senna glycoside and docusate, scheduled miralax, PRN dulcalax  - GI consulted, appreciate recommendations  - Ceftriaxone and flagyl discontinued with urine cultures showing E coli  - Waiting results of IgG 1,2,3, and 4      Shortness of breath  Assessment & Plan  Patient began having SOB overnight on 6/29 and felt increasing wheezing while laying flat  Patient has been on 2 L nasal cannula since  SpO2 97% this morning  Patient also initially given 2 mg Bumex due to apparent fluid-overloaded state  Transitioned to 1mg Bumex due to renal function  Echo normal     Chest X-ray showed mild pulmonary venous congestion with trace effusions     -Monitor O2        CINDY (acute kidney injury) ([facility] [address] )  Assessment & Plan  Pre-renal CINDY on CKD 2/2 dehydration and decreased p o  intake is now resolved  Creatinine at 1 32 today  Baseline Cr around 1-1 4, 1 91 on admission       Started patient on 2 mg bumex 6/29, then transitioned to 1 mg bumex due to renal function     -Holding IVF at this time  -Trend BMP  -Replete to keep K>4  -Monitor I&O and daily weights  -Restarted home losartan      Type 2 diabetes mellitus with stage 3 chronic kidney disease, with long-term current use of insulin ([facility])  Assessment & Plan  A1c 6 1%, symptomatic hypoglycemia 7/1 AM with glucose reading of 51  Glucose increased to 98 after given some food  Detemir subsequently decreased to 10U  Current glucose range 104-301, labile control but goal to avoid hypoglycemic event  Outpt regimen: detemir 40 units BID; novolin 15 units bid meals (lunch and dinner)    Plan:  - Soft diet  - Continue Detemir 10 units, Q12h, with sliding scale  - Monitor glucose levels if too low due to poor PO intake, adjust detemir dose   - goal -180 inpt    Anemia  Assessment & Plan  Hemoglobin 10 2 on 6/25/22, downtrending to 8 1 on 7/3/22  MCV 92, RDW 15 2 on 7/3/22  Anemia of unclear etiology and warrants investigation given ongoing GI workup  Plan:  - Iron panel  - Reticulocyte count  - Blood smear  - LDH, haptoglobin levels    Hypertension  Assessment & Plan  Blood pressure ranging from 651M-081N systolic, 34C-80T diastolic over interval period      - Continue home coreg 12 5 mg q12 hr, cardura 2 mg HS,   - Stopped hydralazine 50 mg q12h (per patient's son request) and Procardia XL 60 mg qd  - Given work of breathing and edema, restarted on Bumex 1mg PO daily, will follow BMPs  - Restarted home Losartan 100mg daily as kidney function stable  - PRN hydralazine for SBP >180    Dysuria  Assessment & Plan  Patient endorses 2 days of dysuria and feeling of incomplete bladder emptying  Urine appears cloudy and has a foul-smelling odor  Dysuria has resolved  UA 1+ protein, positive for leukocytes, trace glucose, and small amount of blood  10-20 RBC/hpf and innumerable WBCs with hyaline casts  Urine culture grew >100,000 Gram negative rods, further speciated to E coli that is pan-sensitive       - Flagyl discontinued, completed CTX course    Pancytopenia ([facility] [address] )  Assessment & Plan  Baseline CBC WNL  Likely 2/2 acute viral illness  Follow with daily CBC    Gastroesophageal reflux disease  Assessment & Plan  Continue pepcid 10 mg bid and protonix 40    Continuous opioid dependence ([facility] [address] )  Assessment & Plan  On tramadol outpatient for chronic back pain    Low back pain  Assessment & Plan  Chronic, on tramadol and robaxin daily  Restart PRN tramadol tabs  Pain currently controlled  CKD (chronic kidney disease) stage 3, GFR 30-59 ml/min ([facility])  Assessment & Plan  In setting of T2DM and HTN  See plans for DM, HTN, and CINDY    Mixed hyperlipidemia  Assessment & Plan  Continue lipitor and Tricor      Disposition: Inpatient, pending final GI recs     SUBJECTIVE     Patient seen and examined  No acute events overnight  No complains, states she feels better then yesterday  Is eager to be discharged  Mild abdominal pain  3 BMs over interval period  No chest pain, shortness of breath, dysuria  OBJECTIVE     Vitals:    22 1946 22 2217 22 0847 22 0852   BP:  163/68 156/56 156/56   Pulse:  92 73 71   Resp:  (!) 24     Temp:  98 5 °F (36 9 °C)     TempSrc:       SpO2: 95% 95% 96% 94%   Weight:       Height:          Temperature:   Temp (24hrs), Av 4 °F (36 9 °C), Min:98 2 °F (36 8 °C), Max:98 5 °F (36 9 °C)    Temperature: 98 5 °F (36 9 °C)  Intake & Output:  I/O        0701  07/ 0700 / 0701  / 0700 / 0701  / 0700    P  O  177 240     I V  (mL/kg)       IV Piggyback 90      Total Intake(mL/kg) 267 (3 8) 240 (3 4)     Urine (mL/kg/hr) 1100 (0 7) 1500 (0 9) 300 (0 9)    Stool 0 0 0    Total Output 1100 1500 300    Net -833 -1260 -300           Unmeasured Stool Occurrence 1 x 1 x 1 x        Weights:        Body mass index is 30 23 kg/m²  Weight (last 2 days)     Date/Time Weight    22 0312 70 2 (154 76)        Physical Exam  Vitals and nursing note reviewed  Constitutional:       General: She is not in acute distress  Appearance: She is well-developed     HENT:      Head: Normocephalic and atraumatic  Eyes:      Extraocular Movements: Extraocular movements intact  Conjunctiva/sclera: Conjunctivae normal    Cardiovascular:      Rate and Rhythm: Normal rate  Pulses: Normal pulses  Heart sounds: No murmur heard  Pulmonary:      Effort: Pulmonary effort is normal  No respiratory distress  Breath sounds: Normal breath sounds  Abdominal:      Palpations: Abdomen is soft  Tenderness: There is abdominal tenderness  Comments: Mild RUQ tenderness   Musculoskeletal:         General: No swelling  Neurological:      Mental Status: She is alert and oriented to person, place, and time  LABORATORY DATA     Labs: I have personally reviewed pertinent reports  Results from last 7 days   Lab Units 07/03/22  0529 07/02/22  0515 07/01/22  0519 06/30/22  0909   WBC Thousand/uL 4 24* 5 28 6 63 5 20   HEMOGLOBIN g/dL 8 1* 8 1* 8 6* 8 4*   HEMATOCRIT % 25 0* 24 7* 26 2* 25 8*   PLATELETS Thousands/uL 199 161 155 108*   NEUTROS PCT %  --  83* 88* 80*   MONOS PCT %  --  6 5 7      Results from last 7 days   Lab Units 07/03/22  0529 07/02/22  0500 07/01/22  0519   POTASSIUM mmol/L 3 7 3 7 3 1*   CHLORIDE mmol/L 114* 117* 111*   CO2 mmol/L 24 24 24   BUN mg/dL 49* 56* 49*   CREATININE mg/dL 1 32* 1 56* 1 67*   CALCIUM mg/dL 8 7 8 7 8 6   ALK PHOS U/L 279* 200* 158*   ALT U/L 168* 150* 166*   AST U/L 122* 114* 125*     Results from last 7 days   Lab Units 07/02/22  0500   MAGNESIUM mg/dL 2 5          Results from last 7 days   Lab Units 07/01/22  0519 06/29/22  0908 06/27/22  0840   INR  1 23* 1 23* 1 31*     Results from last 7 days   Lab Units 06/26/22  2352   LACTIC ACID mmol/L 0 8           IMAGING & DIAGNOSTIC TESTING     Radiology Results: I have personally reviewed pertinent reports  CT abdomen pelvis wo contrast    Result Date: 6/26/2022  Impression: No evidence of acute intra-abdominal or pelvic pathology   Findings suggesting cellulitis versus chronic injections throughout the anterior abdominal wall  Workstation performed: [id number]     XR chest portable    Result Date: 6/29/2022  Impression: Mild pulmonary venous congestion with trace effusions  Workstation performed: [id number]     CTA abdomen pelvis w wo contrast    Result Date: 6/27/2022  Impression: Unremarkable CT arteriography of the abdomen and pelvis  No evidence of acute pathology throughout the abdomen or pelvis Workstation performed: [id number]     NM hepatobiliary    Result Date: 6/28/2022  Impression: There is visualization of the gallbladder indicating a patent cystic duct  Workstation performed: [id number]     US right upper quadrant    Result Date: 6/27/2022  Impression: Mild gallbladder wall thickening without cholelithiasis is nonspecific  It may be sequela of trace perihepatic free fluid  If there is clinical concern for acalculous cholecystitis, consider follow-up with a HIDA scan if it would alter patient management  Partially visualized small pericardial effusion  Remainder of the examination is normal  Workstation performed: [id number]     Other Diagnostic Testing: I have personally reviewed pertinent reports      ACTIVE MEDICATIONS     Current Facility-Administered Medications   Medication Dose Route Frequency    acetaminophen (TYLENOL) tablet 650 mg  650 mg Oral Q6H PRN    albuterol (PROVENTIL HFA,VENTOLIN HFA) inhaler 2 puff  2 puff Inhalation Q4H PRN    albuterol inhalation solution 2 5 mg  2 5 mg Nebulization Q6H PRN    allopurinol (ZYLOPRIM) tablet 100 mg  100 mg Oral Daily    ascorbic acid (VITAMIN C) tablet 500 mg  500 mg Oral Daily    aspirin (ECOTRIN LOW STRENGTH) EC tablet 81 mg  81 mg Oral Daily    bisacodyl (DULCOLAX) rectal suppository 10 mg  10 mg Rectal Daily PRN    budesonide-formoterol (SYMBICORT) 160-4 5 mcg/act inhaler 2 puff  2 puff Inhalation BID    bumetanide (BUMEX) tablet 1 mg  1 mg Oral Daily    calcium carbonate-vitamin D (OSCAL-D) 500 mg-200 units per tablet 1 tablet  1 tablet Oral Daily With Breakfast    carvedilol (COREG) tablet 12 5 mg  12 5 mg Oral Q12H    cholecalciferol (VITAMIN D3) tablet 1,000 Units  1,000 Units Oral Daily    dicyclomine (BENTYL) capsule 10 mg  10 mg Oral 4x Daily (AC & HS)    doxazosin (CARDURA) tablet 2 mg  2 mg Oral HS    famotidine (PEPCID) tablet 10 mg  10 mg Oral BID    ferrous gluconate (FERGON) tablet 325 mg  325 mg Oral Daily    fish oil capsule 1,000 mg  1,000 mg Oral Daily    fluticasone (FLONASE) 50 mcg/act nasal spray 2 spray  2 spray Nasal Daily    heparin (porcine) subcutaneous injection 5,000 Units  5,000 Units Subcutaneous Q8H [address]    hydrALAZINE (APRESOLINE) injection 5 mg  5 mg Intravenous Q6H PRN    insulin detemir (LEVEMIR) subcutaneous injection 10 Units  10 Units Subcutaneous Q12H KORI    insulin lispro (HumaLOG) 100 units/mL subcutaneous injection 1-5 Units  1-5 Units Subcutaneous TID AC    insulin lispro (HumaLOG) 100 units/mL subcutaneous injection 1-5 Units  1-5 Units Subcutaneous HS    levothyroxine tablet 75 mcg  75 mcg Oral Early Morning    losartan (COZAAR) tablet 100 mg  100 mg Oral Daily    magnesium citrate (CITROMA) oral solution 296 mL  296 mL Oral Once    magnesium oxide (MAG-OX) tablet 400 mg  400 mg Oral Daily    methocarbamol (ROBAXIN) tablet 500 mg  500 mg Oral TID    montelukast (SINGULAIR) tablet 10 mg  10 mg Oral HS    multivitamin stress formula tablet 1 tablet  1 tablet Oral Daily    nitroglycerin (NITROSTAT) SL tablet 0 4 mg  0 4 mg Sublingual Q5 Min PRN    pantoprazole (PROTONIX) EC tablet 40 mg  40 mg Oral Early Morning    polyethylene glycol (MIRALAX) packet 17 g  17 g Oral Daily    senna-docusate sodium (SENOKOT S) 8 6-50 mg per tablet 1 tablet  1 tablet Oral BID    traMADol (ULTRAM) tablet 50 mg  50 mg Oral Q12H PRN       VTE Pharmacologic Prophylaxis: Heparin  VTE Mechanical Prophylaxis: sequential compression device    Portions of the record may have been created with voice recognition software  Occasional wrong word or "sound a like" substitutions may have occurred due to the inherent limitations of voice recognition software    Read the chart carefully and recognize, using context, where substitutions have occurred   ==  Мария Garcia MD  [address]  Internal Medicine Residency PGY-1

## 2022-07-03 NOTE — DISCHARGE SUMMARY
INTERNAL MEDICINE RESIDENCY DISCHARGE SUMMARY     Bernadine Ramos   64 y o  female  MRN: [id number]  Room/Bed: [facility] 81/[facility] [address] [place] [address]   Encounter: [id number]    Principal Problem:    Elevated LFTs  Active Problems:    Shortness of breath    CINDY (acute kidney injury) ([address] )    Type 2 diabetes mellitus with stage 3 chronic kidney disease, with long-term current use of insulin (HCC)    Anemia    Hypertension    Pancytopenia (HCC)    Dysuria    Mixed hyperlipidemia    CKD (chronic kidney disease) stage 3, GFR 30-59 ml/min (HCC)    Low back pain    Continuous opioid dependence (HCC)    Gastroesophageal reflux disease      * Elevated LFTs  Assessment & Plan  Presenting with 4-5 days diffuse abdominal pain, worse after eating, with constipation  Patient able to pass small BM on 6/27 am and was given prune juice on 6/28, and was able to pass loose/watery stools after  Had 2 bowel movements 6/30  Constipation resolved 7/3  Current temp at 98 5  MRI/MRCP unremarkable  Labs:  · , up from 114,  was 150 yesterday, alk phos 279, up from 200  Consistently elevated transaminases  · Lipase normal  · Total bili up to 3 01, previously 2 91 and 3 25   · Acute hepatitis panel negative  · EBV showing positive IgG but no IgM - old infection/vaccination  · CMV showing positive IgG but no IgM- old infection/vaccination  · Ceruloplasmin 30 6  · Anti-smooth muscle antibodies normal  · Anti-mitochondrial antibodies normal  · MARIZA negative  · BCx NG  - IgG 1,2,3, and 4 negative    Imaging:  · Developing mild pancytopenia, with Hgb down to 8 4, down from 9 0 on 6/29  · CTA unremarkable for mesenteric ischemia, cholecystitis, or constipation  · CTabd/pelvis from 6/25 showed possible gastroenteritis vs developing SBO  · CTabd/pelvis 6/26 showed no evidence of acute intra-abdominal or pelvic pathology    · US RUQ 6/27 unremarkable, except for mild gallbladder wall thickening without cholelithiasis and a partially visualized small pericardial effusion  · HIDA scan unremarkable, negative for acalculous cholecystitis  · EGD showed mild erythematous mucosa in the stomach  Normal esophagus, normal 1st and 2nd portion of duodenum  The esophagus appeared normal  Biopsy of the stomach, incisura and antrum done to R/O H  Pylori  Biopsies of 1st and 2nd part of duodenum taken to R/O celiac disease  · MRCP showed small bilateral pleural effusions, trace ascites and mild body wall edema  Otherwise unremarkable examination without choledocholithiasis or biliary ductal dilation  Plan:  - hepatocellular pattern LFT elevation, along with fever and mild pancytopenia  Suspect viral etiology    - Start soft diet  - Holding IVF  - bowel regimen of senna glycoside and docusate, scheduled miralax, PRN dulcalax  - GI consulted, patient ok to discharge for outpatient follow up with GI   -  "Suspect DILI vs infection Might need liver biopsy in the future depending on the clinical course"   - Ceftriaxone and flagyl discontinued with urine cultures showing E coli      Shortness of breath  Assessment & Plan  Patient began having SOB overnight on 6/29 and felt increasing wheezing while laying flat  Patient has been on 2 L nasal cannula since  SpO2 97% this morning  Patient also initially given 2 mg Bumex due to apparent fluid-overloaded state  Transitioned to 1mg Bumex due to renal function  Echo normal     Chest X-ray showed mild pulmonary venous congestion with trace effusions  Can continue home respiratory treatments post discharge  CINDY (acute kidney injury) ([facility] )  Assessment & Plan  Pre-renal CINDY on CKD 2/2 dehydration and decreased p o  intake is now resolved  Creatinine at 1 32 today  Baseline Cr around 1-1 4, 1 91 on admission       Started patient on 2 mg bumex 6/29, then transitioned to 1 mg bumex due to renal function     -Holding IVF at this time  -Trend BMP  -Replete to keep K>4  -Monitor I&O and daily weights  -Restarted home losartan      Type 2 diabetes mellitus with stage 3 chronic kidney disease, with long-term current use of insulin ([facility])  Assessment & Plan  A1c 6 1%, symptomatic hypoglycemia 7/1 AM with glucose reading of 51  Glucose increased to 98 after given some food  Detemir subsequently decreased to 10U  Current glucose range 104-301, labile control but goal to avoid hypoglycemic event  Outpt regimen: detemir 40 units BID; novolin 15 units bid meals (lunch and dinner)    Plan:  - Soft diet  - Continue Detemir 10 units, Q12h, with sliding scale  - Monitor glucose levels if too low due to poor PO intake, adjust detemir dose   - goal -180 inpt    Anemia  Assessment & Plan  Hemoglobin 10 2 on 6/25/22, downtrending to 8 1 on 7/3/22  MCV 92, RDW 15 2 on 7/3/22  Anemia of unclear etiology  Plan:  - Iron panel  - Reticulocyte count  - Blood smear  - LDH, haptoglobin levels    Hypertension  Assessment & Plan  Blood pressure ranging from 877W-448C systolic, 40B-99A diastolic over interval period      - Continue home coreg 12 5 mg q12 hr, cardura 2 mg HS,   - Stopped hydralazine 50 mg q12h (per patient's son request) and Procardia XL 60 mg qd  - Given work of breathing and edema, restarted on Bumex 1mg PO daily, will follow BMPs  - Restarted home Losartan 100mg daily as kidney function stable  - PRN hydralazine for SBP >180    Dysuria  Assessment & Plan  Patient endorses 2 days of dysuria and feeling of incomplete bladder emptying  Urine appears cloudy and has a foul-smelling odor  Dysuria has resolved  UA 1+ protein, positive for leukocytes, trace glucose, and small amount of blood  10-20 RBC/hpf and innumerable WBCs with hyaline casts  Urine culture grew >100,000 Gram negative rods, further speciated to E coli that is pan-sensitive       - Flagyl discontinued, completed CTX course    Pancytopenia ([facility] [address] )  Assessment & Plan  Baseline CBC WNL  Likely 2/2 acute viral illness  Follow with daily CBC    Gastroesophageal reflux disease  Assessment & Plan  Continue pepcid 10 mg bid and protonix 40    Continuous opioid dependence ([facility] [address] )  Assessment & Plan  On tramadol outpatient for chronic back pain    Low back pain  Assessment & Plan  Chronic, on tramadol and robaxin daily  Restart PRN tramadol tabs  Pain currently controlled  CKD (chronic kidney disease) stage 3, GFR 30-59 ml/min ([facility])  Assessment & Plan  In setting of T2DM and HTN  See plans for DM, HTN, and CINDY    Mixed hyperlipidemia  Assessment & Plan  Continue lipitor and [address] COURSE     63 yo female with insulin-dependent T2DM, HTN, CKD3, HLD, chronic back pain, GERD and hypothyroidism presented to [facility] ED from urgent care, where she was presenting with complaint of ongoing abdominal pain and fevers  In ED, patient noted to have elevated LFT's  General surgery was consulted by ED for concern for mesenteric ischemia  CTA did not demonstrate mesenteric ischemia, cholecystitis, constipation, or acute intra-abdominal pathology  RUQ US ordered without significant finding  HIDA scan completed and negative for acalculous cholecystitis  GI consulted for uptrending TBilirubin  EGD done 6/30 without significant finding  MRI/MRCP without significant findings  Otherwise unremarkable GI workup  Found to have UTI 2/2 E coli and was treated with cephtriaxone and flagyl  Developed CINDY that has resolved  Discharged to home with plan for outpatient followup with PCP and GI  DISCHARGE INFORMATION     Physical Exam at Discharge:  Vitals and nursing note reviewed  Constitutional:       General: She is not in acute distress  Appearance: She is well-developed  HENT:      Head: Normocephalic and atraumatic  Eyes:      Extraocular Movements: Extraocular movements intact  Conjunctiva/sclera: Conjunctivae normal    Cardiovascular:      Rate and Rhythm: Normal rate        Pulses: Normal pulses  Heart sounds: No murmur heard  Pulmonary:      Effort: Pulmonary effort is normal  No respiratory distress  Breath sounds: Normal breath sounds  Abdominal:      Palpations: Abdomen is soft  Tenderness: There is abdominal tenderness  Comments: Mild RUQ tenderness   Musculoskeletal:         General: No swelling  Neurological:      Mental Status: She is alert and oriented to person, place, and time    PCP at Discharge: Melanie Luna MD    Admitting Provider: Melanie Luna MD  Admission Date: 6/26/2022    Discharge Provider: Melanie Luna MD  Discharge Date: 7/3/2022    Discharge Disposition: Home/Self Care  Discharge Condition: good  Discharge with Lines: no    Discharge Diet: diabetic diet  Activity Restrictions: none   Test Results Pending at Discharge:   Anemia labs:  - Iron panel  - Reticulocyte count  - Blood smear  - LDH, haptoglobin levels    Discharge Diagnoses:  Principal Problem:    Elevated LFTs  Active Problems:    Shortness of breath    CINDY (acute kidney injury) ([facility] )    Type 2 diabetes mellitus with stage 3 chronic kidney disease, with long-term current use of insulin ([facility])    Anemia    Hypertension    Pancytopenia ([facility])    Dysuria    Mixed hyperlipidemia    CKD (chronic kidney disease) stage 3, GFR 30-59 ml/min ([facility])    Low back pain    Continuous opioid dependence ([facility] )    Gastroesophageal reflux disease  Resolved Problems:    * No resolved hospital problems  *      Consulting Providers:  GI     Diagnostic & Therapeutic Procedures Performed:  CT abdomen pelvis wo contrast    Result Date: 6/26/2022  Impression: No evidence of acute intra-abdominal or pelvic pathology  Findings suggesting cellulitis versus chronic injections throughout the anterior abdominal wall  Workstation performed: [id number]     XR chest portable    Result Date: 6/29/2022  Impression: Mild pulmonary venous congestion with trace effusions   Workstation performed: [id number]     CTA abdomen pelvis w wo contrast    Result Date: 6/27/2022  Impression: Unremarkable CT arteriography of the abdomen and pelvis  No evidence of acute pathology throughout the abdomen or pelvis Workstation performed: [id number]     NM hepatobiliary    Result Date: 6/28/2022  Impression: There is visualization of the gallbladder indicating a patent cystic duct  Workstation performed: [id number]     US right upper quadrant    Result Date: 6/27/2022  Impression: Mild gallbladder wall thickening without cholelithiasis is nonspecific  It may be sequela of trace perihepatic free fluid  If there is clinical concern for acalculous cholecystitis, consider follow-up with a HIDA scan if it would alter patient management  Partially visualized small pericardial effusion  Remainder of the examination is normal  Workstation performed: [id number]       Code Status: Level 1 - Full Code  Advance Directive & Living Will: Received  Power of :    POLST:      Medications:  Current Discharge Medication List      STOP taking these medications       hydrALAZINE (APRESOLINE) 25 mg tablet Comments:   Reason for Stopping:         Omega-3 Fatty Acids (OMEGA 3 500 PO) Comments:   Reason for Stopping:             Current Discharge Medication List        Current Discharge Medication List      CONTINUE these medications which have NOT CHANGED    Details   albuterol (Ventolin HFA) 90 mcg/act inhaler Inhale 2 puffs 4 (four) times a day  Qty: 18 g, Refills: 5    Comments: Substitution to a formulary equivalent within the same pharmaceutical class is authorized    Associated Diagnoses: Asthma without status asthmaticus without complication, unspecified asthma severity, unspecified whether persistent      allopurinol (ZYLOPRIM) 100 mg tablet Take 2 tablets (200 mg total) by mouth daily  Qty: 60 tablet, Refills: 5    Associated Diagnoses: Hyperuricemia      aspirin 81 MG tablet Take 1 tablet by mouth daily       atorvastatin (LIPITOR) 80 mg tablet Take 1 tablet (80 mg total) by mouth daily  Qty: 90 tablet, Refills: 1    Associated Diagnoses: Hyperlipidemia, unspecified hyperlipidemia type      budesonide-formoterol (Symbicort) 160-4 5 mcg/act inhaler Inhale 2 puffs 2 (two) times a day Rinse mouth after use    Qty: 120 g, Refills: 1    Associated Diagnoses: Asthma without status asthmaticus without complication, unspecified asthma severity, unspecified whether persistent      bumetanide (BUMEX) 2 mg tablet Take 1 tablet (2 mg total) by mouth daily  Qty: 30 tablet, Refills: 5    Associated Diagnoses: Hypertension, unspecified type      carvedilol (COREG) 12 5 mg tablet Take 12 5 mg by mouth      doxazosin (CARDURA) 2 mg tablet Take 2 mg by mouth      famotidine (PEPCID) 10 mg tablet Take 1 tablet (10 mg total) by mouth 2 (two) times a day for 90 days  Qty: 60 tablet, Refills: 9    Associated Diagnoses: GERD without esophagitis      fenofibrate micronized (LOFIBRA) 67 MG capsule       fluticasone (FLONASE) 50 mcg/act nasal spray 2 sprays into each nostril daily  Qty: 16 g, Refills: 3    Associated Diagnoses: Rhinitis, unspecified type      glucose blood (ONE TOUCH ULTRA TEST) test strip Test blood sugars 3 to 4 times a day  Qty: 400 each, Refills: 1    Associated Diagnoses: Type 2 diabetes mellitus with complication, with long-term current use of insulin ([facility])      insulin detemir (Levemir) 100 units/mL subcutaneous injection Inject 40 Units under the skin every 12 (twelve) hours  Qty: 20 mL, Refills: 5    Associated Diagnoses: Type 2 diabetes mellitus with complication, with long-term current use of insulin ([facility])      insulin regular (HumuLIN R,NovoLIN R) 100 units/mL injection Inject 0 15 mL (15 Units total) under the skin 2 (two) times a day with lunch and dinner  Qty: 20 mL, Refills: 2    Comments: PLEASE DISPENSE NOVOLIN R  Associated Diagnoses: Type 2 diabetes mellitus with complication, with long-term current use of insulin ([facility])      levothyroxine 88 mcg tablet Take 1 tablet (88 mcg total) by mouth daily  Qty: 30 tablet, Refills: 5    Associated Diagnoses: Hypothyroidism, unspecified type      losartan (COZAAR) 100 MG tablet Take 1 tablet (100 mg total) by mouth daily  Qty: 90 tablet, Refills: 1    Associated Diagnoses: Hypertension, unspecified type      montelukast (SINGULAIR) 10 mg tablet Take 1 tablet (10 mg total) by mouth daily at bedtime  Qty: 30 tablet, Refills: 5    Associated Diagnoses: Moderate persistent asthma with acute exacerbation      ONE TOUCH LANCETS MISC by Does not apply route daily Test 2-3 times daily      sitaGLIPtin (Januvia) 50 mg tablet Take 1 tablet (50 mg total) by mouth daily  Qty: 30 tablet, Refills: 5    Associated Diagnoses: Type 2 diabetes mellitus with complication, with long-term current use of insulin ([facility])      traMADol (ULTRAM) 50 mg tablet Take 1 tablet (50 mg total) by mouth 2 (two) times a day  Qty: 60 tablet, Refills: 0    Associated Diagnoses: Low back pain      TRUEplus Insulin Syringe 31G X 5/16" 0 5 ML MISC Inject under the skin 4 (four) times a day  Qty: 200 each, Refills: 5    Associated Diagnoses: Type 2 diabetes mellitus with complication, with long-term current use of insulin ([facility])      nitroglycerin (Nitrostat) 0 4 mg SL tablet Place 1 tablet (0 4 mg total) under the tongue every 5 (five) minutes as needed for chest pain  Qty: 10 tablet, Refills: 0    Associated Diagnoses: Primary hypertension      ondansetron (ZOFRAN) 4 mg tablet Take 1 tablet (4 mg total) by mouth every 8 (eight) hours as needed for nausea or vomiting  Qty: 20 tablet, Refills: 0    Associated Diagnoses: Flu-like symptoms             Allergies:   Allergies   Allergen Reactions    Lasix [Furosemide] Rash    Lyrica [Pregabalin] Rash     Annotation - [id number]: swelling of hands and feet    Penbutolol Rash    Belladonna Other (See Comments)     donnatal- rash    Procaine Other (See Comments), Vomiting and Headache     novacaine      Sulfacetamide Sodium-Sulfur Other (See Comments)    Phenobarbital-Belladonna Alk Rash       FOLLOW-UP     PCP Outpatient Follow-up:  Follow up with PCP in 1-2 weeks    Consulting Providers Follow-up:  Follow up with GI doctor in 1-2 weeks     Active Issues Requiring Follow-up:   Elevated LFTs     Discharge Statement:   I spent 30 minutes minutes discharging the patient  This time was spent on the day of discharge  I had direct contact with the patient on the day of discharge  Additional documentation is required if more than 30 minutes were spent on discharge  Portions of the record may have been created with voice recognition software  Occasional wrong word or "sound a like" substitutions may have occurred due to the inherent limitations of voice recognition software    Read the chart carefully and recognize, using context, where substitutions have occurred     ==  Chloe Monaco, [address]  Internal Medicine Resident PGY-1

## 2022-07-03 NOTE — ASSESSMENT & PLAN NOTE
Hemoglobin 10 2 on 6/25/22, downtrending to 8 1 on 7/3/22  MCV 92, RDW 15 2 on 7/3/22  Anemia of unclear etiology      Plan:  - Iron panel  - Reticulocyte count  - Blood smear  - LDH, haptoglobin levels

## 2022-07-03 NOTE — PLAN OF CARE
Problem: PAIN - ADULT  Goal: Verbalizes/displays adequate comfort level or baseline comfort level  Description: Interventions:  - Encourage patient to monitor pain and request assistance  - Assess pain using appropriate pain scale  - Administer analgesics based on type and severity of pain and evaluate response  - Implement non-pharmacological measures as appropriate and evaluate response  - Consider cultural and social influences on pain and pain management  - Notify physician/advanced practitioner if interventions unsuccessful or patient reports new pain  Outcome: Progressing     Problem: INFECTION - ADULT  Goal: Absence or prevention of progression during hospitalization  Description: INTERVENTIONS:  - Assess and monitor for signs and symptoms of infection  - Monitor lab/diagnostic results  - Monitor all insertion sites, i e  indwelling lines, tubes, and drains  - Monitor endotracheal if appropriate and nasal secretions for changes in amount and color  - [place] appropriate cooling/warming therapies per order  - Administer medications as ordered  - Instruct and encourage patient and family to use good hand hygiene technique  - Identify and instruct in appropriate isolation precautions for identified infection/condition  Outcome: Progressing     Problem: DISCHARGE PLANNING  Goal: Discharge to home or other facility with appropriate resources  Description: INTERVENTIONS:  - Identify barriers to discharge w/patient and caregiver  - Arrange for needed discharge resources and transportation as appropriate  - Identify discharge learning needs (meds, wound care, etc )  - Arrange for interpretive services to assist at discharge as needed  - Refer to Case Management Department for coordinating discharge planning if the patient needs post-hospital services based on physician/advanced practitioner order or complex needs related to functional status, cognitive ability, or social support system  Outcome: Progressing Problem: Knowledge Deficit  Goal: Patient/family/caregiver demonstrates understanding of disease process, treatment plan, medications, and discharge instructions  Description: Complete learning assessment and assess knowledge base  Interventions:  - Provide teaching at level of understanding  - Provide teaching via preferred learning methods  Outcome: Progressing     Problem: GASTROINTESTINAL - ADULT  Goal: Maintains or returns to baseline bowel function  Description: INTERVENTIONS:  - Assess bowel function  - Encourage oral fluids to ensure adequate hydration  - Administer IV fluids if ordered to ensure adequate hydration  - Administer ordered medications as needed  - Encourage mobilization and activity  - Consider nutritional services referral to assist patient with adequate nutrition and appropriate food choices  Outcome: Progressing  Goal: Maintains adequate nutritional intake  Description: INTERVENTIONS:  - Monitor percentage of each meal consumed  - Identify factors contributing to decreased intake, treat as appropriate  - Assist with meals as needed  - Monitor I&O, weight, and lab values if indicated  - Obtain nutrition services referral as needed  Outcome: Progressing     Problem: METABOLIC, FLUID AND ELECTROLYTES - ADULT  Goal: Electrolytes maintained within normal limits  Description: INTERVENTIONS:  - Monitor labs and assess patient for signs and symptoms of electrolyte imbalances  - Administer electrolyte replacement as ordered  - Monitor response to electrolyte replacements, including repeat lab results as appropriate  - Instruct patient on fluid and nutrition as appropriate  Outcome: Progressing     Problem: Nutrition/Hydration-ADULT  Goal: Nutrient/Hydration intake appropriate for improving, restoring or maintaining nutritional needs  Description: Monitor and assess patient's nutrition/hydration status for malnutrition   Collaborate with interdisciplinary team and initiate plan and interventions as ordered  Monitor patient's weight and dietary intake as ordered or per policy  Utilize nutrition screening tool and intervene as necessary  Determine patient's food preferences and provide high-protein, high-caloric foods as appropriate       INTERVENTIONS:  - Monitor oral intake, urinary output, labs, and treatment plans  - Assess nutrition and hydration status and recommend course of action  - Evaluate amount of meals eaten  - Assist patient with eating if necessary   - Allow adequate time for meals  - Recommend/ encourage appropriate diets, oral nutritional supplements, and vitamin/mineral supplements  - Order, calculate, and assess calorie counts as needed  - Recommend, monitor, and adjust tube feedings and TPN/PPN based on assessed needs  - Assess need for intravenous fluids  - Provide specific nutrition/hydration education as appropriate  - Include patient/family/caregiver in decisions related to nutrition  Outcome: Progressing

## 2022-07-04 LAB
BACTERIA BLD CULT: NORMAL
BACTERIA BLD CULT: NORMAL

## 2022-07-05 ENCOUNTER — TRANSITIONAL CARE MANAGEMENT (OUTPATIENT)
Dept: INTERNAL MEDICINE CLINIC | Facility: OTHER | Age: 77
End: 2022-07-05

## 2022-07-05 LAB
ALBUMIN SERPL-MCNC: 2.9 G/DL (ref 3.6–5.1)
ALBUMIN/GLOB SERPL: 1.3 (CALC) (ref 1–2.5)
ALP SERPL-CCNC: 361 U/L (ref 37–153)
ALT SERPL-CCNC: 132 U/L (ref 6–29)
AST SERPL-CCNC: 101 U/L (ref 10–35)
BILIRUB SERPL-MCNC: 1.4 MG/DL (ref 0.2–1.2)
BUN SERPL-MCNC: 45 MG/DL (ref 7–25)
BUN/CREAT SERPL: 46 (CALC) (ref 6–22)
CALCIUM SERPL-MCNC: 8.9 MG/DL (ref 8.6–10.4)
CHLORIDE SERPL-SCNC: 110 MMOL/L (ref 98–110)
CO2 SERPL-SCNC: 26 MMOL/L (ref 20–32)
CREAT SERPL-MCNC: 0.98 MG/DL (ref 0.6–0.93)
GLOBULIN SER CALC-MCNC: 2.3 G/DL (CALC) (ref 1.9–3.7)
GLUCOSE SERPL-MCNC: 73 MG/DL (ref 65–99)
POTASSIUM SERPL-SCNC: 4 MMOL/L (ref 3.5–5.3)
PROT SERPL-MCNC: 5.2 G/DL (ref 6.1–8.1)
SL AMB EGFR AFRICAN AMERICAN: 64 ML/MIN/1.73M2
SL AMB EGFR NON AFRICAN AMERICAN: 55 ML/MIN/1.73M2
SODIUM SERPL-SCNC: 142 MMOL/L (ref 135–146)

## 2022-07-05 NOTE — UTILIZATION REVIEW
Notification of Discharge   This is a Notification of Discharge from our facility [address]  Please be advised that this patient has been discharge from our facility  Below you will find the admission and discharge date and time including the patients disposition  UTILIZATION REVIEW CONTACT:  Karyn Saez  Utilization   Network Utilization Review Department  Phone: [phone] x carefully listen to the prompts  All voicemails are confidential   Email: [email or web address]     PHYSICIAN ADVISORY SERVICES:  FOR [id number] REVIEW - MEDICAL NECESSITY DENIAL  Phone: [phone]  Fax: [phone]  Email: [email or web address]     PRESENTATION DATE: 6/26/2022  8:03 PM  OBERVATION ADMISSION DATE:  INPATIENT ADMISSION DATE: 6/28/22  2:01 PM   DISCHARGE DATE: 7/3/2022  5:12 PM  DISPOSITION: Home/Self Care Home/Self Care      IMPORTANT INFORMATION:  Send all requests for admission clinical reviews, approved or denied determinations and any other requests to dedicated fax number below belonging to the campus where the patient is receiving treatment   List of dedicated fax numbers:  [address] DENIALS (Administrative/Medical Necessity) [phone]   [address] (Maternity/NICU/Pediatrics) [phone]   [facility] [phone]   130 [facility] [phone]   [address] [place] [address] [phone]   [address] [phone]   [facility]  [phone]   [address] [facility] [address] [facility]  [address] [facility] [address] [facility] [phone]

## 2022-07-06 ENCOUNTER — TELEPHONE (OUTPATIENT)
Dept: NEPHROLOGY | Facility: CLINIC | Age: 77
End: 2022-07-06

## 2022-07-06 ENCOUNTER — TELEPHONE (OUTPATIENT)
Dept: GASTROENTEROLOGY | Facility: CLINIC | Age: 77
End: 2022-07-06

## 2022-07-06 DIAGNOSIS — Z71.89 COMPLEX CARE COORDINATION: Primary | ICD-10-CM

## 2022-07-06 NOTE — TELEPHONE ENCOUNTER
Appointment Confirmation   Person confirmed appointment with  If not patient, name of the person Patient    Date and time of appointment 7/7 12:00    Patient acknowledged and will be at appointment? yes    Did you advise the patient that they will need a urine sample if they are a new patient?  N/A    Did you advise the patient to bring their current medications for verification? (including any OTC) Yes    Additional Information

## 2022-07-06 NOTE — TELEPHONE ENCOUNTER
----- Message from Ken Oneal MD sent at 7/3/2022 12:27 PM EDT -----  Please arrange outpatient follow-up within 2 weeks with whichever provider who has availability  Thank-you!  Dx: Iron deficiency    Ken Oneal MD  Gastroenterology Fellow

## 2022-07-07 ENCOUNTER — OFFICE VISIT (OUTPATIENT)
Dept: NEPHROLOGY | Facility: CLINIC | Age: 77
End: 2022-07-07
Payer: COMMERCIAL

## 2022-07-07 ENCOUNTER — TELEPHONE (OUTPATIENT)
Dept: INTERNAL MEDICINE CLINIC | Age: 77
End: 2022-07-07

## 2022-07-07 VITALS
BODY MASS INDEX: 28.43 KG/M2 | SYSTOLIC BLOOD PRESSURE: 130 MMHG | HEIGHT: 60 IN | DIASTOLIC BLOOD PRESSURE: 60 MMHG | WEIGHT: 144.8 LBS | OXYGEN SATURATION: 99 % | HEART RATE: 62 BPM

## 2022-07-07 DIAGNOSIS — R60.0 LOCALIZED EDEMA: ICD-10-CM

## 2022-07-07 DIAGNOSIS — N18.30 BENIGN HYPERTENSION WITH CKD (CHRONIC KIDNEY DISEASE) STAGE III (HCC): ICD-10-CM

## 2022-07-07 DIAGNOSIS — N17.9 AKI (ACUTE KIDNEY INJURY) (HCC): ICD-10-CM

## 2022-07-07 DIAGNOSIS — I12.9 BENIGN HYPERTENSION WITH CKD (CHRONIC KIDNEY DISEASE) STAGE III (HCC): ICD-10-CM

## 2022-07-07 DIAGNOSIS — N18.30 STAGE 3 CHRONIC KIDNEY DISEASE, UNSPECIFIED WHETHER STAGE 3A OR 3B CKD (HCC): Primary | ICD-10-CM

## 2022-07-07 PROCEDURE — 99214 OFFICE O/P EST MOD 30 MIN: CPT | Performed by: PHYSICIAN ASSISTANT

## 2022-07-07 PROCEDURE — 1111F DSCHRG MED/CURRENT MED MERGE: CPT | Performed by: PHYSICIAN ASSISTANT

## 2022-07-07 RX ORDER — METOPROLOL SUCCINATE 100 MG/1
100 TABLET, EXTENDED RELEASE ORAL DAILY
COMMUNITY

## 2022-07-07 NOTE — TELEPHONE ENCOUNTER
Pako Lamb a NP who sees pt in her home called the office concerning pt being on 2 beta blockers    toprol and coreg    States she is concerned about pts heart rate going too low and she doesn't see pt until the 20th again  Pt does see Dr Mini Chase on 11th  Pts BP today was 130/60 (controlled)    She is asking that a provider call her today to talk about it since Dr Mini Chase is out of office until tomorrow  Sravanthi Kendall can be reached at [phone]      Thank you!
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
## 2022-07-08 ENCOUNTER — TELEPHONE (OUTPATIENT)
Dept: NEPHROLOGY | Facility: CLINIC | Age: 77
End: 2022-07-08

## 2022-07-08 ENCOUNTER — TELEPHONE (OUTPATIENT)
Dept: INTERNAL MEDICINE CLINIC | Age: 77
End: 2022-07-08

## 2022-07-08 NOTE — TELEPHONE ENCOUNTER
Clinical Pharmacist asked should there be a hold on Atorvastatin and Fenofibrate because Liver injury  It was never discussed from her hospital discharge summary  Patient does have an TCM on Monday with Dr Christiano Winchester on Monday

## 2022-07-08 NOTE — TELEPHONE ENCOUNTER
Called patient and informed her of the following:    Please let patient know I spoke with Dr Ted Boland and he agrees that she should only be on 1 beta blocker so she should stop the Toprol (metoprolol) and continue the coreg (carvedilol)   Patient and her  verbally understood and had no further questions for me at this time

## 2022-07-08 NOTE — TELEPHONE ENCOUNTER
----- Message from Carole Daniel PA-C sent at 7/8/2022 11:39 AM EDT -----  Please let patient know I spoke with Dr Melanie Rodriguez and he agrees that she should only be on 1 beta blocker so she should stop the Toprol (metoprolol) and continue the coreg (carvedilol)   ----- Message -----  From: Jatinder Arevalo MD  Sent: 7/8/2022   8:48 AM EDT  To: Carole Daniel PA-C    Good morning  If she is on 2 beta blocker that is not what we intended  I would prefer Coreg/carvedilol to be continued  [place]  ----- Message -----  From: Carole Daniel PA-C  Sent: 7/7/2022  12:52 PM EDT  To: Jatinder Arevalo MD    Hi,   I saw Tomasz Rodríguez today for nephrology follow up  Noted she was on 2 beta blockers  On carvedilol on hospital discharge summary NSAID you told her to restart her Toprol after discharge  I wanted to confirm which beta-blocker you would like her on since she is currently taking both     Thank you,   Carole Daniel PA-C

## 2022-07-11 ENCOUNTER — OFFICE VISIT (OUTPATIENT)
Dept: INTERNAL MEDICINE CLINIC | Age: 77
End: 2022-07-11
Payer: COMMERCIAL

## 2022-07-11 VITALS
SYSTOLIC BLOOD PRESSURE: 132 MMHG | HEART RATE: 76 BPM | BODY MASS INDEX: 27.34 KG/M2 | DIASTOLIC BLOOD PRESSURE: 60 MMHG | OXYGEN SATURATION: 95 % | WEIGHT: 140 LBS | TEMPERATURE: 98.1 F

## 2022-07-11 DIAGNOSIS — E03.9 ACQUIRED HYPOTHYROIDISM: ICD-10-CM

## 2022-07-11 DIAGNOSIS — N18.32 TYPE 2 DIABETES MELLITUS WITH STAGE 3B CHRONIC KIDNEY DISEASE, WITH LONG-TERM CURRENT USE OF INSULIN (HCC): Primary | ICD-10-CM

## 2022-07-11 DIAGNOSIS — E78.2 MIXED HYPERLIPIDEMIA: ICD-10-CM

## 2022-07-11 DIAGNOSIS — Z79.4 TYPE 2 DIABETES MELLITUS WITH STAGE 3B CHRONIC KIDNEY DISEASE, WITH LONG-TERM CURRENT USE OF INSULIN (HCC): Primary | ICD-10-CM

## 2022-07-11 DIAGNOSIS — R79.89 ELEVATED LFTS: ICD-10-CM

## 2022-07-11 DIAGNOSIS — I12.9 BENIGN HYPERTENSION WITH CKD (CHRONIC KIDNEY DISEASE) STAGE III (HCC): ICD-10-CM

## 2022-07-11 DIAGNOSIS — K21.9 GASTROESOPHAGEAL REFLUX DISEASE, UNSPECIFIED WHETHER ESOPHAGITIS PRESENT: ICD-10-CM

## 2022-07-11 DIAGNOSIS — Z13.820 OSTEOPOROSIS SCREENING: ICD-10-CM

## 2022-07-11 DIAGNOSIS — N18.30 BENIGN HYPERTENSION WITH CKD (CHRONIC KIDNEY DISEASE) STAGE III (HCC): ICD-10-CM

## 2022-07-11 DIAGNOSIS — E11.22 TYPE 2 DIABETES MELLITUS WITH STAGE 3B CHRONIC KIDNEY DISEASE, WITH LONG-TERM CURRENT USE OF INSULIN (HCC): Primary | ICD-10-CM

## 2022-07-11 PROCEDURE — 1111F DSCHRG MED/CURRENT MED MERGE: CPT | Performed by: INTERNAL MEDICINE

## 2022-07-11 PROCEDURE — 99495 TRANSJ CARE MGMT MOD F2F 14D: CPT | Performed by: INTERNAL MEDICINE
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
## 2022-07-12 ENCOUNTER — TELEPHONE (OUTPATIENT)
Dept: INTERNAL MEDICINE CLINIC | Age: 77
End: 2022-07-12

## 2022-07-12 NOTE — TELEPHONE ENCOUNTER
----- Message from Radha Olson sent at 7/5/2022  8:32 AM EDT -----  Patient is being discharged from their inpatient hospitalization today  Patients unplanned readmission score is red or yellow (meaning that they are at high risk of readmission) and require a TCM appointment within 3-5 days of discharge  Please contact this patient to schedule appointment      Thank you    Inpatient Care Management

## 2022-07-14 DIAGNOSIS — M54.50 LOW BACK PAIN: ICD-10-CM

## 2022-07-14 RX ORDER — TRAMADOL HYDROCHLORIDE 50 MG/1
50 TABLET ORAL 2 TIMES DAILY
Qty: 60 TABLET | Refills: 0 | Status: SHIPPED | OUTPATIENT
Start: 2022-07-14 | End: 2022-08-12 | Stop reason: SDUPTHER

## 2022-07-14 NOTE — TELEPHONE ENCOUNTER
Patient stopped by to request the Tramadol 50 mg one tablet bid as needed for pain    Send over to MICHELLE Pham

## 2022-07-18 ENCOUNTER — OFFICE VISIT (OUTPATIENT)
Dept: URGENT CARE | Facility: MEDICAL CENTER | Age: 77
End: 2022-07-18
Payer: COMMERCIAL

## 2022-07-18 ENCOUNTER — TELEPHONE (OUTPATIENT)
Dept: GASTROENTEROLOGY | Facility: CLINIC | Age: 77
End: 2022-07-18

## 2022-07-18 ENCOUNTER — APPOINTMENT (OUTPATIENT)
Dept: RADIOLOGY | Facility: MEDICAL CENTER | Age: 77
End: 2022-07-18
Payer: COMMERCIAL

## 2022-07-18 VITALS
WEIGHT: 134 LBS | TEMPERATURE: 97.1 F | DIASTOLIC BLOOD PRESSURE: 77 MMHG | SYSTOLIC BLOOD PRESSURE: 182 MMHG | BODY MASS INDEX: 27.01 KG/M2 | HEIGHT: 59 IN | RESPIRATION RATE: 20 BRPM | HEART RATE: 80 BPM | OXYGEN SATURATION: 97 %

## 2022-07-18 DIAGNOSIS — S01.111A LACERATION OF RIGHT EYEBROW, INITIAL ENCOUNTER: ICD-10-CM

## 2022-07-18 DIAGNOSIS — S80.01XA CONTUSION OF RIGHT KNEE, INITIAL ENCOUNTER: ICD-10-CM

## 2022-07-18 DIAGNOSIS — S00.83XA CONTUSION OF FACE, INITIAL ENCOUNTER: Primary | ICD-10-CM

## 2022-07-18 PROCEDURE — 12011 RPR F/E/E/N/L/M 2.5 CM/<: CPT | Performed by: PHYSICIAN ASSISTANT

## 2022-07-18 PROCEDURE — 99213 OFFICE O/P EST LOW 20 MIN: CPT | Performed by: PHYSICIAN ASSISTANT

## 2022-07-18 PROCEDURE — 73564 X-RAY EXAM KNEE 4 OR MORE: CPT
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
## 2022-07-18 NOTE — PATIENT INSTRUCTIONS
Head trauma  Go to the ER  Laceration right eyebrow   Contusion right knee  Follow up with PCP in 3-5 days  Proceed to  ER if symptoms worsen

## 2022-07-18 NOTE — TELEPHONE ENCOUNTER
----- Message from Rossy Smith MD sent at 7/18/2022 10:44 AM EDT -----  Biopsy negative for H pylori infection and celiac disease

## 2022-07-25 ENCOUNTER — OFFICE VISIT (OUTPATIENT)
Dept: INTERNAL MEDICINE CLINIC | Age: 77
End: 2022-07-25
Payer: COMMERCIAL

## 2022-07-25 VITALS
DIASTOLIC BLOOD PRESSURE: 62 MMHG | HEART RATE: 72 BPM | WEIGHT: 134.1 LBS | BODY MASS INDEX: 27.04 KG/M2 | SYSTOLIC BLOOD PRESSURE: 140 MMHG | TEMPERATURE: 98.3 F | OXYGEN SATURATION: 98 % | HEIGHT: 59 IN

## 2022-07-25 DIAGNOSIS — I10 HYPERTENSION, UNSPECIFIED TYPE: ICD-10-CM

## 2022-07-25 DIAGNOSIS — E11.9 TYPE 2 DIABETES MELLITUS WITHOUT COMPLICATION, UNSPECIFIED WHETHER LONG TERM INSULIN USE (HCC): ICD-10-CM

## 2022-07-25 DIAGNOSIS — M70.41 PREPATELLAR BURSITIS OF RIGHT KNEE: Primary | ICD-10-CM

## 2022-07-25 DIAGNOSIS — N18.31 STAGE 3A CHRONIC KIDNEY DISEASE (HCC): ICD-10-CM

## 2022-07-25 PROCEDURE — 3078F DIAST BP <80 MM HG: CPT | Performed by: INTERNAL MEDICINE

## 2022-07-25 PROCEDURE — 99213 OFFICE O/P EST LOW 20 MIN: CPT | Performed by: INTERNAL MEDICINE

## 2022-07-25 PROCEDURE — 3077F SYST BP >= 140 MM HG: CPT | Performed by: INTERNAL MEDICINE

## 2022-07-25 PROCEDURE — 1160F RVW MEDS BY RX/DR IN RCRD: CPT | Performed by: INTERNAL MEDICINE

## 2022-07-25 RX ORDER — CEPHALEXIN 500 MG/1
500 CAPSULE ORAL EVERY 8 HOURS SCHEDULED
Qty: 21 CAPSULE | Refills: 0 | Status: SHIPPED | OUTPATIENT
Start: 2022-07-25 | End: 2022-08-01
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
## 2022-07-25 NOTE — PATIENT INSTRUCTIONS
Please take the keflex 3 times a day for 7 days   Please call the orthopedic surgery office to schedule an appt

## 2022-07-26 ENCOUNTER — PATIENT OUTREACH (OUTPATIENT)
Dept: INTERNAL MEDICINE CLINIC | Age: 77
End: 2022-07-26

## 2022-07-26 NOTE — PROGRESS NOTES
Outpatient Care Management Note: Pt declines outreach for care management at this time  She states that her  sometimes reminds her of taking her medications  She mostly stays 'on top of' appts and things health related

## 2022-07-27 LAB
ALBUMIN SERPL-MCNC: 3.7 G/DL (ref 3.6–5.1)
ALBUMIN/GLOB SERPL: 1.5 (CALC) (ref 1–2.5)
ALP SERPL-CCNC: 121 U/L (ref 37–153)
ALT SERPL-CCNC: 23 U/L (ref 6–29)
AST SERPL-CCNC: 26 U/L (ref 10–35)
BASOPHILS # BLD AUTO: 51 CELLS/UL (ref 0–200)
BASOPHILS NFR BLD AUTO: 1 %
BILIRUB SERPL-MCNC: 0.6 MG/DL (ref 0.2–1.2)
BUN SERPL-MCNC: 47 MG/DL (ref 7–25)
BUN/CREAT SERPL: 36 (CALC) (ref 6–22)
CALCIUM SERPL-MCNC: 9.5 MG/DL (ref 8.6–10.4)
CHLORIDE SERPL-SCNC: 104 MMOL/L (ref 98–110)
CO2 SERPL-SCNC: 27 MMOL/L (ref 20–32)
CREAT SERPL-MCNC: 1.29 MG/DL (ref 0.6–1)
EOSINOPHIL # BLD AUTO: 383 CELLS/UL (ref 15–500)
EOSINOPHIL NFR BLD AUTO: 7.5 %
ERYTHROCYTE [DISTWIDTH] IN BLOOD BY AUTOMATED COUNT: 14.5 % (ref 11–15)
GFR/BSA.PRED SERPLBLD CYS-BASED-ARV: 42 ML/MIN/1.73M2
GLOBULIN SER CALC-MCNC: 2.4 G/DL (CALC) (ref 1.9–3.7)
GLUCOSE SERPL-MCNC: 124 MG/DL (ref 65–99)
HCT VFR BLD AUTO: 30.8 % (ref 35–45)
HGB BLD-MCNC: 9.8 G/DL (ref 11.7–15.5)
LYMPHOCYTES # BLD AUTO: 1448 CELLS/UL (ref 850–3900)
LYMPHOCYTES NFR BLD AUTO: 28.4 %
MCH RBC QN AUTO: 30.5 PG (ref 27–33)
MCHC RBC AUTO-ENTMCNC: 31.8 G/DL (ref 32–36)
MCV RBC AUTO: 96 FL (ref 80–100)
MONOCYTES # BLD AUTO: 694 CELLS/UL (ref 200–950)
MONOCYTES NFR BLD AUTO: 13.6 %
NEUTROPHILS # BLD AUTO: 2525 CELLS/UL (ref 1500–7800)
NEUTROPHILS NFR BLD AUTO: 49.5 %
PLATELET # BLD AUTO: 190 THOUSAND/UL (ref 140–400)
PMV BLD REES-ECKER: 11.4 FL (ref 7.5–12.5)
POTASSIUM SERPL-SCNC: 4.6 MMOL/L (ref 3.5–5.3)
PROT SERPL-MCNC: 6.1 G/DL (ref 6.1–8.1)
RBC # BLD AUTO: 3.21 MILLION/UL (ref 3.8–5.1)
SODIUM SERPL-SCNC: 139 MMOL/L (ref 135–146)
WBC # BLD AUTO: 5.1 THOUSAND/UL (ref 3.8–10.8)

## 2022-07-28 LAB
LEFT EYE DIABETIC RETINOPATHY: NORMAL
RIGHT EYE DIABETIC RETINOPATHY: NORMAL

## 2022-08-02 ENCOUNTER — OFFICE VISIT (OUTPATIENT)
Dept: OBGYN CLINIC | Facility: CLINIC | Age: 77
End: 2022-08-02
Payer: COMMERCIAL

## 2022-08-02 ENCOUNTER — OFFICE VISIT (OUTPATIENT)
Dept: INTERNAL MEDICINE CLINIC | Age: 77
End: 2022-08-02
Payer: COMMERCIAL

## 2022-08-02 VITALS
DIASTOLIC BLOOD PRESSURE: 68 MMHG | SYSTOLIC BLOOD PRESSURE: 128 MMHG | WEIGHT: 137 LBS | TEMPERATURE: 98.3 F | OXYGEN SATURATION: 98 % | HEIGHT: 59 IN | HEART RATE: 72 BPM | BODY MASS INDEX: 27.62 KG/M2

## 2022-08-02 VITALS
WEIGHT: 134 LBS | DIASTOLIC BLOOD PRESSURE: 52 MMHG | HEIGHT: 59 IN | BODY MASS INDEX: 27.01 KG/M2 | SYSTOLIC BLOOD PRESSURE: 120 MMHG

## 2022-08-02 DIAGNOSIS — Z79.4 TYPE 2 DIABETES MELLITUS WITH STABLE PROLIFERATIVE RETINOPATHY OF BOTH EYES, WITH LONG-TERM CURRENT USE OF INSULIN (HCC): ICD-10-CM

## 2022-08-02 DIAGNOSIS — E11.22 TYPE 2 DIABETES MELLITUS WITH STAGE 3B CHRONIC KIDNEY DISEASE, WITH LONG-TERM CURRENT USE OF INSULIN (HCC): ICD-10-CM

## 2022-08-02 DIAGNOSIS — K21.9 GASTROESOPHAGEAL REFLUX DISEASE, UNSPECIFIED WHETHER ESOPHAGITIS PRESENT: ICD-10-CM

## 2022-08-02 DIAGNOSIS — Z12.31 ENCOUNTER FOR SCREENING MAMMOGRAM FOR MALIGNANT NEOPLASM OF BREAST: Primary | ICD-10-CM

## 2022-08-02 DIAGNOSIS — N18.32 TYPE 2 DIABETES MELLITUS WITH STAGE 3B CHRONIC KIDNEY DISEASE, WITH LONG-TERM CURRENT USE OF INSULIN (HCC): ICD-10-CM

## 2022-08-02 DIAGNOSIS — Z79.4 TYPE 2 DIABETES MELLITUS WITH STAGE 3B CHRONIC KIDNEY DISEASE, WITH LONG-TERM CURRENT USE OF INSULIN (HCC): ICD-10-CM

## 2022-08-02 DIAGNOSIS — D61.818 PANCYTOPENIA (HCC): ICD-10-CM

## 2022-08-02 DIAGNOSIS — E11.3553 TYPE 2 DIABETES MELLITUS WITH STABLE PROLIFERATIVE RETINOPATHY OF BOTH EYES, WITH LONG-TERM CURRENT USE OF INSULIN (HCC): ICD-10-CM

## 2022-08-02 DIAGNOSIS — N18.32 STAGE 3B CHRONIC KIDNEY DISEASE (HCC): ICD-10-CM

## 2022-08-02 DIAGNOSIS — M70.41 HEMORRHAGIC PREPATELLAR BURSITIS OF RIGHT KNEE: Primary | ICD-10-CM

## 2022-08-02 DIAGNOSIS — M70.41 PREPATELLAR BURSITIS OF RIGHT KNEE: ICD-10-CM

## 2022-08-02 PROCEDURE — 99203 OFFICE O/P NEW LOW 30 MIN: CPT | Performed by: PHYSICIAN ASSISTANT

## 2022-08-02 PROCEDURE — 3074F SYST BP LT 130 MM HG: CPT | Performed by: PHYSICIAN ASSISTANT

## 2022-08-02 PROCEDURE — 99214 OFFICE O/P EST MOD 30 MIN: CPT | Performed by: INTERNAL MEDICINE

## 2022-08-02 PROCEDURE — 1111F DSCHRG MED/CURRENT MED MERGE: CPT | Performed by: INTERNAL MEDICINE

## 2022-08-02 PROCEDURE — 20610 DRAIN/INJ JOINT/BURSA W/O US: CPT | Performed by: PHYSICIAN ASSISTANT

## 2022-08-02 PROCEDURE — 3078F DIAST BP <80 MM HG: CPT | Performed by: PHYSICIAN ASSISTANT
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
## 2022-08-12 DIAGNOSIS — M54.50 LOW BACK PAIN: ICD-10-CM

## 2022-08-12 RX ORDER — TRAMADOL HYDROCHLORIDE 50 MG/1
50 TABLET ORAL 2 TIMES DAILY
Qty: 60 TABLET | Refills: 0 | Status: SHIPPED | OUTPATIENT
Start: 2022-08-12 | End: 2022-09-15 | Stop reason: SDUPTHER

## 2022-08-12 NOTE — TELEPHONE ENCOUNTER
LS 8/2/22, NA 9/20/22, LF 7/14/22 amt 60/0- takes bid, checked  the PDMP    DR KNUTSON IS OUT OF THE OFFICE -PLEASE FILL

## 2022-08-17 ENCOUNTER — OFFICE VISIT (OUTPATIENT)
Dept: GASTROENTEROLOGY | Facility: CLINIC | Age: 77
End: 2022-08-17
Payer: COMMERCIAL

## 2022-08-17 VITALS
OXYGEN SATURATION: 97 % | DIASTOLIC BLOOD PRESSURE: 70 MMHG | HEART RATE: 72 BPM | SYSTOLIC BLOOD PRESSURE: 118 MMHG | TEMPERATURE: 97.6 F | HEIGHT: 59 IN | WEIGHT: 137 LBS | RESPIRATION RATE: 18 BRPM | BODY MASS INDEX: 27.62 KG/M2

## 2022-08-17 DIAGNOSIS — Z86.010 HISTORY OF COLON POLYPS: Primary | ICD-10-CM

## 2022-08-17 DIAGNOSIS — R79.89 ELEVATED LFTS: ICD-10-CM

## 2022-08-17 DIAGNOSIS — K59.09 OTHER CONSTIPATION: ICD-10-CM

## 2022-08-17 DIAGNOSIS — K21.9 GASTROESOPHAGEAL REFLUX DISEASE WITHOUT ESOPHAGITIS: ICD-10-CM

## 2022-08-17 PROBLEM — Z86.0100 HISTORY OF COLON POLYPS: Status: ACTIVE | Noted: 2022-02-04

## 2022-08-17 PROCEDURE — 99214 OFFICE O/P EST MOD 30 MIN: CPT | Performed by: INTERNAL MEDICINE

## 2022-08-17 RX ORDER — POLYETHYLENE GLYCOL 3350, SODIUM SULFATE ANHYDROUS, SODIUM BICARBONATE, SODIUM CHLORIDE, POTASSIUM CHLORIDE 236; 22.74; 6.74; 5.86; 2.97 G/4L; G/4L; G/4L; G/4L; G/4L
4000 POWDER, FOR SOLUTION ORAL ONCE
Qty: 4000 ML | Refills: 0 | Status: SHIPPED | OUTPATIENT
Start: 2022-08-17 | End: 2022-09-20

## 2022-08-17 NOTE — PATIENT INSTRUCTIONS
Take Miralax daily  Try to drink more water (goal of 6-8 glasses a day)  We will schedule you for a colonoscopy  Please follow the instructions for the bowel prep      Scheduled date of colonoscopy (as of today): 11/3/22  Physician performing colonoscopy: Dr Inga Martinez  Location of colonoscopy: [facility] [place]   Bowel prep reviewed with patient: tisha/dulcolax  Instructions reviewed with patient by: Deanna  Clearances:   n/a

## 2022-08-17 NOTE — ASSESSMENT & PLAN NOTE
Colonoscopy in 2015 by Dr Rollo Sever  Had multiple polyps removed and findings of diverticulosis  Was recommended repeat colonoscopy in three years  His yet to undergo that follow-up colonoscopy  Denies any hematochezia or melena or other alarm symptoms  Does have constipation  Not on any antiplatelets or anticoagulants  No family history of colon cancer    · Will schedule for surveillance colonoscopy at this time  · GoLYTELY/Dulcolax bowel prep  · Based on patient's age, this will likely be her last colonoscopy depending on findings

## 2022-08-17 NOTE — ASSESSMENT & PLAN NOTE
Resolved  Workup in the hospital was negative  Likely secondary to medication (hydralazine)  LFTs normalized after medication was stopped    · No further workup necessary

## 2022-08-17 NOTE — ASSESSMENT & PLAN NOTE
Suspect secondary to medications including tramadol and iron therapy vs possible underlying IBS-C  Last colonoscopy in 2015 with reported diverticulosis and multiple polyps removed  No alarm symptoms  Admits to poor water intake  Currently taking Miralax only as needed    · Start MiraLax daily  · Encouraged patient to increase her water intake within any restrictions given her cardiac comorbidities  · Continue tramadol at discretion of PCP if necessary  · Can escalate bowel regimen to trial of Linzess or Amitiza following colonoscopy if needed
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
## 2022-08-17 NOTE — ASSESSMENT & PLAN NOTE
Symptoms controlled  EGD in 06/2022 with mild gastritis (biopsies negative)    · Continue famotidine 20 mg daily  · Can escalate to omeprazole if needed for better symptom control  · Antireflux measures

## 2022-08-25 ENCOUNTER — OFFICE VISIT (OUTPATIENT)
Dept: PODIATRY | Facility: CLINIC | Age: 77
End: 2022-08-25
Payer: COMMERCIAL

## 2022-08-25 VITALS
WEIGHT: 137.8 LBS | HEART RATE: 71 BPM | HEIGHT: 59 IN | DIASTOLIC BLOOD PRESSURE: 71 MMHG | BODY MASS INDEX: 27.78 KG/M2 | SYSTOLIC BLOOD PRESSURE: 175 MMHG

## 2022-08-25 DIAGNOSIS — E11.42 DIABETIC POLYNEUROPATHY ASSOCIATED WITH TYPE 2 DIABETES MELLITUS (HCC): Primary | ICD-10-CM

## 2022-08-25 DIAGNOSIS — I73.9 PERIPHERAL VASCULAR DISEASE, UNSPECIFIED (HCC): ICD-10-CM

## 2022-08-25 PROCEDURE — 1160F RVW MEDS BY RX/DR IN RCRD: CPT | Performed by: PODIATRIST

## 2022-08-25 PROCEDURE — 99212 OFFICE O/P EST SF 10 MIN: CPT | Performed by: PODIATRIST

## 2022-08-25 RX ORDER — NIFEDIPINE 60 MG/1
TABLET, EXTENDED RELEASE ORAL
COMMUNITY
Start: 2022-08-10

## 2022-08-25 NOTE — PROGRESS NOTES
Patient presents for palliative diabetic foot care  No acute disorder noted  No palpable pedal pulses  Treatment consisted of trimming of elongated toenails  Patient is rescheduled in 10 weeks

## 2022-09-14 LAB
BASOPHILS # BLD AUTO: 33 CELLS/UL (ref 0–200)
BASOPHILS NFR BLD AUTO: 0.6 %
BUN SERPL-MCNC: 47 MG/DL (ref 7–25)
BUN/CREAT SERPL: 46 (CALC) (ref 6–22)
CALCIUM SERPL-MCNC: 10 MG/DL (ref 8.6–10.4)
CHLORIDE SERPL-SCNC: 104 MMOL/L (ref 98–110)
CHOLEST SERPL-MCNC: 144 MG/DL
CHOLEST/HDLC SERPL: 2.9 (CALC)
CO2 SERPL-SCNC: 31 MMOL/L (ref 20–32)
CREAT SERPL-MCNC: 1.02 MG/DL (ref 0.6–1)
EOSINOPHIL # BLD AUTO: 303 CELLS/UL (ref 15–500)
EOSINOPHIL NFR BLD AUTO: 5.5 %
ERYTHROCYTE [DISTWIDTH] IN BLOOD BY AUTOMATED COUNT: 14 % (ref 11–15)
GFR/BSA.PRED SERPLBLD CYS-BASED-ARV: 56 ML/MIN/1.73M2
GLUCOSE SERPL-MCNC: 104 MG/DL (ref 65–99)
HBA1C MFR BLD: 6.3 % OF TOTAL HGB
HCT VFR BLD AUTO: 34 % (ref 35–45)
HDLC SERPL-MCNC: 49 MG/DL
HGB BLD-MCNC: 11.1 G/DL (ref 11.7–15.5)
LDLC SERPL CALC-MCNC: 68 MG/DL (CALC)
LYMPHOCYTES # BLD AUTO: 1584 CELLS/UL (ref 850–3900)
LYMPHOCYTES NFR BLD AUTO: 28.8 %
MCH RBC QN AUTO: 29.8 PG (ref 27–33)
MCHC RBC AUTO-ENTMCNC: 32.6 G/DL (ref 32–36)
MCV RBC AUTO: 91.4 FL (ref 80–100)
MONOCYTES # BLD AUTO: 677 CELLS/UL (ref 200–950)
MONOCYTES NFR BLD AUTO: 12.3 %
NEUTROPHILS # BLD AUTO: 2904 CELLS/UL (ref 1500–7800)
NEUTROPHILS NFR BLD AUTO: 52.8 %
NONHDLC SERPL-MCNC: 95 MG/DL (CALC)
PLATELET # BLD AUTO: 149 THOUSAND/UL (ref 140–400)
PMV BLD REES-ECKER: 11.7 FL (ref 7.5–12.5)
POTASSIUM SERPL-SCNC: 4.5 MMOL/L (ref 3.5–5.3)
RBC # BLD AUTO: 3.72 MILLION/UL (ref 3.8–5.1)
SODIUM SERPL-SCNC: 141 MMOL/L (ref 135–146)
T4 FREE SERPL-MCNC: 1.2 NG/DL (ref 0.8–1.8)
TRIGL SERPL-MCNC: 197 MG/DL
TSH SERPL-ACNC: 5.26 MIU/L (ref 0.4–4.5)
WBC # BLD AUTO: 5.5 THOUSAND/UL (ref 3.8–10.8)

## 2022-09-15 DIAGNOSIS — M54.50 LOW BACK PAIN: ICD-10-CM

## 2022-09-15 RX ORDER — TRAMADOL HYDROCHLORIDE 50 MG/1
50 TABLET ORAL 2 TIMES DAILY
Qty: 60 TABLET | Refills: 0 | Status: SHIPPED | OUTPATIENT
Start: 2022-09-15 | End: 2022-10-12 | Stop reason: SDUPTHER

## 2022-09-20 ENCOUNTER — OFFICE VISIT (OUTPATIENT)
Dept: INTERNAL MEDICINE CLINIC | Age: 77
End: 2022-09-20
Payer: COMMERCIAL

## 2022-09-20 VITALS
DIASTOLIC BLOOD PRESSURE: 68 MMHG | WEIGHT: 141 LBS | HEART RATE: 71 BPM | BODY MASS INDEX: 28.43 KG/M2 | HEIGHT: 59 IN | OXYGEN SATURATION: 98 % | SYSTOLIC BLOOD PRESSURE: 170 MMHG | TEMPERATURE: 98 F

## 2022-09-20 DIAGNOSIS — N18.30 BENIGN HYPERTENSION WITH CKD (CHRONIC KIDNEY DISEASE) STAGE III (HCC): ICD-10-CM

## 2022-09-20 DIAGNOSIS — Z79.4 TYPE 2 DIABETES MELLITUS WITH STAGE 3B CHRONIC KIDNEY DISEASE, WITH LONG-TERM CURRENT USE OF INSULIN (HCC): ICD-10-CM

## 2022-09-20 DIAGNOSIS — N18.32 STAGE 3B CHRONIC KIDNEY DISEASE (HCC): ICD-10-CM

## 2022-09-20 DIAGNOSIS — E03.9 HYPOTHYROIDISM, UNSPECIFIED TYPE: ICD-10-CM

## 2022-09-20 DIAGNOSIS — I10 HYPERTENSION, UNSPECIFIED TYPE: ICD-10-CM

## 2022-09-20 DIAGNOSIS — Z79.4 TYPE 2 DIABETES MELLITUS WITH DIABETIC POLYNEUROPATHY, WITH LONG-TERM CURRENT USE OF INSULIN (HCC): ICD-10-CM

## 2022-09-20 DIAGNOSIS — E11.42 TYPE 2 DIABETES MELLITUS WITH DIABETIC POLYNEUROPATHY, WITH LONG-TERM CURRENT USE OF INSULIN (HCC): ICD-10-CM

## 2022-09-20 DIAGNOSIS — Z79.4 TYPE 2 DIABETES MELLITUS WITH COMPLICATION, WITH LONG-TERM CURRENT USE OF INSULIN (HCC): ICD-10-CM

## 2022-09-20 DIAGNOSIS — E11.8 TYPE 2 DIABETES MELLITUS WITH COMPLICATION, WITH LONG-TERM CURRENT USE OF INSULIN (HCC): ICD-10-CM

## 2022-09-20 DIAGNOSIS — E79.0 HYPERURICEMIA: ICD-10-CM

## 2022-09-20 DIAGNOSIS — E11.22 TYPE 2 DIABETES MELLITUS WITH STAGE 3B CHRONIC KIDNEY DISEASE, WITH LONG-TERM CURRENT USE OF INSULIN (HCC): ICD-10-CM

## 2022-09-20 DIAGNOSIS — I12.9 BENIGN HYPERTENSION WITH CKD (CHRONIC KIDNEY DISEASE) STAGE III (HCC): ICD-10-CM

## 2022-09-20 DIAGNOSIS — N18.32 TYPE 2 DIABETES MELLITUS WITH STAGE 3B CHRONIC KIDNEY DISEASE, WITH LONG-TERM CURRENT USE OF INSULIN (HCC): ICD-10-CM

## 2022-09-20 DIAGNOSIS — J31.0 RHINITIS, UNSPECIFIED TYPE: ICD-10-CM

## 2022-09-20 DIAGNOSIS — J45.909 ASTHMA WITHOUT STATUS ASTHMATICUS WITHOUT COMPLICATION, UNSPECIFIED ASTHMA SEVERITY, UNSPECIFIED WHETHER PERSISTENT: ICD-10-CM

## 2022-09-20 DIAGNOSIS — K21.9 GASTROESOPHAGEAL REFLUX DISEASE WITHOUT ESOPHAGITIS: Primary | ICD-10-CM

## 2022-09-20 PROCEDURE — 1160F RVW MEDS BY RX/DR IN RCRD: CPT | Performed by: INTERNAL MEDICINE

## 2022-09-20 PROCEDURE — 3077F SYST BP >= 140 MM HG: CPT | Performed by: INTERNAL MEDICINE

## 2022-09-20 PROCEDURE — 3078F DIAST BP <80 MM HG: CPT | Performed by: INTERNAL MEDICINE

## 2022-09-20 PROCEDURE — 99214 OFFICE O/P EST MOD 30 MIN: CPT | Performed by: INTERNAL MEDICINE

## 2022-09-20 RX ORDER — ALBUTEROL SULFATE 90 UG/1
2 AEROSOL, METERED RESPIRATORY (INHALATION) 4 TIMES DAILY
Qty: 18 G | Refills: 5 | Status: SHIPPED | OUTPATIENT
Start: 2022-09-20

## 2022-09-20 RX ORDER — ALLOPURINOL 100 MG/1
200 TABLET ORAL DAILY
Qty: 60 TABLET | Refills: 5 | Status: SHIPPED | OUTPATIENT
Start: 2022-09-20

## 2022-09-20 RX ORDER — FLUTICASONE PROPIONATE 50 MCG
2 SPRAY, SUSPENSION (ML) NASAL DAILY
Qty: 16 G | Refills: 3 | Status: SHIPPED | OUTPATIENT
Start: 2022-09-20

## 2022-09-20 RX ORDER — INSULIN DETEMIR 100 [IU]/ML
40 INJECTION, SOLUTION SUBCUTANEOUS EVERY 12 HOURS SCHEDULED
Qty: 20 ML | Refills: 5 | Status: SHIPPED | OUTPATIENT
Start: 2022-09-20

## 2022-09-20 RX ORDER — LEVOTHYROXINE SODIUM 0.1 MG/1
100 TABLET ORAL DAILY
Qty: 90 TABLET | Refills: 1 | Status: SHIPPED | OUTPATIENT
Start: 2022-09-20

## 2022-09-20 RX ORDER — CARVEDILOL 25 MG/1
25 TABLET ORAL 2 TIMES DAILY WITH MEALS
Qty: 180 TABLET | Refills: 1 | Status: SHIPPED | OUTPATIENT
Start: 2022-09-20 | End: 2023-09-20

## 2022-09-20 NOTE — PATIENT INSTRUCTIONS
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
## 2022-09-27 DIAGNOSIS — Z12.31 ENCOUNTER FOR SCREENING MAMMOGRAM FOR MALIGNANT NEOPLASM OF BREAST: ICD-10-CM

## 2022-09-27 DIAGNOSIS — E03.9 HYPOTHYROIDISM, UNSPECIFIED TYPE: ICD-10-CM

## 2022-09-27 DIAGNOSIS — N18.32 STAGE 3B CHRONIC KIDNEY DISEASE (HCC): ICD-10-CM

## 2022-09-28 ENCOUNTER — VBI (OUTPATIENT)
Dept: ADMINISTRATIVE | Facility: OTHER | Age: 77
End: 2022-09-28

## 2022-10-11 PROBLEM — Z00.00 MEDICARE ANNUAL WELLNESS VISIT, SUBSEQUENT: Status: RESOLVED | Noted: 2018-08-28 | Resolved: 2022-10-11

## 2022-10-12 DIAGNOSIS — M54.50 ACUTE MIDLINE LOW BACK PAIN, UNSPECIFIED WHETHER SCIATICA PRESENT: ICD-10-CM

## 2022-10-12 DIAGNOSIS — M54.50 LOW BACK PAIN: ICD-10-CM

## 2022-10-12 RX ORDER — TRAMADOL HYDROCHLORIDE 50 MG/1
50 TABLET ORAL 2 TIMES DAILY
Qty: 60 TABLET | Refills: 0 | Status: SHIPPED | OUTPATIENT
Start: 2022-10-12

## 2022-10-12 RX ORDER — METHOCARBAMOL 500 MG/1
500 TABLET, FILM COATED ORAL 3 TIMES DAILY
Qty: 21 TABLET | Refills: 0 | Status: SHIPPED | OUTPATIENT
Start: 2022-10-12 | End: 2022-10-21 | Stop reason: SDUPTHER

## 2022-10-12 NOTE — TELEPHONE ENCOUNTER
LOV 9/20/22  NOV 1/20/23    Patient would like refill for methocarbamol 500mg   Please advise, thank you

## 2022-10-19 ENCOUNTER — TELEPHONE (OUTPATIENT)
Dept: OTHER | Facility: OTHER | Age: 77
End: 2022-10-19

## 2022-10-19 NOTE — TELEPHONE ENCOUNTER
Patient called in inquire if her colonoscopy scheduled for 11/3/22 is covered by her insurer  Please follow up with patient  Patient gives permission to leave voicemail with information if she is not available

## 2022-10-21 ENCOUNTER — IMMUNIZATIONS (OUTPATIENT)
Dept: INTERNAL MEDICINE CLINIC | Age: 77
End: 2022-10-21
Payer: COMMERCIAL

## 2022-10-21 DIAGNOSIS — Z23 ENCOUNTER FOR IMMUNIZATION: Primary | ICD-10-CM

## 2022-10-21 DIAGNOSIS — M54.50 ACUTE MIDLINE LOW BACK PAIN, UNSPECIFIED WHETHER SCIATICA PRESENT: ICD-10-CM

## 2022-10-21 PROCEDURE — G0008 ADMIN INFLUENZA VIRUS VAC: HCPCS

## 2022-10-21 PROCEDURE — 90662 IIV NO PRSV INCREASED AG IM: CPT

## 2022-10-21 RX ORDER — METHOCARBAMOL 500 MG/1
500 TABLET, FILM COATED ORAL 3 TIMES DAILY
Qty: 21 TABLET | Refills: 0 | Status: SHIPPED | OUTPATIENT
Start: 2022-10-21 | End: 2022-10-28

## 2022-11-03 ENCOUNTER — TELEPHONE (OUTPATIENT)
Dept: INTERNAL MEDICINE CLINIC | Age: 77
End: 2022-11-03

## 2022-11-03 ENCOUNTER — NURSE TRIAGE (OUTPATIENT)
Dept: OTHER | Facility: OTHER | Age: 77
End: 2022-11-03

## 2022-11-03 NOTE — TELEPHONE ENCOUNTER
Patient states that her BP is 176/82 rest, she has a history of heart failure  Patient drank 32 ounces of her prep and developed hand and feet swelling increased HR and BP up to 786 systolic  Patient cancelled her colonoscopy  Patient states she was 138lbs this am  And 140lbs yesterday  Patient was able to take all medications  Patient does have diarrhea  Patient did the GoLYTELY/Dulcolax bowel prep  Reason for Disposition  • Bowel prep for colonoscopy, questions about    Answer Assessment - Initial Assessment Questions  Patient was supposed to have a colonoscopy today but cancelled it because the prep started to cause swelling in her hands and feet and caused her BP to increased to 200/100   Her BP is now 585 systolic and resting HR is 103   -Patient is asking what she should do now    Protocols used: COLONOSCOPY SYMPTOMS AND QUESTIONS-ADULT-

## 2022-11-03 NOTE — TELEPHONE ENCOUNTER
Patient states that she has no way to get to the ED right now and that she will go as soon as she can  In the meantime she states she will call her cardiologist and tell them what happened

## 2022-11-03 NOTE — TELEPHONE ENCOUNTER
Patient was to do the colonoscopy today but she couldn't do it  She started to take the medicine that they gave her  She started to have headaches, blood pressure was up over 200's and also feet started to swell up  They weren't able to do the colonoscopy for her today      Please call the  or Odessa Rajan back mobile [phone]

## 2022-11-03 NOTE — TELEPHONE ENCOUNTER
Regarding: poss reaction to colonoscopy prep:  HTN, elevate HR, HA, bilate LE swelling  ----- Message from Tommy Patel sent at 11/3/2022 10:34 AM EDT -----  "One hour after I started my colonoscopy prep last night I developed a bad headache, feet and leg swelling, my blood pressure shot up to 206/81 and my heart rate was 103 resting; this morning my blood pressure is still elevated at 171/59;  I did call last night and cancel my colonoscopy for today "

## 2022-11-08 DIAGNOSIS — M54.50 LOW BACK PAIN: ICD-10-CM

## 2022-11-08 DIAGNOSIS — M54.50 ACUTE MIDLINE LOW BACK PAIN, UNSPECIFIED WHETHER SCIATICA PRESENT: ICD-10-CM

## 2022-11-08 NOTE — TELEPHONE ENCOUNTER
Ever White dropped off a paper for refills on the following medication    Tramadol 50 mg one tablet bid  Last refilled 10/12/2022    To soon to order    Robaxin 500 mg one tablet tid and would like more than a week supply for this medication    [facility]

## 2022-11-10 ENCOUNTER — OFFICE VISIT (OUTPATIENT)
Dept: PODIATRY | Facility: CLINIC | Age: 77
End: 2022-11-10

## 2022-11-10 VITALS
WEIGHT: 144.6 LBS | BODY MASS INDEX: 29.15 KG/M2 | DIASTOLIC BLOOD PRESSURE: 52 MMHG | HEART RATE: 72 BPM | SYSTOLIC BLOOD PRESSURE: 164 MMHG | HEIGHT: 59 IN

## 2022-11-10 DIAGNOSIS — E11.42 DIABETIC POLYNEUROPATHY ASSOCIATED WITH TYPE 2 DIABETES MELLITUS (HCC): Primary | ICD-10-CM

## 2022-11-10 NOTE — PROGRESS NOTES
Patient presents for palliative diabetic foot care  No acute disorder noted  No palpable pedal pulses  Treatment consisted of nail trimming  Patient has an asymptomatic subungual hematoma affecting the left 2nd nail that is growing out without incident  Patient will be rescheduled in 10 weeks

## 2022-11-11 RX ORDER — TRAMADOL HYDROCHLORIDE 50 MG/1
50 TABLET ORAL 2 TIMES DAILY
Qty: 60 TABLET | Refills: 0 | Status: SHIPPED | OUTPATIENT
Start: 2022-11-11

## 2022-11-11 RX ORDER — METHOCARBAMOL 500 MG/1
500 TABLET, FILM COATED ORAL 3 TIMES DAILY
Qty: 42 TABLET | Refills: 0 | Status: SHIPPED | OUTPATIENT
Start: 2022-11-11 | End: 2022-11-28 | Stop reason: SDUPTHER

## 2022-11-11 NOTE — TELEPHONE ENCOUNTER
Dr Juliet Núñez,    Please advise if ok to change quantity of Robaxin, pt  Is asking for more than a week supply      Thank you

## 2022-11-18 ENCOUNTER — VBI (OUTPATIENT)
Dept: ADMINISTRATIVE | Facility: OTHER | Age: 77
End: 2022-11-18

## 2022-11-28 DIAGNOSIS — M54.50 ACUTE MIDLINE LOW BACK PAIN, UNSPECIFIED WHETHER SCIATICA PRESENT: ICD-10-CM

## 2022-11-28 RX ORDER — METHOCARBAMOL 500 MG/1
500 TABLET, FILM COATED ORAL 3 TIMES DAILY
Qty: 270 TABLET | Refills: 1 | Status: SHIPPED | OUTPATIENT
Start: 2022-11-28

## 2022-11-28 NOTE — TELEPHONE ENCOUNTER
Dr Sheilda Soulier,    The patient wants to know why she is only getting a week or two worth of the Robaxin  Can she have more than that  Can you give her a month or more supply so she doesn't have to come in to request a refill several times a week      Please advise      [facility] is the Pharmacy

## 2022-12-12 DIAGNOSIS — M54.50 LOW BACK PAIN: ICD-10-CM

## 2022-12-12 RX ORDER — TRAMADOL HYDROCHLORIDE 50 MG/1
50 TABLET ORAL 2 TIMES DAILY
Qty: 60 TABLET | Refills: 0 | Status: SHIPPED | OUTPATIENT
Start: 2022-12-12

## 2022-12-21 ENCOUNTER — VBI (OUTPATIENT)
Dept: ADMINISTRATIVE | Facility: OTHER | Age: 77
End: 2022-12-21

## 2023-01-03 ENCOUNTER — TELEPHONE (OUTPATIENT)
Dept: INTERNAL MEDICINE CLINIC | Age: 78
End: 2023-01-03

## 2023-01-03 NOTE — TELEPHONE ENCOUNTER
Patient's  stopped by the office today  Stating that she has been having the diarrhea so bad  She can't keep anything down without it going through her  Went to Pharmacy and was told to take the Generic form of Pepto Bismol  She has been taking them every hour    What else should she be doing or taking    No fever, just diarrhea, stomach cramps,     Please call her back at the home number

## 2023-01-06 ENCOUNTER — DOCUMENTATION (OUTPATIENT)
Dept: NEPHROLOGY | Facility: CLINIC | Age: 78
End: 2023-01-06

## 2023-01-11 ENCOUNTER — TELEPHONE (OUTPATIENT)
Dept: INTERNAL MEDICINE CLINIC | Age: 78
End: 2023-01-11

## 2023-01-11 ENCOUNTER — VBI (OUTPATIENT)
Dept: ADMINISTRATIVE | Facility: OTHER | Age: 78
End: 2023-01-11

## 2023-01-11 DIAGNOSIS — I10 HYPERTENSION, UNSPECIFIED TYPE: ICD-10-CM

## 2023-01-11 DIAGNOSIS — M54.50 LOW BACK PAIN: ICD-10-CM

## 2023-01-11 RX ORDER — BUMETANIDE 2 MG/1
2 TABLET ORAL DAILY
Qty: 30 TABLET | Refills: 5 | Status: SHIPPED | OUTPATIENT
Start: 2023-01-11

## 2023-01-11 NOTE — TELEPHONE ENCOUNTER
Patient is requesting a refill on the following medication    Bumetanide 2 mg once a day    [facility]    NOV: 01/20/2023

## 2023-01-12 ENCOUNTER — TELEPHONE (OUTPATIENT)
Dept: NEPHROLOGY | Facility: CLINIC | Age: 78
End: 2023-01-12

## 2023-01-12 RX ORDER — TRAMADOL HYDROCHLORIDE 50 MG/1
50 TABLET ORAL 2 TIMES DAILY
Qty: 60 TABLET | Refills: 0 | Status: SHIPPED | OUTPATIENT
Start: 2023-01-12

## 2023-01-12 NOTE — TELEPHONE ENCOUNTER
Left voice mail to schedule appointment with Dr Karina Urias in the [place] location  This is for their July appointment  This is the 2nd attempt

## 2023-01-12 NOTE — TELEPHONE ENCOUNTER
Spoke with Patient and schedule appointment for 7/24/2023 with Dr Aimee Martino in the [facility]

## 2023-01-13 ENCOUNTER — RA CDI HCC (OUTPATIENT)
Dept: OTHER | Facility: HOSPITAL | Age: 78
End: 2023-01-13

## 2023-01-13 NOTE — PROGRESS NOTES
[facility]  coding opportunities       Chart reviewed, no opportunity found: CHART REVIEWED, NO OPPORTUNITY FOUND        Patients Insurance     Medicare Insurance: [facility] [employer]

## 2023-01-17 ENCOUNTER — OFFICE VISIT (OUTPATIENT)
Dept: PODIATRY | Facility: CLINIC | Age: 78
End: 2023-01-17

## 2023-01-17 VITALS
BODY MASS INDEX: 29.07 KG/M2 | DIASTOLIC BLOOD PRESSURE: 72 MMHG | SYSTOLIC BLOOD PRESSURE: 148 MMHG | HEART RATE: 69 BPM | HEIGHT: 59 IN | WEIGHT: 144.2 LBS

## 2023-01-17 DIAGNOSIS — E11.42 DIABETIC POLYNEUROPATHY ASSOCIATED WITH TYPE 2 DIABETES MELLITUS (HCC): Primary | ICD-10-CM

## 2023-01-17 RX ORDER — CARVEDILOL 12.5 MG/1
TABLET ORAL
COMMUNITY
Start: 2022-12-31 | End: 2023-01-20 | Stop reason: SDUPTHER

## 2023-01-17 NOTE — PROGRESS NOTES
Patient presents for palliative diabetic foot care  No acute disorder noted  There is palpable dorsalis pedis pulse bilateral but absent PT  Toenails are elongated  No open areas on either foot  Treatment consists of nail trimming

## 2023-01-18 ENCOUNTER — TELEPHONE (OUTPATIENT)
Dept: NEPHROLOGY | Facility: CLINIC | Age: 78
End: 2023-01-18

## 2023-01-18 LAB
25(OH)D3 SERPL-MCNC: 40 NG/ML (ref 30–100)
ALBUMIN SERPL-MCNC: 3.5 G/DL (ref 3.6–5.1)
ALBUMIN SERPL-MCNC: 3.6 G/DL (ref 3.6–5.1)
ALBUMIN/CREAT UR: 360 MCG/MG CREAT
ALBUMIN/GLOB SERPL: 1.6 (CALC) (ref 1–2.5)
ALP SERPL-CCNC: 88 U/L (ref 37–153)
ALT SERPL-CCNC: 19 U/L (ref 6–29)
APPEARANCE UR: CLEAR
AST SERPL-CCNC: 22 U/L (ref 10–35)
BACTERIA UR QL AUTO: ABNORMAL /HPF
BASOPHILS # BLD AUTO: 33 CELLS/UL (ref 0–200)
BASOPHILS NFR BLD AUTO: 0.6 %
BILIRUB SERPL-MCNC: 0.4 MG/DL (ref 0.2–1.2)
BILIRUB UR QL STRIP: NEGATIVE
BUN SERPL-MCNC: 43 MG/DL (ref 7–25)
BUN SERPL-MCNC: 45 MG/DL (ref 7–25)
BUN/CREAT SERPL: 45 (CALC) (ref 6–22)
BUN/CREAT SERPL: 46 (CALC) (ref 6–22)
CALCIUM SERPL-MCNC: 9.3 MG/DL (ref 8.6–10.4)
CHLORIDE SERPL-SCNC: 104 MMOL/L (ref 98–110)
CHLORIDE SERPL-SCNC: 104 MMOL/L (ref 98–110)
CO2 SERPL-SCNC: 31 MMOL/L (ref 20–32)
CO2 SERPL-SCNC: 32 MMOL/L (ref 20–32)
COLOR UR: YELLOW
CREAT SERPL-MCNC: 0.94 MG/DL (ref 0.6–1)
CREAT SERPL-MCNC: 1 MG/DL (ref 0.6–1)
CREAT UR-MCNC: 48 MG/DL (ref 20–275)
EOSINOPHIL # BLD AUTO: 292 CELLS/UL (ref 15–500)
EOSINOPHIL NFR BLD AUTO: 5.3 %
ERYTHROCYTE [DISTWIDTH] IN BLOOD BY AUTOMATED COUNT: 13.9 % (ref 11–15)
GFR/BSA.PRED SERPLBLD CYS-BASED-ARV: 57 ML/MIN/1.73M2
GFR/BSA.PRED SERPLBLD CYS-BASED-ARV: 62 ML/MIN/1.73M2
GLOBULIN SER CALC-MCNC: 2.2 G/DL (CALC) (ref 1.9–3.7)
GLUCOSE SERPL-MCNC: 87 MG/DL (ref 65–99)
GLUCOSE SERPL-MCNC: 88 MG/DL (ref 65–99)
GLUCOSE UR QL STRIP: NEGATIVE
HBA1C MFR BLD: 6.6 % OF TOTAL HGB
HCT VFR BLD AUTO: 33.1 % (ref 35–45)
HGB BLD-MCNC: 10.7 G/DL (ref 11.7–15.5)
HGB UR QL STRIP: NEGATIVE
HYALINE CASTS #/AREA URNS LPF: ABNORMAL /LPF
KETONES UR QL STRIP: NEGATIVE
LEUKOCYTE ESTERASE UR QL STRIP: NEGATIVE
LYMPHOCYTES # BLD AUTO: 1634 CELLS/UL (ref 850–3900)
LYMPHOCYTES NFR BLD AUTO: 29.7 %
MAGNESIUM SERPL-MCNC: 2.1 MG/DL (ref 1.5–2.5)
MCH RBC QN AUTO: 30 PG (ref 27–33)
MCHC RBC AUTO-ENTMCNC: 32.3 G/DL (ref 32–36)
MCV RBC AUTO: 92.7 FL (ref 80–100)
MICROALBUMIN UR-MCNC: 17.3 MG/DL
MONOCYTES # BLD AUTO: 556 CELLS/UL (ref 200–950)
MONOCYTES NFR BLD AUTO: 10.1 %
NEUTROPHILS # BLD AUTO: 2987 CELLS/UL (ref 1500–7800)
NEUTROPHILS NFR BLD AUTO: 54.3 %
NITRITE UR QL STRIP: NEGATIVE
PH UR STRIP: 6.5 [PH] (ref 5–8)
PHOSPHATE SERPL-MCNC: 4.6 MG/DL (ref 2.1–4.3)
PLATELET # BLD AUTO: 163 THOUSAND/UL (ref 140–400)
PMV BLD REES-ECKER: 12.4 FL (ref 7.5–12.5)
POTASSIUM SERPL-SCNC: 4.5 MMOL/L (ref 3.5–5.3)
POTASSIUM SERPL-SCNC: 4.7 MMOL/L (ref 3.5–5.3)
PROT SERPL-MCNC: 5.8 G/DL (ref 6.1–8.1)
PROT UR QL STRIP: ABNORMAL
PTH-INTACT SERPL-MCNC: 39 PG/ML (ref 16–77)
RBC # BLD AUTO: 3.57 MILLION/UL (ref 3.8–5.1)
RBC #/AREA URNS HPF: ABNORMAL /HPF
SODIUM SERPL-SCNC: 139 MMOL/L (ref 135–146)
SODIUM SERPL-SCNC: 140 MMOL/L (ref 135–146)
SP GR UR STRIP: 1.01 (ref 1–1.03)
SQUAMOUS #/AREA URNS HPF: ABNORMAL /HPF
TSH SERPL-ACNC: 3.35 MIU/L (ref 0.4–4.5)
WBC # BLD AUTO: 5.5 THOUSAND/UL (ref 3.8–10.8)
WBC #/AREA URNS HPF: ABNORMAL /HPF

## 2023-01-18 NOTE — TELEPHONE ENCOUNTER
Called patient and went over the following:    Patients BMP reviewed and kidney function stable  Patient verbally understood and had no further questions for me at this time

## 2023-01-18 NOTE — TELEPHONE ENCOUNTER
----- Message from Donna Mendoza, [address] sent at 1/18/2023  1:24 PM EST -----  Please let patient know BMP reviewed and kidney function stable

## 2023-01-20 ENCOUNTER — OFFICE VISIT (OUTPATIENT)
Dept: INTERNAL MEDICINE CLINIC | Age: 78
End: 2023-01-20

## 2023-01-20 VITALS
SYSTOLIC BLOOD PRESSURE: 138 MMHG | OXYGEN SATURATION: 99 % | DIASTOLIC BLOOD PRESSURE: 72 MMHG | HEART RATE: 68 BPM | WEIGHT: 141 LBS | BODY MASS INDEX: 28.43 KG/M2 | TEMPERATURE: 98.2 F | HEIGHT: 59 IN

## 2023-01-20 DIAGNOSIS — Z79.4 LONG TERM (CURRENT) USE OF INSULIN (HCC): ICD-10-CM

## 2023-01-20 DIAGNOSIS — I12.9 BENIGN HYPERTENSION WITH CKD (CHRONIC KIDNEY DISEASE) STAGE III (HCC): ICD-10-CM

## 2023-01-20 DIAGNOSIS — J31.0 RHINITIS, UNSPECIFIED TYPE: ICD-10-CM

## 2023-01-20 DIAGNOSIS — J45.41 MODERATE PERSISTENT ASTHMA WITH ACUTE EXACERBATION: ICD-10-CM

## 2023-01-20 DIAGNOSIS — E11.3553 TYPE 2 DIABETES MELLITUS WITH STABLE PROLIFERATIVE RETINOPATHY OF BOTH EYES, WITH LONG-TERM CURRENT USE OF INSULIN (HCC): ICD-10-CM

## 2023-01-20 DIAGNOSIS — E11.8 TYPE 2 DIABETES MELLITUS WITH COMPLICATION, WITH LONG-TERM CURRENT USE OF INSULIN (HCC): ICD-10-CM

## 2023-01-20 DIAGNOSIS — D61.818 PANCYTOPENIA (HCC): ICD-10-CM

## 2023-01-20 DIAGNOSIS — N18.32 STAGE 3B CHRONIC KIDNEY DISEASE (HCC): ICD-10-CM

## 2023-01-20 DIAGNOSIS — E11.42 TYPE 2 DIABETES MELLITUS WITH DIABETIC POLYNEUROPATHY, WITH LONG-TERM CURRENT USE OF INSULIN (HCC): ICD-10-CM

## 2023-01-20 DIAGNOSIS — E78.2 MIXED HYPERLIPIDEMIA: ICD-10-CM

## 2023-01-20 DIAGNOSIS — I73.9 PERIPHERAL VASCULAR DISEASE, UNSPECIFIED (HCC): ICD-10-CM

## 2023-01-20 DIAGNOSIS — K21.9 GASTROESOPHAGEAL REFLUX DISEASE WITHOUT ESOPHAGITIS: ICD-10-CM

## 2023-01-20 DIAGNOSIS — Z79.4 TYPE 2 DIABETES MELLITUS WITH STAGE 3B CHRONIC KIDNEY DISEASE, WITH LONG-TERM CURRENT USE OF INSULIN (HCC): Primary | ICD-10-CM

## 2023-01-20 DIAGNOSIS — N18.32 TYPE 2 DIABETES MELLITUS WITH STAGE 3B CHRONIC KIDNEY DISEASE, WITH LONG-TERM CURRENT USE OF INSULIN (HCC): Primary | ICD-10-CM

## 2023-01-20 DIAGNOSIS — E78.5 HYPERLIPIDEMIA, UNSPECIFIED HYPERLIPIDEMIA TYPE: ICD-10-CM

## 2023-01-20 DIAGNOSIS — Z79.4 TYPE 2 DIABETES MELLITUS WITH COMPLICATION, WITH LONG-TERM CURRENT USE OF INSULIN (HCC): ICD-10-CM

## 2023-01-20 DIAGNOSIS — E11.22 TYPE 2 DIABETES MELLITUS WITH STAGE 3B CHRONIC KIDNEY DISEASE, WITH LONG-TERM CURRENT USE OF INSULIN (HCC): Primary | ICD-10-CM

## 2023-01-20 DIAGNOSIS — F11.20 CONTINUOUS OPIOID DEPENDENCE (HCC): ICD-10-CM

## 2023-01-20 DIAGNOSIS — I10 PRIMARY HYPERTENSION: ICD-10-CM

## 2023-01-20 DIAGNOSIS — Z79.4 TYPE 2 DIABETES MELLITUS WITH DIABETIC POLYNEUROPATHY, WITH LONG-TERM CURRENT USE OF INSULIN (HCC): ICD-10-CM

## 2023-01-20 DIAGNOSIS — N18.30 BENIGN HYPERTENSION WITH CKD (CHRONIC KIDNEY DISEASE) STAGE III (HCC): ICD-10-CM

## 2023-01-20 DIAGNOSIS — I10 HYPERTENSION, UNSPECIFIED TYPE: ICD-10-CM

## 2023-01-20 DIAGNOSIS — N18.32 CHRONIC KIDNEY DISEASE, STAGE 3B (HCC): ICD-10-CM

## 2023-01-20 DIAGNOSIS — E03.9 HYPOTHYROIDISM, UNSPECIFIED TYPE: ICD-10-CM

## 2023-01-20 DIAGNOSIS — Z79.4 TYPE 2 DIABETES MELLITUS WITH STABLE PROLIFERATIVE RETINOPATHY OF BOTH EYES, WITH LONG-TERM CURRENT USE OF INSULIN (HCC): ICD-10-CM

## 2023-01-20 PROBLEM — N17.9 AKI (ACUTE KIDNEY INJURY) (HCC): Status: RESOLVED | Noted: 2022-06-27 | Resolved: 2023-01-20

## 2023-01-20 RX ORDER — NITROGLYCERIN 0.4 MG/1
0.4 TABLET SUBLINGUAL
Qty: 10 TABLET | Refills: 0 | Status: SHIPPED | OUTPATIENT
Start: 2023-01-20

## 2023-01-20 RX ORDER — ATORVASTATIN CALCIUM 80 MG/1
80 TABLET, FILM COATED ORAL DAILY
Qty: 90 TABLET | Refills: 1 | Status: SHIPPED | OUTPATIENT
Start: 2023-01-20

## 2023-01-20 RX ORDER — LOSARTAN POTASSIUM 100 MG/1
100 TABLET ORAL DAILY
Qty: 90 TABLET | Refills: 1 | Status: SHIPPED | OUTPATIENT
Start: 2023-01-20

## 2023-01-20 RX ORDER — LEVOTHYROXINE SODIUM 0.1 MG/1
100 TABLET ORAL DAILY
Qty: 90 TABLET | Refills: 1 | Status: SHIPPED | OUTPATIENT
Start: 2023-01-20

## 2023-01-20 RX ORDER — INSULIN DETEMIR 100 [IU]/ML
40 INJECTION, SOLUTION SUBCUTANEOUS EVERY 12 HOURS SCHEDULED
Qty: 20 ML | Refills: 5 | Status: SHIPPED | OUTPATIENT
Start: 2023-01-20

## 2023-01-20 RX ORDER — FLUTICASONE PROPIONATE 50 MCG
2 SPRAY, SUSPENSION (ML) NASAL DAILY
Qty: 16 G | Refills: 3 | Status: SHIPPED | OUTPATIENT
Start: 2023-01-20

## 2023-01-20 RX ORDER — CARVEDILOL 12.5 MG/1
12.5 TABLET ORAL 2 TIMES DAILY WITH MEALS
Qty: 180 TABLET | Refills: 1 | Status: SHIPPED | OUTPATIENT
Start: 2023-01-20

## 2023-01-20 RX ORDER — MONTELUKAST SODIUM 10 MG/1
10 TABLET ORAL
Qty: 30 TABLET | Refills: 5 | Status: SHIPPED | OUTPATIENT
Start: 2023-01-20

## 2023-01-20 NOTE — ASSESSMENT & PLAN NOTE
Lab Results   Component Value Date    EGFR 57 (L) 01/17/2023    EGFR 62 01/17/2023    EGFR 56 (L) 09/13/2022    CREATININE 1 00 01/17/2023    CREATININE 0 94 01/17/2023    CREATININE 1 02 (H) 09/13/2022   Renal function is stable    Also being followed by nephrologist

## 2023-01-20 NOTE — ASSESSMENT & PLAN NOTE
Presently she is on tramadol 50 mg p o  twice daily for spinal stenosis  Pain is stable with present regimen

## 2023-01-20 NOTE — ASSESSMENT & PLAN NOTE
Lab Results   Component Value Date    HGBA1C 6 6 (H) 01/17/2023   Renal function is stable    Continue with present regimen

## 2023-01-20 NOTE — ASSESSMENT & PLAN NOTE
Lab Results   Component Value Date    HGBA1C 6 6 (H) 01/17/2023   Stable  Continue with present regimen
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
## 2023-01-20 NOTE — PATIENT INSTRUCTIONS

## 2023-01-31 ENCOUNTER — TELEPHONE (OUTPATIENT)
Dept: INTERNAL MEDICINE CLINIC | Age: 78
End: 2023-01-31

## 2023-01-31 DIAGNOSIS — N18.30 BENIGN HYPERTENSION WITH CKD (CHRONIC KIDNEY DISEASE) STAGE III (HCC): Primary | ICD-10-CM

## 2023-01-31 DIAGNOSIS — I12.9 BENIGN HYPERTENSION WITH CKD (CHRONIC KIDNEY DISEASE) STAGE III (HCC): Primary | ICD-10-CM

## 2023-01-31 RX ORDER — CARVEDILOL 25 MG/1
25 TABLET ORAL 2 TIMES DAILY WITH MEALS
Qty: 180 TABLET | Refills: 1 | Status: SHIPPED | OUTPATIENT
Start: 2023-01-31

## 2023-01-31 NOTE — TELEPHONE ENCOUNTER
Patient came into the office questioning why her carvedilol was decreased to 12 5mg BID  Patient states she was previously taking 25mg BID        Please advise, thank you

## 2023-01-31 NOTE — TELEPHONE ENCOUNTER
Spoke with patient- Patient was taking 25mg BID with old script- Spoke with [facility], 12 5mg script canceled(patient never picked up)   25mg BID sent over

## 2023-02-03 ENCOUNTER — HOSPITAL ENCOUNTER (OUTPATIENT)
Dept: RADIOLOGY | Age: 78
Discharge: HOME | End: 2023-02-03

## 2023-02-03 VITALS — BODY MASS INDEX: 28.22 KG/M2 | HEIGHT: 59 IN | WEIGHT: 140 LBS

## 2023-02-03 DIAGNOSIS — Z13.820 OSTEOPOROSIS SCREENING: ICD-10-CM

## 2023-02-08 DIAGNOSIS — M54.50 LOW BACK PAIN: ICD-10-CM

## 2023-02-10 RX ORDER — TRAMADOL HYDROCHLORIDE 50 MG/1
50 TABLET ORAL 2 TIMES DAILY
Qty: 60 TABLET | Refills: 0 | Status: SHIPPED | OUTPATIENT
Start: 2023-02-10

## 2023-03-09 DIAGNOSIS — M54.50 LOW BACK PAIN: ICD-10-CM

## 2023-03-10 RX ORDER — TRAMADOL HYDROCHLORIDE 50 MG/1
50 TABLET ORAL 2 TIMES DAILY
Qty: 60 TABLET | Refills: 0 | Status: SHIPPED | OUTPATIENT
Start: 2023-03-10

## 2023-03-14 ENCOUNTER — TELEPHONE (OUTPATIENT)
Dept: INTERNAL MEDICINE CLINIC | Age: 78
End: 2023-03-14

## 2023-03-14 NOTE — TELEPHONE ENCOUNTER
----- Message from Noam Galvez MD sent at 2/27/2023 10:10 AM EST -----  Regarding: Approval for Prolia injection  Please try to get approval for Prolia injection and let me know if approved or not    Thanks  ----- Message -----  From: Interface, Radiology Results In  Sent: 2/3/2023   2:16 PM EST  To: Noam Galvez MD

## 2023-03-23 NOTE — TELEPHONE ENCOUNTER
Patient would be responsible for 250  00 every 6 months for the Prolia injection    LMOM to let patient know this information and to call back to see if she would like to get the injection or not

## 2023-03-28 ENCOUNTER — OFFICE VISIT (OUTPATIENT)
Dept: PODIATRY | Facility: CLINIC | Age: 78
End: 2023-03-28

## 2023-03-28 VITALS
SYSTOLIC BLOOD PRESSURE: 162 MMHG | DIASTOLIC BLOOD PRESSURE: 67 MMHG | WEIGHT: 150.4 LBS | BODY MASS INDEX: 30.32 KG/M2 | HEIGHT: 59 IN | HEART RATE: 75 BPM

## 2023-03-28 DIAGNOSIS — E11.42 DIABETIC POLYNEUROPATHY ASSOCIATED WITH TYPE 2 DIABETES MELLITUS (HCC): Primary | ICD-10-CM

## 2023-03-28 NOTE — PROGRESS NOTES
Patient presents for palliative diabetic foot care  No acute disorder noted  No palpable pedal pulses  Chief complaint involves elongated toenails that she cannot trim  Patient notes that she has an asymptomatic mallet toe and left second toe  No treatment advised due to paucity of symptoms and vascular disease  Treatment consisted of nail trimming  Patient is rescheduled in 10 weeks

## 2023-04-25 ENCOUNTER — TELEPHONE (OUTPATIENT)
Dept: INTERNAL MEDICINE CLINIC | Age: 78
End: 2023-04-25

## 2023-04-25 NOTE — TELEPHONE ENCOUNTER
rx refill for allopurinol (ZYLOPRIM) 100 mg tablet      Send to [facility], [address]     Nov- 6/12/23

## 2023-05-09 DIAGNOSIS — M54.50 LOW BACK PAIN: ICD-10-CM

## 2023-05-09 NOTE — TELEPHONE ENCOUNTER
stopped by with a paper to request the following medication    Tramadol 50 mg one tablet bid    [facility]    NOV: 06/12/2023

## 2023-05-10 RX ORDER — TRAMADOL HYDROCHLORIDE 50 MG/1
50 TABLET ORAL 2 TIMES DAILY
Qty: 60 TABLET | Refills: 0 | Status: SHIPPED | OUTPATIENT
Start: 2023-05-10

## 2023-06-07 LAB
BASOPHILS # BLD AUTO: 32 CELLS/UL (ref 0–200)
BASOPHILS NFR BLD AUTO: 0.6 %
BUN SERPL-MCNC: 48 MG/DL (ref 7–25)
BUN/CREAT SERPL: 44 (CALC) (ref 6–22)
CALCIUM SERPL-MCNC: 10.2 MG/DL (ref 8.6–10.4)
CHLORIDE SERPL-SCNC: 105 MMOL/L (ref 98–110)
CO2 SERPL-SCNC: 30 MMOL/L (ref 20–32)
CREAT SERPL-MCNC: 1.08 MG/DL (ref 0.6–1)
EOSINOPHIL # BLD AUTO: 248 CELLS/UL (ref 15–500)
EOSINOPHIL NFR BLD AUTO: 4.6 %
ERYTHROCYTE [DISTWIDTH] IN BLOOD BY AUTOMATED COUNT: 13.6 % (ref 11–15)
GFR/BSA.PRED SERPLBLD CYS-BASED-ARV: 52 ML/MIN/1.73M2
GLUCOSE SERPL-MCNC: 61 MG/DL (ref 65–99)
HBA1C MFR BLD: 7 % OF TOTAL HGB
HCT VFR BLD AUTO: 33.9 % (ref 35–45)
HGB BLD-MCNC: 11.2 G/DL (ref 11.7–15.5)
LYMPHOCYTES # BLD AUTO: 1723 CELLS/UL (ref 850–3900)
LYMPHOCYTES NFR BLD AUTO: 31.9 %
MCH RBC QN AUTO: 30.1 PG (ref 27–33)
MCHC RBC AUTO-ENTMCNC: 33 G/DL (ref 32–36)
MCV RBC AUTO: 91.1 FL (ref 80–100)
MONOCYTES # BLD AUTO: 659 CELLS/UL (ref 200–950)
MONOCYTES NFR BLD AUTO: 12.2 %
NEUTROPHILS # BLD AUTO: 2738 CELLS/UL (ref 1500–7800)
NEUTROPHILS NFR BLD AUTO: 50.7 %
PLATELET # BLD AUTO: 149 THOUSAND/UL (ref 140–400)
PMV BLD REES-ECKER: 12.3 FL (ref 7.5–12.5)
POTASSIUM SERPL-SCNC: 4.1 MMOL/L (ref 3.5–5.3)
RBC # BLD AUTO: 3.72 MILLION/UL (ref 3.8–5.1)
SODIUM SERPL-SCNC: 141 MMOL/L (ref 135–146)
T4 FREE SERPL-MCNC: 1.2 NG/DL (ref 0.8–1.8)
TSH SERPL-ACNC: 4.94 MIU/L (ref 0.4–4.5)
WBC # BLD AUTO: 5.4 THOUSAND/UL (ref 3.8–10.8)

## 2023-06-08 ENCOUNTER — OFFICE VISIT (OUTPATIENT)
Dept: PODIATRY | Facility: CLINIC | Age: 78
End: 2023-06-08
Payer: COMMERCIAL

## 2023-06-08 VITALS
DIASTOLIC BLOOD PRESSURE: 69 MMHG | WEIGHT: 145 LBS | SYSTOLIC BLOOD PRESSURE: 168 MMHG | RESPIRATION RATE: 18 BRPM | HEIGHT: 58 IN | BODY MASS INDEX: 30.44 KG/M2

## 2023-06-08 DIAGNOSIS — S90.32XA CONTUSION OF LEFT FOOT, INITIAL ENCOUNTER: Primary | ICD-10-CM

## 2023-06-08 PROCEDURE — 99213 OFFICE O/P EST LOW 20 MIN: CPT | Performed by: PODIATRIST

## 2023-06-08 NOTE — PROGRESS NOTES
"Assessment/Plan:    I personally reviewed x-rays of the left foot  They are negative for fracture or osseous pathology  Explained to patient that she has a left foot contusion  Advised her to utilize ice and Tylenol  She will be reassessed in 10 weeks  Diabetic foot exam to be performed at that visit  No problem-specific Assessment & Plan notes found for this encounter  Diagnoses and all orders for this visit:    Contusion of left foot, initial encounter  -     X-ray foot left 3+ views; Future          Subjective:      Patient ID: Sathya Matta is a 76 y o  female  HPI     Patient, 70-year-old type II diabetic female presents with a painful and bruised left foot  Patient states that she tripped yesterday and fell leading to her discomfort  She is uncertain as to the whether or not she may have a fracture  She is ambulating with a limp and has difficulty getting a shoe on  The following portions of the patient's history were reviewed and updated as appropriate: allergies, current medications, past family history, past medical history, past social history, past surgical history and problem list     Review of Systems   Gastrointestinal:        GERD   Genitourinary:        Stage III renal disease   Musculoskeletal: Positive for arthralgias  Objective:      /69   Resp 18   Ht 4' 10\" (1 473 m)   Wt 65 8 kg (145 lb)   LMP  (LMP Unknown)   BMI 30 31 kg/m²          Physical Exam  Constitutional:       Appearance: Normal appearance  Cardiovascular:      Pulses: Normal pulses  Musculoskeletal:         General: Swelling, tenderness and signs of injury present  Comments: Left forefoot is edematous and ecchymotic  No open areas present  Sharp pain with palpation of the third and fourth metatarsal shafts  Skin:     General: Skin is warm  Neurological:      General: No focal deficit present  Mental Status: She is oriented to person, place, and time               "

## 2023-06-12 ENCOUNTER — OFFICE VISIT (OUTPATIENT)
Dept: INTERNAL MEDICINE CLINIC | Age: 78
End: 2023-06-12
Payer: COMMERCIAL

## 2023-06-12 ENCOUNTER — TELEPHONE (OUTPATIENT)
Dept: PODIATRY | Facility: CLINIC | Age: 78
End: 2023-06-12

## 2023-06-12 VITALS
HEIGHT: 58 IN | BODY MASS INDEX: 30.64 KG/M2 | HEART RATE: 64 BPM | OXYGEN SATURATION: 96 % | WEIGHT: 146 LBS | DIASTOLIC BLOOD PRESSURE: 66 MMHG | TEMPERATURE: 98.4 F | SYSTOLIC BLOOD PRESSURE: 138 MMHG

## 2023-06-12 DIAGNOSIS — I73.9 PERIPHERAL VASCULAR DISEASE, UNSPECIFIED (HCC): ICD-10-CM

## 2023-06-12 DIAGNOSIS — E11.22 TYPE 2 DIABETES MELLITUS WITH STAGE 3B CHRONIC KIDNEY DISEASE, WITH LONG-TERM CURRENT USE OF INSULIN (HCC): ICD-10-CM

## 2023-06-12 DIAGNOSIS — Z79.4 LONG TERM (CURRENT) USE OF INSULIN (HCC): ICD-10-CM

## 2023-06-12 DIAGNOSIS — D69.6 PLATELETS DECREASED (HCC): Primary | ICD-10-CM

## 2023-06-12 DIAGNOSIS — E11.42 TYPE 2 DIABETES MELLITUS WITH DIABETIC POLYNEUROPATHY, WITH LONG-TERM CURRENT USE OF INSULIN (HCC): ICD-10-CM

## 2023-06-12 DIAGNOSIS — Z79.4 TYPE 2 DIABETES MELLITUS WITH DIABETIC POLYNEUROPATHY, WITH LONG-TERM CURRENT USE OF INSULIN (HCC): ICD-10-CM

## 2023-06-12 DIAGNOSIS — I12.9 BENIGN HYPERTENSION WITH CKD (CHRONIC KIDNEY DISEASE) STAGE III (HCC): ICD-10-CM

## 2023-06-12 DIAGNOSIS — E11.3553 TYPE 2 DIABETES MELLITUS WITH STABLE PROLIFERATIVE RETINOPATHY OF BOTH EYES, WITH LONG-TERM CURRENT USE OF INSULIN (HCC): ICD-10-CM

## 2023-06-12 DIAGNOSIS — Z00.00 MEDICARE ANNUAL WELLNESS VISIT, SUBSEQUENT: ICD-10-CM

## 2023-06-12 DIAGNOSIS — Z79.4 TYPE 2 DIABETES MELLITUS WITH STAGE 3B CHRONIC KIDNEY DISEASE, WITH LONG-TERM CURRENT USE OF INSULIN (HCC): ICD-10-CM

## 2023-06-12 DIAGNOSIS — Z79.4 TYPE 2 DIABETES MELLITUS WITH STABLE PROLIFERATIVE RETINOPATHY OF BOTH EYES, WITH LONG-TERM CURRENT USE OF INSULIN (HCC): ICD-10-CM

## 2023-06-12 DIAGNOSIS — N18.32 CHRONIC KIDNEY DISEASE, STAGE 3B (HCC): ICD-10-CM

## 2023-06-12 DIAGNOSIS — M54.50 LOW BACK PAIN: ICD-10-CM

## 2023-06-12 DIAGNOSIS — N18.32 STAGE 3B CHRONIC KIDNEY DISEASE (HCC): ICD-10-CM

## 2023-06-12 DIAGNOSIS — F11.20 CONTINUOUS OPIOID DEPENDENCE (HCC): ICD-10-CM

## 2023-06-12 DIAGNOSIS — N18.30 BENIGN HYPERTENSION WITH CKD (CHRONIC KIDNEY DISEASE) STAGE III (HCC): ICD-10-CM

## 2023-06-12 DIAGNOSIS — N18.32 TYPE 2 DIABETES MELLITUS WITH STAGE 3B CHRONIC KIDNEY DISEASE, WITH LONG-TERM CURRENT USE OF INSULIN (HCC): ICD-10-CM

## 2023-06-12 DIAGNOSIS — M54.50 ACUTE MIDLINE LOW BACK PAIN, UNSPECIFIED WHETHER SCIATICA PRESENT: ICD-10-CM

## 2023-06-12 PROBLEM — D61.818 PANCYTOPENIA (HCC): Status: RESOLVED | Noted: 2022-06-27 | Resolved: 2023-06-12

## 2023-06-12 PROCEDURE — 99214 OFFICE O/P EST MOD 30 MIN: CPT | Performed by: INTERNAL MEDICINE

## 2023-06-12 PROCEDURE — G0439 PPPS, SUBSEQ VISIT: HCPCS | Performed by: INTERNAL MEDICINE

## 2023-06-12 RX ORDER — METHOCARBAMOL 500 MG/1
500 TABLET, FILM COATED ORAL 3 TIMES DAILY
Qty: 270 TABLET | Refills: 1 | Status: SHIPPED | OUTPATIENT
Start: 2023-06-12

## 2023-06-12 RX ORDER — TRAMADOL HYDROCHLORIDE 50 MG/1
50 TABLET ORAL 2 TIMES DAILY
Qty: 60 TABLET | Refills: 0 | Status: SHIPPED | OUTPATIENT
Start: 2023-06-12

## 2023-06-12 NOTE — ASSESSMENT & PLAN NOTE
Lab Results   Component Value Date    CREATININE 1 08 (H) 06/06/2023    CREATININE 1 00 01/17/2023    CREATININE 0 94 01/17/2023    EGFR 52 (L) 06/06/2023    EGFR 57 (L) 01/17/2023    EGFR 62 01/17/2023   Stable    We will continue to monitor

## 2023-06-12 NOTE — ASSESSMENT & PLAN NOTE
Lab Results   Component Value Date    HGBA1C 7 0 (H) 06/06/2023   Hemoglobin A1c is slightly higher than before  Advised for better diet control    We will continue with present regimen

## 2023-06-12 NOTE — TELEPHONE ENCOUNTER
Caller: augustin workmanay    Doctor:Chitra    Reason for call: immediate findings    Call back#:[phone]
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
## 2023-06-12 NOTE — ASSESSMENT & PLAN NOTE
Lab Results   Component Value Date    HGBA1C 7 0 (H) 06/06/2023   Continue with present regimen  We will continue to monitor

## 2023-06-12 NOTE — PATIENT INSTRUCTIONS
Medicare Preventive Visit Patient Instructions  Thank you for completing your Welcome to Medicare Visit or Medicare Annual Wellness Visit today  Your next wellness visit will be due in one year (6/12/2024)  The screening/preventive services that you may require over the next 5-10 years are detailed below  Some tests may not apply to you based off risk factors and/or age  Screening tests ordered at today's visit but not completed yet may show as past due  Also, please note that scanned in results may not display below  Preventive Screenings:  Service Recommendations Previous Testing/Comments   Colorectal Cancer Screening  * Colonoscopy    * Fecal Occult Blood Test (FOBT)/Fecal Immunochemical Test (FIT)  * Fecal DNA/Cologuard Test  * Flexible Sigmoidoscopy Age: 39-70 years old   Colonoscopy: every 10 years (may be performed more frequently if at higher risk)  OR  FOBT/FIT: every 1 year  OR  Cologuard: every 3 years  OR  Sigmoidoscopy: every 5 years  Screening may be recommended earlier than age 39 if at higher risk for colorectal cancer  Also, an individualized decision between you and your healthcare provider will decide whether screening between the ages of 74-80 would be appropriate  Colonoscopy: 12/22/2015  FOBT/FIT: Not on file  Cologuard: Not on file  Sigmoidoscopy: Not on file          Breast Cancer Screening Age: 36 years old  Frequency: every 1-2 years  Not required if history of left and right mastectomy Mammogram: 09/19/2022    Screening Current   Cervical Cancer Screening Between the ages of 21-29, pap smear recommended once every 3 years  Between the ages of 33-67, can perform pap smear with HPV co-testing every 5 years     Recommendations may differ for women with a history of total hysterectomy, cervical cancer, or abnormal pap smears in past  Pap Smear: 08/28/2018    Screening Not Indicated   Hepatitis C Screening Once for adults born between 1945 and 1965  More frequently in patients at high risk for Hepatitis C Hep C Antibody: 06/27/2022    Screening Current   Diabetes Screening 1-2 times per year if you're at risk for diabetes or have pre-diabetes Fasting glucose: 120 mg/dL (6/28/2022)  A1C: 7 0 % of total Hgb (6/6/2023)  Screening Not Indicated  History Diabetes   Cholesterol Screening Once every 5 years if you don't have a lipid disorder  May order more often based on risk factors  Lipid panel: 09/13/2022    Screening Not Indicated  History Lipid Disorder     Other Preventive Screenings Covered by Medicare:  1  Abdominal Aortic Aneurysm (AAA) Screening: covered once if your at risk  You're considered to be at risk if you have a family history of AAA  2  Lung Cancer Screening: covers low dose CT scan once per year if you meet all of the following conditions: (1) Age 50-69; (2) No signs or symptoms of lung cancer; (3) Current smoker or have quit smoking within the last 15 years; (4) You have a tobacco smoking history of at least 20 pack years (packs per day multiplied by number of years you smoked); (5) You get a written order from a healthcare provider  3  Glaucoma Screening: covered annually if you're considered high risk: (1) You have diabetes OR (2) Family history of glaucoma OR (3)  aged 48 and older OR (3)  American aged 72 and older  3  Osteoporosis Screening: covered every 2 years if you meet one of the following conditions: (1) You're estrogen deficient and at risk for osteoporosis based off medical history and other findings; (2) Have a vertebral abnormality; (3) On glucocorticoid therapy for more than 3 months; (4) Have primary hyperparathyroidism; (5) On osteoporosis medications and need to assess response to drug therapy  · Last bone density test (DXA Scan): 02/03/2023   5  HIV Screening: covered annually if you're between the age of 15-65  Also covered annually if you are younger than 13 and older than 72 with risk factors for HIV infection   For pregnant patients, it is covered up to 3 times per pregnancy  Immunizations:  Immunization Recommendations   Influenza Vaccine Annual influenza vaccination during flu season is recommended for all persons aged >= 6 months who do not have contraindications   Pneumococcal Vaccine   * Pneumococcal conjugate vaccine = PCV13 (Prevnar 13), PCV15 (Vaxneuvance), PCV20 (Prevnar 20)  * Pneumococcal polysaccharide vaccine = PPSV23 (Pneumovax) Adults 25-60 years old: 1-3 doses may be recommended based on certain risk factors  Adults 72 years old: 1-2 doses may be recommended based off what pneumonia vaccine you previously received   Hepatitis B Vaccine 3 dose series if at intermediate or high risk (ex: diabetes, end stage renal disease, liver disease)   Tetanus (Td) Vaccine - COST NOT COVERED BY MEDICARE PART B Following completion of primary series, a booster dose should be given every 10 years to maintain immunity against tetanus  Td may also be given as tetanus wound prophylaxis  Tdap Vaccine - COST NOT COVERED BY MEDICARE PART B Recommended at least once for all adults  For pregnant patients, recommended with each pregnancy  Shingles Vaccine (Shingrix) - COST NOT COVERED BY MEDICARE PART B  2 shot series recommended in those aged 48 and above     Health Maintenance Due:      Topic Date Due   • DXA SCAN  02/03/2025   • Hepatitis C Screening  Completed   • Colorectal Cancer Screening  Discontinued     Immunizations Due:      Topic Date Due   • COVID-19 Vaccine (5 - Pfizer series) 06/09/2022     Advance Directives   What are advance directives? Advance directives are legal documents that state your wishes and plans for medical care  These plans are made ahead of time in case you lose your ability to make decisions for yourself  Advance directives can apply to any medical decision, such as the treatments you want, and if you want to donate organs  What are the types of advance directives?   There are many types of advance directives, and each state has rules about how to use them  You may choose a combination of any of the following:  · Living will: This is a written record of the treatment you want  You can also choose which treatments you do not want, which to limit, and which to stop at a certain time  This includes surgery, medicine, IV fluid, and tube feedings  · Durable power of  for healthcare [place] [facility]): This is a written record that states who you want to make healthcare choices for you when you are unable to make them for yourself  This person, called a proxy, is usually a family member or a friend  You may choose more than 1 proxy  · Do not resuscitate (DNR) order:  A DNR order is used in case your heart stops beating or you stop breathing  It is a request not to have certain forms of treatment, such as CPR  A DNR order may be included in other types of advance directives  · Medical directive: This covers the care that you want if you are in a coma, near death, or unable to make decisions for yourself  You can list the treatments you want for each condition  Treatment may include pain medicine, surgery, blood transfusions, dialysis, IV or tube feedings, and a ventilator (breathing machine)  · Values history: This document has questions about your views, beliefs, and how you feel and think about life  This information can help others choose the care that you would choose  Why are advance directives important? An advance directive helps you control your care  Although spoken wishes may be used, it is better to have your wishes written down  Spoken wishes can be misunderstood, or not followed  Treatments may be given even if you do not want them  An advance directive may make it easier for your family to make difficult choices about your care  Fall Prevention    Fall prevention  includes ways to make your home and other areas safer  It also includes ways you can move more carefully to prevent a fall   Health conditions that cause changes in your blood pressure, vision, or muscle strength and coordination may increase your risk for falls  Medicines may also increase your risk for falls if they make you dizzy, weak, or sleepy  Fall prevention tips:   · Stand or sit up slowly  · Use assistive devices as directed  · Wear shoes that fit well and have soles that   · Wear a personal alarm  · Stay active  · Manage your medical conditions  Home Safety Tips:  · Add items to prevent falls in the bathroom  · Keep paths clear  · Install bright lights in your home  · Keep items you use often on shelves within reach  · Paint or place reflective tape on the edges of your stairs  Weight Management   Why it is important to manage your weight:  Being overweight increases your risk of health conditions such as heart disease, high blood pressure, type 2 diabetes, and certain types of cancer  It can also increase your risk for osteoarthritis, sleep apnea, and other respiratory problems  Aim for a slow, steady weight loss  Even a small amount of weight loss can lower your risk of health problems  How to lose weight safely:  A safe and healthy way to lose weight is to eat fewer calories and get regular exercise  You can lose up about 1 pound a week by decreasing the number of calories you eat by 500 calories each day  Healthy meal plan for weight management:  A healthy meal plan includes a variety of foods, contains fewer calories, and helps you stay healthy  A healthy meal plan includes the following:  · Eat whole-grain foods more often  A healthy meal plan should contain fiber  Fiber is the part of grains, fruits, and vegetables that is not broken down by your body  Whole-grain foods are healthy and provide extra fiber in your diet  Some examples of whole-grain foods are whole-wheat breads and pastas, oatmeal, brown rice, and bulgur  · Eat a variety of vegetables every day    Include dark, leafy greens such as spinach, kale, nadege greens, and mustard greens  Eat yellow and orange vegetables such as carrots, sweet potatoes, and winter squash  · Eat a variety of fruits every day  Choose fresh or canned fruit (canned in its own juice or light syrup) instead of juice  Fruit juice has very little or no fiber  · Eat low-fat dairy foods  Drink fat-free (skim) milk or 1% milk  Eat fat-free yogurt and low-fat cottage cheese  Try low-fat cheeses such as mozzarella and other reduced-fat cheeses  · Choose meat and other protein foods that are low in fat  Choose beans or other legumes such as split peas or lentils  Choose fish, skinless poultry (chicken or turkey), or lean cuts of red meat (beef or pork)  Before you cook meat or poultry, cut off any visible fat  · Use less fat and oil  Try baking foods instead of frying them  Add less fat, such as margarine, sour cream, regular salad dressing and mayonnaise to foods  Eat fewer high-fat foods  Some examples of high-fat foods include french fries, doughnuts, ice cream, and cakes  · Eat fewer sweets  Limit foods and drinks that are high in sugar  This includes candy, cookies, regular soda, and sweetened drinks  Exercise:  Exercise at least 30 minutes per day on most days of the week  Some examples of exercise include walking, biking, dancing, and swimming  You can also fit in more physical activity by taking the stairs instead of the elevator or parking farther away from stores  Ask your healthcare provider about the best exercise plan for you     Narcotic (Opioid) Safety    Use narcotics safely:  · Take prescribed narcotics exactly as directed  · Do not give narcotics to others or take narcotics that belong to someone else  · Do not mix narcotics without medicines or alcohol  · Do not drive or operate heavy machinery after you take the narcotic  · Monitor for side effects and notify your healthcare provider if you experienced side effects such as nausea, sleepiness, itching, or trouble thinking clearly  Manage constipation:    Constipation is the most common side effect of narcotic medicine  Constipation is when you have hard, dry bowel movements, or you go longer than usual between bowel movements  Tell your healthcare provider about all changes in your bowel movements while you are taking narcotics  He or she may recommend laxative medicine to help you have a bowel movement  He or she may also change the kind of narcotic you are taking, or change when you take it  The following are more ways you can prevent or relieve constipation:    · Drink liquids as directed  You may need to drink extra liquids to help soften and move your bowels  Ask how much liquid to drink each day and which liquids are best for you  · Eat high-fiber foods  This may help decrease constipation by adding bulk to your bowel movements  High-fiber foods include fruits, vegetables, whole-grain breads and cereals, and beans  Your healthcare provider or dietitian can help you create a high-fiber meal plan  Your provider may also recommend a fiber supplement if you cannot get enough fiber from food  · Exercise regularly  Regular physical activity can help stimulate your intestines  Walking is a good exercise to prevent or relieve constipation  Ask which exercises are best for you  · Schedule a time each day to have a bowel movement  This may help train your body to have regular bowel movements  Bend forward while you are on the toilet to help move the bowel movement out  Sit on the toilet for at least 10 minutes, even if you do not have a bowel movement  Store narcotics safely:   · Store narcotics where others cannot easily get them  Keep them in a locked cabinet or secure area  Do not  keep them in a purse or other bag you carry with you  A person may be looking for something else and find the narcotics  · Make sure narcotics are stored out of the reach of children    A child can easily overdose on narcotics  Narcotics may look like candy to a small child  The best way to dispose of narcotics: The laws vary by country and area  In the [place], the best way is to return the narcotics through a take-back program  This program is offered by the [employer])  The following are options for using the program:  · Take the narcotics to a ROWDY collection site  The site is often a law enforcement center  Call your local law enforcement center for scheduled take-back days in your area  You will be given information on where to go if the collection site is in a different location  · Take the narcotics to an approved pharmacy or hospital   A pharmacy or hospital may be set up as a collection site  You will need to ask if it is a ROWDY collection site if you were not directed there  A pharmacy or doctor's office may not be able to take back narcotics unless it is a ROWDY site  · Use a mail-back system  This means you are given containers to put the narcotics into  You will then mail them in the containers  · Use a take-back drop box  This is a place to leave the narcotics at any time  People and animals will not be able to get into the box  Your local law enforcement agency can tell you where to find a drop box in your area  Other ways to manage pain:   · Ask your healthcare provider about non-narcotic medicines to control pain  Nonprescription medicines include NSAIDs (such as ibuprofen) and acetaminophen  Prescription medicines include muscle relaxers, antidepressants, and steroids  · Pain may be managed without any medicines  Some ways to relieve pain include massage, aromatherapy, or meditation  Physical or occupational therapy may also help  For more information:   · Drug Enforcement Administration  [address] [place] [address] [address]  Phone: 5- 779 - [phone]  Web Address: [facility]/drug_disposal/    · [address] and Drug Administration  [address]  Phone: 6- 613 - 811-4995  Web Address: http://ThisClicks/     © Copyright [address] 2018 Information is for End User's use only and may not be sold, redistributed or otherwise used for commercial purposes  All illustrations and images included in CareNotes® are the copyrighted property of [employer]  or [address] [place] [address] [place]     Type 2 Diabetes Management for Adults   AMBULATORY CARE:   Type 2 diabetes  is a disease that affects how your body uses glucose (sugar)  Either your body cannot make enough insulin, or it cannot use the insulin correctly  It is important to keep diabetes controlled to prevent damage to your heart, blood vessels, and other organs  Management will help you feel well and enjoy your daily activities  Your diabetes care team providers can help you make a plan to fit diabetes care into your schedule  Your plan can change over time to fit your needs and your family's needs  Have someone call your local emergency number (911 in the [address] [facility]) if:   • You cannot be woken  • You have signs of diabetic ketoacidosis:     ? confusion, fatigue    ? vomiting    ? rapid heartbeat    ? fruity smelling breath    ? extreme thirst    ? dry mouth and skin    • You have any of the following signs of a heart attack:      ? Squeezing, pressure, or pain in your chest    ? You may  also have any of the following:     - Discomfort or pain in your back, neck, jaw, stomach, or arm    - Shortness of breath    - Nausea or vomiting    - Lightheadedness or a sudden cold sweat    • You have any of the following signs of a stroke:      ? Numbness or drooping on one side of your face     ? Weakness in an arm or leg    ? Confusion or difficulty speaking    ?  Dizziness, a severe headache, or vision loss    Call your doctor or diabetes care team provider if:   • You have a sore or wound that will not heal     • You have a change in the amount you urinate  • Your blood sugar levels are higher than your target goals  • You often have lower blood sugar levels than your target goals  • Your skin is red, dry, warm, or swollen  • You have trouble coping with diabetes, or you feel anxious or depressed  • You have questions or concerns about your condition or care  What you need to know about high blood sugar levels:  High blood sugar levels may not cause any symptoms  You may feel more thirsty or urinate more often than usual  Over time, high blood sugar levels can damage your nerves, blood vessels, tissues, and organs  The following can increase your blood sugar levels:  • Large meals or large amounts of carbohydrates at one time    • Less physical activity    • Stress    • Illness    • A lower dose of diabetes medicine or insulin, or a late dose    What you need to know about low blood sugar levels:  Symptoms include feeling shaky, dizzy, irritable, or confused  You can prevent symptoms by keeping your blood sugar levels from going too low  • Treat a low blood sugar level right away:      ? Drink 4 ounces of juice or have 1 tube of glucose gel  ? Check your blood sugar level again 10 to 15 minutes later  ? When the level goes back to normal, eat a meal or snack to prevent another decrease  • Keep glucose gel, raisins, or hard candy with you at all times to treat a low blood sugar level  • Your blood sugar level can get too low if you take diabetes medicine or insulin and do not eat enough food  • If you use insulin, check your blood sugar level before you exercise  ? If your blood sugar level is below 100 mg/dL, eat 4 crackers or 2 ounces of raisins, or drink 4 ounces of juice  ? Check your level every 30 minutes if you exercise longer than 1 hour  ? You may need a snack during or after exercise  What you can do to manage your blood sugar levels:   • Check your blood sugar levels as directed and as needed  Several items are available to use to check your levels  You may need to check by testing a drop of blood in a glucose monitor  You may instead be given a continuous glucose monitoring (CGM) device  The device is worn at all times  The CGM checks your blood sugar level every 5 minutes  It sends results to an electronic device such as a smart phone  A CGM can be used with or without an insulin pump  You and your diabetes care team providers will decide on the best method for you  The goal for blood sugar levels before meals  is between 80 and 130 mg/dL and 2 hours after eating  is lower than 180 mg/dL  • Make healthy food choices  Work with a dietitian to develop a meal plan that works for you and your schedule  A dietitian can help you learn how to eat the right amount of carbohydrates during your meals and snacks  Carbohydrates can raise your blood sugar level if you eat too many at one time  Examples of foods that contain carbohydrates are breads, cereals, rice, pasta, and sweets  • Eat high-fiber foods as directed  Fiber helps improve blood sugar levels  Fiber also lowers your risk for heart disease and other problems diabetes can cause  Examples of high-fiber foods include vegetables, whole-grain bread, and beans such as farah beans  Your dietitian can tell you how much fiber to have each day  • Get regular physical activity  Physical activity can help you get to your target blood sugar level goal and manage your weight  Get at least 150 minutes of moderate to vigorous aerobic physical activity each week  Do not miss more than 2 days in a row  Do not sit longer than 30 minutes at a time  Your healthcare provider can help you create an activity plan  The plan can include the best activities for you and can help you build your strength and endurance  • Maintain a healthy weight  Ask your team what a healthy weight is for you   A healthy weight can help you control diabetes and prevent heart disease  Ask your team to help you create a weight loss plan, if needed  Weight loss can help make a difference in managing diabetes  Your team will help you set a weight-loss goal, such as 10 to 15 pounds, or 5% of your extra weight  Together you and your team can set manageable weight loss goals  • Take your diabetes medicine or insulin as directed  You may need diabetes medicine, insulin, or both to help control your blood sugar levels  Your healthcare provider will teach you how and when to take your diabetes medicine or insulin  You will also be taught about side effects oral diabetes medicine can cause  Insulin may be injected or given through a pump or pen  You and your providers will decide on the best method for you:    ? An insulin pump  is an implanted device that gives your insulin 24 hours a day  An insulin pump prevents the need for multiple insulin injections in a day  ? An insulin pen  is a device prefilled with the right amount of insulin  ? You and your family members will be taught how to draw up and give insulin  if this is the best method for you  Your providers will also teach you how to dispose of needles and syringes  ? You will learn how much insulin you need  and when to give it  You will be taught when not to give insulin  You will also be taught what to do if your blood sugar level drops too low  This may happen if you take insulin and do not eat the right amount of carbohydrates  More ways to manage type 2 diabetes:   • Wear medical alert identification  Wear medical alert jewelry or carry a card that says you have diabetes  Ask your provider where to get these items  • Do not smoke  Nicotine and other chemicals in cigarettes and cigars can cause lung and blood vessel damage  It also makes it more difficult to manage your diabetes  Ask your provider for information if you currently smoke and need help to quit   Do not use e-cigarettes or smokeless tobacco in place of cigarettes or to help you quit  They still contain nicotine  • Check your feet each day for cuts, scratches, calluses, or other wounds  Look for redness and swelling, and feel for warmth  Wear shoes that fit well  Check your shoes for rocks or other objects that can hurt your feet  Do not walk barefoot or wear shoes without socks  Wear cotton socks to help keep your feet dry  • Ask about vaccines you may need  You have a higher risk for serious illness if you get the flu, pneumonia, COVID-19, or hepatitis  Ask your provider if you should get vaccines to prevent these or other diseases, and when to get the vaccines  • Talk to your provider if you become stressed about diabetes care  Sometimes being able to fit diabetes care into your life can cause increased stress  The stress can cause you not to take care of yourself properly  Your care team providers can help by offering tips about self-care  Your providers may suggest you talk to a mental health provider who can listen and offer help with self-care issues  • Have your A1c checked as directed  Your provider may check your A1c every 3 months, or 2 times each year if your diabetes is controlled  An A1c test shows the average amount of sugar in your blood over the past 2 to 3 months  Your provider will tell you what your A1c level should be  • Have screening tests as directed  Your provider may recommend screening for complications of diabetes and other conditions that may develop  Some screenings may begin right away and some may happen within the first 5 years of diagnosis:    ? Examples of diabetes complications  include kidney problems, high cholesterol, high blood pressure, blood vessel problems, eye problems, and sleep apnea  ? You may be screened for a low vitamin B level  if you take oral diabetes medicine for a long time  ?  Women of childbearing years may be screened  for polycystic ovarian syndrome (PCOS)  Follow up with your doctor or diabetes care team providers as directed: You may need to have blood tests done before your follow-up visit  The test results will show if changes need to be made in your treatment or self-care  Talk to your provider if you cannot afford your medicine  Write down your questions so you remember to ask them during your visits  © Copyright [employer] 2022 Information is for End User's use only and may not be sold, redistributed or otherwise used for commercial purposes  The above information is an  only  It is not intended as medical advice for individual conditions or treatments  Talk to your doctor, nurse or pharmacist before following any medical regimen to see if it is safe and effective for you

## 2023-06-13 ENCOUNTER — TELEPHONE (OUTPATIENT)
Dept: PODIATRY | Facility: CLINIC | Age: 78
End: 2023-06-13

## 2023-06-13 DIAGNOSIS — E11.8 TYPE 2 DIABETES MELLITUS WITH COMPLICATION, WITH LONG-TERM CURRENT USE OF INSULIN (HCC): ICD-10-CM

## 2023-06-13 DIAGNOSIS — Z79.4 TYPE 2 DIABETES MELLITUS WITH COMPLICATION, WITH LONG-TERM CURRENT USE OF INSULIN (HCC): ICD-10-CM

## 2023-06-13 DIAGNOSIS — J45.909 ASTHMA WITHOUT STATUS ASTHMATICUS WITHOUT COMPLICATION, UNSPECIFIED ASTHMA SEVERITY, UNSPECIFIED WHETHER PERSISTENT: ICD-10-CM

## 2023-06-13 RX ORDER — BUDESONIDE AND FORMOTEROL FUMARATE DIHYDRATE 160; 4.5 UG/1; UG/1
2 AEROSOL RESPIRATORY (INHALATION) 2 TIMES DAILY
Qty: 120 G | Refills: 1 | Status: SHIPPED | OUTPATIENT
Start: 2023-06-13 | End: 2023-09-11

## 2023-06-13 NOTE — TELEPHONE ENCOUNTER
Called patient as x-ray results from radiologist were positive for fractures of the proximal phalanx of the third and fourth toes  Patient having pain in the forefoot and in the toes  She will be seen in the office in the very near future

## 2023-06-15 ENCOUNTER — OFFICE VISIT (OUTPATIENT)
Dept: PODIATRY | Facility: CLINIC | Age: 78
End: 2023-06-15
Payer: COMMERCIAL

## 2023-06-15 VITALS
HEART RATE: 65 BPM | BODY MASS INDEX: 30.64 KG/M2 | DIASTOLIC BLOOD PRESSURE: 65 MMHG | WEIGHT: 146 LBS | RESPIRATION RATE: 18 BRPM | SYSTOLIC BLOOD PRESSURE: 173 MMHG | HEIGHT: 58 IN

## 2023-06-15 DIAGNOSIS — S92.515D CLOSED NONDISPLACED FRACTURE OF PROXIMAL PHALANX OF LESSER TOE OF LEFT FOOT WITH ROUTINE HEALING, SUBSEQUENT ENCOUNTER: Primary | ICD-10-CM

## 2023-06-15 DIAGNOSIS — M77.42 METATARSALGIA OF LEFT FOOT: ICD-10-CM

## 2023-06-15 PROBLEM — S92.515A CLOSED NONDISPLACED FRACTURE OF PROXIMAL PHALANX OF LESSER TOE OF LEFT FOOT: Status: ACTIVE | Noted: 2023-06-15

## 2023-06-15 PROCEDURE — 99212 OFFICE O/P EST SF 10 MIN: CPT | Performed by: PODIATRIST

## 2023-07-06 ENCOUNTER — OFFICE VISIT (OUTPATIENT)
Dept: PODIATRY | Facility: CLINIC | Age: 78
End: 2023-07-06
Payer: COMMERCIAL

## 2023-07-06 VITALS
SYSTOLIC BLOOD PRESSURE: 136 MMHG | BODY MASS INDEX: 29.6 KG/M2 | WEIGHT: 141 LBS | DIASTOLIC BLOOD PRESSURE: 58 MMHG | HEIGHT: 58 IN | HEART RATE: 66 BPM

## 2023-07-06 DIAGNOSIS — S92.515D CLOSED NONDISPLACED FRACTURE OF PROXIMAL PHALANX OF LESSER TOE OF LEFT FOOT WITH ROUTINE HEALING, SUBSEQUENT ENCOUNTER: Primary | ICD-10-CM

## 2023-07-06 PROCEDURE — 99213 OFFICE O/P EST LOW 20 MIN: CPT | Performed by: PODIATRIST

## 2023-07-06 NOTE — PROGRESS NOTES
Patient presents for assessment of digital fractures left third, fourth and fifth toes. She is wearing a running style shoe. She states that she cannot wear the surgical shoe as she was prone to falling. I personally reviewed x-rays of the toes of the left foot. Continued presence of nondisplaced fractures proximal phalanx third, fourth and fifth toes. At the present time the third toe is the prime symptomatic digit. Patient told that she may continue in her running shoe but she should limit her activities. She will be reassessed for palliative care in 7 to 10 weeks.

## 2023-07-10 DIAGNOSIS — M54.50 LOW BACK PAIN: ICD-10-CM

## 2023-07-10 RX ORDER — TRAMADOL HYDROCHLORIDE 50 MG/1
50 TABLET ORAL 2 TIMES DAILY
Qty: 60 TABLET | Refills: 0 | Status: SHIPPED | OUTPATIENT
Start: 2023-07-10

## 2023-07-11 ENCOUNTER — TELEPHONE (OUTPATIENT)
Dept: NEPHROLOGY | Facility: CLINIC | Age: 78
End: 2023-07-11

## 2023-07-11 NOTE — TELEPHONE ENCOUNTER
Received a call from [facility] to fax labs at [facility] at [phone]. Faxed labs at [facility].

## 2023-07-12 DIAGNOSIS — E03.9 HYPOTHYROIDISM, UNSPECIFIED TYPE: ICD-10-CM

## 2023-07-12 DIAGNOSIS — J45.41 MODERATE PERSISTENT ASTHMA WITH ACUTE EXACERBATION: ICD-10-CM

## 2023-07-12 DIAGNOSIS — E11.8 TYPE 2 DIABETES MELLITUS WITH COMPLICATION, WITH LONG-TERM CURRENT USE OF INSULIN (HCC): ICD-10-CM

## 2023-07-12 DIAGNOSIS — Z79.4 TYPE 2 DIABETES MELLITUS WITH COMPLICATION, WITH LONG-TERM CURRENT USE OF INSULIN (HCC): ICD-10-CM

## 2023-07-12 DIAGNOSIS — I10 HYPERTENSION, UNSPECIFIED TYPE: ICD-10-CM

## 2023-07-12 RX ORDER — MONTELUKAST SODIUM 10 MG/1
10 TABLET ORAL
Qty: 30 TABLET | Refills: 4 | OUTPATIENT
Start: 2023-07-12

## 2023-07-12 RX ORDER — LEVOTHYROXINE SODIUM 0.1 MG/1
100 TABLET ORAL DAILY
Qty: 90 TABLET | Refills: 0 | OUTPATIENT
Start: 2023-07-12

## 2023-07-12 RX ORDER — INSULIN DETEMIR 100 [IU]/ML
40 INJECTION, SOLUTION SUBCUTANEOUS EVERY 12 HOURS SCHEDULED
Qty: 20 ML | Refills: 4 | OUTPATIENT
Start: 2023-07-12

## 2023-07-12 RX ORDER — BUMETANIDE 2 MG/1
2 TABLET ORAL DAILY
Qty: 30 TABLET | Refills: 4 | OUTPATIENT
Start: 2023-07-12

## 2023-07-12 RX ORDER — SYRINGE-NEEDLE,INSULIN,0.5 ML 31 GX5/16"
SYRINGE, EMPTY DISPOSABLE MISCELLANEOUS 4 TIMES DAILY
Refills: 4 | OUTPATIENT
Start: 2023-07-12

## 2023-07-13 DIAGNOSIS — I10 HYPERTENSION, UNSPECIFIED TYPE: ICD-10-CM

## 2023-07-13 DIAGNOSIS — E11.8 TYPE 2 DIABETES MELLITUS WITH COMPLICATION, WITH LONG-TERM CURRENT USE OF INSULIN (HCC): ICD-10-CM

## 2023-07-13 DIAGNOSIS — Z79.4 TYPE 2 DIABETES MELLITUS WITH COMPLICATION, WITH LONG-TERM CURRENT USE OF INSULIN (HCC): ICD-10-CM

## 2023-07-13 RX ORDER — BUMETANIDE 2 MG/1
2 TABLET ORAL DAILY
Qty: 30 TABLET | Refills: 5 | Status: SHIPPED | OUTPATIENT
Start: 2023-07-13

## 2023-07-13 RX ORDER — SYRINGE-NEEDLE,INSULIN,0.5 ML 31 GX5/16"
SYRINGE, EMPTY DISPOSABLE MISCELLANEOUS 4 TIMES DAILY
Qty: 200 EACH | Refills: 5 | Status: SHIPPED | OUTPATIENT
Start: 2023-07-13

## 2023-07-24 ENCOUNTER — OFFICE VISIT (OUTPATIENT)
Dept: NEPHROLOGY | Facility: CLINIC | Age: 78
End: 2023-07-24
Payer: COMMERCIAL

## 2023-07-24 VITALS
HEIGHT: 58 IN | BODY MASS INDEX: 29.6 KG/M2 | HEART RATE: 68 BPM | DIASTOLIC BLOOD PRESSURE: 60 MMHG | SYSTOLIC BLOOD PRESSURE: 140 MMHG | WEIGHT: 141 LBS

## 2023-07-24 DIAGNOSIS — N18.9 CHRONIC KIDNEY DISEASE, UNSPECIFIED CKD STAGE: Primary | ICD-10-CM

## 2023-07-24 DIAGNOSIS — R80.8 OTHER PROTEINURIA: ICD-10-CM

## 2023-07-24 PROBLEM — N18.30 CKD (CHRONIC KIDNEY DISEASE) STAGE 3, GFR 30-59 ML/MIN (HCC): Status: RESOLVED | Noted: 2018-01-29 | Resolved: 2023-07-24

## 2023-07-24 PROBLEM — N18.32 CHRONIC KIDNEY DISEASE, STAGE 3B (HCC): Status: RESOLVED | Noted: 2021-05-20 | Resolved: 2023-07-24

## 2023-07-24 PROBLEM — R80.9 MICROALBUMINURIA: Status: RESOLVED | Noted: 2020-02-06 | Resolved: 2023-07-24

## 2023-07-24 PROBLEM — N18.30 TYPE 2 DIABETES MELLITUS WITH STAGE 3 CHRONIC KIDNEY DISEASE, WITH LONG-TERM CURRENT USE OF INSULIN (HCC): Status: RESOLVED | Noted: 2018-01-29 | Resolved: 2023-07-24

## 2023-07-24 PROBLEM — E11.22 TYPE 2 DIABETES MELLITUS WITH STAGE 3 CHRONIC KIDNEY DISEASE, WITH LONG-TERM CURRENT USE OF INSULIN (HCC): Status: RESOLVED | Noted: 2018-01-29 | Resolved: 2023-07-24

## 2023-07-24 PROBLEM — R80.9 PROTEINURIA: Status: ACTIVE | Noted: 2023-07-24

## 2023-07-24 PROBLEM — Z79.4 TYPE 2 DIABETES MELLITUS WITH STAGE 3 CHRONIC KIDNEY DISEASE, WITH LONG-TERM CURRENT USE OF INSULIN (HCC): Status: RESOLVED | Noted: 2018-01-29 | Resolved: 2023-07-24

## 2023-07-24 PROCEDURE — 99214 OFFICE O/P EST MOD 30 MIN: CPT | Performed by: INTERNAL MEDICINE

## 2023-07-24 NOTE — PATIENT INSTRUCTIONS
You are here for follow-up. Sounds like you been doing well but you have had a couple low sugar attacks so if this continues you got a watch that insulin you take in the evening and I would talk to your doctor. Your creatinine level which is the blood test for the kidney function in July was 1.6 but in June it was 1. We looked back a year and the blood test does fluctuate up and down around the normal level. I think that that is likely related to subtle changes in your hydration status potentially related to your Bumex which is your water pill. I am not going to change anything because it does go up and down and its not getting worse and I do not want to cause a problem such as swelling to make ends up in the hospital.    Now continue current medications maintain good blood sugar control. I would like to just check the blood work in 3 months to make sure its not changing and hopefully will see the creatinine is a little better and I will call you with results if everything stable then we will see you again in 6 months.

## 2023-07-24 NOTE — LETTER
July 24, 2023     Ace Gould MD  [address]    Patient: Fidelina Gamino   YOB: 1945   Date of Visit: 7/24/2023       Dear Dr. Sherine Willoughby: Thank you for referring Nelson Call to me for evaluation. Below are my notes for this consultation. If you have questions, please do not hesitate to call me. I look forward to following your patient along with you. Sincerely,        Rukhsana Collier MD        CC: No Recipients    Rukhsana Collier MD  7/24/2023  1:43 PM  Sign when Signing Visit  NEPHROLOGY PROGRESS NOTE    Fidelina Gamino 76 y.o. female MRN: [id number]  Unit/Bed#:  Encounter: [id number]  Reason for Consult: Chronic kidney disease    The patient is here for follow-up she looks great I have not seen her in a couple years because she was seeing an extender from time to time. She presents today with no complaints in talking to her she says she has had an occasional low blood sugar attack at home but is always felt the symptoms and then treated accordingly. Other than that no changes no hospitalizations recently. ASSESSMENT/PLAN:  1. Renal    Patient's chronic renal insufficiency and she is a diabetic which puts her at risk for chronic kidney disease. In the past she had very low levels of proteinuria but her most recent estimation was around a gram.  Her latest creatinine is 1.6 but it was 1 in June. Looking back over couple years it tends to fluctuate between 1-1.5. When I see this occur it is generally related to medications or subtle changes in volume status and she does take Bumex. Her echocardiogram shows normal LV function she does not have edematous state so at some point she was put on this but it likely causes fluctuations in her volume status and given that it was normal a month ago and a little high now that is likely what it was and also its been very hot lately.     What I am going to do is just check a BMP in 3 months to make sure there is no worsening I will call her with results and if things are stable we will see her back in 6 months with repeat labs at that time. For now no changes in medications  Labs as instructed    She was told to call if there is any problems or concerns before next visit. SUBJECTIVE:  Review of Systems   Constitutional: Negative for chills, diaphoresis, fever and malaise/fatigue. HENT: Negative. Eyes: Negative. Cardiovascular: Negative for chest pain, dyspnea on exertion, leg swelling and orthopnea. Respiratory: Negative. Negative for cough, shortness of breath, sputum production and wheezing. Gastrointestinal: Negative for abdominal pain, diarrhea, nausea and vomiting. Genitourinary: Negative. Neurological: Negative for dizziness, focal weakness, headaches and weakness. Psychiatric/Behavioral: Negative for altered mental status, hallucinations and hypervigilance. The patient is not nervous/anxious. OBJECTIVE:  Current Weight:    Cacey@[id number]:     There were no vitals taken for this visit. , There is no height or weight on file to calculate BMI. [unfilled]    Physical Exam: LMP  (LMP Unknown)   Physical Exam  Constitutional:       General: She is not in acute distress. Appearance: She is not ill-appearing or toxic-appearing. HENT:      Head: Normocephalic and atraumatic. Nose: Nose normal.      Mouth/Throat:      Mouth: Mucous membranes are moist.   Eyes:      General: No scleral icterus. Extraocular Movements: Extraocular movements intact. Cardiovascular:      Rate and Rhythm: Normal rate and regular rhythm. Heart sounds: No friction rub. No gallop. Comments: No edema. Pulmonary:      Effort: Pulmonary effort is normal. No respiratory distress. Breath sounds: No wheezing, rhonchi or rales. Abdominal:      General: Bowel sounds are normal. There is no distension. Palpations: Abdomen is soft. Tenderness:  There is no abdominal tenderness. There is no rebound. Musculoskeletal:      Cervical back: Normal range of motion and neck supple. Neurological:      General: No focal deficit present. Mental Status: She is alert and oriented to person, place, and time. Mental status is at baseline. Psychiatric:         Mood and Affect: Mood normal.         Behavior: Behavior normal.         Thought Content:  Thought content normal.         Judgment: Judgment normal.         Medications:    Current Outpatient Medications:   •  albuterol (Ventolin HFA) 90 mcg/act inhaler, Inhale 2 puffs 4 (four) times a day, Disp: 18 g, Rfl: 5  •  allopurinol (ZYLOPRIM) 100 mg tablet, Take 2 tablets (200 mg total) by mouth daily, Disp: 60 tablet, Rfl: 5  •  ascorbic acid (VITAMIN C) 500 mg tablet, Take 1 tablet (500 mg total) by mouth daily, Disp: 90 tablet, Rfl: 0  •  aspirin 81 MG tablet, Take 1 tablet by mouth daily , Disp: , Rfl:   •  atorvastatin (LIPITOR) 80 mg tablet, Take 1 tablet (80 mg total) by mouth daily, Disp: 90 tablet, Rfl: 1  •  bisacodyl (DULCOLAX) 5 mg EC tablet, Take 4 tablets (20 mg total) by mouth once for 1 dose, Disp: 4 tablet, Rfl: 0  •  budesonide-formoterol (Symbicort) 160-4.5 mcg/act inhaler, Inhale 2 puffs 2 (two) times a day Rinse mouth after use., Disp: 120 g, Rfl: 1  •  bumetanide (BUMEX) 2 mg tablet, Take 1 tablet (2 mg total) by mouth daily, Disp: 30 tablet, Rfl: 5  •  Calcium Carbonate-Vit D-Min (Calcium 600+D Plus Minerals) 600-400 MG-UNIT CHEW, Chew 2 tablets daily, Disp: 180 tablet, Rfl: 0  •  carvedilol (COREG) 25 mg tablet, Take 1 tablet (25 mg total) by mouth 2 (two) times a day with meals, Disp: 180 tablet, Rfl: 1  •  Cholecalciferol (Vitamin D3) 25 MCG (1000 UT) CAPS, Take 1 capsule (1,000 Units total) by mouth daily, Disp: 90 capsule, Rfl: 0  •  doxazosin (CARDURA) 2 mg tablet, Take 2 mg by mouth daily at bedtime, Disp: , Rfl:   •  famotidine (PEPCID) 10 mg tablet, Take 1 tablet (10 mg total) by mouth 2 (two) times a day for 90 days (Patient taking differently: Take 20 mg by mouth daily), Disp: 60 tablet, Rfl: 9  •  ferrous gluconate (FERGON) 240 (27 FE) MG tablet, Take 1 tablet (240 mg total) by mouth every other day, Disp: 45 tablet, Rfl: 0  •  fluticasone (FLONASE) 50 mcg/act nasal spray, 2 sprays into each nostril daily, Disp: 16 g, Rfl: 3  •  glucose blood (ONE TOUCH ULTRA TEST) test strip, Test blood sugars 3 to 4 times a day, Disp: 400 each, Rfl: 1  •  insulin detemir (Levemir) 100 units/mL subcutaneous injection, Inject 40 Units under the skin every 12 (twelve) hours, Disp: 20 mL, Rfl: 5  •  insulin regular (HumuLIN R,NovoLIN R) 100 units/mL injection, Inject 0.15 mL (15 Units total) under the skin 2 (two) times a day with lunch and dinner, Disp: 20 mL, Rfl: 2  •  levothyroxine 100 mcg tablet, Take 1 tablet (100 mcg total) by mouth daily, Disp: 90 tablet, Rfl: 1  •  losartan (COZAAR) 100 MG tablet, Take 1 tablet (100 mg total) by mouth daily, Disp: 90 tablet, Rfl: 1  •  Magnesium 400 MG CAPS, Take 1 capsule (400 mg total) by mouth daily, Disp: 90 capsule, Rfl: 0  •  methocarbamol (ROBAXIN) 500 mg tablet, Take 1 tablet (500 mg total) by mouth 3 (three) times a day, Disp: 270 tablet, Rfl: 1  •  montelukast (SINGULAIR) 10 mg tablet, Take 1 tablet (10 mg total) by mouth daily at bedtime, Disp: 30 tablet, Rfl: 5  •  multivitamin (THERAGRAN) TABS, Take 1 tablet by mouth daily, Disp: 90 tablet, Rfl: 0  •  NIFEdipine (PROCARDIA XL) 60 mg 24 hr tablet, , Disp: , Rfl:   •  nitroglycerin (Nitrostat) 0.4 mg SL tablet, Place 1 tablet (0.4 mg total) under the tongue every 5 (five) minutes as needed for chest pain, Disp: 10 tablet, Rfl: 0  •  ondansetron (ZOFRAN) 4 mg tablet, Take 1 tablet (4 mg total) by mouth every 8 (eight) hours as needed for nausea or vomiting, Disp: 20 tablet, Rfl: 0  •  ONE TOUCH LANCETS MISC, by Does not apply route daily Test 2-3 times daily, Disp: , Rfl:   •  polyethylene glycol (Golytely) 4000 mL solution, Take 4,000 mL by mouth once for 1 dose Take 4000 mL by mouth once for 1 dose.  Use as directed (Patient not taking: Reported on 1/20/2023), Disp: 4000 mL, Rfl: 0  •  sitaGLIPtin (Januvia) 50 mg tablet, Take 1 tablet (50 mg total) by mouth daily, Disp: 30 tablet, Rfl: 5  •  traMADol (ULTRAM) 50 mg tablet, Take 1 tablet (50 mg total) by mouth 2 (two) times a day, Disp: 60 tablet, Rfl: 0  •  TRUEplus Insulin Syringe 31G X 5/16" 0.5 ML MISC, Inject under the skin 4 (four) times a day, Disp: 200 each, Rfl: 5    Laboratory Results:  Lab Results   Component Value Date    WBC 5.4 06/06/2023    HGB 11.2 (L) 06/06/2023    HCT 33.9 (L) 06/06/2023    MCV 91.1 06/06/2023     06/06/2023     Lab Results   Component Value Date    SODIUM 141 06/06/2023    K 4.1 06/06/2023     06/06/2023    CO2 30 06/06/2023    BUN 48 (H) 06/06/2023    CREATININE 1.08 (H) 06/06/2023    GLUC 61 (L) 06/06/2023    CALCIUM 10.2 06/06/2023     Lab Results   Component Value Date    CALCIUM 10.2 06/06/2023     No results found for: "LABPROT"

## 2023-07-24 NOTE — PROGRESS NOTES
NEPHROLOGY PROGRESS NOTE    Marcus Payton 76 y.o. female MRN: [id number]  Unit/Bed#:  Encounter: [id number]  Reason for Consult: Chronic kidney disease    The patient is here for follow-up she looks great I have not seen her in a couple years because she was seeing an extender from time to time. She presents today with no complaints in talking to her she says she has had an occasional low blood sugar attack at home but is always felt the symptoms and then treated accordingly. Other than that no changes no hospitalizations recently. ASSESSMENT/PLAN:  1. Renal    Patient's chronic renal insufficiency and she is a diabetic which puts her at risk for chronic kidney disease. In the past she had very low levels of proteinuria but her most recent estimation was around a gram.  Her latest creatinine is 1.6 but it was 1 in June. Looking back over couple years it tends to fluctuate between 1-1.5. When I see this occur it is generally related to medications or subtle changes in volume status and she does take Bumex. Her echocardiogram shows normal LV function she does not have edematous state so at some point she was put on this but it likely causes fluctuations in her volume status and given that it was normal a month ago and a little high now that is likely what it was and also its been very hot lately. What I am going to do is just check a BMP in 3 months to make sure there is no worsening I will call her with results and if things are stable we will see her back in 6 months with repeat labs at that time. For now no changes in medications  Labs as instructed    She was told to call if there is any problems or concerns before next visit. SUBJECTIVE:  Review of Systems   Constitutional: Negative for chills, diaphoresis, fever and malaise/fatigue. HENT: Negative. Eyes: Negative. Cardiovascular: Negative for chest pain, dyspnea on exertion, leg swelling and orthopnea. Respiratory: Negative. Negative for cough, shortness of breath, sputum production and wheezing. Gastrointestinal: Negative for abdominal pain, diarrhea, nausea and vomiting. Genitourinary: Negative. Neurological: Negative for dizziness, focal weakness, headaches and weakness. Psychiatric/Behavioral: Negative for altered mental status, hallucinations and hypervigilance. The patient is not nervous/anxious. OBJECTIVE:  Current Weight:    John@M-DAQ:     There were no vitals taken for this visit. , There is no height or weight on file to calculate BMI. [unfilled]    Physical Exam: LMP  (LMP Unknown)   Physical Exam  Constitutional:       General: She is not in acute distress. Appearance: She is not ill-appearing or toxic-appearing. HENT:      Head: Normocephalic and atraumatic. Nose: Nose normal.      Mouth/Throat:      Mouth: Mucous membranes are moist.   Eyes:      General: No scleral icterus. Extraocular Movements: Extraocular movements intact. Cardiovascular:      Rate and Rhythm: Normal rate and regular rhythm. Heart sounds: No friction rub. No gallop. Comments: No edema. Pulmonary:      Effort: Pulmonary effort is normal. No respiratory distress. Breath sounds: No wheezing, rhonchi or rales. Abdominal:      General: Bowel sounds are normal. There is no distension. Palpations: Abdomen is soft. Tenderness: There is no abdominal tenderness. There is no rebound. Musculoskeletal:      Cervical back: Normal range of motion and neck supple. Neurological:      General: No focal deficit present. Mental Status: She is alert and oriented to person, place, and time. Mental status is at baseline. Psychiatric:         Mood and Affect: Mood normal.         Behavior: Behavior normal.         Thought Content:  Thought content normal.         Judgment: Judgment normal.         Medications:    Current Outpatient Medications:   •  albuterol (Ventolin HFA) 90 mcg/act inhaler, Inhale 2 puffs 4 (four) times a day, Disp: 18 g, Rfl: 5  •  allopurinol (ZYLOPRIM) 100 mg tablet, Take 2 tablets (200 mg total) by mouth daily, Disp: 60 tablet, Rfl: 5  •  ascorbic acid (VITAMIN C) 500 mg tablet, Take 1 tablet (500 mg total) by mouth daily, Disp: 90 tablet, Rfl: 0  •  aspirin 81 MG tablet, Take 1 tablet by mouth daily , Disp: , Rfl:   •  atorvastatin (LIPITOR) 80 mg tablet, Take 1 tablet (80 mg total) by mouth daily, Disp: 90 tablet, Rfl: 1  •  bisacodyl (DULCOLAX) 5 mg EC tablet, Take 4 tablets (20 mg total) by mouth once for 1 dose, Disp: 4 tablet, Rfl: 0  •  budesonide-formoterol (Symbicort) 160-4.5 mcg/act inhaler, Inhale 2 puffs 2 (two) times a day Rinse mouth after use., Disp: 120 g, Rfl: 1  •  bumetanide (BUMEX) 2 mg tablet, Take 1 tablet (2 mg total) by mouth daily, Disp: 30 tablet, Rfl: 5  •  Calcium Carbonate-Vit D-Min (Calcium 600+D Plus Minerals) 600-400 MG-UNIT CHEW, Chew 2 tablets daily, Disp: 180 tablet, Rfl: 0  •  carvedilol (COREG) 25 mg tablet, Take 1 tablet (25 mg total) by mouth 2 (two) times a day with meals, Disp: 180 tablet, Rfl: 1  •  Cholecalciferol (Vitamin D3) 25 MCG (1000 UT) CAPS, Take 1 capsule (1,000 Units total) by mouth daily, Disp: 90 capsule, Rfl: 0  •  doxazosin (CARDURA) 2 mg tablet, Take 2 mg by mouth daily at bedtime, Disp: , Rfl:   •  famotidine (PEPCID) 10 mg tablet, Take 1 tablet (10 mg total) by mouth 2 (two) times a day for 90 days (Patient taking differently: Take 20 mg by mouth daily), Disp: 60 tablet, Rfl: 9  •  ferrous gluconate (FERGON) 240 (27 FE) MG tablet, Take 1 tablet (240 mg total) by mouth every other day, Disp: 45 tablet, Rfl: 0  •  fluticasone (FLONASE) 50 mcg/act nasal spray, 2 sprays into each nostril daily, Disp: 16 g, Rfl: 3  •  glucose blood (ONE TOUCH ULTRA TEST) test strip, Test blood sugars 3 to 4 times a day, Disp: 400 each, Rfl: 1  •  insulin detemir (Levemir) 100 units/mL subcutaneous injection, Inject 40 Units under the skin every 12 (twelve) hours, Disp: 20 mL, Rfl: 5  •  insulin regular (HumuLIN R,NovoLIN R) 100 units/mL injection, Inject 0.15 mL (15 Units total) under the skin 2 (two) times a day with lunch and dinner, Disp: 20 mL, Rfl: 2  •  levothyroxine 100 mcg tablet, Take 1 tablet (100 mcg total) by mouth daily, Disp: 90 tablet, Rfl: 1  •  losartan (COZAAR) 100 MG tablet, Take 1 tablet (100 mg total) by mouth daily, Disp: 90 tablet, Rfl: 1  •  Magnesium 400 MG CAPS, Take 1 capsule (400 mg total) by mouth daily, Disp: 90 capsule, Rfl: 0  •  methocarbamol (ROBAXIN) 500 mg tablet, Take 1 tablet (500 mg total) by mouth 3 (three) times a day, Disp: 270 tablet, Rfl: 1  •  montelukast (SINGULAIR) 10 mg tablet, Take 1 tablet (10 mg total) by mouth daily at bedtime, Disp: 30 tablet, Rfl: 5  •  multivitamin (THERAGRAN) TABS, Take 1 tablet by mouth daily, Disp: 90 tablet, Rfl: 0  •  NIFEdipine (PROCARDIA XL) 60 mg 24 hr tablet, , Disp: , Rfl:   •  nitroglycerin (Nitrostat) 0.4 mg SL tablet, Place 1 tablet (0.4 mg total) under the tongue every 5 (five) minutes as needed for chest pain, Disp: 10 tablet, Rfl: 0  •  ondansetron (ZOFRAN) 4 mg tablet, Take 1 tablet (4 mg total) by mouth every 8 (eight) hours as needed for nausea or vomiting, Disp: 20 tablet, Rfl: 0  •  ONE TOUCH LANCETS MISC, by Does not apply route daily Test 2-3 times daily, Disp: , Rfl:   •  polyethylene glycol (Golytely) 4000 mL solution, Take 4,000 mL by mouth once for 1 dose Take 4000 mL by mouth once for 1 dose.  Use as directed (Patient not taking: Reported on 1/20/2023), Disp: 4000 mL, Rfl: 0  •  sitaGLIPtin (Januvia) 50 mg tablet, Take 1 tablet (50 mg total) by mouth daily, Disp: 30 tablet, Rfl: 5  •  traMADol (ULTRAM) 50 mg tablet, Take 1 tablet (50 mg total) by mouth 2 (two) times a day, Disp: 60 tablet, Rfl: 0  •  TRUEplus Insulin Syringe 31G X 5/16" 0.5 ML MISC, Inject under the skin 4 (four) times a day, Disp: 200 each, Rfl: 5    Laboratory Results:  Lab Results   Component Value Date    WBC 5.4 06/06/2023    HGB 11.2 (L) 06/06/2023    HCT 33.9 (L) 06/06/2023    MCV 91.1 06/06/2023     06/06/2023     Lab Results   Component Value Date    SODIUM 141 06/06/2023    K 4.1 06/06/2023     06/06/2023    CO2 30 06/06/2023    BUN 48 (H) 06/06/2023    CREATININE 1.08 (H) 06/06/2023    GLUC 61 (L) 06/06/2023    CALCIUM 10.2 06/06/2023     Lab Results   Component Value Date    CALCIUM 10.2 06/06/2023     No results found for: "LABPROT"

## 2023-08-10 DIAGNOSIS — Z79.4 TYPE 2 DIABETES MELLITUS WITH COMPLICATION, WITH LONG-TERM CURRENT USE OF INSULIN (HCC): ICD-10-CM

## 2023-08-10 DIAGNOSIS — E11.8 TYPE 2 DIABETES MELLITUS WITH COMPLICATION, WITH LONG-TERM CURRENT USE OF INSULIN (HCC): ICD-10-CM

## 2023-08-10 DIAGNOSIS — M54.50 LOW BACK PAIN: ICD-10-CM

## 2023-08-11 PROBLEM — Z00.00 MEDICARE ANNUAL WELLNESS VISIT, SUBSEQUENT: Status: RESOLVED | Noted: 2018-08-28 | Resolved: 2023-08-11

## 2023-08-11 RX ORDER — INSULIN DETEMIR 100 [IU]/ML
40 INJECTION, SOLUTION SUBCUTANEOUS EVERY 12 HOURS SCHEDULED
Qty: 20 ML | Refills: 5 | Status: SHIPPED | OUTPATIENT
Start: 2023-08-11

## 2023-08-11 RX ORDER — TRAMADOL HYDROCHLORIDE 50 MG/1
50 TABLET ORAL 2 TIMES DAILY
Qty: 60 TABLET | Refills: 0 | Status: SHIPPED | OUTPATIENT
Start: 2023-08-11

## 2023-08-15 ENCOUNTER — OFFICE VISIT (OUTPATIENT)
Dept: INTERNAL MEDICINE CLINIC | Facility: OTHER | Age: 78
End: 2023-08-15
Payer: COMMERCIAL

## 2023-08-15 VITALS
HEART RATE: 67 BPM | HEIGHT: 58 IN | DIASTOLIC BLOOD PRESSURE: 58 MMHG | OXYGEN SATURATION: 97 % | SYSTOLIC BLOOD PRESSURE: 126 MMHG | TEMPERATURE: 98.3 F | BODY MASS INDEX: 29.22 KG/M2 | RESPIRATION RATE: 20 BRPM | WEIGHT: 139.2 LBS

## 2023-08-15 DIAGNOSIS — H00.014 HORDEOLUM EXTERNUM OF LEFT UPPER EYELID: Primary | ICD-10-CM

## 2023-08-15 PROCEDURE — 99213 OFFICE O/P EST LOW 20 MIN: CPT | Performed by: NURSE PRACTITIONER

## 2023-08-15 RX ORDER — ERYTHROMYCIN 5 MG/G
0.5 OINTMENT OPHTHALMIC
Qty: 42 G | Refills: 0 | Status: SHIPPED | OUTPATIENT
Start: 2023-08-15 | End: 2023-08-22
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
## 2023-08-15 NOTE — ASSESSMENT & PLAN NOTE
Will treat with Erythromycin opthalmic ointment, patient is to apply 0.5 inch of ointment to eye every 4 hours for the next 7 days. Use warm compresses: This will help decrease swelling and pain. Wet a clean washcloth with warm water and place it on your eye for 10 to 15 minutes, 3 to 4 times each day or as directed. Keep your hands away from your eye: This helps to prevent the spread of the infection to other parts of the eye. Wash your hands often with soap and dry with a clean towel. Do not squeeze the stye. Do not use eye makeup:  Do not wear eye makeup while you have a stye. Eye makeup may carry bacteria and cause another stye. Throw away eye makeup and brushes used to apply the makeup. Use new eye makeup after the stye has gone away. Do not share eye makeup with others. Prevent another stye:  Wash your face and clean your eyelashes every day. Remove eye makeup with makeup remover. This helps to completely remove eye makeup without heavy rubbing. Patient is to contact our office if:  You have redness and discharge around your eye, and your eye pain is getting worse. Your vision changes. The stye has not gone away within 7 days. The stye comes back within a short period of time after treatment.

## 2023-08-17 ENCOUNTER — OFFICE VISIT (OUTPATIENT)
Dept: PODIATRY | Facility: CLINIC | Age: 78
End: 2023-08-17
Payer: COMMERCIAL

## 2023-08-17 VITALS
HEART RATE: 78 BPM | WEIGHT: 139 LBS | BODY MASS INDEX: 29.18 KG/M2 | SYSTOLIC BLOOD PRESSURE: 120 MMHG | DIASTOLIC BLOOD PRESSURE: 65 MMHG | HEIGHT: 58 IN

## 2023-08-17 DIAGNOSIS — E11.42 DIABETIC POLYNEUROPATHY ASSOCIATED WITH TYPE 2 DIABETES MELLITUS (HCC): Primary | ICD-10-CM

## 2023-08-17 PROCEDURE — 99212 OFFICE O/P EST SF 10 MIN: CPT | Performed by: PODIATRIST

## 2023-08-17 NOTE — PROGRESS NOTES
Patient presents for palliative diabetic foot care. Patient relates minimal pain from her digital fractures. There is no pain with palpation left third fourth and fifth toes. There are palpable pedal pulses. Treatment consisted of trimming of elongated toenails. Patient is rescheduled in 10 weeks.

## 2023-08-24 DIAGNOSIS — E78.5 HYPERLIPIDEMIA, UNSPECIFIED HYPERLIPIDEMIA TYPE: ICD-10-CM

## 2023-08-24 DIAGNOSIS — E11.8 TYPE 2 DIABETES MELLITUS WITH COMPLICATION, WITH LONG-TERM CURRENT USE OF INSULIN (HCC): ICD-10-CM

## 2023-08-24 DIAGNOSIS — Z79.4 TYPE 2 DIABETES MELLITUS WITH COMPLICATION, WITH LONG-TERM CURRENT USE OF INSULIN (HCC): ICD-10-CM

## 2023-08-24 RX ORDER — ATORVASTATIN CALCIUM 80 MG/1
80 TABLET, FILM COATED ORAL DAILY
Qty: 90 TABLET | Refills: 0 | OUTPATIENT
Start: 2023-08-24

## 2023-08-31 ENCOUNTER — TELEPHONE (OUTPATIENT)
Dept: INTERNAL MEDICINE CLINIC | Age: 78
End: 2023-08-31

## 2023-08-31 DIAGNOSIS — J45.41 MODERATE PERSISTENT ASTHMA WITH ACUTE EXACERBATION: ICD-10-CM

## 2023-08-31 RX ORDER — MONTELUKAST SODIUM 10 MG/1
10 TABLET ORAL
Qty: 30 TABLET | Refills: 5 | Status: SHIPPED | OUTPATIENT
Start: 2023-08-31

## 2023-08-31 NOTE — TELEPHONE ENCOUNTER
rx refill for montelukast (SINGULAIR) 10 mg tablet    Send to [id number]  [place]       Nov- 10/12/2023

## 2023-09-07 DIAGNOSIS — M54.50 LOW BACK PAIN: ICD-10-CM

## 2023-09-07 RX ORDER — TRAMADOL HYDROCHLORIDE 50 MG/1
50 TABLET ORAL 2 TIMES DAILY
Qty: 60 TABLET | Refills: 1 | Status: SHIPPED | OUTPATIENT
Start: 2023-09-07

## 2023-09-07 NOTE — TELEPHONE ENCOUNTER
rx refill for traMADol (ULTRAM) 50 mg tablet.     Send to [id number], [address] [facility]     Nov- 10/12/2023

## 2023-09-13 DIAGNOSIS — E03.9 HYPOTHYROIDISM, UNSPECIFIED TYPE: ICD-10-CM

## 2023-09-13 DIAGNOSIS — E78.5 HYPERLIPIDEMIA, UNSPECIFIED HYPERLIPIDEMIA TYPE: ICD-10-CM

## 2023-09-13 RX ORDER — ATORVASTATIN CALCIUM 80 MG/1
80 TABLET, FILM COATED ORAL DAILY
Qty: 90 TABLET | Refills: 1 | Status: SHIPPED | OUTPATIENT
Start: 2023-09-13

## 2023-09-13 RX ORDER — LEVOTHYROXINE SODIUM 0.1 MG/1
100 TABLET ORAL DAILY
Qty: 90 TABLET | Refills: 1 | Status: SHIPPED | OUTPATIENT
Start: 2023-09-13

## 2023-09-13 RX ORDER — LEVOTHYROXINE SODIUM 0.1 MG/1
100 TABLET ORAL DAILY
Qty: 90 TABLET | Refills: 0 | OUTPATIENT
Start: 2023-09-13

## 2023-09-13 NOTE — TELEPHONE ENCOUNTER
rx refill for levothyroxine 100 mcg tablet.     Send to [id number], [address] [facility]     Nov- 10/11/2023

## 2023-09-14 ENCOUNTER — OFFICE VISIT (OUTPATIENT)
Dept: INTERNAL MEDICINE CLINIC | Facility: CLINIC | Age: 78
End: 2023-09-14
Payer: COMMERCIAL

## 2023-09-14 ENCOUNTER — APPOINTMENT (OUTPATIENT)
Dept: LAB | Age: 78
End: 2023-09-14
Payer: COMMERCIAL

## 2023-09-14 VITALS
TEMPERATURE: 98 F | HEIGHT: 58 IN | HEART RATE: 81 BPM | SYSTOLIC BLOOD PRESSURE: 134 MMHG | WEIGHT: 138 LBS | DIASTOLIC BLOOD PRESSURE: 60 MMHG | OXYGEN SATURATION: 98 % | BODY MASS INDEX: 28.97 KG/M2

## 2023-09-14 DIAGNOSIS — R68.89 FLU-LIKE SYMPTOMS: ICD-10-CM

## 2023-09-14 DIAGNOSIS — Z79.4 TYPE 2 DIABETES MELLITUS WITH COMPLICATION, WITH LONG-TERM CURRENT USE OF INSULIN (HCC): ICD-10-CM

## 2023-09-14 DIAGNOSIS — R11.2 NAUSEA AND VOMITING, UNSPECIFIED VOMITING TYPE: Primary | ICD-10-CM

## 2023-09-14 DIAGNOSIS — E11.8 TYPE 2 DIABETES MELLITUS WITH COMPLICATION, WITH LONG-TERM CURRENT USE OF INSULIN (HCC): ICD-10-CM

## 2023-09-14 DIAGNOSIS — R11.2 NAUSEA AND VOMITING, UNSPECIFIED VOMITING TYPE: ICD-10-CM

## 2023-09-14 LAB
ANION GAP SERPL CALCULATED.3IONS-SCNC: 7 MMOL/L
BUN SERPL-MCNC: 70 MG/DL (ref 5–25)
CALCIUM SERPL-MCNC: 9.4 MG/DL (ref 8.4–10.2)
CHLORIDE SERPL-SCNC: 108 MMOL/L (ref 96–108)
CO2 SERPL-SCNC: 24 MMOL/L (ref 21–32)
CREAT SERPL-MCNC: 1.47 MG/DL (ref 0.6–1.3)
GFR SERPL CREATININE-BSD FRML MDRD: 33 ML/MIN/1.73SQ M
GLUCOSE P FAST SERPL-MCNC: 162 MG/DL (ref 65–99)
POTASSIUM SERPL-SCNC: 4.8 MMOL/L (ref 3.5–5.3)
SODIUM SERPL-SCNC: 139 MMOL/L (ref 135–147)

## 2023-09-14 PROCEDURE — 99213 OFFICE O/P EST LOW 20 MIN: CPT | Performed by: INTERNAL MEDICINE

## 2023-09-14 PROCEDURE — 80048 BASIC METABOLIC PNL TOTAL CA: CPT

## 2023-09-14 PROCEDURE — 36415 COLL VENOUS BLD VENIPUNCTURE: CPT

## 2023-09-14 RX ORDER — ONDANSETRON 4 MG/1
4 TABLET, FILM COATED ORAL EVERY 8 HOURS PRN
Qty: 20 TABLET | Refills: 0 | Status: SHIPPED | OUTPATIENT
Start: 2023-09-14
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
## 2023-09-15 DIAGNOSIS — E11.8 TYPE 2 DIABETES MELLITUS WITH COMPLICATION, WITH LONG-TERM CURRENT USE OF INSULIN (HCC): ICD-10-CM

## 2023-09-15 DIAGNOSIS — R79.89 ELEVATED SERUM CREATININE: Primary | ICD-10-CM

## 2023-09-15 DIAGNOSIS — R11.2 NAUSEA AND VOMITING, UNSPECIFIED VOMITING TYPE: ICD-10-CM

## 2023-09-15 DIAGNOSIS — Z79.4 TYPE 2 DIABETES MELLITUS WITH COMPLICATION, WITH LONG-TERM CURRENT USE OF INSULIN (HCC): ICD-10-CM

## 2023-09-18 ENCOUNTER — APPOINTMENT (OUTPATIENT)
Dept: LAB | Age: 78
End: 2023-09-18
Payer: COMMERCIAL

## 2023-09-18 DIAGNOSIS — Z79.4 TYPE 2 DIABETES MELLITUS WITH COMPLICATION, WITH LONG-TERM CURRENT USE OF INSULIN (HCC): ICD-10-CM

## 2023-09-18 DIAGNOSIS — N18.32 TYPE 2 DIABETES MELLITUS WITH STAGE 3B CHRONIC KIDNEY DISEASE, WITH LONG-TERM CURRENT USE OF INSULIN (HCC): ICD-10-CM

## 2023-09-18 DIAGNOSIS — E11.42 TYPE 2 DIABETES MELLITUS WITH DIABETIC POLYNEUROPATHY, WITH LONG-TERM CURRENT USE OF INSULIN (HCC): ICD-10-CM

## 2023-09-18 DIAGNOSIS — Z79.4 TYPE 2 DIABETES MELLITUS WITH STAGE 3B CHRONIC KIDNEY DISEASE, WITH LONG-TERM CURRENT USE OF INSULIN (HCC): ICD-10-CM

## 2023-09-18 DIAGNOSIS — N18.32 STAGE 3B CHRONIC KIDNEY DISEASE (HCC): ICD-10-CM

## 2023-09-18 DIAGNOSIS — E11.8 TYPE 2 DIABETES MELLITUS WITH COMPLICATION, WITH LONG-TERM CURRENT USE OF INSULIN (HCC): ICD-10-CM

## 2023-09-18 DIAGNOSIS — R79.89 ELEVATED SERUM CREATININE: ICD-10-CM

## 2023-09-18 DIAGNOSIS — E11.22 TYPE 2 DIABETES MELLITUS WITH STAGE 3B CHRONIC KIDNEY DISEASE, WITH LONG-TERM CURRENT USE OF INSULIN (HCC): ICD-10-CM

## 2023-09-18 DIAGNOSIS — R11.2 NAUSEA AND VOMITING, UNSPECIFIED VOMITING TYPE: ICD-10-CM

## 2023-09-18 DIAGNOSIS — N18.9 CHRONIC KIDNEY DISEASE, UNSPECIFIED CKD STAGE: ICD-10-CM

## 2023-09-18 DIAGNOSIS — Z79.4 TYPE 2 DIABETES MELLITUS WITH DIABETIC POLYNEUROPATHY, WITH LONG-TERM CURRENT USE OF INSULIN (HCC): ICD-10-CM

## 2023-09-18 LAB
ALBUMIN SERPL BCP-MCNC: 3.8 G/DL (ref 3.5–5)
ALP SERPL-CCNC: 81 U/L (ref 34–104)
ALT SERPL W P-5'-P-CCNC: 26 U/L (ref 7–52)
ANION GAP SERPL CALCULATED.3IONS-SCNC: 7 MMOL/L
AST SERPL W P-5'-P-CCNC: 29 U/L (ref 13–39)
BILIRUB SERPL-MCNC: 0.3 MG/DL (ref 0.2–1)
BUN SERPL-MCNC: 65 MG/DL (ref 5–25)
CALCIUM SERPL-MCNC: 9.4 MG/DL (ref 8.4–10.2)
CHLORIDE SERPL-SCNC: 103 MMOL/L (ref 96–108)
CHOLEST SERPL-MCNC: 108 MG/DL
CO2 SERPL-SCNC: 27 MMOL/L (ref 21–32)
CREAT SERPL-MCNC: 1.77 MG/DL (ref 0.6–1.3)
ERYTHROCYTE [DISTWIDTH] IN BLOOD BY AUTOMATED COUNT: 15 % (ref 11.6–15.1)
EST. AVERAGE GLUCOSE BLD GHB EST-MCNC: 183 MG/DL
GFR SERPL CREATININE-BSD FRML MDRD: 26 ML/MIN/1.73SQ M
GLUCOSE P FAST SERPL-MCNC: 111 MG/DL (ref 65–99)
HBA1C MFR BLD: 8 %
HCT VFR BLD AUTO: 33.6 % (ref 34.8–46.1)
HDLC SERPL-MCNC: 33 MG/DL
HGB BLD-MCNC: 10.7 G/DL (ref 11.5–15.4)
LDLC SERPL CALC-MCNC: 25 MG/DL (ref 0–100)
MCH RBC QN AUTO: 31.3 PG (ref 26.8–34.3)
MCHC RBC AUTO-ENTMCNC: 31.8 G/DL (ref 31.4–37.4)
MCV RBC AUTO: 98 FL (ref 82–98)
PLATELET # BLD AUTO: 164 THOUSANDS/UL (ref 149–390)
PMV BLD AUTO: 12.1 FL (ref 8.9–12.7)
POTASSIUM SERPL-SCNC: 5 MMOL/L (ref 3.5–5.3)
PROT SERPL-MCNC: 6.4 G/DL (ref 6.4–8.4)
RBC # BLD AUTO: 3.42 MILLION/UL (ref 3.81–5.12)
SODIUM SERPL-SCNC: 137 MMOL/L (ref 135–147)
TRIGL SERPL-MCNC: 249 MG/DL
TSH SERPL DL<=0.05 MIU/L-ACNC: 3.75 UIU/ML (ref 0.45–4.5)
WBC # BLD AUTO: 7.31 THOUSAND/UL (ref 4.31–10.16)

## 2023-09-18 PROCEDURE — 80061 LIPID PANEL: CPT

## 2023-09-18 PROCEDURE — 36415 COLL VENOUS BLD VENIPUNCTURE: CPT | Performed by: INTERNAL MEDICINE

## 2023-09-18 PROCEDURE — 83036 HEMOGLOBIN GLYCOSYLATED A1C: CPT

## 2023-09-18 PROCEDURE — 80053 COMPREHEN METABOLIC PANEL: CPT | Performed by: INTERNAL MEDICINE

## 2023-09-18 PROCEDURE — 84443 ASSAY THYROID STIM HORMONE: CPT | Performed by: INTERNAL MEDICINE

## 2023-09-18 PROCEDURE — 85027 COMPLETE CBC AUTOMATED: CPT

## 2023-09-19 ENCOUNTER — TELEPHONE (OUTPATIENT)
Dept: INTERNAL MEDICINE CLINIC | Age: 78
End: 2023-09-19

## 2023-09-19 ENCOUNTER — HOSPITAL ENCOUNTER (EMERGENCY)
Facility: HOSPITAL | Age: 78
Discharge: HOME | End: 2023-09-19
Attending: EMERGENCY MEDICINE
Payer: COMMERCIAL

## 2023-09-19 VITALS
SYSTOLIC BLOOD PRESSURE: 176 MMHG | TEMPERATURE: 98.1 F | HEART RATE: 66 BPM | DIASTOLIC BLOOD PRESSURE: 81 MMHG | OXYGEN SATURATION: 99 % | RESPIRATION RATE: 20 BRPM

## 2023-09-19 DIAGNOSIS — N17.9 AKI (ACUTE KIDNEY INJURY) (HCC): Primary | ICD-10-CM

## 2023-09-19 DIAGNOSIS — R03.0 ELEVATED BLOOD PRESSURE READING: ICD-10-CM

## 2023-09-19 LAB
ALBUMIN SERPL BCP-MCNC: 3.9 G/DL (ref 3.5–5)
ALP SERPL-CCNC: 86 U/L (ref 34–104)
ALT SERPL W P-5'-P-CCNC: 25 U/L (ref 7–52)
ANION GAP SERPL CALCULATED.3IONS-SCNC: 8 MMOL/L
AST SERPL W P-5'-P-CCNC: 26 U/L (ref 13–39)
BILIRUB SERPL-MCNC: 0.29 MG/DL (ref 0.2–1)
BUN SERPL-MCNC: 57 MG/DL (ref 5–25)
CALCIUM SERPL-MCNC: 8.9 MG/DL (ref 8.4–10.2)
CHLORIDE SERPL-SCNC: 104 MMOL/L (ref 96–108)
CO2 SERPL-SCNC: 24 MMOL/L (ref 21–32)
CREAT SERPL-MCNC: 1.43 MG/DL (ref 0.6–1.3)
GFR SERPL CREATININE-BSD FRML MDRD: 34 ML/MIN/1.73SQ M
GLUCOSE SERPL-MCNC: 215 MG/DL (ref 65–140)
POTASSIUM SERPL-SCNC: 5 MMOL/L (ref 3.5–5.3)
PROT SERPL-MCNC: 6.3 G/DL (ref 6.4–8.4)
SODIUM SERPL-SCNC: 136 MMOL/L (ref 135–147)

## 2023-09-19 PROCEDURE — 96360 HYDRATION IV INFUSION INIT: CPT

## 2023-09-19 PROCEDURE — 96361 HYDRATE IV INFUSION ADD-ON: CPT

## 2023-09-19 PROCEDURE — 99285 EMERGENCY DEPT VISIT HI MDM: CPT | Performed by: EMERGENCY MEDICINE

## 2023-09-19 PROCEDURE — 36415 COLL VENOUS BLD VENIPUNCTURE: CPT

## 2023-09-19 PROCEDURE — 99284 EMERGENCY DEPT VISIT MOD MDM: CPT

## 2023-09-19 PROCEDURE — 80053 COMPREHEN METABOLIC PANEL: CPT

## 2023-09-19 RX ADMIN — SODIUM CHLORIDE 1000 ML: 0.9 INJECTION, SOLUTION INTRAVENOUS at 12:46

## 2023-09-19 NOTE — DISCHARGE INSTRUCTIONS
Your blood pressure was elevated in the emergency department today. You should make an appointment with your doctor in the next 1 week for follow-up and recheck of the blood pressure. Maintain good intake of fluids and return to eating regular meals as tolerated. Please return to the ED if you have intractable vomiting and are unable to hydrate orally, decreased urination, abdominal pain, fever or chills.

## 2023-09-19 NOTE — TELEPHONE ENCOUNTER
Spoke with the  and he stated that she finally agreed and will be going to the hospital now to follow up on this issue

## 2023-09-19 NOTE — ED NOTES
Pt ambulated to the bathroom, offers no complaints @ this time.      Arturo Isaacs RN  09/19/23 [id number]

## 2023-09-19 NOTE — ED NOTES
Pt reports feeling dizzy while getting to the bathroom. Dr Kaplan Corner aware.      Shahab Rabago RN  09/19/23 [id number]
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
## 2023-09-19 NOTE — TELEPHONE ENCOUNTER
Called patient's phone number, no response. Voicemail message left to keep next appointment with Dr. James Guerrero and she can request to repeat renal function at that time.

## 2023-09-19 NOTE — RESULT ENCOUNTER NOTE
Discussed  BMP labs with the patient, creatinine worsening, patient has no subjective complaints today, reports that she is hydrating herself well. Discussed that she needs to go to the ER for further work-up and medication adjustment and possibly IV hydration. Patient refuses to go to the ER, she rather prefers to be evaluated at her PCP office.   Discussed that patient is at risk of kidney failure without immediate intervention, she verbalized understanding

## 2023-09-19 NOTE — TELEPHONE ENCOUNTER
Patient is questioning regarding the blood work. She was told by Dr. Dario Narayanan to go to the ED due to her abnormal labs. Patient doesn't want to go. She is stating she is ok and doesn't want to go. I see that she has an appointment with Dr. Alicia Peter, but she wants to speak with you on the blood work and if she should really be going to the ED or should she come in to see you for this issue.       Please call her back at the home number [phone]

## 2023-09-19 NOTE — TELEPHONE ENCOUNTER
Patient left message on machine requesting a call back, I tried to call back and she did not answer.

## 2023-09-19 NOTE — ED ATTENDING ATTESTATION
9/19/2023  ISherin DO, saw and evaluated the patient. I have discussed the patient with the resident/non-physician practitioner and agree with the resident's/non-physician practitioner's findings, Plan of Care, and MDM as documented in the resident's/non-physician practitioner's note, except where noted. All available labs and Radiology studies were reviewed. I was present for key portions of any procedure(s) performed by the resident/non-physician practitioner and I was immediately available to provide assistance. At this point I agree with the current assessment done in the Emergency Department. I have conducted an independent evaluation of this patient a history and physical is as follows:    Patient is a 64-year-old female with a history of type 2 diabetes, hypothyroidism, baseline creatinine about 1, comedy by her . About a week and a half ago the patient had several days of nausea, vomiting, diarrhea, felt somewhat weak and rundown. She was seen by her primary care physician, had outpatient blood work drawn which showed a creatinine of 1.47 on September 14. Her baseline creatinine is about 1. She was then encouraged to drink more fluids, yesterday had repeat blood work showing creatinine 1.77 and was recommended to go to the ED. She says that she is only been drinking about 8 ounces of water a day for the last week and a half or 2 weeks. Her nausea and vomiting has resolved. There is no one else sick with similar symptoms. No hematuria. Her  who accompanies her indicates she is acting normally otherwise.     General:  Patient is well-appearing  Head:  Atraumatic  Eyes:  Conjunctiva pink  ENT:  Mucous membranes are moist  Neck:  Supple  Cardiac:  S1-S2, without murmurs  Lungs:  Clear to auscultation bilaterally  Abdomen:  Soft, nontender, normal bowel sounds, no CVA tenderness, no tympany, no rigidity, no guarding  Extremities:  Normal range of motion  Neurologic: Awake, fluent speech, normal comprehension. AAOx3. Skin:  Pink warm and dry  Psychiatric:  Alert, pleasant, cooperative            ED Course     Labs Reviewed   COMPREHENSIVE METABOLIC PANEL - Abnormal       Result Value Ref Range Status    Sodium 136  135 - 147 mmol/L Final    Potassium 5.0  3.5 - 5.3 mmol/L Final    Chloride 104  96 - 108 mmol/L Final    CO2 24  21 - 32 mmol/L Final    ANION GAP 8  mmol/L Final    BUN 57 (*) 5 - 25 mg/dL Final    Creatinine 1.43 (*) 0.60 - 1.30 mg/dL Final    Comment: Standardized to IDMS reference method    Glucose 215 (*) 65 - 140 mg/dL Final    Comment: If the patient is fasting, the ADA then defines impaired fasting glucose as > 100 mg/dL and diabetes as > or equal to 123 mg/dL. Calcium 8.9  8.4 - 10.2 mg/dL Final    AST 26  13 - 39 U/L Final    ALT 25  7 - 52 U/L Final    Comment: Specimen collection should occur prior to Sulfasalazine administration due to the potential for falsely depressed results. Alkaline Phosphatase 86  34 - 104 U/L Final    Total Protein 6.3 (*) 6.4 - 8.4 g/dL Final    Albumin 3.9  3.5 - 5.0 g/dL Final    Total Bilirubin 0.29  0.20 - 1.00 mg/dL Final    Comment: Use of this assay is not recommended for patients undergoing treatment with eltrombopag due to the potential for falsely elevated results. N-acetyl-p-benzoquinone imine (metabolite of Acetaminophen) will generate erroneously low results in samples for patients that have taken an overdose of Acetaminophen.     eGFR 34  ml/min/1.73sq m Final    Narrative:     [place] guidelines for Chronic Kidney Disease (CKD):   •  Stage 1 with normal or high GFR (GFR > 90 mL/min/1.73 square meters)  •  Stage 2 Mild CKD (GFR = 60-89 mL/min/1.73 square meters)  •  Stage 3A Moderate CKD (GFR = 45-59 mL/min/1.73 square meters)  •  Stage 3B Moderate CKD (GFR = 30-44 mL/min/1.73 square meters)  •  Stage 4 Severe CKD (GFR = 15-29 mL/min/1.73 square meters)  •  Stage 5 End Stage CKD (GFR <15 mL/min/1.73 square meters)  Note: GFR calculation is accurate only with a steady state creatinine   Patient given IV fluids, reassessed, repeat creatinine as noted above. At this point believe patient has some mild dehydration and some slight  Prerenal renal insufficiency. Her creatinine has improved significantly after a liter of IV fluids in the emergency department, she and her  indicate that she will be able to drink fluids appropriately and I believe discharge home with outpatient follow-up is appropriate. DIAGNOSIS:  Acute dehydration, acute prerenal acute kidney injury    MEDICAL DECISION MAKING CODING    The differential diagnosis before testing included (but is not limited to) acute dehydration, acute renal insufficiency, acute kidney injury, and acute life-threatening hyponatremia which is a medical condition that poses a threat to life/function. Patient presents with acute new problem with:  Threat to life or bodily function      Chronic conditions affecting care: As per HPI    COLLECTION AND INTERPRETATION OF DATA  Additional history obtained from:   I reviewed prior external notes, including prior labs as noted above    I ordered each unique test  Tests reviewed personally by me:    Labs: See above      RISK  All of the patient's current prescription medications should be continued.     Treatment:  Consideration of admission: considered, but believe d/c home is appropriate            Critical Care Time  Procedures

## 2023-09-21 ENCOUNTER — OFFICE VISIT (OUTPATIENT)
Dept: INTERNAL MEDICINE CLINIC | Age: 78
End: 2023-09-21
Payer: COMMERCIAL

## 2023-09-21 VITALS
TEMPERATURE: 97.4 F | WEIGHT: 139 LBS | SYSTOLIC BLOOD PRESSURE: 128 MMHG | HEART RATE: 66 BPM | BODY MASS INDEX: 29.18 KG/M2 | HEIGHT: 58 IN | OXYGEN SATURATION: 99 % | DIASTOLIC BLOOD PRESSURE: 60 MMHG

## 2023-09-21 DIAGNOSIS — N18.32 TYPE 2 DIABETES MELLITUS WITH STAGE 3B CHRONIC KIDNEY DISEASE, WITH LONG-TERM CURRENT USE OF INSULIN (HCC): ICD-10-CM

## 2023-09-21 DIAGNOSIS — R11.2 NAUSEA AND VOMITING, UNSPECIFIED VOMITING TYPE: ICD-10-CM

## 2023-09-21 DIAGNOSIS — N17.9 AKI (ACUTE KIDNEY INJURY) (HCC): Primary | ICD-10-CM

## 2023-09-21 DIAGNOSIS — Z12.31 ENCOUNTER FOR SCREENING MAMMOGRAM FOR MALIGNANT NEOPLASM OF BREAST: ICD-10-CM

## 2023-09-21 DIAGNOSIS — N18.32 STAGE 3B CHRONIC KIDNEY DISEASE (HCC): ICD-10-CM

## 2023-09-21 DIAGNOSIS — Z79.4 TYPE 2 DIABETES MELLITUS WITH STAGE 3B CHRONIC KIDNEY DISEASE, WITH LONG-TERM CURRENT USE OF INSULIN (HCC): ICD-10-CM

## 2023-09-21 DIAGNOSIS — E11.22 TYPE 2 DIABETES MELLITUS WITH STAGE 3B CHRONIC KIDNEY DISEASE, WITH LONG-TERM CURRENT USE OF INSULIN (HCC): ICD-10-CM

## 2023-09-21 PROCEDURE — 99214 OFFICE O/P EST MOD 30 MIN: CPT | Performed by: INTERNAL MEDICINE

## 2023-09-21 NOTE — PROGRESS NOTES
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   greater than or equal to 7% indicates suboptimal   control. A1c targets should be individualized based on   duration of diabetes, age, comorbid conditions, and   other considerations. Currently, no consensus exists regarding use of  hemoglobin A1c for diagnosis of diabetes for children. Orders Only on 01/17/2023   Component Date Value Ref Range Status   • Creatinine, Urine 01/17/2023 48  20 - 275 mg/dL Final   • Albumin,U,Random 01/17/2023 17.3  See Note: mg/dL Final    Comment: Reference Range:    Reference Range  Not established     • Microalb/Creat Ratio 01/17/2023 360 (H)  <30 mcg/mg creat Final    Comment:    The ADA defines abnormalities in albumin  excretion as follows: Albuminuria Category        Result (mcg/mg creatinine)     Normal to Mildly increased   <30  Moderately increased            Severely increased           > OR = 300     The ADA recommends that at least two of three  specimens collected within a 3-6 month period be  abnormal before considering a patient to be  within a diagnostic category.      • PTH, Intact 01/17/2023 39  16 - 77 pg/mL Final    Comment:    Interpretive Guide    Intact PTH           Calcium  ------------------ ----------           -------  Normal Parathyroid    Normal               Normal  Hypoparathyroidism    Low or Low Normal    Low  Hyperparathyroidism     Primary            Normal or High       High     Secondary          High                 Normal or Low     Tertiary           High                 High  Non-Parathyroid     Hypercalcemia      Low or Low Normal    High        • Calcium 01/17/2023 9.3  8.6 - 10.4 mg/dL Final   • Magnesium, Serum 01/17/2023 2.1  1.5 - 2.5 mg/dL Final   • Glucose, Random 01/17/2023 88  65 - 99 mg/dL Final    Comment:               Fasting reference interval        • BUN 01/17/2023 43 (H)  7 - 25 mg/dL Final   • Creatinine 01/17/2023 0.94  0.60 - 1.00 mg/dL Final   • eGFR 01/17/2023 62  > OR = 60 mL/min/1.73m2 Final    Comment: The eGFR is based on the CKD-EPI 2021 equation. To calculate   the new eGFR from a previous Creatinine or Cystatin C  result, go to [email or web address] org/professionals/  [email or web address]     • SL AMB BUN/CREATININE RATIO 01/17/2023 46 (H)  6 - 22 (calc) Final   • Sodium 01/17/2023 139  135 - 146 mmol/L Final   • Potassium 01/17/2023 4.5  3.5 - 5.3 mmol/L Final   • Chloride 01/17/2023 104  98 - 110 mmol/L Final   • CO2 01/17/2023 31  20 - 32 mmol/L Final   • Calcium 01/17/2023 9.3  8.6 - 10.4 mg/dL Final   • Phosphorus, Serum 01/17/2023 4.6 (H)  2.1 - 4.3 mg/dL Final   • Albumin 01/17/2023 3.5 (L)  3.6 - 5.1 g/dL Final   • Color UA 01/17/2023 YELLOW  YELLOW Final   • Urine Appearance 01/17/2023 CLEAR  CLEAR Final   • Specific Gravity 01/17/2023 1.012  1.001 - 1.035 Final   • Ph 01/17/2023 6.5  5.0 - 8.0 Final   • Glucose, Urine 01/17/2023 NEGATIVE  NEGATIVE Final   • Bilirubin, Urine 01/17/2023 NEGATIVE  NEGATIVE Final   • Ketone, Urine 01/17/2023 NEGATIVE  NEGATIVE Final   • Blood, Urine 01/17/2023 NEGATIVE  NEGATIVE Final   • Protein, Urine 01/17/2023 1+ (A)  NEGATIVE Final   • Nitrites Urine 01/17/2023 NEGATIVE  NEGATIVE Final   • Leukocyte Esterase 01/17/2023 NEGATIVE  NEGATIVE Final   • SL AMB WBC, URINE 01/17/2023 NONE SEEN  < OR = 5 /HPF Final   • RBC, Urine 01/17/2023 NONE SEEN  < OR = 2 /HPF Final   • Squamous Epithelial Cells 01/17/2023 NONE SEEN  < OR = 5 /HPF Final   • Bacteria, UA 01/17/2023 NONE SEEN  NONE SEEN /HPF Final   • Hyaline Casts 01/17/2023 NONE SEEN  NONE SEEN /LPF Final   • Comment(s) 01/17/2023    Final    Comment: This urine was analyzed for the presence of WBC,   RBC, bacteria, casts, and other formed elements. Only those elements seen were reported.            • White Blood Cell Count 01/17/2023 5.5  3.8 - 10.8 Thousand/uL Final   • Red Blood Cell Count 01/17/2023 3.57 (L)  3.80 - 5.10 Million/uL Final   • Hemoglobin 01/17/2023 10.7 (L)  11.7 - 15.5 g/dL Final   • HCT 01/17/2023 33.1 (L)  35.0 - 45.0 % Final   • MCV 01/17/2023 92.7  80.0 - 100.0 fL Final   • MCH 01/17/2023 30.0  27.0 - 33.0 pg Final   • MCHC 01/17/2023 32.3  32.0 - 36.0 g/dL Final   • RDW 01/17/2023 13.9  11.0 - 15.0 % Final   • Platelet Count 82/99/4025 163  140 - 400 Thousand/uL Final   • SL AMB MPV 01/17/2023 12.4  7.5 - 12.5 fL Final   • Neutrophils (Absolute) 01/17/2023 2,987  1,500 - 7,800 cells/uL Final   • Lymphocytes (Absolute) 01/17/2023 1,634  850 - 3,900 cells/uL Final   • Monocytes (Absolute) 01/17/2023 556  200 - 950 cells/uL Final   • Eosinophils (Absolute) 01/17/2023 292  15 - 500 cells/uL Final   • Basophils ABS 01/17/2023 33  0 - 200 cells/uL Final   • Neutrophils 01/17/2023 54.3  % Final   • Lymphocytes 01/17/2023 29.7  % Final   • Monocytes 01/17/2023 10.1  % Final   • Eosinophils 01/17/2023 5.3  % Final   • Basophils PCT 01/17/2023 0.6  % Final   • Vitamin D, 25-Hydroxy, Serum 01/17/2023 40  30 - 100 ng/mL Final    Comment: Vitamin D Status         25-OH Vitamin D:     Deficiency:                    <20 ng/mL  Insufficiency:             20 - 29 ng/mL  Optimal:                 > or = 30 ng/mL     For 25-OH Vitamin D testing on patients on D2-supplementation and patients for whom quantitation   of D2 and D3 fractions is required, the [employer](TM)  25-OH VIT D, (D2,D3), LC/MS/MS is recommended: order   code 44984 (patients >2yrs). See Note 1     Note 1     For additional information, please refer to   http://education. Questli/faq/TCX348   (This link is being provided for informational/  educational purposes only.)     Orders Only on 09/27/2022   Component Date Value Ref Range Status   • TSH 3RD GENERATON 09/18/2023 3.751  0.450 - 4.500 uIU/mL Final    The recommended reference ranges for TSH during pregnancy are as follows:   First trimester 0.1 to 2.5 uIU/mL   Second trimester  0.2 to 3.0 uIU/mL   Third trimester 0.3 to 3.0 uIU/m    Note: Normal ranges may not apply to patients who are transgender, non-binary, or whose legal sex, sex at birth, and gender identity differ. Adult TSH (3rd generation) reference range follows the recommended guidelines of the American Thyroid Association, January, 2020. • Sodium 09/18/2023 137  135 - 147 mmol/L Final   • Potassium 09/18/2023 5.0  3.5 - 5.3 mmol/L Final   • Chloride 09/18/2023 103  96 - 108 mmol/L Final   • CO2 09/18/2023 27  21 - 32 mmol/L Final   • ANION GAP 09/18/2023 7  mmol/L Final   • BUN 09/18/2023 65 (H)  5 - 25 mg/dL Final   • Creatinine 09/18/2023 1.77 (H)  0.60 - 1.30 mg/dL Final    Standardized to IDMS reference method   • Glucose, Fasting 09/18/2023 111 (H)  65 - 99 mg/dL Final   • Calcium 09/18/2023 9.4  8.4 - 10.2 mg/dL Final   • AST 09/18/2023 29  13 - 39 U/L Final   • ALT 09/18/2023 26  7 - 52 U/L Final    Specimen collection should occur prior to Sulfasalazine administration due to the potential for falsely depressed results.     • Alkaline Phosphatase 09/18/2023 81  34 - 104 U/L Final   • Total Protein 09/18/2023 6.4  6.4 - 8.4 g/dL Final   • Albumin 09/18/2023 3.8  3.5 - 5.0 g/dL Final   • Total Bilirubin 09/18/2023 0.30  0.20 - 1.00 mg/dL Final    Use of this assay is not recommended for patients undergoing treatment with eltrombopag due to the potential for falsely elevated results. N-acetyl-p-benzoquinone imine (metabolite of Acetaminophen) will generate erroneously low results in samples for patients that have taken an overdose of Acetaminophen.    • eGFR 09/18/2023 26  ml/min/1.73sq m Final

## 2023-09-28 DIAGNOSIS — Z12.31 ENCOUNTER FOR SCREENING MAMMOGRAM FOR MALIGNANT NEOPLASM OF BREAST: ICD-10-CM

## 2023-10-11 ENCOUNTER — OFFICE VISIT (OUTPATIENT)
Dept: INTERNAL MEDICINE CLINIC | Age: 78
End: 2023-10-11
Payer: COMMERCIAL

## 2023-10-11 VITALS
HEART RATE: 66 BPM | WEIGHT: 137 LBS | OXYGEN SATURATION: 97 % | BODY MASS INDEX: 28.76 KG/M2 | SYSTOLIC BLOOD PRESSURE: 126 MMHG | HEIGHT: 58 IN | DIASTOLIC BLOOD PRESSURE: 76 MMHG | TEMPERATURE: 97.5 F

## 2023-10-11 DIAGNOSIS — Z79.4 TYPE 2 DIABETES MELLITUS WITH STABLE PROLIFERATIVE RETINOPATHY OF BOTH EYES, WITH LONG-TERM CURRENT USE OF INSULIN (HCC): ICD-10-CM

## 2023-10-11 DIAGNOSIS — N18.32 TYPE 2 DIABETES MELLITUS WITH STAGE 3B CHRONIC KIDNEY DISEASE, WITH LONG-TERM CURRENT USE OF INSULIN (HCC): ICD-10-CM

## 2023-10-11 DIAGNOSIS — J31.0 RHINITIS, UNSPECIFIED TYPE: ICD-10-CM

## 2023-10-11 DIAGNOSIS — I73.9 PERIPHERAL VASCULAR DISEASE, UNSPECIFIED (HCC): ICD-10-CM

## 2023-10-11 DIAGNOSIS — N18.32 STAGE 3B CHRONIC KIDNEY DISEASE (HCC): ICD-10-CM

## 2023-10-11 DIAGNOSIS — D61.818 PANCYTOPENIA (HCC): ICD-10-CM

## 2023-10-11 DIAGNOSIS — E11.42 TYPE 2 DIABETES MELLITUS WITH DIABETIC POLYNEUROPATHY, WITH LONG-TERM CURRENT USE OF INSULIN (HCC): ICD-10-CM

## 2023-10-11 DIAGNOSIS — I10 PRIMARY HYPERTENSION: ICD-10-CM

## 2023-10-11 DIAGNOSIS — Z79.4 TYPE 2 DIABETES MELLITUS WITH COMPLICATION, WITH LONG-TERM CURRENT USE OF INSULIN (HCC): ICD-10-CM

## 2023-10-11 DIAGNOSIS — E79.0 HYPERURICEMIA: ICD-10-CM

## 2023-10-11 DIAGNOSIS — F11.20 CONTINUOUS OPIOID DEPENDENCE (HCC): ICD-10-CM

## 2023-10-11 DIAGNOSIS — E11.8 TYPE 2 DIABETES MELLITUS WITH COMPLICATION, WITH LONG-TERM CURRENT USE OF INSULIN (HCC): ICD-10-CM

## 2023-10-11 DIAGNOSIS — N18.30 BENIGN HYPERTENSION WITH CKD (CHRONIC KIDNEY DISEASE) STAGE III (HCC): ICD-10-CM

## 2023-10-11 DIAGNOSIS — K21.9 GASTROESOPHAGEAL REFLUX DISEASE WITHOUT ESOPHAGITIS: ICD-10-CM

## 2023-10-11 DIAGNOSIS — E11.3553 TYPE 2 DIABETES MELLITUS WITH STABLE PROLIFERATIVE RETINOPATHY OF BOTH EYES, WITH LONG-TERM CURRENT USE OF INSULIN (HCC): ICD-10-CM

## 2023-10-11 DIAGNOSIS — Z79.4 TYPE 2 DIABETES MELLITUS WITH DIABETIC POLYNEUROPATHY, WITH LONG-TERM CURRENT USE OF INSULIN (HCC): ICD-10-CM

## 2023-10-11 DIAGNOSIS — Z23 NEED FOR INFLUENZA VACCINATION: ICD-10-CM

## 2023-10-11 DIAGNOSIS — J45.909 ASTHMA WITHOUT STATUS ASTHMATICUS WITHOUT COMPLICATION, UNSPECIFIED ASTHMA SEVERITY, UNSPECIFIED WHETHER PERSISTENT: ICD-10-CM

## 2023-10-11 DIAGNOSIS — I12.9 BENIGN HYPERTENSION WITH CKD (CHRONIC KIDNEY DISEASE) STAGE III (HCC): ICD-10-CM

## 2023-10-11 DIAGNOSIS — E11.22 TYPE 2 DIABETES MELLITUS WITH STAGE 3B CHRONIC KIDNEY DISEASE, WITH LONG-TERM CURRENT USE OF INSULIN (HCC): ICD-10-CM

## 2023-10-11 DIAGNOSIS — Z79.4 TYPE 2 DIABETES MELLITUS WITH STAGE 3B CHRONIC KIDNEY DISEASE, WITH LONG-TERM CURRENT USE OF INSULIN (HCC): ICD-10-CM

## 2023-10-11 DIAGNOSIS — Z23 ENCOUNTER FOR IMMUNIZATION: Primary | ICD-10-CM

## 2023-10-11 DIAGNOSIS — E03.9 ACQUIRED HYPOTHYROIDISM: ICD-10-CM

## 2023-10-11 PROBLEM — S92.515A CLOSED NONDISPLACED FRACTURE OF PROXIMAL PHALANX OF LESSER TOE OF LEFT FOOT: Status: RESOLVED | Noted: 2023-06-15 | Resolved: 2023-10-11

## 2023-10-11 PROBLEM — M70.41 PREPATELLAR BURSITIS OF RIGHT KNEE: Status: RESOLVED | Noted: 2022-08-02 | Resolved: 2023-10-11

## 2023-10-11 PROCEDURE — 99214 OFFICE O/P EST MOD 30 MIN: CPT | Performed by: INTERNAL MEDICINE

## 2023-10-11 PROCEDURE — G0008 ADMIN INFLUENZA VIRUS VAC: HCPCS

## 2023-10-11 PROCEDURE — 90662 IIV NO PRSV INCREASED AG IM: CPT

## 2023-10-11 RX ORDER — FLUTICASONE PROPIONATE 50 MCG
2 SPRAY, SUSPENSION (ML) NASAL DAILY
Qty: 48 G | Refills: 3 | Status: SHIPPED | OUTPATIENT
Start: 2023-10-11

## 2023-10-11 RX ORDER — ALLOPURINOL 100 MG/1
200 TABLET ORAL DAILY
Qty: 180 TABLET | Refills: 3 | Status: SHIPPED | OUTPATIENT
Start: 2023-10-11

## 2023-10-11 RX ORDER — BUDESONIDE AND FORMOTEROL FUMARATE DIHYDRATE 160; 4.5 UG/1; UG/1
2 AEROSOL RESPIRATORY (INHALATION) 2 TIMES DAILY
Qty: 120 G | Refills: 3 | Status: SHIPPED | OUTPATIENT
Start: 2023-10-11 | End: 2024-01-09
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
## 2023-10-11 NOTE — ASSESSMENT & PLAN NOTE
Lab Results   Component Value Date    HGBA1C 8.0 (H) 09/18/2023   She does follow-up with ophthalmologist on regular basis

## 2023-10-11 NOTE — PATIENT INSTRUCTIONS

## 2023-10-11 NOTE — PROGRESS NOTES
Diabetic Foot Exam    Patient's shoes and socks removed. Right Foot/Ankle   Right Foot Inspection  Skin Exam: skin normal and skin intact. No dry skin, no warmth, no callus, no erythema, no maceration, no abnormal color, no pre-ulcer, no ulcer and no callus. Toe Exam: ROM and strength within normal limits. Sensory   Monofilament testing: diminished    Vascular  The right DP pulse is 2+. The right PT pulse is 2+. Left Foot/Ankle  Left Foot Inspection  Skin Exam: skin normal and skin intact. No dry skin, no warmth, no erythema, no maceration, normal color, no pre-ulcer, no ulcer and no callus. Toe Exam: ROM and strength within normal limits. Sensory   Monofilament testing: diminished    Vascular  The left DP pulse is 2+. The left PT pulse is 2+.      Assign Risk Category  No deformity present  Loss of protective sensation  No weak pulses  Risk: 1

## 2023-10-11 NOTE — ASSESSMENT & PLAN NOTE
Lab Results   Component Value Date    EGFR 34 09/19/2023    EGFR 26 09/18/2023    EGFR 33 09/14/2023    CREATININE 1.43 (H) 09/19/2023    CREATININE 1.77 (H) 09/18/2023    CREATININE 1.47 (H) 09/14/2023   Blood pressure and renal function is stable.   We will continue to monitor

## 2023-10-11 NOTE — ASSESSMENT & PLAN NOTE
Lab Results   Component Value Date    HGBA1C 8.0 (H) 09/18/2023   NovoLog with meals she is using all year from her blood sugar is high. Advised her to take 8 units of NovoLog with each meal on a regular basis. We will continue same dose of Lantus.

## 2023-10-16 ENCOUNTER — VBI (OUTPATIENT)
Dept: ADMINISTRATIVE | Facility: OTHER | Age: 78
End: 2023-10-16

## 2023-10-17 DIAGNOSIS — J45.909 ASTHMA WITHOUT STATUS ASTHMATICUS WITHOUT COMPLICATION, UNSPECIFIED ASTHMA SEVERITY, UNSPECIFIED WHETHER PERSISTENT: ICD-10-CM

## 2023-10-17 DIAGNOSIS — J31.0 RHINITIS, UNSPECIFIED TYPE: ICD-10-CM

## 2023-10-17 RX ORDER — FLUTICASONE PROPIONATE 50 MCG
2 SPRAY, SUSPENSION (ML) NASAL DAILY
Qty: 48 G | Refills: 3 | OUTPATIENT
Start: 2023-10-17

## 2023-10-17 RX ORDER — BUDESONIDE AND FORMOTEROL FUMARATE DIHYDRATE 160; 4.5 UG/1; UG/1
AEROSOL RESPIRATORY (INHALATION)
Qty: 30.6 G | Refills: 4 | OUTPATIENT
Start: 2023-10-17

## 2023-10-17 NOTE — TELEPHONE ENCOUNTER
Patient stopped in office and requesting a refill for Symbicort 160-45 inhaler  Next office appointment on 2/12/24

## 2023-11-06 DIAGNOSIS — M54.50 LOW BACK PAIN: ICD-10-CM

## 2023-11-06 RX ORDER — TRAMADOL HYDROCHLORIDE 50 MG/1
50 TABLET ORAL 2 TIMES DAILY
Qty: 60 TABLET | Refills: 1 | Status: SHIPPED | OUTPATIENT
Start: 2023-11-06

## 2023-11-09 ENCOUNTER — OFFICE VISIT (OUTPATIENT)
Dept: PODIATRY | Facility: CLINIC | Age: 78
End: 2023-11-09
Payer: COMMERCIAL

## 2023-11-09 VITALS
RESPIRATION RATE: 18 BRPM | BODY MASS INDEX: 28.63 KG/M2 | HEART RATE: 66 BPM | SYSTOLIC BLOOD PRESSURE: 163 MMHG | DIASTOLIC BLOOD PRESSURE: 52 MMHG | HEIGHT: 58 IN

## 2023-11-09 DIAGNOSIS — E11.42 DIABETIC POLYNEUROPATHY ASSOCIATED WITH TYPE 2 DIABETES MELLITUS (HCC): Primary | ICD-10-CM

## 2023-11-09 PROCEDURE — 99212 OFFICE O/P EST SF 10 MIN: CPT | Performed by: PODIATRIST

## 2023-11-09 NOTE — PROGRESS NOTES
Patient presents for palliative diabetic foot care. No acute disorder noted. There are no palpable pedal pulses. Treatment consisted of trimming of multiple elongated toenails.

## 2023-11-22 DIAGNOSIS — J45.909 ASTHMA WITHOUT STATUS ASTHMATICUS WITHOUT COMPLICATION, UNSPECIFIED ASTHMA SEVERITY, UNSPECIFIED WHETHER PERSISTENT: ICD-10-CM

## 2023-11-22 RX ORDER — ALBUTEROL SULFATE 90 UG/1
2 AEROSOL, METERED RESPIRATORY (INHALATION) 4 TIMES DAILY
Qty: 18 G | Refills: 5 | Status: SHIPPED | OUTPATIENT
Start: 2023-11-22

## 2023-11-22 RX ORDER — ALBUTEROL SULFATE 90 UG/1
2 AEROSOL, METERED RESPIRATORY (INHALATION) 4 TIMES DAILY
Qty: 8.5 G | Refills: 4 | OUTPATIENT
Start: 2023-11-22

## 2023-11-23 NOTE — CASE MANAGEMENT
Case Management Assessment & Discharge Planning Note    Patient name Jerrilyn Schlatter  Location [facility] 819/[facility] 737-79 MRN [id number]  : 1945 Date 2022       Current Admission Date: 2022  Current Admission Diagnosis:Elevated LFTs   Patient Active Problem List    Diagnosis Date Noted    Elevated LFTs 2022    CINDY (acute kidney injury) ([facility] ) 2022    Pancytopenia ([facility] ) 2022    Asthma without status asthmaticus without complication     Polyp of colon 2022    Gastroesophageal reflux disease 2022    Benign hypertension with CKD (chronic kidney disease) stage III ([facility] ) 2022    Continuous opioid dependence ([facility] ) 2021    Hypothyroidism 2021    Type 2 diabetes mellitus with diabetic chronic kidney disease ([facility] ) 2021    Chronic kidney disease, stage 3b ([facility] ) 2021    Type 2 diabetes mellitus with diabetic polyneuropathy ([facility] ) 2021    Long term (current) use of insulin ([facility] ) 2021    Type 2 diabetes mellitus with stable proliferative diabetic retinopathy, bilateral ([facility] ) 2021    CRI (chronic renal insufficiency) 2020    Microalbuminuria 2020    Acute pain of right shoulder 2019    Right elbow pain 2019    Acute pain of right shoulder due to trauma 2019    Fall at home 2019    Imbalance 2019    Low back pain 2018    Encntr for gyn exam (general) (routine) w abnormal findings 2018    Vaginal atrophy 2018    Medicare annual wellness visit, subsequent 2018    Vulvar lesion 2018    Hypertension 2018    Mixed hyperlipidemia 2018    Type 2 diabetes mellitus with stage 3 chronic kidney disease, with long-term current use of insulin ([facility] ) 2018    CKD (chronic kidney disease) stage 3, GFR 30-59 ml/min ([facility] ) 2018      LOS (days): 1  Geometric Mean LOS (GMLOS) (days): 3 20  Days to GMLOS:2 3     OBJECTIVE:    Risk of Unplanned Readmission Score: 33 31      Current admission status: Inpatient    Preferred Pharmacy:   [place], [address]  [address] [facility] [address]  Phone: [phone] Fax: [phone]    Primary Care Provider: Avi Reina MD    Primary Insurance: [address]  Secondary Insurance:     ASSESSMENT:  Phi AlanniteshEmely huang Representative - Spouse   Primary Phone: [phone] [facility]  Home Phone: [phone]               Advance Directives  Does patient have a [address]?: No  Was patient offered paperwork?: Yes (declined)  Does patient currently have a Health Care decision maker?: Yes, please see Health Care Proxy section (spouse, Jailyn Newberry [phone])  Does patient have Advance Directives?: No  Was patient offered paperwork?: Yes (declined)  Primary Contact: spouse, Jailyn Newberry [phone]    Readmission Root Cause  30 Day Readmission: No    Patient Information  Admitted from[de-identified] Home  Mental Status: Alert  During Assessment patient was accompanied by: Not accompanied during assessment  Assessment information provided by[de-identified] Patient  Primary Caregiver: Self  Support Systems: Self, Spouse/significant other  [place] of Residence: [address] [place] [address] do you live in?: [facility] entry access options   Select all that apply : Ramp  Type of Current Residence: [place]  In the last 12 months, was there a time when you were not able to pay the mortgage or rent on time?: No  In the last 12 months, how many places have you lived?: 1  In the last 12 months, was there a time when you did not have a steady place to sleep or slept in a shelter (including now)?: No  Homeless/housing insecurity resource given?: N/A  Living Arrangements: Lives w/ Spouse/significant other    Activities of Daily Living Prior to Admission  Functional Status: Independent  Completes ADLs independently?: Yes  Ambulates independently?: Yes  Does patient use assisted devices?: No  Does patient currently own DME?: No  Does patient have a history of Outpatient Therapy (PT/OT)?: No  Does the patient have a history of Short-Term Rehab?: No  Does patient have a history of HHC?: No  Does patient currently have [facility]?: No      Patient Information Continued  Income Source: Pension/correction  Does patient have prescription coverage?: Yes  Within the past 12 months, you worried that your food would run out before you got the money to buy more : Never true  Within the past 12 months, the food you bought just didn't last and you didn't have money to get more : Never true  Food insecurity resource given?: N/A  Does patient receive dialysis treatments?: No  Does patient have a history of substance abuse?: No  Does patient have a history of Mental Health Diagnosis?: No    PHQ 2/9 Screening   Reviewed PHQ 2/9 Depression Screening Score?: No    Means of Transportation  Means of Transport to Appts[de-identified] Family transport  In the past 12 months, has lack of transportation kept you from medical appointments or from getting medications?: No  In the past 12 months, has lack of transportation kept you from meetings, work, or from getting things needed for daily living?: No    DISCHARGE DETAILS:    Discharge planning discussed with[de-identified] patient     CM contacted family/caregiver?: No- see comments (declined)     Contacts  Patient Contacts: spouse, Declan Ragland  Relationship to Patient[de-identified] Family  Contact Method: Phone  Phone Number: [phone]  Reason/Outcome: Continuity of Care, Emergency Contact, Referral, Discharge Planning                                                                     Additional Comments: No d/c needs evaluated   Will follow Yes

## 2023-12-28 LAB
BUN SERPL-MCNC: 73 MG/DL (ref 7–25)
BUN/CREAT SERPL: 50 (CALC) (ref 6–22)
CALCIUM SERPL-MCNC: 10 MG/DL (ref 8.6–10.4)
CHLORIDE SERPL-SCNC: 104 MMOL/L (ref 98–110)
CO2 SERPL-SCNC: 28 MMOL/L (ref 20–32)
CREAT SERPL-MCNC: 1.47 MG/DL (ref 0.6–0.95)
GFR/BSA.PRED SERPLBLD CYS-BASED-ARV: 36 ML/MIN/1.73M2
GLUCOSE SERPL-MCNC: 166 MG/DL (ref 65–99)
POTASSIUM SERPL-SCNC: 4.9 MMOL/L (ref 3.5–5.3)
SODIUM SERPL-SCNC: 139 MMOL/L (ref 135–146)

## 2024-01-03 DIAGNOSIS — M54.50 LOW BACK PAIN: ICD-10-CM

## 2024-01-03 RX ORDER — TRAMADOL HYDROCHLORIDE 50 MG/1
50 TABLET ORAL 2 TIMES DAILY
Qty: 60 TABLET | Refills: 1 | Status: SHIPPED | OUTPATIENT
Start: 2024-01-03

## 2024-01-09 DIAGNOSIS — I10 HYPERTENSION, UNSPECIFIED TYPE: ICD-10-CM

## 2024-01-09 RX ORDER — BUMETANIDE 2 MG/1
2 TABLET ORAL DAILY
Qty: 30 TABLET | Refills: 5 | Status: SHIPPED | OUTPATIENT
Start: 2024-01-09

## 2024-01-09 NOTE — TELEPHONE ENCOUNTER
Next office appointment 6/25/25 patient stated that got a letter from the insurance and that the Levemir isnt going to be covered and the the alternative is Lantus pre filled pen and the patient wants the vials for then next refill

## 2024-01-22 ENCOUNTER — TRANSITIONAL CARE MANAGEMENT (OUTPATIENT)
Dept: INTERNAL MEDICINE CLINIC | Facility: OTHER | Age: 79
End: 2024-01-22

## 2024-01-24 ENCOUNTER — OFFICE VISIT (OUTPATIENT)
Dept: INTERNAL MEDICINE CLINIC | Age: 79
End: 2024-01-24
Payer: COMMERCIAL

## 2024-01-24 VITALS
TEMPERATURE: 97.4 F | DIASTOLIC BLOOD PRESSURE: 56 MMHG | BODY MASS INDEX: 27.29 KG/M2 | SYSTOLIC BLOOD PRESSURE: 120 MMHG | HEIGHT: 58 IN | OXYGEN SATURATION: 99 % | HEART RATE: 81 BPM | WEIGHT: 130 LBS

## 2024-01-24 DIAGNOSIS — D61.818 PANCYTOPENIA (HCC): ICD-10-CM

## 2024-01-24 DIAGNOSIS — Z79.4 TYPE 2 DIABETES MELLITUS WITH STABLE PROLIFERATIVE RETINOPATHY OF BOTH EYES, WITH LONG-TERM CURRENT USE OF INSULIN (HCC): ICD-10-CM

## 2024-01-24 DIAGNOSIS — Z79.4 TYPE 2 DIABETES MELLITUS WITH DIABETIC POLYNEUROPATHY, WITH LONG-TERM CURRENT USE OF INSULIN (HCC): ICD-10-CM

## 2024-01-24 DIAGNOSIS — E11.3553 TYPE 2 DIABETES MELLITUS WITH STABLE PROLIFERATIVE RETINOPATHY OF BOTH EYES, WITH LONG-TERM CURRENT USE OF INSULIN (HCC): ICD-10-CM

## 2024-01-24 DIAGNOSIS — I12.9 BENIGN HYPERTENSION WITH CKD (CHRONIC KIDNEY DISEASE) STAGE III (HCC): ICD-10-CM

## 2024-01-24 DIAGNOSIS — E11.22 TYPE 2 DIABETES MELLITUS WITH STAGE 3B CHRONIC KIDNEY DISEASE, WITH LONG-TERM CURRENT USE OF INSULIN (HCC): ICD-10-CM

## 2024-01-24 DIAGNOSIS — E11.8 TYPE 2 DIABETES MELLITUS WITH COMPLICATION, WITH LONG-TERM CURRENT USE OF INSULIN (HCC): ICD-10-CM

## 2024-01-24 DIAGNOSIS — K21.9 GASTROESOPHAGEAL REFLUX DISEASE WITHOUT ESOPHAGITIS: Primary | ICD-10-CM

## 2024-01-24 DIAGNOSIS — Z79.4 TYPE 2 DIABETES MELLITUS WITH STAGE 3B CHRONIC KIDNEY DISEASE, WITH LONG-TERM CURRENT USE OF INSULIN (HCC): ICD-10-CM

## 2024-01-24 DIAGNOSIS — E03.9 ACQUIRED HYPOTHYROIDISM: ICD-10-CM

## 2024-01-24 DIAGNOSIS — I73.9 PERIPHERAL VASCULAR DISEASE, UNSPECIFIED (HCC): ICD-10-CM

## 2024-01-24 DIAGNOSIS — N18.32 TYPE 2 DIABETES MELLITUS WITH STAGE 3B CHRONIC KIDNEY DISEASE, WITH LONG-TERM CURRENT USE OF INSULIN (HCC): ICD-10-CM

## 2024-01-24 DIAGNOSIS — Z79.4 LONG TERM (CURRENT) USE OF INSULIN (HCC): ICD-10-CM

## 2024-01-24 DIAGNOSIS — F11.20 CONTINUOUS OPIOID DEPENDENCE (HCC): ICD-10-CM

## 2024-01-24 DIAGNOSIS — N18.32 STAGE 3B CHRONIC KIDNEY DISEASE (HCC): ICD-10-CM

## 2024-01-24 DIAGNOSIS — E11.42 TYPE 2 DIABETES MELLITUS WITH DIABETIC POLYNEUROPATHY, WITH LONG-TERM CURRENT USE OF INSULIN (HCC): ICD-10-CM

## 2024-01-24 DIAGNOSIS — N18.30 BENIGN HYPERTENSION WITH CKD (CHRONIC KIDNEY DISEASE) STAGE III (HCC): ICD-10-CM

## 2024-01-24 DIAGNOSIS — Z79.4 TYPE 2 DIABETES MELLITUS WITH COMPLICATION, WITH LONG-TERM CURRENT USE OF INSULIN (HCC): ICD-10-CM

## 2024-01-24 PROCEDURE — 99496 TRANSJ CARE MGMT HIGH F2F 7D: CPT | Performed by: INTERNAL MEDICINE

## 2024-01-24 RX ORDER — INSULIN GLARGINE 100 [IU]/ML
40 INJECTION, SOLUTION SUBCUTANEOUS EVERY 12 HOURS
Qty: 20 ML | Refills: 5 | Status: SHIPPED | OUTPATIENT
Start: 2024-01-24

## 2024-01-24 NOTE — ASSESSMENT & PLAN NOTE
Lab Results   Component Value Date    EGFR 36 (L) 12/28/2023    EGFR 34 09/19/2023    EGFR 26 09/18/2023    CREATININE 1.47 (H) 12/28/2023    CREATININE 1.43 (H) 09/19/2023    CREATININE 1.77 (H) 09/18/2023   Renal function is stable.  Will continue to monitor

## 2024-01-24 NOTE — ASSESSMENT & PLAN NOTE
Lab Results   Component Value Date    HGBA1C 7.5 (H) 01/18/2024   Continue with present regimen.
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
## 2024-01-24 NOTE — ASSESSMENT & PLAN NOTE
Lab Results   Component Value Date    HGBA1C 7.5 (H) 01/18/2024   Stable blood sugars at home.  Continue with present regimen.

## 2024-02-12 ENCOUNTER — OFFICE VISIT (OUTPATIENT)
Dept: INTERNAL MEDICINE CLINIC | Facility: OTHER | Age: 79
End: 2024-02-12
Payer: COMMERCIAL

## 2024-02-12 VITALS
HEART RATE: 80 BPM | BODY MASS INDEX: 26.81 KG/M2 | HEIGHT: 59 IN | SYSTOLIC BLOOD PRESSURE: 120 MMHG | TEMPERATURE: 98 F | OXYGEN SATURATION: 99 % | WEIGHT: 133 LBS | DIASTOLIC BLOOD PRESSURE: 62 MMHG

## 2024-02-12 DIAGNOSIS — Z79.4 LONG TERM (CURRENT) USE OF INSULIN (HCC): ICD-10-CM

## 2024-02-12 DIAGNOSIS — E78.2 MIXED HYPERLIPIDEMIA: ICD-10-CM

## 2024-02-12 DIAGNOSIS — Z79.4 TYPE 2 DIABETES MELLITUS WITH STABLE PROLIFERATIVE RETINOPATHY OF BOTH EYES, WITH LONG-TERM CURRENT USE OF INSULIN (HCC): ICD-10-CM

## 2024-02-12 DIAGNOSIS — Z79.4 TYPE 2 DIABETES MELLITUS WITH DIABETIC POLYNEUROPATHY, WITH LONG-TERM CURRENT USE OF INSULIN (HCC): ICD-10-CM

## 2024-02-12 DIAGNOSIS — F11.20 CONTINUOUS OPIOID DEPENDENCE (HCC): ICD-10-CM

## 2024-02-12 DIAGNOSIS — D61.818 PANCYTOPENIA (HCC): ICD-10-CM

## 2024-02-12 DIAGNOSIS — Z79.4 TYPE 2 DIABETES MELLITUS WITH COMPLICATION, WITH LONG-TERM CURRENT USE OF INSULIN (HCC): ICD-10-CM

## 2024-02-12 DIAGNOSIS — I73.9 PERIPHERAL VASCULAR DISEASE, UNSPECIFIED (HCC): ICD-10-CM

## 2024-02-12 DIAGNOSIS — K21.9 GASTROESOPHAGEAL REFLUX DISEASE WITHOUT ESOPHAGITIS: Primary | ICD-10-CM

## 2024-02-12 DIAGNOSIS — N18.32 TYPE 2 DIABETES MELLITUS WITH STAGE 3B CHRONIC KIDNEY DISEASE, WITH LONG-TERM CURRENT USE OF INSULIN (HCC): ICD-10-CM

## 2024-02-12 DIAGNOSIS — N18.32 STAGE 3B CHRONIC KIDNEY DISEASE (HCC): ICD-10-CM

## 2024-02-12 DIAGNOSIS — E11.3553 TYPE 2 DIABETES MELLITUS WITH STABLE PROLIFERATIVE RETINOPATHY OF BOTH EYES, WITH LONG-TERM CURRENT USE OF INSULIN (HCC): ICD-10-CM

## 2024-02-12 DIAGNOSIS — Z79.4 TYPE 2 DIABETES MELLITUS WITH STAGE 3B CHRONIC KIDNEY DISEASE, WITH LONG-TERM CURRENT USE OF INSULIN (HCC): ICD-10-CM

## 2024-02-12 DIAGNOSIS — N18.32 TYPE 2 DIABETES MELLITUS WITH STAGE 3B CHRONIC KIDNEY DISEASE, WITHOUT LONG-TERM CURRENT USE OF INSULIN (HCC): ICD-10-CM

## 2024-02-12 DIAGNOSIS — E03.9 ACQUIRED HYPOTHYROIDISM: ICD-10-CM

## 2024-02-12 DIAGNOSIS — E11.42 TYPE 2 DIABETES MELLITUS WITH DIABETIC POLYNEUROPATHY, WITH LONG-TERM CURRENT USE OF INSULIN (HCC): ICD-10-CM

## 2024-02-12 DIAGNOSIS — E11.22 TYPE 2 DIABETES MELLITUS WITH STAGE 3B CHRONIC KIDNEY DISEASE, WITHOUT LONG-TERM CURRENT USE OF INSULIN (HCC): ICD-10-CM

## 2024-02-12 DIAGNOSIS — E11.8 TYPE 2 DIABETES MELLITUS WITH COMPLICATION, WITH LONG-TERM CURRENT USE OF INSULIN (HCC): ICD-10-CM

## 2024-02-12 DIAGNOSIS — E11.22 TYPE 2 DIABETES MELLITUS WITH STAGE 3B CHRONIC KIDNEY DISEASE, WITH LONG-TERM CURRENT USE OF INSULIN (HCC): ICD-10-CM

## 2024-02-12 PROCEDURE — 99214 OFFICE O/P EST MOD 30 MIN: CPT | Performed by: INTERNAL MEDICINE

## 2024-02-12 RX ORDER — INSULIN GLARGINE 100 [IU]/ML
75 INJECTION, SOLUTION SUBCUTANEOUS DAILY
Qty: 67.5 ML | Refills: 1 | Status: SHIPPED | OUTPATIENT
Start: 2024-02-12 | End: 2024-08-10

## 2024-02-12 NOTE — ASSESSMENT & PLAN NOTE
Lab Results   Component Value Date    HGBA1C 7.5 (H) 01/18/2024   Recent hemoglobin A1c done is 6.9.  Will lower evening dose of Lantus from 40 units to 30 units because as per patient sometimes blood sugars are in the range of 50s this happens rarely.
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
## 2024-02-12 NOTE — ASSESSMENT & PLAN NOTE
Lab Results   Component Value Date    EGFR 43 (L) 01/19/2024    EGFR 36 (L) 01/18/2024    EGFR 34 (L) 01/18/2024    CREATININE 1.27 (H) 01/19/2024    CREATININE 1.45 (H) 01/18/2024    CREATININE 1.54 (H) 01/18/2024   Renal function is stable.  Continue with adequate oral hydration and to avoid nephrotoxic meds

## 2024-02-12 NOTE — PATIENT INSTRUCTIONS

## 2024-02-12 NOTE — ASSESSMENT & PLAN NOTE
Lab Results   Component Value Date    HGBA1C 7.5 (H) 01/18/2024   Continue with present regimen.  Continue with diabetic diet

## 2024-02-12 NOTE — ASSESSMENT & PLAN NOTE
Lab Results   Component Value Date    HGBA1C 7.5 (H) 01/18/2024   Recent hemoglobin A1c 6.9.  Will lower the evening dose of Lantus from 40 to 30 units

## 2024-02-29 DIAGNOSIS — J45.41 MODERATE PERSISTENT ASTHMA WITH ACUTE EXACERBATION: ICD-10-CM

## 2024-02-29 RX ORDER — MONTELUKAST SODIUM 10 MG/1
10 TABLET ORAL
Qty: 30 TABLET | Refills: 5 | Status: SHIPPED | OUTPATIENT
Start: 2024-02-29

## 2024-02-29 NOTE — TELEPHONE ENCOUNTER
Patient called asking if a letter can be written for the community patient lives in stating she cannot live alone due to health conditions so her son can move in with her since her  passed away recently.    Please advise    Thank you

## 2024-03-05 DIAGNOSIS — M54.50 ACUTE MIDLINE LOW BACK PAIN, UNSPECIFIED WHETHER SCIATICA PRESENT: ICD-10-CM

## 2024-03-05 DIAGNOSIS — E78.5 HYPERLIPIDEMIA, UNSPECIFIED HYPERLIPIDEMIA TYPE: ICD-10-CM

## 2024-03-05 DIAGNOSIS — E03.9 HYPOTHYROIDISM, UNSPECIFIED TYPE: ICD-10-CM

## 2024-03-05 DIAGNOSIS — M54.50 LOW BACK PAIN: ICD-10-CM

## 2024-03-05 RX ORDER — METHOCARBAMOL 500 MG/1
500 TABLET, FILM COATED ORAL 3 TIMES DAILY
Qty: 270 TABLET | Refills: 1 | Status: SHIPPED | OUTPATIENT
Start: 2024-03-05

## 2024-03-05 RX ORDER — LEVOTHYROXINE SODIUM 0.1 MG/1
100 TABLET ORAL DAILY
Qty: 90 TABLET | Refills: 1 | Status: SHIPPED | OUTPATIENT
Start: 2024-03-05

## 2024-03-05 RX ORDER — ATORVASTATIN CALCIUM 80 MG/1
80 TABLET, FILM COATED ORAL DAILY
Qty: 90 TABLET | Refills: 1 | Status: SHIPPED | OUTPATIENT
Start: 2024-03-05

## 2024-03-05 RX ORDER — TRAMADOL HYDROCHLORIDE 50 MG/1
50 TABLET ORAL 2 TIMES DAILY
Qty: 60 TABLET | Refills: 1 | Status: SHIPPED | OUTPATIENT
Start: 2024-03-05

## 2024-03-14 ENCOUNTER — OFFICE VISIT (OUTPATIENT)
Dept: PODIATRY | Facility: CLINIC | Age: 79
End: 2024-03-14
Payer: COMMERCIAL

## 2024-03-14 VITALS
WEIGHT: 131 LBS | HEART RATE: 80 BPM | SYSTOLIC BLOOD PRESSURE: 158 MMHG | HEIGHT: 59 IN | BODY MASS INDEX: 26.41 KG/M2 | RESPIRATION RATE: 18 BRPM | DIASTOLIC BLOOD PRESSURE: 54 MMHG

## 2024-03-14 DIAGNOSIS — E11.42 DIABETIC POLYNEUROPATHY ASSOCIATED WITH TYPE 2 DIABETES MELLITUS (HCC): Primary | ICD-10-CM

## 2024-03-14 PROCEDURE — 99212 OFFICE O/P EST SF 10 MIN: CPT | Performed by: PODIATRIST

## 2024-03-14 NOTE — PROGRESS NOTES
Patient presents for palliative nail care.  No acute disorder noted.  There are no palpable pedal pulses.  Treatment consists of nail trimming.

## 2024-05-09 DIAGNOSIS — M54.50 LOW BACK PAIN: ICD-10-CM

## 2024-05-09 RX ORDER — TRAMADOL HYDROCHLORIDE 50 MG/1
50 TABLET ORAL 2 TIMES DAILY
Qty: 60 TABLET | Refills: 1 | Status: SHIPPED | OUTPATIENT
Start: 2024-05-09

## 2024-05-09 NOTE — TELEPHONE ENCOUNTER
Medication: traMADol (ULTRAM) 50 mg tablet     Dose/Frequency:   Take 1 tablet (50 mg total) by mouth 2 (two) times a day          Quantity: 60    Pharmacy: [facility] - [place] - [address]     Office:   [x] PCP/Provider - Kaden   [] Speciality/Provider -     Does the patient have enough for 3 days?   [] Yes   [x] No - Send as HP to POD

## 2024-05-15 ENCOUNTER — OFFICE VISIT (OUTPATIENT)
Dept: INTERNAL MEDICINE CLINIC | Age: 79
End: 2024-05-15
Payer: COMMERCIAL

## 2024-05-15 VITALS
WEIGHT: 138.6 LBS | HEART RATE: 68 BPM | BODY MASS INDEX: 27.94 KG/M2 | DIASTOLIC BLOOD PRESSURE: 70 MMHG | OXYGEN SATURATION: 98 % | TEMPERATURE: 98.2 F | SYSTOLIC BLOOD PRESSURE: 124 MMHG | HEIGHT: 59 IN

## 2024-05-15 DIAGNOSIS — E11.3553 TYPE 2 DIABETES MELLITUS WITH STABLE PROLIFERATIVE RETINOPATHY OF BOTH EYES, WITH LONG-TERM CURRENT USE OF INSULIN (HCC): ICD-10-CM

## 2024-05-15 DIAGNOSIS — N18.32 STAGE 3B CHRONIC KIDNEY DISEASE (HCC): ICD-10-CM

## 2024-05-15 DIAGNOSIS — Z79.4 TYPE 2 DIABETES MELLITUS WITH STAGE 3B CHRONIC KIDNEY DISEASE, WITH LONG-TERM CURRENT USE OF INSULIN (HCC): Primary | ICD-10-CM

## 2024-05-15 DIAGNOSIS — N18.32 TYPE 2 DIABETES MELLITUS WITH STAGE 3B CHRONIC KIDNEY DISEASE, WITH LONG-TERM CURRENT USE OF INSULIN (HCC): Primary | ICD-10-CM

## 2024-05-15 DIAGNOSIS — D61.818 PANCYTOPENIA (HCC): ICD-10-CM

## 2024-05-15 DIAGNOSIS — Z79.4 TYPE 2 DIABETES MELLITUS WITH STABLE PROLIFERATIVE RETINOPATHY OF BOTH EYES, WITH LONG-TERM CURRENT USE OF INSULIN (HCC): ICD-10-CM

## 2024-05-15 DIAGNOSIS — I10 PRIMARY HYPERTENSION: ICD-10-CM

## 2024-05-15 DIAGNOSIS — Z79.4 LONG TERM (CURRENT) USE OF INSULIN (HCC): ICD-10-CM

## 2024-05-15 DIAGNOSIS — Z79.4 TYPE 2 DIABETES MELLITUS WITH COMPLICATION, WITH LONG-TERM CURRENT USE OF INSULIN (HCC): ICD-10-CM

## 2024-05-15 DIAGNOSIS — E03.9 HYPOTHYROIDISM, UNSPECIFIED TYPE: ICD-10-CM

## 2024-05-15 DIAGNOSIS — E03.9 ACQUIRED HYPOTHYROIDISM: ICD-10-CM

## 2024-05-15 DIAGNOSIS — F11.20 CONTINUOUS OPIOID DEPENDENCE (HCC): ICD-10-CM

## 2024-05-15 DIAGNOSIS — I10 HYPERTENSION, UNSPECIFIED TYPE: ICD-10-CM

## 2024-05-15 DIAGNOSIS — Z79.4 TYPE 2 DIABETES MELLITUS WITH DIABETIC POLYNEUROPATHY, WITH LONG-TERM CURRENT USE OF INSULIN (HCC): ICD-10-CM

## 2024-05-15 DIAGNOSIS — I73.9 PERIPHERAL VASCULAR DISEASE, UNSPECIFIED (HCC): ICD-10-CM

## 2024-05-15 DIAGNOSIS — E11.22 TYPE 2 DIABETES MELLITUS WITH STAGE 3B CHRONIC KIDNEY DISEASE, WITH LONG-TERM CURRENT USE OF INSULIN (HCC): Primary | ICD-10-CM

## 2024-05-15 DIAGNOSIS — E11.42 TYPE 2 DIABETES MELLITUS WITH DIABETIC POLYNEUROPATHY, WITH LONG-TERM CURRENT USE OF INSULIN (HCC): ICD-10-CM

## 2024-05-15 DIAGNOSIS — E78.5 HYPERLIPIDEMIA, UNSPECIFIED HYPERLIPIDEMIA TYPE: ICD-10-CM

## 2024-05-15 DIAGNOSIS — E79.0 HYPERURICEMIA: ICD-10-CM

## 2024-05-15 DIAGNOSIS — E11.8 TYPE 2 DIABETES MELLITUS WITH COMPLICATION, WITH LONG-TERM CURRENT USE OF INSULIN (HCC): ICD-10-CM

## 2024-05-15 LAB — SL AMB POCT HEMOGLOBIN AIC: 6.8 (ref ?–6.5)

## 2024-05-15 PROCEDURE — 99214 OFFICE O/P EST MOD 30 MIN: CPT | Performed by: INTERNAL MEDICINE

## 2024-05-15 PROCEDURE — 83036 HEMOGLOBIN GLYCOSYLATED A1C: CPT | Performed by: INTERNAL MEDICINE

## 2024-05-15 NOTE — ASSESSMENT & PLAN NOTE
Lab Results   Component Value Date    EGFR 43 (L) 01/19/2024    EGFR 36 (L) 01/18/2024    EGFR 34 (L) 01/18/2024    CREATININE 1.27 (H) 01/19/2024    CREATININE 1.45 (H) 01/18/2024    CREATININE 1.54 (H) 01/18/2024   Advised for adequate oral hydration.  She is due for renal function

## 2024-05-15 NOTE — ASSESSMENT & PLAN NOTE
Lab Results   Component Value Date    HGBA1C 6.8 (A) 05/15/2024   Recent hemoglobin A1c 6.8.  She does have episode of low blood sugar in the 40s and 50s in the evening and sometimes in the 50s in the morning.  Will decrease morning Levemir from 40 units to 30 units and evening 24 units from 30 units.  Also decrease the mealtime insulin from 8 units to 4 units.  She will continue to monitor blood sugar and will call office next week with blood sugar reading.  Hypoglycemia symptoms and treatment were reviewed with the patient
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
## 2024-05-15 NOTE — TELEPHONE ENCOUNTER
Patient son dropped of medication that his Mom forgot to ask for at her visit her next office appointment is on 6/25/24   65

## 2024-05-15 NOTE — ASSESSMENT & PLAN NOTE
Lab Results   Component Value Date    HGBA1C 6.8 (A) 05/15/2024   She was unable to do renal function and advised her to do it as soon as possible.  Will continue to monitor

## 2024-05-16 RX ORDER — ALLOPURINOL 100 MG/1
200 TABLET ORAL DAILY
Qty: 180 TABLET | Refills: 1 | Status: SHIPPED | OUTPATIENT
Start: 2024-05-16

## 2024-05-16 RX ORDER — ATORVASTATIN CALCIUM 80 MG/1
80 TABLET, FILM COATED ORAL DAILY
Qty: 90 TABLET | Refills: 1 | Status: SHIPPED | OUTPATIENT
Start: 2024-05-16

## 2024-05-16 RX ORDER — BUMETANIDE 2 MG/1
2 TABLET ORAL DAILY
Qty: 30 TABLET | Refills: 5 | Status: SHIPPED | OUTPATIENT
Start: 2024-05-16

## 2024-05-16 RX ORDER — LEVOTHYROXINE SODIUM 0.1 MG/1
100 TABLET ORAL DAILY
Qty: 90 TABLET | Refills: 1 | Status: SHIPPED | OUTPATIENT
Start: 2024-05-16

## 2024-06-18 ENCOUNTER — OFFICE VISIT (OUTPATIENT)
Dept: PODIATRY | Facility: CLINIC | Age: 79
End: 2024-06-18
Payer: COMMERCIAL

## 2024-06-18 VITALS
SYSTOLIC BLOOD PRESSURE: 130 MMHG | HEART RATE: 69 BPM | BODY MASS INDEX: 27.09 KG/M2 | DIASTOLIC BLOOD PRESSURE: 60 MMHG | HEIGHT: 60 IN | WEIGHT: 138 LBS

## 2024-06-18 DIAGNOSIS — I73.9 PERIPHERAL VASCULAR DISEASE, UNSPECIFIED (HCC): ICD-10-CM

## 2024-06-18 DIAGNOSIS — E11.42 DIABETIC POLYNEUROPATHY ASSOCIATED WITH TYPE 2 DIABETES MELLITUS (HCC): Primary | ICD-10-CM

## 2024-06-18 PROCEDURE — G2211 COMPLEX E/M VISIT ADD ON: HCPCS | Performed by: PODIATRIST

## 2024-06-18 PROCEDURE — 99213 OFFICE O/P EST LOW 20 MIN: CPT | Performed by: PODIATRIST

## 2024-06-18 NOTE — PROGRESS NOTES
Patient presents for diabetic footcare.  Patient's blood sugar is under reasonable control with an A1c of 6.8 on 5/15/2024.    Patient denies numbness or tingling in her feet.  She denies foot pain at this time.    On exam, dorsalis pedis pulses palpable at 2/4 bilateral.  There are no palpable posterior tibial pulses bilateral.  There is no hair growth and nails are elongated and slightly dystrophic.  There are no open areas or calluses on her feet.    Treatment: Trimmed elongated toenails this date.  Patient is rescheduled in 3 months for care.

## 2024-06-25 ENCOUNTER — RA CDI HCC (OUTPATIENT)
Dept: OTHER | Facility: HOSPITAL | Age: 79
End: 2024-06-25

## 2024-07-02 ENCOUNTER — OFFICE VISIT (OUTPATIENT)
Dept: INTERNAL MEDICINE CLINIC | Age: 79
End: 2024-07-02
Payer: COMMERCIAL

## 2024-07-02 VITALS
WEIGHT: 133 LBS | TEMPERATURE: 97.4 F | DIASTOLIC BLOOD PRESSURE: 76 MMHG | SYSTOLIC BLOOD PRESSURE: 132 MMHG | HEIGHT: 60 IN | BODY MASS INDEX: 26.11 KG/M2 | HEART RATE: 73 BPM | OXYGEN SATURATION: 97 %

## 2024-07-02 DIAGNOSIS — D61.818 PANCYTOPENIA (HCC): ICD-10-CM

## 2024-07-02 DIAGNOSIS — Z79.4 LONG TERM (CURRENT) USE OF INSULIN (HCC): ICD-10-CM

## 2024-07-02 DIAGNOSIS — N18.32 STAGE 3B CHRONIC KIDNEY DISEASE (HCC): ICD-10-CM

## 2024-07-02 DIAGNOSIS — E03.9 ACQUIRED HYPOTHYROIDISM: ICD-10-CM

## 2024-07-02 DIAGNOSIS — I73.9 PERIPHERAL VASCULAR DISEASE, UNSPECIFIED (HCC): ICD-10-CM

## 2024-07-02 DIAGNOSIS — E11.3553 TYPE 2 DIABETES MELLITUS WITH STABLE PROLIFERATIVE RETINOPATHY OF BOTH EYES, WITH LONG-TERM CURRENT USE OF INSULIN (HCC): ICD-10-CM

## 2024-07-02 DIAGNOSIS — Z79.4 TYPE 2 DIABETES MELLITUS WITH COMPLICATION, WITH LONG-TERM CURRENT USE OF INSULIN (HCC): ICD-10-CM

## 2024-07-02 DIAGNOSIS — Z00.00 MEDICARE ANNUAL WELLNESS VISIT, SUBSEQUENT: ICD-10-CM

## 2024-07-02 DIAGNOSIS — Z79.4 TYPE 2 DIABETES MELLITUS WITH STAGE 3B CHRONIC KIDNEY DISEASE, WITH LONG-TERM CURRENT USE OF INSULIN (HCC): Primary | ICD-10-CM

## 2024-07-02 DIAGNOSIS — E78.2 MIXED HYPERLIPIDEMIA: ICD-10-CM

## 2024-07-02 DIAGNOSIS — F11.20 CONTINUOUS OPIOID DEPENDENCE (HCC): ICD-10-CM

## 2024-07-02 DIAGNOSIS — Z79.4 TYPE 2 DIABETES MELLITUS WITH DIABETIC POLYNEUROPATHY, WITH LONG-TERM CURRENT USE OF INSULIN (HCC): ICD-10-CM

## 2024-07-02 DIAGNOSIS — E11.8 TYPE 2 DIABETES MELLITUS WITH COMPLICATION, WITH LONG-TERM CURRENT USE OF INSULIN (HCC): ICD-10-CM

## 2024-07-02 DIAGNOSIS — M54.50 LOW BACK PAIN: ICD-10-CM

## 2024-07-02 DIAGNOSIS — N18.32 TYPE 2 DIABETES MELLITUS WITH STAGE 3B CHRONIC KIDNEY DISEASE, WITH LONG-TERM CURRENT USE OF INSULIN (HCC): Primary | ICD-10-CM

## 2024-07-02 DIAGNOSIS — Z79.4 TYPE 2 DIABETES MELLITUS WITH STABLE PROLIFERATIVE RETINOPATHY OF BOTH EYES, WITH LONG-TERM CURRENT USE OF INSULIN (HCC): ICD-10-CM

## 2024-07-02 DIAGNOSIS — E11.22 TYPE 2 DIABETES MELLITUS WITH STAGE 3B CHRONIC KIDNEY DISEASE, WITH LONG-TERM CURRENT USE OF INSULIN (HCC): Primary | ICD-10-CM

## 2024-07-02 DIAGNOSIS — E11.42 TYPE 2 DIABETES MELLITUS WITH DIABETIC POLYNEUROPATHY, WITH LONG-TERM CURRENT USE OF INSULIN (HCC): ICD-10-CM

## 2024-07-02 LAB — SL AMB POCT HEMOGLOBIN AIC: 7.9 (ref ?–6.5)

## 2024-07-02 PROCEDURE — 99214 OFFICE O/P EST MOD 30 MIN: CPT | Performed by: INTERNAL MEDICINE

## 2024-07-02 PROCEDURE — G0439 PPPS, SUBSEQ VISIT: HCPCS | Performed by: INTERNAL MEDICINE

## 2024-07-02 PROCEDURE — 83036 HEMOGLOBIN GLYCOSYLATED A1C: CPT | Performed by: INTERNAL MEDICINE

## 2024-07-02 RX ORDER — INSULIN GLARGINE 100 [IU]/ML
26 INJECTION, SOLUTION SUBCUTANEOUS DAILY
Qty: 67.5 ML | Refills: 0 | Status: SHIPPED | OUTPATIENT
Start: 2024-07-02 | End: 2024-12-29

## 2024-07-02 RX ORDER — TRAMADOL HYDROCHLORIDE 50 MG/1
50 TABLET ORAL 2 TIMES DAILY
Qty: 60 TABLET | Refills: 1 | Status: SHIPPED | OUTPATIENT
Start: 2024-07-02

## 2024-07-02 NOTE — PROGRESS NOTES
Ambulatory Visit  Name: Porsha Hoyos      : 1945      MRN: [id number]  Encounter Provider: João Alfonso MD  Encounter Date: 2024   Encounter department: [facility]    Assessment & Plan   1. Type 2 diabetes mellitus with stage 3b chronic kidney disease, with long-term current use of insulin (HCC)  -     Albumin / creatinine urine ratio; Future  -     Comprehensive metabolic panel; Future; Expected date: 10/30/2024  -     CBC and Platelet; Future; Expected date: 10/30/2024  -     Lipid Panel with Direct LDL reflex; Future; Expected date: 10/30/2024  -     Comprehensive metabolic panel  -     CBC and Platelet  -     Lipid Panel with Direct LDL reflex  2. Low back pain  -     traMADol (ULTRAM) 50 mg tablet; Take 1 tablet (50 mg total) by mouth 2 (two) times a day  3. Type 2 diabetes mellitus with diabetic polyneuropathy, with long-term current use of insulin ([facility])  Assessment & Plan:    Lab Results   Component Value Date    HGBA1C 6.8 (A) 05/15/2024   See insulin dosage as ordered.  Orders:  -     Albumin / creatinine urine ratio; Future  4. 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, with long-term current use of insulin ([facility])  Assessment & Plan:    Lab Results   Component Value Date    HGBA1C 6.8 (A) 05/15/2024   Retinopathy managed by ophthalmologist  Orders:  -     Albumin / creatinine urine ratio; Future  5. Type 2 diabetes mellitus with complication, with long-term current use of insulin ([facility])  Assessment & Plan:    Lab Results   Component Value Date    HGBA1C 6.8 (A) 05/15/2024   Today hemoglobin A1c done in office is 7.9.  Will continue with present regimen of insulin.  She discontinue her mealtime insulin at her own.  Because she was having low blood sugar in the morning  Advised her to take 26 units of Lantus/Levemir and take 5 units of NovoLog/Humalog with each meal.  Orders:  -     Albumin / creatinine urine ratio; Future  -      insulin regular (HumuLIN R,NovoLIN R) 100 units/mL injection; Inject 5 Units under the skin 2 (two) times a day with lunch and dinner  -     insulin glargine (Lantus) 100 units/mL subcutaneous injection; Inject 26 Units under the skin daily (40 Units Morning; 35 Units at bedtime)  6. Medicare annual wellness visit, subsequent  7. Peripheral vascular disease, unspecified (HCC)  Assessment & Plan:  Continue with present dose of statin and antiplatelet.  8. Acquired hypothyroidism  Assessment & Plan:  Continue with present dose of levothyroxine.  She is overdue for thyroid function test  Orders:  -     TSH, 3rd generation with Free T4 reflex; Future  -     TSH, 3rd generation with Free T4 reflex  9. Stage 3b chronic kidney disease (HCC)  Assessment & Plan:  Lab Results   Component Value Date    EGFR 43 (L) 01/19/2024    EGFR 36 (L) 01/18/2024    EGFR 34 (L) 01/18/2024    CREATININE 1.27 (H) 01/19/2024    CREATININE 1.45 (H) 01/18/2024    CREATININE 1.54 (H) 01/18/2024   Advised for adequate oral hydration and she is overdue for renal function advised her to do as soon as possible  Orders:  -     Comprehensive metabolic panel; Future; Expected date: 10/30/2024  -     CBC and Platelet; Future; Expected date: 10/30/2024  -     Comprehensive metabolic panel  -     CBC and Platelet  10. Continuous opioid dependence (HCC)  Assessment & Plan:  Still requiring her tramadol for her spinal stenosis and radiculopathy  11. Pancytopenia (HCC)  Assessment & Plan:  Will continue to monitor.  She is overdue for CBC  12. Long term (current) use of insulin (HCC)       Preventive health issues were discussed with patient, and age appropriate screening tests were ordered as noted in patient's After Visit Summary. Personalized health advice and appropriate referrals for health education or preventive services given if needed, as noted in patient's After Visit Summary.    History of Present Illness     Patient is here for follow-up.   She was unable to do blood work requested on previous visit.  Otherwise no new complaints.       Patient Care Team:  João Alfonso MD as PCP - General  CHRISTELLE Patino MD Deborah Sundlof, DO Nidhi Mehta, MD (Cardiology)    Review of Systems   Constitutional:  Negative for fatigue and fever.   HENT:  Negative for congestion, ear discharge, ear pain, postnasal drip, sinus pressure, sore throat, tinnitus and trouble swallowing.    Eyes:  Negative for discharge, itching and visual disturbance.   Respiratory:  Negative for cough and shortness of breath.    Cardiovascular:  Negative for chest pain and palpitations.   Gastrointestinal:  Negative for abdominal pain, diarrhea, nausea and vomiting.   Endocrine: Negative for cold intolerance and polyuria.   Genitourinary:  Negative for difficulty urinating, dysuria and urgency.   Musculoskeletal:  Positive for arthralgias. Negative for neck pain.   Skin:  Negative for rash.   Allergic/Immunologic: Negative for environmental allergies.   Neurological:  Negative for dizziness, weakness and headaches.   Psychiatric/Behavioral:  Negative for agitation. The patient is not nervous/anxious.      Medical History Reviewed by provider this encounter:  Tobacco  Allergies  Meds  Problems  Med Hx  Surg Hx  Fam Hx       Annual Wellness Visit Questionnaire   Porsha is here for her Subsequent Wellness visit. Last Medicare Wellness visit information reviewed, patient interviewed and updates made to the record today.      Health Risk Assessment:   Patient rates overall health as fair. Patient feels that their physical health rating is same. Patient is dissatisfied with their life. Eyesight was rated as same. Hearing was rated as same. Patient feels that their emotional and mental health rating is slightly worse. Patients states they are sometimes angry. Patient states they are often unusually tired/fatigued. Pain experienced in the last 7 days has been some.  Patient's pain rating has been 5/10. Patient states that she has experienced no weight loss or gain in last 6 months.     Depression Screening:   PHQ-2 Score: 2      Fall Risk Screening:   In the past year, patient has experienced: history of falling in past year    Number of falls: 1  Injured during fall?: No    Feels unsteady when standing or walking?: Yes    Worried about falling?: Yes      Urinary Incontinence Screening:   Patient has not leaked urine accidently in the last six months.     Home Safety:  Patient does not have trouble with stairs inside or outside of their home. Patient has working smoke alarms and has working carbon monoxide detector. Home safety hazards include: none.     Nutrition:   Current diet is Diabetic and Limited junk food.     Medications:   Patient is currently taking over-the-counter supplements. OTC medications include: see medication list. Patient is able to manage medications.     Activities of Daily Living (ADLs)/Instrumental Activities of Daily Living (IADLs):   Walk and transfer into and out of bed and chair?: Yes  Dress and groom yourself?: Yes    Bathe or shower yourself?: Yes    Feed yourself? Yes  Do your laundry/housekeeping?: Yes  Manage your money, pay your bills and track your expenses?: Yes  Make your own meals?: Yes    Do your own shopping?: Yes    Previous Hospitalizations:   Any hospitalizations or ED visits within the last 12 months?: Yes    How many hospitalizations have you had in the last year?: 1-2    Advance Care Planning:   Living will: Yes    Durable POA for healthcare: Yes    Advanced directive: Yes      Cognitive Screening:   Provider or family/friend/caregiver concerned regarding cognition?: No    PREVENTIVE SCREENINGS      Cardiovascular Screening:    General: Screening Not Indicated and History Lipid Disorder      Diabetes Screening:     General: Screening Not Indicated and History Diabetes      Colorectal Cancer Screening:     General: Screening Not  Indicated      Breast Cancer Screening:     General: Screening Current      Cervical Cancer Screening:    General: Screening Not Indicated      Osteoporosis Screening:    General: Screening Current      Abdominal Aortic Aneurysm (AAA) Screening:        General: Screening Not Indicated      Lung Cancer Screening:     General: Screening Not Indicated      Hepatitis C Screening:    General: Screening Current    Screening, Brief Intervention, and Referral to Treatment (SBIRT)    Screening  Typical number of drinks in a day: 0  Typical number of drinks in a week: 0  Interpretation: Low risk drinking behavior.    Brief Intervention  Alcohol & drug use screenings were reviewed. No concerns regarding substance use disorder identified.     Review of Current Opioid Use    Opioid Risk Tool (ORT) Interpretation: Complete Opioid Risk Tool (ORT)    Other Counseling Topics:   Car/seat belt/driving safety, skin self-exam, sunscreen and calcium and vitamin D intake and regular weightbearing exercise.     Social Determinants of Health     Financial Resource Strain: Low Risk  (1/19/2024)    [facility]    Overall Financial Resource Strain (CARDIA)     Difficulty of Paying Living Expenses: Not very hard   Food Insecurity: No Food Insecurity (7/2/2024)    Hunger Vital Sign     Worried About Running Out of Food in the Last Year: Never true     Ran Out of Food in the Last Year: Never true   Transportation Needs: No Transportation Needs (7/2/2024)    PRAPARE - Transportation     Lack of Transportation (Medical): No     Lack of Transportation (Non-Medical): No   Housing Stability: Low Risk  (7/2/2024)    Housing Stability Vital Sign     Unable to Pay for Housing in the Last Year: No     Number of Times Moved in the Last Year: 0     Homeless in the Last Year: No   Utilities: Not At Risk (7/2/2024)    [facility] Utilities     Threatened with loss of utilities: No     No results  "found.    Objective     /76 (BP Location: Left arm, Patient Position: Sitting, Cuff Size: Standard)   Pulse 73   Temp (!) 97.4 °F (36.3 °C) (Temporal)   Ht 4' 11.6\" (1.514 m)   Wt 60.3 kg (133 lb)   LMP  (LMP Unknown)   SpO2 97%   BMI 26.32 kg/m²     Physical Exam  Constitutional:       General: She is not in acute distress.     Appearance: She is well-developed.   HENT:      Head: Normocephalic.      Right Ear: External ear normal. There is no impacted cerumen.      Left Ear: External ear normal. There is no impacted cerumen.      Nose: No rhinorrhea.      Mouth/Throat:      Pharynx: No posterior oropharyngeal erythema.   Eyes:      General: No scleral icterus.     Pupils: Pupils are equal, round, and reactive to light.   Neck:      Thyroid: No thyromegaly.      Trachea: No tracheal deviation.   Cardiovascular:      Rate and Rhythm: Normal rate and regular rhythm.      Heart sounds: Normal heart sounds. No murmur heard.  Pulmonary:      Effort: Pulmonary effort is normal. No respiratory distress.      Breath sounds: Normal breath sounds.   Chest:      Chest wall: No tenderness.   Abdominal:      General: Bowel sounds are normal.      Palpations: Abdomen is soft. There is no mass.      Tenderness: There is no abdominal tenderness.   Musculoskeletal:         General: Normal range of motion.      Cervical back: Normal range of motion and neck supple. No rigidity.      Right lower leg: No edema.      Left lower leg: No edema.   Lymphadenopathy:      Cervical: No cervical adenopathy.   Skin:     General: Skin is warm.      Findings: No erythema or rash.   Neurological:      Mental Status: She is alert and oriented to person, place, and time.      Cranial Nerves: No cranial nerve deficit.   Psychiatric:         Mood and Affect: Mood normal.         Behavior: Behavior normal.       Administrative Statements       Immunization reviewed and advised for shingles vaccine but declined.    "

## 2024-07-02 NOTE — PATIENT INSTRUCTIONS
Medicare Preventive Visit Patient Instructions  Thank you for completing your Welcome to Medicare Visit or Medicare Annual Wellness Visit today. Your next wellness visit will be due in one year (7/3/2025).  The screening/preventive services that you may require over the next 5-10 years are detailed below. Some tests may not apply to you based off risk factors and/or age. Screening tests ordered at today's visit but not completed yet may show as past due. Also, please note that scanned in results may not display below.  Preventive Screenings:  Service Recommendations Previous Testing/Comments   Colorectal Cancer Screening  * Colonoscopy    * Fecal Occult Blood Test (FOBT)/Fecal Immunochemical Test (FIT)  * Fecal DNA/Cologuard Test  * Flexible Sigmoidoscopy Age: 45-75 years old   Colonoscopy: every 10 years (may be performed more frequently if at higher risk)  OR  FOBT/FIT: every 1 year  OR  Cologuard: every 3 years  OR  Sigmoidoscopy: every 5 years  Screening may be recommended earlier than age 45 if at higher risk for colorectal cancer. Also, an individualized decision between you and your healthcare provider will decide whether screening between the ages of 76-85 would be appropriate. Colonoscopy: 12/22/2015  FOBT/FIT: Not on file  Cologuard: Not on file  Sigmoidoscopy: Not on file          Breast Cancer Screening Age: 40+ years old  Frequency: every 1-2 years  Not required if history of left and right mastectomy Mammogram: 09/25/2023    Screening Current   Cervical Cancer Screening Between the ages of 21-29, pap smear recommended once every 3 years.   Between the ages of 30-65, can perform pap smear with HPV co-testing every 5 years.   Recommendations may differ for women with a history of total hysterectomy, cervical cancer, or abnormal pap smears in past. Pap Smear: 08/28/2018    Screening Not Indicated   Hepatitis C Screening Once for adults born between 1945 and 1965  More frequently in patients at high risk  for Hepatitis C Hep C Antibody: 06/27/2022    Screening Current   Diabetes Screening 1-2 times per year if you're at risk for diabetes or have pre-diabetes Fasting glucose: 111 mg/dL (9/18/2023)  A1C: 6.8 (5/15/2024)  Screening Not Indicated  History Diabetes   Cholesterol Screening Once every 5 years if you don't have a lipid disorder. May order more often based on risk factors. Lipid panel: 01/18/2024    Screening Not Indicated  History Lipid Disorder     Other Preventive Screenings Covered by Medicare:  Abdominal Aortic Aneurysm (AAA) Screening: covered once if your at risk. You're considered to be at risk if you have a family history of AAA.  Lung Cancer Screening: covers low dose CT scan once per year if you meet all of the following conditions: (1) Age 55-77; (2) No signs or symptoms of lung cancer; (3) Current smoker or have quit smoking within the last 15 years; (4) You have a tobacco smoking history of at least 20 pack years (packs per day multiplied by number of years you smoked); (5) You get a written order from a healthcare provider.  Glaucoma Screening: covered annually if you're considered high risk: (1) You have diabetes OR (2) Family history of glaucoma OR (3)  aged 50 and older OR (4)  American aged 65 and older  Osteoporosis Screening: covered every 2 years if you meet one of the following conditions: (1) You're estrogen deficient and at risk for osteoporosis based off medical history and other findings; (2) Have a vertebral abnormality; (3) On glucocorticoid therapy for more than 3 months; (4) Have primary hyperparathyroidism; (5) On osteoporosis medications and need to assess response to drug therapy.   Last bone density test (DXA Scan): 02/03/2023.  HIV Screening: covered annually if you're between the age of 15-65. Also covered annually if you are younger than 15 and older than 65 with risk factors for HIV infection. For pregnant patients, it is covered up to 3 times per  pregnancy.    Immunizations:  Immunization Recommendations   Influenza Vaccine Annual influenza vaccination during flu season is recommended for all persons aged >= 6 months who do not have contraindications   Pneumococcal Vaccine   * Pneumococcal conjugate vaccine = PCV13 (Prevnar 13), PCV15 (Vaxneuvance), PCV20 (Prevnar 20)  * Pneumococcal polysaccharide vaccine = PPSV23 (Pneumovax) Adults 19-65 yo with certain risk factors or if 65+ yo  If never received any pneumonia vaccine: recommend Prevnar 20 (PCV20)  Give PCV20 if previously received 1 dose of PCV13 or PPSV23   Hepatitis B Vaccine 3 dose series if at intermediate or high risk (ex: diabetes, end stage renal disease, liver disease)   Respiratory syncytial virus (RSV) Vaccine - COVERED BY MEDICARE PART D  * RSVPreF3 (Arexvy) CDC recommends that adults 60 years of age and older may receive a single dose of RSV vaccine using shared clinical decision-making (SCDM)   Tetanus (Td) Vaccine - COST NOT COVERED BY MEDICARE PART B Following completion of primary series, a booster dose should be given every 10 years to maintain immunity against tetanus. Td may also be given as tetanus wound prophylaxis.   Tdap Vaccine - COST NOT COVERED BY MEDICARE PART B Recommended at least once for all adults. For pregnant patients, recommended with each pregnancy.   Shingles Vaccine (Shingrix) - COST NOT COVERED BY MEDICARE PART B  2 shot series recommended in those 19 years and older who have or will have weakened immune systems or those 50 years and older     Health Maintenance Due:      Topic Date Due   • DXA SCAN  02/03/2025   • Hepatitis C Screening  Completed   • Colorectal Cancer Screening  Discontinued     Immunizations Due:      Topic Date Due   • COVID-19 Vaccine (5 - 2023-24 season) 09/01/2023   • Influenza Vaccine (1) 09/01/2024     Advance Directives   What are advance directives?  Advance directives are legal documents that state your wishes and plans for medical care.  These plans are made ahead of time in case you lose your ability to make decisions for yourself. Advance directives can apply to any medical decision, such as the treatments you want, and if you want to donate organs.   What are the types of advance directives?  There are many types of advance directives, and each state has rules about how to use them. You may choose a combination of any of the following:  Living will:  This is a written record of the treatment you want. You can also choose which treatments you do not want, which to limit, and which to stop at a certain time. This includes surgery, medicine, IV fluid, and tube feedings.   Durable power of  for healthcare (DPAHC):  This is a written record that states who you want to make healthcare choices for you when you are unable to make them for yourself. This person, called a proxy, is usually a family member or a friend. You may choose more than 1 proxy.  Do not resuscitate (DNR) order:  A DNR order is used in case your heart stops beating or you stop breathing. It is a request not to have certain forms of treatment, such as CPR. A DNR order may be included in other types of advance directives.  Medical directive:  This covers the care that you want if you are in a coma, near death, or unable to make decisions for yourself. You can list the treatments you want for each condition. Treatment may include pain medicine, surgery, blood transfusions, dialysis, IV or tube feedings, and a ventilator (breathing machine).  Values history:  This document has questions about your views, beliefs, and how you feel and think about life. This information can help others choose the care that you would choose.  Why are advance directives important?  An advance directive helps you control your care. Although spoken wishes may be used, it is better to have your wishes written down. Spoken wishes can be misunderstood, or not followed. Treatments may be given even if you  do not want them. An advance directive may make it easier for your family to make difficult choices about your care.   Fall Prevention    Fall prevention  includes ways to make your home and other areas safer. It also includes ways you can move more carefully to prevent a fall. Health conditions that cause changes in your blood pressure, vision, or muscle strength and coordination may increase your risk for falls. Medicines may also increase your risk for falls if they make you dizzy, weak, or sleepy.   Fall prevention tips:   Stand or sit up slowly.    Use assistive devices as directed.    Wear shoes that fit well and have soles that .    Wear a personal alarm.    Stay active.    Manage your medical conditions.    Home Safety Tips:  Add items to prevent falls in the bathroom.    Keep paths clear.    Install bright lights in your home.    Keep items you use often on shelves within reach.    Paint or place reflective tape on the edges of your stairs.    Weight Management   Why it is important to manage your weight:  Being overweight increases your risk of health conditions such as heart disease, high blood pressure, type 2 diabetes, and certain types of cancer. It can also increase your risk for osteoarthritis, sleep apnea, and other respiratory problems. Aim for a slow, steady weight loss. Even a small amount of weight loss can lower your risk of health problems.  How to lose weight safely:  A safe and healthy way to lose weight is to eat fewer calories and get regular exercise. You can lose up about 1 pound a week by decreasing the number of calories you eat by 500 calories each day.   Healthy meal plan for weight management:  A healthy meal plan includes a variety of foods, contains fewer calories, and helps you stay healthy. A healthy meal plan includes the following:  Eat whole-grain foods more often.  A healthy meal plan should contain fiber. Fiber is the part of grains, fruits, and vegetables that is not  broken down by your body. Whole-grain foods are healthy and provide extra fiber in your diet. Some examples of whole-grain foods are whole-wheat breads and pastas, oatmeal, brown rice, and bulgur.  Eat a variety of vegetables every day.  Include dark, leafy greens such as spinach, kale, nadege greens, and mustard greens. Eat yellow and orange vegetables such as carrots, sweet potatoes, and winter squash.   Eat a variety of fruits every day.  Choose fresh or canned fruit (canned in its own juice or light syrup) instead of juice. Fruit juice has very little or no fiber.  Eat low-fat dairy foods.  Drink fat-free (skim) milk or 1% milk. Eat fat-free yogurt and low-fat cottage cheese. Try low-fat cheeses such as mozzarella and other reduced-fat cheeses.  Choose meat and other protein foods that are low in fat.  Choose beans or other legumes such as split peas or lentils. Choose fish, skinless poultry (chicken or turkey), or lean cuts of red meat (beef or pork). Before you cook meat or poultry, cut off any visible fat.   Use less fat and oil.  Try baking foods instead of frying them. Add less fat, such as margarine, sour cream, regular salad dressing and mayonnaise to foods. Eat fewer high-fat foods. Some examples of high-fat foods include french fries, doughnuts, ice cream, and cakes.  Eat fewer sweets.  Limit foods and drinks that are high in sugar. This includes candy, cookies, regular soda, and sweetened drinks.  Exercise:  Exercise at least 30 minutes per day on most days of the week. Some examples of exercise include walking, biking, dancing, and swimming. You can also fit in more physical activity by taking the stairs instead of the elevator or parking farther away from stores. Ask your healthcare provider about the best exercise plan for you.   Narcotic (Opioid) Safety    Use narcotics safely:  Take prescribed narcotics exactly as directed  Do not give narcotics to others or take narcotics that belong to someone  else  Do not mix narcotics without medicines or alcohol  Do not drive or operate heavy machinery after you take the narcotic  Monitor for side effects and notify your healthcare provider if you experienced side effects such as nausea, sleepiness, itching, or trouble thinking clearly.    Manage constipation:    Constipation is the most common side effect of narcotic medicine. Constipation is when you have hard, dry bowel movements, or you go longer than usual between bowel movements. Tell your healthcare provider about all changes in your bowel movements while you are taking narcotics. He or she may recommend laxative medicine to help you have a bowel movement. He or she may also change the kind of narcotic you are taking, or change when you take it. The following are more ways you can prevent or relieve constipation:    Drink liquids as directed.  You may need to drink extra liquids to help soften and move your bowels. Ask how much liquid to drink each day and which liquids are best for you.  Eat high-fiber foods.  This may help decrease constipation by adding bulk to your bowel movements. High-fiber foods include fruits, vegetables, whole-grain breads and cereals, and beans. Your healthcare provider or dietitian can help you create a high-fiber meal plan. Your provider may also recommend a fiber supplement if you cannot get enough fiber from food.  Exercise regularly.  Regular physical activity can help stimulate your intestines. Walking is a good exercise to prevent or relieve constipation. Ask which exercises are best for you.  Schedule a time each day to have a bowel movement.  This may help train your body to have regular bowel movements. Bend forward while you are on the toilet to help move the bowel movement out. Sit on the toilet for at least 10 minutes, even if you do not have a bowel movement.    Store narcotics safely:   Store narcotics where others cannot easily get them.  Keep them in a locked cabinet  or secure area. Do not  keep them in a purse or other bag you carry with you. A person may be looking for something else and find the narcotics.  Make sure narcotics are stored out of the reach of children.  A child can easily overdose on narcotics. Narcotics may look like candy to a small child.    The best way to dispose of narcotics:      The laws vary by country and area. In the [place], the best way is to return the narcotics through a take-back program. This program is offered by the [place] Drug Enforcement Agency (ROWDY). The following are options for using the program:  Take the narcotics to a ROWDY collection site.  The site is often a law enforcement center. Call your local law enforcement center for scheduled take-back days in your area. You will be given information on where to go if the collection site is in a different location.  Take the narcotics to an approved pharmacy or hospital.  A pharmacy or hospital may be set up as a collection site. You will need to ask if it is a ROW[employer] collection site if you were not directed there. A pharmacy or doctor's office may not be able to take back narcotics unless it is a ROWDY site.  Use a mail-back system.  This means you are given containers to put the narcotics into. You will then mail them in the containers.  Use a take-back drop box.  This is a place to leave the narcotics at any time. People and animals will not be able to get into the box. Your local law enforcement agency can tell you where to find a drop box in your area.    Other ways to manage pain:   Ask your healthcare provider about non-narcotic medicines to control pain.  Nonprescription medicines include NSAIDs (such as ibuprofen) and acetaminophen. Prescription medicines include muscle relaxers, antidepressants, and steroids.  Pain may be managed without any medicines.  Some ways to relieve pain include massage, aromatherapy, or meditation. Physical or occupational therapy may also help.    For more  information:   Drug Enforcement Administration  [address] [place] [address]  Phone: 6- 827 - 581-5645  Web Address: https://www.deadiversion.[facility].gov/drug_disposal/    [place] Food and Drug Administration  [address] [place] [address]  Phone: 8- 808 - 708-8753  Web Address: http://www.fda.gov     © Copyright [employer] 2018 Information is for End User's use only and may not be sold, redistributed or otherwise used for commercial purposes. All illustrations and images included in CareNotes® are the copyrighted property of [employer]D.A[employer]M., Inc[employer] or [employer]

## 2024-07-02 NOTE — ASSESSMENT & PLAN NOTE
Lab Results   Component Value Date    HGBA1C 6.8 (A) 05/15/2024   Today hemoglobin A1c done in office is 7.9.  Will continue with present regimen of insulin.  She discontinue her mealtime insulin at her own.  Because she was having low blood sugar in the morning  Advised her to take 26 units of Lantus/Levemir and take 5 units of NovoLog/Humalog with each meal.

## 2024-07-02 NOTE — ASSESSMENT & PLAN NOTE
Lab Results   Component Value Date    EGFR 43 (L) 01/19/2024    EGFR 36 (L) 01/18/2024    EGFR 34 (L) 01/18/2024    CREATININE 1.27 (H) 01/19/2024    CREATININE 1.45 (H) 01/18/2024    CREATININE 1.54 (H) 01/18/2024   Advised for adequate oral hydration and she is overdue for renal function advised her to do as soon as possible

## 2024-07-02 NOTE — ASSESSMENT & PLAN NOTE
Lab Results   Component Value Date    HGBA1C 6.8 (A) 05/15/2024   Retinopathy managed by ophthalmologist

## 2024-07-02 NOTE — ASSESSMENT & PLAN NOTE
Lab Results   Component Value Date    HGBA1C 6.8 (A) 05/15/2024   See insulin dosage as ordered.

## 2024-07-10 ENCOUNTER — APPOINTMENT (OUTPATIENT)
Dept: LAB | Age: 79
End: 2024-07-10
Payer: COMMERCIAL

## 2024-07-10 DIAGNOSIS — E11.22 TYPE 2 DIABETES MELLITUS WITH STAGE 3B CHRONIC KIDNEY DISEASE, WITH LONG-TERM CURRENT USE OF INSULIN (HCC): ICD-10-CM

## 2024-07-10 DIAGNOSIS — N18.32 STAGE 3B CHRONIC KIDNEY DISEASE (HCC): ICD-10-CM

## 2024-07-10 DIAGNOSIS — E11.42 TYPE 2 DIABETES MELLITUS WITH DIABETIC POLYNEUROPATHY, WITH LONG-TERM CURRENT USE OF INSULIN (HCC): ICD-10-CM

## 2024-07-10 DIAGNOSIS — E11.3553 TYPE 2 DIABETES MELLITUS WITH STABLE PROLIFERATIVE RETINOPATHY OF BOTH EYES, WITH LONG-TERM CURRENT USE OF INSULIN (HCC): ICD-10-CM

## 2024-07-10 DIAGNOSIS — Z79.4 TYPE 2 DIABETES MELLITUS WITH COMPLICATION, WITH LONG-TERM CURRENT USE OF INSULIN (HCC): ICD-10-CM

## 2024-07-10 DIAGNOSIS — N18.32 TYPE 2 DIABETES MELLITUS WITH STAGE 3B CHRONIC KIDNEY DISEASE, WITHOUT LONG-TERM CURRENT USE OF INSULIN (HCC): ICD-10-CM

## 2024-07-10 DIAGNOSIS — N18.9 CHRONIC KIDNEY DISEASE, UNSPECIFIED CKD STAGE: ICD-10-CM

## 2024-07-10 DIAGNOSIS — K21.9 GASTROESOPHAGEAL REFLUX DISEASE WITHOUT ESOPHAGITIS: ICD-10-CM

## 2024-07-10 DIAGNOSIS — E03.9 ACQUIRED HYPOTHYROIDISM: ICD-10-CM

## 2024-07-10 DIAGNOSIS — N18.32 TYPE 2 DIABETES MELLITUS WITH STAGE 3B CHRONIC KIDNEY DISEASE, WITH LONG-TERM CURRENT USE OF INSULIN (HCC): ICD-10-CM

## 2024-07-10 DIAGNOSIS — Z79.4 TYPE 2 DIABETES MELLITUS WITH DIABETIC POLYNEUROPATHY, WITH LONG-TERM CURRENT USE OF INSULIN (HCC): ICD-10-CM

## 2024-07-10 DIAGNOSIS — E11.8 TYPE 2 DIABETES MELLITUS WITH COMPLICATION, WITH LONG-TERM CURRENT USE OF INSULIN (HCC): ICD-10-CM

## 2024-07-10 DIAGNOSIS — E11.22 TYPE 2 DIABETES MELLITUS WITH STAGE 3B CHRONIC KIDNEY DISEASE, WITHOUT LONG-TERM CURRENT USE OF INSULIN (HCC): ICD-10-CM

## 2024-07-10 DIAGNOSIS — E78.2 MIXED HYPERLIPIDEMIA: ICD-10-CM

## 2024-07-10 DIAGNOSIS — Z79.4 TYPE 2 DIABETES MELLITUS WITH STAGE 3B CHRONIC KIDNEY DISEASE, WITH LONG-TERM CURRENT USE OF INSULIN (HCC): ICD-10-CM

## 2024-07-10 DIAGNOSIS — Z79.4 TYPE 2 DIABETES MELLITUS WITH STABLE PROLIFERATIVE RETINOPATHY OF BOTH EYES, WITH LONG-TERM CURRENT USE OF INSULIN (HCC): ICD-10-CM

## 2024-07-10 LAB
ALBUMIN SERPL BCG-MCNC: 3.9 G/DL (ref 3.5–5)
ALP SERPL-CCNC: 85 U/L (ref 34–104)
ALT SERPL W P-5'-P-CCNC: 29 U/L (ref 7–52)
ANION GAP SERPL CALCULATED.3IONS-SCNC: 7 MMOL/L (ref 4–13)
AST SERPL W P-5'-P-CCNC: 26 U/L (ref 13–39)
BILIRUB SERPL-MCNC: 0.37 MG/DL (ref 0.2–1)
BUN SERPL-MCNC: 44 MG/DL (ref 5–25)
CALCIUM SERPL-MCNC: 9.7 MG/DL (ref 8.4–10.2)
CHLORIDE SERPL-SCNC: 105 MMOL/L (ref 96–108)
CHOLEST SERPL-MCNC: 119 MG/DL
CO2 SERPL-SCNC: 29 MMOL/L (ref 21–32)
CREAT SERPL-MCNC: 1.3 MG/DL (ref 0.6–1.3)
ERYTHROCYTE [DISTWIDTH] IN BLOOD BY AUTOMATED COUNT: 13.8 % (ref 11.6–15.1)
GFR SERPL CREATININE-BSD FRML MDRD: 38 ML/MIN/1.73SQ M
GLUCOSE P FAST SERPL-MCNC: 101 MG/DL (ref 65–99)
HCT VFR BLD AUTO: 35.6 % (ref 34.8–46.1)
HDLC SERPL-MCNC: 50 MG/DL
HGB BLD-MCNC: 11.6 G/DL (ref 11.5–15.4)
LDLC SERPL CALC-MCNC: 43 MG/DL (ref 0–100)
MAGNESIUM SERPL-MCNC: 2.2 MG/DL (ref 1.9–2.7)
MCH RBC QN AUTO: 31.4 PG (ref 26.8–34.3)
MCHC RBC AUTO-ENTMCNC: 32.6 G/DL (ref 31.4–37.4)
MCV RBC AUTO: 96 FL (ref 82–98)
PHOSPHATE SERPL-MCNC: 4.6 MG/DL (ref 2.3–4.1)
PLATELET # BLD AUTO: 148 THOUSANDS/UL (ref 149–390)
PMV BLD AUTO: 12.2 FL (ref 8.9–12.7)
POTASSIUM SERPL-SCNC: 5 MMOL/L (ref 3.5–5.3)
PROT SERPL-MCNC: 6.4 G/DL (ref 6.4–8.4)
PTH-INTACT SERPL-MCNC: 39.6 PG/ML (ref 12–88)
RBC # BLD AUTO: 3.7 MILLION/UL (ref 3.81–5.12)
SODIUM SERPL-SCNC: 141 MMOL/L (ref 135–147)
TRIGL SERPL-MCNC: 131 MG/DL
TSH SERPL DL<=0.05 MIU/L-ACNC: 1.78 UIU/ML (ref 0.45–4.5)
URATE SERPL-MCNC: 3.7 MG/DL (ref 2–7.5)
WBC # BLD AUTO: 6.49 THOUSAND/UL (ref 4.31–10.16)

## 2024-07-10 PROCEDURE — 36415 COLL VENOUS BLD VENIPUNCTURE: CPT

## 2024-07-10 PROCEDURE — 85027 COMPLETE CBC AUTOMATED: CPT

## 2024-07-10 PROCEDURE — 84443 ASSAY THYROID STIM HORMONE: CPT

## 2024-07-10 PROCEDURE — 80061 LIPID PANEL: CPT

## 2024-07-10 PROCEDURE — 84550 ASSAY OF BLOOD/URIC ACID: CPT

## 2024-07-10 PROCEDURE — 80053 COMPREHEN METABOLIC PANEL: CPT

## 2024-07-10 PROCEDURE — 84100 ASSAY OF PHOSPHORUS: CPT

## 2024-07-10 PROCEDURE — 83970 ASSAY OF PARATHORMONE: CPT

## 2024-07-10 PROCEDURE — 82570 ASSAY OF URINE CREATININE: CPT

## 2024-07-10 PROCEDURE — 83735 ASSAY OF MAGNESIUM: CPT

## 2024-07-10 PROCEDURE — 82043 UR ALBUMIN QUANTITATIVE: CPT

## 2024-07-11 LAB
CREAT UR-MCNC: 93.7 MG/DL
MICROALBUMIN UR-MCNC: 709.7 MG/L
MICROALBUMIN/CREAT 24H UR: 757 MG/G CREATININE (ref 0–30)

## 2024-07-15 DIAGNOSIS — E11.8 TYPE 2 DIABETES MELLITUS WITH COMPLICATION, WITH LONG-TERM CURRENT USE OF INSULIN (HCC): ICD-10-CM

## 2024-07-15 DIAGNOSIS — Z79.4 TYPE 2 DIABETES MELLITUS WITH COMPLICATION, WITH LONG-TERM CURRENT USE OF INSULIN (HCC): ICD-10-CM

## 2024-07-15 RX ORDER — SYRINGE-NEEDLE,INSULIN,0.5 ML 31 GX5/16"
SYRINGE, EMPTY DISPOSABLE MISCELLANEOUS 4 TIMES DAILY
Qty: 200 EACH | Refills: 5 | Status: SHIPPED | OUTPATIENT
Start: 2024-07-15

## 2024-07-15 NOTE — TELEPHONE ENCOUNTER
"Medication:     TRUEplus Insulin Syringe 31G X 5/16\" 0.5 ML MISC       Dose/Frequency:     Inject under the skin 4 (four) times a day       Quantity: 200    Pharmacy: [facility] - [place] - [address]     Office:   [x] PCP/Provider - Dr Alfonso  [] Speciality/Provider -     Does the patient have enough for 3 days?   [x] Yes   [] No - Send as HP to POD      "

## 2024-07-26 NOTE — TELEPHONE ENCOUNTER
Patient aware
Patient is waiting to schedule a procedure with you  Want to know if you got the information you needed from her dentist yet 
Please let her know I have reached out to her dentist and am waiting to hear back from them 
Spoke with [email or web address] that I need more information regarding her allergy to anesthesia  I plan to speak with an anesthesiologist regarding this concern and will get back to her in the next few days 
no weight-bearing restrictions

## 2024-07-30 DIAGNOSIS — J45.41 MODERATE PERSISTENT ASTHMA WITH ACUTE EXACERBATION: ICD-10-CM

## 2024-07-30 RX ORDER — MONTELUKAST SODIUM 10 MG/1
10 TABLET ORAL
Qty: 30 TABLET | Refills: 5 | Status: SHIPPED | OUTPATIENT
Start: 2024-07-30

## 2024-08-02 DIAGNOSIS — I10 HYPERTENSION, UNSPECIFIED TYPE: ICD-10-CM

## 2024-08-02 DIAGNOSIS — Z79.4 TYPE 2 DIABETES MELLITUS WITH COMPLICATION, WITH LONG-TERM CURRENT USE OF INSULIN (HCC): ICD-10-CM

## 2024-08-02 DIAGNOSIS — E11.8 TYPE 2 DIABETES MELLITUS WITH COMPLICATION, WITH LONG-TERM CURRENT USE OF INSULIN (HCC): ICD-10-CM

## 2024-08-04 RX ORDER — SYRINGE-NEEDLE,INSULIN,0.5 ML 31 GX5/16"
SYRINGE, EMPTY DISPOSABLE MISCELLANEOUS 4 TIMES DAILY
Qty: 120 EACH | Refills: 5 | Status: SHIPPED | OUTPATIENT
Start: 2024-08-04

## 2024-08-04 RX ORDER — BUMETANIDE 2 MG/1
2 TABLET ORAL DAILY
Qty: 30 TABLET | Refills: 5 | Status: SHIPPED | OUTPATIENT
Start: 2024-08-04

## 2024-09-07 ENCOUNTER — APPOINTMENT (EMERGENCY)
Dept: CT IMAGING | Facility: HOSPITAL | Age: 79
DRG: 871 | End: 2024-09-07
Payer: COMMERCIAL

## 2024-09-07 ENCOUNTER — HOSPITAL ENCOUNTER (INPATIENT)
Facility: HOSPITAL | Age: 79
LOS: 6 days | Discharge: NURSING, REHAB OR LONG-TERM CARE | DRG: 871 | End: 2024-09-14
Admitting: INTERNAL MEDICINE
Payer: COMMERCIAL

## 2024-09-07 DIAGNOSIS — N17.9 AKI (ACUTE KIDNEY INJURY) (HCC): ICD-10-CM

## 2024-09-07 DIAGNOSIS — E78.5 HYPERLIPIDEMIA, UNSPECIFIED HYPERLIPIDEMIA TYPE: ICD-10-CM

## 2024-09-07 DIAGNOSIS — C44.42 SQUAMOUS CELL CARCINOMA OF HEAD AND NECK: ICD-10-CM

## 2024-09-07 DIAGNOSIS — I70.1 RENAL ARTERY STENOSIS (HCC): ICD-10-CM

## 2024-09-07 DIAGNOSIS — N18.30 BENIGN HYPERTENSION WITH CKD (CHRONIC KIDNEY DISEASE) STAGE III (HCC): ICD-10-CM

## 2024-09-07 DIAGNOSIS — I12.9 BENIGN HYPERTENSION WITH CKD (CHRONIC KIDNEY DISEASE) STAGE III (HCC): ICD-10-CM

## 2024-09-07 DIAGNOSIS — A41.9 SEPSIS (HCC): ICD-10-CM

## 2024-09-07 DIAGNOSIS — I77.1 CELIAC ARTERY STENOSIS (HCC): Primary | ICD-10-CM

## 2024-09-07 DIAGNOSIS — Z79.4 TYPE 2 DIABETES MELLITUS WITH COMPLICATION, WITH LONG-TERM CURRENT USE OF INSULIN (HCC): ICD-10-CM

## 2024-09-07 DIAGNOSIS — M54.50 LOW BACK PAIN: ICD-10-CM

## 2024-09-07 DIAGNOSIS — M86.9 OSTEOMYELITIS (HCC): ICD-10-CM

## 2024-09-07 DIAGNOSIS — E11.8 TYPE 2 DIABETES MELLITUS WITH COMPLICATION, WITH LONG-TERM CURRENT USE OF INSULIN (HCC): ICD-10-CM

## 2024-09-07 DIAGNOSIS — L98.9 SCALP LESION: ICD-10-CM

## 2024-09-07 DIAGNOSIS — M54.9 ACUTE BACK PAIN: ICD-10-CM

## 2024-09-07 DIAGNOSIS — U07.1 COVID: ICD-10-CM

## 2024-09-07 DIAGNOSIS — R34 OLIGOURIA: ICD-10-CM

## 2024-09-07 LAB
2HR DELTA HS TROPONIN: 2 NG/L
4HR DELTA HS TROPONIN: 7 NG/L
ALBUMIN SERPL BCG-MCNC: 4 G/DL (ref 3.5–5)
ALP SERPL-CCNC: 80 U/L (ref 34–104)
ALT SERPL W P-5'-P-CCNC: 30 U/L (ref 7–52)
ANION GAP SERPL CALCULATED.3IONS-SCNC: 9 MMOL/L (ref 4–13)
APTT PPP: 29 SECONDS (ref 23–34)
AST SERPL W P-5'-P-CCNC: 36 U/L (ref 13–39)
BASOPHILS # BLD AUTO: 0.03 THOUSANDS/ΜL (ref 0–0.1)
BASOPHILS NFR BLD AUTO: 0 % (ref 0–1)
BILIRUB SERPL-MCNC: 0.56 MG/DL (ref 0.2–1)
BUN SERPL-MCNC: 43 MG/DL (ref 5–25)
CALCIUM SERPL-MCNC: 10.1 MG/DL (ref 8.4–10.2)
CARDIAC TROPONIN I PNL SERPL HS: 14 NG/L
CARDIAC TROPONIN I PNL SERPL HS: 16 NG/L
CARDIAC TROPONIN I PNL SERPL HS: 21 NG/L
CHLORIDE SERPL-SCNC: 102 MMOL/L (ref 96–108)
CO2 SERPL-SCNC: 26 MMOL/L (ref 21–32)
CREAT SERPL-MCNC: 1.19 MG/DL (ref 0.6–1.3)
EOSINOPHIL # BLD AUTO: 0.01 THOUSAND/ΜL (ref 0–0.61)
EOSINOPHIL NFR BLD AUTO: 0 % (ref 0–6)
ERYTHROCYTE [DISTWIDTH] IN BLOOD BY AUTOMATED COUNT: 14.2 % (ref 11.6–15.1)
FLUAV RNA RESP QL NAA+PROBE: NEGATIVE
FLUBV RNA RESP QL NAA+PROBE: NEGATIVE
GFR SERPL CREATININE-BSD FRML MDRD: 42 ML/MIN/1.73SQ M
GLUCOSE SERPL-MCNC: 172 MG/DL (ref 65–140)
GLUCOSE SERPL-MCNC: 227 MG/DL (ref 65–140)
HCT VFR BLD AUTO: 37 % (ref 34.8–46.1)
HGB BLD-MCNC: 12.2 G/DL (ref 11.5–15.4)
IMM GRANULOCYTES # BLD AUTO: 0.05 THOUSAND/UL (ref 0–0.2)
IMM GRANULOCYTES NFR BLD AUTO: 1 % (ref 0–2)
INR PPP: 1.03 (ref 0.85–1.19)
LACTATE SERPL-SCNC: 0.9 MMOL/L (ref 0.5–2)
LIPASE SERPL-CCNC: 10 U/L (ref 11–82)
LYMPHOCYTES # BLD AUTO: 0.55 THOUSANDS/ΜL (ref 0.6–4.47)
LYMPHOCYTES NFR BLD AUTO: 5 % (ref 14–44)
MCH RBC QN AUTO: 31.4 PG (ref 26.8–34.3)
MCHC RBC AUTO-ENTMCNC: 33 G/DL (ref 31.4–37.4)
MCV RBC AUTO: 95 FL (ref 82–98)
MONOCYTES # BLD AUTO: 1.07 THOUSAND/ΜL (ref 0.17–1.22)
MONOCYTES NFR BLD AUTO: 10 % (ref 4–12)
NEUTROPHILS # BLD AUTO: 9.12 THOUSANDS/ΜL (ref 1.85–7.62)
NEUTS SEG NFR BLD AUTO: 84 % (ref 43–75)
NRBC BLD AUTO-RTO: 0 /100 WBCS
PLATELET # BLD AUTO: 135 THOUSANDS/UL (ref 149–390)
PMV BLD AUTO: 11.8 FL (ref 8.9–12.7)
POTASSIUM SERPL-SCNC: 5 MMOL/L (ref 3.5–5.3)
PROCALCITONIN SERPL-MCNC: 0.35 NG/ML
PROT SERPL-MCNC: 7 G/DL (ref 6.4–8.4)
PROTHROMBIN TIME: 14.2 SECONDS (ref 12.3–15)
RBC # BLD AUTO: 3.89 MILLION/UL (ref 3.81–5.12)
RSV RNA RESP QL NAA+PROBE: NEGATIVE
SARS-COV-2 RNA RESP QL NAA+PROBE: POSITIVE
SODIUM SERPL-SCNC: 137 MMOL/L (ref 135–147)
WBC # BLD AUTO: 10.83 THOUSAND/UL (ref 4.31–10.16)

## 2024-09-07 PROCEDURE — 93005 ELECTROCARDIOGRAM TRACING: CPT

## 2024-09-07 PROCEDURE — 87154 CUL TYP ID BLD PTHGN 6+ TRGT: CPT

## 2024-09-07 PROCEDURE — 82550 ASSAY OF CK (CPK): CPT

## 2024-09-07 PROCEDURE — 0241U HB NFCT DS VIR RESP RNA 4 TRGT: CPT

## 2024-09-07 PROCEDURE — 96361 HYDRATE IV INFUSION ADD-ON: CPT

## 2024-09-07 PROCEDURE — 99223 1ST HOSP IP/OBS HIGH 75: CPT | Performed by: INTERNAL MEDICINE

## 2024-09-07 PROCEDURE — 85025 COMPLETE CBC W/AUTO DIFF WBC: CPT

## 2024-09-07 PROCEDURE — 96365 THER/PROPH/DIAG IV INF INIT: CPT

## 2024-09-07 PROCEDURE — 87147 CULTURE TYPE IMMUNOLOGIC: CPT

## 2024-09-07 PROCEDURE — 83605 ASSAY OF LACTIC ACID: CPT

## 2024-09-07 PROCEDURE — 83880 ASSAY OF NATRIURETIC PEPTIDE: CPT

## 2024-09-07 PROCEDURE — 96376 TX/PRO/DX INJ SAME DRUG ADON: CPT

## 2024-09-07 PROCEDURE — 99285 EMERGENCY DEPT VISIT HI MDM: CPT

## 2024-09-07 PROCEDURE — 36415 COLL VENOUS BLD VENIPUNCTURE: CPT

## 2024-09-07 PROCEDURE — 96375 TX/PRO/DX INJ NEW DRUG ADDON: CPT

## 2024-09-07 PROCEDURE — 87040 BLOOD CULTURE FOR BACTERIA: CPT

## 2024-09-07 PROCEDURE — 84484 ASSAY OF TROPONIN QUANT: CPT

## 2024-09-07 PROCEDURE — 83690 ASSAY OF LIPASE: CPT

## 2024-09-07 PROCEDURE — 85730 THROMBOPLASTIN TIME PARTIAL: CPT

## 2024-09-07 PROCEDURE — 99284 EMERGENCY DEPT VISIT MOD MDM: CPT

## 2024-09-07 PROCEDURE — 82948 REAGENT STRIP/BLOOD GLUCOSE: CPT

## 2024-09-07 PROCEDURE — 80053 COMPREHEN METABOLIC PANEL: CPT

## 2024-09-07 PROCEDURE — 74174 CTA ABD&PLVS W/CONTRAST: CPT

## 2024-09-07 PROCEDURE — 71275 CT ANGIOGRAPHY CHEST: CPT

## 2024-09-07 PROCEDURE — 87186 SC STD MICRODIL/AGAR DIL: CPT

## 2024-09-07 PROCEDURE — 85610 PROTHROMBIN TIME: CPT

## 2024-09-07 PROCEDURE — 86140 C-REACTIVE PROTEIN: CPT

## 2024-09-07 PROCEDURE — 84145 PROCALCITONIN (PCT): CPT

## 2024-09-07 RX ORDER — ATORVASTATIN CALCIUM 40 MG/1
80 TABLET, FILM COATED ORAL
Status: DISCONTINUED | OUTPATIENT
Start: 2024-09-08 | End: 2024-09-08

## 2024-09-07 RX ORDER — LIDOCAINE 50 MG/G
1 PATCH TOPICAL ONCE
Status: COMPLETED | OUTPATIENT
Start: 2024-09-07 | End: 2024-09-08

## 2024-09-07 RX ORDER — CARVEDILOL 12.5 MG/1
12.5 TABLET ORAL 2 TIMES DAILY WITH MEALS
Status: DISCONTINUED | OUTPATIENT
Start: 2024-09-07 | End: 2024-09-10

## 2024-09-07 RX ORDER — INSULIN GLARGINE 100 [IU]/ML
30 INJECTION, SOLUTION SUBCUTANEOUS EVERY MORNING
Status: DISCONTINUED | OUTPATIENT
Start: 2024-09-08 | End: 2024-09-14 | Stop reason: HOSPADM

## 2024-09-07 RX ORDER — TRAMADOL HYDROCHLORIDE 50 MG/1
50 TABLET ORAL 2 TIMES DAILY
Status: DISCONTINUED | OUTPATIENT
Start: 2024-09-07 | End: 2024-09-14 | Stop reason: HOSPADM

## 2024-09-07 RX ORDER — HYDROMORPHONE HCL/PF 1 MG/ML
0.5 SYRINGE (ML) INJECTION ONCE
Status: COMPLETED | OUTPATIENT
Start: 2024-09-07 | End: 2024-09-07

## 2024-09-07 RX ORDER — ACETAMINOPHEN 325 MG/1
975 TABLET ORAL EVERY 8 HOURS SCHEDULED
Status: DISCONTINUED | OUTPATIENT
Start: 2024-09-07 | End: 2024-09-14 | Stop reason: HOSPADM

## 2024-09-07 RX ORDER — METHOCARBAMOL 500 MG/1
500 TABLET, FILM COATED ORAL EVERY 6 HOURS PRN
Status: DISCONTINUED | OUTPATIENT
Start: 2024-09-07 | End: 2024-09-07

## 2024-09-07 RX ORDER — INSULIN LISPRO 100 [IU]/ML
1-5 INJECTION, SOLUTION INTRAVENOUS; SUBCUTANEOUS
Status: DISCONTINUED | OUTPATIENT
Start: 2024-09-07 | End: 2024-09-14 | Stop reason: HOSPADM

## 2024-09-07 RX ORDER — LANOLIN ALCOHOL/MO/W.PET/CERES
1 CREAM (GRAM) TOPICAL
Status: DISCONTINUED | OUTPATIENT
Start: 2024-09-08 | End: 2024-09-14 | Stop reason: HOSPADM

## 2024-09-07 RX ORDER — LOSARTAN POTASSIUM 50 MG/1
100 TABLET ORAL DAILY
Status: DISCONTINUED | OUTPATIENT
Start: 2024-09-08 | End: 2024-09-09

## 2024-09-07 RX ORDER — ACETAMINOPHEN 10 MG/ML
1000 INJECTION, SOLUTION INTRAVENOUS ONCE
Status: COMPLETED | OUTPATIENT
Start: 2024-09-07 | End: 2024-09-07

## 2024-09-07 RX ORDER — MONTELUKAST SODIUM 10 MG/1
10 TABLET ORAL
Status: DISCONTINUED | OUTPATIENT
Start: 2024-09-07 | End: 2024-09-14 | Stop reason: HOSPADM

## 2024-09-07 RX ORDER — FAMOTIDINE 20 MG/1
20 TABLET, FILM COATED ORAL DAILY
Status: DISCONTINUED | OUTPATIENT
Start: 2024-09-08 | End: 2024-09-14 | Stop reason: HOSPADM

## 2024-09-07 RX ORDER — CARVEDILOL 12.5 MG/1
12.5 TABLET ORAL 2 TIMES DAILY WITH MEALS
Status: DISCONTINUED | OUTPATIENT
Start: 2024-09-08 | End: 2024-09-07

## 2024-09-07 RX ORDER — ALLOPURINOL 100 MG/1
200 TABLET ORAL DAILY
Status: DISCONTINUED | OUTPATIENT
Start: 2024-09-08 | End: 2024-09-14 | Stop reason: HOSPADM

## 2024-09-07 RX ORDER — FLUTICASONE PROPIONATE 50 MCG
2 SPRAY, SUSPENSION (ML) NASAL DAILY
Status: DISCONTINUED | OUTPATIENT
Start: 2024-09-08 | End: 2024-09-14 | Stop reason: HOSPADM

## 2024-09-07 RX ORDER — OXYCODONE HYDROCHLORIDE 5 MG/1
5 TABLET ORAL EVERY 6 HOURS PRN
Status: DISCONTINUED | OUTPATIENT
Start: 2024-09-07 | End: 2024-09-08

## 2024-09-07 RX ORDER — ENOXAPARIN SODIUM 100 MG/ML
40 INJECTION SUBCUTANEOUS DAILY
Status: DISCONTINUED | OUTPATIENT
Start: 2024-09-08 | End: 2024-09-08 | Stop reason: DRUGHIGH

## 2024-09-07 RX ORDER — NIFEDIPINE 60 MG/1
60 TABLET, EXTENDED RELEASE ORAL DAILY
Status: DISCONTINUED | OUTPATIENT
Start: 2024-09-08 | End: 2024-09-10

## 2024-09-07 RX ORDER — METHOCARBAMOL 500 MG/1
500 TABLET, FILM COATED ORAL 3 TIMES DAILY
Status: DISCONTINUED | OUTPATIENT
Start: 2024-09-08 | End: 2024-09-14 | Stop reason: HOSPADM

## 2024-09-07 RX ORDER — INSULIN LISPRO 100 [IU]/ML
1-5 INJECTION, SOLUTION INTRAVENOUS; SUBCUTANEOUS
Status: DISCONTINUED | OUTPATIENT
Start: 2024-09-08 | End: 2024-09-14 | Stop reason: HOSPADM

## 2024-09-07 RX ORDER — BUMETANIDE 1 MG/1
2 TABLET ORAL DAILY
Status: DISCONTINUED | OUTPATIENT
Start: 2024-09-08 | End: 2024-09-09

## 2024-09-07 RX ORDER — LEVOTHYROXINE SODIUM 100 UG/1
100 TABLET ORAL
Status: DISCONTINUED | OUTPATIENT
Start: 2024-09-08 | End: 2024-09-14 | Stop reason: HOSPADM

## 2024-09-07 RX ADMIN — SODIUM CHLORIDE 1000 ML: 0.9 INJECTION, SOLUTION INTRAVENOUS at 20:25

## 2024-09-07 RX ADMIN — ACETAMINOPHEN 1000 MG: 10 INJECTION INTRAVENOUS at 17:35

## 2024-09-07 RX ADMIN — MONTELUKAST 10 MG: 10 TABLET, FILM COATED ORAL at 23:28

## 2024-09-07 RX ADMIN — HYDROMORPHONE HYDROCHLORIDE 0.5 MG: 1 INJECTION, SOLUTION INTRAMUSCULAR; INTRAVENOUS; SUBCUTANEOUS at 16:55

## 2024-09-07 RX ADMIN — LIDOCAINE 1 PATCH: 50 PATCH CUTANEOUS at 19:56

## 2024-09-07 RX ADMIN — HYDROMORPHONE HYDROCHLORIDE 0.5 MG: 1 INJECTION, SOLUTION INTRAMUSCULAR; INTRAVENOUS; SUBCUTANEOUS at 17:56

## 2024-09-07 RX ADMIN — ACETAMINOPHEN 975 MG: 325 TABLET ORAL at 22:00

## 2024-09-07 RX ADMIN — METHOCARBAMOL 500 MG: 500 TABLET ORAL at 21:48

## 2024-09-07 RX ADMIN — TRAMADOL HYDROCHLORIDE 50 MG: 50 TABLET, COATED ORAL at 23:28

## 2024-09-07 RX ADMIN — CARVEDILOL 12.5 MG: 12.5 TABLET, FILM COATED ORAL at 23:28

## 2024-09-07 RX ADMIN — INSULIN LISPRO 1 UNITS: 100 INJECTION, SOLUTION INTRAVENOUS; SUBCUTANEOUS at 23:28

## 2024-09-07 RX ADMIN — SODIUM CHLORIDE 1000 ML: 0.9 INJECTION, SOLUTION INTRAVENOUS at 17:56

## 2024-09-07 RX ADMIN — IOHEXOL 50 ML: 350 INJECTION, SOLUTION INTRAVENOUS at 17:23

## 2024-09-07 NOTE — ED PROVIDER NOTES
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 and the patient's admission status was agreed to be Admission Status: observation status to the service of [facility] .               Follow-up Information       Follow up With Specialties Details Why Contact Info Additional Information    [facility] Vascular Surgery Schedule an appointment as soon as possible for a visit in 2 week(s)  [address] [address] [phone] [facility], [address] [place] [address], [address] [phone]            Current Discharge Medication List        START taking these medications    Details   atorvastatin (LIPITOR) 80 mg tablet TAKE 1 TABLET (80 MG TOTAL) BY MOUTH DAILY  Qty: 90 tablet, Refills: 1    Associated Diagnoses: Hyperlipidemia, unspecified hyperlipidemia type           CONTINUE these medications which have NOT CHANGED    Details   allopurinol (ZYLOPRIM) 100 mg tablet Take 2 tablets (200 mg total) by mouth daily  Qty: 180 tablet, Refills: 1    Associated Diagnoses: Hyperuricemia      aspirin 81 MG tablet Take 1 tablet by mouth daily       bumetanide (BUMEX) 2 mg tablet TAKE 1 TABLET (2 MG TOTAL) BY MOUTH DAILY  Qty: 30 tablet, Refills: 5    Associated Diagnoses: Hypertension, unspecified type      carvedilol (COREG) 25 mg tablet Take 1 tablet (25 mg total) by mouth  2 (two) times a day with meals  Qty: 180 tablet, Refills: 1    Associated Diagnoses: Benign hypertension with CKD (chronic kidney disease) stage III ([facility])      famotidine (PEPCID) 10 mg tablet Take 1 tablet (10 mg total) by mouth 2 (two) times a day for 90 days  Qty: 60 tablet, Refills: 9    Associated Diagnoses: GERD without esophagitis      fluticasone (FLONASE) 50 mcg/act nasal spray 2 sprays into each nostril daily  Qty: 48 g, Refills: 3    Associated Diagnoses: Rhinitis, unspecified type      glucose blood (ONE TOUCH ULTRA TEST) test strip Test blood sugars 3 to 4 times a day  Qty: 400 each, Refills: 3    Associated Diagnoses: Type 2 diabetes mellitus with complication, with long-term current use of insulin ([facility])      insulin glargine (Lantus) 100 units/mL subcutaneous injection Inject 26 Units under the skin daily (40 Units Morning; 35 Units at bedtime)  Qty: 67.5 mL, Refills: 0    Comments: Vials!  Associated Diagnoses: Type 2 diabetes mellitus with complication, with long-term current use of insulin ([facility])      insulin regular (HumuLIN R,NovoLIN R) 100 units/mL injection Inject 5 Units under the skin 2 (two) times a day with lunch and dinner  Qty: 20 mL, Refills: 2    Comments: PLEASE DISPENSE NOVOLIN R  Associated Diagnoses: Type 2 diabetes mellitus with complication, with long-term current use of insulin ([facility])      levothyroxine 100 mcg tablet Take 1 tablet (100 mcg total) by mouth daily  Qty: 90 tablet, Refills: 1    Associated Diagnoses: Hypothyroidism, unspecified type      losartan (COZAAR) 100 MG tablet Take 1 tablet (100 mg total) by mouth daily  Qty: 90 tablet, Refills: 1    Associated Diagnoses: Hypertension, unspecified type      methocarbamol (ROBAXIN) 500 mg tablet Take 1 tablet (500 mg total) by mouth 3 (three) times a day  Qty: 270 tablet, Refills: 1    Associated Diagnoses: Acute midline low back pain, unspecified whether sciatica present      montelukast (SINGULAIR) 10 mg tablet TAKE 1 TABLET  "(10 MG TOTAL) BY MOUTH DAILY AT BEDTIME  Qty: 30 tablet, Refills: 5    Associated Diagnoses: Moderate persistent asthma with acute exacerbation      NIFEdipine (PROCARDIA XL) 60 mg 24 hr tablet Take 60 mg by mouth daily      sitaGLIPtin (Januvia) 50 mg tablet Take 1 tablet (50 mg total) by mouth daily  Qty: 90 tablet, Refills: 3    Associated Diagnoses: Type 2 diabetes mellitus with complication, with long-term current use of insulin ([facility])      traMADol (ULTRAM) 50 mg tablet Take 1 tablet (50 mg total) by mouth 2 (two) times a day  Qty: 60 tablet, Refills: 1    Associated Diagnoses: Low back pain      TRUEplus Insulin Syringe 31G X 5/16\" 0.5 ML MISC INJECT UNDER THE SKIN 4 (FOUR) TIMES A DAY  Qty: 120 each, Refills: 5    Associated Diagnoses: Type 2 diabetes mellitus with complication, with long-term current use of insulin ([facility])      albuterol (Ventolin HFA) 90 mcg/act inhaler Inhale 2 puffs 4 (four) times a day  Qty: 18 g, Refills: 5    Comments: Substitution to a formulary equivalent within the same pharmaceutical class is authorized.  Associated Diagnoses: Asthma without status asthmaticus without complication, unspecified asthma severity, unspecified whether persistent      ascorbic acid (VITAMIN C) 500 mg tablet Take 1 tablet (500 mg total) by mouth daily  Qty: 90 tablet, Refills: 0    Associated Diagnoses: Vitamin deficiency      budesonide-formoterol (Symbicort) 160-4.5 mcg/act inhaler Inhale 2 puffs 2 (two) times a day Rinse mouth after use.  Qty: 120 g, Refills: 3    Associated Diagnoses: Asthma without status asthmaticus without complication, unspecified asthma severity, unspecified whether persistent      Calcium Carbonate-Vit D-Min (Calcium 600+D Plus Minerals) 600-400 MG-UNIT CHEW Chew 2 tablets daily  Qty: 180 tablet, Refills: 0    Associated Diagnoses: Stage 3 chronic kidney disease, unspecified whether stage 3a or 3b CKD ([facility])      Cholecalciferol (Vitamin D3) 25 MCG (1000 UT) CAPS Take 1 capsule " (1,000 Units total) by mouth daily  Qty: 90 capsule, Refills: 0    Associated Diagnoses: Stage 3 chronic kidney disease, unspecified whether stage 3a or 3b CKD ([facility])      doxazosin (CARDURA) 2 mg tablet Take 2 mg by mouth daily at bedtime      ferrous gluconate (FERGON) 240 (27 FE) MG tablet Take 1 tablet (240 mg total) by mouth every other day  Qty: 45 tablet, Refills: 0    Associated Diagnoses: Vitamin deficiency      Magnesium 400 MG CAPS Take 1 capsule (400 mg total) by mouth daily  Qty: 90 capsule, Refills: 0    Associated Diagnoses: Vitamin deficiency      multivitamin (THERAGRAN) TABS Take 1 tablet by mouth daily  Qty: 90 tablet, Refills: 0    Associated Diagnoses: Vitamin deficiency      nitroglycerin (Nitrostat) 0.4 mg SL tablet Place 1 tablet (0.4 mg total) under the tongue every 5 (five) minutes as needed for chest pain  Qty: 10 tablet, Refills: 0    Associated Diagnoses: Primary hypertension      ondansetron (ZOFRAN) 4 mg tablet Take 1 tablet (4 mg total) by mouth every 8 (eight) hours as needed for nausea or vomiting  Qty: 20 tablet, Refills: 0    Associated Diagnoses: Flu-like symptoms      ONE TOUCH LANCETS MISC by Does not apply route daily Test 2-3 times daily             No discharge procedures on file.    PDMP Review         Value Time User    PDMP Reviewed  Yes 8/30/2021  2:32 PM SUDHEER Hancock            ED Provider  Electronically Signed by             Dionicio Vela MD  09/11/24 4178

## 2024-09-07 NOTE — QUICK NOTE
Vascular Surgery   Porsha Hoyos 79 y.o. female MRN: [id number]    Unit/Bed#: ED-33 Encounter: [id number]    Vascular team contacted about Porsha Hoyos, patient information/history obtained from chart review and discussion with ED physician.     78 yo F with pertinent hx of CAD on Aspirin presented to the [facility] ED with complains of acute onset severe back pain that awoke her from sleep. Her symptoms have been persistent despite Tylenol use and are worsened with movement. She has associated chills, but denies numbness, weakness, chills, abd pain. She does have a remote history of a back injury that felt similar to this pain.     Notably, pt has been hypertensive with -220s. Labs are unremarkable without elevated creatinine, however, pt is Covid+ on this admission. CTA abd/pelvis demonstrates stenosis of the origin of the celiac artery as well as the left renal artery with a patent SMA and CHRISTINE and no signs of ischemia.     Suspect patient's current complaint unrelated to the stenoses of the celiac and left renal arteries. Anticoagulation for these is not indicated. Recommend continuing aspirin. Will defer management of pt's HTN to the ED and medical teams. No acute vascular intervention indicated at this time. Further care/dispo per ED team.      Vitals:  Wt Readings from Last 3 Encounters:   09/07/24 62.4 kg (137 lb 9.1 oz)   07/02/24 60.3 kg (133 lb)   06/18/24 62.6 kg (138 lb)      BP Readings from Last 3 Encounters:   09/07/24 159/68   07/02/24 132/76   06/18/24 130/60      Pulse Readings from Last 3 Encounters:   09/07/24 81   07/02/24 73   06/18/24 69      Resp Readings from Last 3 Encounters:   09/07/24 18   03/14/24 18   11/09/23 18      PF Readings from Last 3 Encounters:   No data found for PF      SpO2 Readings from Last 3 Encounters:   09/07/24 97%   07/02/24 97%   05/15/24 98%      Temp Readings from Last 3 Encounters:   09/07/24 100.3 °F (37.9 °C) (Oral)   07/02/24 (!) 97.4 °F (36.3 °C)  (Temporal)   05/15/24 98.2 °F (36.8 °C) (Temporal)        Labs:  CBC - WBC/Hgb/Hct/Plts: 10.83*/12.2/37.0/135* (09/07 1655)  CHEM - BUN/Cr/glu/ALT/AST/amyl/lip:43*/1.19/--/30/36/--/10* (09/07 1655)  COAG - PT/INR/PTT: 14.2/1.03/29 (09/07 1655)  LYTES - Na/K/Cl/CO2: 137/5.0/102/26 (09/07 1655)    Imaging:  CTA dissection protocol chest abdomen pelvis w wo contrast    Result Date: 9/7/2024  Impression: No aortic aneurysm, dissection or other acute pathology. 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 Workstation performed: [id number]

## 2024-09-08 PROBLEM — U07.1 COVID: Status: ACTIVE | Noted: 2024-09-08

## 2024-09-08 PROBLEM — I77.4 CELIAC ARTERY STENOSIS (HCC): Status: ACTIVE | Noted: 2024-09-08

## 2024-09-08 PROBLEM — A41.9 SEPSIS (HCC): Status: ACTIVE | Noted: 2024-09-08

## 2024-09-08 PROBLEM — I77.1 CELIAC ARTERY STENOSIS (HCC): Status: ACTIVE | Noted: 2024-09-08

## 2024-09-08 LAB
ANION GAP SERPL CALCULATED.3IONS-SCNC: 9 MMOL/L (ref 4–13)
ATRIAL RATE: 86 BPM
BACTERIA UR QL AUTO: ABNORMAL /HPF
BASOPHILS # BLD AUTO: 0.02 THOUSANDS/ΜL (ref 0–0.1)
BASOPHILS NFR BLD AUTO: 0 % (ref 0–1)
BILIRUB UR QL STRIP: NEGATIVE
BNP SERPL-MCNC: 182 PG/ML (ref 0–100)
BUN SERPL-MCNC: 38 MG/DL (ref 5–25)
CALCIUM SERPL-MCNC: 8.9 MG/DL (ref 8.4–10.2)
CHLORIDE SERPL-SCNC: 108 MMOL/L (ref 96–108)
CK SERPL-CCNC: 179 U/L (ref 26–192)
CLARITY UR: CLEAR
CO2 SERPL-SCNC: 23 MMOL/L (ref 21–32)
COLOR UR: ABNORMAL
CREAT SERPL-MCNC: 1.11 MG/DL (ref 0.6–1.3)
CRP SERPL QL: 49.1 MG/L
EOSINOPHIL # BLD AUTO: 0 THOUSAND/ΜL (ref 0–0.61)
EOSINOPHIL NFR BLD AUTO: 0 % (ref 0–6)
ERYTHROCYTE [DISTWIDTH] IN BLOOD BY AUTOMATED COUNT: 14.4 % (ref 11.6–15.1)
GFR SERPL CREATININE-BSD FRML MDRD: 46 ML/MIN/1.73SQ M
GLUCOSE P FAST SERPL-MCNC: 110 MG/DL (ref 65–99)
GLUCOSE SERPL-MCNC: 110 MG/DL (ref 65–140)
GLUCOSE SERPL-MCNC: 126 MG/DL (ref 65–140)
GLUCOSE SERPL-MCNC: 173 MG/DL (ref 65–140)
GLUCOSE SERPL-MCNC: 228 MG/DL (ref 65–140)
GLUCOSE SERPL-MCNC: 253 MG/DL (ref 65–140)
GLUCOSE UR STRIP-MCNC: NEGATIVE MG/DL
HCT VFR BLD AUTO: 31.2 % (ref 34.8–46.1)
HGB BLD-MCNC: 10.2 G/DL (ref 11.5–15.4)
HGB UR QL STRIP.AUTO: NEGATIVE
IMM GRANULOCYTES # BLD AUTO: 0.08 THOUSAND/UL (ref 0–0.2)
IMM GRANULOCYTES NFR BLD AUTO: 1 % (ref 0–2)
KETONES UR STRIP-MCNC: NEGATIVE MG/DL
LEUKOCYTE ESTERASE UR QL STRIP: NEGATIVE
LYMPHOCYTES # BLD AUTO: 0.68 THOUSANDS/ΜL (ref 0.6–4.47)
LYMPHOCYTES NFR BLD AUTO: 6 % (ref 14–44)
MAGNESIUM SERPL-MCNC: 1.7 MG/DL (ref 1.9–2.7)
MCH RBC QN AUTO: 31.6 PG (ref 26.8–34.3)
MCHC RBC AUTO-ENTMCNC: 32.7 G/DL (ref 31.4–37.4)
MCV RBC AUTO: 97 FL (ref 82–98)
MONOCYTES # BLD AUTO: 1.08 THOUSAND/ΜL (ref 0.17–1.22)
MONOCYTES NFR BLD AUTO: 9 % (ref 4–12)
NEUTROPHILS # BLD AUTO: 9.82 THOUSANDS/ΜL (ref 1.85–7.62)
NEUTS SEG NFR BLD AUTO: 84 % (ref 43–75)
NITRITE UR QL STRIP: NEGATIVE
NON-SQ EPI CELLS URNS QL MICRO: ABNORMAL /HPF
NRBC BLD AUTO-RTO: 0 /100 WBCS
P AXIS: 12 DEGREES
PH UR STRIP.AUTO: 5.5 [PH]
PLATELET # BLD AUTO: 114 THOUSANDS/UL (ref 149–390)
PMV BLD AUTO: 11.9 FL (ref 8.9–12.7)
POTASSIUM SERPL-SCNC: 3.8 MMOL/L (ref 3.5–5.3)
PR INTERVAL: 206 MS
PROCALCITONIN SERPL-MCNC: 3.41 NG/ML
PROT UR STRIP-MCNC: ABNORMAL MG/DL
QRS AXIS: 5 DEGREES
QRSD INTERVAL: 78 MS
QT INTERVAL: 350 MS
QTC INTERVAL: 418 MS
RBC # BLD AUTO: 3.23 MILLION/UL (ref 3.81–5.12)
RBC #/AREA URNS AUTO: ABNORMAL /HPF
SODIUM SERPL-SCNC: 140 MMOL/L (ref 135–147)
SP GR UR STRIP.AUTO: 1.03 (ref 1–1.03)
T WAVE AXIS: 221 DEGREES
UROBILINOGEN UR STRIP-ACNC: <2 MG/DL
VENTRICULAR RATE: 86 BPM
WBC # BLD AUTO: 11.68 THOUSAND/UL (ref 4.31–10.16)
WBC #/AREA URNS AUTO: ABNORMAL /HPF

## 2024-09-08 PROCEDURE — 83735 ASSAY OF MAGNESIUM: CPT

## 2024-09-08 PROCEDURE — 84145 PROCALCITONIN (PCT): CPT

## 2024-09-08 PROCEDURE — 93010 ELECTROCARDIOGRAM REPORT: CPT | Performed by: INTERNAL MEDICINE

## 2024-09-08 PROCEDURE — 81001 URINALYSIS AUTO W/SCOPE: CPT

## 2024-09-08 PROCEDURE — 82948 REAGENT STRIP/BLOOD GLUCOSE: CPT

## 2024-09-08 PROCEDURE — 85025 COMPLETE CBC W/AUTO DIFF WBC: CPT

## 2024-09-08 PROCEDURE — 80048 BASIC METABOLIC PNL TOTAL CA: CPT

## 2024-09-08 RX ORDER — LIDOCAINE 50 MG/G
1 PATCH TOPICAL DAILY
Status: DISCONTINUED | OUTPATIENT
Start: 2024-09-08 | End: 2024-09-14 | Stop reason: HOSPADM

## 2024-09-08 RX ORDER — MAGNESIUM SULFATE HEPTAHYDRATE 40 MG/ML
2 INJECTION, SOLUTION INTRAVENOUS ONCE
Status: COMPLETED | OUTPATIENT
Start: 2024-09-08 | End: 2024-09-08

## 2024-09-08 RX ORDER — ATORVASTATIN CALCIUM 80 MG/1
80 TABLET, FILM COATED ORAL DAILY
Qty: 90 TABLET | Refills: 1 | Status: ON HOLD | OUTPATIENT
Start: 2024-09-08

## 2024-09-08 RX ORDER — CEFAZOLIN SODIUM 2 G/50ML
2000 SOLUTION INTRAVENOUS EVERY 8 HOURS
Status: DISCONTINUED | OUTPATIENT
Start: 2024-09-09 | End: 2024-09-09

## 2024-09-08 RX ORDER — ENOXAPARIN SODIUM 100 MG/ML
30 INJECTION SUBCUTANEOUS DAILY
Status: DISCONTINUED | OUTPATIENT
Start: 2024-09-08 | End: 2024-09-14 | Stop reason: HOSPADM

## 2024-09-08 RX ADMIN — INSULIN LISPRO 2 UNITS: 100 INJECTION, SOLUTION INTRAVENOUS; SUBCUTANEOUS at 18:05

## 2024-09-08 RX ADMIN — LEVOTHYROXINE SODIUM 100 MCG: 100 TABLET ORAL at 05:51

## 2024-09-08 RX ADMIN — INSULIN LISPRO 2 UNITS: 100 INJECTION, SOLUTION INTRAVENOUS; SUBCUTANEOUS at 22:38

## 2024-09-08 RX ADMIN — DICLOFENAC SODIUM TOPICAL GEL, 1% 2 G: 10 GEL TOPICAL at 21:21

## 2024-09-08 RX ADMIN — ENOXAPARIN SODIUM 30 MG: 30 INJECTION SUBCUTANEOUS at 09:48

## 2024-09-08 RX ADMIN — DICLOFENAC SODIUM TOPICAL GEL, 1% 2 G: 10 GEL TOPICAL at 09:47

## 2024-09-08 RX ADMIN — MONTELUKAST 10 MG: 10 TABLET, FILM COATED ORAL at 21:20

## 2024-09-08 RX ADMIN — INSULIN GLARGINE 30 UNITS: 100 INJECTION, SOLUTION SUBCUTANEOUS at 09:47

## 2024-09-08 RX ADMIN — ACETAMINOPHEN 975 MG: 325 TABLET ORAL at 05:51

## 2024-09-08 RX ADMIN — MAGNESIUM SULFATE HEPTAHYDRATE 2 G: 40 INJECTION, SOLUTION INTRAVENOUS at 09:48

## 2024-09-08 RX ADMIN — BUMETANIDE 2 MG: 1 TABLET ORAL at 09:47

## 2024-09-08 RX ADMIN — DEXTROSE 1000 MG: 50 INJECTION, SOLUTION INTRAVENOUS at 13:38

## 2024-09-08 RX ADMIN — METHOCARBAMOL TABLETS 500 MG: 500 TABLET, COATED ORAL at 21:21

## 2024-09-08 RX ADMIN — DICLOFENAC SODIUM TOPICAL GEL, 1% 2 G: 10 GEL TOPICAL at 18:04

## 2024-09-08 RX ADMIN — LIDOCAINE 5% 1 PATCH: 700 PATCH TOPICAL at 13:39

## 2024-09-08 RX ADMIN — INSULIN LISPRO 1 UNITS: 100 INJECTION, SOLUTION INTRAVENOUS; SUBCUTANEOUS at 13:39

## 2024-09-08 RX ADMIN — Medication 1 TABLET: at 09:47

## 2024-09-08 RX ADMIN — NIFEDIPINE 60 MG: 60 TABLET, EXTENDED RELEASE ORAL at 09:46

## 2024-09-08 RX ADMIN — METHOCARBAMOL TABLETS 500 MG: 500 TABLET, COATED ORAL at 18:04

## 2024-09-08 RX ADMIN — CARVEDILOL 12.5 MG: 12.5 TABLET, FILM COATED ORAL at 09:46

## 2024-09-08 RX ADMIN — TRAMADOL HYDROCHLORIDE 50 MG: 50 TABLET, COATED ORAL at 09:46

## 2024-09-08 RX ADMIN — TRAMADOL HYDROCHLORIDE 50 MG: 50 TABLET, COATED ORAL at 18:04

## 2024-09-08 RX ADMIN — ACETAMINOPHEN 975 MG: 325 TABLET ORAL at 13:38

## 2024-09-08 RX ADMIN — FLUTICASONE PROPIONATE 2 SPRAY: 50 SPRAY, METERED NASAL at 09:47

## 2024-09-08 RX ADMIN — NIRMATRELVIR AND RITONAVIR 2 TABLET: KIT at 09:46

## 2024-09-08 RX ADMIN — ASPIRIN 81 MG: 81 TABLET, COATED ORAL at 09:46

## 2024-09-08 RX ADMIN — METHOCARBAMOL TABLETS 500 MG: 500 TABLET, COATED ORAL at 09:47

## 2024-09-08 RX ADMIN — ALLOPURINOL 200 MG: 100 TABLET ORAL at 09:47

## 2024-09-08 RX ADMIN — ACETAMINOPHEN 975 MG: 325 TABLET ORAL at 21:20

## 2024-09-08 RX ADMIN — DICLOFENAC SODIUM TOPICAL GEL, 1% 2 G: 10 GEL TOPICAL at 13:38

## 2024-09-08 RX ADMIN — DEXTROSE 1000 MG: 50 INJECTION, SOLUTION INTRAVENOUS at 00:56

## 2024-09-08 RX ADMIN — NIRMATRELVIR AND RITONAVIR 2 TABLET: KIT at 18:05

## 2024-09-08 RX ADMIN — CEFAZOLIN SODIUM 2000 MG: 2 SOLUTION INTRAVENOUS at 23:25

## 2024-09-08 RX ADMIN — FAMOTIDINE 20 MG: 20 TABLET ORAL at 09:47

## 2024-09-08 RX ADMIN — LOSARTAN POTASSIUM 100 MG: 50 TABLET, FILM COATED ORAL at 09:46

## 2024-09-08 RX ADMIN — CARVEDILOL 12.5 MG: 12.5 TABLET, FILM COATED ORAL at 18:04

## 2024-09-08 NOTE — ASSESSMENT & PLAN NOTE
Lab Results   Component Value Date    HGBA1C 7.9 (A) 07/02/2024       Recent Labs     09/07/24  2143   POCGLU 172*       Blood Sugar Average: Last 72 hrs:  (P) 172  Home regimen: Januvia 50 mg daily, insulin regular 5 units with lunch and dinner, Lantus 30 units in the morning (patient reported),  Patient reports not eating anything today, endorses a decreased appetite overall.    Plan:  Continue Lantus 30 units  Insulin sliding scale  We will hold Premeal insulin for now, to be resumed based on patient's food intake.  Carb controlled diet

## 2024-09-08 NOTE — ASSESSMENT & PLAN NOTE
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  Superior mesenteric artery and inferior mesenteric artery are patent with no signs of ischemia.  Vascular surgery evaluated patient in the ED: No acute vascular intervention.    Plan:  Vascular team consulted: Continue aspirin 81 mg daily.

## 2024-09-08 NOTE — ASSESSMENT & PLAN NOTE
Acute exacerbation of chronic back pain, home regimen tramadol twice daily and Robaxin 3 times daily.  Denies any recent injury, denies lifting any heavy objects, woke up from sleep with exacerbation back pain, unable to ambulate due to pain.  Denies any numbness tingling bowel or bladder incontinence  Pain not improved by home tramadol or Robaxin this a.m.  Pain improved with IV Dilaudid in the ED    Differentials include: Muscle spasm, infection with spread to the spine, acute vertebral fracture,  Patient still in significant amount of pain, she indicated having some episodes of bladder incontinence which has been recent for her  She denies any numbness or tingling in the saddle area, or lower extremities    Plan:  Multimodal pain regimen:  Home tramadol twice daily  Home Robaxin 3 times daily  Tylenol as 975 mg every 8 hours  Topical Voltaren gel  Aqua K-pad  Lidocaine patch added  MRI of lumbosacral spine ordered  Urinary retention protocol  PT/OT

## 2024-09-08 NOTE — ASSESSMENT & PLAN NOTE
Lab Results   Component Value Date    HGBA1C 7.9 (A) 07/02/2024       Recent Labs     09/07/24  2143 09/08/24  0757 09/08/24  1041   POCGLU 172* 126 173*       Blood Sugar Average: Last 72 hrs:  (P) 157  Home regimen: Januvia 50 mg daily, insulin regular 5 units with lunch and dinner, Lantus 30 units in the morning (patient reported),  Patient reports not eating anything today, endorses a decreased appetite overall.    Plan:  Continue Lantus 30 units  Insulin sliding scale  We will hold Premeal insulin for now, to be resumed based on patient's food intake.  Carb controlled diet

## 2024-09-08 NOTE — ASSESSMENT & PLAN NOTE
Fever 103, chills, +covid, reports intermittent dysuria, lower abdomen tender to palpation.  Last BM this morning, no constipation or diarrhea.  Mild leukocytosis, mild elevation in Pro-Kp.  No lactic acidosis.    Plan:  Paxlovid, renally adjusted dose for COVID  Empiric ceftriaxone in setting of abdominal tenderness, dysuria  Urinalysis  Blood cultures x 2 in process  Repeat Pro-Kp in a.m.  Noted her CBC.

## 2024-09-08 NOTE — ASSESSMENT & PLAN NOTE
Fever 103, chills, +covid, reports intermittent dysuria, lower abdomen tender to palpation.  Last BM this morning, no constipation or diarrhea.  Mild leukocytosis, mild elevation in Pro-Kp.  No lactic acidosis.  Pro-Kp 9/8/2024:  3.4    Plan:  Paxlovid, renally adjusted dose for COVID  Empiric ceftriaxone in setting of abdominal tenderness, dysuria  Urinalysis  Blood cultures x 2 in process  Repeat Pro-Kp in a.m.  Continue IV ceftriaxone once daily

## 2024-09-08 NOTE — ASSESSMENT & PLAN NOTE
Blood pressure elevated in the ED likely secondary to acute exacerbation of pain, improved with pain control.  Home regimen Coreg, losartan, nifedipine.  Blood pressure is currently in acceptable range.    Plan:  Continue home blood pressure meds  Continue multimodal pain regimen for pain control  Continue to monitor blood pressure
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
## 2024-09-08 NOTE — ASSESSMENT & PLAN NOTE
PDMP reviewed, patient is tramadol 50 mg twice daily for back pain.    Plan:  Continue tramadol   As needed oxycodone 5 mg for breakthrough pain

## 2024-09-08 NOTE — ASSESSMENT & PLAN NOTE
Acute exacerbation of chronic back pain, home regimen tramadol twice daily and Robaxin 3 times daily.  Denies any recent injury, denies lifting any heavy objects, woke up from sleep with exacerbation back pain, unable to ambulate due to pain.  Denies any numbness tingling bowel or bladder incontinence  Pain not improved by home tramadol or Robaxin this a.m.  Pain improved with IV Dilaudid in the ED    Differentials include: Muscle spasm, infection with spread to the spine, acute vertebral fracture,    Plan:  Multimodal pain regimen:  Home tramadol twice daily  Home Robaxin 3 times daily  Tylenol as 975 mg every 8 hours  Topical Voltaren gel  Aqua K-pad  X-ray of L-spine, consider MRI of the spine if pain is not controlled over the next 24 hours.  Urinary retention protocol  PT/OT
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
## 2024-09-08 NOTE — ASSESSMENT & PLAN NOTE
Lab Results   Component Value Date    EGFR 42 09/07/2024    EGFR 38 07/10/2024    EGFR 43 (L) 01/19/2024    CREATININE 1.19 09/07/2024    CREATININE 1.30 07/10/2024    CREATININE 1.27 (H) 01/19/2024   Baseline creatinine 1.2-1.5  Currently creatinine is at baseline    Continue to monitor.  Avoid nephrotoxic agents.

## 2024-09-08 NOTE — ASSESSMENT & PLAN NOTE
Lab Results   Component Value Date    EGFR 46 09/08/2024    EGFR 42 09/07/2024    EGFR 38 07/10/2024    CREATININE 1.11 09/08/2024    CREATININE 1.19 09/07/2024    CREATININE 1.30 07/10/2024   Baseline creatinine 1.2-1.5  Currently creatinine is at baseline    Continue to monitor.  Avoid nephrotoxic agents.

## 2024-09-08 NOTE — PROGRESS NOTES
[facility]  Progress Note  Name: Porsha Hoyos I  MRN: [id number]  Unit/Bed#: S MS [id number] I Date of Admission: 9/7/2024   Date of Service: 9/8/2024 I Hospital Day: 0    Assessment & Plan   * Low back pain  Assessment & Plan  Acute exacerbation of chronic back pain, home regimen tramadol twice daily and Robaxin 3 times daily.  Denies any recent injury, denies lifting any heavy objects, woke up from sleep with exacerbation back pain, unable to ambulate due to pain.  Denies any numbness tingling bowel or bladder incontinence  Pain not improved by home tramadol or Robaxin this a.m.  Pain improved with IV Dilaudid in the ED    Differentials include: Muscle spasm, infection with spread to the spine, acute vertebral fracture,  Patient still in significant amount of pain, she indicated having some episodes of bladder incontinence which has been recent for her  She denies any numbness or tingling in the saddle area, or lower extremities    Plan:  Multimodal pain regimen:  Home tramadol twice daily  Home Robaxin 3 times daily  Tylenol as 975 mg every 8 hours  Topical Voltaren gel  Aqua K-pad  Lidocaine patch added  MRI of lumbosacral spine ordered  Urinary retention protocol  PT/OT        Sepsis (HCC)  Assessment & Plan  Fever 103, chills, +covid, reports intermittent dysuria, lower abdomen tender to palpation.  Last BM this morning, no constipation or diarrhea.  Mild leukocytosis, mild elevation in Pro-Kp.  No lactic acidosis.  Pro-Kp 9/8/2024:  3.4    Plan:  Paxlovid, renally adjusted dose for COVID  Empiric ceftriaxone in setting of abdominal tenderness, dysuria  Urinalysis  Blood cultures x 2 in process  Repeat Pro-Kp in a.m.  Continue IV ceftriaxone once daily    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HCC)  Assessment & Plan  CTA dissection protocol positive for severe stenosis in the proximal celiac artery and moderate to severe stenosis in the proximal left renal artery.  Superior mesenteric artery and  inferior mesenteric artery are patent with no signs of ischemia.  Vascular surgery evaluated patient in the ED: No acute vascular intervention.    Plan:  Vascular team consulted: Continue aspirin 81 mg daily.      COVID  Assessment & Plan  Patient has a positive for COVID in the ED.  Has fevers, chills denies any sore throat, cough or shortness of breath.  Intermittently required oxygen in the ED as saturations were dropped with movement.  Saturating 99% on 2 L when resting.  + Fever, mild leukocytosis  In the setting of age> 65, CKD, chronic heart disease, patient meets the criteria for treatment     Plan:  Start Paxlovid, dose renally adjusted  Wean off oxygen as tolerated      Type 2 diabetes mellitus with complication, with long-term current use of insulin ([facility])  Assessment & Plan  Lab Results   Component Value Date    HGBA1C 7.9 (A) 07/02/2024       Recent Labs     09/07/24  2143 09/08/24  0757 09/08/24  1041   POCGLU 172* 126 173*       Blood Sugar Average: Last 72 hrs:  (P) 157  Home regimen: Januvia 50 mg daily, insulin regular 5 units with lunch and dinner, Lantus 30 units in the morning (patient reported),  Patient reports not eating anything today, endorses a decreased appetite overall.    Plan:  Continue Lantus 30 units  Insulin sliding scale  We will hold Premeal insulin for now, to be resumed based on patient's food intake.  Carb controlled diet    Stage 3b chronic kidney disease (HCC)  Assessment & Plan  Lab Results   Component Value Date    EGFR 46 09/08/2024    EGFR 42 09/07/2024    EGFR 38 07/10/2024    CREATININE 1.11 09/08/2024    CREATININE 1.19 09/07/2024    CREATININE 1.30 07/10/2024   Baseline creatinine 1.2-1.5  Currently creatinine is at baseline    Continue to monitor.  Avoid nephrotoxic agents.    Continuous opioid dependence (HCC)  Assessment & Plan  PDMP reviewed, patient is tramadol 50 mg twice daily for back pain.    Plan:  Continue tramadol   As needed oxycodone 5 mg for breakthrough  pain    Hypertension  Assessment & Plan  Blood pressure elevated in the ED likely secondary to acute exacerbation of pain, improved with pain control.  Home regimen Coreg, losartan, nifedipine.  Blood pressure is currently in acceptable range.    Plan:  Continue home blood pressure meds  Continue multimodal pain regimen for pain control  Continue to monitor blood pressure           VTE Pharmacologic Prophylaxis: VTE Score: 4 Moderate Risk (Score 3-4) - Pharmacological DVT Prophylaxis Ordered: enoxaparin (Lovenox).    Mobility:   Basic Mobility Inpatient Raw Score: 23  JH-HLM Goal: 7: Walk 25 feet or more  JH-HLM Achieved: 7: Walk 25 feet or more  JH-HLM Goal achieved. Continue to encourage appropriate mobility.    Patient Centered Rounds: I performed bedside rounds with nursing staff today.  Discussions with Specialists or Other Care Team Provider: Attending    Education and Discussions with Family / Patient: Updated  (son) via phone.    Current Length of Stay: 0 day(s)  Current Patient Status: Observation   Discharge Plan: Anticipate discharge in 24-48 hrs to discharge location to be determined pending rehab evaluations.    Code Status: Level 1 - Full Code    Subjective:   Patient was seen and examined at bedside.  She appears to be uncomfortable on examination.  She is moaning in pain from her back.  She denies experiencing any numbness or tingling in the lower extremities or saddle area.  She has indicated that she has had urinary incontinence upon standing.  She indicates fevers and chills as well.  She denies any chest pain or shortness of breath.    Objective:     Vitals:   Temp (24hrs), Av.6 °F (38.1 °C), Min:99.7 °F (37.6 °C), Max:103 °F (39.4 °C)    Temp:  [99.7 °F (37.6 °C)-103 °F (39.4 °C)] 100.1 °F (37.8 °C)  HR:  [67-93] 86  Resp:  [16-18] 18  BP: (118-222)/() 142/53  SpO2:  [88 %-99 %] 96 %  Body mass index is 27.32 kg/m².     Input and Output Summary (last 24 hours):      Intake/Output Summary (Last 24 hours) at 9/8/2024 1229  Last data filed at 9/8/2024 0900  Gross per 24 hour   Intake 1720 ml   Output 985 ml   Net 735 ml       Physical Exam:   Physical Exam  Constitutional:       General: She is in acute distress.      Appearance: She is ill-appearing.   HENT:      Mouth/Throat:      Mouth: Mucous membranes are moist.      Pharynx: Oropharynx is clear.   Cardiovascular:      Rate and Rhythm: Normal rate and regular rhythm.      Pulses: Normal pulses.      Heart sounds: Normal heart sounds. No murmur heard.  Pulmonary:      Effort: Pulmonary effort is normal. No respiratory distress.      Breath sounds: Normal breath sounds.      Comments: Difficult to assess lung fields patient unable to move due to pain  Abdominal:      General: Bowel sounds are normal. There is no distension.      Palpations: Abdomen is soft.      Tenderness: There is no abdominal tenderness.   Musculoskeletal:         General: Tenderness (Tenderness centrally along lumbosacral spine) present.      Right lower leg: No edema.      Left lower leg: No edema.   Skin:     General: Skin is warm and dry.      Capillary Refill: Capillary refill takes less than 2 seconds.   Neurological:      General: No focal deficit present.      Sensory: No sensory deficit.      Motor: No weakness.   Psychiatric:         Mood and Affect: Mood normal.         Behavior: Behavior normal.          Additional Data:     Labs:  Results from last 7 days   Lab Units 09/08/24  0443   WBC Thousand/uL 11.68*   HEMOGLOBIN g/dL 10.2*   HEMATOCRIT % 31.2*   PLATELETS Thousands/uL 114*   SEGS PCT % 84*   LYMPHO PCT % 6*   MONO PCT % 9   EOS PCT % 0     Results from last 7 days   Lab Units 09/08/24  0443 09/07/24  1655   SODIUM mmol/L 140 137   POTASSIUM mmol/L 3.8 5.0   CHLORIDE mmol/L 108 102   CO2 mmol/L 23 26   BUN mg/dL 38* 43*   CREATININE mg/dL 1.11 1.19   ANION GAP mmol/L 9 9   CALCIUM mg/dL 8.9 10.1   ALBUMIN g/dL  --  4.0   TOTAL  BILIRUBIN mg/dL  --  0.56   ALK PHOS U/L  --  80   ALT U/L  --  30   AST U/L  --  36   GLUCOSE RANDOM mg/dL 110 227*     Results from last 7 days   Lab Units 09/07/24  1655   INR  1.03     Results from last 7 days   Lab Units 09/08/24  1041 09/08/24  0757 09/07/24  2143   POC GLUCOSE mg/dl 173* 126 172*         Results from last 7 days   Lab Units 09/08/24  0443 09/07/24  1655   LACTIC ACID mmol/L  --  0.9   PROCALCITONIN ng/ml 3.41* 0.35*       Lines/Drains:  Invasive Devices       Peripheral Intravenous Line  Duration             Peripheral IV 09/07/24 Left;Ventral (anterior) Wrist <1 day                          Imaging: No pertinent imaging reviewed.    Recent Cultures (last 7 days):   Results from last 7 days   Lab Units 09/07/24  1655 09/07/24  1645   BLOOD CULTURE  Received in Microbiology Lab. Culture in Progress. Received in Microbiology Lab. Culture in Progress.       Last 24 Hours Medication List:   Current Facility-Administered Medications   Medication Dose Route Frequency Provider Last Rate    acetaminophen  975 mg Oral Q8H [facility] Catarino Michael MD      allopurinol  200 mg Oral Daily Catarino Michael MD      aspirin  81 mg Oral Daily Catarino Michael MD      bumetanide  2 mg Oral Daily Catarino Michael MD      calcium carbonate-vitamin D  1 tablet Oral Daily With Breakfast Catarino Michael MD      carvedilol  12.5 mg Oral BID With Meals Catarino Michael MD      cefTRIAXone  1,000 mg Intravenous Q24H Rob Huerta Jr., DO      Diclofenac Sodium  2 g Topical 4x Daily Catarino Michael MD      enoxaparin  30 mg Subcutaneous Daily Natalia Horn MD      famotidine  20 mg Oral Daily Catarino Michael MD      fluticasone  2 spray Nasal Daily Catarino Michael MD      insulin glargine  30 Units Subcutaneous QAM Catarino Michael MD      insulin lispro  1-5 Units Subcutaneous TID AC Catarino Michael MD      insulin lispro  1-5 Units Subcutaneous HS Catarino Michael MD      levothyroxine  100 mcg Oral Early Morning  Catarino Michael MD      lidocaine  1 patch Topical Daily Rob Huerta Jr., DO      losartan  100 mg Oral Daily Catarino Mihcael MD      methocarbamol  500 mg Oral TID Catarino Michael MD      montelukast  10 mg Oral HS Catarino Michael MD      NIFEdipine  60 mg Oral Daily Catarino Michael MD      nirmatrelvir & ritonavir  2 tablet Oral BID Catarino Michael MD      oxyCODONE  2.5 mg Oral Q6H PRN Catarino Michael MD      traMADol  50 mg Oral BID Catarino Michael MD          Today, Patient Was Seen By: Rob Huerta Jr, DO    **Please Note: This note may have been constructed using a voice recognition system.**

## 2024-09-08 NOTE — ASSESSMENT & PLAN NOTE
Patient has a positive for COVID in the ED.  Has fevers, chills denies any sore throat, cough or shortness of breath.  Intermittently required oxygen in the ED as saturations were dropped with movement.  Saturating 99% on 2 L when resting.  + Fever, mild leukocytosis  In the setting of age> 65, CKD, chronic heart disease, patient meets the criteria for treatment     Plan:  Start Paxlovid, dose renally adjusted  Wean off oxygen as tolerated

## 2024-09-09 ENCOUNTER — APPOINTMENT (INPATIENT)
Dept: MRI IMAGING | Facility: HOSPITAL | Age: 79
DRG: 871 | End: 2024-09-09
Payer: COMMERCIAL

## 2024-09-09 LAB
ANION GAP SERPL CALCULATED.3IONS-SCNC: 9 MMOL/L (ref 4–13)
BASOPHILS # BLD AUTO: 0.02 THOUSANDS/ΜL (ref 0–0.1)
BASOPHILS NFR BLD AUTO: 0 % (ref 0–1)
BUN SERPL-MCNC: 49 MG/DL (ref 5–25)
CALCIUM SERPL-MCNC: 9 MG/DL (ref 8.4–10.2)
CHLORIDE SERPL-SCNC: 103 MMOL/L (ref 96–108)
CO2 SERPL-SCNC: 25 MMOL/L (ref 21–32)
CREAT SERPL-MCNC: 2.66 MG/DL (ref 0.6–1.3)
EOSINOPHIL # BLD AUTO: 0 THOUSAND/ΜL (ref 0–0.61)
EOSINOPHIL NFR BLD AUTO: 0 % (ref 0–6)
ERYTHROCYTE [DISTWIDTH] IN BLOOD BY AUTOMATED COUNT: 14.4 % (ref 11.6–15.1)
GFR SERPL CREATININE-BSD FRML MDRD: 16 ML/MIN/1.73SQ M
GLUCOSE SERPL-MCNC: 129 MG/DL (ref 65–140)
GLUCOSE SERPL-MCNC: 150 MG/DL (ref 65–140)
GLUCOSE SERPL-MCNC: 178 MG/DL (ref 65–140)
GLUCOSE SERPL-MCNC: 191 MG/DL (ref 65–140)
GLUCOSE SERPL-MCNC: 235 MG/DL (ref 65–140)
HCT VFR BLD AUTO: 30.7 % (ref 34.8–46.1)
HGB BLD-MCNC: 10.1 G/DL (ref 11.5–15.4)
IMM GRANULOCYTES # BLD AUTO: 0.08 THOUSAND/UL (ref 0–0.2)
IMM GRANULOCYTES NFR BLD AUTO: 1 % (ref 0–2)
LG PLATELETS BLD QL SMEAR: PRESENT
LYMPHOCYTES # BLD AUTO: 0.62 THOUSANDS/ΜL (ref 0.6–4.47)
LYMPHOCYTES NFR BLD AUTO: 5 % (ref 14–44)
MAGNESIUM SERPL-MCNC: 2.4 MG/DL (ref 1.9–2.7)
MCH RBC QN AUTO: 31.7 PG (ref 26.8–34.3)
MCHC RBC AUTO-ENTMCNC: 32.9 G/DL (ref 31.4–37.4)
MCV RBC AUTO: 96 FL (ref 82–98)
MONOCYTES # BLD AUTO: 0.72 THOUSAND/ΜL (ref 0.17–1.22)
MONOCYTES NFR BLD AUTO: 6 % (ref 4–12)
NEUTROPHILS # BLD AUTO: 10.11 THOUSANDS/ΜL (ref 1.85–7.62)
NEUTS SEG NFR BLD AUTO: 88 % (ref 43–75)
NRBC BLD AUTO-RTO: 0 /100 WBCS
OVALOCYTES BLD QL SMEAR: PRESENT
PLATELET # BLD AUTO: 92 THOUSANDS/UL (ref 149–390)
PLATELET BLD QL SMEAR: ABNORMAL
PMV BLD AUTO: 11.7 FL (ref 8.9–12.7)
POIKILOCYTOSIS BLD QL SMEAR: PRESENT
POTASSIUM SERPL-SCNC: 3.9 MMOL/L (ref 3.5–5.3)
PROCALCITONIN SERPL-MCNC: 17.59 NG/ML
RBC # BLD AUTO: 3.19 MILLION/UL (ref 3.81–5.12)
RBC MORPH BLD: PRESENT
SODIUM SERPL-SCNC: 137 MMOL/L (ref 135–147)
WBC # BLD AUTO: 11.55 THOUSAND/UL (ref 4.31–10.16)

## 2024-09-09 PROCEDURE — 97167 OT EVAL HIGH COMPLEX 60 MIN: CPT

## 2024-09-09 PROCEDURE — A9585 GADOBUTROL INJECTION: HCPCS | Performed by: INTERNAL MEDICINE

## 2024-09-09 PROCEDURE — 99223 1ST HOSP IP/OBS HIGH 75: CPT | Performed by: SURGERY

## 2024-09-09 PROCEDURE — 82948 REAGENT STRIP/BLOOD GLUCOSE: CPT

## 2024-09-09 PROCEDURE — 72158 MRI LUMBAR SPINE W/O & W/DYE: CPT

## 2024-09-09 PROCEDURE — 80048 BASIC METABOLIC PNL TOTAL CA: CPT

## 2024-09-09 PROCEDURE — 97530 THERAPEUTIC ACTIVITIES: CPT

## 2024-09-09 PROCEDURE — 99232 SBSQ HOSP IP/OBS MODERATE 35: CPT | Performed by: INTERNAL MEDICINE

## 2024-09-09 PROCEDURE — 84145 PROCALCITONIN (PCT): CPT

## 2024-09-09 PROCEDURE — 97163 PT EVAL HIGH COMPLEX 45 MIN: CPT

## 2024-09-09 PROCEDURE — 83735 ASSAY OF MAGNESIUM: CPT

## 2024-09-09 PROCEDURE — 85025 COMPLETE CBC W/AUTO DIFF WBC: CPT

## 2024-09-09 RX ORDER — GADOBUTROL 604.72 MG/ML
6 INJECTION INTRAVENOUS
Status: COMPLETED | OUTPATIENT
Start: 2024-09-09 | End: 2024-09-09

## 2024-09-09 RX ORDER — VANCOMYCIN HYDROCHLORIDE 1 G/200ML
15 INJECTION, SOLUTION INTRAVENOUS DAILY PRN
Status: DISCONTINUED | OUTPATIENT
Start: 2024-09-09 | End: 2024-09-09

## 2024-09-09 RX ORDER — SODIUM CHLORIDE, SODIUM LACTATE, POTASSIUM CHLORIDE, CALCIUM CHLORIDE 600; 310; 30; 20 MG/100ML; MG/100ML; MG/100ML; MG/100ML
125 INJECTION, SOLUTION INTRAVENOUS CONTINUOUS
Status: DISCONTINUED | OUTPATIENT
Start: 2024-09-09 | End: 2024-09-10

## 2024-09-09 RX ORDER — VANCOMYCIN HYDROCHLORIDE 1 G/200ML
15 INJECTION, SOLUTION INTRAVENOUS EVERY 12 HOURS
Status: DISCONTINUED | OUTPATIENT
Start: 2024-09-09 | End: 2024-09-09

## 2024-09-09 RX ORDER — CALCIUM CARBONATE 500 MG/1
500 TABLET, CHEWABLE ORAL 2 TIMES DAILY PRN
Status: DISCONTINUED | OUTPATIENT
Start: 2024-09-09 | End: 2024-09-14 | Stop reason: HOSPADM

## 2024-09-09 RX ADMIN — NIRMATRELVIR AND RITONAVIR 2 TABLET: KIT at 17:46

## 2024-09-09 RX ADMIN — METHOCARBAMOL TABLETS 500 MG: 500 TABLET, COATED ORAL at 17:45

## 2024-09-09 RX ADMIN — DICLOFENAC SODIUM TOPICAL GEL, 1% 2 G: 10 GEL TOPICAL at 12:46

## 2024-09-09 RX ADMIN — ACETAMINOPHEN 975 MG: 325 TABLET ORAL at 05:00

## 2024-09-09 RX ADMIN — NIRMATRELVIR AND RITONAVIR 2 TABLET: KIT at 09:46

## 2024-09-09 RX ADMIN — ENOXAPARIN SODIUM 30 MG: 30 INJECTION SUBCUTANEOUS at 08:17

## 2024-09-09 RX ADMIN — INSULIN GLARGINE 30 UNITS: 100 INJECTION, SOLUTION SUBCUTANEOUS at 08:16

## 2024-09-09 RX ADMIN — Medication 1 TABLET: at 08:17

## 2024-09-09 RX ADMIN — ACETAMINOPHEN 975 MG: 325 TABLET ORAL at 21:20

## 2024-09-09 RX ADMIN — GADOBUTROL 6 ML: 604.72 INJECTION INTRAVENOUS at 20:59

## 2024-09-09 RX ADMIN — VANCOMYCIN HYDROCHLORIDE 1500 MG: 10 INJECTION, POWDER, LYOPHILIZED, FOR SOLUTION INTRAVENOUS at 09:46

## 2024-09-09 RX ADMIN — SODIUM CHLORIDE, SODIUM LACTATE, POTASSIUM CHLORIDE, AND CALCIUM CHLORIDE 50 ML/HR: .6; .31; .03; .02 INJECTION, SOLUTION INTRAVENOUS at 09:46

## 2024-09-09 RX ADMIN — METHOCARBAMOL TABLETS 500 MG: 500 TABLET, COATED ORAL at 08:17

## 2024-09-09 RX ADMIN — INSULIN LISPRO 1 UNITS: 100 INJECTION, SOLUTION INTRAVENOUS; SUBCUTANEOUS at 12:46

## 2024-09-09 RX ADMIN — TRAMADOL HYDROCHLORIDE 50 MG: 50 TABLET, COATED ORAL at 08:17

## 2024-09-09 RX ADMIN — MONTELUKAST 10 MG: 10 TABLET, FILM COATED ORAL at 21:20

## 2024-09-09 RX ADMIN — CEFTRIAXONE SODIUM 1000 MG: 10 INJECTION, POWDER, FOR SOLUTION INTRAVENOUS at 12:46

## 2024-09-09 RX ADMIN — ASPIRIN 81 MG: 81 TABLET, COATED ORAL at 08:17

## 2024-09-09 RX ADMIN — ALLOPURINOL 200 MG: 100 TABLET ORAL at 08:17

## 2024-09-09 RX ADMIN — ACETAMINOPHEN 975 MG: 325 TABLET ORAL at 13:59

## 2024-09-09 RX ADMIN — INSULIN LISPRO 1 UNITS: 100 INJECTION, SOLUTION INTRAVENOUS; SUBCUTANEOUS at 21:22

## 2024-09-09 RX ADMIN — METHOCARBAMOL TABLETS 500 MG: 500 TABLET, COATED ORAL at 21:20

## 2024-09-09 RX ADMIN — LEVOTHYROXINE SODIUM 100 MCG: 100 TABLET ORAL at 05:00

## 2024-09-09 RX ADMIN — SODIUM CHLORIDE, SODIUM LACTATE, POTASSIUM CHLORIDE, AND CALCIUM CHLORIDE 500 ML: .6; .31; .03; .02 INJECTION, SOLUTION INTRAVENOUS at 08:10

## 2024-09-09 RX ADMIN — DICLOFENAC SODIUM TOPICAL GEL, 1% 2 G: 10 GEL TOPICAL at 08:18

## 2024-09-09 RX ADMIN — FAMOTIDINE 20 MG: 20 TABLET ORAL at 08:17

## 2024-09-09 RX ADMIN — LIDOCAINE 5% 1 PATCH: 700 PATCH TOPICAL at 12:46

## 2024-09-09 RX ADMIN — CALCIUM CARBONATE (ANTACID) CHEW TAB 500 MG 500 MG: 500 CHEW TAB at 18:28

## 2024-09-09 RX ADMIN — DICLOFENAC SODIUM TOPICAL GEL, 1% 2 G: 10 GEL TOPICAL at 21:23

## 2024-09-09 RX ADMIN — INSULIN LISPRO 2 UNITS: 100 INJECTION, SOLUTION INTRAVENOUS; SUBCUTANEOUS at 17:46

## 2024-09-09 RX ADMIN — TRAMADOL HYDROCHLORIDE 50 MG: 50 TABLET, COATED ORAL at 17:46

## 2024-09-09 RX ADMIN — FLUTICASONE PROPIONATE 2 SPRAY: 50 SPRAY, METERED NASAL at 08:18
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
## 2024-09-09 NOTE — ASSESSMENT & PLAN NOTE
"Fever 103, chills, +covid, reports intermittent dysuria, lower abdomen tender to palpation.  Last BM this morning of admission, no constipation or diarrhea.  WBC on 9/9: 11.55 from admission 10.83  Pro-Kp today on 9/9 uptrended to 14.59 from 9/8/2024 value of  3.4  No lactic acidosis.  Blood culture #1 returned positive 9/8 for staph aureus, likely MSSA.  Today patient will receive 4 doses of cephalosporins. Currently on ceftriaxone .   In the ED patient received 1,500 mg of vancomycin  On Paxlovid for COVID today is day 2   No respiratory distress on room air    Plan:  Continue Paxlovid, renally adjusted dose for COVID for 3 more days (total of 5 days)   Continue Empiric coverage ceftriaxone in setting of abdominal tenderness, dysuria. Today gave on dosage of vancomycin as per pharmacy recommendations.     Continue to follow blood cultures. Treat/descalate as appropriate per sensitivities   Trend fever, WBC, and procalcitonin   See above under \"Low Back Pain\" for rest of management.   "

## 2024-09-09 NOTE — ASSESSMENT & PLAN NOTE
Patient's creatinine on compared to admission creatinine 1.11.   Patient received a lactated Ringer 1 bolus 500 cc in the ED then placed on 50 ml/hr IV continous   Patient was on BUMEX 2 g, losartan 100 mg,   UA: +2 urine protein, RBC urine 2-4. no hyaline cast or granular cast  In the ED patient had IV contrast exposure on CTA dissection chest abdomen and pelvis   Blood pressure is on the softer side 101-106/36-40 with MAP ranges now at 58-60.   Patient reports decreased oral intake for past 48 hours. Reports generalized malise and fatigue  Plan  Assessment: Prerenal CINDY Multifactorial: lasix use, decreased oral intake, systemic vasodilation due to sepsis. DDx for Intrinsic Renal CINDY: Contrast imaging done in the ED less likely. More likely Pre-renal CINDY   Today will modify LR to 75 cc continuous maintence medication   Holding nephrotoxic agents  losartan and BUMEX to treat CINDY and hypotension. Today will also hold carvediol in the setting of hypotension with decreased MAP. Avoid NSIADs.   Cardiology recommendations appreciated.   Bladder scan if urinary retention develops   Encouraged patient to eat meals to regain strength and treat clinical signs and symptoms.   As per the Internal Medicine team patient patient will remain in patient for observation for atleast another 48 hours for clinical management and MRI read

## 2024-09-09 NOTE — PROGRESS NOTES
Vancomycin IV Pharmacy-to-Dose Consultation    Porsha Hoyos is a 79 y.o. female who is currently receiving Vancomycin IV with management by the Pharmacy Consult service.    Relevant clinical data and objective / subjective history reviewed.  Vancomycin Assessment:  Indication and Goal AUC/Trough: Bacteremia (goal -600, trough >10)    Micro:       Renal Function:  SCr:  2.66 mg/dL  CrCl:  14 mL/min  Days of Therapy: 1  Current Dose:  1500mg IV load   Vancomycin Plan:  New Dosing: pulse-dosing with plan to re-dose when vancomycin random level < 15  Next Level: 9/10 at 0600  Renal Function Monitoring: Daily BMP and UOP  Pharmacy will continue to follow closely for s/sx of nephrotoxicity, infusion reactions and appropriateness of therapy.  BMP and CBC will be ordered per protocol. We will continue to follow the patient’s culture results and clinical progress daily.    Sri Nazario, Pharmacist      Sri Nazario, Pharmacist
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
## 2024-09-09 NOTE — ASSESSMENT & PLAN NOTE
Blood pressure elevated in the ED likely secondary to acute exacerbation of pain, improved with pain control.  Home regimen Coreg, losartan, nifedipine.  Hypotensive with low MAP range    Plan:  Holding nephrotoxic agents  losartan and BUMEX to treat CINDY and hypotension. Today will also hold carvediol in the setting of hypotension with decreased MAP. If MAP rises tomorrow can restart carvediol.  Cardiology is following, appreciate recommendations.   Continue multimodal pain regimen for pain control  Continue to monitor blood pressure

## 2024-09-09 NOTE — PROGRESS NOTES
[facility]  Progress Note  Name: Porsha Hooys I  MRN: [id number]  Unit/Bed#: S MS [id number] I Date of Admission: 9/7/2024   Date of Service: 9/9/2024 I Hospital Day: 1    Assessment & Plan   * Acute on Chronic Back Pain in the Setting of Sepsis, MSSA Bacteremia  Assessment & Plan  Acute exacerbation of chronic back pain, home regimen tramadol twice daily and Robaxin 3 times daily.  Denies any recent injury, denies lifting any heavy objects, woke up from sleep with exacerbation back pain, unable to ambulate due to pain.  Denies any numbness tingling bowel or bladder incontinence  On admission significant for Fever 103, chills, +covid, reports intermittent dysuria, lower abdomen tender to palpation.   Pain improved with IV Dilaudid in the ED  WBC on 9/9: 11.55 from admission 10.83  Pro-Kp today on 9/9 uptrended to 14.59 from 9/8/2024 value of  3.4  No lactic acidosis.  Blood culture #1 returned positive 9/8 for staph aureus, likely MSSA.  Pain improved with IV Dilaudid in the ED  Patient still in significant amount of pain, had some bladder incontinence. Denies urinary retentio    Plan:  Assessment for Acute on Chronic Back pain in the setting of sepsis: Ddx osteomyelitis 2/2 to MSSA bactermia spread to the spine possible acute vertebral fracture/trauma more likely than abscess/phlegmon  Awaiting MRI lumbosacral spine   Continue ceftriaxone   Multimodal pain regimen:   Scheduled medications of Tramadol 50 mg BID and ROBACIN 500 mg TID   Along with the following PRN medications PRN oycodone 2.5 mg, Home Robaxin 3 times daily, Topical Voltaren gel, Aqua K-pad,         Lidocaine patch  Urinary retention protocol  PT/OT appreciate recommendations         Sepsis (HCC): SIRS criteria IRINEO Bacteremia and COVID infection  Assessment & Plan  Fever 103, chills, +covid, reports intermittent dysuria, lower abdomen tender to palpation.  Last BM this morning of admission, no constipation or diarrhea.  WBC  "on 9/9: 11.55 from admission 10.83  Pro-Kp today on 9/9 uptrended to 14.59 from 9/8/2024 value of  3.4  No lactic acidosis.  Blood culture #1 returned positive 9/8 for staph aureus, likely MSSA.  Today patient will receive 4 doses of cephalosporins. Currently on ceftriaxone .   In the ED patient received 1,500 mg of vancomycin  On Paxlovid for COVID today is day 2   No respiratory distress on room air    Plan:  Continue Paxlovid, renally adjusted dose for COVID for 3 more days (total of 5 days)   Continue Empiric coverage ceftriaxone in setting of abdominal tenderness, dysuria. Today gave on dosage of vancomycin as per pharmacy recommendations.     Continue to follow blood cultures. Treat/descalate as appropriate per sensitivities   Trend fever, WBC, and procalcitonin   See above under \"Low Back Pain\" for rest of management.     COVID  Assessment & Plan  Patient has a positive for COVID in the ED.  Has fevers, chills denies any sore throat, cough or shortness of breath.  Intermittently required oxygen in the ED as saturations were dropped with movement.  Saturating 99% on 2 L when resting.  + Fever, mild leukocytosis  In the setting of age> 65, CKD, chronic heart disease, patient meets the criteria for treatment     Plan:  Continue  Paxlovid, dose renally adjusted  Wean off oxygen as tolerated  Continue COVID protocol    See above for management under problems 1 and 2       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HCC)  Assessment & Plan  CTA dissection protocol positive for severe stenosis in the proximal celiac artery and moderate to severe stenosis in the proximal left renal artery.  Superior mesenteric artery and inferior mesenteric artery are patent with no signs of ischemia.  Vascular surgery evaluated patient in the ED: No acute vascular intervention.    Plan:  Vascular team consulted follow recommendations: Continue aspirin 81 mg daily and f/u with vascular surgery as outpt       Hypertension  Assessment & Plan  Blood " pressure elevated in the ED likely secondary to acute exacerbation of pain, improved with pain control.  Home regimen Coreg, losartan, nifedipine.  Hypotensive with low MAP range    Plan:  Holding nephrotoxic agents  losartan and BUMEX to treat CINDY and hypotension. Today will also hold carvediol in the setting of hypotension with decreased MAP. If MAP rises tomorrow can restart carvediol.  Cardiology is following, appreciate recommendations.   Continue multimodal pain regimen for pain control  Continue to monitor blood pressure    Type 2 diabetes mellitus with complication, with long-term current use of insulin (Former[facility])  Assessment & Plan  Lab Results   Component Value Date    HGBA1C 7.9 (A) 07/02/2024       Recent Labs     09/08/24  1707 09/08/24  2222 09/09/24  0810 09/09/24  1113   POCGLU 228* 253* 129 191*         Blood Sugar Average: Last 72 hrs:  (P) [id number]  Home regimen: Januvia 50 mg daily, insulin regular 5 units with lunch and dinner, Lantus 30 units in the morning (patient reported),  Patient reports not eating anything today, endorses a decreased appetite overall.    Plan:  Continue Lantus 30 units  Insulin sliding scale  We will hold Premeal insulin for now, to be resumed based on patient's food intake.  Carb controlled diet    Continuous opioid dependence (HCC)  Assessment & Plan  Chronic history of back pain prior history of severe spinal stenosis and disc herination s/p surgery   Home medications of tramadol 50 mg twice daily ROBACIN 500 mg TID for back pain  Reports improvement in back pain with pain medications.     Plan:  Continue Multimodal pain regimen:   Scheduled medications of Tramadol 50 mg BID and ROBACIN 500 mg TID   Along with the following PRN medications PRN oycodone 2.5 mg, Home Robaxin 3 times daily, Topical Voltaren gel, Aqua K-pad,         Lidocaine patch  Continue to monitor   PT/OT recommendations appreciated     Stage 3b chronic kidney disease (HCC)  Assessment &  Plan  Lab Results   Component Value Date    EGFR 16 09/09/2024    EGFR 46 09/08/2024    EGFR 42 09/07/2024    CREATININE 2.66 (H) 09/09/2024    CREATININE 1.11 09/08/2024    CREATININE 1.19 09/07/2024   Baseline creatinine 1.2-1.5  Urine output on 9/9 was 200 ml as per nursing   Plan   Treat CINDY as above   Continue to monitor.  Avoid nephrotoxic agents.    CINDY (acute kidney injury) (HCC)  Assessment & Plan  Patient's creatinine on compared to admission creatinine 1.11.   Patient received a lactated Ringer 1 bolus 500 cc in the ED then placed on 50 ml/hr IV continous   Patient was on BUMEX 2 g, losartan 100 mg,   UA: +2 urine protein, RBC urine 2-4. no hyaline cast or granular cast  In the ED patient had IV contrast exposure on CTA dissection chest abdomen and pelvis   Blood pressure is on the softer side 101-106/36-40 with MAP ranges now at 58-60.   Patient reports decreased oral intake for past 48 hours. Reports generalized malise and fatigue  Plan  Assessment: Prerenal CINDY Multifactorial: lasix use, decreased oral intake, systemic vasodilation due to sepsis. DDx for Intrinsic Renal CINDY: Contrast imaging done in the ED less likely. More likely Pre-renal CINDY   Today will modify LR to 75 cc continuous maintence medication   Holding nephrotoxic agents  losartan and BUMEX to treat CINDY and hypotension. Today will also hold carvediol in the setting of hypotension with decreased MAP. Avoid NSIADs.   Cardiology recommendations appreciated.   Bladder scan if urinary retention develops   Encouraged patient to eat meals to regain strength and treat clinical signs and symptoms.   As per the Internal Medicine team patient patient will remain in patient for observation for atleast another 48 hours for clinical management and MRI read               VTE Pharmacologic Prophylaxis: VTE Score: 4 Moderate Risk (Score 3-4) - Pharmacological DVT Prophylaxis Ordered: enoxaparin (Lovenox).       Mobility:   Basic Mobility Inpatient Raw  Score: 18  JH-HLM Goal: 6: Walk 10 steps or more  JH-HLM Achieved: 5: Stand (1 or more minutes)  JH-HLM Goal NOT achieved. Continue with multidisciplinary rounding and encourage appropriate mobility to improve upon JH-HLM goals.    Patient Centered Rounds: I performed bedside rounds with nursing staff today.  Discussions with Specialists or Other Care Team Provider: Cardiology     Education and Discussions with Family / Patient: Updated  (son) via phone. Patient's son said patient has a head sore on head. Asked if we can take a look at it.     Current Length of Stay: 1 day(s)  Current Patient Status: Inpatient   Discharge Plan: Anticipate discharge in 24-48 hrs to discharge location to be determined pending rehab evaluations.    Code Status: Level 1 - Full Code    Subjective:     Patient was examined at bedside. Reports improvement in back pain after pain medications.  When she does not have pain medications or tries to walk rates currently at rest was at 3/10.  Patient has been able to walk to the commode but has not walked more than that.  Patient denies fevers or chills movement causes back pain.She denies experiencing any numbness or tingling in the lower extremities or saddle area.   Patient reports decreased energy levels including generalized weakness and generalized malaise.  Patient for me that she has not eaten anything in the past 48 hours because she has decreased appetite.  Encourage patient to eat more food and drink more liquids to improve strength.  Patient has a history of baseline const patient that last for 3 days then followed by bowel movement.  Today is day 2 of constipation.  Patient denies abdominal discomfort, distension, or bloating at this time. Patient has regular urine output. Denies dysuria.   She denies any chest pain or shortness of breath.   All questions and concerns regarding management were addressed.    Patient was seen and examined at bedside.   Objective:      Vitals:   Temp (24hrs), Av.2 °F (36.8 °C), Min:97.8 °F (36.6 °C), Max:98.5 °F (36.9 °C)    Temp:  [97.8 °F (36.6 °C)-98.5 °F (36.9 °C)] 97.8 °F (36.6 °C)  HR:  [57-66] 61  Resp:  [12] 12  BP: (101-107)/(36-42) 106/37  SpO2:  [89 %-94 %] 89 %  Body mass index is 27.32 kg/m².     Input and Output Summary (last 24 hours):     Intake/Output Summary (Last 24 hours) at 2024 1418  Last data filed at 2024 1200  Gross per 24 hour   Intake 1900 ml   Output 200 ml   Net 1700 ml       Physical Exam:   Physical Exam   Constitutional:       General: She is in acute distress.      Appearance: She is ill-appearing.   HENT:      Mouth/Throat:      Mouth: Mucous membranes are moist.      Pharynx: Oropharynx is clear.   Cardiovascular:      Rate and Rhythm: Normal rate and regular rhythm.      Pulses: Normal pulses.      Heart sounds: Normal heart sounds. No murmur heard.  Pulmonary:      Effort: Pulmonary effort is normal. No respiratory distress.      Breath sounds: Vesicular  breath sounds.      Comments: Patient is satting well on room air   Abdominal:      General: Bowel sounds are normal. There is no distension.      Palpations: Abdomen is soft.      Tenderness: There is no abdominal tenderness.   Musculoskeletal:         General: Tenderness (Tenderness centrally along lumbosacral spine) present. Right leg elevation causes right hip pain     Right lower leg: No edema.      Left lower leg: No edema.   Skin:     General: Skin is warm and dry.      Capillary Refill: Capillary refill takes less than 2 seconds.   Neurological:      General: No focal deficit present.      Sensory: No sensory deficit.      Motor: No weakness.   Psychiatric:         Mood and Affect: Mood normal.         Behavior: Behavior normal.   Additional Data:     Labs:  Results from last 7 days   Lab Units 24  0433   WBC Thousand/uL 11.55*   HEMOGLOBIN g/dL 10.1*   HEMATOCRIT % 30.7*   PLATELETS Thousands/uL 92*   SEGS PCT % 88*   LYMPHO PCT  % 5*   MONO PCT % 6   EOS PCT % 0     Results from last 7 days   Lab Units 09/09/24  0433 09/08/24  0443 09/07/24  1655   SODIUM mmol/L 137   < > 137   POTASSIUM mmol/L 3.9   < > 5.0   CHLORIDE mmol/L 103   < > 102   CO2 mmol/L 25   < > 26   BUN mg/dL 49*   < > 43*   CREATININE mg/dL 2.66*   < > 1.19   ANION GAP mmol/L 9   < > 9   CALCIUM mg/dL 9.0   < > 10.1   ALBUMIN g/dL  --   --  4.0   TOTAL BILIRUBIN mg/dL  --   --  0.56   ALK PHOS U/L  --   --  80   ALT U/L  --   --  30   AST U/L  --   --  36   GLUCOSE RANDOM mg/dL 150*   < > 227*    < > = values in this interval not displayed.     Results from last 7 days   Lab Units 09/07/24  1655   INR  1.03     Results from last 7 days   Lab Units 09/09/24  1113 09/09/24  0810 09/08/24  2222 09/08/24  1707 09/08/24  1041 09/08/24  0757 09/07/24  2143   POC GLUCOSE mg/dl 191* 129 253* 228* 173* 126 172*         Results from last 7 days   Lab Units 09/09/24  0433 09/08/24  0443 09/07/24  1655   LACTIC ACID mmol/L  --   --  0.9   PROCALCITONIN ng/ml 17.59* 3.41* 0.35*       Lines/Drains:  Invasive Devices       Peripheral Intravenous Line  Duration             Peripheral IV 09/07/24 Left;Ventral (anterior) Wrist 1 day                          Imaging: Reviewed radiology reports from this admission including: abdominal/pelvic CT    Recent Cultures (last 7 days):   Results from last 7 days   Lab Units 09/07/24  1655 09/07/24  1645   BLOOD CULTURE   --  No Growth at 24 hrs.   GRAM STAIN RESULT  Gram positive cocci in clusters*  --        Last 24 Hours Medication List:   Current Facility-Administered Medications   Medication Dose Route Frequency Provider Last Rate    acetaminophen  975 mg Oral Q8H [facility] Catarino Michael MD      allopurinol  200 mg Oral Daily Catarino Michael MD      aspirin  81 mg Oral Daily Catarino Michael MD      calcium carbonate-vitamin D  1 tablet Oral Daily With Breakfast Catarino Michael MD      carvedilol  12.5 mg Oral BID With Meals Catarino Michael MD       cefTRIAXone  1,000 mg Intravenous Q24H Emelina Mckee MD 1,000 mg (09/09/24 1246)    Diclofenac Sodium  2 g Topical 4x Daily Catarino Michael MD      enoxaparin  30 mg Subcutaneous Daily Natalia Horn MD      famotidine  20 mg Oral Daily Catarino Michael MD      fluticasone  2 spray Nasal Daily Catarino Michael MD      insulin glargine  30 Units Subcutaneous QAM Catarino Michael MD      insulin lispro  1-5 Units Subcutaneous TID AC Catarino Michael MD      insulin lispro  1-5 Units Subcutaneous HS Catarino Michael MD      lactated ringers  75 mL/hr Intravenous Continuous Emelina Mckee MD 75 mL/hr (09/09/24 1246)    levothyroxine  100 mcg Oral Early Morning Catarino Michael MD      lidocaine  1 patch Topical Daily Rob Huerta Jr., DO      methocarbamol  500 mg Oral TID Catarino Michael MD      montelukast  10 mg Oral HS Catarino Michael MD      NIFEdipine  60 mg Oral Daily Catarino Michael MD      nirmatrelvir & ritonavir  2 tablet Oral BID Catarino Michael MD      oxyCODONE  2.5 mg Oral Q6H PRN Catarino Michael MD      traMADol  50 mg Oral BID Catarino Michael MD          Today, Patient Was Seen By: Nicole Bernardo MD    **Please Note: This note may have been constructed using a voice recognition system.**

## 2024-09-09 NOTE — ASSESSMENT & PLAN NOTE
Lab Results   Component Value Date    EGFR 16 09/09/2024    EGFR 46 09/08/2024    EGFR 42 09/07/2024    CREATININE 2.66 (H) 09/09/2024    CREATININE 1.11 09/08/2024    CREATININE 1.19 09/07/2024   Baseline creatinine 1.2-1.5  Urine output on 9/9 was 200 ml as per nursing   Plan   Treat CINDY as above   Continue to monitor.  Avoid nephrotoxic agents.

## 2024-09-09 NOTE — PLAN OF CARE
Problem: OCCUPATIONAL THERAPY ADULT  Goal: Performs self-care activities at highest level of function for planned discharge setting.  See evaluation for individualized goals.  Description: Treatment Interventions: ADL retraining, Functional transfer training, UE strengthening/ROM, Endurance training, Patient/family training, Equipment evaluation/education, Fine motor coordination activities, Compensatory technique education, Continued evaluation          See flowsheet documentation for full assessment, interventions and recommendations.   Note: Limitation: Decreased ADL status, Decreased UE strength, Decreased Safe judgement during ADL, Decreased cognition, Decreased endurance, Decreased self-care trans, Decreased high-level ADLs (pain, balance, functional reach)  Prognosis: Good  Assessment: Patient is a 79 y.o. female seen for OT evaluation at [facility] following admission on 9/7/2024  s/p Low back pain. Please see above for comprehensive list of comorbidities and significant PMHx impacting functional performance.  Upon initial evaluation, pt appears to be performing below baseline functional status.   Occupational performance is affected by the following deficits: endurance ,  decreased muscular strength , decreased standing tolerance for self care tasks , decreased dynamic balance impacting functional reach, decreased trunk control , decreased activity tolerance , impaired memory , impaired judgement and problem solving , impaired safety awareness , and (+) pain . Personal/Environmental factors impacting D/C include: Assistance needed for ADLs and functional mobility. Supporting factors include: accessible home environment Patient would benefit from OT services within the acute care setting to maximize level of functional independence in the following areas self-care transfers, functional mobility, and ADLs.  From OT standpoint, recommendation at time of D/C would be Level 2: moderate resource  intensity .     Rehab Resource Intensity Level, OT: II (Moderate Resource Intensity)     Jeanne Gallo, OT

## 2024-09-09 NOTE — PLAN OF CARE
Problem: PHYSICAL THERAPY ADULT  Goal: Performs mobility at highest level of function for planned discharge setting.  See evaluation for individualized goals.  Description: Treatment/Interventions: Functional transfer training, LE strengthening/ROM, Therapeutic exercise, Endurance training, Cognitive reorientation, Patient/family training, Equipment eval/education, Bed mobility, Gait training, Spoke to nursing, Spoke to case management     See flowsheet documentation for full assessment, interventions and recommendations.  Outcome: Not Progressing  Note: Prognosis: Good  Problem List: Decreased strength, Decreased endurance, Impaired balance, Decreased mobility, Decreased cognition, Impaired judgement, Decreased safety awareness, Pain  Assessment: Pt seen for PT evaluation for mobility assessment & discharge needs. Activity orders: Up and OOB as tolerated. Pt admitted 9/7/2024 w/ severe back pain and difficulty ambulating, dx Low back pain, COVID-19. Comorbidities affecting pt's fnxl performance include: MI, CVA, HTN, back surgery 2011, gout, lumbar radiculopathy, asthma, DM, CKD. During PT IE, pt requires close S and inc effort for bed mobility, transfers STS with MODA and RW, and ambulates 18ft with RW and MARYEBL. Pt with significant increase in pain and fatigue with minimal mobility efforts, requires extended functional rest break prior to additional treatment. Pt displays above outlined functional impairments & limitations, and presents below her baseline level of functional mobility. The AM-PAC & Barthel Index outcome tools were used to assist in determining pt safety w/ mobility/self care & appropriate d/c recommendations, see above for scores. Pt is at risk of falls d/t multiple comorbidities, impaired balance, impaired cognition, impaired insight/safety awareness, use of ambulatory aid, varying levels of pain , advanced age, acuity of medical illness, ongoing medical treatment of primary dx, polypharmacy,  and unstable vitals. Pt's clinical presentation is currently unstable/unpredictable as seen in pt's presentation of vital sign response, changing level of pain, varying levels of cognitive performance, increased fall risk, new onset of impairment of functional mobility, decreased endurance, and new onset of weakness. Pt will benefit from continued PT services in order to address impairments, decrease risk of falls, maximize independence w/ fnxl mobility, & ensure safety w/ mobility for transition to next level of care. Based on pt presentation & impairments, pt would most appropriately benefit from Level II (moderate PT intensity) resources upon d/c.    Barriers to Discharge: Decreased caregiver support     Rehab Resource Intensity Level, PT: II (Moderate Resource Intensity)    See flowsheet documentation for full assessment.

## 2024-09-09 NOTE — ASSESSMENT & PLAN NOTE
Patient has a positive for COVID in the ED.  Has fevers, chills denies any sore throat, cough or shortness of breath.  Intermittently required oxygen in the ED as saturations were dropped with movement.  Saturating 99% on 2 L when resting.  + Fever, mild leukocytosis  In the setting of age> 65, CKD, chronic heart disease, patient meets the criteria for treatment     Plan:  Continue  Paxlovid, dose renally adjusted  Wean off oxygen as tolerated  Continue COVID protocol    See above for management under problems 1 and 2

## 2024-09-09 NOTE — UTILIZATION REVIEW
Continued Stay Review    SEE INITIAL REVIEW AT BOTTOM    Date: 9/9/24  Day 3: Has surpassed a 2nd midnight with active treatments and services.  Patient feels slightly better today however still has lower back pain.  Complains of weakness of the bilateral lower extremities. Pain 4-8/10. Hypotensive; MSSA bacteremia 1 out of 2 sets ; on Room air. Pro-Kp today on 9/9 uptrended to 14.59 from 9/8/2024 value of  3.4; Cr 2.66 (baseline 1.2-1.5)  Plan: cont iv abx; cont ivf; cont paxlovid; monitor labs; recd ivf bolus 500 x1; f/u blood cx; f/u PT / OT recom; f/u MRI lumbosacral spine; trend procal; Accuchecks with ssic; pain control (see below)         Vital Signs (last 3 days)       Date/Time Temp Pulse Resp BP MAP (mmHg) SpO2 Calculated FIO2 (%) - Nasal Cannula Nasal Cannula O2 Flow Rate (L/min) O2 Device Patient Position - Orthostatic VS Pain    09/09/24 1359 -- -- -- -- -- -- -- -- -- -- Med Not Given for Pain - for MAR use only    09/09/24 11:07:52 -- 61 -- 106/37 60 89 % -- -- -- -- --    09/09/24 10:50:30 -- 61 -- 101/36 58 94 % -- -- -- -- --    09/09/24 1046 -- -- -- -- -- -- -- -- -- -- 8 09/09/24 1045 -- -- -- -- -- -- -- -- -- -- 8 09/09/24 09:42:49 -- 57 -- 101/40 60 92 % -- -- -- -- --    09/09/24 0900 -- -- -- -- -- 94 % -- -- None (Room air) -- --    09/09/24 08:24:11 -- 59 -- -- -- 92 % -- -- -- -- --    09/09/24 0817 -- -- -- -- -- -- -- -- -- -- 4 09/09/24 08:02:10 97.8 °F (36.6 °C) 57 -- 105/42 63 92 % -- -- -- -- --    09/09/24 0449 -- -- -- -- -- -- -- -- -- -- 4 09/08/24 21:40:14 98.5 °F (36.9 °C) 66 12 107/40 62 92 % -- -- -- -- --    09/08/24 2120 -- -- -- -- -- -- -- -- -- -- 6 09/08/24 1945 -- -- -- -- -- -- -- -- -- -- 6 09/08/24 07:52:15 100.1 °F (37.8 °C) 86 -- 142/53 83 96 % -- -- -- -- --    09/08/24 0551 -- -- -- -- -- -- -- -- -- -- 5 09/07/24 2328 -- -- -- -- -- -- -- -- -- -- 8 09/07/24 2200 99.7 °F (37.6 °C) -- 18 -- -- 99 % 28 2 L/min Nasal cannula Lying 10 -  Worst Possible Pain    09/07/24 21:51:54 -- 88 -- 136/99 111 98 % -- -- -- -- --    09/07/24 2151 -- -- -- -- -- -- -- -- -- -- 10 - Worst Possible Pain    09/07/24 21:38:15 99.7 °F (37.6 °C) 85 -- 179/141 154 99 % -- -- -- -- --    09/07/24 2100 -- 80 18 160/70 -- 98 % 28 2 L/min Nasal cannula Lying 4    09/07/24 2030 -- 72 16 158/71 102 98 % 28 2 L/min Nasal cannula Sitting --    09/07/24 2015 -- 67 18 137/62 89 98 % 28 2 L/min Nasal cannula Sitting --    09/07/24 2000 -- 74 18 118/56 81 97 % 28 2 L/min Nasal cannula Sitting --    09/07/24 1945 -- 69 18 142/63 91 97 % 28 2 L/min Nasal cannula Sitting --    09/07/24 1930 -- 77 18 155/70 101 97 % 28 2 L/min Nasal cannula Sitting --    09/07/24 1915 -- 81 -- 159/68 98 97 % -- -- -- -- --    09/07/24 1900 -- 82 18 173/74 107 98 % -- -- -- -- --    09/07/24 1852 100.3 °F (37.9 °C) -- -- -- -- -- -- -- -- -- --    09/07/24 1845 -- 84 18 200/79 113 99 % -- -- -- -- --    09/07/24 1830 -- 86 18 202/84 120 98 % 32 3 L/min Nasal cannula Sitting --    09/07/24 1800 -- 80 18 176/75 108 97 % 32 3 L/min Nasal cannula Lying --    09/07/24 1756 -- -- -- -- -- -- -- -- -- -- 6    09/07/24 1745 -- 80 18 206/79 114 98 % 32 3 L/min Nasal cannula Lying --    09/07/24 1735 -- -- -- -- -- 95 %  32 3 L/min  Nasal cannula  -- 6    09/07/24 1730 -- 93 18 201/84 120 88 % -- -- None (Room air) Lying --    09/07/24 1700 -- 84 18 191/79 118 95 % -- -- None (Room air) Lying 10 - Worst Possible Pain    09/07/24 1655 -- -- -- -- -- -- -- -- -- -- 10 - Worst Possible Pain    09/07/24 1645 -- 80 18 218/101 -- 95 % -- -- None (Room air) Lying 10 - Worst Possible Pain    09/07/24 1615 -- 86 18 222/105  150 95 % -- -- None (Room air) Lying --    09/07/24 1610 103 °F (39.4 °C) -- -- -- -- -- -- -- -- -- --    09/07/24 1606 -- 85 18 220/95 136 95 % -- -- None (Room air) Lying 10 - Worst Possible Pain          Weight (last 2 days)       Date/Time Weight    09/07/24 1606 62.4 (137.57)               Pertinent Labs/Diagnostic Results:   Radiology:  CTA dissection protocol chest abdomen pelvis w wo contrast   Final Interpretation by E. Alec Schoenberger, MD (09/07 1838)      No aortic aneurysm, dissection or other acute pathology.   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            Workstation performed: [id number]         MRI inpatient order    (Results Pending)     Cardiology:  ECG 12 lead   Final Result by Rob Ling MD (09/08 1305)   Normal sinus rhythm with sinus arrhythmia   Nonspecific ST and T wave abnormality   Abnormal ECG   When compared with ECG of 25-JUN-2022 19:43,   T wave inversion more evident in Inferior leads   Confirmed by Rob Ling ([id number]) on 9/8/2024 1:05:24 PM          Results from last 7 days   Lab Units 09/07/24  1649   SARS-COV-2  Positive*     Results from last 7 days   Lab Units 09/09/24  0433 09/08/24 0443 09/07/24  1655   WBC Thousand/uL 11.55* 11.68* 10.83*   HEMOGLOBIN g/dL 10.1* 10.2* 12.2   HEMATOCRIT % 30.7* 31.2* 37.0   PLATELETS Thousands/uL 92* 114* 135*   TOTAL NEUT ABS Thousands/µL 10.11* 9.82* 9.12*        Results from last 7 days   Lab Units 09/09/24  0433 09/08/24  0443 09/07/24  1655   SODIUM mmol/L 137 140 137   POTASSIUM mmol/L 3.9 3.8 5.0   CHLORIDE mmol/L 103 108 102   CO2 mmol/L 25 23 26   ANION GAP mmol/L 9 9 9   BUN mg/dL 49* 38* 43*   CREATININE mg/dL 2.66* 1.11 1.19   EGFR ml/min/1.73sq m 16 46 42   CALCIUM mg/dL 9.0 8.9 10.1   MAGNESIUM mg/dL 2.4 1.7*  --      Results from last 7 days   Lab Units 09/07/24  1655   AST U/L 36   ALT U/L 30   ALK PHOS U/L 80   TOTAL PROTEIN g/dL 7.0   ALBUMIN g/dL 4.0   TOTAL BILIRUBIN mg/dL 0.56     Results from last 7 days   Lab Units 09/09/24  1113 09/09/24  0810 09/08/24  2222 09/08/24  1707 09/08/24  1041 09/08/24  0757 09/07/24  2143   POC GLUCOSE mg/dl 191* 129 253* 228* 173* 126 172*     Results from last 7 days   Lab Units  09/09/24  0433 09/08/24  0443 09/07/24  1655   GLUCOSE RANDOM mg/dL 150* 110 227*        Results from last 7 days   Lab Units 09/07/24  1655   CK TOTAL U/L 179     Results from last 7 days   Lab Units 09/07/24  2104 09/07/24  1855 09/07/24  1655   HS TNI 0HR ng/L  --   --  14   HS TNI 2HR ng/L  --  16  --    HSTNI D2 ng/L  --  2  --    HS TNI 4HR ng/L 21  --   --    HSTNI D4 ng/L 7  --   --         Results from last 7 days   Lab Units 09/07/24  1655   PROTIME seconds 14.2   INR  1.03   PTT seconds 29        Results from last 7 days   Lab Units 09/09/24  0433 09/08/24  0443 09/07/24  1655   PROCALCITONIN ng/ml 17.59* 3.41* 0.35*     Results from last 7 days   Lab Units 09/07/24  1655   LACTIC ACID mmol/L 0.9        Results from last 7 days   Lab Units 09/07/24  1655   BNP pg/mL 182*        Results from last 7 days   Lab Units 09/07/24  1655   LIPASE u/L 10*     Results from last 7 days   Lab Units 09/07/24  1655   CRP mg/L 49.1*      Results from last 7 days   Lab Units 09/08/24  0037   CLARITY UA  Clear   COLOR UA  Light Yellow   SPEC GRAV UA  1.028   PH UA  5.5   GLUCOSE UA mg/dl Negative   KETONES UA mg/dl Negative   BLOOD UA  Negative   PROTEIN UA mg/dl 100 (2+)*   NITRITE UA  Negative   BILIRUBIN UA  Negative   UROBILINOGEN UA (BE) mg/dl <2.0   LEUKOCYTES UA  Negative   WBC UA /hpf 1-2   RBC UA /hpf 2-4*   BACTERIA UA /hpf None Seen   EPITHELIAL CELLS WET PREP /hpf None Seen     Results from last 7 days   Lab Units 09/07/24  1649   INFLUENZA A PCR  Negative   INFLUENZA B PCR  Negative   RSV PCR  Negative        Results from last 7 days   Lab Units 09/07/24  1655 09/07/24  1645   BLOOD CULTURE  Staphylococcus aureus* No Growth at 24 hrs.   GRAM STAIN RESULT  Gram positive cocci in clusters*  --           Medications:   Scheduled Medications:  acetaminophen, 975 mg, Oral, Q8H KORI  allopurinol, 200 mg, Oral, Daily  aspirin, 81 mg, Oral, Daily  calcium carbonate-vitamin D, 1 tablet, Oral, Daily With  Breakfast  carvedilol, 12.5 mg, Oral, BID With Meals  cefTRIAXone, 1,000 mg, Intravenous, Q24H  Diclofenac Sodium, 2 g, Topical, 4x Daily  enoxaparin, 30 mg, Subcutaneous, Daily  famotidine, 20 mg, Oral, Daily  fluticasone, 2 spray, Nasal, Daily  insulin glargine, 30 Units, Subcutaneous, QAM  insulin lispro, 1-5 Units, Subcutaneous, TID AC  insulin lispro, 1-5 Units, Subcutaneous, HS  levothyroxine, 100 mcg, Oral, Early Morning  lidocaine, 1 patch, Topical, Daily  methocarbamol, 500 mg, Oral, TID  montelukast, 10 mg, Oral, HS  NIFEdipine, 60 mg, Oral, Daily  nirmatrelvir & ritonavir, 2 tablet, Oral, BID (Paxlovid)   traMADol, 50 mg, Oral, BID    lactated ringers bolus 500 mL  Dose: 500 mL  Freq: Once Route: IV  Last Dose: Stopped (09/09/24 0959)  Start: 09/09/24 0700 End: 09/09/24 0959      Continuous IV Infusions:  lactated ringers, 100 mL/hr, Intravenous, Continuous      PRN Meds:  oxyCODONE, 2.5 mg, Oral, Q6H PRN        Discharge Plan: D    Network Utilization Review Department  ATTENTION: Please call with any questions or concerns to [phone] and carefully listen to the prompts so that you are directed to the right person. All voicemails are confidential.   For Discharge needs, contact Care Management DC Support Team at [phone] opt. [phone]  Send all requests for admission clinical reviews, approved or denied determinations and any other requests to dedicated fax number below belonging to the campus where the patient is receiving treatment. List of dedicated fax numbers for the Facilities:  FACILITY NAME UR FAX NUMBER   ADMISSION DENIALS (Administrative/Medical Necessity) [phone]   DISCHARGE SUPPORT TEAM (NETWORK) [phone]   PARENT CHILD HEALTH (Maternity/NICU/Pediatrics) [phone]   [facility] [phone]   [facility] [phone]   [facility] [phone]   [facility] [phone]   [facility]  [facility] [phone]   [facility] [phone]   [facility] [phone]   [facility] [phone]   [facility] [phone]   [facility] [phone]   [facility] [phone]   [facility] [phone]

## 2024-09-09 NOTE — ASSESSMENT & PLAN NOTE
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  Superior mesenteric artery and inferior mesenteric artery are patent with no signs of ischemia.  Vascular surgery evaluated patient in the ED: No acute vascular intervention.    Plan:  Vascular team consulted follow recommendations: Continue aspirin 81 mg daily and f/u with vascular surgery as outpt

## 2024-09-09 NOTE — ASSESSMENT & PLAN NOTE
Lab Results   Component Value Date    HGBA1C 7.9 (A) 07/02/2024       Recent Labs     09/08/24  1707 09/08/24  2222 09/09/24  0810 09/09/24  1113   POCGLU 228* 253* 129 191*         Blood Sugar Average: Last 72 hrs:  (P) [id number]  Home regimen: Januvia 50 mg daily, insulin regular 5 units with lunch and dinner, Lantus 30 units in the morning (patient reported),  Patient reports not eating anything today, endorses a decreased appetite overall.    Plan:  Continue Lantus 30 units  Insulin sliding scale  We will hold Premeal insulin for now, to be resumed based on patient's food intake.  Carb controlled diet

## 2024-09-09 NOTE — ASSESSMENT & PLAN NOTE
Acute exacerbation of chronic back pain, home regimen tramadol twice daily and Robaxin 3 times daily.  Denies any recent injury, denies lifting any heavy objects, woke up from sleep with exacerbation back pain, unable to ambulate due to pain.  Denies any numbness tingling bowel or bladder incontinence  On admission significant for Fever 103, chills, +covid, reports intermittent dysuria, lower abdomen tender to palpation.   Pain improved with IV Dilaudid in the ED  WBC on 9/9: 11.55 from admission 10.83  Pro-Kp today on 9/9 uptrended to 14.59 from 9/8/2024 value of  3.4  No lactic acidosis.  Blood culture #1 returned positive 9/8 for staph aureus, likely MSSA.  Pain improved with IV Dilaudid in the ED  Patient still in significant amount of pain, had some bladder incontinence. Denies urinary retentio    Plan:  Assessment for Acute on Chronic Back pain in the setting of sepsis: Ddx osteomyelitis 2/2 to MSSA bactermia spread to the spine possible acute vertebral fracture/trauma more likely than abscess/phlegmon  Awaiting MRI lumbosacral spine   Continue ceftriaxone   Multimodal pain regimen:   Scheduled medications of Tramadol 50 mg BID and ROBACIN 500 mg TID   Along with the following PRN medications PRN oycodone 2.5 mg, Home Robaxin 3 times daily, Topical Voltaren gel, Aqua K-pad,         Lidocaine patch  Urinary retention protocol  PT/OT appreciate recommendations
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
## 2024-09-10 ENCOUNTER — APPOINTMENT (INPATIENT)
Dept: ULTRASOUND IMAGING | Facility: HOSPITAL | Age: 79
DRG: 871 | End: 2024-09-10
Payer: COMMERCIAL

## 2024-09-10 PROBLEM — K59.00 CONSTIPATION: Status: ACTIVE | Noted: 2022-08-17

## 2024-09-10 LAB
ANION GAP SERPL CALCULATED.3IONS-SCNC: 9 MMOL/L (ref 4–13)
BACTERIA UR QL AUTO: ABNORMAL /HPF
BILIRUB UR QL STRIP: NEGATIVE
BUN SERPL-MCNC: 59 MG/DL (ref 5–25)
CALCIUM SERPL-MCNC: 8.7 MG/DL (ref 8.4–10.2)
CHLORIDE SERPL-SCNC: 102 MMOL/L (ref 96–108)
CLARITY UR: CLEAR
CO2 SERPL-SCNC: 23 MMOL/L (ref 21–32)
COLOR UR: YELLOW
CORTIS AM PEAK SERPL-MCNC: 19.6 UG/DL (ref 6.7–22.6)
CREAT SERPL-MCNC: 2.62 MG/DL (ref 0.6–1.3)
CREAT UR-MCNC: 108.2 MG/DL
ERYTHROCYTE [DISTWIDTH] IN BLOOD BY AUTOMATED COUNT: 14.5 % (ref 11.6–15.1)
GFR SERPL CREATININE-BSD FRML MDRD: 16 ML/MIN/1.73SQ M
GLUCOSE SERPL-MCNC: 108 MG/DL (ref 65–140)
GLUCOSE SERPL-MCNC: 112 MG/DL (ref 65–140)
GLUCOSE SERPL-MCNC: 167 MG/DL (ref 65–140)
GLUCOSE SERPL-MCNC: 188 MG/DL (ref 65–140)
GLUCOSE SERPL-MCNC: 191 MG/DL (ref 65–140)
GLUCOSE UR STRIP-MCNC: NEGATIVE MG/DL
HCT VFR BLD AUTO: 29.7 % (ref 34.8–46.1)
HGB BLD-MCNC: 9.9 G/DL (ref 11.5–15.4)
HGB UR QL STRIP.AUTO: NEGATIVE
KETONES UR STRIP-MCNC: NEGATIVE MG/DL
LEUKOCYTE ESTERASE UR QL STRIP: NEGATIVE
MCH RBC QN AUTO: 31.7 PG (ref 26.8–34.3)
MCHC RBC AUTO-ENTMCNC: 33.3 G/DL (ref 31.4–37.4)
MCV RBC AUTO: 95 FL (ref 82–98)
NITRITE UR QL STRIP: NEGATIVE
NON-SQ EPI CELLS URNS QL MICRO: ABNORMAL /HPF
PH UR STRIP.AUTO: 5.5 [PH]
PLATELET # BLD AUTO: 93 THOUSANDS/UL (ref 149–390)
PMV BLD AUTO: 12 FL (ref 8.9–12.7)
POTASSIUM SERPL-SCNC: 3.8 MMOL/L (ref 3.5–5.3)
PROCALCITONIN SERPL-MCNC: 11.16 NG/ML
PROT UR STRIP-MCNC: ABNORMAL MG/DL
RBC # BLD AUTO: 3.12 MILLION/UL (ref 3.81–5.12)
RBC #/AREA URNS AUTO: ABNORMAL /HPF
S AUREUS DNA BLD POS QL NAA+NON-PROBE: DETECTED
SODIUM 24H UR-SCNC: 31 MOL/L
SODIUM SERPL-SCNC: 134 MMOL/L (ref 135–147)
SP GR UR STRIP.AUTO: 1.03 (ref 1–1.03)
UROBILINOGEN UR STRIP-ACNC: <2 MG/DL
VANCOMYCIN SERPL-MCNC: 15.2 UG/ML (ref 10–20)
WBC # BLD AUTO: 8.08 THOUSAND/UL (ref 4.31–10.16)
WBC #/AREA URNS AUTO: ABNORMAL /HPF

## 2024-09-10 PROCEDURE — 99223 1ST HOSP IP/OBS HIGH 75: CPT | Performed by: INTERNAL MEDICINE

## 2024-09-10 PROCEDURE — 76775 US EXAM ABDO BACK WALL LIM: CPT

## 2024-09-10 PROCEDURE — 82533 TOTAL CORTISOL: CPT | Performed by: STUDENT IN AN ORGANIZED HEALTH CARE EDUCATION/TRAINING PROGRAM

## 2024-09-10 PROCEDURE — 87040 BLOOD CULTURE FOR BACTERIA: CPT | Performed by: INTERNAL MEDICINE

## 2024-09-10 PROCEDURE — 84145 PROCALCITONIN (PCT): CPT | Performed by: STUDENT IN AN ORGANIZED HEALTH CARE EDUCATION/TRAINING PROGRAM

## 2024-09-10 PROCEDURE — 82570 ASSAY OF URINE CREATININE: CPT | Performed by: STUDENT IN AN ORGANIZED HEALTH CARE EDUCATION/TRAINING PROGRAM

## 2024-09-10 PROCEDURE — 84300 ASSAY OF URINE SODIUM: CPT | Performed by: STUDENT IN AN ORGANIZED HEALTH CARE EDUCATION/TRAINING PROGRAM

## 2024-09-10 PROCEDURE — 80202 ASSAY OF VANCOMYCIN: CPT | Performed by: INTERNAL MEDICINE

## 2024-09-10 PROCEDURE — 82948 REAGENT STRIP/BLOOD GLUCOSE: CPT

## 2024-09-10 PROCEDURE — 80048 BASIC METABOLIC PNL TOTAL CA: CPT

## 2024-09-10 PROCEDURE — 81001 URINALYSIS AUTO W/SCOPE: CPT | Performed by: STUDENT IN AN ORGANIZED HEALTH CARE EDUCATION/TRAINING PROGRAM

## 2024-09-10 PROCEDURE — 99232 SBSQ HOSP IP/OBS MODERATE 35: CPT | Performed by: INTERNAL MEDICINE

## 2024-09-10 PROCEDURE — 85027 COMPLETE CBC AUTOMATED: CPT

## 2024-09-10 RX ORDER — AMOXICILLIN 250 MG
2 CAPSULE ORAL 2 TIMES DAILY
Status: DISCONTINUED | OUTPATIENT
Start: 2024-09-10 | End: 2024-09-14 | Stop reason: HOSPADM

## 2024-09-10 RX ORDER — POLYETHYLENE GLYCOL 3350 17 G/17G
17 POWDER, FOR SOLUTION ORAL DAILY
Status: DISCONTINUED | OUTPATIENT
Start: 2024-09-10 | End: 2024-09-12

## 2024-09-10 RX ORDER — CEFAZOLIN SODIUM 2 G/50ML
2000 SOLUTION INTRAVENOUS EVERY 12 HOURS
Status: DISCONTINUED | OUTPATIENT
Start: 2024-09-10 | End: 2024-09-14 | Stop reason: HOSPADM

## 2024-09-10 RX ORDER — ALBUMIN (HUMAN) 12.5 G/50ML
25 SOLUTION INTRAVENOUS EVERY 8 HOURS
Status: COMPLETED | OUTPATIENT
Start: 2024-09-10 | End: 2024-09-10

## 2024-09-10 RX ORDER — MIDODRINE HYDROCHLORIDE 2.5 MG/1
2.5 TABLET ORAL ONCE
Status: COMPLETED | OUTPATIENT
Start: 2024-09-10 | End: 2024-09-10

## 2024-09-10 RX ADMIN — DICLOFENAC SODIUM TOPICAL GEL, 1% 2 G: 10 GEL TOPICAL at 17:35

## 2024-09-10 RX ADMIN — SODIUM CHLORIDE, SODIUM LACTATE, POTASSIUM CHLORIDE, AND CALCIUM CHLORIDE 125 ML/HR: .6; .31; .03; .02 INJECTION, SOLUTION INTRAVENOUS at 05:41

## 2024-09-10 RX ADMIN — LIDOCAINE 5% 1 PATCH: 700 PATCH TOPICAL at 11:53

## 2024-09-10 RX ADMIN — TRAMADOL HYDROCHLORIDE 50 MG: 50 TABLET, COATED ORAL at 08:00

## 2024-09-10 RX ADMIN — ASPIRIN 81 MG: 81 TABLET, COATED ORAL at 08:00

## 2024-09-10 RX ADMIN — ENOXAPARIN SODIUM 30 MG: 30 INJECTION SUBCUTANEOUS at 08:00

## 2024-09-10 RX ADMIN — POLYETHYLENE GLYCOL 3350 17 G: 17 POWDER, FOR SOLUTION ORAL at 08:00

## 2024-09-10 RX ADMIN — METHOCARBAMOL TABLETS 500 MG: 500 TABLET, COATED ORAL at 08:00

## 2024-09-10 RX ADMIN — TRAMADOL HYDROCHLORIDE 50 MG: 50 TABLET, COATED ORAL at 17:35

## 2024-09-10 RX ADMIN — ALBUMIN (HUMAN) 25 G: 0.25 INJECTION, SOLUTION INTRAVENOUS at 23:06

## 2024-09-10 RX ADMIN — CEFTRIAXONE SODIUM 1000 MG: 10 INJECTION, POWDER, FOR SOLUTION INTRAVENOUS at 11:53

## 2024-09-10 RX ADMIN — ACETAMINOPHEN 975 MG: 325 TABLET ORAL at 05:31

## 2024-09-10 RX ADMIN — DICLOFENAC SODIUM TOPICAL GEL, 1% 2 G: 10 GEL TOPICAL at 08:01

## 2024-09-10 RX ADMIN — INSULIN LISPRO 1 UNITS: 100 INJECTION, SOLUTION INTRAVENOUS; SUBCUTANEOUS at 11:53

## 2024-09-10 RX ADMIN — FLUTICASONE PROPIONATE 2 SPRAY: 50 SPRAY, METERED NASAL at 08:01

## 2024-09-10 RX ADMIN — Medication 1 TABLET: at 07:59

## 2024-09-10 RX ADMIN — INSULIN LISPRO 1 UNITS: 100 INJECTION, SOLUTION INTRAVENOUS; SUBCUTANEOUS at 23:06

## 2024-09-10 RX ADMIN — DICLOFENAC SODIUM TOPICAL GEL, 1% 2 G: 10 GEL TOPICAL at 23:07

## 2024-09-10 RX ADMIN — NIRMATRELVIR AND RITONAVIR 2 TABLET: KIT at 17:35

## 2024-09-10 RX ADMIN — ACETAMINOPHEN 975 MG: 325 TABLET ORAL at 13:26

## 2024-09-10 RX ADMIN — CEFAZOLIN SODIUM 2000 MG: 2 SOLUTION INTRAVENOUS at 15:07

## 2024-09-10 RX ADMIN — INSULIN LISPRO 1 UNITS: 100 INJECTION, SOLUTION INTRAVENOUS; SUBCUTANEOUS at 17:34

## 2024-09-10 RX ADMIN — SENNOSIDES AND DOCUSATE SODIUM 2 TABLET: 8.6; 5 TABLET ORAL at 17:35

## 2024-09-10 RX ADMIN — Medication 2.5 MG: at 12:12

## 2024-09-10 RX ADMIN — METHOCARBAMOL TABLETS 500 MG: 500 TABLET, COATED ORAL at 23:05

## 2024-09-10 RX ADMIN — MIDODRINE HYDROCHLORIDE 2.5 MG: 2.5 TABLET ORAL at 17:35

## 2024-09-10 RX ADMIN — ALBUMIN (HUMAN) 25 G: 0.25 INJECTION, SOLUTION INTRAVENOUS at 15:04

## 2024-09-10 RX ADMIN — METHOCARBAMOL TABLETS 500 MG: 500 TABLET, COATED ORAL at 17:35

## 2024-09-10 RX ADMIN — SENNOSIDES AND DOCUSATE SODIUM 2 TABLET: 8.6; 5 TABLET ORAL at 08:00

## 2024-09-10 RX ADMIN — NIRMATRELVIR AND RITONAVIR 2 TABLET: KIT at 08:01

## 2024-09-10 RX ADMIN — FAMOTIDINE 20 MG: 20 TABLET ORAL at 08:00

## 2024-09-10 RX ADMIN — ALLOPURINOL 200 MG: 100 TABLET ORAL at 08:00

## 2024-09-10 RX ADMIN — ALBUMIN (HUMAN) 25 G: 0.25 INJECTION, SOLUTION INTRAVENOUS at 07:56

## 2024-09-10 RX ADMIN — LEVOTHYROXINE SODIUM 100 MCG: 100 TABLET ORAL at 05:31

## 2024-09-10 RX ADMIN — ACETAMINOPHEN 975 MG: 325 TABLET ORAL at 23:05

## 2024-09-10 RX ADMIN — INSULIN GLARGINE 30 UNITS: 100 INJECTION, SOLUTION SUBCUTANEOUS at 08:00

## 2024-09-10 RX ADMIN — MONTELUKAST 10 MG: 10 TABLET, FILM COATED ORAL at 23:05

## 2024-09-10 RX ADMIN — DICLOFENAC SODIUM TOPICAL GEL, 1% 2 G: 10 GEL TOPICAL at 11:53

## 2024-09-10 NOTE — ASSESSMENT & PLAN NOTE
-Asymptomatic   -incidental finding found on CTA  -Seen by vascular surgery and no recommendations for intervention  -Management per primary team

## 2024-09-10 NOTE — ASSESSMENT & PLAN NOTE
CINDY on CKD IIIB:   -Etiology: Suspect due to contrast associated nephropathy in the setting of hemodynamic changes from hypotension, diuretics, left renal artery stenosis and autoregulatory dysfunction with ARB.  -Admission creatinine 1.19 on 9/7/2024.  Today's creatinine 2.62, plateaued and trending down  -Peak creatinine 2.66 on 9/9/2024  -baseline creatinine 1.2-1.5  -workup: UA with 1+ protein, 1-2 RBC, 2-4 WBC.  -urine sodium 31  -Imaging:  CTA with no hydronephrosis.  Renal ultrasound with stable cortical thinning consistent with medical renal disease.  -Renal function stable creatinine appears to have plateaued  -Hold losartan, Bumex  -Hold antihypertensives in the setting of relative hypotension  -Avoid nephrotoxins, NSAIDs, IV contrast if possible  -Avoid hypotension, maintain MAP >65  -trend BMP in am  -Optimize hemodynamic status to avoid delay in renal recovery  -will d/w Dr Lozada

## 2024-09-10 NOTE — ASSESSMENT & PLAN NOTE
Patient has a positive for COVID in the ED.  Has fevers, chills denies any sore throat, cough or shortness of breath.  Intermittently required oxygen in the ED as saturations were dropped with movement.  Was on [place] oxygen. Successfully weaned to room air   + Fever, mild leukocytosis  In the setting of age> 65, CKD, chronic heart disease, patient meets the criteria for treatment     Plan:  Continue  Paxlovid, dose renally adjusted  Continue COVID protocol    Monitor Spo2 and respiratory status.   Monitor for signs of infection/fever   See above for management under problems 1 and 2

## 2024-09-10 NOTE — ASSESSMENT & PLAN NOTE
Patient's creatinine on compared to admission creatinine 1.11.   Patient received a lactated Ringer 1 bolus 500 cc in the ED then placed on 50 ml/hr IV continous   Patient was on BUMEX 2 g, losartan 100 mg,   UA: +2 urine protein, RBC urine 2-4. no hyaline cast or granular cast  In the ED patient had IV contrast exposure on CTA dissection chest abdomen and pelvis   Blood pressure is on the softer side 101-106/36-40 with MAP ranges now at 58-60.   Patient reports decreased oral intake for past 48 hours. Reports generalized malise and fatigue  Creatinine 9/9: 2.66 -> Cr today on 9/10 2.62  Renal ultrasound showed symmetrically sized kidneys.  The right kidney 9.8 cm on the left kidney 10.9 cm both kidneys are diffusely cortical thinning with lobulated contours no hydronephrosis  Plan  Assessment:  Worsening creatinine within 48 hours of contrast exposure puts acute tubular necrosis higher on differential for cause of CINDY  Follow nephrology recommendations:   Holding nephrotoxic agents  losartan and BUMEX to treat CINDY and hypotension.   Avoid NSIADs.   Daily BMP to monitor creatinine.   As per the Internal Medicine team patient patient will remain in patient for observation for atleast another 48 hours for clinical management and MRI read
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
## 2024-09-10 NOTE — ASSESSMENT & PLAN NOTE
"Fever 103, chills, +covid, reports intermittent dysuria, lower abdomen tender to palpation.  Last BM this morning of admission, no constipation or diarrhea.  WBC on 9/9: 11.55 from admission 10.83  Pro-Kp today on 9/9 uptrended to 14.59 from 9/8/2024 value of  3.4  No lactic acidosis.  Blood culture #1 returned positive 9/8 for staph aureus, likely MSSA.  Today patient will receive 4 doses of cephalosporins. Currently on ceftriaxone .   In the ED patient received 1,500 mg of vancomycin  On Paxlovid for COVID today is day 3   No respiratory distress on room air    Plan:  Continue Paxlovid, renally adjusted dose for COVID for 2 more days (total of 5 days)   See above under \"Low Back Pain\" for rest of management.   "

## 2024-09-10 NOTE — ASSESSMENT & PLAN NOTE
Lab Results   Component Value Date    EGFR 16 09/10/2024    EGFR 16 09/09/2024    EGFR 46 09/08/2024    CREATININE 2.62 (H) 09/10/2024    CREATININE 2.66 (H) 09/09/2024    CREATININE 1.11 09/08/2024   Etiology: Suspect due to age-related nephron loss, hypertensive nephrosclerosis and diabetic kidney disease  -With proteinuria  -Outpatient nephrologist: Dr. Sanchez  -Will require follow-up on discharge
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
## 2024-09-10 NOTE — ASSESSMENT & PLAN NOTE
Blood pressure elevated in the ED likely secondary to acute exacerbation of pain, improved with pain control.  Home regimen BUMEX, Coreg, losartan, nifedipine.  Patient became hypotensive with low MAP ranges during admission.   BUMEX, losartan, COREG, nifedipine, are all held   9/10 Received midodrine at 3;30 pm today   9/10: Recent BP uptrended. MAP has been stable today as well  Cortisol am 19.6     Plan:  Holding nephrotoxic agents  losartan and BUMEX to treat CINYD and hypotension. Today will also hold carvediol in the setting of hypotension with decreased MAP. If MAP rises tomorrow can restart carvediol.  Continue multimodal pain regimen for pain control  Continue to monitor blood pressure

## 2024-09-10 NOTE — PROGRESS NOTES
Progress Note - Hospitalist   Name: Porsha Hoyos 79 y.o. female I MRN: [id number]  Unit/Bed#: S -01 I Date of Admission: 9/7/2024   Date of Service: 9/10/2024 I Hospital Day: 2    Assessment & Plan  Acute on Chronic Back Pain in the Setting of Sepsis, MSSA Bacteremia  Acute exacerbation of chronic back pain, home regimen tramadol twice daily and Robaxin 3 times daily.  Denies any recent injury, denies lifting any heavy objects, woke up from sleep with exacerbation back pain, unable to ambulate due to pain.  Denies urinary retention   On admission significant for Fever 103, chills, +covid, reports intermittent dysuria, lower abdomen tender to palpation.   WBC on 9/10: improved from leukocytosis on admission, now 8.08 compared to yesterday 11.55   Pro-Kp today on 910 downtrended to 11.16 from yesterday 17.59. No lactic acidosis.  Blood culture #1 returned positive 9/8 for staph aureus, likely MSSA. Blood culture 2: no growth at 48 hours   Patient still in significant amount of pain, had some bladder incontinence. Denies urinary retention   Pain levels improved since admission but still debilitating   Tenderness to palpation on musculosketal exam. Limited mobility. No neurological deficts on physical exam. Denies any numbness tingling bowel or bladder incontinence.  9/10: MRI lumbar spine without contrast possible discitis osteomyelitis.  Infectious disease is on board Transitioned to IV Cefazolin.   9/10: Chronic scalp lession-possible source of bacteremia.    PT recommends level 2    Plan:  Assessment for Acute on Chronic Back pain in the setting of sepsis: Ddx discitis osteomyelitis 2/2 to MSSA bactermia spread to the spine possible acute vertebral fracture/trauma more likely than abscess/phlegmon   Infectious disease recommendations   Continue IV cefazolin   Repeat blood cultures   Check TTE.  Trend fever, WBC, and procalcitonin   Dermatology consult placed for chronic scalp lession-possible source of  bacteremia. Appreciate recommendations.  Multimodal pain regimen:   Scheduled medications of Tramadol 50 mg BID and ROBACIN 500 mg TID   Along with the following PRN medications PRN oycodone 2.5 mg, Home Robaxin 3 times daily, Topical Voltaren gel, Aqua K-pad, Lidocaine patch  Urinary retention protocol  Continue PT recommendations.       CINDY (acute kidney injury) (HCC)  Patient's creatinine on compared to admission creatinine 1.11.   Patient received a lactated Ringer 1 bolus 500 cc in the ED then placed on 50 ml/hr IV continous   Patient was on BUMEX 2 g, losartan 100 mg,   UA: +2 urine protein, RBC urine 2-4. no hyaline cast or granular cast  In the ED patient had IV contrast exposure on CTA dissection chest abdomen and pelvis   Blood pressure is on the softer side 101-106/36-40 with MAP ranges now at 58-60.   Patient reports decreased oral intake for past 48 hours. Reports generalized malise and fatigue  Creatinine 9/9: 2.66 -> Cr today on 9/10 2.62  Renal ultrasound showed symmetrically sized kidneys.  The right kidney 9.8 cm on the left kidney 10.9 cm both kidneys are diffusely cortical thinning with lobulated contours no hydronephrosis  Plan  Assessment:  Worsening creatinine within 48 hours of contrast exposure puts acute tubular necrosis higher on differential for cause of CINDY  Follow nephrology recommendations:   Holding nephrotoxic agents  losartan and BUMEX to treat CINDY and hypotension.   Avoid NSIADs.   Daily BMP to monitor creatinine.   As per the Internal Medicine team patient patient will remain in patient for observation for atleast another 48 hours for clinical management and MRI read  Hypertension Management  Blood pressure elevated in the ED likely secondary to acute exacerbation of pain, improved with pain control.  Home regimen BUMEX, Coreg, losartan, nifedipine.  Patient became hypotensive with low MAP ranges during admission.   BUMEX, losartan, COREG, nifedipine, are all held   9/10 Received  "midodrine at 3;30 pm today   9/10: Recent BP uptrended. MAP has been stable today as well  Cortisol am 19.6     Plan:  Holding nephrotoxic agents  losartan and BUMEX to treat CINDY and hypotension. Today will also hold carvediol in the setting of hypotension with decreased MAP. If MAP rises tomorrow can restart carvediol.  Continue multimodal pain regimen for pain control  Continue to monitor blood pressure  Sepsis (HCC): SIRS criteria IRINEO Bacteremia and COVID infection  Fever 103, chills, +covid, reports intermittent dysuria, lower abdomen tender to palpation.  Last BM this morning of admission, no constipation or diarrhea.  WBC on 9/9: 11.55 from admission 10.83  Pro-Kp today on 9/9 uptrended to 14.59 from 9/8/2024 value of  3.4  No lactic acidosis.  Blood culture #1 returned positive 9/8 for staph aureus, likely MSSA.  Today patient will receive 4 doses of cephalosporins. Currently on ceftriaxone .   In the ED patient received 1,500 mg of vancomycin  On Paxlovid for COVID today is day 3   No respiratory distress on room air    Plan:  Continue Paxlovid, renally adjusted dose for COVID for 2 more days (total of 5 days)   See above under \"Low Back Pain\" for rest of management.   COVID  Patient has a positive for COVID in the ED.  Has fevers, chills denies any sore throat, cough or shortness of breath.  Intermittently required oxygen in the ED as saturations were dropped with movement.  Was on [place] oxygen. Successfully weaned to room air   + Fever, mild leukocytosis  In the setting of age> 65, CKD, chronic heart disease, patient meets the criteria for treatment     Plan:  Continue  Paxlovid, dose renally adjusted  Continue COVID protocol    Monitor Spo2 and respiratory status.   Monitor for signs of infection/fever   See above for management under problems 1 and 2     Type 2 diabetes mellitus with complication, with long-term current use of insulin (HCC)  Lab Results   Component Value Date    HGBA1C 7.9 (A) 07/02/2024 "       Recent Labs     09/09/24  2114 09/10/24  0731 09/10/24  1123 09/10/24  1622   POCGLU 178* 108 167* 191*       Blood Sugar Average: Last 72 hrs:  (P) 179.25  Home regimen: Januvia 50 mg daily, insulin regular 5 units with lunch and dinner, Lantus 30 units in the morning (patient reported),  Patient reports not eating anything today, endorses a decreased appetite overall.    Plan:  Continue Lantus 30 units  Insulin sliding scale Insulin lispro 1-5 units   Fingerstick glucose  Carb controlled diet  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HCC)  CTA dissection protocol positive for severe stenosis in the proximal celiac artery and moderate to severe stenosis in the proximal left renal artery.  Superior mesenteric artery and inferior mesenteric artery are patent with no signs of ischemia.  Vascular surgery evaluated patient in the ED: No acute vascular intervention.    Plan:  Vascular team consulted follow recommendations: Continue aspirin 81 mg daily and f/u with vascular surgery as outpt     Constipation  Constipation   Plan   Continue Senna docusate sodium and Miralax  Monitor   Stage 3b chronic kidney disease (HCC)  Lab Results   Component Value Date    EGFR 16 09/10/2024    EGFR 16 09/09/2024    EGFR 46 09/08/2024    CREATININE 2.62 (H) 09/10/2024    CREATININE 2.66 (H) 09/09/2024    CREATININE 1.11 09/08/2024   Baseline creatinine 1.2-1.5  Urine output on 9/9 was 200 ml as per nursing   Plan   See under CINDY as above     VTE Pharmacologic Prophylaxis: VTE Score: 4 Moderate Risk (Score 3-4) - Pharmacological DVT Prophylaxis Ordered: enoxaparin (Lovenox).     Mobility:   Basic Mobility Inpatient Raw Score: 18  JH-HLM Goal: 6: Walk 10 steps or more  JH-HLM Achieved: 4: Move to chair/commode  JH-HLM Goal NOT achieved. Continue with multidisciplinary rounding and encourage appropriate mobility to improve upon JH-HLM goals.    Patient Centered Rounds: I performed bedside rounds with nursing staff today.   Discussions with  Specialists or Other Care Team Provider: Nephrology and Infectious disease and vascular surgery     Education and Discussions with Family / Patient: Attempted to update  (son) via phone. Unable to contact.    Current Length of Stay: 2 day(s)  Current Patient Status: Inpatient   Certification Statement: The patient will continue to require additional inpatient hospital stay due to management of MSSA bactermia/osteomyelitis,  blood pressure management. Acute kidney injury,   Discharge Plan: Anticipate discharge in 48 hrs to home.    Code Status: Level 1 - Full Code    Subjective   Patient was examined this morning.  Patient reported that before MRI she sat in her chair for most of the day.  She thinks that contributed to her current back and abdominal pain rated as 6 out of 10.  Patient reports constipation she took Maalox this morning.  Reports fatigue attributed to getting MRI imaging and repeat lab work and being woken up early for kidney ultrasound.  Patient reports decreased energy levels including generalized weakness and generalized malaise. Patient has been trying to eat more-denies N/V. Continues to deny chest pain, lightheadedness, shortness of breath, urinary incontinence, fever, chills, or dysuria patient had questions on results of morning labs which were all addressed.      Objective     Vitals:   Temp (24hrs), Av.8 °F (37.1 °C), Min:98.2 °F (36.8 °C), Max:99.4 °F (37.4 °C)    Temp:  [98.2 °F (36.8 °C)-99.4 °F (37.4 °C)] 98.2 °F (36.8 °C)  HR:  [70-77] 75  Resp:  [18-20] 18  BP: (108-140)/(38-48) 128/40  SpO2:  [91 %-92 %] 91 %  Body mass index is 27.32 kg/m².     Input and Output Summary (last 24 hours):     Intake/Output Summary (Last 24 hours) at 9/10/2024 1754  Last data filed at 9/10/2024 1701  Gross per 24 hour   Intake 3028.09 ml   Output 695 ml   Net 2333.09 ml       Physical Exam     Physical Exam   Constitutional:       General: She is in acute distress.      Appearance: She  is ill-appearing.   HENT:      Mouth/Throat:      Mouth: Mucous membranes are moist.      Pharynx: Oropharynx is clear.   Cardiovascular:      Rate and Rhythm: Normal rate and regular rhythm.      Pulses: Normal pulses.      Heart sounds: Normal heart sounds. No murmur heard.  Pulmonary:      Effort: Pulmonary effort is normal. No respiratory distress.      Breath sounds: Vesicular  breath sounds.      Comments: Patient is satting well on room air   Abdominal:      General: Bowel sounds are normal. There is no distension.      Palpations: Abdomen is soft.      Tenderness: There is no abdominal tenderness.   Musculoskeletal:         General: Tenderness (Tenderness centrally along lumbosacral spine) present. Right leg elevation causes right hip pain     Right lower leg: No edema.      Left lower leg: No edema.   Skin:     General: Skin is warm and dry.      Capillary Refill: Capillary refill takes less than 2 seconds.   Neurological:      General: No focal deficit present.      Sensory: No sensory deficit.      Motor: No weakness.   Psychiatric:         Mood and Affect: Mood normal.         Behavior: Behavior normal.     Lines/Drains:  Lines/Drains/Airways       Active Status       None                            Lab Results: I have reviewed the following results:    Results from last 7 days   Lab Units 09/10/24  0606 09/09/24  0433   WBC Thousand/uL 8.08 11.55*   HEMOGLOBIN g/dL 9.9* 10.1*   HEMATOCRIT % 29.7* 30.7*   PLATELETS Thousands/uL 93* 92*   SEGS PCT %  --  88*   LYMPHO PCT %  --  5*   MONO PCT %  --  6   EOS PCT %  --  0     Results from last 7 days   Lab Units 09/10/24  0606 09/08/24  0443 09/07/24  1655   SODIUM mmol/L 134*   < > 137   POTASSIUM mmol/L 3.8   < > 5.0   CHLORIDE mmol/L 102   < > 102   CO2 mmol/L 23   < > 26   BUN mg/dL 59*   < > 43*   CREATININE mg/dL 2.62*   < > 1.19   ANION GAP mmol/L 9   < > 9   CALCIUM mg/dL 8.7   < > 10.1   ALBUMIN g/dL  --   --  4.0   TOTAL BILIRUBIN mg/dL  --   --   0.56   ALK PHOS U/L  --   --  80   ALT U/L  --   --  30   AST U/L  --   --  36   GLUCOSE RANDOM mg/dL 112   < > 227*    < > = values in this interval not displayed.     Results from last 7 days   Lab Units 09/07/24  1655   INR  1.03     Results from last 7 days   Lab Units 09/10/24  1622 09/10/24  1123 09/10/24  0731 09/09/24  2114 09/09/24  1607 09/09/24  1113 09/09/24  0810 09/08/24  2222 09/08/24  1707 09/08/24  1041 09/08/24  0757 09/07/24  2143   POC GLUCOSE mg/dl 191* 167* 108 178* 235* 191* 129 253* 228* 173* 126 172*         Results from last 7 days   Lab Units 09/10/24  0606 09/09/24  0433 09/08/24  0443 09/07/24  1655   LACTIC ACID mmol/L  --   --   --  0.9   PROCALCITONIN ng/ml 11.16* 17.59* 3.41* 0.35*       Recent Cultures (last 7 days):   Results from last 7 days   Lab Units 09/07/24  1655 09/07/24  1645   BLOOD CULTURE  Staphylococcus aureus* No Growth at 48 hrs.   GRAM STAIN RESULT  Gram positive cocci in clusters*  --        Imaging Review: Reviewed radiology reports from this admission including: MRI spine. And CTPA  Other Studies: No additional pertinent studies reviewed.    Last 24 Hours Medication List:     Current Facility-Administered Medications:     acetaminophen (TYLENOL) tablet 975 mg, Q8H KORI    albumin human (FLEXBUMIN) 25 % injection 25 g, Q8H    allopurinol (ZYLOPRIM) tablet 200 mg, Daily    aspirin (ECOTRIN LOW STRENGTH) EC tablet 81 mg, Daily    calcium carbonate (TUMS) chewable tablet 500 mg, BID PRN    calcium carbonate-vitamin D 500 mg-5 mcg tablet 1 tablet, Daily With Breakfast    ceFAZolin (ANCEF) IVPB (premix in dextrose) 2,000 mg 50 mL, Q12H, Last Rate: 2,000 mg (09/10/24 1507)    Diclofenac Sodium (VOLTAREN) 1 % topical gel 2 g, 4x Daily    enoxaparin (LOVENOX) subcutaneous injection 30 mg, Daily    famotidine (PEPCID) tablet 20 mg, Daily    fluticasone (FLONASE) 50 mcg/act nasal spray 2 spray, Daily    insulin glargine (LANTUS) subcutaneous injection 30 Units 0.3 mL, [facility]     insulin lispro (HumALOG/ADMELOG) 100 units/mL subcutaneous injection 1-5 Units, TID AC **AND** Fingerstick Glucose (POCT), TID AC    insulin lispro (HumALOG/ADMELOG) 100 units/mL subcutaneous injection 1-5 Units, HS    levothyroxine tablet 100 mcg, Early Morning    lidocaine (LIDODERM) 5 % patch 1 patch, Daily    methocarbamol (ROBAXIN) tablet 500 mg, TID    montelukast (SINGULAIR) tablet 10 mg, HS    nirmatrelvir 150 mg x 1 and ritonavir 100 mg x 1 (Paxlovid) therapy pack, BID    oxyCODONE (ROXICODONE) split tablet 2.5 mg, Q6H PRN    polyethylene glycol (MIRALAX) packet 17 g, Daily    senna-docusate sodium (SENOKOT S) 8.6-50 mg per tablet 2 tablet, BID    traMADol (ULTRAM) tablet 50 mg, BID    Administrative Statements   Today, Patient Was Seen By: Nicole Bernardo MD      **Please Note: This note may have been constructed using a voice recognition system.**

## 2024-09-10 NOTE — ASSESSMENT & PLAN NOTE
Acute exacerbation of chronic back pain, home regimen tramadol twice daily and Robaxin 3 times daily.  Denies any recent injury, denies lifting any heavy objects, woke up from sleep with exacerbation back pain, unable to ambulate due to pain.  Denies urinary retention   On admission significant for Fever 103, chills, +covid, reports intermittent dysuria, lower abdomen tender to palpation.   WBC on 9/10: improved from leukocytosis on admission, now 8.08 compared to yesterday 11.55   Pro-Kp today on 910 downtrended to 11.16 from yesterday 17.59. No lactic acidosis.  Blood culture #1 returned positive 9/8 for staph aureus, likely MSSA. Blood culture 2: no growth at 48 hours   Patient still in significant amount of pain, had some bladder incontinence. Denies urinary retention   Pain levels improved since admission but still debilitating   Tenderness to palpation on musculosketal exam. Limited mobility. No neurological deficts on physical exam. Denies any numbness tingling bowel or bladder incontinence.  9/10: MRI lumbar spine without contrast possible discitis osteomyelitis.  Infectious disease is on board Transitioned to IV Cefazolin.   9/10: Chronic scalp lession-possible source of bacteremia.    PT recommends level 2    Plan:  Assessment for Acute on Chronic Back pain in the setting of sepsis: Ddx discitis osteomyelitis 2/2 to MSSA bactermia spread to the spine possible acute vertebral fracture/trauma more likely than abscess/phlegmon   Infectious disease recommendations   Continue IV cefazolin   Repeat blood cultures   Check TTE.  Trend fever, WBC, and procalcitonin   Dermatology consult placed for chronic scalp lession-possible source of bacteremia. Appreciate recommendations.  Multimodal pain regimen:   Scheduled medications of Tramadol 50 mg BID and ROBACIN 500 mg TID   Along with the following PRN medications PRN oycodone 2.5 mg, Home Robaxin 3 times daily, Topical Voltaren gel, Aqua K-pad, Lidocaine  patch  Urinary retention protocol  Continue PT recommendations.

## 2024-09-10 NOTE — ASSESSMENT & PLAN NOTE
-tested positive on admission  -Respiratory status stable on 2L NCO2  -On Paxlovid  -Management per primary team

## 2024-09-10 NOTE — HOSPITAL COURSE
79 y.o. female with a prior history of prior history severe spinal stenosis and disc herniation status post surgery on chronic opioids DMT2, hypothyroidism, CKD3B, PAD, remote CVA, CAD,  who presented  with acute on chronic back pain.  Admitted for acute on chronic back pain along with Sepsis in the setting of COVID infection, On day She normally manages with Robaxin and tramadol, which overall manages her pain well. Patient on paxlovid day 2 and cephaloporin dose 4 (two doses of ceftiraxone in EDand now on 2,000 IV cefazolin).  Patient reports being in her baseline state of health until last night when she woke up with acute back pain which did not improve with her usual home pain meds. Patient does endorse some chills and found to be febrile with a temperature of 103 in the ED. denies any sore throat, cough or shortness of breath.reports intermittent dysuriaLast BM this morning, no constipation or diarrhea.     Intermittently required oxygen in the ED as saturations were dropped with movement Saturating 99% on 2 L when resting.Hypertensive with blood pressure systolic 200s secondary to acute pain, improved with pain control.  CT dissection protocol C/A/P + for severe celiac artery stenosis and moderate to severe proximal left renal artery stenosis. She reports feeling some abdominal distention and has some pain on palpation. Urine incontinece She denies nausea, vomiting, diarrhea or constipation. Denies chest pain or shortness of breath.  Reports chronic right shoulder and arm arthralgia, also rhinorrhea. At baseline patient can She walks to her mailbox which is about 100 yards. She denies any chest pain or shortness of breath on exertion. There are times when she feels off balance. She denies any falls. She does not ambulate with a cane or walker. She does not have stairs in her home.Porsha does own a blood pressure monitor but does not check her readings.  She does suffer from back pain for which she takes  tramadol.  Her  passed away earlier this year. She lives in a mobile home. Her son does check on her frequently.  Porsha does her own house work and her son takes her to the store to run errands.     Home medications: Albuterol Ventolin inhaler 2 puffs 4 times daily, allopurinol 200 mg daily, sorbic acid 500 mg daily, aspirin 81 mg, atorvastatin 80 mg, Symbicort budesonide formoterol inhaler 2 puffs twice a day,bumetanide 2 mg, calcium 600, carvediol table 25 mg, doxaxosin 2 mg at bedtime , famotidine 10 mg. Ferrous gluconate tablet 240 mg, fluticasone nasal spray 2 sprays, glucose strip, insulin glargine (alntus) 26 units daily however paitnent takes 30 units in the morning and 35 units at bedtime? , januvia 50 mg daily,   insulin regular 5 units with lunch and dinner, levothyroxine 100 mcg, losartan 100 mg, macgneissium 400, methocarbamol ROBAXCIN tablet 500 mg, montelukast 10 mg daily, multivtaimin, nifedipine 24 hour tablet 60 mg daily, nitroglycerin .4 sublingual every 5 minutes, ondansetron 4 mg oral every 8 hours, one touch lancets 2-3 times daily, tramadol 50 mg BID, vitamin d3 capsule 1,000 units   Today   Back pain rating< have you ambulated, urine passage? numbness tingling poop    Vitals: no longer febrile, BP softer on 107/40 M currently on room air?   Physical exam: lower abdominal tenderss, Spasms and tenderness (Right flank, left flank, unable to assess tenderness over the spine directly as patient not willing to turn to the side due to pain) present.  Increase lower back pain with raising the legs, no radiation.       Patient reports being in her baseline state of health until last night when she woke up with acute back pain which did not improve with her usual home pain meds. Patient does endorse some chills and found to be febrile with a temperature of 103 in the ED.  Hypertensive with blood pressure systolic 200s secondary to acute pain, improved with pain control.  COVID positive  currently on mild pathway due to 2 L of oxygen requirement. WBC on admission 11.68  with absolute neutrophil count of 9.82elevated Pro-Kp today, CT dissection protocol C/A/P + for severe celiac artery stenosis and moderate to severe proximal left renal artery stenosis. She reports feeling some abdominal distention and has some pain on palpation. Given 1 dose of ceftrtiaxone.    WBC down trended today to 11.55     Vascular surgery: pain unrelated to stenoisis of celiac/left renal arteries. No intervention

## 2024-09-10 NOTE — ASSESSMENT & PLAN NOTE
-p/w abrupt onset mid back pain  -CTA unrevealing  -ongoing workup and management per primary team

## 2024-09-10 NOTE — CASE MANAGEMENT
Case Management Progress Note    Patient name Porsha Hoyos  Location S /S -01 MRN [id number]  : 1945 Date 9/10/2024       LOS (days): 2  Geometric Mean LOS (GMLOS) (days): 5.1  Days to GMLOS:3.3        OBJECTIVE:        Current admission status: Inpatient  Preferred Pharmacy:   [facility] - [place] - [address] [address]  Phone: [phone] Fax: [phone]    Primary Care Provider: João Alfonso MD    Primary Insurance: [employer]  Secondary Insurance:     PROGRESS NOTE:    CM attempted to contact pt's son Rob via phone re: pt assessment and dcp - left  requesting return call.

## 2024-09-10 NOTE — ASSESSMENT & PLAN NOTE
Lab Results   Component Value Date    EGFR 16 09/10/2024    EGFR 16 09/09/2024    EGFR 46 09/08/2024    CREATININE 2.62 (H) 09/10/2024    CREATININE 2.66 (H) 09/09/2024    CREATININE 1.11 09/08/2024   Baseline creatinine 1.2-1.5  Urine output on 9/9 was 200 ml as per nursing   Plan   See under CINDY as above

## 2024-09-10 NOTE — ASSESSMENT & PLAN NOTE
Hypotension/HTN/Volume status:  -BP soft since admission  -s/p IVF and receiving albumin 25 g every 8 hours x 3  -volume status euvolemic  -Home medications: Bumex 2 mg twice daily, carvedilol 25 mg twice daily, doxazosin 2 mg nightly, Procardia XL 60 mg daily, losartan 100 mg daily  -Current medications: none currently  -Losartan and Bumex discontinued on 9/9/2024.  Remainder of antihypertensives held on 9/10/2024  -Continue albumin 25 g IV every 8 hours x 3 total doses  -Optimize hemodynamic status to avoid delay in renal recovery  -recommend hold parameters on antihypertensive's for SBP <130 mmHg.  -Avoid hypotension or fluctuations in blood pressure.  Maintain MAP >65  -continue to trend

## 2024-09-10 NOTE — PROGRESS NOTES
Vancomycin IV Pharmacy-to-Dose Consultation     Vancomycin has been discontinued.  Pharmacy will sign off.  Please contact or re-consult with questions.    Ayla Parada, Pharmacist

## 2024-09-10 NOTE — CASE MANAGEMENT
Case Management Progress Note    Patient name Porsha Hoyos  Location S /S -01 MRN [id number]  : 1945 Date 9/10/2024       LOS (days): 2  Geometric Mean LOS (GMLOS) (days): 5.1  Days to GMLOS:3.1        OBJECTIVE:        Current admission status: Inpatient  Preferred Pharmacy:   [facility] [place] - [address] [place] [id number]  Phone: [phone] Fax: [phone]    Primary Care Provider: João Alfonso MD    Primary Insurance: [employer] RE[employer]  Secondary Insurance:     PROGRESS NOTE:    [facility] patient choice list provided to pt for her review. CM to f/u with pt re: C choice from list provided.

## 2024-09-10 NOTE — ASSESSMENT & PLAN NOTE
Lab Results   Component Value Date    HGBA1C 7.9 (A) 07/02/2024       Recent Labs     09/09/24  2114 09/10/24  0731 09/10/24  1123 09/10/24  1622   POCGLU 178* 108 167* 191*       Blood Sugar Average: Last 72 hrs:  (P) 179.25  Home regimen: Januvia 50 mg daily, insulin regular 5 units with lunch and dinner, Lantus 30 units in the morning (patient reported),  Patient reports not eating anything today, endorses a decreased appetite overall.    Plan:  Continue Lantus 30 units  Insulin sliding scale Insulin lispro 1-5 units   Fingerstick glucose  Carb controlled diet

## 2024-09-10 NOTE — ASSESSMENT & PLAN NOTE
Lab Results   Component Value Date    HGBA1C 7.9 (A) 07/02/2024       Recent Labs     09/09/24  1607 09/09/24  2114 09/10/24  0731 09/10/24  1123   POCGLU 235* 178* 108 167*       Blood Sugar Average: Last 72 hrs:  (P) 178.6187011884248302  -stable  -continue to optimize glycemic control to slow progression of chronic kidney disease  -management per primary team

## 2024-09-10 NOTE — CASE MANAGEMENT
Case Management Assessment & Discharge Planning Note    Patient name Porsha Hoyos  Location S /S -01 MRN [id number]  : 1945 Date 9/10/2024       Current Admission Date: 2024  Current Admission Diagnosis:Acute on Chronic Back Pain in the Setting of Sepsis, MSSA Bacteremia   Patient Active Problem List    Diagnosis Date Noted Date Diagnosed    COVID 2024     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HCC) 2024     Sepsis (HCC): SIRS criteria IRINEO Bacteremia and COVID infection 2024     Type 2 diabetes mellitus with complication, with long-term current use of insulin ([facility]) 2024     Nausea and vomiting 2023     Hordeolum externum of left upper eyelid 08/15/2023     Proteinuria 2023     Metatarsalgia of left foot 06/15/2023     Peripheral vascular disease, unspecified ([facility]) 2023     Other constipation 2022     Anemia 2022     Vitamin deficiency 2022     Shortness of breath 2022     Dysuria 2022     Elevated LFTs 2022     CINDY (acute kidney injury) ([facility]) 2022     Pancytopenia ([facility]) 2022     Asthma without status asthmaticus without complication 2022     History of colon polyps 2022     Gastroesophageal reflux disease 2022     Stage 3b chronic kidney disease (HCC) 2022     Continuous opioid dependence ([facility]) 2021     Hypothyroidism 2021     Type 2 diabetes mellitus with diabetic chronic kidney disease (HCC) 2021     Type 2 diabetes mellitus with diabetic polyneuropathy ([facility]) 2021     Long term (current) use of insulin ([facility]) 2021     Type 2 diabetes mellitus with stable proliferative diabetic retinopathy, bilateral ([facility]) 2021     Acute pain of right shoulder 2019     Right elbow pain 2019     Acute pain of right shoulder due to trauma 2019     Fall at home 2019     Imbalance 2019     Acute on Chronic Back Pain in the Setting of Sepsis,  MSSA Bacteremia 12/31/2018     Encntr for gyn exam (general) (routine) w abnormal findings 08/28/2018     Vaginal atrophy 08/28/2018     Vulvar lesion 02/05/2018     Hypertension 01/29/2018     Mixed hyperlipidemia 01/29/2018       LOS (days): 2  Geometric Mean LOS (GMLOS) (days): 5.1  Days to GMLOS:3.2     OBJECTIVE:    Risk of Unplanned Readmission Score: 24.69         Current admission status: Inpatient       Preferred Pharmacy:   [facility] - [place] - [address]  [address]  [place] [address]  Phone: [phone] Fax: [phone]    Primary Care Provider: João Alfonso MD    Primary Insurance: [employer] RE[employer]  Secondary Insurance:     ASSESSMENT:  Active Health Care Proxies    There are no active Health Care Proxies on file.                      Patient Information  Admitted from:: Home  Mental Status: Alert  During Assessment patient was accompanied by: Not accompanied during assessment  Assessment information provided by:: Patient, Son  Primary Caregiver: Self  Support Systems: Family members  County of Residence: [place]  What city do you live in?: [place]  Home entry access options. Select all that apply.: Ramp  Type of Current Residence: [facility]  Living Arrangements: Lives Alone    Activities of Daily Living Prior to Admission  Functional Status: Independent  Completes ADLs independently?: Yes  Ambulates independently?: Yes  Does patient use assisted devices?: Yes  Assisted Devices (DME) used: Other (Comment) (grab bar next to toilet)  Does patient currently own DME?: Yes  What DME does the patient currently own?: Walker, Straight Cane, Other (Comment) (grab bar next to toilet)  Does patient have a history of Outpatient Therapy (PT/OT)?: Yes (OPPT after back surgery)  Does the patient have a history of Short-Term Rehab?: No  Does patient have a history of HHC?: No  Does patient currently have HHC?: No         Patient Information Continued  Does patient have prescription coverage?:  Yes  Does patient receive dialysis treatments?: No         Means of Transportation  Means of Transport to Appts:: Family transport      Social Determinants of Health (SDOH)      Flowsheet Row Most Recent Value   Housing Stability    In the last 12 months, was there a time when you were not able to pay the mortgage or rent on time? N   At any time in the past 12 months, were you homeless or living in a shelter (including now)? N   Transportation Needs    In the past 12 months, has lack of transportation kept you from medical appointments or from getting medications? no   In the past 12 months, has lack of transportation kept you from meetings, work, or from getting things needed for daily living? No   Food Insecurity    Within the past 12 months, you worried that your food would run out before you got the money to buy more. Never true   Within the past 12 months, the food you bought just didn't last and you didn't have money to get more. Never true   Utilities    In the past 12 months has the electric, gas, oil, or water company threatened to shut off services in your home? No            DISCHARGE DETAILS:    Discharge planning discussed with:: patient and pt's son Rob  Freedom of Choice: Yes  Comments - Freedom of Choice: CM s/w patient at bedside and pt's son Rob via phone respectively re: assessment and dcp. Assessment info provided by pt and son - pt lives alone in ranch home, ramp entry. Pt is independent with ADLS, does not use any DME to ambulate, has hx of OPPT after back surgery and family provides transport. Son and son's SO stay with patient 4 out of 7 days a week and son's SO stays with pt while son is at work. PT/OT henri STR - discussed recs with pt and son. Pt relayed choice for return to home with HHC - son in agreement with pt decision as he and his SO will be staying with the patient for at least a couple of weeks upon return to home from current hospital admission and will assist patient as  needed. Pt relayed no preference for C - blanket referral sent via aidin, awaiting responses.  CM contacted family/caregiver?: Yes  Were Treatment Team discharge recommendations reviewed with patient/caregiver?: Yes  Did patient/caregiver verbalize understanding of patient care needs?: Yes  Were patient/caregiver advised of the risks associated with not following Treatment Team discharge recommendations?: Yes    Contacts  Patient Contacts: Rob (son)  Relationship to Patient:: Family  Contact Method: Phone  Phone Number: [phone]  Reason/Outcome: Continuity of Care, Emergency Contact, Referral, Discharge Planning    Requested Home Health Care         Is the patient interested in HHC at discharge?: Yes  Home Health Discipline requested:: Nursing, Occupational Therapy, Physical Therapy  Home Health Agency Name:: Other  Home Health Follow-Up Provider:: PCP  Home Health Services Needed:: Evaluate Functional Status and Safety, Gait/ADL Training, Strengthening/Theraputic Exercises to Improve Function  Homebound Criteria Met:: Requires the Assistance of Another Person for Safe Ambulation or to Leave the Home, Uses an Assist Device (i.e. cane, walker, etc)  Supporting Clincal Findings:: Limited Endurance, Fatigues Easliy in Short Distances    DME Referral Provided  Referral made for DME?: No    Other Referral/Resources/Interventions Provided:  Interventions: Short Term Rehab, HHC  Referral Comments: PT/OT rcmd STR. Pt declined STR opting for home with [facility]. C blanket referral sent.         Treatment Team Recommendation: Short Term Rehab  Discharge Destination Plan:: Home with Home Health Care  Transport at Discharge : Family                          IMM reviewed with patient, patient agrees with discharge determination.  IMM Given (Date):: 09/10/24  IMM Given to:: Patient

## 2024-09-11 ENCOUNTER — TELEPHONE (OUTPATIENT)
Dept: DERMATOLOGY | Facility: CLINIC | Age: 79
End: 2024-09-11

## 2024-09-11 ENCOUNTER — APPOINTMENT (INPATIENT)
Dept: NON INVASIVE DIAGNOSTICS | Facility: HOSPITAL | Age: 79
DRG: 871 | End: 2024-09-11
Payer: COMMERCIAL

## 2024-09-11 PROBLEM — E87.1 HYPONATREMIA: Status: ACTIVE | Noted: 2024-09-11

## 2024-09-11 LAB
ANION GAP SERPL CALCULATED.3IONS-SCNC: 9 MMOL/L (ref 4–13)
BASOPHILS # BLD AUTO: 0.02 THOUSANDS/ΜL (ref 0–0.1)
BASOPHILS NFR BLD AUTO: 0 % (ref 0–1)
BSA FOR ECHO PROCEDURE: 1.58 M2
BUN SERPL-MCNC: 66 MG/DL (ref 5–25)
CALCIUM SERPL-MCNC: 8.6 MG/DL (ref 8.4–10.2)
CHLORIDE SERPL-SCNC: 102 MMOL/L (ref 96–108)
CO2 SERPL-SCNC: 22 MMOL/L (ref 21–32)
CORTIS AM PEAK SERPL-MCNC: 14.9 UG/DL (ref 6.7–22.6)
CREAT SERPL-MCNC: 2.02 MG/DL (ref 0.6–1.3)
E WAVE DECELERATION TIME: 189 MS
E/A RATIO: 0.93
EOSINOPHIL # BLD AUTO: 0.12 THOUSAND/ΜL (ref 0–0.61)
EOSINOPHIL NFR BLD AUTO: 2 % (ref 0–6)
ERYTHROCYTE [DISTWIDTH] IN BLOOD BY AUTOMATED COUNT: 14 % (ref 11.6–15.1)
GFR SERPL CREATININE-BSD FRML MDRD: 22 ML/MIN/1.73SQ M
GLUCOSE SERPL-MCNC: 114 MG/DL (ref 65–140)
GLUCOSE SERPL-MCNC: 122 MG/DL (ref 65–140)
GLUCOSE SERPL-MCNC: 141 MG/DL (ref 65–140)
GLUCOSE SERPL-MCNC: 162 MG/DL (ref 65–140)
GLUCOSE SERPL-MCNC: 196 MG/DL (ref 65–140)
HCT VFR BLD AUTO: 27.7 % (ref 34.8–46.1)
HGB BLD-MCNC: 9.2 G/DL (ref 11.5–15.4)
IMM GRANULOCYTES # BLD AUTO: 0.03 THOUSAND/UL (ref 0–0.2)
IMM GRANULOCYTES NFR BLD AUTO: 1 % (ref 0–2)
LYMPHOCYTES # BLD AUTO: 0.65 THOUSANDS/ΜL (ref 0.6–4.47)
LYMPHOCYTES NFR BLD AUTO: 10 % (ref 14–44)
MCH RBC QN AUTO: 31.5 PG (ref 26.8–34.3)
MCHC RBC AUTO-ENTMCNC: 33.2 G/DL (ref 31.4–37.4)
MCV RBC AUTO: 95 FL (ref 82–98)
MONOCYTES # BLD AUTO: 0.73 THOUSAND/ΜL (ref 0.17–1.22)
MONOCYTES NFR BLD AUTO: 11 % (ref 4–12)
MV PEAK A VEL: 1.38 M/S
MV PEAK E VEL: 128 CM/S
MV STENOSIS PRESSURE HALF TIME: 55 MS
MV VALVE AREA P 1/2 METHOD: 4
NEUTROPHILS # BLD AUTO: 5.01 THOUSANDS/ΜL (ref 1.85–7.62)
NEUTS SEG NFR BLD AUTO: 76 % (ref 43–75)
NRBC BLD AUTO-RTO: 0 /100 WBCS
PLATELET # BLD AUTO: 86 THOUSANDS/UL (ref 149–390)
PMV BLD AUTO: 12.1 FL (ref 8.9–12.7)
POTASSIUM SERPL-SCNC: 3.6 MMOL/L (ref 3.5–5.3)
PROCALCITONIN SERPL-MCNC: 7.67 NG/ML
RA PRESSURE ESTIMATED: 10 MMHG
RBC # BLD AUTO: 2.92 MILLION/UL (ref 3.81–5.12)
RV PSP: 43 MMHG
SODIUM SERPL-SCNC: 133 MMOL/L (ref 135–147)
TR MAX PG: 33 MMHG
TR PEAK VELOCITY: 2.9 M/S
TRICUSPID VALVE PEAK REGURGITATION VELOCITY: 2.86 M/S
WBC # BLD AUTO: 6.56 THOUSAND/UL (ref 4.31–10.16)

## 2024-09-11 PROCEDURE — 93321 DOPPLER ECHO F-UP/LMTD STD: CPT

## 2024-09-11 PROCEDURE — 93308 TTE F-UP OR LMTD: CPT | Performed by: INTERNAL MEDICINE

## 2024-09-11 PROCEDURE — 99233 SBSQ HOSP IP/OBS HIGH 50: CPT | Performed by: INTERNAL MEDICINE

## 2024-09-11 PROCEDURE — 99232 SBSQ HOSP IP/OBS MODERATE 35: CPT | Performed by: INTERNAL MEDICINE

## 2024-09-11 PROCEDURE — 80048 BASIC METABOLIC PNL TOTAL CA: CPT | Performed by: PHYSICIAN ASSISTANT

## 2024-09-11 PROCEDURE — 97530 THERAPEUTIC ACTIVITIES: CPT

## 2024-09-11 PROCEDURE — 93321 DOPPLER ECHO F-UP/LMTD STD: CPT | Performed by: INTERNAL MEDICINE

## 2024-09-11 PROCEDURE — 97116 GAIT TRAINING THERAPY: CPT

## 2024-09-11 PROCEDURE — 82533 TOTAL CORTISOL: CPT

## 2024-09-11 PROCEDURE — 93325 DOPPLER ECHO COLOR FLOW MAPG: CPT

## 2024-09-11 PROCEDURE — 93325 DOPPLER ECHO COLOR FLOW MAPG: CPT | Performed by: INTERNAL MEDICINE

## 2024-09-11 PROCEDURE — 84145 PROCALCITONIN (PCT): CPT

## 2024-09-11 PROCEDURE — 93308 TTE F-UP OR LMTD: CPT

## 2024-09-11 PROCEDURE — 85025 COMPLETE CBC W/AUTO DIFF WBC: CPT

## 2024-09-11 PROCEDURE — 82948 REAGENT STRIP/BLOOD GLUCOSE: CPT

## 2024-09-11 RX ORDER — BISACODYL 10 MG
10 SUPPOSITORY, RECTAL RECTAL DAILY PRN
Status: DISCONTINUED | OUTPATIENT
Start: 2024-09-11 | End: 2024-09-14 | Stop reason: HOSPADM

## 2024-09-11 RX ADMIN — TRAMADOL HYDROCHLORIDE 50 MG: 50 TABLET, COATED ORAL at 17:38

## 2024-09-11 RX ADMIN — INSULIN LISPRO 1 UNITS: 100 INJECTION, SOLUTION INTRAVENOUS; SUBCUTANEOUS at 13:03

## 2024-09-11 RX ADMIN — METHOCARBAMOL TABLETS 500 MG: 500 TABLET, COATED ORAL at 08:15

## 2024-09-11 RX ADMIN — DICLOFENAC SODIUM TOPICAL GEL, 1% 2 G: 10 GEL TOPICAL at 08:14

## 2024-09-11 RX ADMIN — SENNOSIDES AND DOCUSATE SODIUM 2 TABLET: 8.6; 5 TABLET ORAL at 08:15

## 2024-09-11 RX ADMIN — DICLOFENAC SODIUM TOPICAL GEL, 1% 2 G: 10 GEL TOPICAL at 21:28

## 2024-09-11 RX ADMIN — NIRMATRELVIR AND RITONAVIR 2 TABLET: KIT at 17:39

## 2024-09-11 RX ADMIN — ACETAMINOPHEN 975 MG: 325 TABLET ORAL at 05:07

## 2024-09-11 RX ADMIN — FAMOTIDINE 20 MG: 20 TABLET ORAL at 08:15

## 2024-09-11 RX ADMIN — INSULIN LISPRO 1 UNITS: 100 INJECTION, SOLUTION INTRAVENOUS; SUBCUTANEOUS at 21:28

## 2024-09-11 RX ADMIN — ACETAMINOPHEN 975 MG: 325 TABLET ORAL at 13:03

## 2024-09-11 RX ADMIN — NIRMATRELVIR AND RITONAVIR 2 TABLET: KIT at 08:16

## 2024-09-11 RX ADMIN — INSULIN GLARGINE 30 UNITS: 100 INJECTION, SOLUTION SUBCUTANEOUS at 08:15

## 2024-09-11 RX ADMIN — ASPIRIN 81 MG: 81 TABLET, COATED ORAL at 08:15

## 2024-09-11 RX ADMIN — TRAMADOL HYDROCHLORIDE 50 MG: 50 TABLET, COATED ORAL at 08:15

## 2024-09-11 RX ADMIN — ACETAMINOPHEN 975 MG: 325 TABLET ORAL at 21:28

## 2024-09-11 RX ADMIN — LIDOCAINE 5% 1 PATCH: 700 PATCH TOPICAL at 13:03

## 2024-09-11 RX ADMIN — ALLOPURINOL 200 MG: 100 TABLET ORAL at 08:15

## 2024-09-11 RX ADMIN — FLUTICASONE PROPIONATE 2 SPRAY: 50 SPRAY, METERED NASAL at 08:16

## 2024-09-11 RX ADMIN — METHOCARBAMOL TABLETS 500 MG: 500 TABLET, COATED ORAL at 17:38

## 2024-09-11 RX ADMIN — Medication 1 TABLET: at 08:15

## 2024-09-11 RX ADMIN — CEFAZOLIN SODIUM 2000 MG: 2 SOLUTION INTRAVENOUS at 13:03

## 2024-09-11 RX ADMIN — LEVOTHYROXINE SODIUM 100 MCG: 100 TABLET ORAL at 05:08

## 2024-09-11 RX ADMIN — MONTELUKAST 10 MG: 10 TABLET, FILM COATED ORAL at 21:28

## 2024-09-11 RX ADMIN — DICLOFENAC SODIUM TOPICAL GEL, 1% 2 G: 10 GEL TOPICAL at 13:05

## 2024-09-11 RX ADMIN — POLYETHYLENE GLYCOL 3350 17 G: 17 POWDER, FOR SOLUTION ORAL at 08:15

## 2024-09-11 RX ADMIN — ENOXAPARIN SODIUM 30 MG: 30 INJECTION SUBCUTANEOUS at 08:15

## 2024-09-11 RX ADMIN — BISACODYL 10 MG: 10 SUPPOSITORY RECTAL at 17:56

## 2024-09-11 RX ADMIN — CEFAZOLIN SODIUM 2000 MG: 2 SOLUTION INTRAVENOUS at 05:08

## 2024-09-11 RX ADMIN — METHOCARBAMOL TABLETS 500 MG: 500 TABLET, COATED ORAL at 21:28

## 2024-09-11 RX ADMIN — DICLOFENAC SODIUM TOPICAL GEL, 1% 2 G: 10 GEL TOPICAL at 17:39

## 2024-09-11 RX ADMIN — SENNOSIDES AND DOCUSATE SODIUM 2 TABLET: 8.6; 5 TABLET ORAL at 17:38

## 2024-09-11 NOTE — ASSESSMENT & PLAN NOTE
Lab Results   Component Value Date    HGBA1C 7.9 (A) 07/02/2024       Recent Labs     09/10/24  1622 09/10/24  2130 09/11/24  0739 09/11/24  1114   POCGLU 191* 188* 122 162*       Blood Sugar Average: Last 72 hrs:  (P) 175.0229331599617981  -stable  -continue to optimize glycemic control to slow progression of chronic kidney disease  -management per primary team

## 2024-09-11 NOTE — ASSESSMENT & PLAN NOTE
Lab Results   Component Value Date    HGBA1C 7.9 (A) 07/02/2024       Recent Labs     09/10/24  2130 09/11/24  0739 09/11/24  1114 09/11/24  1624   POCGLU 188* 122 162* 141*       Blood Sugar Average: Last 72 hrs:  (P) 172.8  Home regimen: Januvia 50 mg daily, insulin regular 5 units with lunch and dinner, Lantus 30 units in the morning (patient reported),  Patient reports not eating anything today, endorses a decreased appetite overall.    Plan:  Continue Lantus 30 units  Insulin sliding scale Insulin lispro 1-5 units   Fingerstick glucose  Carb controlled diet

## 2024-09-11 NOTE — CASE MANAGEMENT
Case Management Discharge Planning Note    Patient name Porsha Hoyos  Location S /S -01 MRN [id number]  : 1945 Date 2024       Current Admission Date: 2024  Current Admission Diagnosis:Acute on Chronic Back Pain in the Setting of Sepsis, MSSA Bacteremia   Patient Active Problem List    Diagnosis Date Noted Date Diagnosed    COVID 2024     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HCC) 2024     Sepsis (HCC): SIRS criteria IRINEO Bacteremia and COVID infection 2024     Type 2 diabetes mellitus with complication, with long-term current use of insulin ([facility]) 2024     Nausea and vomiting 2023     Hordeolum externum of left upper eyelid 08/15/2023     Proteinuria 2023     Metatarsalgia of left foot 06/15/2023     Peripheral vascular disease, unspecified ([facility]) 2023     Constipation 2022     Anemia 2022     Vitamin deficiency 2022     Shortness of breath 2022     Dysuria 2022     Elevated LFTs 2022     CINDY (acute kidney injury) ([facility]) 2022     Pancytopenia ([facility]) 2022     Asthma without status asthmaticus without complication 2022     History of colon polyps 2022     Gastroesophageal reflux disease 2022     Stage 3b chronic kidney disease (HCC) 2022     Hypothyroidism 2021     Type 2 diabetes mellitus with diabetic chronic kidney disease ([facility]) 2021     Type 2 diabetes mellitus with diabetic polyneuropathy ([facility]) 2021     Long term (current) use of insulin ([facility]) 2021     Type 2 diabetes mellitus with stable proliferative diabetic retinopathy, bilateral ([facility]) 2021     Acute pain of right shoulder 2019     Right elbow pain 2019     Acute pain of right shoulder due to trauma 2019     Fall at home 2019     Imbalance 2019     Acute on Chronic Back Pain in the Setting of Sepsis, MSSA Bacteremia 2018     Encntr for gyn exam (general) (routine) w  abnormal findings 08/28/2018     Vaginal atrophy 08/28/2018     Vulvar lesion 02/05/2018     Hypertension Management 01/29/2018     Mixed hyperlipidemia 01/29/2018       LOS (days): 3  Geometric Mean LOS (GMLOS) (days): 5.1  Days to GMLOS:2.3     OBJECTIVE:  Risk of Unplanned Readmission Score: 22.43         Current admission status: Inpatient   Preferred Pharmacy:   [facility] - [place] - [address] [address]  Phone: [phone] Fax: [phone]    Primary Care Provider: João Alfonso MD    Primary Insurance: [employer]  Secondary Insurance:     DISCHARGE DETAILS:    Discharge planning discussed with:: patient  Freedom of Choice: Yes  Comments - Freedom of Choice: CM f/u w/ patient re: [facility] choice. Patient relayed she is now considering STR and relayed no preference for facility, open to blanket referral. STR blanket referral sent via aidin, awaiting responses.

## 2024-09-11 NOTE — PLAN OF CARE
Problem: PHYSICAL THERAPY ADULT  Goal: Performs mobility at highest level of function for planned discharge setting.  See evaluation for individualized goals.  Description: Treatment/Interventions: Functional transfer training, LE strengthening/ROM, Therapeutic exercise, Endurance training, Cognitive reorientation, Patient/family training, Equipment eval/education, Bed mobility, Gait training, Spoke to nursing, Spoke to case management     See flowsheet documentation for full assessment, interventions and recommendations.  Outcome: Progressing  Note: Prognosis: Good  Problem List: Decreased strength, Decreased endurance, Impaired balance, Decreased mobility, Impaired judgement, Decreased safety awareness, Decreased skin integrity, Pain  Assessment: Pt seen for PT treatment 9/11/2024 with focus on: improve gait endurance and tolerance with improved pain levels. Pt presents seated OOB in recliner chair and is agreeable to participate in session. Pt participates in gait training and therapeutic activities, with intervention focusing on goal of improved independence and safety with mobility. During session, pt requires variable S to MARYBEL for STS transfers from various surfaces, S/CGA for ambulation of 18ft + 32ft with RW (baseline does not use AD for household and community distances). Pt completes 5x STS transfer assessment with B armrest support, Supervision and increased time/effort, in 76 seconds. Pt's score on 5xSTS assessment indicates high risk of falls, decreased LE power, and high risk of hospital readmission d/t falls as compared to age matched peers. At end of session pt was left seated OOB in recliner chair with alarm engaged and needs in reach. Pt notable fatigued at end of today's session. Overall pt displays significant improvement as compared to previous session, however continues to perform below their baseline level of functional mobility due to pain, weakness, decreased endurance, impaired balance.  Pt continues to most appropriately benefit from Level II (moderate PT intensity) resources upon d/c from the acute care setting. Continue skilled PT POC as able and appropriate in order to progress towards therapy goals and maximize independence with functional mobility. Next session, plan for intervention of initiation of LE therex as able and appropriate.    Barriers to Discharge: Decreased caregiver support (pt home alone)     Rehab Resource Intensity Level, PT: II (Moderate Resource Intensity)    See flowsheet documentation for full assessment.

## 2024-09-11 NOTE — ASSESSMENT & PLAN NOTE
Lab Results   Component Value Date    EGFR 22 09/11/2024    EGFR 16 09/10/2024    EGFR 16 09/09/2024    CREATININE 2.02 (H) 09/11/2024    CREATININE 2.62 (H) 09/10/2024    CREATININE 2.66 (H) 09/09/2024   Etiology: Suspect due to age-related nephron loss, hypertensive nephrosclerosis and diabetic kidney disease  -With proteinuria  -Outpatient nephrologist: Dr. Sanchez  -Will require follow-up on discharge

## 2024-09-11 NOTE — ASSESSMENT & PLAN NOTE
-Source: Unclear.  Concern for scalp lesion or possible lumbar discitis/OM  -BC: 1/2 +MSSA.  Concern for true Staph aureus bacteremia per ID  -+COVID  -On cefazolin vancomycin, Paxlovid   -Check repeat BC  -ID following  -management per primary team per ID

## 2024-09-11 NOTE — ASSESSMENT & PLAN NOTE
Hypotension/HTN/Volume status:  -BP normotensive, improved  -s/p IVF and albumin x 3  -volume status euvolemic  -Home medications: Bumex 2 mg twice daily, carvedilol 25 mg twice daily, doxazosin 2 mg nightly, Procardia XL 60 mg daily, losartan 100 mg daily  -Current medications: none currently  -continue to hold Losartan and diuretics  -s/p albumin 25 g IV every 8 hours x 3 and midodrine 2.5 mg x 1 on 9/10/2024  -Optimize hemodynamic status to avoid delay in renal recovery  -recommend hold parameters on antihypertensive's for SBP <130 mmHg.  -Avoid hypotension or fluctuations in blood pressure.  Maintain MAP >65  -continue to trend

## 2024-09-11 NOTE — PROGRESS NOTES
Progress Note - Nephrology   Name: Porsha Hoyos 79 y.o. female I MRN: [id number]  Unit/Bed#: S -01 I Date of Admission: 9/7/2024   Date of Service: 9/11/2024 I Hospital Day: 3     Reason for Consult:  CINDY    79 y.o. female with CKD 3B, hx CINDY, HTN, DM2, CAD, CVA, hypothyroidism, IBS, GERD, PAD p/w back pain.  Nephrology consulted for management of CINDY on CKD 3B  Assessment & Plan  CINDY (acute kidney injury) (HCC)  CINDY on CKD IIIB:   -Etiology: Suspect due to contrast associated nephropathy in the setting of hemodynamic changes from hypotension, diuretics, left renal artery stenosis and autoregulatory dysfunction with ARB.  -Admission creatinine 1.19 on 9/7/2024.  Today's creatinine 2.02, trending down  -Peak creatinine 2.66 on 9/9/2024  -baseline creatinine 1.2-1.5  -workup: UA with 1+ protein, 1-2 RBC, 2-4 WBC.  -urine sodium 31  -Imaging:  CTA with no hydronephrosis.  Renal ultrasound with stable cortical thinning consistent with medical renal disease.  -Renal function stable and creatinine trending down  -Continue to hold losartan, Bumex  -Avoid nephrotoxins, NSAIDs, IV contrast if possible  -Avoid hypotension, maintain MAP >65  -trend BMP in am  -Optimize hemodynamic status to avoid delay in renal recovery  Stage 3b chronic kidney disease (HCC)  Lab Results   Component Value Date    EGFR 22 09/11/2024    EGFR 16 09/10/2024    EGFR 16 09/09/2024    CREATININE 2.02 (H) 09/11/2024    CREATININE 2.62 (H) 09/10/2024    CREATININE 2.66 (H) 09/09/2024   Etiology: Suspect due to age-related nephron loss, hypertensive nephrosclerosis and diabetic kidney disease  -With proteinuria  -Outpatient nephrologist: Dr. Sanchez  -Will require follow-up on discharge  Hypertension Management  Hypotension/HTN/Volume status:  -BP normotensive, improved  -s/p IVF and albumin x 3  -volume status euvolemic  -Home medications: Bumex 2 mg twice daily, carvedilol 25 mg twice daily, doxazosin 2 mg nightly, Procardia XL 60 mg daily,  losartan 100 mg daily  -Current medications: none currently  -continue to hold Losartan and diuretics  -s/p albumin 25 g IV every 8 hours x 3 and midodrine 2.5 mg x 1 on 9/10/2024  -Optimize hemodynamic status to avoid delay in renal recovery  -recommend hold parameters on antihypertensive's for SBP <130 mmHg.  -Avoid hypotension or fluctuations in blood pressure.  Maintain MAP >65  -continue to trend    Hyponatremia  -possible in the setting of COVID infection, poor solute intake  -mild.  Serum sodium 133 today  -urine studies pending  -Recommend mild fluid restriction  -BMP in a.m.  Acute on Chronic Back Pain in the Setting of Sepsis, MSSA Bacteremia  -p/w abrupt onset mid back pain  -CTA unrevealing  -ongoing workup and management per primary team  Sepsis (HCC): SIRS criteria IRINEO Bacteremia and COVID infection  -Source: Unclear.  Concern for scalp lesion or possible lumbar discitis/OM  -BC: 1/2 +MSSA.  Concern for true Staph aureus bacteremia per ID  -+COVID  -On cefazolin vancomycin, Paxlovid   -Check repeat BC  -ID following  -management per primary team per ID  COVID  -tested positive on admission  -Respiratory status stable on RA  -On Paxlovid for 5 day course through tomorrow  -Management per primary team    Type 2 diabetes mellitus with complication, with long-term current use of insulin ([facility])  Lab Results   Component Value Date    HGBA1C 7.9 (A) 07/02/2024       Recent Labs     09/10/24  1622 09/10/24  2130 09/11/24  0739 09/11/24  1114   POCGLU 191* 188* 122 162*       Blood Sugar Average: Last 72 hrs:  (P) [id number]  -stable  -continue to optimize glycemic control to slow progression of chronic kidney disease  -management per primary team    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HCC)  -Asymptomatic   -incidental finding found on CTA  -Seen by vascular surgery and no recommendations for intervention  -Management per primary team      Plan Summary:  -Renal function improving  -Continue to hold losartan and  "diuretics  -BMP in a.m.    SUBJECTIVE:  Patient awake, alert without complaints.  Creatinine trending down.  Adequate urine output.  VSS    A complete review of systems was performed. Pertinent positives and negatives noted in the HPI. Otherwise the review of systems was negative.  OBJECTIVE:  Current Weight: Weight - Scale: 62.4 kg (137 lb 9.1 oz)  Vitals:    09/10/24 2100 09/11/24 0300 09/11/24 0735 09/11/24 1200   BP: (!) 108/42 117/76 137/51 137/51   BP Location: Right arm      Pulse:  73 78 78   Resp: 18  16    Temp: 99 °F (37.2 °C)  99 °F (37.2 °C)    TempSrc: Oral      SpO2:   91%    Weight:    62.4 kg (137 lb 9.1 oz)   Height:    4' 11.5\" (1.511 m)       Intake/Output Summary (Last 24 hours) at 9/11/2024 1434  Last data filed at 9/11/2024 0900  Gross per 24 hour   Intake 476 ml   Output 900 ml   Net -424 ml       General:  Awake, alert, appears comfortable and in no acute distress.  Nontoxic.  Skin:  No rash, warm, good skin turgor   Eyes:  PERRL, EOMI, sclerae nonicteric.  no periorbital edema   ENT:  Moist mucous membranes  Neck:  Trachea midline, symmetric.  No JVD.  Chest:  Clear to auscultation bilaterally without wheezes, crackles or rhonchi  CVS:  Regular rate and rhythm without murmur, gallop or rub.  S1 and S2 identified and normal.  No S3, S4.   Abdomen:  Soft, nontender, nondistended without masses.  Normal bowel sounds x 4 quadrants.  No bruit.  Extremities:  Warm, pink, motor and sensory intact and well perfused.  No cyanosis, pallor.  No BLE edema.  Neuro:  Awake, alert, oriented x3.  Grossly intact  Psych:  Appropriate affect.  Mentating appropriately.  Normal mental status exam       Medications:    Current Facility-Administered Medications:     acetaminophen (TYLENOL) tablet 975 mg, 975 mg, Oral, Q8H KORI, Catarino Michael MD, 975 mg at 09/11/24 1303    allopurinol (ZYLOPRIM) tablet 200 mg, 200 mg, Oral, Daily, Catarino Michael MD, 200 mg at 09/11/24 0815    aspirin (ECOTRIN LOW STRENGTH) EC " tablet 81 mg, 81 mg, Oral, Daily, Catarino Michael MD, 81 mg at 09/11/24 0815    calcium carbonate (TUMS) chewable tablet 500 mg, 500 mg, Oral, BID PRN, Emelina Mckee MD, 500 mg at 09/09/24 1828    calcium carbonate-vitamin D 500 mg-5 mcg tablet 1 tablet, 1 tablet, Oral, Daily With Breakfast, Catarino Michael MD, 1 tablet at 09/11/24 0815    ceFAZolin (ANCEF) IVPB (premix in dextrose) 2,000 mg 50 mL, 2,000 mg, Intravenous, Q12H, Ted Pritchard MD, Last Rate: 100 mL/hr at 09/11/24 1303, 2,000 mg at 09/11/24 1303    Diclofenac Sodium (VOLTAREN) 1 % topical gel 2 g, 2 g, Topical, 4x Daily, Catarino Michael MD, 2 g at 09/11/24 1305    enoxaparin (LOVENOX) subcutaneous injection 30 mg, 30 mg, Subcutaneous, Daily, Natalia Horn MD, 30 mg at 09/11/24 0815    famotidine (PEPCID) tablet 20 mg, 20 mg, Oral, Daily, Catarino Michael MD, 20 mg at 09/11/24 0815    fluticasone (FLONASE) 50 mcg/act nasal spray 2 spray, 2 spray, Nasal, Daily, Catarino Michael MD, 2 spray at 09/11/24 0816    insulin glargine (LANTUS) subcutaneous injection 30 Units 0.3 mL, 30 Units, Subcutaneous, QAM, Catarino Michael MD, 30 Units at 09/11/24 0815    insulin lispro (HumALOG/ADMELOG) 100 units/mL subcutaneous injection 1-5 Units, 1-5 Units, Subcutaneous, TID AC, 1 Units at 09/11/24 1303 **AND** Fingerstick Glucose (POCT), , , TID AC, Catarino Michael MD    insulin lispro (HumALOG/ADMELOG) 100 units/mL subcutaneous injection 1-5 Units, 1-5 Units, Subcutaneous, HS, Catarino Michael MD, 1 Units at 09/10/24 2306    levothyroxine tablet 100 mcg, 100 mcg, Oral, Early Morning, Catarino Michael MD, 100 mcg at 09/11/24 0508    lidocaine (LIDODERM) 5 % patch 1 patch, 1 patch, Topical, Daily, Rob Huerta Jr., DO, 1 patch at 09/11/24 1303    methocarbamol (ROBAXIN) tablet 500 mg, 500 mg, Oral, TID, Catarino Michael MD, 500 mg at 09/11/24 0815    montelukast (SINGULAIR) tablet 10 mg, 10 mg, Oral, HS, Catarino Michael MD, 10 mg at 09/10/24 2306     nirmatrelvir 150 mg x 1 and ritonavir 100 mg x 1 (Paxlovid) therapy pack, 2 tablet, Oral, BID, Catarino Michael MD, 2 tablet at 09/11/24 0816    oxyCODONE (ROXICODONE) split tablet 2.5 mg, 2.5 mg, Oral, Q6H PRN, Catarino Michael MD, 2.5 mg at 09/10/24 1212    polyethylene glycol (MIRALAX) packet 17 g, 17 g, Oral, Daily, Catarino Michael MD, 17 g at 09/11/24 0815    senna-docusate sodium (SENOKOT S) 8.6-50 mg per tablet 2 tablet, 2 tablet, Oral, BID, Catarino Michael MD, 2 tablet at 09/11/24 0815    traMADol (ULTRAM) tablet 50 mg, 50 mg, Oral, BID, Catarino Michael MD, 50 mg at 09/11/24 0815    Laboratory Results:  Results from last 7 days   Lab Units 09/11/24  0812 09/10/24  0606 09/09/24  0433 09/08/24  0443 09/07/24  1655   WBC Thousand/uL 6.56 8.08 11.55* 11.68* 10.83*   HEMOGLOBIN g/dL 9.2* 9.9* 10.1* 10.2* 12.2   HEMATOCRIT % 27.7* 29.7* 30.7* 31.2* 37.0   PLATELETS Thousands/uL 86* 93* 92* 114* 135*   SODIUM mmol/L 133* 134* 137 140 137   POTASSIUM mmol/L 3.6 3.8 3.9 3.8 5.0   CHLORIDE mmol/L 102 102 103 108 102   CO2 mmol/L 22 23 25 23 26   BUN mg/dL 66* 59* 49* 38* 43*   CREATININE mg/dL 2.02* 2.62* 2.66* 1.11 1.19   CALCIUM mg/dL 8.6 8.7 9.0 8.9 10.1   MAGNESIUM mg/dL  --   --  2.4 1.7*  --        I have personally reviewed the blood work as stated above and in my note.  I have personally reviewed internal Medicine, co-consultants and previous nephrology notes.

## 2024-09-11 NOTE — CASE MANAGEMENT
Case Management Discharge Planning Note    Patient name Porsha Hoyos  Location S /S -01 MRN [id number]  : 1945 Date 2024       Current Admission Date: 2024  Current Admission Diagnosis:Acute on Chronic Back Pain in the Setting of Sepsis, MSSA Bacteremia   Patient Active Problem List    Diagnosis Date Noted Date Diagnosed    Hyponatremia 2024     COVID 2024     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HCC) 2024     Sepsis (HCC): SIRS criteria IRINEO Bacteremia and COVID infection 2024     Type 2 diabetes mellitus with complication, with long-term current use of insulin ([facility]) 2024     Nausea and vomiting 2023     Hordeolum externum of left upper eyelid 08/15/2023     Proteinuria 2023     Metatarsalgia of left foot 06/15/2023     Peripheral vascular disease, unspecified ([facility]) 2023     Constipation 2022     Anemia 2022     Vitamin deficiency 2022     Shortness of breath 2022     Dysuria 2022     Elevated LFTs 2022     CINDY (acute kidney injury) ([facility]) 2022     Pancytopenia ([facility]) 2022     Asthma without status asthmaticus without complication 2022     History of colon polyps 2022     Gastroesophageal reflux disease 2022     Stage 3b chronic kidney disease ([facility]) 2022     Hypothyroidism 2021     Type 2 diabetes mellitus with diabetic chronic kidney disease ([facility]) 2021     Type 2 diabetes mellitus with diabetic polyneuropathy ([facility]) 2021     Long term (current) use of insulin ([facility]) 2021     Type 2 diabetes mellitus with stable proliferative diabetic retinopathy, bilateral ([facility]) 2021     Acute pain of right shoulder 2019     Right elbow pain 2019     Acute pain of right shoulder due to trauma 2019     Fall at home 2019     Imbalance 2019     Acute on Chronic Back Pain in the Setting of Sepsis, MSSA Bacteremia 2018     Encntr for gyn  exam (general) (routine) w abnormal findings 08/28/2018     Vaginal atrophy 08/28/2018     Vulvar lesion 02/05/2018     Hypertension Management 01/29/2018     Mixed hyperlipidemia 01/29/2018       LOS (days): 3  Geometric Mean LOS (GMLOS) (days): 5.1  Days to GMLOS:2.1     OBJECTIVE:  Risk of Unplanned Readmission Score: 22.48         Current admission status: Inpatient   Preferred Pharmacy:   [facility] - [place] - [address]  [address]  [place] [address]  Phone: [phone] Fax: [phone]    Primary Care Provider: João Alfonso MD    Primary Insurance: [employer]  Secondary Insurance:     DISCHARGE DETAILS:    Discharge planning discussed with:: patient, son and DIL at bedside  [place] of Choice: Yes  Comments - Freedom of Choice: CM f/u with pt and family at bedside re: STR options available - pt choice list provided. Pt and family relayed choice for [facility]. STR auth to be tasked to CM d/c support closer to pt d/c timeframe. CM to continue to follow for pt STR needs.  CM contacted family/caregiver?: Yes  Were Treatment Team discharge recommendations reviewed with patient/caregiver?: Yes  Did patient/caregiver verbalize understanding of patient care needs?: N/A- going to facility  Were patient/caregiver advised of the risks associated with not following Treatment Team discharge recommendations?: Yes    Contacts  Patient Contacts: Rob (son)  Relationship to Patient:: Family  Contact Method: In Person  Reason/Outcome: Continuity of Care, Emergency Contact, Referral, Discharge Planning              Other Referral/Resources/Interventions Provided:  Interventions: Short Term Rehab  Referral Comments: [facility] reserved in aidin. STR auth to be tasked to CM d/c support closer to pt d/c timeframe.         Treatment Team Recommendation: Short Term Rehab  Discharge Destination Plan:: Short Term Rehab  Transport at Discharge : Wheelchair van

## 2024-09-11 NOTE — ASSESSMENT & PLAN NOTE
Acute exacerbation of chronic back pain, home regimen tramadol twice daily and Robaxin 3 times daily.  Denies any recent injury, denies lifting any heavy objects, woke up from sleep with exacerbation back pain, unable to ambulate due to pain.  Denies urinary retention   On admission significant for Fever 103, chills, +covid, reports intermittent dysuria, lower abdomen tender to palpation.   WBC on 9/10: improved from leukocytosis on admission, now 8.08 compared to yesterday 11.55   Pro-Kp today on 910 downtrended to 11.16 from yesterday 17.59. No lactic acidosis.  Blood culture #1 returned positive 9/8 for staph aureus, likely MSSA. Blood culture 2: no growth at 48 hours   Patient still in significant amount of pain, had some bladder incontinence. Denies urinary retention   Pain levels improved since admission but still debilitating   Tenderness to palpation on musculosketal exam. Limited mobility. No neurological deficts on physical exam. Denies any numbness tingling bowel or bladder incontinence.  9/10: MRI lumbar spine without contrast possible discitis osteomyelitis.  Infectious disease is on board Transitioned to IV Cefazolin.   Currently on Cefazolin IV. Has received 6 doses of cephalosporins   9/10: Chronic scalp lession-possible source of bacteremia.    PT recommends level 2  Echocardiogram resulted no findings of infectious endocarditis     Plan:  Assessment for Acute on Chronic Back pain in the setting of sepsis: Ddx discitis osteomyelitis 2/2 to MSSA bactermia spread to the spine possible acute vertebral fracture/trauma more likely than abscess/phlegmon   Infectious disease recommendations   Continue IV cefazolin   Follow second round of blood cultures   Trend fever, WBC, and procalcitonin   Dermatology consult placed for chronic scalp lession-possible source of bacteremia. Appreciate recommendations.  Multimodal pain regimen:   Scheduled medications of Tramadol 50 mg BID and ROBACIN 500 mg TID   Along  with the following PRN medications PRN oycodone 2.5 mg, Home Robaxin 3 times daily, Topical Voltaren gel, Aqua K-pad, Lidocaine patch  Urinary retention protocol  Continue PT recommendations.   Disposition: On [facility] for continued clinical management.

## 2024-09-11 NOTE — ASSESSMENT & PLAN NOTE
Patient's creatinine on compared to admission creatinine 1.11.   Patient received a lactated Ringer 1 bolus 500 cc in the ED then placed on 50 ml/hr IV continous   Patient was on BUMEX 2 g, losartan 100 mg,   UA: +2 urine protein, RBC urine 2-4. no hyaline cast or granular cast  In the ED patient had IV contrast exposure on CTA dissection chest abdomen and pelvis   Blood pressure is on the softer side 101-106/36-40 with MAP ranges now at 58-60.   Patient reports decreased oral intake for past 48 hours. Reports generalized malise and fatigue  Creatinine 9/9: 2.66 -> Cr today on 9/11 2.02  Renal ultrasound showed symmetrically sized kidneys.  The right kidney 9.8 cm on the left kidney 10.9 cm both kidneys are diffusely cortical thinning with lobulated contours no hydronephrosis  Plan  Assessment:  Worsening creatinine within 48 hours of contrast exposure puts acute tubular necrosis higher on differential for cause of CINDY  Follow nephrology recommendations:   Holding nephrotoxic agents  losartan and BUMEX to treat CINDY and hypotension.   Avoid NSIADs.   Daily BMP to monitor creatinine.

## 2024-09-11 NOTE — PROGRESS NOTES
Progress Note - Infectious Disease   Porsha Hoyos 79 y.o. female MRN: [id number]  Unit/Bed#: S -01 Encounter: [id number]      Impression/Recommendations:  1.  Sepsis, with fever, mild leukocytosis, with elevated procalcitonin.  Although patient has COVID, leukocytosis and elevated procalcitonin suggest presence of underlying bacterial infection.  There is no obvious source of active infection, other than Staph aureus bacteremia (see below).  Patient is clinically improved.  Fever has resolved.  WBC has normalized.  Despite sepsis, patient has remained systemically well, without toxicity and hemodynamically stable, without hypotension.  Antibiotic plan as in below.  Monitor temperature/WBC.  Monitor hemodynamics.     2.  MSSA bacteremia.  Although there was growth in only 1 out of 2 admission blood cultures, given sepsis, leukocytosis and elevated procalcitonin, without obvious source of active infection, concern is for true Staph aureus bacteremia.  Patient has moderately poor blood draw and IV access.  She does have a chronic skin lesion in her scalp that may be etiology of translocation.  Patient is clinically improved.  Continue high-dose IV cefazolin.  Follow-up repeat blood cultures.  Follow-up TTE.  Monitor temperature/WBC.     3.  Acute exacerbation of chronic low back pain.  Patient has no history of recent trauma.  Lumbar spine MRI with extensive chronicity and difficult to tell whether there is acute discitis/vertebral osteomyelitis.  If patient does indeed have true MSSA bacteremia, given acute exacerbation chronic low back pain and equivocal MRI findings, she may have lumbar discitis/osteomyelitis.  No evidence of paraspinal or epidural abscess on MRI.  Patient has no focal neurological deficit.  Antibiotic plan as in above.  Monitor low back pain.  Pain control per primary service.     4.  Mild COVID.  Given multiple comorbid illnesses, patient is currently on Paxlovid to prevent progression of  symptoms.  Patient currently has minimal respiratory symptoms and is not hypoxic.  She is not on any O2 supplement.  Chest CT is without evidence of bacterial superinfection.  Complete 5-day Paxlovid course through tomorrow.  Monitor respiratory symptoms.     5.  Chronic scalp lesion, for many months, according to patient.  Scalp lesion is concerning for squamous cell carcinoma.  Although there is no evidence of cellulitis, this may be etiology of Staph aureus translocation.  Recommend dermatology evaluation for punch biopsy.     6.  CKD stage III.  Creatinine elevated from baseline, now improved.  Antibiotic dosages adjusted accordingly.  Monitor creatinine.     7.  DM, poorly controlled, with hyperglycemia and elevated hemoglobin A1c.  This is risk factor for infection above.  Management per primary service.     Discussed with patient in detail regarding the above plan.    Antibiotics:  Cefazolin # 2  Ceftriaxone # 4    Subjective:  Patient is comfortable.  Stable low back pain.  No dyspnea or cough.  Temperature stays down.  No chills.  She is tolerating antibiotic well.  No nausea, vomiting or diarrhea.    Objective:  Vitals:  Temp:  [98.2 °F (36.8 °C)-99 °F (37.2 °C)] 99 °F (37.2 °C)  HR:  [73-78] 78  Resp:  [16-18] 16  BP: (108-137)/(40-76) 137/51  SpO2:  [91 %] 91 %  Temp (24hrs), Av.7 °F (37.1 °C), Min:98.2 °F (36.8 °C), Max:99 °F (37.2 °C)  Current: Temperature: 99 °F (37.2 °C)    Physical Exam:     General: Awake, alert, cooperative, no distress.   Neck:  Supple. No mass.  No lymphadenopathy.   Lungs: Expansion symmetric, no rales, no wheezing, respirations unlabored.   Heart:  Regular rate and rhythm, S1 and S2 normal, no murmur.   Abdomen: Soft, nondistended, non-tender, bowel sounds active all four quadrants, no masses, no organomegaly.   Extremities: No edema. No erythema/warmth. No ulcer. Nontender to palpation.   Skin:  No rash.   Neuro: Moves all extremities.     Invasive Devices        Peripheral Intravenous Line  Duration             Peripheral IV 09/07/24 Left;Ventral (anterior) Wrist 3 days                    Labs studies:   I have personally reviewed pertinent labs.  Results from last 7 days   Lab Units 09/11/24  0812 09/10/24  0606 09/09/24  0433 09/08/24  0443 09/07/24  1655   POTASSIUM mmol/L 3.6 3.8 3.9   < > 5.0   CHLORIDE mmol/L 102 102 103   < > 102   CO2 mmol/L 22 23 25   < > 26   BUN mg/dL 66* 59* 49*   < > 43*   CREATININE mg/dL 2.02* 2.62* 2.66*   < > 1.19   EGFR ml/min/1.73sq m 22 16 16   < > 42   CALCIUM mg/dL 8.6 8.7 9.0   < > 10.1   AST U/L  --   --   --   --  36   ALT U/L  --   --   --   --  30   ALK PHOS U/L  --   --   --   --  80    < > = values in this interval not displayed.     Results from last 7 days   Lab Units 09/11/24  0812 09/10/24  0606 09/09/24  0433   WBC Thousand/uL 6.56 8.08 11.55*   HEMOGLOBIN g/dL 9.2* 9.9* 10.1*   PLATELETS Thousands/uL 86* 93* 92*     Results from last 7 days   Lab Units 09/10/24  1452 09/07/24  1655 09/07/24  1645   BLOOD CULTURE  Received in Microbiology Lab. Culture in Progress.  Received in Microbiology Lab. Culture in Progress. Staphylococcus aureus* No Growth at 72 hrs.   GRAM STAIN RESULT   --  Gram positive cocci in clusters*  --        Imaging Studies:   I have personally reviewed pertinent imaging study reports and images in PACS.    EKG, Pathology, and Other Studies:   I have personally reviewed pertinent reports.

## 2024-09-11 NOTE — ASSESSMENT & PLAN NOTE
Lab Results   Component Value Date    EGFR 22 09/11/2024    EGFR 16 09/10/2024    EGFR 16 09/09/2024    CREATININE 2.02 (H) 09/11/2024    CREATININE 2.62 (H) 09/10/2024    CREATININE 2.66 (H) 09/09/2024   Baseline creatinine 1.2-1.5  Urine output on 9/9 was 200 ml as per nursing   Plan   See under CINDY as above
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
## 2024-09-11 NOTE — ASSESSMENT & PLAN NOTE
-possible in the setting of COVID infection, poor solute intake  -mild.  Serum sodium 133 today  -urine studies pending  -Recommend mild fluid restriction  -BMP in a.m.

## 2024-09-11 NOTE — ASSESSMENT & PLAN NOTE
-tested positive on admission  -Respiratory status stable on RA  -On Paxlovid for 5 day course through tomorrow  -Management per primary team

## 2024-09-11 NOTE — ASSESSMENT & PLAN NOTE
"Fever 103, chills, +covid, reports intermittent dysuria, lower abdomen tender to palpation.  Last BM this morning of admission, no constipation or diarrhea.  WBC on 9/9: 11.55 from admission 10.83  Pro-Kp today on 9/9 uptrended to 14.59 from 9/8/2024 value of  3.4  No lactic acidosis.  Blood culture #1 returned positive 9/8 for staph aureus, likely MSSA.  Today patient will receive 4 doses of cephalosporins. Currently on ceftriaxone .   In the ED patient received 1,500 mg of vancomycin  On Paxlovid for COVID today is day 4   Patient has recived 6 days of cephalosporin antibiotics: 3 days of ceftriaxone and 3 days of cefazolin as of 9/11/2024  No respiratory distress on room air    Plan:  Continue Paxlovid, renally adjusted dose for COVID for 1 more days (total of 5 days)   Continue IV cefazolin   See above under \"Low Back Pain\" for rest of management.   "

## 2024-09-11 NOTE — ASSESSMENT & PLAN NOTE
Blood pressure elevated in the ED likely secondary to acute exacerbation of pain, improved with pain control.  Home regimen BUMEX, Coreg, losartan, nifedipine.  Patient became hypotensive with low MAP ranges during admission.   BUMEX, losartan, COREG, nifedipine, are all held   9/10 Received midodrine at 3;30 pm today   Recent BP uptrended. MAP has been stable today as well  Cortisol am 14.9     Plan:  Holding nephrotoxic agents  losartan and BUMEX to treat CINDY  Today will also hold carvediol while monitoring blood pressure.   Continue to monitor blood pressure

## 2024-09-11 NOTE — ASSESSMENT & PLAN NOTE
CINDY on CKD IIIB:   -Etiology: Suspect due to contrast associated nephropathy in the setting of hemodynamic changes from hypotension, diuretics, left renal artery stenosis and autoregulatory dysfunction with ARB.  -Admission creatinine 1.19 on 9/7/2024.  Today's creatinine 2.02, trending down  -Peak creatinine 2.66 on 9/9/2024  -baseline creatinine 1.2-1.5  -workup: UA with 1+ protein, 1-2 RBC, 2-4 WBC.  -urine sodium 31  -Imaging:  CTA with no hydronephrosis.  Renal ultrasound with stable cortical thinning consistent with medical renal disease.  -Renal function stable and creatinine trending down  -Continue to hold losartan, Bumex  -Avoid nephrotoxins, NSAIDs, IV contrast if possible  -Avoid hypotension, maintain MAP >65  -trend BMP in am  -Optimize hemodynamic status to avoid delay in renal recovery
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
## 2024-09-12 PROBLEM — E87.1 HYPONATREMIA: Status: RESOLVED | Noted: 2024-09-11 | Resolved: 2024-09-12

## 2024-09-12 PROBLEM — N17.9 AKI (ACUTE KIDNEY INJURY) (HCC): Status: RESOLVED | Noted: 2022-06-27 | Resolved: 2024-09-12

## 2024-09-12 LAB
ANION GAP SERPL CALCULATED.3IONS-SCNC: 10 MMOL/L (ref 4–13)
BACTERIA BLD CULT: ABNORMAL
BACTERIA BLD CULT: NORMAL
BASOPHILS # BLD AUTO: 0.03 THOUSANDS/ΜL (ref 0–0.1)
BASOPHILS NFR BLD AUTO: 0 % (ref 0–1)
BUN SERPL-MCNC: 68 MG/DL (ref 5–25)
CALCIUM SERPL-MCNC: 9.1 MG/DL (ref 8.4–10.2)
CHLORIDE SERPL-SCNC: 105 MMOL/L (ref 96–108)
CO2 SERPL-SCNC: 23 MMOL/L (ref 21–32)
CREAT SERPL-MCNC: 1.6 MG/DL (ref 0.6–1.3)
EOSINOPHIL # BLD AUTO: 0.08 THOUSAND/ΜL (ref 0–0.61)
EOSINOPHIL NFR BLD AUTO: 1 % (ref 0–6)
ERYTHROCYTE [DISTWIDTH] IN BLOOD BY AUTOMATED COUNT: 14.2 % (ref 11.6–15.1)
GFR SERPL CREATININE-BSD FRML MDRD: 30 ML/MIN/1.73SQ M
GLUCOSE SERPL-MCNC: 131 MG/DL (ref 65–140)
GLUCOSE SERPL-MCNC: 139 MG/DL (ref 65–140)
GLUCOSE SERPL-MCNC: 213 MG/DL (ref 65–140)
GLUCOSE SERPL-MCNC: 253 MG/DL (ref 65–140)
GLUCOSE SERPL-MCNC: 281 MG/DL (ref 65–140)
GRAM STN SPEC: ABNORMAL
HCT VFR BLD AUTO: 29.5 % (ref 34.8–46.1)
HGB BLD-MCNC: 9.9 G/DL (ref 11.5–15.4)
IMM GRANULOCYTES # BLD AUTO: 0.09 THOUSAND/UL (ref 0–0.2)
IMM GRANULOCYTES NFR BLD AUTO: 1 % (ref 0–2)
LYMPHOCYTES # BLD AUTO: 0.95 THOUSANDS/ΜL (ref 0.6–4.47)
LYMPHOCYTES NFR BLD AUTO: 13 % (ref 14–44)
MCH RBC QN AUTO: 31.7 PG (ref 26.8–34.3)
MCHC RBC AUTO-ENTMCNC: 33.6 G/DL (ref 31.4–37.4)
MCV RBC AUTO: 95 FL (ref 82–98)
MONOCYTES # BLD AUTO: 0.88 THOUSAND/ΜL (ref 0.17–1.22)
MONOCYTES NFR BLD AUTO: 12 % (ref 4–12)
NEUTROPHILS # BLD AUTO: 5.34 THOUSANDS/ΜL (ref 1.85–7.62)
NEUTS SEG NFR BLD AUTO: 73 % (ref 43–75)
NRBC BLD AUTO-RTO: 0 /100 WBCS
OSMOLALITY UR/SERPL-RTO: 310 MMOL/KG (ref 282–298)
PLATELET # BLD AUTO: 114 THOUSANDS/UL (ref 149–390)
PMV BLD AUTO: 11.8 FL (ref 8.9–12.7)
POTASSIUM SERPL-SCNC: 3.7 MMOL/L (ref 3.5–5.3)
RBC # BLD AUTO: 3.12 MILLION/UL (ref 3.81–5.12)
SODIUM SERPL-SCNC: 138 MMOL/L (ref 135–147)
WBC # BLD AUTO: 7.37 THOUSAND/UL (ref 4.31–10.16)

## 2024-09-12 PROCEDURE — 99232 SBSQ HOSP IP/OBS MODERATE 35: CPT | Performed by: INTERNAL MEDICINE

## 2024-09-12 PROCEDURE — 99233 SBSQ HOSP IP/OBS HIGH 50: CPT | Performed by: INTERNAL MEDICINE

## 2024-09-12 PROCEDURE — 82948 REAGENT STRIP/BLOOD GLUCOSE: CPT

## 2024-09-12 PROCEDURE — NC001 PR NO CHARGE: Performed by: DERMATOLOGY

## 2024-09-12 PROCEDURE — 97530 THERAPEUTIC ACTIVITIES: CPT

## 2024-09-12 PROCEDURE — 85025 COMPLETE CBC W/AUTO DIFF WBC: CPT

## 2024-09-12 PROCEDURE — 83930 ASSAY OF BLOOD OSMOLALITY: CPT | Performed by: INTERNAL MEDICINE

## 2024-09-12 PROCEDURE — 80048 BASIC METABOLIC PNL TOTAL CA: CPT | Performed by: PHYSICIAN ASSISTANT

## 2024-09-12 PROCEDURE — 97535 SELF CARE MNGMENT TRAINING: CPT

## 2024-09-12 RX ORDER — POLYETHYLENE GLYCOL 3350 17 G/17G
17 POWDER, FOR SOLUTION ORAL 2 TIMES DAILY
Status: DISCONTINUED | OUTPATIENT
Start: 2024-09-12 | End: 2024-09-14 | Stop reason: HOSPADM

## 2024-09-12 RX ADMIN — FAMOTIDINE 20 MG: 20 TABLET ORAL at 09:04

## 2024-09-12 RX ADMIN — POLYETHYLENE GLYCOL 3350 17 G: 17 POWDER, FOR SOLUTION ORAL at 09:08

## 2024-09-12 RX ADMIN — ALLOPURINOL 200 MG: 100 TABLET ORAL at 09:04

## 2024-09-12 RX ADMIN — INSULIN LISPRO 2 UNITS: 100 INJECTION, SOLUTION INTRAVENOUS; SUBCUTANEOUS at 21:24

## 2024-09-12 RX ADMIN — MONTELUKAST 10 MG: 10 TABLET, FILM COATED ORAL at 21:21

## 2024-09-12 RX ADMIN — METHOCARBAMOL TABLETS 500 MG: 500 TABLET, COATED ORAL at 09:04

## 2024-09-12 RX ADMIN — NIRMATRELVIR AND RITONAVIR 2 TABLET: KIT at 17:07

## 2024-09-12 RX ADMIN — INSULIN GLARGINE 30 UNITS: 100 INJECTION, SOLUTION SUBCUTANEOUS at 09:07

## 2024-09-12 RX ADMIN — METHOCARBAMOL TABLETS 500 MG: 500 TABLET, COATED ORAL at 16:04

## 2024-09-12 RX ADMIN — NIRMATRELVIR AND RITONAVIR 2 TABLET: KIT at 10:25

## 2024-09-12 RX ADMIN — LEVOTHYROXINE SODIUM 100 MCG: 100 TABLET ORAL at 05:04

## 2024-09-12 RX ADMIN — INSULIN LISPRO 3 UNITS: 100 INJECTION, SOLUTION INTRAVENOUS; SUBCUTANEOUS at 17:05

## 2024-09-12 RX ADMIN — SENNOSIDES AND DOCUSATE SODIUM 2 TABLET: 8.6; 5 TABLET ORAL at 17:07

## 2024-09-12 RX ADMIN — ACETAMINOPHEN 975 MG: 325 TABLET ORAL at 21:20

## 2024-09-12 RX ADMIN — ACETAMINOPHEN 975 MG: 325 TABLET ORAL at 05:04

## 2024-09-12 RX ADMIN — DICLOFENAC SODIUM TOPICAL GEL, 1% 2 G: 10 GEL TOPICAL at 09:08

## 2024-09-12 RX ADMIN — ENOXAPARIN SODIUM 30 MG: 30 INJECTION SUBCUTANEOUS at 09:09

## 2024-09-12 RX ADMIN — POLYETHYLENE GLYCOL 3350 17 G: 17 POWDER, FOR SOLUTION ORAL at 21:21

## 2024-09-12 RX ADMIN — CEFAZOLIN SODIUM 2000 MG: 2 SOLUTION INTRAVENOUS at 14:54

## 2024-09-12 RX ADMIN — SENNOSIDES AND DOCUSATE SODIUM 2 TABLET: 8.6; 5 TABLET ORAL at 09:04

## 2024-09-12 RX ADMIN — INSULIN LISPRO 2 UNITS: 100 INJECTION, SOLUTION INTRAVENOUS; SUBCUTANEOUS at 12:37

## 2024-09-12 RX ADMIN — DICLOFENAC SODIUM TOPICAL GEL, 1% 2 G: 10 GEL TOPICAL at 21:20

## 2024-09-12 RX ADMIN — ASPIRIN 81 MG: 81 TABLET, COATED ORAL at 09:04

## 2024-09-12 RX ADMIN — DICLOFENAC SODIUM TOPICAL GEL, 1% 2 G: 10 GEL TOPICAL at 12:39

## 2024-09-12 RX ADMIN — CEFAZOLIN SODIUM 2000 MG: 2 SOLUTION INTRAVENOUS at 05:04

## 2024-09-12 RX ADMIN — DICLOFENAC SODIUM TOPICAL GEL, 1% 2 G: 10 GEL TOPICAL at 17:08

## 2024-09-12 RX ADMIN — LIDOCAINE 5% 1 PATCH: 700 PATCH TOPICAL at 10:26

## 2024-09-12 RX ADMIN — TRAMADOL HYDROCHLORIDE 50 MG: 50 TABLET, COATED ORAL at 17:07

## 2024-09-12 RX ADMIN — Medication 1 TABLET: at 09:03

## 2024-09-12 RX ADMIN — ACETAMINOPHEN 975 MG: 325 TABLET ORAL at 14:53

## 2024-09-12 RX ADMIN — METHOCARBAMOL TABLETS 500 MG: 500 TABLET, COATED ORAL at 21:21

## 2024-09-12 RX ADMIN — TRAMADOL HYDROCHLORIDE 50 MG: 50 TABLET, COATED ORAL at 09:04

## 2024-09-12 RX ADMIN — FLUTICASONE PROPIONATE 2 SPRAY: 50 SPRAY, METERED NASAL at 09:07

## 2024-09-12 NOTE — PLAN OF CARE
Problem: OCCUPATIONAL THERAPY ADULT  Goal: Performs self-care activities at highest level of function for planned discharge setting.  See evaluation for individualized goals.  Description: Treatment Interventions: ADL retraining, Functional transfer training, UE strengthening/ROM, Endurance training, Patient/family training, Equipment evaluation/education, Fine motor coordination activities, Compensatory technique education, Continued evaluation          See flowsheet documentation for full assessment, interventions and recommendations.   Outcome: Progressing  Note: Limitation: Decreased ADL status, Decreased UE strength, Decreased Safe judgement during ADL, Decreased cognition, Decreased endurance, Decreased self-care trans, Decreased high-level ADLs (pain, balance, functional reach)  Prognosis: Good  Assessment: Patient seen for OT treatment on 9/12/2024 s/p admission for Low back pain Patient agreeable to OT session. Patient participated in dressing , self-care transfers, bed mobility , and functional mobility with intervention focus on maximizing activity tolerance for ADL participation. Porsha Hoyos is showing slow progress in transfers and mobility but is continues to be very limited by pain, generalized weakness. . Personal factors continuing to impact D/C include: decreased caregiver status  and Assistance needed for ADLs and functional mobility From OT standpoint, patient would benefit from skilled intervention to maximize independence with ADLs and functional mobility. Goals remain appropriate, continue POC. At this time, recommending D/C to: Level 2: moderate resource intensity     Rehab Resource Intensity Level, OT: II (Moderate Resource Intensity)     Jeanne Gallo, OT

## 2024-09-12 NOTE — PROGRESS NOTES
Progress Note - Hospitalist   Name: Porsha Hoyos 79 y.o. female I MRN: [id number]  Unit/Bed#: S -01 I Date of Admission: 9/7/2024   Date of Service: 9/12/2024 I Hospital Day: 4    Assessment & Plan  Acute on Chronic Back Pain in the Setting of Sepsis, MSSA Bacteremia  Acute exacerbation of chronic back pain, home regimen tramadol twice daily and Robaxin 3 times daily.  Denies any recent injury, denies lifting any heavy objects, woke up from sleep with exacerbation back pain, unable to ambulate due to pain.  Denies urinary retention   On admission significant for Fever 103, chills, +covid, reports intermittent dysuria, lower abdomen tender to palpation.   WBC on 9/10: improved from leukocytosis on admission, now 8.08 compared to yesterday 11.55   Pro-Kp today on 910 downtrended to 11.16 from yesterday 17.59. No lactic acidosis.  Blood culture #1 returned positive 9/8 for staph aureus, likely MSSA. Blood culture 2: no growth at 48 hours   Patient still in significant amount of pain, had some bladder incontinence. Denies urinary retention   Pain levels improved since admission but still debilitating   Tenderness to palpation on musculosketal exam. Limited mobility. No neurological deficts on physical exam. Denies any numbness tingling bowel or bladder incontinence.  9/10: MRI lumbar spine without contrast possible discitis osteomyelitis.  Infectious disease is on board Transitioned to IV Cefazolin.   Currently on Cefazolin IV. Has received 7 doses of cephalosporins todate  9/10: Chronic scalp lession-possible source of bacteremia.   PT recommends level 2  TTE Echocardiogram resulted no findings of valvular abnormalities.   Repeat blood cultures ordered on 9/10: Show No growth at 24 hours    Plan:  Assessment for Acute on Chronic Back pain in the setting of sepsis: Ddx discitis osteomyelitis 2/2 to MSSA bactermia spread to the spine possible acute vertebral fracture/trauma more likely than abscess/phlegmon    Infectious disease recommendations   Continue IV cefazolin   Follow second round of blood cultures   Will check inflammatory markers before advancing management, ESR and C-reactive protein. If negative markers and repeat blood cultures remain negative for bacterial growth then will not treat for discitis. If markers are elevated then will treat for discitis/osteomyelitis.   Trend fever, WBC, and procalcitonin   Dermatology consult placed for chronic scalp lession-possible source of bacteremia. Appreciate recommendations.  Multimodal pain regimen:   Scheduled medications of Tramadol 50 mg BID and ROBACIN 500 mg TID   Along with the following PRN medications PRN oycodone 2.5 mg, Home Robaxin 3 times daily, Topical Voltaren gel, Aqua K-pad, Lidocaine patch  Urinary retention protocol  Continue PT recommendations.   Derm outpatient evaluation for scalp wound  Disposition: On [facility] for continued clinical management.     CINDY (acute kidney injury) (HCC)-resolving  Patient's creatinine on compared to admission creatinine 1.11.   Patient received a lactated Ringer 1 bolus 500 cc in the ED then placed on 50 ml/hr IV continous   Patient was on BUMEX 2 g, losartan 100 mg,   UA: +2 urine protein, RBC urine 2-4. no hyaline cast or granular cast  In the ED patient had IV contrast exposure on CTA dissection chest abdomen and pelvis   Blood pressure is on the softer side 101-106/36-40 with MAP ranges now at 58-60.   Patient reports decreased oral intake for past 48 hours. Reports generalized malise and fatigue  Creatinine 9/9: 2.66 -> Cr today on 9/11 2.02  Renal ultrasound showed symmetrically sized kidneys.  The right kidney 9.8 cm on the left kidney 10.9 cm both kidneys are diffusely cortical thinning with lobulated contours no hydronephrosis  Plan  Assessment:  Worsening creatinine within 48 hours of contrast exposure puts acute tubular necrosis higher on differential for cause of CINDY  Follow nephrology recommendations:    Currently holding losartan and Bumex  Avoid nephrotoxins, NSAIDs, IV contrast if possible  Avoid hypotension, maintain MAP >65  Trend BMP in am  Optimize hemodynamic status to avoid delay in renal recovery  Daily BMP to monitor creatinine.   Hypertension Management  Blood pressure elevated in the ED likely secondary to acute exacerbation of pain, improved with pain control.  Home medications: Bumex 2 mg twice daily, carvedilol 25 mg twice daily, doxazosin 2 mg nightly, Procardia XL 60 mg daily, losartan 100 mg daily   Patient became hypotensive with low MAP ranges during admission.   s/p IVF and albumin x 3   BUMEX, losartan, COREG, nifedipine, are all held   9/10 Received midodrine at 3;30 pm today   Recent BP uptrended. MAP has been stable today as well  Cortisol am 14.9     Plan:  Holding nephrotoxic agents  losartan and BUMEX to treat CINDY  Today will also hold carvediol while monitoring blood pressure.   Continue to monitor blood pressure  Follow nephrology recommendations:   Avoid nephrotoxins, NSAIDs, IV contrast if possible  Avoid hypotension, maintain MAP >65  trend BMP in am  Optimize hemodynamic status to avoid delay in renal recovery  Follow cardiology recommendations:   Recommend hold parameters on antihypertensive's for SBP <130 mmHg.  Avoid hypotension or fluctuations in blood pressure.  Maintain MAP >65  If blood pressure remains greater than 140 systolic persistently can restart losartan 25 mg on discharge  Sepsis (HCC): SIRS criteria MSSA Bacteremia and COVID infection  Fever 103, chills, +covid, reports intermittent dysuria, lower abdomen tender to palpation.  Last BM this morning of admission, no constipation or diarrhea.  WBC on 9/9: 11.55 from admission 10.83  Pro-Kp today on 9/9 uptrended to 14.59 from 9/8/2024 value of  3.4  No lactic acidosis.  Blood culture #1 returned positive 9/8 for staph aureus, likely MSSA.  Today patient will receive 4 doses of cephalosporins. Currently on  "ceftriaxone .   In the ED patient received 1,500 mg of vancomycin  On Paxlovid for COVID today is day 5  Patient has recived 6 days of cephalosporin antibiotics: 3 days of ceftriaxone and 3 days of cefazolin as of 9/11/2024  No respiratory distress on room air    Plan:  Continue IV cefazolin   See above under \"Low Back Pain\" for rest of management.   COVID  Patient has a positive for COVID in the ED.  Has fevers, chills denies any sore throat, cough or shortness of breath.  Intermittently required oxygen in the ED as saturations were dropped with movement.  Was on [place] oxygen. Successfully weaned to room air   On Paxlovid for COVID today is day 5  + Fever, mild leukocytosis  In the setting of age> 65, CKD, chronic heart disease, patient meets the criteria for treatment     Plan:  Monitor Spo2 and respiratory status.       Type 2 diabetes mellitus with complication, with long-term current use of insulin ([facility])  Lab Results   Component Value Date    HGBA1C 7.9 (A) 07/02/2024       Recent Labs     09/11/24  1624 09/11/24  2051 09/12/24  0836 09/12/24  1122   POCGLU 141* 196* 131 253*       Blood Sugar Average: Last 72 hrs:  (P) 170.4629244436572327  Home regimen: Januvia 50 mg daily, insulin regular 5 units with lunch and dinner, Lantus 30 units in the morning (patient reported),  Patient reports not eating anything today, endorses a decreased appetite overall.    Plan:  Continue Lantus 30 units  Insulin sliding scale Insulin lispro 1-5 units   Fingerstick glucose  Carb controlled diet  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HCC)  CTA dissection protocol positive for severe stenosis in the proximal celiac artery and moderate to severe stenosis in the proximal left renal artery.  Superior mesenteric artery and inferior mesenteric artery are patent with no signs of ischemia.  Vascular surgery evaluated patient in the ED: No acute vascular intervention.    Plan:  Vascular team consulted follow recommendations: Continue aspirin 81 mg daily " and f/u with vascular surgery as outpt     Constipation  Constipation   Plan   Continue Senna docusate sodium   Change frequency of Miralax to twice daily  Monitor symptoms  Stage 3b chronic kidney disease (HCC)  Lab Results   Component Value Date    EGFR 30 09/12/2024    EGFR 22 09/11/2024    EGFR 16 09/10/2024    CREATININE 1.60 (H) 09/12/2024    CREATININE 2.02 (H) 09/11/2024    CREATININE 2.62 (H) 09/10/2024   Baseline creatinine 1.2-1.5  Urine output on 9/9 was 200 ml as per nursing   Plan   See under CINDY as above     VTE Pharmacologic Prophylaxis: VTE Score: 4  4 Moderate Risk (Score 3-4) - Pharmacological DVT Prophylaxis Ordered: enoxaparin (Lovenox).     Mobility:   Basic Mobility Inpatient Raw Score: 18  JH-HLM Goal: 6: Walk 10 steps or more  JH-HLM Achieved: 6: Walk 10 steps or more  JH-HLM Goal achieved. Continue to encourage appropriate mobility.    Patient Centered Rounds: I performed bedside rounds with nursing staff today.   Discussions with Specialists or Other Care Team Provider:  Nephrology and Infectious disease and vascular surgery     Education and Discussions with Family / Patient: Attempted to update  (son) via phone. Left voicemail.     Current Length of Stay: 4 day(s)  Current Patient Status: Inpatient   Certification Statement: The patient will continue to require additional inpatient hospital stay due to management of MSSA bacteremia, following blood cultures  Discharge Plan: Anticipate discharge in 24-48 hrs to rehab facility.    Code Status: Level 1 - Full Code    Subjective     Patient had a medium sized bowel movement last night. No other acute overnight events  Patient was examined at bedside. Patient was drowsy but she was able to answer questions. Continues to report distension and bloating. Patient is agreeable to trying another suppository if she continues to feel constipated. Patient does not wish to try an enema at this time. Denies fever, chest pain,  lightheadedness, shortness of breath, urinary incontinence, urinary retention, fever, chills, or dysuria.       Objective     Vitals:   Temp (24hrs), Av.6 °F (37 °C), Min:98.4 °F (36.9 °C), Max:98.8 °F (37.1 °C)    Temp:  [98.4 °F (36.9 °C)-98.8 °F (37.1 °C)] 98.4 °F (36.9 °C)  HR:  [77-87] 87  Resp:  [16-18] 16  BP: (140-156)/(52-59) 156/59  SpO2:  [92 %-95 %] 93 %  Body mass index is 29.86 kg/m².     Input and Output Summary (last 24 hours):     Intake/Output Summary (Last 24 hours) at 2024 1500  Last data filed at 2024 0300  Gross per 24 hour   Intake 780 ml   Output 750 ml   Net 30 ml       Physical Exam   Constitutional:       General: Patient was drowsy during examination.      Appearance: She is ill-appearing.   HENT:      Mouth/Throat:      Mouth: Mucous membranes are moist.      Pharynx: Oropharynx is clear.   Cardiovascular:      Rate and Rhythm: Normal rate and regular rhythm.      Pulses: Normal pulses.      Heart sounds: Normal heart sounds. No murmur heard.  Pulmonary:      Effort: Pulmonary effort is normal. No respiratory distress.      Breath sounds: Vesicular  breath sounds. No wheezing. No coughing during examination.      Comments: Patient is satting well on room air   Abdominal:      General: Bowel sounds are normal. There is no distension.      Palpations: Abdomen is soft.      Tenderness: There is no abdominal tenderness.   Musculoskeletal:         General: Tenderness (Tenderness centrally along lumbosacral spine) present. Right leg elevation causes right hip pain. Limited range of motion due to back pain.      Right lower leg: No edema.      Left lower leg: No edema.   Skin:     General: Skin is warm and dry.      Capillary Refill: Capillary refill takes less than 2 seconds.   Neurological:      General: No focal deficit present.      Sensory: No sensory deficit.      Motor: No weakness.   Psychiatric:         Mood and Affect: Mood normal.         Behavior: Behavior normal.         Lines/Drains:  Lines/Drains/Airways       Active Status       None                            Lab Results: I have reviewed the following results: CBC/BMP:   .     09/12/24  0520   WBC 7.37   HGB 9.9*   HCT 29.5*   *   SODIUM 138   K 3.7      CO2 23   BUN 68*   CREATININE 1.60*   GLUC 139       Results from last 7 days   Lab Units 09/12/24  0520   WBC Thousand/uL 7.37   HEMOGLOBIN g/dL 9.9*   HEMATOCRIT % 29.5*   PLATELETS Thousands/uL 114*   SEGS PCT % 73   LYMPHO PCT % 13*   MONO PCT % 12   EOS PCT % 1     Results from last 7 days   Lab Units 09/12/24  0520 09/08/24  0443 09/07/24  1655   SODIUM mmol/L 138   < > 137   POTASSIUM mmol/L 3.7   < > 5.0   CHLORIDE mmol/L 105   < > 102   CO2 mmol/L 23   < > 26   BUN mg/dL 68*   < > 43*   CREATININE mg/dL 1.60*   < > 1.19   ANION GAP mmol/L 10   < > 9   CALCIUM mg/dL 9.1   < > 10.1   ALBUMIN g/dL  --   --  4.0   TOTAL BILIRUBIN mg/dL  --   --  0.56   ALK PHOS U/L  --   --  80   ALT U/L  --   --  30   AST U/L  --   --  36   GLUCOSE RANDOM mg/dL 139   < > 227*    < > = values in this interval not displayed.     Results from last 7 days   Lab Units 09/07/24  1655   INR  1.03     Results from last 7 days   Lab Units 09/12/24  1122 09/12/24  0836 09/11/24  2051 09/11/24  1624 09/11/24  1114 09/11/24  0739 09/10/24  2130 09/10/24  1622 09/10/24  1123 09/10/24  0731 09/09/24  2114 09/09/24  1607   POC GLUCOSE mg/dl 253* 131 196* 141* 162* 122 188* 191* 167* 108 178* 235*         Results from last 7 days   Lab Units 09/11/24  0812 09/10/24  0606 09/09/24  0433 09/08/24  0443 09/07/24  1655   LACTIC ACID mmol/L  --   --   --   --  0.9   PROCALCITONIN ng/ml 7.67* 11.16* 17.59* 3.41* 0.35*       Recent Cultures (last 7 days):   Results from last 7 days   Lab Units 09/10/24  1452 09/07/24  1655 09/07/24  1645   BLOOD CULTURE  No Growth at 24 hrs.  No Growth at 24 hrs. Staphylococcus aureus* No Growth After 4 Days.   GRAM STAIN RESULT   --  Gram positive cocci in  clusters*  --        Imaging Review: Reviewed radiology reports from this admission including: MRI spine and Echocardiogram.  Other Studies: No additional pertinent studies reviewed.    Last 24 Hours Medication List:     Current Facility-Administered Medications:     acetaminophen (TYLENOL) tablet 975 mg, Q8H KORI    allopurinol (ZYLOPRIM) tablet 200 mg, Daily    aspirin (ECOTRIN LOW STRENGTH) EC tablet 81 mg, Daily    bisacodyl (DULCOLAX) rectal suppository 10 mg, Daily PRN    calcium carbonate (TUMS) chewable tablet 500 mg, BID PRN    calcium carbonate-vitamin D 500 mg-5 mcg tablet 1 tablet, Daily With Breakfast    ceFAZolin (ANCEF) IVPB (premix in dextrose) 2,000 mg 50 mL, Q12H, Last Rate: 2,000 mg (09/12/24 0504)    Diclofenac Sodium (VOLTAREN) 1 % topical gel 2 g, 4x Daily    enoxaparin (LOVENOX) subcutaneous injection 30 mg, Daily    famotidine (PEPCID) tablet 20 mg, Daily    fluticasone (FLONASE) 50 mcg/act nasal spray 2 spray, Daily    insulin glargine (LANTUS) subcutaneous injection 30 Units 0.3 mL, QAM    insulin lispro (HumALOG/ADMELOG) 100 units/mL subcutaneous injection 1-5 Units, TID AC **AND** Fingerstick Glucose (POCT), TID AC    insulin lispro (HumALOG/ADMELOG) 100 units/mL subcutaneous injection 1-5 Units, HS    levothyroxine tablet 100 mcg, Early Morning    lidocaine (LIDODERM) 5 % patch 1 patch, Daily    methocarbamol (ROBAXIN) tablet 500 mg, TID    montelukast (SINGULAIR) tablet 10 mg, HS    nirmatrelvir 150 mg x 1 and ritonavir 100 mg x 1 (Paxlovid) therapy pack, BID    oxyCODONE (ROXICODONE) split tablet 2.5 mg, Q6H PRN    polyethylene glycol (MIRALAX) packet 17 g, Daily    senna-docusate sodium (SENOKOT S) 8.6-50 mg per tablet 2 tablet, BID    traMADol (ULTRAM) tablet 50 mg, BID    Administrative Statements   Today, Patient Was Seen By: Nicole Bernardo MD  I have spent a total time of 60 minutes in caring for this patient on the day of the visit/encounter including Risks and benefits of tx  options and Patient and family education.   **Please Note: This note may have been constructed using a voice recognition system.**

## 2024-09-12 NOTE — ASSESSMENT & PLAN NOTE
Lab Results   Component Value Date    HGBA1C 7.9 (A) 07/02/2024       Recent Labs     09/11/24  1624 09/11/24  2051 09/12/24  0836 09/12/24  1122   POCGLU 141* 196* 131 253*       Blood Sugar Average: Last 72 hrs:  (P) 170.2576789002071329  Home regimen: Januvia 50 mg daily, insulin regular 5 units with lunch and dinner, Lantus 30 units in the morning (patient reported),  Patient reports not eating anything today, endorses a decreased appetite overall.    Plan:  Continue Lantus 30 units  Insulin sliding scale Insulin lispro 1-5 units   Fingerstick glucose  Carb controlled diet

## 2024-09-12 NOTE — ASSESSMENT & PLAN NOTE
Lab Results   Component Value Date    HGBA1C 7.9 (A) 07/02/2024       Recent Labs     09/11/24  1624 09/11/24  2051 09/12/24  0836 09/12/24  1122   POCGLU 141* 196* 131 253*       Blood Sugar Average: Last 72 hrs:  (P) 170.4163150409051599  Home regimen: Januvia 50 mg daily, insulin regular 5 units with lunch and dinner, Lantus 30 units in the morning (patient reported),  Patient reports not eating anything today, endorses a decreased appetite overall.    Plan:  Continue Lantus 30 units  Insulin sliding scale Insulin lispro 1-5 units   Fingerstick glucose  Carb controlled diet

## 2024-09-12 NOTE — PROGRESS NOTES
Progress Note - Infectious Disease   Porsha Hoyos 79 y.o. female MRN: [id number]  Unit/Bed#: S -01 Encounter: [id number]      Impression/Recommendations:  1.  Sepsis, with fever, mild leukocytosis, with elevated procalcitonin.  Although patient has COVID, leukocytosis and elevated procalcitonin suggest presence of underlying bacterial infection.  There is no obvious source of active infection, other than Staph aureus bacteremia (see below).  Patient is clinically improved.  Fever has resolved.  WBC has normalized.  Despite sepsis, patient has remained systemically well, without toxicity and hemodynamically stable, without hypotension.  Antibiotic plan as in below.  Monitor temperature/WBC.  Monitor hemodynamics.     2.  MSSA bacteremia.  Although there was growth in only 1 out of 2 admission blood cultures, given sepsis, leukocytosis and elevated procalcitonin, without obvious source of active infection, concern is for true Staph aureus bacteremia.  Patient has moderately poor blood draw and IV access.  She does have a chronic skin lesion in her scalp that may be etiology of translocation.  Patient is clinically improved.  TTE without obvious vegetation.  Continue high-dose IV cefazolin.  Follow-up repeat blood cultures.  Monitor temperature/WBC.     3.  Acute exacerbation of chronic low back pain.  Patient has no history of recent trauma.  Lumbar spine MRI with extensive chronicity and difficult to tell whether there is acute discitis/vertebral osteomyelitis.  If patient does indeed have true MSSA bacteremia, given acute exacerbation chronic low back pain and equivocal MRI findings, she may have lumbar discitis/osteomyelitis.  No evidence of paraspinal or epidural abscess on MRI.  Patient has no focal neurological deficit.  Will check inflammatory markers.  If these are elevated, she may need to complete empiric treatment course for discitis/osteomyelitis.  Antibiotic plan as in above.  Monitor low back  pain.  Pain control per primary service.  Check ESR/CRP.     4.  Mild COVID.  Given multiple comorbid illnesses, patient is currently on Paxlovid to prevent progression of symptoms.  Patient currently has minimal respiratory symptoms and is not hypoxic.  She is not on any O2 supplement.  Chest CT is without evidence of bacterial superinfection.  Complete 5-day Paxlovid course, complete today.  Monitor respiratory symptoms.     5.  Chronic scalp lesion, for many months, according to patient.  Scalp lesion is concerning for squamous cell carcinoma.  Although there is no evidence of cellulitis, this may be etiology of Staph aureus translocation.  Recommend dermatology evaluation for punch biopsy.     6.  CKD stage III.  Creatinine elevated from baseline, now improved.  Antibiotic dosages adjusted accordingly.  Monitor creatinine.     7.  DM, poorly controlled, with hyperglycemia and elevated hemoglobin A1c.  This is risk factor for infection above.  Management per primary service.     Discussed with patient in detail regarding the above plan.  Discussed with Dr. Bernardo from primary service regarding antibiotic and workup plan above.  He is in agreement.     Antibiotics:  Cefazolin # 3  Antibiotic # 5     Subjective:  Patient stable low back pain.  Otherwise, she has no specific complaints.  No dyspnea or cough.  Temperature stays down.  No chills.  She is tolerating antibiotic well.  No nausea, vomiting or diarrhea.     Objective:  Vitals:  Temp:  [98.4 °F (36.9 °C)-98.8 °F (37.1 °C)] 98.4 °F (36.9 °C)  HR:  [77-87] 87  Resp:  [16-18] 16  BP: (140-156)/(52-59) 156/59  SpO2:  [92 %-95 %] 93 %  Temp (24hrs), Av.6 °F (37 °C), Min:98.4 °F (36.9 °C), Max:98.8 °F (37.1 °C)  Current: Temperature: 98.4 °F (36.9 °C)    Physical Exam:     General: Awake, alert, cooperative, no distress.   Neck:  Supple. No mass.  No lymphadenopathy.   Lungs: Expansion symmetric, no rales, no wheezing, respirations unlabored.   Heart:  Regular  rate and rhythm, S1 and S2 normal, no murmur.   Abdomen: Soft, nondistended, non-tender, bowel sounds active all four quadrants, no masses, no organomegaly.   Extremities: No edema. No erythema/warmth. No ulcer. Nontender to palpation.   Skin:  No rash.   Neuro: Moves all extremities.     Invasive Devices       Peripheral Intravenous Line  Duration             Peripheral IV 09/11/24 Dorsal (posterior);Right Forearm <1 day                    Labs studies:   I have personally reviewed pertinent labs.  Results from last 7 days   Lab Units 09/12/24  0520 09/11/24  0812 09/10/24  0606 09/08/24  0443 09/07/24  1655   POTASSIUM mmol/L 3.7 3.6 3.8   < > 5.0   CHLORIDE mmol/L 105 102 102   < > 102   CO2 mmol/L 23 22 23   < > 26   BUN mg/dL 68* 66* 59*   < > 43*   CREATININE mg/dL 1.60* 2.02* 2.62*   < > 1.19   EGFR ml/min/1.73sq m 30 22 16   < > 42   CALCIUM mg/dL 9.1 8.6 8.7   < > 10.1   AST U/L  --   --   --   --  36   ALT U/L  --   --   --   --  30   ALK PHOS U/L  --   --   --   --  80    < > = values in this interval not displayed.     Results from last 7 days   Lab Units 09/12/24  0520 09/11/24  0812 09/10/24  0606   WBC Thousand/uL 7.37 6.56 8.08   HEMOGLOBIN g/dL 9.9* 9.2* 9.9*   PLATELETS Thousands/uL 114* 86* 93*     Results from last 7 days   Lab Units 09/10/24  1452 09/07/24  1655 09/07/24  1645   BLOOD CULTURE  No Growth at 24 hrs.  No Growth at 24 hrs. Staphylococcus aureus* No Growth After 4 Days.   GRAM STAIN RESULT   --  Gram positive cocci in clusters*  --        Imaging Studies:   I have personally reviewed pertinent imaging study reports and images in PACS.    EKG, Pathology, and Other Studies:   I have personally reviewed pertinent reports.

## 2024-09-12 NOTE — ASSESSMENT & PLAN NOTE
"Fever 103, chills, +covid, reports intermittent dysuria, lower abdomen tender to palpation.  Last BM this morning of admission, no constipation or diarrhea.  WBC on 9/9: 11.55 from admission 10.83  Pro-Kp today on 9/9 uptrended to 14.59 from 9/8/2024 value of  3.4  No lactic acidosis.  Blood culture #1 returned positive 9/8 for staph aureus, likely MSSA.  Today patient will receive 4 doses of cephalosporins. Currently on ceftriaxone .   In the ED patient received 1,500 mg of vancomycin  On Paxlovid for COVID today is day 5  Patient has recived 6 days of cephalosporin antibiotics: 3 days of ceftriaxone and 3 days of cefazolin as of 9/11/2024  No respiratory distress on room air    Plan:  Continue IV cefazolin   See above under \"Low Back Pain\" for rest of management.   "

## 2024-09-12 NOTE — ASSESSMENT & PLAN NOTE
Patient has a positive for COVID in the ED.  Has fevers, chills denies any sore throat, cough or shortness of breath.  Intermittently required oxygen in the ED as saturations were dropped with movement.  Was on [place] oxygen. Successfully weaned to room air   On Paxlovid for COVID today is day 5  + Fever, mild leukocytosis  In the setting of age> 65, CKD, chronic heart disease, patient meets the criteria for treatment     Plan:  Monitor Spo2 and respiratory status.

## 2024-09-12 NOTE — OCCUPATIONAL THERAPY NOTE
"  Occupational Therapy Treatment Note     Patient Name: Porsha Hoyos  Today's Date: 9/12/2024  Problem List  Principal Problem:    Acute on Chronic Back Pain in the Setting of Sepsis, MSSA Bacteremia  Active Problems:    Hypertension Management    Stage 3b chronic kidney disease (HCC)    Constipation    Type 2 diabetes mellitus with complication, with long-term current use of insulin (HCC)    COVID    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HCC)    Sepsis (HCC): SIRS criteria MSSA Bacteremia and COVID infection        09/12/24 1402   OT Last Visit   OT Visit Date 09/12/24   Note Type   Note Type Treatment for insurance authorization   Pain Assessment   Pain Assessment Tool 0-10   Pain Score 8   Pain Location/Orientation Location: Back   Pain Onset/Description Onset: Ongoing;Frequency: Intermittent  (c repositioning and movement)   Effect of Pain on Daily Activities limits comfort, activity tolerance, LB ADLs, standing toleraence   Hospital Pain Intervention(s) Repositioned;Ambulation/increased activity;Emotional support  (RN made aware)   Multiple Pain Sites No   Restrictions/Precautions   Weight Bearing Precautions Per Order No   Other Precautions Contact/isolation;Airborne/isolation;Fall Risk;Pain;Multiple lines;Cognitive;Chair Alarm;Restraints  (COVID+)   Lifestyle   Autonomy PTA, pt is (I) c ADLs, IADLs. no AD. Lives alone but son stays with patient \"part time\". (-) driving (-) falls   Reciprocal Relationships supportive son and sons significant other   Service to Others retired   ADL   Where Assessed Edge of bed  (vs recliner)   Grooming Assistance 5  Supervision/Setup   Grooming Deficit   (seated in recliner combing hair at end of session c mirror from tray table)   LB Dressing Assistance 3  Moderate Assistance   LB Dressing Deficit Thread RLE into underwear;Increased time to complete;Supervision/safety;Verbal cueing;Setup;Steadying;Requires assistive device for steadying;Pull up over hips  (intermittent A to pull over hips " + min A steadying.)   LB Dressing Comments expresses high level of pain during task, standing rest breaks required   Toileting Assistance    (declines need to void at this time, reports just completed in bathroom c nursing staff prior to therapist arrival)   Functional Standing Tolerance   Time 1 min   Activity LB dressing   Comments Min A x 1 c UE x 1 on RW   Bed Mobility   Rolling R 4  Minimal assistance   Additional items Increased time required;Verbal cues;Bedrails  (trunk management)   Supine to Sit 4  Minimal assistance   Additional items LE management;Increased time required  (trunk management.)   Additional Comments flat bed, completes log roll for bed mobility d/t pain. sat EOB c supervision, denies lightheaded/dizziness but exrpesses increased pain   Transfers   Sit to Stand 4  Minimal assistance   Additional items Increased time required;Verbal cues;Assist x 1   Stand to Sit 4  Minimal assistance   Additional items Increased time required;Verbal cues;Assist x 1  (cues for hand placements, controlled descent)   Additional Comments cueing and education for proper hand placements and body mechanics during transfers c fair application, sit<>stand transfers x 2 completed   Functional Mobility   Functional Mobility 4  Minimal assistance   Additional Comments < household distance tolerated 2/2 pain. increased  time required, denies lightheaded/dizziness. Spo2 >90% on RA   Additional items Rolling walker   Subjective   Subjective Pt reports hesitancy for therapy d/t pain, but agreeable following education re: importance of OOB mobility. Agreeable to trial sitting in recliner   Cognition   Overall Cognitive Status WFL   Arousal/Participation Alert;Cooperative   Attention Attends with cues to redirect   Orientation Level Oriented to person;Oriented to place;Oriented to situation   Memory Decreased recall of precautions   Following Commands Follows multistep commands without difficulty   Comments Participates and  engages appropriately throughout session.   Activity Tolerance   Activity Tolerance Patient limited by pain   Medical Staff Made Aware RN Heather. PT Isaura. CM Maximino   Assessment   Assessment Patient seen for OT treatment on 9/12/2024 s/p admission for Low back pain Patient agreeable to OT session. Patient participated in dressing , self-care transfers, bed mobility , and functional mobility with intervention focus on maximizing activity tolerance for ADL participation. Porsha Hoyos is showing slow progress in transfers and mobility but is continues to be very limited by pain, generalized weakness. . Personal factors continuing to impact D/C include: decreased caregiver status  and Assistance needed for ADLs and functional mobility From OT standpoint, patient would benefit from skilled intervention to maximize independence with ADLs and functional mobility. Goals remain appropriate, continue POC. At this time, recommending D/C to: Level 2: moderate resource intensity   Plan   Treatment Interventions ADL retraining;Functional transfer training;UE strengthening/ROM;Patient/family training;Endurance training;Equipment evaluation/education;Compensatory technique education;Activityengagement   Goal Expiration Date 09/19/24   OT Treatment Day 1   OT Frequency 3-5x/wk   Discharge Recommendation   Rehab Resource Intensity Level, OT II (Moderate Resource Intensity)   Additional Comments  The patient's raw score on the -PAC Daily Activity Inpatient Short Form is 16. A raw score of less than 19 suggests the patient may benefit from discharge to post-acute rehabilitation services. Please refer to the recommendation of the Occupational Therapist for safe discharge planning.   AM-PAC Daily Activity Inpatient   Lower Body Dressing 2   Bathing 2   Toileting 2   Upper Body Dressing 3   Grooming 3   Eating 4   Daily Activity Raw Score 16   Daily Activity Standardized Score (Calc for Raw Score >=11) 35.96   AM-PAC Applied Cognition  Inpatient   Following a Speech/Presentation 3   Understanding Ordinary Conversation 4   Taking Medications 2   Remembering Where Things Are Placed or Put Away 3   Remembering List of 4-5 Errands 3   Taking Care of Complicated Tasks 2   Applied Cognition Raw Score 17   Applied Cognition Standardized Score 36.52   End of Consult   Education Provided Yes   Patient Position at End of Consult Bedside chair;Bed/Chair alarm activated;All needs within reach   Nurse Communication Nurse aware of consult  (Krsytal)   Goals established on initial evaluation in order to achieve goal of maximizing functional independence during ADL tasks       Pt will complete UB ADLs Supervision  for increased ADL independence within 10 days.      Pt will complete LB ADLs Min A  for increased ADL independence within 10 days.      Pt will complete toileting Min A  with use of DME for increased ADL independence within 10 days.      Pt will demonstrate proper body mechanics to complete self-care transfers and functional mobility with Supervision and use of LRAD for increased safety and functional independence within 10 days.      Pt will demonstrate standing tolerance of 4 min for increased activity tolerance during ADL/IADL tasks within 10 days.      Pt will demonstrate proper body mechanics and fall prevention strategies during 100% of tx sessions for increased safety awareness during ADL/IADLs     Pt will demonstrate activity tolerance of 30 min in therapeutic tasks for increased participation in meaningful activities upon D/C.     Pt will participate in ongoing cognitive assessments to assist with safe D/C planning and supervision/assistance recommendations.      Pt will demonstrate OOB sitting tolerance of 2-4 hr/day for increased activity tolerance and engagement in leisure activities within 10 days.      Pt benefited from co-session of skilled OT and PT therapists in order to most appropriately address functional deficits d/t decreased  activity tolerance.  OT/PT objectives were addressed separately; please see PT note for specific goal areas targeted.     Jeanne Gallo, OT

## 2024-09-12 NOTE — CONSULTS
Consulted for request for sooner appt for non-healing wound of scalp. No photos. Patient has appt scheduled at our outpatient [facility] office on 10/15.

## 2024-09-12 NOTE — ASSESSMENT & PLAN NOTE
Lab Results   Component Value Date    EGFR 30 09/12/2024    EGFR 22 09/11/2024    EGFR 16 09/10/2024    CREATININE 1.60 (H) 09/12/2024    CREATININE 2.02 (H) 09/11/2024    CREATININE 2.62 (H) 09/10/2024   Baseline creatinine 1.2-1.5  Urine output on 9/9 was 200 ml as per nursing   Plan   See under CINDY as above

## 2024-09-12 NOTE — CASE MANAGEMENT
Case Management Progress Note    Patient name Porsha Hoyos  Location S /S -01 MRN [id number]  : 1945 Date 2024       LOS (days): 4  Geometric Mean LOS (GMLOS) (days): 5.1  Days to GMLOS:1.1        OBJECTIVE:        Current admission status: Inpatient  Preferred Pharmacy:   [facility] [place] - [address] [place] [id number]  Phone: [phone] Fax: [phone]    Primary Care Provider: João Alfonso MD    Primary Insurance: [employer]  Secondary Insurance:     PROGRESS NOTE:    STR auth tasked to CM d/c support for [facility].

## 2024-09-12 NOTE — PROGRESS NOTES
Progress Note - Nephrology   Name: Porsha Hoyos 79 y.o. female I MRN: [id number]  Unit/Bed#: S -01 I Date of Admission: 9/7/2024   Date of Service: 9/12/2024 I Hospital Day: 4     Assessment & Plan  CINDY (acute kidney injury) ([facility]) (Resolved: 9/12/2024)  CINDY on CKD IIIB:   -Etiology: Suspect due to contrast associated nephropathy in the setting of hemodynamic changes from hypotension, diuretics, left renal artery stenosis and autoregulatory dysfunction with ARB.  -Admission creatinine 1.19 on 9/7/2024.  Today's creatinine 2.02, trending down  -Peak creatinine 2.66 on 9/9/2024  -baseline creatinine 1.2-1.5  -workup: UA with 1+ protein, 1-2 RBC, 2-4 WBC.  -urine sodium 31  -Imaging:  CTA with no hydronephrosis.  Renal ultrasound with stable cortical thinning consistent with medical renal disease.  -Renal function has improved creatinine back to baseline at 1.6 mg/dL  -Currently holding losartan and Bumex  -Avoid nephrotoxins, NSAIDs, IV contrast if possible  -Avoid hypotension, maintain MAP >65  -trend BMP in am  -Optimize hemodynamic status to avoid delay in renal recovery  -Overall quite stable from renal standpoint continue current management.  No objections to restarting losartan on discharge would recommend likely a lower dose 25 mg if blood pressure acceptable.  At this time does not examine volume overloaded can reassess diuretic need as an outpatient.  Stage 3b chronic kidney disease (HCC)  Lab Results   Component Value Date    EGFR 30 09/12/2024    EGFR 22 09/11/2024    EGFR 16 09/10/2024    CREATININE 1.60 (H) 09/12/2024    CREATININE 2.02 (H) 09/11/2024    CREATININE 2.62 (H) 09/10/2024   Etiology: Suspect due to age-related nephron loss, hypertensive nephrosclerosis and diabetic kidney disease  -With proteinuria  -Outpatient nephrologist: Dr. Sanchez  -Will require follow-up on discharge  Hypertension Management  Hypotension/HTN/Volume status:  -Blood pressure much better  -s/p IVF and albumin x  3  -volume status euvolemic  -Home medications: Bumex 2 mg twice daily, carvedilol 25 mg twice daily, doxazosin 2 mg nightly, Procardia XL 60 mg daily, losartan 100 mg daily  -Current medications: none currently  -Currently holding hold Losartan and diuretics  -s/p albumin 25 g IV every 8 hours x 3 and midodrine 2.5 mg x 1 on 9/10/2024  -Optimize hemodynamic status to avoid delay in renal recovery  -recommend hold parameters on antihypertensive's for SBP <130 mmHg.  -Avoid hypotension or fluctuations in blood pressure.  Maintain MAP >65  -If blood pressure remains greater than 140 systolic persistently can restart losartan 25 mg on discharge    Acute on Chronic Back Pain in the Setting of Sepsis, MSSA Bacteremia  -p/w abrupt onset mid back pain  -CTA unrevealing  -Management as per the primary team  Sepsis (HCC): SIRS criteria MSSA Bacteremia and COVID infection  -Source: Unclear.  Concern for scalp lesion or possible lumbar discitis/OM  -BC: 1/2 +MSSA.  Concern for true Staph aureus bacteremia per ID  -+COVID  -On cefazolin vancomycin, Paxlovid   -Check repeat BC  -ID following  -management per primary team per ID  COVID  -tested positive on admission  -Respiratory status stable on RA  -On Paxlovid for 5 day course  -Management per primary team    Type 2 diabetes mellitus with complication, with long-term current use of insulin (HCC)  Lab Results   Component Value Date    HGBA1C 7.9 (A) 07/02/2024       Recent Labs     09/11/24  1624 09/11/24  2051 09/12/24  0836 09/12/24  1122   POCGLU 141* 196* 131 253*       Blood Sugar Average: Last 72 hrs:  (P) [id number]  -stable  -continue to optimize glycemic control to slow progression of chronic kidney disease  -management per primary team    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HCC)  -Asymptomatic   -incidental finding found on CTA  -Seen by vascular surgery and no recommendations for intervention  -Management per primary team  Hyponatremia (Resolved: 9/12/2024)      I have  reviewed the nephrology recommendations including assessment and plan, with patient, and we are in agreement with renal plan including the information outlined above. Ok for discharge from Nephrology service perspective.  We will sign off    History of Present Illness   Brief History of Admission - 79 y.o. female with CKD 3B, hx CINDY, HTN, DM2, CAD, CVA, hypothyroidism, IBS, GERD, PAD p/w back pain.  Nephrology consulted for management of CINDY on CKD 3B        No complaints eating well.    Objective      Temp:  [98.4 °F (36.9 °C)-98.8 °F (37.1 °C)] 98.4 °F (36.9 °C)  HR:  [77-87] 87  Resp:  [16-18] 16  BP: (140-156)/(52-59) 156/59  O2 Device: None (Room air)         Vitals:    09/11/24 1200 09/12/24 0600   Weight: 62.4 kg (137 lb 9.1 oz) 68.2 kg (150 lb 5.7 oz)     I/O last 24 hours:  In: 780 [P.O.:780]  Out: 1150 [Urine:1150]  Lines/Drains/Airways       Active Status       None                  Physical Exam  Vitals and nursing note reviewed.   Constitutional:       General: She is not in acute distress.     Appearance: She is well-developed.   HENT:      Head: Normocephalic and atraumatic.   Eyes:      General: No scleral icterus.     Conjunctiva/sclera: Conjunctivae normal.      Pupils: Pupils are equal, round, and reactive to light.   Cardiovascular:      Rate and Rhythm: Normal rate and regular rhythm.      Heart sounds: S1 normal and S2 normal. No murmur heard.     No friction rub. No gallop.   Pulmonary:      Effort: Pulmonary effort is normal. No respiratory distress.      Breath sounds: Normal breath sounds. No wheezing or rales.   Abdominal:      General: Bowel sounds are normal.      Palpations: Abdomen is soft.      Tenderness: There is no abdominal tenderness. There is no rebound.   Musculoskeletal:         General: Normal range of motion.      Cervical back: Normal range of motion and neck supple.   Skin:     Findings: No rash.   Neurological:      Mental Status: She is alert and oriented to person,  place, and time.   Psychiatric:         Behavior: Behavior normal.        Medications:    Current Facility-Administered Medications:     acetaminophen (TYLENOL) tablet 975 mg, 975 mg, Oral, Q8H KORI, Catarino Michael MD, 975 mg at 09/12/24 0504    allopurinol (ZYLOPRIM) tablet 200 mg, 200 mg, Oral, Daily, Catarino Michael MD, 200 mg at 09/12/24 0904    aspirin (ECOTRIN LOW STRENGTH) EC tablet 81 mg, 81 mg, Oral, Daily, Catarino Michael MD, 81 mg at 09/12/24 0904    bisacodyl (DULCOLAX) rectal suppository 10 mg, 10 mg, Rectal, Daily PRN, Nicole Bernardo MD, 10 mg at 09/11/24 1756    calcium carbonate (TUMS) chewable tablet 500 mg, 500 mg, Oral, BID PRN, Emelina Mckee MD, 500 mg at 09/09/24 1828    calcium carbonate-vitamin D 500 mg-5 mcg tablet 1 tablet, 1 tablet, Oral, Daily With Breakfast, Catarino Michael MD, 1 tablet at 09/12/24 0903    ceFAZolin (ANCEF) IVPB (premix in dextrose) 2,000 mg 50 mL, 2,000 mg, Intravenous, Q12H, Ted Pritchard MD, Last Rate: 100 mL/hr at 09/12/24 0504, 2,000 mg at 09/12/24 0504    Diclofenac Sodium (VOLTAREN) 1 % topical gel 2 g, 2 g, Topical, 4x Daily, Catarino Michael MD, 2 g at 09/12/24 1239    enoxaparin (LOVENOX) subcutaneous injection 30 mg, 30 mg, Subcutaneous, Daily, Natalia Horn MD, 30 mg at 09/12/24 0909    famotidine (PEPCID) tablet 20 mg, 20 mg, Oral, Daily, Catarino Michael MD, 20 mg at 09/12/24 0904    fluticasone (FLONASE) 50 mcg/act nasal spray 2 spray, 2 spray, Nasal, Daily, Catarino Michael MD, 2 spray at 09/12/24 0907    insulin glargine (LANTUS) subcutaneous injection 30 Units 0.3 mL, 30 Units, Subcutaneous, QAM, Catarino Michael MD, 30 Units at 09/12/24 0907    insulin lispro (HumALOG/ADMELOG) 100 units/mL subcutaneous injection 1-5 Units, 1-5 Units, Subcutaneous, TID AC, 2 Units at 09/12/24 1237 **AND** Fingerstick Glucose (POCT), , , TID AC, Catarino Michael MD    insulin lispro (HumALOG/ADMELOG) 100 units/mL subcutaneous injection 1-5 Units, 1-5 Units,  Subcutaneous, HS, Catarino Michael MD, 1 Units at 09/11/24 2128    levothyroxine tablet 100 mcg, 100 mcg, Oral, Early Morning, Catarino Michael MD, 100 mcg at 09/12/24 0504    lidocaine (LIDODERM) 5 % patch 1 patch, 1 patch, Topical, Daily, Rob Huerta Jr., DO, 1 patch at 09/12/24 1026    methocarbamol (ROBAXIN) tablet 500 mg, 500 mg, Oral, TID, Catarino Michael MD, 500 mg at 09/12/24 0904    montelukast (SINGULAIR) tablet 10 mg, 10 mg, Oral, HS, Catarino Michael MD, 10 mg at 09/11/24 2128    nirmatrelvir 150 mg x 1 and ritonavir 100 mg x 1 (Paxlovid) therapy pack, 2 tablet, Oral, BID, Catarino Michael MD, 2 tablet at 09/12/24 1025    oxyCODONE (ROXICODONE) split tablet 2.5 mg, 2.5 mg, Oral, Q6H PRN, Catarino Michael MD, 2.5 mg at 09/10/24 1212    polyethylene glycol (MIRALAX) packet 17 g, 17 g, Oral, Daily, Catarino Michael MD, 17 g at 09/12/24 0908    senna-docusate sodium (SENOKOT S) 8.6-50 mg per tablet 2 tablet, 2 tablet, Oral, BID, Catarino Michael MD, 2 tablet at 09/12/24 0904    traMADol (ULTRAM) tablet 50 mg, 50 mg, Oral, BID, Catarino Michael MD, 50 mg at 09/12/24 0904      Lab Results: I have reviewed the following results: CBC/BMP:   .     09/12/24  0520   WBC 7.37   HGB 9.9*   HCT 29.5*   *   SODIUM 138   K 3.7      CO2 23   BUN 68*   CREATININE 1.60*   GLUC 139    , Creatinine Clearance: Estimated Creatinine Clearance: 24.8 mL/min (A) (by C-G formula based on SCr of 1.6 mg/dL (H)).  Results from last 7 days   Lab Units 09/12/24  0520 09/11/24  0812 09/10/24  0606 09/09/24  0433 09/08/24  0443 09/07/24  1655   WBC Thousand/uL 7.37 6.56 8.08 11.55* 11.68* 10.83*   HEMOGLOBIN g/dL 9.9* 9.2* 9.9* 10.1* 10.2* 12.2   HEMATOCRIT % 29.5* 27.7* 29.7* 30.7* 31.2* 37.0   PLATELETS Thousands/uL 114* 86* 93* 92* 114* 135*   POTASSIUM mmol/L 3.7 3.6 3.8 3.9 3.8 5.0   CHLORIDE mmol/L 105 102 102 103 108 102   CO2 mmol/L 23 22 23 25 23 26   BUN mg/dL 68* 66* 59* 49* 38* 43*   CREATININE mg/dL 1.60* 2.02*  "2.62* 2.66* 1.11 1.19   CALCIUM mg/dL 9.1 8.6 8.7 9.0 8.9 10.1   MAGNESIUM mg/dL  --   --   --  2.4 1.7*  --    ALBUMIN g/dL  --   --   --   --   --  4.0       Administrative Statements   I have spent a total time of 15   minutes in caring for this patient on the day of the visit/encounter including Counseling / Coordination of care, Documenting in the medical record, Reviewing / ordering tests, medicine, procedures  , and Obtaining or reviewing history  .  Portions of the record may have been created with voice recognition software. Occasional wrong word or \"sound a like\" substitutions may have occurred due to the inherent limitations of voice recognition software. Read the chart carefully and recognize, using context, where substitutions have occurred.If you have any questions, please contact the dictating provider.  "

## 2024-09-12 NOTE — ASSESSMENT & PLAN NOTE
Lab Results   Component Value Date    HGBA1C 7.9 (A) 07/02/2024       Recent Labs     09/11/24  1624 09/11/24 2051 09/12/24  0836 09/12/24  1122   POCGLU 141* 196* 131 253*       Blood Sugar Average: Last 72 hrs:  (P) [id number]  -stable  -continue to optimize glycemic control to slow progression of chronic kidney disease  -management per primary team

## 2024-09-12 NOTE — CASE MANAGEMENT
[facility] received request for authorization from Care Manager.  Authorization request submitted for: SNF  Facility Name:  [facility]  NPI:[id number]  Facility MD:  Corwin Trujillo NPI: [id number]  Authorization initiated by contacting insurance:  [facility]  Via: [employer] Portal   Clinicals submitted via [employer] attachment   Pending Reference #:[id number]     Care Manager notified: sree patel     Updates to authorization status will be noted in chart. Please reach out to CM for updates on any clinical information.

## 2024-09-12 NOTE — ASSESSMENT & PLAN NOTE
Acute exacerbation of chronic back pain, home regimen tramadol twice daily and Robaxin 3 times daily.  Denies any recent injury, denies lifting any heavy objects, woke up from sleep with exacerbation back pain, unable to ambulate due to pain.  Denies urinary retention   On admission significant for Fever 103, chills, +covid, reports intermittent dysuria, lower abdomen tender to palpation.   WBC on 9/10: improved from leukocytosis on admission, now 8.08 compared to yesterday 11.55   Pro-Kp today on 910 downtrended to 11.16 from yesterday 17.59. No lactic acidosis.  Blood culture #1 returned positive 9/8 for staph aureus, likely MSSA. Blood culture 2: no growth at 48 hours   Patient still in significant amount of pain, had some bladder incontinence. Denies urinary retention   Pain levels improved since admission but still debilitating   Tenderness to palpation on musculosketal exam. Limited mobility. No neurological deficts on physical exam. Denies any numbness tingling bowel or bladder incontinence.  9/10: MRI lumbar spine without contrast possible discitis osteomyelitis.  Infectious disease is on board Transitioned to IV Cefazolin.   Currently on Cefazolin IV. Has received 7 doses of cephalosporins todate  9/10: Chronic scalp lession-possible source of bacteremia.   PT recommends level 2  TTE Echocardiogram resulted no findings of valvular abnormalities.   Repeat blood cultures ordered on 9/10: Show No growth at 24 hours    Plan:  Assessment for Acute on Chronic Back pain in the setting of sepsis: Ddx discitis osteomyelitis 2/2 to MSSA bactermia spread to the spine possible acute vertebral fracture/trauma more likely than abscess/phlegmon   Infectious disease recommendations   Continue IV cefazolin   Follow second round of blood cultures   Will check inflammatory markers before advancing management, ESR and C-reactive protein. If negative markers and repeat blood cultures remain negative for bacterial growth then  will not treat for discitis. If markers are elevated then will treat for discitis/osteomyelitis.   Trend fever, WBC, and procalcitonin   Dermatology consult placed for chronic scalp lession-possible source of bacteremia. Appreciate recommendations.  Multimodal pain regimen:   Scheduled medications of Tramadol 50 mg BID and ROBACIN 500 mg TID   Along with the following PRN medications PRN oycodone 2.5 mg, Home Robaxin 3 times daily, Topical Voltaren gel, Aqua K-pad, Lidocaine patch  Urinary retention protocol  Continue PT recommendations.   Derm outpatient evaluation for scalp wound  Disposition: On [facility] for continued clinical management.     CINDY (acute kidney injury) (HCC)-resolving  Patient's creatinine on compared to admission creatinine 1.11.   Patient received a lactated Ringer 1 bolus 500 cc in the ED then placed on 50 ml/hr IV continous   Patient was on BUMEX 2 g, losartan 100 mg,   UA: +2 urine protein, RBC urine 2-4. no hyaline cast or granular cast  In the ED patient had IV contrast exposure on CTA dissection chest abdomen and pelvis   Blood pressure is on the softer side 101-106/36-40 with MAP ranges now at 58-60.   Patient reports decreased oral intake for past 48 hours. Reports generalized malise and fatigue  Creatinine 9/9: 2.66 -> Cr today on 9/11 2.02  Renal ultrasound showed symmetrically sized kidneys.  The right kidney 9.8 cm on the left kidney 10.9 cm both kidneys are diffusely cortical thinning with lobulated contours no hydronephrosis  Plan  Assessment:  Worsening creatinine within 48 hours of contrast exposure puts acute tubular necrosis higher on differential for cause of CINDY  Follow nephrology recommendations:   Currently holding losartan and Bumex  Avoid nephrotoxins, NSAIDs, IV contrast if possible  Avoid hypotension, maintain MAP >65  Trend BMP in am  Optimize hemodynamic status to avoid delay in renal recovery  Daily BMP to monitor creatinine.

## 2024-09-12 NOTE — ASSESSMENT & PLAN NOTE
CINDY on CKD IIIB:   -Etiology: Suspect due to contrast associated nephropathy in the setting of hemodynamic changes from hypotension, diuretics, left renal artery stenosis and autoregulatory dysfunction with ARB.  -Admission creatinine 1.19 on 9/7/2024.  Today's creatinine 2.02, trending down  -Peak creatinine 2.66 on 9/9/2024  -baseline creatinine 1.2-1.5  -workup: UA with 1+ protein, 1-2 RBC, 2-4 WBC.  -urine sodium 31  -Imaging:  CTA with no hydronephrosis.  Renal ultrasound with stable cortical thinning consistent with medical renal disease.  -Renal function has improved creatinine back to baseline at 1.6 mg/dL  -Currently holding losartan and Bumex  -Avoid nephrotoxins, NSAIDs, IV contrast if possible  -Avoid hypotension, maintain MAP >65  -trend BMP in am  -Optimize hemodynamic status to avoid delay in renal recovery  -Overall quite stable from renal standpoint continue current management.  No objections to restarting losartan on discharge would recommend likely a lower dose 25 mg if blood pressure acceptable.  At this time does not examine volume overloaded can reassess diuretic need as an outpatient.

## 2024-09-12 NOTE — ASSESSMENT & PLAN NOTE
Constipation   Plan   Continue Senna docusate sodium   Change frequency of Miralax to twice daily  Monitor symptoms

## 2024-09-12 NOTE — ASSESSMENT & PLAN NOTE
Blood pressure elevated in the ED likely secondary to acute exacerbation of pain, improved with pain control.  Home medications: Bumex 2 mg twice daily, carvedilol 25 mg twice daily, doxazosin 2 mg nightly, Procardia XL 60 mg daily, losartan 100 mg daily   Patient became hypotensive with low MAP ranges during admission.   s/p IVF and albumin x 3   BUMEX, losartan, COREG, nifedipine, are all held   9/10 Received midodrine at 3;30 pm today   Recent BP uptrended. MAP has been stable today as well  Cortisol am 14.9     Plan:  Holding nephrotoxic agents  losartan and BUMEX to treat CINDY  Today will also hold carvediol while monitoring blood pressure.   Continue to monitor blood pressure  Follow nephrology recommendations:   Avoid nephrotoxins, NSAIDs, IV contrast if possible  Avoid hypotension, maintain MAP >65  trend BMP in am  Optimize hemodynamic status to avoid delay in renal recovery  Follow cardiology recommendations:   Recommend hold parameters on antihypertensive's for SBP <130 mmHg.  Avoid hypotension or fluctuations in blood pressure.  Maintain MAP >65  If blood pressure remains greater than 140 systolic persistently can restart losartan 25 mg on discharge

## 2024-09-12 NOTE — ASSESSMENT & PLAN NOTE
Lab Results   Component Value Date    EGFR 30 09/12/2024    EGFR 22 09/11/2024    EGFR 16 09/10/2024    CREATININE 1.60 (H) 09/12/2024    CREATININE 2.02 (H) 09/11/2024    CREATININE 2.62 (H) 09/10/2024   Etiology: Suspect due to age-related nephron loss, hypertensive nephrosclerosis and diabetic kidney disease  -With proteinuria  -Outpatient nephrologist: Dr. Sanchez  -Will require follow-up on discharge

## 2024-09-12 NOTE — Clinical Note
Patient seen for OT treatment on 9/12/2024 s/p admission for Low back pain Patient agreeable to OT session. Patient participated in dressing , self-care transfers, bed mobility , and functional mobility with intervention focus on maximizing activity tolerance for ADL participation. Porsha Hoyos is showing slow progress in transfers and mobility but is continues to be very limited by pain, generalized weakness. . Personal factors continuing to impact D/C include: decreased caregiver status  and Assistance needed for ADLs and functional mobility From OT standpoint, patient would benefit from skilled intervention to maximize independence with ADLs and functional mobility. Goals remain appropriate, continue POC. At this time, recommending D/C to: Level 2: moderate resource intensity       The patient's raw score on the AM-PAC Daily Activity Inpatient Short Form is 16. A raw score of less than 19 suggests the patient may benefit from discharge to post-acute rehabilitation services. Please refer to the recommendation of the Occupational Therapist for safe discharge planning.

## 2024-09-12 NOTE — ASSESSMENT & PLAN NOTE
Hypotension/HTN/Volume status:  -Blood pressure much better  -s/p IVF and albumin x 3  -volume status euvolemic  -Home medications: Bumex 2 mg twice daily, carvedilol 25 mg twice daily, doxazosin 2 mg nightly, Procardia XL 60 mg daily, losartan 100 mg daily  -Current medications: none currently  -Currently holding hold Losartan and diuretics  -s/p albumin 25 g IV every 8 hours x 3 and midodrine 2.5 mg x 1 on 9/10/2024  -Optimize hemodynamic status to avoid delay in renal recovery  -recommend hold parameters on antihypertensive's for SBP <130 mmHg.  -Avoid hypotension or fluctuations in blood pressure.  Maintain MAP >65  -If blood pressure remains greater than 140 systolic persistently can restart losartan 25 mg on discharge

## 2024-09-12 NOTE — ASSESSMENT & PLAN NOTE
-tested positive on admission  -Respiratory status stable on RA  -On Paxlovid for 5 day course  -Management per primary team

## 2024-09-13 ENCOUNTER — APPOINTMENT (INPATIENT)
Dept: RADIOLOGY | Facility: HOSPITAL | Age: 79
DRG: 871 | End: 2024-09-13
Payer: COMMERCIAL

## 2024-09-13 PROBLEM — M86.9 OSTEOMYELITIS (HCC): Status: ACTIVE | Noted: 2024-09-13

## 2024-09-13 LAB
ANION GAP SERPL CALCULATED.3IONS-SCNC: 9 MMOL/L (ref 4–13)
ANISOCYTOSIS BLD QL SMEAR: PRESENT
BASOPHILS # BLD MANUAL: 0 THOUSAND/UL (ref 0–0.1)
BASOPHILS NFR MAR MANUAL: 0 % (ref 0–1)
BUN SERPL-MCNC: 58 MG/DL (ref 5–25)
CALCIUM SERPL-MCNC: 9 MG/DL (ref 8.4–10.2)
CHLORIDE SERPL-SCNC: 107 MMOL/L (ref 96–108)
CO2 SERPL-SCNC: 23 MMOL/L (ref 21–32)
CREAT SERPL-MCNC: 1.18 MG/DL (ref 0.6–1.3)
CRP SERPL QL: 203.1 MG/L
EOSINOPHIL # BLD MANUAL: 0.06 THOUSAND/UL (ref 0–0.4)
EOSINOPHIL NFR BLD MANUAL: 1 % (ref 0–6)
ERYTHROCYTE [DISTWIDTH] IN BLOOD BY AUTOMATED COUNT: 14.1 % (ref 11.6–15.1)
ERYTHROCYTE [SEDIMENTATION RATE] IN BLOOD: 99 MM/HOUR (ref 0–29)
GFR SERPL CREATININE-BSD FRML MDRD: 43 ML/MIN/1.73SQ M
GLUCOSE SERPL-MCNC: 139 MG/DL (ref 65–140)
GLUCOSE SERPL-MCNC: 165 MG/DL (ref 65–140)
GLUCOSE SERPL-MCNC: 83 MG/DL (ref 65–140)
GLUCOSE SERPL-MCNC: 86 MG/DL (ref 65–140)
GLUCOSE SERPL-MCNC: 99 MG/DL (ref 65–140)
HCT VFR BLD AUTO: 31.5 % (ref 34.8–46.1)
HGB BLD-MCNC: 10.6 G/DL (ref 11.5–15.4)
LYMPHOCYTES # BLD AUTO: 1.14 THOUSAND/UL (ref 0.6–4.47)
LYMPHOCYTES # BLD AUTO: 18 % (ref 14–44)
MCH RBC QN AUTO: 31.5 PG (ref 26.8–34.3)
MCHC RBC AUTO-ENTMCNC: 33.7 G/DL (ref 31.4–37.4)
MCV RBC AUTO: 94 FL (ref 82–98)
MONOCYTES # BLD AUTO: 0.44 THOUSAND/UL (ref 0–1.22)
MONOCYTES NFR BLD: 7 % (ref 4–12)
MYELOCYTE ABSOLUTE CT: 0.44 THOUSAND/UL (ref 0–0.1)
MYELOCYTES NFR BLD MANUAL: 7 % (ref 0–1)
NEUTROPHILS # BLD MANUAL: 4.24 THOUSAND/UL (ref 1.85–7.62)
NEUTS BAND NFR BLD MANUAL: 1 % (ref 0–8)
NEUTS SEG NFR BLD AUTO: 66 % (ref 43–75)
OVALOCYTES BLD QL SMEAR: PRESENT
PLATELET # BLD AUTO: 128 THOUSANDS/UL (ref 149–390)
PLATELET BLD QL SMEAR: ABNORMAL
PMV BLD AUTO: 10.8 FL (ref 8.9–12.7)
POTASSIUM SERPL-SCNC: 3.7 MMOL/L (ref 3.5–5.3)
RBC # BLD AUTO: 3.37 MILLION/UL (ref 3.81–5.12)
RBC MORPH BLD: PRESENT
SODIUM SERPL-SCNC: 139 MMOL/L (ref 135–147)
WBC # BLD AUTO: 6.33 THOUSAND/UL (ref 4.31–10.16)

## 2024-09-13 PROCEDURE — 82948 REAGENT STRIP/BLOOD GLUCOSE: CPT

## 2024-09-13 PROCEDURE — C1751 CATH, INF, PER/CENT/MIDLINE: HCPCS

## 2024-09-13 PROCEDURE — 85027 COMPLETE CBC AUTOMATED: CPT | Performed by: INTERNAL MEDICINE

## 2024-09-13 PROCEDURE — 86140 C-REACTIVE PROTEIN: CPT | Performed by: INTERNAL MEDICINE

## 2024-09-13 PROCEDURE — 85007 BL SMEAR W/DIFF WBC COUNT: CPT | Performed by: INTERNAL MEDICINE

## 2024-09-13 PROCEDURE — 02HV33Z INSERTION OF INFUSION DEVICE INTO SUPERIOR VENA CAVA, PERCUTANEOUS APPROACH: ICD-10-PCS | Performed by: RADIOLOGY

## 2024-09-13 PROCEDURE — 99232 SBSQ HOSP IP/OBS MODERATE 35: CPT | Performed by: INTERNAL MEDICINE

## 2024-09-13 PROCEDURE — 99233 SBSQ HOSP IP/OBS HIGH 50: CPT | Performed by: INTERNAL MEDICINE

## 2024-09-13 PROCEDURE — 80048 BASIC METABOLIC PNL TOTAL CA: CPT | Performed by: INTERNAL MEDICINE

## 2024-09-13 PROCEDURE — 85652 RBC SED RATE AUTOMATED: CPT | Performed by: INTERNAL MEDICINE

## 2024-09-13 PROCEDURE — 71045 X-RAY EXAM CHEST 1 VIEW: CPT

## 2024-09-13 RX ORDER — NIFEDIPINE 30 MG/1
60 TABLET, EXTENDED RELEASE ORAL DAILY
Status: DISCONTINUED | OUTPATIENT
Start: 2024-09-13 | End: 2024-09-14 | Stop reason: HOSPADM

## 2024-09-13 RX ORDER — CARVEDILOL 12.5 MG/1
12.5 TABLET ORAL 2 TIMES DAILY WITH MEALS
Status: DISCONTINUED | OUTPATIENT
Start: 2024-09-13 | End: 2024-09-14 | Stop reason: HOSPADM

## 2024-09-13 RX ADMIN — ACETAMINOPHEN 975 MG: 325 TABLET ORAL at 04:13

## 2024-09-13 RX ADMIN — METHOCARBAMOL TABLETS 500 MG: 500 TABLET, COATED ORAL at 08:59

## 2024-09-13 RX ADMIN — INSULIN GLARGINE 30 UNITS: 100 INJECTION, SOLUTION SUBCUTANEOUS at 09:00

## 2024-09-13 RX ADMIN — DICLOFENAC SODIUM TOPICAL GEL, 1% 2 G: 10 GEL TOPICAL at 09:01

## 2024-09-13 RX ADMIN — Medication 2.5 MG: at 04:12

## 2024-09-13 RX ADMIN — METHOCARBAMOL TABLETS 500 MG: 500 TABLET, COATED ORAL at 22:00

## 2024-09-13 RX ADMIN — LIDOCAINE 5% 1 PATCH: 700 PATCH TOPICAL at 12:23

## 2024-09-13 RX ADMIN — TRAMADOL HYDROCHLORIDE 50 MG: 50 TABLET, COATED ORAL at 08:59

## 2024-09-13 RX ADMIN — METHOCARBAMOL TABLETS 500 MG: 500 TABLET, COATED ORAL at 17:41

## 2024-09-13 RX ADMIN — SENNOSIDES AND DOCUSATE SODIUM 2 TABLET: 8.6; 5 TABLET ORAL at 08:59

## 2024-09-13 RX ADMIN — ACETAMINOPHEN 975 MG: 325 TABLET ORAL at 22:00

## 2024-09-13 RX ADMIN — ASPIRIN 81 MG: 81 TABLET, COATED ORAL at 08:59

## 2024-09-13 RX ADMIN — TRAMADOL HYDROCHLORIDE 50 MG: 50 TABLET, COATED ORAL at 17:41

## 2024-09-13 RX ADMIN — POLYETHYLENE GLYCOL 3350 17 G: 17 POWDER, FOR SOLUTION ORAL at 09:00

## 2024-09-13 RX ADMIN — ALLOPURINOL 200 MG: 100 TABLET ORAL at 08:59

## 2024-09-13 RX ADMIN — Medication 1 TABLET: at 08:59

## 2024-09-13 RX ADMIN — NIFEDIPINE 60 MG: 30 TABLET, EXTENDED RELEASE ORAL at 17:40

## 2024-09-13 RX ADMIN — ENOXAPARIN SODIUM 30 MG: 30 INJECTION SUBCUTANEOUS at 09:00

## 2024-09-13 RX ADMIN — MONTELUKAST 10 MG: 10 TABLET, FILM COATED ORAL at 22:00

## 2024-09-13 RX ADMIN — CEFAZOLIN SODIUM 2000 MG: 2 SOLUTION INTRAVENOUS at 03:54

## 2024-09-13 RX ADMIN — CARVEDILOL 12.5 MG: 12.5 TABLET, FILM COATED ORAL at 17:41

## 2024-09-13 RX ADMIN — FLUTICASONE PROPIONATE 2 SPRAY: 50 SPRAY, METERED NASAL at 09:01

## 2024-09-13 RX ADMIN — INSULIN LISPRO 1 UNITS: 100 INJECTION, SOLUTION INTRAVENOUS; SUBCUTANEOUS at 12:23

## 2024-09-13 RX ADMIN — LEVOTHYROXINE SODIUM 100 MCG: 100 TABLET ORAL at 04:13

## 2024-09-13 RX ADMIN — SENNOSIDES AND DOCUSATE SODIUM 2 TABLET: 8.6; 5 TABLET ORAL at 17:40

## 2024-09-13 RX ADMIN — POLYETHYLENE GLYCOL 3350 17 G: 17 POWDER, FOR SOLUTION ORAL at 22:04

## 2024-09-13 RX ADMIN — FAMOTIDINE 20 MG: 20 TABLET ORAL at 08:59

## 2024-09-13 RX ADMIN — CEFAZOLIN SODIUM 2000 MG: 2 SOLUTION INTRAVENOUS at 13:46

## 2024-09-13 RX ADMIN — DICLOFENAC SODIUM TOPICAL GEL, 1% 2 G: 10 GEL TOPICAL at 12:27

## 2024-09-13 RX ADMIN — ACETAMINOPHEN 975 MG: 325 TABLET ORAL at 13:44

## 2024-09-13 NOTE — CASE MANAGEMENT
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[facility]) 2024     Sepsis ([facility]): SIRS criteria MSSA Bacteremia and COVID infection 2024     Type 2 diabetes mellitus with complication, with long-term current use of insulin ([facility]) 2024     Nausea and vomiting 2023     Hordeolum externum of left upper eyelid 08/15/2023     Proteinuria 2023     Metatarsalgia of left foot 06/15/2023     Peripheral vascular disease, unspecified ([facility]) 2023     Constipation 2022     Anemia 2022     Vitamin deficiency 2022     Shortness of breath 2022     Dysuria 2022     Elevated LFTs 2022     Pancytopenia ([facility]) 2022     Asthma without status asthmaticus without complication 2022     History of colon polyps 2022     Gastroesophageal reflux disease 2022     Stage 3b chronic kidney disease ([facility]) 2022     Hypothyroidism 2021     Type 2 diabetes mellitus with diabetic chronic kidney disease ([facility]) 2021     Type 2 diabetes mellitus with diabetic polyneuropathy ([facility]) 2021     Long term (current) use of insulin ([facility]) 2021     Type 2 diabetes mellitus with stable proliferative diabetic retinopathy, bilateral ([facility]) 2021     Acute pain of right shoulder 2019     Right elbow pain 2019     Acute pain of right shoulder due to trauma 2019     Fall at home 2019     Imbalance 2019     Acute on Chronic Back Pain in the Setting of Sepsis, MSSA Bacteremia 2018     Encntr for gyn exam (general) (routine) w abnormal  findings 08/28/2018     Vaginal atrophy 08/28/2018     Vulvar lesion 02/05/2018     Hypertension Management 01/29/2018     Mixed hyperlipidemia 01/29/2018       LOS (days): 5  Geometric Mean LOS (GMLOS) (days): 5.1  Days to GMLOS:0     OBJECTIVE:  Risk of Unplanned Readmission Score: 22         Current admission status: Inpatient   Preferred Pharmacy:   [facility] - [place] - [address]  [address]  [place] [address]  Phone: [phone] Fax: [phone]    Primary Care Provider: João Alfonso MD    Primary Insurance: [employer] REP  Secondary Insurance:     DISCHARGE DETAILS:                                          Other Referral/Resources/Interventions Provided:  Interventions: Transportation  Referral Comments: Per [facility]IM - pt medically stable pending PICC placement. CM f/u with [facility] via aidin re: bed availability for pt. Per [facility] - able to accept pt to facility on Saturday and requesting around 1pm transport. [facility] for 1300 via [facility] to [facility] tomorrow afternoon - pending confirmation. Per CM d/c support there is a possibility approval was error message. CM f/u with [facility] admissions, [place], via phone notified of same. CM notified pt of likely d/c for tomorrow afternoon pending auth confirmed. If auth remains approved pt to d/c to [facility] tomorrow and if completed in error pt will need to stay till Monday for insurance to correct STR auth. CM to f/u with CM d/c support in AM to confirm STR auth.         Treatment Team Recommendation: Short Term Rehab  Discharge Destination Plan:: Short Term Rehab  Transport at Discharge : Wheelchair van        Transported by (Company and Unit #): pending confirmation  ETA of Transport (Date): 09/14/24  ETA of Transport (Time): 1300           IMM reviewed with patient, patient agrees with discharge determination.  IMM Given (Date):: 09/13/24  IMM Given to:: Patient          Accepting Facility  Name, City & State : [facility]  Receiving Facility/Agency Phone Number: [phone]  Facility/Agency Fax Number: [phone]

## 2024-09-13 NOTE — PROCEDURES
Insert Complex Venous Access Line    Date/Time: 9/13/2024 12:19 PM    Performed by: Franny Hu RN  Authorized by: Emelina Mckee MD    Patient location:  Bedside  Consent:     Consent obtained:  Verbal and written (Obtained by vas team)    Consent given by:  Patient (Obtained by vas team)    Procedural risks discussed: discussed with vas team.    Alternatives discussed: Obtained by vas team.  [place] protocol:     Procedure explained and questions answered to patient or proxy's satisfaction: yes      Immediately prior to procedure, a time out was called: yes      Relevant documents present and verified: yes      Test results available and properly labeled: yes      Radiology Images displayed and confirmed.  If images not available, report reviewed: yes      Required blood products, implants, devices, and special equipment available: yes      Site/side marked: yes      Patient identity confirmed:  Verbally with patient and arm band  Pre-procedure details:     Hand hygiene: Hand hygiene performed prior to insertion      Sterile barrier technique: All elements of maximal sterile technique followed      Skin preparation:  ChloraPrep    Skin preparation agent: Skin preparation agent completely dried prior to procedure    Procedure details:     Complex Venous Access Line Type: PICC      Complex Venous Access Line Indications: long term antibiotics      Catheter tip vessel location: superior vena cava      Orientation:  Right and upper    Location:  [facility]    Procedural supplies:  Single lumen    Catheter size:  4 Fr (Ref#[id number],Lot#[id number],Exp07/31/25)    Total catheter length (cm):  39    Catheter out on skin (cm):  0    Max flow rate:  999ml/hr    Arm circumference:  26cm    Patient evaluated for contraindications to access (i.e. fistula, thrombosis, etc): Yes      Approach: percutaneous technique used      Patient position:  Flat    Ultrasound image availability:  Not saved    Sterile ultrasound  techniques: Sterile gel and sterile probe covers were used      Number of attempts:  1    Successful placement: yes      Landmarks identified: yes      Vessel of catheter tip end:  Chest Xray needed to confirm placement  Anesthesia (see MAR for exact dosages):     Anesthesia method:  Local infiltration    Local anesthetic:  Lidocaine 1% w/o epi (2.5ml lido w/o epi used)  Post-procedure details:     Post-procedure:  Dressing applied    Assessment:  Blood return through all ports    Post-procedure complications: none      Patient tolerance of procedure:  Tolerated well, no immediate complications

## 2024-09-13 NOTE — CASE MANAGEMENT
Availity shows auth request approved but for 1 day. Spoke with [facility] rep Cody (P#: [phone]) who stated case is approved but date range is only for 24hrs. Per rep, she cannot edit date range since she did not work the case and that auths cannot be approved for only one day. Stated Rizwana (P#: [phone]) worked the case and update would need to go through her. VM left for Rizwana and fax sent to [facility].     09/13 @ 430pm - Spoke with Albertina @ [facility] (P#: [phone]). Stated auth system went down previously today and approval may be an error since it is showing approved for 1 day. Per rep, patient should not admit to facility in case auth is not approved. Rep confirmed as well that she is not able to access case to update since she is not the original reviewer. Rep stated that rep Rizwana and Cody have left for the day. Sated to try P#: [phone] over the weekend to see if the weekend team can see any updates.     Care Manager notified: Cynthia Chacon   DCS  made aware.

## 2024-09-13 NOTE — PLAN OF CARE
Problem: Prexisting or High Potential for Compromised Skin Integrity  Goal: Skin integrity is maintained or improved  Description: INTERVENTIONS:  - Identify patients at risk for skin breakdown  - Assess and monitor skin integrity  - Assess and monitor nutrition and hydration status  - Monitor labs   - Assess for incontinence   - Turn and reposition patient  - Assist with mobility/ambulation  - Relieve pressure over bony prominences  - Avoid friction and shearing  - Provide appropriate hygiene as needed including keeping skin clean and dry  - Evaluate need for skin moisturizer/barrier cream  - Collaborate with interdisciplinary team   - Patient/family teaching  - Consider wound care consult   Outcome: Progressing     Problem: PAIN - ADULT  Goal: Verbalizes/displays adequate comfort level or baseline comfort level  Description: Interventions:  - Encourage patient to monitor pain and request assistance  - Assess pain using appropriate pain scale  - Administer analgesics based on type and severity of pain and evaluate response  - Implement non-pharmacological measures as appropriate and evaluate response  - Consider cultural and social influences on pain and pain management  - Notify physician/advanced practitioner if interventions unsuccessful or patient reports new pain  Outcome: Progressing     Problem: INFECTION - ADULT  Goal: Absence or prevention of progression during hospitalization  Description: INTERVENTIONS:  - Assess and monitor for signs and symptoms of infection  - Monitor lab/diagnostic results  - Monitor all insertion sites, i.e. indwelling lines, tubes, and drains  - Monitor endotracheal if appropriate and nasal secretions for changes in amount and color  - [place] appropriate cooling/warming therapies per order  - Administer medications as ordered  - Instruct and encourage patient and family to use good hand hygiene technique  - Identify and instruct in appropriate isolation precautions for  identified infection/condition  Outcome: Progressing  Goal: Absence of fever/infection during neutropenic period  Description: INTERVENTIONS:  - Monitor WBC    Outcome: Progressing     Problem: SAFETY ADULT  Goal: Patient will remain free of falls  Description: INTERVENTIONS:  - Educate patient/family on patient safety including physical limitations  - Instruct patient to call for assistance with activity   - Consult OT/PT to assist with strengthening/mobility   - Keep Call bell within reach  - Keep bed low and locked with side rails adjusted as appropriate  - Keep care items and personal belongings within reach  - Initiate and maintain comfort rounds  - Make Fall Risk Sign visible to staff  - Offer Toileting every  Hours, in advance of need  - Initiate/Maintain alarm  - Obtain necessary fall risk management equipment:   - Apply yellow socks and bracelet for high fall risk patients  - Consider moving patient to room near nurses station  Outcome: Progressing  Goal: Maintain or return to baseline ADL function  Description: INTERVENTIONS:  -  Assess patient's ability to carry out ADLs; assess patient's baseline for ADL function and identify physical deficits which impact ability to perform ADLs (bathing, care of mouth/teeth, toileting, grooming, dressing, etc.)  - Assess/evaluate cause of self-care deficits   - Assess range of motion  - Assess patient's mobility; develop plan if impaired  - Assess patient's need for assistive devices and provide as appropriate  - Encourage maximum independence but intervene and supervise when necessary  - Involve family in performance of ADLs  - Assess for home care needs following discharge   - Consider OT consult to assist with ADL evaluation and planning for discharge  - Provide patient education as appropriate  Outcome: Progressing  Goal: Maintains/Returns to pre admission functional level  Description: INTERVENTIONS:  - Perform AM-PAC 6 Click Basic Mobility/ Daily Activity  assessment daily.  - Set and communicate daily mobility goal to care team and patient/family/caregiver.   - Collaborate with rehabilitation services on mobility goals if consulted  - Perform Range of Motion  times a day.  - Reposition patient every  hours.  - Dangle patient  times a day  - Stand patient  times a day  - Ambulate patient  times a day  - Out of bed to chair  times a day   - Out of bed for meals  times a day  - Out of bed for toileting  - Record patient progress and toleration of activity level   Outcome: Progressing     Problem: DISCHARGE PLANNING  Goal: Discharge to home or other facility with appropriate resources  Description: INTERVENTIONS:  - Identify barriers to discharge w/patient and caregiver  - Arrange for needed discharge resources and transportation as appropriate  - Identify discharge learning needs (meds, wound care, etc.)  - Arrange for interpretive services to assist at discharge as needed  - Refer to Case Management Department for coordinating discharge planning if the patient needs post-hospital services based on physician/advanced practitioner order or complex needs related to functional status, cognitive ability, or social support system  Outcome: Progressing     Problem: Knowledge Deficit  Goal: Patient/family/caregiver demonstrates understanding of disease process, treatment plan, medications, and discharge instructions  Description: Complete learning assessment and assess knowledge base.  Interventions:  - Provide teaching at level of understanding  - Provide teaching via preferred learning methods  Outcome: Progressing

## 2024-09-13 NOTE — ASSESSMENT & PLAN NOTE
Acute exacerbation of chronic back pain, home regimen tramadol twice daily and Robaxin 3 times daily.  Denies any recent injury, denies lifting any heavy objects, woke up from sleep with exacerbation back pain, unable to ambulate due to pain.  Denies urinary retention   On admission significant for Fever 103, chills, +covid, reports intermittent dysuria, lower abdomen tender to palpation. WBC 10.8. ProCal picked at 17.  Blood culture #1 from 9/7 returned positive for staph aureus, MSSA. Repeat Bcx2 from 9/10- no growth in 48 h  9/10: MRI lumbar spine without contrast possible discitis osteomyelitis.  Infectious disease is on board Transitioned from Ceftriaxone to IV Cefazolin.   9/10: Chronic scalp lession-possible source of bacteremia.   TTE Echocardiogram resulted no findings of valvular abnormalities. Questionable if test is reliable.  , Sed rate 99    Assessment for Acute on Chronic Back pain in the setting of sepsis: Ddx discitis osteomyelitis 2/2 to MSSA bactermia spread to the spine possible acute vertebral fracture/trauma more likely than abscess/phlegmon     Plan:    Trend fever, WBC  Infectious disease recommendations appreciated  Continue IV cefazolin D 4 (total D 6) through 10/21/24  PICC line placed  Dermatology consult placed, will see pt in o/p settings, appt on 10/17  Multimodal pain regimen:   Scheduled medications of Tramadol 50 mg BID and ROBAXIN 500 mg TID   Along with the following PRN medications PRN oxycodone 2.5 mg, Home Robaxin 3 times daily, Topical Voltaren gel, Aqua K-pad, Lidocaine patch      CINDY (acute kidney injury) (HCC)-resolving  Patient's creatinine on compared to admission creatinine 1.11.   Creatinine 9/9: 2.66 -> trended down to normal range  UA: +2 urine protein, RBC urine 2-4. No hyaline cast or granular cast.  Renal ultrasound showed diffusely cortical thinning in both kidney with lobulated contours no hydronephrosis  Nephrology consulted, recs  appreciated  Plan  Assessment: Most likely multifactorial in settings of contrast induced nephropathy along with hypotension due to sepsis and PTA meds bumex and losartan  Daily BMP to monitor creatinine.

## 2024-09-13 NOTE — ASSESSMENT & PLAN NOTE
Plan   Continue Senna docusate sodium   Miralax twice daily  Dulcolax suppositorium  Last Bm on 9/11

## 2024-09-13 NOTE — ASSESSMENT & PLAN NOTE
Blood pressure elevated in the ED likely secondary to acute exacerbation of pain, improved with pain control.  PTA meds: Bumex 2 mg twice daily, carvedilol 25 mg twice daily, doxazosin 2 mg nightly, Procardia XL 60 mg daily, losartan 100 mg daily . PTA meds were on hold due to hypotension secondary to sepsis.  BP normalized and on the higher side    Plan:  Restart Carvedilol 12.5 mg BID and Nifedipine XL 60 mg daily today  Restart Losartan and bumex on 9/14/24

## 2024-09-13 NOTE — PROGRESS NOTES
Progress Note - Infectious Disease   Porsha Hoyos 79 y.o. female MRN: [id number]  Unit/Bed#: S -01 Encounter: [id number]      Impression/Recommendations:  1.  Sepsis, with fever, mild leukocytosis, with elevated procalcitonin.  Although patient has COVID, leukocytosis and elevated procalcitonin suggest presence of underlying bacterial infection.  Source is most likely lumbar spine discitis and possible Staph aureus bacteremia.  Patient is clinically improved.  Fever has resolved.  WBC has normalized.  Despite sepsis, patient has remained systemically well, without toxicity and hemodynamically stable, without hypotension.  Antibiotic plan as in below.  Monitor temperature/WBC.  Monitor hemodynamics.     2.  MSSA bacteremia.  Although there was growth in only 1 out of 2 admission blood cultures, given sepsis, leukocytosis and elevated procalcitonin, without obvious source of active infection, concern is for true Staph aureus bacteremia.  Patient has moderately poor blood draw and IV access.  She does have a chronic skin lesion in her scalp that may be etiology of translocation.  Patient is clinically improved.  TTE without obvious vegetation.  Although it is not completely clear whether patient has true MSSA bacteremia, given likely lumbar spine discitis, patient will need long-term IV antibiotic regardless.  Continue high-dose IV cefazolin.  Follow-up repeat blood cultures.  With resolving bacteremia, okay for PICC placement anytime.     3.  Probable L3-4 discitis/vertebral osteomyelitis.  Lumbar spine MRI suggest possible discitis but difficult to tell due to extensive chronic changes.  However, given acute development of low back pain, superimposed on mild chronic back pain, the elevated inflammatory markers and possible MSSA bacteremia above, will treat patient for presumptive lumbar spine discitis/osteomyelitis..  IV cefazolin as in above.  Monitor low back pain.  Pain control per primary service.  Treat  x 6 weeks total IV antibiotic, through 10/21.  Monitor ESR/CRP.  If ESR/CRP remain elevated at the end of IV antibiotic course, patient will likely need to be transitioned to p.o. cefadroxil and remain on it until inflammatory markers normalize.  Monitor weekly CBCD/creatinine, in addition to ESR/CRP, while on IV antibiotic.     4.  Mild COVID.  Given multiple comorbid illnesses, patient is currently on Paxlovid to prevent progression of symptoms.  Patient currently has minimal respiratory symptoms and is not hypoxic.  She is not on any O2 supplement.  Chest CT is without evidence of bacterial superinfection.  Patient completed 5-day course of Paxlovid.  Monitor respiratory symptoms.     5.  Chronic scalp lesion, for many months, according to patient.  Scalp lesion is concerning for squamous cell carcinoma.  Although there is no evidence of cellulitis, this may be etiology of Staph aureus translocation.  Recommend dermatology evaluation for punch biopsy.     6.  CKD stage III.  Creatinine elevated from baseline, now improved.  Antibiotic dosages adjusted accordingly.  Monitor creatinine.     7.  DM, poorly controlled, with hyperglycemia and elevated hemoglobin A1c.  This is risk factor for infection above.  Management per primary service.     Discussed with patient in detail regarding the above plan.  Discussed with Dr. Mckee from primary service regarding antibiotic plan above.  She is in agreement and will order PICC placement and is working on discharging patient.     Antibiotics:  Cefazolin # 4     Subjective:  Patient states low back pain is a little better.  Otherwise, she has no specific complaints.  No dyspnea or cough.  Temperature stays down.  No chills.  She is tolerating antibiotic well.  No nausea, vomiting or diarrhea.     Objective:  Vitals:  Temp:  [98.3 °F (36.8 °C)-98.7 °F (37.1 °C)] 98.3 °F (36.8 °C)  HR:  [77-80] 79  Resp:  [16] 16  BP: (116-160)/(35-60) 160/60  SpO2:  [94 %-96 %] 96 %  Temp  (24hrs), Av.5 °F (36.9 °C), Min:98.3 °F (36.8 °C), Max:98.7 °F (37.1 °C)  Current: Temperature: 98.3 °F (36.8 °C)    Physical Exam:     General: Awake, alert, cooperative, no distress.   Neck:  Supple. No mass.  No lymphadenopathy.   Lungs: Expansion symmetric, no rales, no wheezing, respirations unlabored.   Heart:  Regular rate and rhythm, S1 and S2 normal, no murmur.   Abdomen: Soft, nondistended, non-tender, bowel sounds active all four quadrants, no masses, no organomegaly.   Back:  A little improved lumbar tenderness.  No deformity.  No open wound.  No erythema.   Extremities: Stable mild leg edema. No erythema/warmth. No ulcer. Nontender to palpation.   Skin:  No rash.   Neuro: Moves all extremities.     Invasive Devices       Peripheral Intravenous Line  Duration             Peripheral IV 24 Dorsal (posterior);Left Forearm <1 day                    Labs studies:   I have personally reviewed pertinent labs.  Results from last 7 days   Lab Units 24  03524  0812 24  0443 24  1655   POTASSIUM mmol/L 3.7 3.7 3.6   < > 5.0   CHLORIDE mmol/L 107 105 102   < > 102   CO2 mmol/L 23 23 22   < > 26   BUN mg/dL 58* 68* 66*   < > 43*   CREATININE mg/dL 1.18 1.60* 2.02*   < > 1.19   EGFR ml/min/1.73sq m 43 30 22   < > 42   CALCIUM mg/dL 9.0 9.1 8.6   < > 10.1   AST U/L  --   --   --   --  36   ALT U/L  --   --   --   --  30   ALK PHOS U/L  --   --   --   --  80    < > = values in this interval not displayed.     Results from last 7 days   Lab Units 24  03524  0812   WBC Thousand/uL 6.33 7.37 6.56   HEMOGLOBIN g/dL 10.6* 9.9* 9.2*   PLATELETS Thousands/uL 128* 114* 86*     Results from last 7 days   Lab Units 09/10/24  1452 24  1655 24  1645   BLOOD CULTURE  No Growth at 48 hrs.  No Growth at 48 hrs. Staphylococcus aureus* No Growth After 5 Days.   GRAM STAIN RESULT   --  Gram positive cocci in clusters*  --        Imaging Studies:    I have personally reviewed pertinent imaging study reports and images in PACS.    EKG, Pathology, and Other Studies:   I have personally reviewed pertinent reports.

## 2024-09-13 NOTE — ASSESSMENT & PLAN NOTE
Lab Results   Component Value Date    EGFR 43 09/13/2024    EGFR 30 09/12/2024    EGFR 22 09/11/2024    CREATININE 1.18 09/13/2024    CREATININE 1.60 (H) 09/12/2024    CREATININE 2.02 (H) 09/11/2024   Baseline creatinine 1.2-1.5  Plan   See under CINDY as above

## 2024-09-13 NOTE — ASSESSMENT & PLAN NOTE
Lab Results   Component Value Date    HGBA1C 7.9 (A) 07/02/2024       Recent Labs     09/12/24  1652 09/12/24  2124 09/13/24  0809 09/13/24  1115   POCGLU 281* 213* 83 165*       Blood Sugar Average: Last 72 hrs:  (P) 171.5  Home regimen: Januvia 50 mg daily, insulin regular 5 units with lunch and dinner, Lantus 30 units in the morning (patient reported),  Patient reports not eating anything today, endorses a decreased appetite overall.    Plan:  Continue Lantus 30 units  Insulin sliding scale Insulin lispro 1-5 units   Fingerstick glucose  Carb controlled diet

## 2024-09-13 NOTE — PROGRESS NOTES
Progress Note - Hospitalist   Name: Porsha Hoyos 79 y.o. female I MRN: [id number]  Unit/Bed#: S -01 I Date of Admission: 9/7/2024   Date of Service: 9/13/2024 I Hospital Day: 5    Assessment & Plan  Acute on Chronic Back Pain in the Setting of Sepsis, MSSA Bacteremia  Acute exacerbation of chronic back pain, home regimen tramadol twice daily and Robaxin 3 times daily.  Denies any recent injury, denies lifting any heavy objects, woke up from sleep with exacerbation back pain, unable to ambulate due to pain.  Denies urinary retention   On admission significant for Fever 103, chills, +covid, reports intermittent dysuria, lower abdomen tender to palpation. WBC 10.8. ProCal picked at 17.  Blood culture #1 from 9/7 returned positive for staph aureus, MSSA. Repeat Bcx2 from 9/10- no growth in 48 h  9/10: MRI lumbar spine without contrast possible discitis osteomyelitis.  Infectious disease is on board Transitioned from Ceftriaxone to IV Cefazolin.   9/10: Chronic scalp lession-possible source of bacteremia.   TTE Echocardiogram resulted no findings of valvular abnormalities. Questionable if test is reliable.  , Sed rate 99    Assessment for Acute on Chronic Back pain in the setting of sepsis: Ddx discitis osteomyelitis 2/2 to MSSA bactermia spread to the spine possible acute vertebral fracture/trauma more likely than abscess/phlegmon     Plan:    Trend fever, WBC  Infectious disease recommendations appreciated  Continue IV cefazolin D 4 (total D 6) through 10/21/24  PICC line placed  Dermatology consult placed, will see pt in o/p settings, appt on 10/17  Multimodal pain regimen:   Scheduled medications of Tramadol 50 mg BID and ROBAXIN 500 mg TID   Along with the following PRN medications PRN oxycodone 2.5 mg, Home Robaxin 3 times daily, Topical Voltaren gel, Aqua K-pad, Lidocaine patch      CINDY (acute kidney injury) (HCC)-resolving  Patient's creatinine on compared to admission creatinine 1.11.   Creatinine  9/9: 2.66 -> trended down to normal range  UA: +2 urine protein, RBC urine 2-4. No hyaline cast or granular cast.  Renal ultrasound showed diffusely cortical thinning in both kidney with lobulated contours no hydronephrosis  Nephrology consulted, recs appreciated  Plan  Assessment: Most likely multifactorial in settings of contrast induced nephropathy along with hypotension due to sepsis and PTA meds bumex and losartan  Daily BMP to monitor creatinine.   Hypertension Management  Blood pressure elevated in the ED likely secondary to acute exacerbation of pain, improved with pain control.  PTA meds: Bumex 2 mg twice daily, carvedilol 25 mg twice daily, doxazosin 2 mg nightly, Procardia XL 60 mg daily, losartan 100 mg daily . PTA meds were on hold due to hypotension secondary to sepsis.  BP normalized and on the higher side    Plan:  Restart Carvedilol 12.5 mg BID and Nifedipine XL 60 mg daily today  Restart Losartan and bumex on 9/14/24  Sepsis ([facility]): SIRS criteria MSSA Bacteremia and COVID infection  Fever 103, chills, +covid, reports intermittent dysuria, lower abdomen tender to palpation.  WBC on 9/9: 11.55 from admission 10.83  No lactic acidosis  Most likely in settings of MSSA bacteremia and discitis/osteomyelitis  Plan:  Continue IV cefazolin through 10/21  Sepsis now resolved  COVID  Patient has a positive for COVID in the ED.  Has fevers, chills denies any sore throat, cough or shortness of breath.  Intermittently required oxygen in the ED as saturations were dropped with movement.  Was on [place] oxygen. Successfully weaned to room air   S/p Paxlovid    Plan:  Monitor Spo2 and respiratory status, wean off as tolerating      Type 2 diabetes mellitus with complication, with long-term current use of insulin ([facility])  Lab Results   Component Value Date    HGBA1C 7.9 (A) 07/02/2024       Recent Labs     09/12/24  1652 09/12/24  2124 09/13/24  0809 09/13/24  1115   POCGLU 281* 213* 83 165*       Blood Sugar Average: Last  72 hrs:  (P) 171.5  Home regimen: Januvia 50 mg daily, insulin regular 5 units with lunch and dinner, Lantus 30 units in the morning (patient reported),  Patient reports not eating anything today, endorses a decreased appetite overall.    Plan:  Continue Lantus 30 units  Insulin sliding scale Insulin lispro 1-5 units   Fingerstick glucose  Carb controlled diet  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HCC)  CTA dissection protocol positive for severe stenosis in the proximal celiac artery and moderate to severe stenosis in the proximal left renal artery.  Superior mesenteric artery and inferior mesenteric artery are patent with no signs of ischemia.  Vascular surgery evaluated patient in the ED: No acute vascular intervention.    Plan:  Vascular team consulted follow recommendations: Continue aspirin 81 mg daily and f/u with vascular surgery as outpt     Constipation  Plan   Continue Senna docusate sodium   Miralax twice daily  Dulcolax suppositorium  Last Bm on 9/11    Stage 3b chronic kidney disease (HCC)  Lab Results   Component Value Date    EGFR 43 09/13/2024    EGFR 30 09/12/2024    EGFR 22 09/11/2024    CREATININE 1.18 09/13/2024    CREATININE 1.60 (H) 09/12/2024    CREATININE 2.02 (H) 09/11/2024   Baseline creatinine 1.2-1.5  Plan   See under CINDY as above   Osteomyelitis (HCC)  POA with back pain  MRI lumbar spine There are corresponding sclerotic endplate changes on CT at these levels, with likely vacuum phenomena noted anteriorly involving the intervertebral disc at L3-L4. These findings are likely due to extensive degenerative disc disease/degenerative endplate changes   but a discitis osteomyelitis cannot be entirely excluded.  , Sed Rate 99  MSSA bacteremia  Pt will be treated for OM with 6 weeks IV Cefazolin as per ID recs  Will need repeat MRI in 3-4 weeks    VTE Pharmacologic Prophylaxis: VTE Score: 4 Moderate Risk (Score 3-4) - Pharmacological DVT Prophylaxis Ordered: enoxaparin (Lovenox).    Mobility:    Basic Mobility Inpatient Raw Score: 18  JH-HLM Goal: 6: Walk 10 steps or more  JH-HLM Achieved: 4: Move to chair/commode  JH-HLM Goal NOT achieved. Continue with multidisciplinary rounding and encourage appropriate mobility to improve upon JH-HLM goals.    Patient Centered Rounds: I performed bedside rounds with nursing staff today.   Discussions with Specialists or Other Care Team Provider: attending physician    Education and Discussions with Family / Patient: spoke with pt's son over the phone, updates provided      Current Length of Stay: 5 day(s)  Current Patient Status: Inpatient   Certification Statement: The patient will continue to require additional inpatient hospital stay due to needs of IV ABx  Discharge Plan: Anticipate discharge in 24-48 hrs to ST, placement TBD    Code Status: Level 1 - Full Code    Subjective   No acute events overnight, pt is hemodynamically stable. Pt states that she is feeling well and express no new complains. Pt admits that her back pain is better. Pt requested to updated her son.  Pt's son mentioned that he has work trip till Sep 28 and he is worried who will take care of his mom if she will be discharged earlier. Pt was inform that rehab facility will make decision regarding discharge time and will plan accordingly to his mom condition and ongoing situation.    Objective     Vitals:   Temp (24hrs), Av.5 °F (36.9 °C), Min:98.3 °F (36.8 °C), Max:98.7 °F (37.1 °C)    Temp:  [98.3 °F (36.8 °C)-98.7 °F (37.1 °C)] 98.3 °F (36.8 °C)  HR:  [77-85] 85  Resp:  [16] 16  BP: (116-172)/(35-69) 172/69  SpO2:  [94 %-97 %] 97 %  Body mass index is 28.5 kg/m².     Input and Output Summary (last 24 hours):     Intake/Output Summary (Last 24 hours) at 2024 1545  Last data filed at 2024 1300  Gross per 24 hour   Intake 120 ml   Output 1500 ml   Net -1380 ml       Physical Exam  Constitutional:       Appearance: Normal appearance. She is not ill-appearing.   HENT:      Head:  Normocephalic and atraumatic.      Nose: Nose normal.   Eyes:      Conjunctiva/sclera: Conjunctivae normal.   Cardiovascular:      Rate and Rhythm: Normal rate and regular rhythm.      Pulses: Normal pulses.      Heart sounds: Normal heart sounds.   Pulmonary:      Effort: Pulmonary effort is normal. No respiratory distress.      Breath sounds: No wheezing or rales.   Abdominal:      General: Abdomen is flat. Bowel sounds are normal. There is no distension.      Palpations: Abdomen is soft.      Tenderness: There is no abdominal tenderness. There is no guarding.   Musculoskeletal:      Cervical back: Normal range of motion.      Right lower leg: No edema.      Left lower leg: No edema.   Skin:     General: Skin is warm and dry.      Comments: Scalp lesion   Neurological:      General: No focal deficit present.      Mental Status: She is alert.   Psychiatric:         Mood and Affect: Mood normal.         Behavior: Behavior normal.         Thought Content: Thought content normal.         Judgment: Judgment normal.          Lines/Drains:  Lines/Drains/Airways       Active Status       Name Placement date Placement time Site Days    PICC Line 09/13/24 Right Brachial 09/13/24  1222  Brachial  less than 1                            Lab Results: I have reviewed the following results:    Results from last 7 days   Lab Units 09/13/24  0356 09/12/24  0520   WBC Thousand/uL 6.33 7.37   HEMOGLOBIN g/dL 10.6* 9.9*   HEMATOCRIT % 31.5* 29.5*   PLATELETS Thousands/uL 128* 114*   BANDS PCT % 1  --    SEGS PCT %  --  73   LYMPHO PCT % 18 13*   MONO PCT % 7 12   EOS PCT % 1 1     Results from last 7 days   Lab Units 09/13/24  0356 09/08/24  0443 09/07/24  1655   SODIUM mmol/L 139   < > 137   POTASSIUM mmol/L 3.7   < > 5.0   CHLORIDE mmol/L 107   < > 102   CO2 mmol/L 23   < > 26   BUN mg/dL 58*   < > 43*   CREATININE mg/dL 1.18   < > 1.19   ANION GAP mmol/L 9   < > 9   CALCIUM mg/dL 9.0   < > 10.1   ALBUMIN g/dL  --   --  4.0   TOTAL  BILIRUBIN mg/dL  --   --  0.56   ALK PHOS U/L  --   --  80   ALT U/L  --   --  30   AST U/L  --   --  36   GLUCOSE RANDOM mg/dL 99   < > 227*    < > = values in this interval not displayed.     Results from last 7 days   Lab Units 09/07/24  1655   INR  1.03     Results from last 7 days   Lab Units 09/13/24  1115 09/13/24  0809 09/12/24  2124 09/12/24  1652 09/12/24  1122 09/12/24  0836 09/11/24  2051 09/11/24  1624 09/11/24  1114 09/11/24  0739 09/10/24  2130 09/10/24  1622   POC GLUCOSE mg/dl 165* 83 213* 281* 253* 131 196* 141* 162* 122 188* 191*         Results from last 7 days   Lab Units 09/11/24  0812 09/10/24  0606 09/09/24  0433 09/08/24  0443 09/07/24  1655   LACTIC ACID mmol/L  --   --   --   --  0.9   PROCALCITONIN ng/ml 7.67* 11.16* 17.59* 3.41* 0.35*       Recent Cultures (last 7 days):   Results from last 7 days   Lab Units 09/10/24  1452 09/07/24  1655 09/07/24  1645   BLOOD CULTURE  No Growth at 48 hrs.  No Growth at 48 hrs. Staphylococcus aureus* No Growth After 5 Days.   GRAM STAIN RESULT   --  Gram positive cocci in clusters*  --        Imaging Review: Personally reviewed the following image studies in PACS and associated radiology reports: chest xray and CT chest. My interpretation of the radiology images/reports is: MRI spine.  Other Studies: EKG was reviewed.     Last 24 Hours Medication List:     Current Facility-Administered Medications:     acetaminophen (TYLENOL) tablet 975 mg, Q8H KORI    allopurinol (ZYLOPRIM) tablet 200 mg, Daily    aspirin (ECOTRIN LOW STRENGTH) EC tablet 81 mg, Daily    bisacodyl (DULCOLAX) rectal suppository 10 mg, Daily PRN    calcium carbonate (TUMS) chewable tablet 500 mg, BID PRN    calcium carbonate-vitamin D 500 mg-5 mcg tablet 1 tablet, Daily With Breakfast    carvedilol (COREG) tablet 12.5 mg, BID With Meals    ceFAZolin (ANCEF) IVPB (premix in dextrose) 2,000 mg 50 mL, Q12H, Last Rate: 2,000 mg (09/13/24 1346)    Diclofenac Sodium (VOLTAREN) 1 % topical gel 2  g, 4x Daily    enoxaparin (LOVENOX) subcutaneous injection 30 mg, Daily    famotidine (PEPCID) tablet 20 mg, Daily    fluticasone (FLONASE) 50 mcg/act nasal spray 2 spray, Daily    insulin glargine (LANTUS) subcutaneous injection 30 Units 0.3 mL, QAM    insulin lispro (HumALOG/ADMELOG) 100 units/mL subcutaneous injection 1-5 Units, TID AC **AND** Fingerstick Glucose (POCT), TID AC    insulin lispro (HumALOG/ADMELOG) 100 units/mL subcutaneous injection 1-5 Units, HS    levothyroxine tablet 100 mcg, Early Morning    lidocaine (LIDODERM) 5 % patch 1 patch, Daily    methocarbamol (ROBAXIN) tablet 500 mg, TID    montelukast (SINGULAIR) tablet 10 mg, HS    NIFEdipine (PROCARDIA XL) 24 hr tablet 60 mg, Daily    oxyCODONE (ROXICODONE) split tablet 2.5 mg, Q6H PRN    polyethylene glycol (MIRALAX) packet 17 g, BID    senna-docusate sodium (SENOKOT S) 8.6-50 mg per tablet 2 tablet, BID    traMADol (ULTRAM) tablet 50 mg, BID    Administrative Statements   Today, Patient Was Seen By: Emelina Mckee MD  I have spent a total time of 45 minutes in caring for this patient on the day of the visit/encounter including Diagnostic results, Prognosis, Risks and benefits of tx options, Instructions for management, Patient and family education, Importance of tx compliance, Risk factor reductions, Impressions, Counseling / Coordination of care, Documenting in the medical record, Reviewing / ordering tests, medicine, procedures  , Obtaining or reviewing history  , and Communicating with other healthcare professionals .    **Please Note: This note may have been constructed using a voice recognition system.**

## 2024-09-13 NOTE — PLAN OF CARE
Problem: Prexisting or High Potential for Compromised Skin Integrity  Goal: Skin integrity is maintained or improved  Description: INTERVENTIONS:  - Identify patients at risk for skin breakdown  - Assess and monitor skin integrity  - Assess and monitor nutrition and hydration status  - Monitor labs   - Assess for incontinence   - Turn and reposition patient  - Assist with mobility/ambulation  - Relieve pressure over bony prominences  - Avoid friction and shearing  - Provide appropriate hygiene as needed including keeping skin clean and dry  - Evaluate need for skin moisturizer/barrier cream  - Collaborate with interdisciplinary team   - Patient/family teaching  - Consider wound care consult   Outcome: Progressing     Problem: PAIN - ADULT  Goal: Verbalizes/displays adequate comfort level or baseline comfort level  Description: Interventions:  - Encourage patient to monitor pain and request assistance  - Assess pain using appropriate pain scale  - Administer analgesics based on type and severity of pain and evaluate response  - Implement non-pharmacological measures as appropriate and evaluate response  - Consider cultural and social influences on pain and pain management  - Notify physician/advanced practitioner if interventions unsuccessful or patient reports new pain  Outcome: Progressing     Problem: INFECTION - ADULT  Goal: Absence or prevention of progression during hospitalization  Description: INTERVENTIONS:  - Assess and monitor for signs and symptoms of infection  - Monitor lab/diagnostic results  - Monitor all insertion sites, i.e. indwelling lines, tubes, and drains  - Monitor endotracheal if appropriate and nasal secretions for changes in amount and color  - [place] appropriate cooling/warming therapies per order  - Administer medications as ordered  - Instruct and encourage patient and family to use good hand hygiene technique  - Identify and instruct in appropriate isolation precautions for  identified infection/condition  Outcome: Progressing  Goal: Absence of fever/infection during neutropenic period  Description: INTERVENTIONS:  - Monitor WBC    Outcome: Progressing     Problem: SAFETY ADULT  Goal: Patient will remain free of falls  Description: INTERVENTIONS:  - Educate patient/family on patient safety including physical limitations  - Instruct patient to call for assistance with activity   - Consult OT/PT to assist with strengthening/mobility   - Keep Call bell within reach  - Keep bed low and locked with side rails adjusted as appropriate  - Keep care items and personal belongings within reach  - Initiate and maintain comfort rounds  - Make Fall Risk Sign visible to staff  - Offer Toileting every  Hours, in advance of need  - Initiate/Maintain alarm  - Obtain necessary fall risk management equipment:   - Apply yellow socks and bracelet for high fall risk patients  - Consider moving patient to room near nurses station  Outcome: Progressing  Goal: Maintain or return to baseline ADL function  Description: INTERVENTIONS:  -  Assess patient's ability to carry out ADLs; assess patient's baseline for ADL function and identify physical deficits which impact ability to perform ADLs (bathing, care of mouth/teeth, toileting, grooming, dressing, etc.)  - Assess/evaluate cause of self-care deficits   - Assess range of motion  - Assess patient's mobility; develop plan if impaired  - Assess patient's need for assistive devices and provide as appropriate  - Encourage maximum independence but intervene and supervise when necessary  - Involve family in performance of ADLs  - Assess for home care needs following discharge   - Consider OT consult to assist with ADL evaluation and planning for discharge  - Provide patient education as appropriate  Outcome: Progressing  Goal: Maintains/Returns to pre admission functional level  Description: INTERVENTIONS:  - Perform AM-PAC 6 Click Basic Mobility/ Daily Activity  assessment daily.  - Set and communicate daily mobility goal to care team and patient/family/caregiver.   - Collaborate with rehabilitation services on mobility goals if consulted  - Perform Range of Motion  times a day.  - Reposition patient every  hours.  - Dangle patient  times a day  - Stand patient  times a day  - Ambulate patient  times a day  - Out of bed to chair  times a day   - Out of bed for meals times a day  - Out of bed for toileting  - Record patient progress and toleration of activity level   Outcome: Progressing     Problem: DISCHARGE PLANNING  Goal: Discharge to home or other facility with appropriate resources  Description: INTERVENTIONS:  - Identify barriers to discharge w/patient and caregiver  - Arrange for needed discharge resources and transportation as appropriate  - Identify discharge learning needs (meds, wound care, etc.)  - Arrange for interpretive services to assist at discharge as needed  - Refer to Case Management Department for coordinating discharge planning if the patient needs post-hospital services based on physician/advanced practitioner order or complex needs related to functional status, cognitive ability, or social support system  Outcome: Progressing     Problem: Knowledge Deficit  Goal: Patient/family/caregiver demonstrates understanding of disease process, treatment plan, medications, and discharge instructions  Description: Complete learning assessment and assess knowledge base.  Interventions:  - Provide teaching at level of understanding  - Provide teaching via preferred learning methods  Outcome: Progressing

## 2024-09-13 NOTE — ASSESSMENT & PLAN NOTE
POA with back pain  MRI lumbar spine There are corresponding sclerotic endplate changes on CT at these levels, with likely vacuum phenomena noted anteriorly involving the intervertebral disc at L3-L4. These findings are likely due to extensive degenerative disc disease/degenerative endplate changes   but a discitis osteomyelitis cannot be entirely excluded.  , Sed Rate 99  MSSA bacteremia  Pt will be treated for OM with 6 weeks IV Cefazolin as per ID recs  Will need repeat MRI in 3-4 weeks

## 2024-09-13 NOTE — ASSESSMENT & PLAN NOTE
Fever 103, chills, +covid, reports intermittent dysuria, lower abdomen tender to palpation.  WBC on 9/9: 11.55 from admission 10.83  No lactic acidosis  Most likely in settings of MSSA bacteremia and discitis/osteomyelitis  Plan:  Continue IV cefazolin through 10/21  Sepsis now resolved

## 2024-09-13 NOTE — ASSESSMENT & PLAN NOTE
Patient has a positive for COVID in the ED.  Has fevers, chills denies any sore throat, cough or shortness of breath.  Intermittently required oxygen in the ED as saturations were dropped with movement.  Was on [place] oxygen. Successfully weaned to room air   S/p Paxlovid    Plan:  Monitor Spo2 and respiratory status, wean off as tolerating

## 2024-09-14 ENCOUNTER — HOSPITAL ENCOUNTER (INPATIENT)
Facility: HOSPITAL | Age: 79
LOS: 4 days | End: 2024-09-18
Attending: STUDENT IN AN ORGANIZED HEALTH CARE EDUCATION/TRAINING PROGRAM | Admitting: STUDENT IN AN ORGANIZED HEALTH CARE EDUCATION/TRAINING PROGRAM
Payer: COMMERCIAL

## 2024-09-14 VITALS
RESPIRATION RATE: 16 BRPM | HEIGHT: 60 IN | TEMPERATURE: 97.9 F | BODY MASS INDEX: 28.22 KG/M2 | WEIGHT: 143.74 LBS | OXYGEN SATURATION: 98 % | SYSTOLIC BLOOD PRESSURE: 147 MMHG | HEART RATE: 91 BPM | DIASTOLIC BLOOD PRESSURE: 44 MMHG

## 2024-09-14 DIAGNOSIS — K56.609 SBO (SMALL BOWEL OBSTRUCTION) (HCC): Primary | ICD-10-CM

## 2024-09-14 LAB
ANION GAP SERPL CALCULATED.3IONS-SCNC: 6 MMOL/L (ref 4–13)
ANISOCYTOSIS BLD QL SMEAR: PRESENT
BASOPHILS # BLD MANUAL: 0.16 THOUSAND/UL (ref 0–0.1)
BASOPHILS NFR MAR MANUAL: 2 % (ref 0–1)
BUN SERPL-MCNC: 48 MG/DL (ref 5–25)
CALCIUM SERPL-MCNC: 8.8 MG/DL (ref 8.4–10.2)
CHLORIDE SERPL-SCNC: 109 MMOL/L (ref 96–108)
CO2 SERPL-SCNC: 25 MMOL/L (ref 21–32)
CREAT SERPL-MCNC: 1.11 MG/DL (ref 0.6–1.3)
EOSINOPHIL # BLD MANUAL: 0.08 THOUSAND/UL (ref 0–0.4)
EOSINOPHIL NFR BLD MANUAL: 1 % (ref 0–6)
ERYTHROCYTE [DISTWIDTH] IN BLOOD BY AUTOMATED COUNT: 14.3 % (ref 11.6–15.1)
GFR SERPL CREATININE-BSD FRML MDRD: 46 ML/MIN/1.73SQ M
GLUCOSE SERPL-MCNC: 105 MG/DL (ref 65–140)
GLUCOSE SERPL-MCNC: 229 MG/DL (ref 65–140)
GLUCOSE SERPL-MCNC: 249 MG/DL (ref 65–140)
GLUCOSE SERPL-MCNC: 278 MG/DL (ref 65–140)
GLUCOSE SERPL-MCNC: 96 MG/DL (ref 65–140)
HCT VFR BLD AUTO: 29 % (ref 34.8–46.1)
HGB BLD-MCNC: 9.4 G/DL (ref 11.5–15.4)
LYMPHOCYTES # BLD AUTO: 1.48 THOUSAND/UL (ref 0.6–4.47)
LYMPHOCYTES # BLD AUTO: 19 % (ref 14–44)
MCH RBC QN AUTO: 31.1 PG (ref 26.8–34.3)
MCHC RBC AUTO-ENTMCNC: 32.4 G/DL (ref 31.4–37.4)
MCV RBC AUTO: 96 FL (ref 82–98)
METAMYELOCYTE ABSOLUTE CT: 0.23 THOUSAND/UL (ref 0–0.1)
METAMYELOCYTES NFR BLD MANUAL: 3 % (ref 0–1)
MONOCYTES # BLD AUTO: 0.39 THOUSAND/UL (ref 0–1.22)
MONOCYTES NFR BLD: 5 % (ref 4–12)
MYELOCYTE ABSOLUTE CT: 0.08 THOUSAND/UL (ref 0–0.1)
MYELOCYTES NFR BLD MANUAL: 1 % (ref 0–1)
NEUTROPHILS # BLD MANUAL: 5.37 THOUSAND/UL (ref 1.85–7.62)
NEUTS BAND NFR BLD MANUAL: 2 % (ref 0–8)
NEUTS SEG NFR BLD AUTO: 67 % (ref 43–75)
PLATELET # BLD AUTO: 180 THOUSANDS/UL (ref 149–390)
PLATELET BLD QL SMEAR: ADEQUATE
PMV BLD AUTO: 11.3 FL (ref 8.9–12.7)
POTASSIUM SERPL-SCNC: 4 MMOL/L (ref 3.5–5.3)
RBC # BLD AUTO: 3.02 MILLION/UL (ref 3.81–5.12)
RBC MORPH BLD: PRESENT
SODIUM SERPL-SCNC: 140 MMOL/L (ref 135–147)
WBC # BLD AUTO: 7.78 THOUSAND/UL (ref 4.31–10.16)

## 2024-09-14 PROCEDURE — 85007 BL SMEAR W/DIFF WBC COUNT: CPT | Performed by: STUDENT IN AN ORGANIZED HEALTH CARE EDUCATION/TRAINING PROGRAM

## 2024-09-14 PROCEDURE — 99239 HOSP IP/OBS DSCHRG MGMT >30: CPT | Performed by: INTERNAL MEDICINE

## 2024-09-14 PROCEDURE — 80048 BASIC METABOLIC PNL TOTAL CA: CPT | Performed by: STUDENT IN AN ORGANIZED HEALTH CARE EDUCATION/TRAINING PROGRAM

## 2024-09-14 PROCEDURE — 82948 REAGENT STRIP/BLOOD GLUCOSE: CPT

## 2024-09-14 PROCEDURE — 85027 COMPLETE CBC AUTOMATED: CPT | Performed by: STUDENT IN AN ORGANIZED HEALTH CARE EDUCATION/TRAINING PROGRAM

## 2024-09-14 RX ORDER — CEFAZOLIN SODIUM 2 G/50ML
2000 SOLUTION INTRAVENOUS EVERY 12 HOURS
Qty: 1 EACH | Refills: 0
Start: 2024-09-14 | End: 2024-09-14

## 2024-09-14 RX ORDER — BISACODYL 10 MG
10 SUPPOSITORY, RECTAL RECTAL DAILY PRN
Qty: 12 SUPPOSITORY | Refills: 0 | Status: ON HOLD
Start: 2024-09-14 | End: 2024-09-25

## 2024-09-14 RX ORDER — BUMETANIDE 1 MG/1
2 TABLET ORAL DAILY
Status: DISCONTINUED | OUTPATIENT
Start: 2024-09-14 | End: 2024-09-14 | Stop reason: HOSPADM

## 2024-09-14 RX ORDER — CARVEDILOL 25 MG/1
12.5 TABLET ORAL 2 TIMES DAILY WITH MEALS
Qty: 30 TABLET | Refills: 0 | Status: ON HOLD
Start: 2024-09-14 | End: 2024-09-25

## 2024-09-14 RX ORDER — LOSARTAN POTASSIUM 50 MG/1
100 TABLET ORAL DAILY
Status: DISCONTINUED | OUTPATIENT
Start: 2024-09-14 | End: 2024-09-14 | Stop reason: HOSPADM

## 2024-09-14 RX ORDER — CEFAZOLIN SODIUM 2 G/50ML
2000 SOLUTION INTRAVENOUS EVERY 12 HOURS
Qty: 1 EACH | Refills: 0 | Status: ON HOLD
Start: 2024-09-14 | End: 2024-09-25

## 2024-09-14 RX ORDER — OXYCODONE HYDROCHLORIDE 5 MG/1
2.5 TABLET ORAL EVERY 6 HOURS PRN
Qty: 6 TABLET | Refills: 0 | Status: SHIPPED | OUTPATIENT
Start: 2024-09-14 | End: 2024-09-25

## 2024-09-14 RX ORDER — TRAMADOL HYDROCHLORIDE 50 MG/1
50 TABLET ORAL 2 TIMES DAILY
Qty: 6 TABLET | Refills: 0 | Status: SHIPPED | OUTPATIENT
Start: 2024-09-14 | End: 2024-09-25

## 2024-09-14 RX ORDER — ACETAMINOPHEN 325 MG/1
975 TABLET ORAL EVERY 8 HOURS SCHEDULED
Qty: 30 TABLET | Refills: 0 | Status: ON HOLD
Start: 2024-09-14 | End: 2024-09-25

## 2024-09-14 RX ADMIN — LOSARTAN POTASSIUM 100 MG: 50 TABLET, FILM COATED ORAL at 09:51

## 2024-09-14 RX ADMIN — TRAMADOL HYDROCHLORIDE 50 MG: 50 TABLET, COATED ORAL at 09:52

## 2024-09-14 RX ADMIN — ACETAMINOPHEN 975 MG: 325 TABLET ORAL at 04:23

## 2024-09-14 RX ADMIN — INSULIN LISPRO 2 UNITS: 100 INJECTION, SOLUTION INTRAVENOUS; SUBCUTANEOUS at 12:31

## 2024-09-14 RX ADMIN — POLYETHYLENE GLYCOL 3350 17 G: 17 POWDER, FOR SOLUTION ORAL at 09:51

## 2024-09-14 RX ADMIN — ASPIRIN 81 MG: 81 TABLET, COATED ORAL at 09:51

## 2024-09-14 RX ADMIN — Medication 1 TABLET: at 09:51

## 2024-09-14 RX ADMIN — ALLOPURINOL 200 MG: 100 TABLET ORAL at 09:52

## 2024-09-14 RX ADMIN — LEVOTHYROXINE SODIUM 100 MCG: 100 TABLET ORAL at 04:23

## 2024-09-14 RX ADMIN — ENOXAPARIN SODIUM 30 MG: 30 INJECTION SUBCUTANEOUS at 09:51

## 2024-09-14 RX ADMIN — CARVEDILOL 12.5 MG: 12.5 TABLET, FILM COATED ORAL at 09:52

## 2024-09-14 RX ADMIN — SENNOSIDES AND DOCUSATE SODIUM 2 TABLET: 8.6; 5 TABLET ORAL at 09:51

## 2024-09-14 RX ADMIN — DICLOFENAC SODIUM TOPICAL GEL, 1% 2 G: 10 GEL TOPICAL at 09:54

## 2024-09-14 RX ADMIN — FLUTICASONE PROPIONATE 2 SPRAY: 50 SPRAY, METERED NASAL at 09:54

## 2024-09-14 RX ADMIN — METHOCARBAMOL TABLETS 500 MG: 500 TABLET, COATED ORAL at 09:51

## 2024-09-14 RX ADMIN — CEFAZOLIN SODIUM 2000 MG: 2 SOLUTION INTRAVENOUS at 03:30

## 2024-09-14 RX ADMIN — FAMOTIDINE 20 MG: 20 TABLET ORAL at 09:52

## 2024-09-14 RX ADMIN — NIFEDIPINE 60 MG: 30 TABLET, EXTENDED RELEASE ORAL at 09:52

## 2024-09-14 NOTE — ASSESSMENT & PLAN NOTE
Plan   Continue Senna docusate sodium   Miralax twice daily  Dulcolax suppositorium  This a.m. swabs and suds enema was performed with small bowel movement  Follow-up PCP

## 2024-09-14 NOTE — DISCHARGE SUMMARY
Discharge Summary - Hospitalist   Name: Porsha Hoyos 79 y.o. female I MRN: [id number]  Unit/Bed#: S -01 I Date of Admission: 9/7/2024   Date of Service: 9/14/2024 I Hospital Day: 6     Assessment & Plan  Acute on Chronic Back Pain in the Setting of Sepsis, MSSA Bacteremia  Acute exacerbation of chronic back pain, home regimen tramadol twice daily and Robaxin 3 times daily.  Denies any recent injury, denies lifting any heavy objects, woke up from sleep with exacerbation back pain, unable to ambulate due to pain.  Denies urinary retention   On admission significant for Fever 103, chills, +covid, reports intermittent dysuria, lower abdomen tender to palpation. WBC 10.8. ProCal picked at 17.  Blood culture #1 from 9/7 returned positive for staph aureus, MSSA. Repeat Bcx2 from 9/10- no growth in 48 h  9/10: MRI lumbar spine without contrast possible discitis osteomyelitis.  , Sed rate 99  Infectious disease is on board Transitioned from Ceftriaxone to IV Cefazolin.   9/10: Chronic scalp lession-possible source of bacteremia.   TTE Echocardiogram resulted no findings of valvular abnormalities. Questionable if test is reliable.  ID recommended to treat pt as osteomyelitis with Cefazolin through 10/21      Acute back pain 2/2 Ddx discitis/osteomyelitis 2/2 to MSSA bactermia spread to the spine with possible source scalp lesion    Plan:  Follow with PCP  Continue IV cefazolin D 5 (total D 7) through 10/21/24, follow up with ID  Rrequest your PCP to remove PICC line after Abx completed  Dermatology consult placed, will see pt in o/p settings, appt on 10/17  C/w home pain regimen       CINDY (acute kidney injury) (HCC)-resolving  Patient's creatinine on compared to admission creatinine 1.11.   Creatinine 9/9: 2.66 -> trended down to normal range  UA: +2 urine protein, RBC urine 2-4. No hyaline cast or granular cast.  Renal ultrasound showed diffusely cortical thinning in both kidney with lobulated contours no  hydronephrosis  Nephrology consulted, recs appreciated  Plan  Assessment: Most likely multifactorial in settings of contrast induced nephropathy along with hypotension due to sepsis and PTA meds bumex and losartan  Request your PCP to repeat BMP in 1 week  Avoid nephrotoxic medication and hypotension  Hypertension Management  Blood pressure elevated in the ED likely secondary to acute exacerbation of pain, improved with pain control.  PTA meds: Bumex 2 mg twice daily, carvedilol 25 mg twice daily, doxazosin 2 mg nightly, Procardia XL 60 mg daily, losartan 100 mg daily . PTA meds were on hold due to hypotension secondary to sepsis.  BP normalized and on the higher side    Plan:  Follow with PCP  Continue with PTA BP meds  Sepsis (HCC): SIRS criteria MSSA Bacteremia and COVID infection  Fever 103, chills, +covid, reports intermittent dysuria, lower abdomen tender to palpation.  WBC on 9/9: 11.55 from admission 10.83  No lactic acidosis  Most likely in settings of MSSA bacteremia and discitis/osteomyelitis  Plan:  Continue IV cefazolin through 10/21  Sepsis now resolved  COVID  Patient has a positive for COVID in the ED.  Has fevers, chills denies any sore throat, cough or shortness of breath.  Intermittently required oxygen in the ED as saturations were dropped with movement.  Was on [place] oxygen. Successfully weaned to room air   S/p Paxlovid    Plan:  Follow with PCP in 1 week      Type 2 diabetes mellitus with complication, with long-term current use of insulin ([facility])  Lab Results   Component Value Date    HGBA1C 7.9 (A) 07/02/2024       Recent Labs     09/13/24  1636 09/13/24  2136 09/14/24  0803 09/14/24  1058   POCGLU 86 139 105 249*       Blood Sugar Average: Last 72 hrs:  (P) 166.1950989284508719  Home regimen: Januvia 50 mg daily, insulin regular 5 units with lunch and dinner, Lantus 30 units in the morning (patient reported),  Patient reports not eating anything today, endorses a decreased appetite  overall.    Plan:  Continue Lantus 30 units  Insulin sliding scale Insulin lispro 1-5 units   Fingerstick glucose  Carb controlled diet  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HCC)  CTA dissection protocol positive for severe stenosis in the proximal celiac artery and moderate to severe stenosis in the proximal left renal artery.  Superior mesenteric artery and inferior mesenteric artery are patent with no signs of ischemia.  Vascular surgery evaluated patient in the ED: No acute vascular intervention.    Plan:  Vascular team consulted follow recommendations: Continue aspirin 81 mg daily and f/u with vascular surgery as outpt   Follow-up with PCP    Constipation  Plan   Continue Senna docusate sodium   Miralax twice daily  Dulcolax suppositorium  This a.m. swabs and suds enema was performed with small bowel movement  Follow-up PCP    Stage 3b chronic kidney disease (HCC)  Lab Results   Component Value Date    EGFR 46 09/14/2024    EGFR 43 09/13/2024    EGFR 30 09/12/2024    CREATININE 1.11 09/14/2024    CREATININE 1.18 09/13/2024    CREATININE 1.60 (H) 09/12/2024   Baseline creatinine 1.2-1.5  Plan   See under CINDY as above   Osteomyelitis (HCC)  POA with back pain  MRI lumbar spine There are corresponding sclerotic endplate changes on CT at these levels, with likely vacuum phenomena noted anteriorly involving the intervertebral disc at L3-L4. These findings are likely due to extensive degenerative disc disease/degenerative endplate changes but a discitis osteomyelitis cannot be entirely excluded.  , Sed Rate 99  MSSA bacteremia  Pt will be treated for OM with 6 weeks IV Cefazolin as per ID recs  Will need repeat MRI in 3-4 weeks  Follow-up with PCP in 1 week     Medical Problems       Resolved Problems  Date Reviewed: 9/8/2024            Resolved    CINDY (acute kidney injury) (HCC) 9/12/2024     Resolved by  Timothy Lozada MD    Hyponatremia 9/12/2024     Resolved by  Timothy Lozada MD        Discharging Physician / Practitioner:  Emelina Mckee MD  PCP: João Alfonso MD  Admission Date:   Admission Orders (From admission, onward)       Ordered        09/08/24 1505  INPATIENT ADMISSION  Once            09/07/24 2058  Place in Observation  Once                          Discharge Date: 09/14/24    Consultations During Hospital Stay:  IP CONSULT TO VASCULAR SURGERY  IP CONSULT TO NEPHROLOGY  IP CONSULT TO INFECTIOUS DISEASES  IP CONSULT TO DERMATOLOGY     Procedures Performed:   None    Significant Findings / Test Results:   MSSA bacteremia  Will need repeat MRI in 3-4 weeks  Covid+  , Sed Rate 99  MRI lumbar spine There are corresponding sclerotic endplate changes on CT at these levels, with likely vacuum phenomena noted anteriorly involving the intervertebral disc at L3-L4. These findings are likely due to extensive degenerative disc disease/degenerative endplate changes but a discitis osteomyelitis cannot be entirely excluded.    Incidental Findings:   As mentioned above  I reviewed the above mentioned incidental findings with the patient and/or family and they expressed understanding.  Pt will need repeat lumbar region MRI in 3-4 weeks    Test Results Pending at Discharge (will require follow up):   none     Outpatient Tests Requested:  BMP in 1 week    Complications:  none    Reason for Admission: Discitis osteomyelitis    Hospital Course:   Porsha Hoyos is a 79 y.o. female patient who originally presented to the hospital on 9/7/2024 due to acute on chronic back pain.    Hospital Course: 79 y.o. female with a PMH of spinal stenosis and disc herniation status post surgery on chronic opioids DMT2, hypothyroidism, CKD3B, PAD, remote CVA, CAD,  who presented  with acute on chronic back pain. Admitted for acute on chronic back pain along with Sepsis in the setting of COVID infection.  Patient required 2 L nasal cannula oxygen port. Patient met SIRS and sepsis criteria on admission and was empirically started on ceftriaxone  x2 days and 1 dose of vancomycin. Patient was managed with mild COVID pathway protocol and finished 5 days of Paxlovid.  CTA CAP was negative for pulmonary embolism. following day patient became more hypotensive, PTA BP meds were on hold. 1 of 2 blood cultures was positive for MSSA.  ID was consulted.  Patient was transitioned to cefazolin.  Repeat blood culture showed no growth in 72 hours.  TTE showed no vegetations.  MRI lumbar spine with without contrast was significant for likely vacuum phenomena noted anteriorly involving the intervertebral disc at L3-L4. These findings are likely due to extensive degenerative disc disease/degenerative endplate changes but a discitis osteomyelitis cannot be entirely excluded.  In settings of a wound growing MSSA, it was decided to obtain inflammatory markers which came back significantly elevated CRP to 200 and sed rate around 100. Given these findings it was decided to proceed with treatment of osteomyelitis for 6 weeks of IV cefazolin.  PICC line was placed.  Patient will need to follow-up with infectious disease and to have repeat MRI of lumbar spine in 3 to 4 weeks.  Also, patient developed CINDY which is most likely multifactorial in settings of contrast-induced nephropathy and hypotension secondary to sepsis, nephrology was consulted, recommendations appreciated, creatinine trended down to normal range with gentle hydration.  Additionally, on CT CAP there was noticed severe stenosis in the proximal celiac artery and moderate to severe stenosis in the proximal left renal artery. Superior mesenteric artery and inferior mesenteric artery are patent with no signs of ischemia. Vascular surgery evaluated patient and no acute vascular intervention was recommended, patient will continue with aspirin 81 mg daily and f/u with vascular surgery as outpatient.  PTA blood pressure medications were restarted 24 hours before discharge in settings of resolved sepsis and improved blood  "pressure. Dermatology consulted, pt will need follow up in o/p settings for evaluation of scalp lesion. Symptomatic treatment were provided as needed.    Patient is medically optimized and ready to be discharged to postacute rehab with close PCP follow-up, IV cefazolin through 10/21, [place] BMP in 1 week, vascular surgery follow-up in outpatient settings.    Patient and family expressed understanding of medical management plan and expressed intent to comply.      Condition at Discharge: stable    Discharge Day Visit / Exam:   Subjective: No acute events overnight, patient is hemodynamically stable.  Examined patient at the bedside this a.m., patient is not in acute distress.  Patient reported that she had mild abdominal pain and discomfort.  Patient last bowel movement about 3 days ago.  Abdomen is mildly tender on exam.  Soap and suds enema was done by the nurse, patient had a bowel movement with improvement in her abdominal pain.    Vitals: Blood Pressure: (!) 147/44 (09/14/24 0757)  Pulse: 91 (09/14/24 0757)  Temperature: 97.9 °F (36.6 °C) (09/14/24 0757)  Temp Source: Oral (09/10/24 2100)  Respirations: 16 (09/12/24 1647)  Height: 4' 11.5\" (151.1 cm) (09/13/24 1439)  Weight - Scale: 65.2 kg (143 lb 11.8 oz) (09/14/24 0500)  SpO2: 98 % (09/14/24 0757)  Exam:   Physical Exam  Constitutional:       Appearance: Normal appearance. She is not ill-appearing.   HENT:      Head: Normocephalic and atraumatic.      Nose: Nose normal.   Eyes:      Conjunctiva/sclera: Conjunctivae normal.   Cardiovascular:      Rate and Rhythm: Normal rate and regular rhythm.      Pulses: Normal pulses.      Heart sounds: Normal heart sounds.   Pulmonary:      Effort: Pulmonary effort is normal. No respiratory distress.      Breath sounds: No wheezing or rales.   Abdominal:      General: Abdomen is flat. Bowel sounds are normal. There is no distension.      Palpations: Abdomen is soft.      Tenderness: There is no abdominal tenderness. There " is no guarding.   Musculoskeletal:      Cervical back: Normal range of motion.      Right lower leg: No edema.      Left lower leg: No edema.      Comments: Tenderness in the midline lumbar region   Skin:     General: Skin is warm and dry.      Findings: Lesion present.   Neurological:      General: No focal deficit present.      Mental Status: She is alert.   Psychiatric:         Mood and Affect: Mood normal.         Behavior: Behavior normal.         Thought Content: Thought content normal.         Judgment: Judgment normal.          Discussion with Family: Updated  (son) via phone.    Discharge instructions/Information to patient and family:   See after visit summary for information provided to patient and family.      Provisions for Follow-Up Care:  See after visit summary for information related to follow-up care and any pertinent home health orders.      Mobility at time of Discharge:   Basic Mobility Inpatient Raw Score: 18  JH-HLM Goal: 6: Walk 10 steps or more  JH-HLM Achieved: 7: Walk 25 feet or more  HLM Goal NOT achieved. Continue to encourage mobility in post discharge setting.     Disposition:   Other: Subacute rehab    Planned Readmission: None    Discharge Medications:  See after visit summary for reconciled discharge medications provided to patient and/or family.      Administrative Statements   Discharge Statement:  I have spent a total time of 45 minutes in caring for this patient on the day of the visit/encounter. >30 minutes of time was spent on: Diagnostic results, Prognosis, Risks and benefits of tx options, Instructions for management, Patient and family education, Importance of tx compliance, Risk factor reductions, Impressions, Counseling / Coordination of care, Documenting in the medical record, Reviewing / ordering tests, medicine, procedures  , and Communicating with other healthcare professionals .    **Please Note: This note may have been constructed using a voice  recognition system**

## 2024-09-14 NOTE — ASSESSMENT & PLAN NOTE
Lab Results   Component Value Date    HGBA1C 7.9 (A) 07/02/2024       Recent Labs     09/13/24  1636 09/13/24  2136 09/14/24  0803 09/14/24  1058   POCGLU 86 139 105 249*       Blood Sugar Average: Last 72 hrs:  (P) 166.5096550301369828  Home regimen: Januvia 50 mg daily, insulin regular 5 units with lunch and dinner, Lantus 30 units in the morning (patient reported),  Patient reports not eating anything today, endorses a decreased appetite overall.    Plan:  Continue Lantus 30 units  Insulin sliding scale Insulin lispro 1-5 units   Fingerstick glucose  Carb controlled diet

## 2024-09-14 NOTE — CASE MANAGEMENT
Case Management Discharge Planning Note    Patient name Porsha Hoyos  Location S /S -01 MRN [id number]  : 1945 Date 2024       Current Admission Date: 2024  Current Admission Diagnosis:Acute on Chronic Back Pain in the Setting of Sepsis, MSSA Bacteremia   Patient Active Problem List    Diagnosis Date Noted Date Diagnosed    Osteomyelitis (HCC) 2024     COVID 2024     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[facility]) 2024     Sepsis ([facility]): SIRS criteria MSSA Bacteremia and COVID infection 2024     Type 2 diabetes mellitus with complication, with long-term current use of insulin ([facility]) 2024     Nausea and vomiting 2023     Hordeolum externum of left upper eyelid 08/15/2023     Proteinuria 2023     Metatarsalgia of left foot 06/15/2023     Peripheral vascular disease, unspecified ([facility]) 2023     Constipation 2022     Anemia 2022     Vitamin deficiency 2022     Shortness of breath 2022     Dysuria 2022     Elevated LFTs 2022     Pancytopenia ([facility]) 2022     Asthma without status asthmaticus without complication 2022     History of colon polyps 2022     Gastroesophageal reflux disease 2022     Stage 3b chronic kidney disease ([facility]) 2022     Hypothyroidism 2021     Type 2 diabetes mellitus with diabetic chronic kidney disease ([facility]) 2021     Type 2 diabetes mellitus with diabetic polyneuropathy ([facility]) 2021     Long term (current) use of insulin ([facility]) 2021     Type 2 diabetes mellitus with stable proliferative diabetic retinopathy, bilateral ([facility]) 2021     Acute pain of right shoulder 2019     Right elbow pain 2019     Acute pain of right shoulder due to trauma 2019     Fall at home 2019     Imbalance 2019     Acute on Chronic Back Pain in the Setting of Sepsis, MSSA Bacteremia 2018     Encntr for gyn exam (general) (routine) w abnormal  findings 08/28/2018     Vaginal atrophy 08/28/2018     Vulvar lesion 02/05/2018     Hypertension Management 01/29/2018     Mixed hyperlipidemia 01/29/2018       LOS (days): 6  Geometric Mean LOS (GMLOS) (days): 5.1  Days to GMLOS:-0.7     OBJECTIVE:  Risk of Unplanned Readmission Score: 22.42         Current admission status: Inpatient   Preferred Pharmacy:   [facility] - [place] - [address]  [address]  [place] [address]  Phone: [phone] Fax: [phone]    Primary Care Provider: João Alfnoso MD    Primary Insurance: [employer] REP  Secondary Insurance:     DISCHARGE DETAILS:                 CM contacted family/caregiver?: Yes (left VM to son w/ dcp update - transport time to [facility])             Contacts  Patient Contacts: Rob (son)  Relationship to Patient:: Family  Contact Method: Phone  Phone Number: [phone]  Reason/Outcome: Continuity of Care, Emergency Contact, Referral, Discharge Planning              Other Referral/Resources/Interventions Provided:  Interventions: Short Term Rehab, Transportation  Referral Comments: CM d/c support notified this CM of STR auth updated and remains approved. STR auth info forwarded to [facility] via [employer]. Transport remains in place for 1300 p/u via [employer] [facility] to [facility]. All parties aware of same. CM contacted [facility] via phone, s/w Yvonne, notified of pt p/u time to facility today. CM to continue to follow for pt dcp needs.         Treatment Team Recommendation: Short Term Rehab  Discharge Destination Plan:: Short Term Rehab  Transport at Discharge : Wheelchair van        Transported by (Company and Unit #): [employer]  ETA of Transport (Date): 09/14/24  ETA of Transport (Time): 1300                            Accepting Facility Name, City & State : [facility]  Receiving Facility/Agency Phone Number: [phone]  Facility/Agency Fax Number: [phone]

## 2024-09-14 NOTE — ASSESSMENT & PLAN NOTE
Acute exacerbation of chronic back pain, home regimen tramadol twice daily and Robaxin 3 times daily.  Denies any recent injury, denies lifting any heavy objects, woke up from sleep with exacerbation back pain, unable to ambulate due to pain.  Denies urinary retention   On admission significant for Fever 103, chills, +covid, reports intermittent dysuria, lower abdomen tender to palpation. WBC 10.8. ProCal picked at 17.  Blood culture #1 from 9/7 returned positive for staph aureus, MSSA. Repeat Bcx2 from 9/10- no growth in 48 h  9/10: MRI lumbar spine without contrast possible discitis osteomyelitis.  , Sed rate 99  Infectious disease is on board Transitioned from Ceftriaxone to IV Cefazolin.   9/10: Chronic scalp lession-possible source of bacteremia.   TTE Echocardiogram resulted no findings of valvular abnormalities. Questionable if test is reliable.  ID recommended to treat pt as osteomyelitis with Cefazolin through 10/21      Acute back pain 2/2 Ddx discitis/osteomyelitis 2/2 to MSSA bactermia spread to the spine with possible source scalp lesion    Plan:  Follow with PCP  Continue IV cefazolin D 5 (total D 7) through 10/21/24, follow up with ID  Rrequest your PCP to remove PICC line after Abx completed  Dermatology consult placed, will see pt in o/p settings, appt on 10/17  C/w home pain regimen       CINDY (acute kidney injury) (HCC)-resolving  Patient's creatinine on compared to admission creatinine 1.11.   Creatinine 9/9: 2.66 -> trended down to normal range  UA: +2 urine protein, RBC urine 2-4. No hyaline cast or granular cast.  Renal ultrasound showed diffusely cortical thinning in both kidney with lobulated contours no hydronephrosis  Nephrology consulted, recs appreciated  Plan  Assessment: Most likely multifactorial in settings of contrast induced nephropathy along with hypotension due to sepsis and PTA meds bumex and losartan  Request your PCP to repeat BMP in 1 week  Avoid nephrotoxic medication  and hypotension

## 2024-09-14 NOTE — INCIDENTAL FINDINGS
The following findings require follow up:  Radiographic finding   Finding: CT CAP w contrast: Severe stenosis in the proximal celiac artery and moderate to severe stenosis in the proximal left renal artery.    Follow up required: yes   Follow up should be done within 3-4 weeks week(s)    Please notify the following clinician to assist with the follow up:   Dr. Deja Baker, vascular surgery      Incidental finding results were discussed with the Patient by Emelina Mckee MD on 09/14/24.   They expressed understanding and all questions answered.

## 2024-09-14 NOTE — CASE MANAGEMENT
[facility] Support [place] has received APPROVED authorization.  Insurance:   [facility]  Called in by Rep:shiva QING#  [phone]  Authorization received for: SNF  Facility: [facility]   Authorization #:[id number]   Start of Care:9/14   Next Review Date:9/20  Continued Stay Care Coordinator: n/a    Submit next review to: fax [phone]     Care Manager notified: sree patel     Please reach out to CM for updates on any clinical information.

## 2024-09-14 NOTE — ASSESSMENT & PLAN NOTE
Patient has a positive for COVID in the ED.  Has fevers, chills denies any sore throat, cough or shortness of breath.  Intermittently required oxygen in the ED as saturations were dropped with movement.  Was on [place] oxygen. Successfully weaned to room air   S/p Paxlovid    Plan:  Follow with PCP in 1 week

## 2024-09-14 NOTE — CASE MANAGEMENT
Case Management Discharge Planning Note    Patient name Porsha Hoyos  Location S /S -01 MRN [id number]  : 1945 Date 2024       Current Admission Date: 2024  Current Admission Diagnosis:Acute on Chronic Back Pain in the Setting of Sepsis, MSSA Bacteremia   Patient Active Problem List    Diagnosis Date Noted Date Diagnosed    Osteomyelitis (HCC) 2024     COVID 2024     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[facility]) 2024     Sepsis ([facility]): SIRS criteria MSSA Bacteremia and COVID infection 2024     Type 2 diabetes mellitus with complication, with long-term current use of insulin ([facility]) 2024     Nausea and vomiting 2023     Hordeolum externum of left upper eyelid 08/15/2023     Proteinuria 2023     Metatarsalgia of left foot 06/15/2023     Peripheral vascular disease, unspecified ([facility]) 2023     Constipation 2022     Anemia 2022     Vitamin deficiency 2022     Shortness of breath 2022     Dysuria 2022     Elevated LFTs 2022     Pancytopenia ([facility]) 2022     Asthma without status asthmaticus without complication 2022     History of colon polyps 2022     Gastroesophageal reflux disease 2022     Stage 3b chronic kidney disease ([facility]) 2022     Hypothyroidism 2021     Type 2 diabetes mellitus with diabetic chronic kidney disease ([facility]) 2021     Type 2 diabetes mellitus with diabetic polyneuropathy ([facility]) 2021     Long term (current) use of insulin ([facility]) 2021     Type 2 diabetes mellitus with stable proliferative diabetic retinopathy, bilateral ([facility]) 2021     Acute pain of right shoulder 2019     Right elbow pain 2019     Acute pain of right shoulder due to trauma 2019     Fall at home 2019     Imbalance 2019     Acute on Chronic Back Pain in the Setting of Sepsis, MSSA Bacteremia 2018     Encntr for gyn exam (general) (routine) w abnormal  findings 08/28/2018     Vaginal atrophy 08/28/2018     Vulvar lesion 02/05/2018     Hypertension Management 01/29/2018     Mixed hyperlipidemia 01/29/2018       LOS (days): 6  Geometric Mean LOS (GMLOS) (days): 5.1  Days to GMLOS:-0.7     OBJECTIVE:  Risk of Unplanned Readmission Score: 22.42         Current admission status: Inpatient   Preferred Pharmacy:   [facility] - [place] - [address] [address]  Phone: [phone] Fax: [phone]    Primary Care Provider: João Alfonso MD    Primary Insurance: [employer] RE[employer]  Secondary Insurance:     DISCHARGE DETAILS:                                                                                                               Facility Insurance Auth Number: [id number]

## 2024-09-14 NOTE — ASSESSMENT & PLAN NOTE
POA with back pain  MRI lumbar spine There are corresponding sclerotic endplate changes on CT at these levels, with likely vacuum phenomena noted anteriorly involving the intervertebral disc at L3-L4. These findings are likely due to extensive degenerative disc disease/degenerative endplate changes but a discitis osteomyelitis cannot be entirely excluded.  , Sed Rate 99  MSSA bacteremia  Pt will be treated for OM with 6 weeks IV Cefazolin as per ID recs  Will need repeat MRI in 3-4 weeks  Follow-up with PCP in 1 week

## 2024-09-14 NOTE — ASSESSMENT & PLAN NOTE
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  Superior mesenteric artery and inferior mesenteric artery are patent with no signs of ischemia.  Vascular surgery evaluated patient in the ED: No acute vascular intervention.    Plan:  Vascular team consulted follow recommendations: Continue aspirin 81 mg daily and f/u with vascular surgery as outpt   Follow-up with PCP

## 2024-09-14 NOTE — ASSESSMENT & PLAN NOTE
Blood pressure elevated in the ED likely secondary to acute exacerbation of pain, improved with pain control.  PTA meds: Bumex 2 mg twice daily, carvedilol 25 mg twice daily, doxazosin 2 mg nightly, Procardia XL 60 mg daily, losartan 100 mg daily . PTA meds were on hold due to hypotension secondary to sepsis.  BP normalized and on the higher side    Plan:  Follow with PCP  Continue with PTA BP meds

## 2024-09-14 NOTE — ASSESSMENT & PLAN NOTE
Lab Results   Component Value Date    EGFR 46 09/14/2024    EGFR 43 09/13/2024    EGFR 30 09/12/2024    CREATININE 1.11 09/14/2024    CREATININE 1.18 09/13/2024    CREATININE 1.60 (H) 09/12/2024   Baseline creatinine 1.2-1.5  Plan   See under CINDY as above

## 2024-09-14 NOTE — DISCHARGE INSTR - AVS FIRST PAGE
Dear Porsha Hoyos,     It was our pleasure to care for you here at [facility].  It is our hope that we were always able to exceed the expected standards for your care during your stay.  You were hospitalized due to bacteremia and discitiis..  You were cared for on the  floor under the service of Florence Mitchell MD with the [facility] who covers for your primary care physician (PCP), João Alfonso MD, while you were hospitalized.  If you have any questions or concerns related to this hospitalization, you may contact us at .  For follow up as well as medication refills, we recommend that you follow up with your primary care physician.  A registered nurse will reach out to you by phone within a few days after your discharge to answer any additional questions that you may have after going home.  However, at this time we provide for you here, the most important instructions / recommendations at discharge:     Notable Medication Adjustments -   You will be discharged to rehab with IV antibiotics which you need to take through 10/21/24  You will need close ID follow up   Please follow up with your primary care doctor  Follow up with vascular surgery  Follow up with Dermatology  Testing Required after Discharge -   You will need labs checked regularly since you will be on long term antibiotcs  You need to ensure you are having regular bowel movements  Please make and appointment with your Primary care doctor, call on Monday and let them know you are in hospital  ** Please contact your PCP to request testing orders for any of the testing recommended here **  Important follow up information -   If you experience any fevers, chills, night sweats please seek urgent medical attention   If you experience loss of sensation weakness, loss of bowel or bladder function/control you must seek urgent medical attention   Failure to do so could result in  paralysis and worsening infection   Other Instructions -   As above  Please review this entire after visit summary as additional general instructions including medication list, appointments, activity, diet, any pertinent wound care, and other additional recommendations from your care team that may be provided for you.      Sincerely,     Florence Mitchell MD

## 2024-09-15 LAB
BACTERIA BLD CULT: NORMAL
BACTERIA BLD CULT: NORMAL
GLUCOSE SERPL-MCNC: 199 MG/DL (ref 65–140)
GLUCOSE SERPL-MCNC: 226 MG/DL (ref 65–140)
GLUCOSE SERPL-MCNC: 231 MG/DL (ref 65–140)
GLUCOSE SERPL-MCNC: 252 MG/DL (ref 65–140)

## 2024-09-15 PROCEDURE — 82948 REAGENT STRIP/BLOOD GLUCOSE: CPT

## 2024-09-16 ENCOUNTER — APPOINTMENT (INPATIENT)
Dept: NON INVASIVE DIAGNOSTICS | Facility: HOSPITAL | Age: 79
End: 2024-09-16
Payer: COMMERCIAL

## 2024-09-16 ENCOUNTER — NURSING HOME VISIT (OUTPATIENT)
Dept: GERIATRICS | Facility: OTHER | Age: 79
End: 2024-09-16
Payer: COMMERCIAL

## 2024-09-16 DIAGNOSIS — K59.03 DRUG-INDUCED CONSTIPATION: ICD-10-CM

## 2024-09-16 DIAGNOSIS — E11.22 TYPE 2 DIABETES MELLITUS WITH STAGE 3B CHRONIC KIDNEY DISEASE, WITH LONG-TERM CURRENT USE OF INSULIN (HCC): ICD-10-CM

## 2024-09-16 DIAGNOSIS — Z79.4 TYPE 2 DIABETES MELLITUS WITH STAGE 3B CHRONIC KIDNEY DISEASE, WITH LONG-TERM CURRENT USE OF INSULIN (HCC): ICD-10-CM

## 2024-09-16 DIAGNOSIS — N18.32 TYPE 2 DIABETES MELLITUS WITH STAGE 3B CHRONIC KIDNEY DISEASE, WITH LONG-TERM CURRENT USE OF INSULIN (HCC): ICD-10-CM

## 2024-09-16 DIAGNOSIS — N18.32 ANEMIA DUE TO STAGE 3B CHRONIC KIDNEY DISEASE  (HCC): ICD-10-CM

## 2024-09-16 DIAGNOSIS — I73.9 PERIPHERAL VASCULAR DISEASE, UNSPECIFIED (HCC): ICD-10-CM

## 2024-09-16 DIAGNOSIS — Z71.89 ADVANCE CARE PLANNING: ICD-10-CM

## 2024-09-16 DIAGNOSIS — I10 PRIMARY HYPERTENSION: ICD-10-CM

## 2024-09-16 DIAGNOSIS — L98.9 SCALP LESION: ICD-10-CM

## 2024-09-16 DIAGNOSIS — U07.1 COVID: ICD-10-CM

## 2024-09-16 DIAGNOSIS — J45.909 ASTHMA WITHOUT STATUS ASTHMATICUS WITHOUT COMPLICATION, UNSPECIFIED ASTHMA SEVERITY, UNSPECIFIED WHETHER PERSISTENT: ICD-10-CM

## 2024-09-16 DIAGNOSIS — Z91.89 AT RISK FOR DELIRIUM: ICD-10-CM

## 2024-09-16 DIAGNOSIS — I25.10 CORONARY ARTERY DISEASE INVOLVING NATIVE CORONARY ARTERY OF NATIVE HEART WITHOUT ANGINA PECTORIS: ICD-10-CM

## 2024-09-16 DIAGNOSIS — D63.1 ANEMIA DUE TO STAGE 3B CHRONIC KIDNEY DISEASE  (HCC): ICD-10-CM

## 2024-09-16 DIAGNOSIS — A41.01 SEPSIS DUE TO METHICILLIN SUSCEPTIBLE STAPHYLOCOCCUS AUREUS (MSSA) WITHOUT ACUTE ORGAN DYSFUNCTION (HCC): Primary | ICD-10-CM

## 2024-09-16 DIAGNOSIS — M79.89 PAIN AND SWELLING OF RIGHT UPPER EXTREMITY: ICD-10-CM

## 2024-09-16 DIAGNOSIS — R60.0 BILATERAL LOWER EXTREMITY EDEMA: ICD-10-CM

## 2024-09-16 DIAGNOSIS — E78.2 MIXED HYPERLIPIDEMIA: ICD-10-CM

## 2024-09-16 DIAGNOSIS — R53.81 PHYSICAL DECONDITIONING: ICD-10-CM

## 2024-09-16 DIAGNOSIS — N18.9 ACUTE KIDNEY INJURY SUPERIMPOSED ON CHRONIC KIDNEY DISEASE  (HCC): ICD-10-CM

## 2024-09-16 DIAGNOSIS — N17.9 ACUTE KIDNEY INJURY SUPERIMPOSED ON CHRONIC KIDNEY DISEASE  (HCC): ICD-10-CM

## 2024-09-16 DIAGNOSIS — M54.42 ACUTE BILATERAL LOW BACK PAIN WITH LEFT-SIDED SCIATICA: ICD-10-CM

## 2024-09-16 DIAGNOSIS — I77.1 CELIAC ARTERY STENOSIS (HCC): ICD-10-CM

## 2024-09-16 DIAGNOSIS — M79.601 PAIN AND SWELLING OF RIGHT UPPER EXTREMITY: ICD-10-CM

## 2024-09-16 DIAGNOSIS — M86.08 ACUTE HEMATOGENOUS OSTEOMYELITIS OF OTHER SITE (HCC): ICD-10-CM

## 2024-09-16 DIAGNOSIS — E03.9 ACQUIRED HYPOTHYROIDISM: ICD-10-CM

## 2024-09-16 DIAGNOSIS — K21.9 GASTROESOPHAGEAL REFLUX DISEASE, UNSPECIFIED WHETHER ESOPHAGITIS PRESENT: ICD-10-CM

## 2024-09-16 PROBLEM — R30.0 DYSURIA: Status: RESOLVED | Noted: 2022-06-29 | Resolved: 2024-09-16

## 2024-09-16 LAB
GLUCOSE SERPL-MCNC: 196 MG/DL (ref 65–140)
GLUCOSE SERPL-MCNC: 218 MG/DL (ref 65–140)
GLUCOSE SERPL-MCNC: 252 MG/DL (ref 65–140)
GLUCOSE SERPL-MCNC: 261 MG/DL (ref 65–140)

## 2024-09-16 PROCEDURE — 82948 REAGENT STRIP/BLOOD GLUCOSE: CPT

## 2024-09-16 PROCEDURE — 93971 EXTREMITY STUDY: CPT

## 2024-09-16 PROCEDURE — 99306 1ST NF CARE HIGH MDM 50: CPT | Performed by: STUDENT IN AN ORGANIZED HEALTH CARE EDUCATION/TRAINING PROGRAM

## 2024-09-16 PROCEDURE — 93971 EXTREMITY STUDY: CPT | Performed by: SURGERY

## 2024-09-16 NOTE — ASSESSMENT & PLAN NOTE
Patient endorses chronic constipation  At risk due to hospitalization, relative immobility, comorbidities, chronic opioid  Monitor stool output - recent BM small/hard requiring straining which likely exacerbates her back pain and discomfort  Bowel regimen at facility: miralax daily, docusate BID, and senna BID, added lactulose BID, adjust as appropriate; bisacodyl suppository PRN  Encourage mobility as tolerated, PO hydration as appropriate, high fiber diet/prune juice (in outpatient setting as appropriate)  Goal is for 1 easy BM every 1-2 days

## 2024-09-16 NOTE — ASSESSMENT & PLAN NOTE
Tested positive on 9/7/24 for COVID  Symptoms earlier on including reported fevers/chills and mild cough, overall improved - at this time seems recovering well from COVID standpoint  Did require supplemental O2 in hospital temporarily  Was treated with Paxlovid course inpatient  Monitor respiratory status - stable on room air  Supportive care, encourage IS  Isolation as per facility protocol

## 2024-09-16 NOTE — ASSESSMENT & PLAN NOTE
Noted POA to recent hospitalization, likely multifactorial in setting of COVID and concern for MSSA bacteremia and osteomyelitis  Evaluated by ID inpatient  Continue IV cefazolin for 6 week course (through 10/21/24)  Monitor labs while on IV abx in accordance with ID recs  Monitor for acute/recurrent infectious symptoms - no new/acute symptoms, acute sepsis seems resolved with ongoing treatment  Follow up with PCP, ID as appropriate

## 2024-09-16 NOTE — ASSESSMENT & PLAN NOTE
Noted on inpatient workup  Evaluated by Vascular inpatient with no acute intervention indicated. Considered unlikely to be contributing to patient's presenting symptoms.  Continue aspirin, statin  Follow up with PCP, Vascular as appropriate

## 2024-09-16 NOTE — ASSESSMENT & PLAN NOTE
With chronic low back pain (generally L>R with associated intermittent sciatica) with hx of spinal stenosis and reported spinal surgery  With recent acute exacerbation outpatient (without associated trauma) leading to present hospitalization  Likely related to sepsis in setting of presumed osteomyelitis per inpatient workup  Monitor pain - acute component gradually improving but not yet to baseline  Current regimen including: tylenol 975mg TID, methocarbamol 500mg TID, tramadol 50mg BID (chronic med), oxycodone 2.5mg q6hr PRN, topical voltaren  Adjust/wean regimen as appropriate. As of 9/16 added topical voltaren. Use caution with titration as patient notes muscle relaxer/opioid can make her a bit tired at times. Would aim to wean off oxycodone by discharge to avoid mixing multiple opioids in outpatient setting  See plan under sepsis

## 2024-09-16 NOTE — ASSESSMENT & PLAN NOTE
Lab Results   Component Value Date    EGFR 30 09/16/2024    EGFR 46 09/14/2024    EGFR 43 09/13/2024    CREATININE 1.57 (H) 09/16/2024    CREATININE 1.11 09/14/2024    CREATININE 1.18 09/13/2024       Noted to have CINDY inpatient, likely multifactorial related to contrast use, hypotension/sepsis  Evaluated by Nephrology inpatient. Losartan and bumex were held temporarily inpatient.  Monitor renal function on routine labs - CINDY appears generally improved back to near/within baseline  Avoid nephrotoxins, NSAIDs as able  Encourage PO hydration, respecting volume status  Follow up with PCP, Nephrology as appropriate

## 2024-09-16 NOTE — UTILIZATION REVIEW
NOTIFICATION OF ADMISSION DISCHARGE   This is a Notification of Discharge from [facility]. Please be advised that this patient has been discharge from our facility. Below you will find the admission and discharge date and time including the patient’s disposition.   UTILIZATION REVIEW CONTACT:  Amaris Anderson  Utilization   Network Utilization Review Department  Phone: [phone] x carefully listen to the prompts. All voicemails are confidential.  Email: [email or web address]     ADMISSION INFORMATION  PRESENTATION DATE: 9/7/2024  4:02 PM  OBERVATION ADMISSION DATE: 09/07/2024 2058  INPATIENT ADMISSION DATE: 9/8/24  3:05 PM   DISCHARGE DATE: 9/14/2024  2:14 PM   DISPOSITION:Released to SNF/TCU/SNU Facility    Network Utilization Review Department  ATTENTION: Please call with any questions or concerns to [phone] and carefully listen to the prompts so that you are directed to the right person. All voicemails are confidential.   For Discharge needs, contact Care Management DC Support Team at [phone] opt. 2  Send all requests for admission clinical reviews, approved or denied determinations and any other requests to dedicated fax number below belonging to the campus where the patient is receiving treatment. List of dedicated fax numbers for the Facilities:  FACILITY NAME UR FAX NUMBER   ADMISSION DENIALS (Administrative/Medical Necessity) [phone]   DISCHARGE SUPPORT TEAM ([facility]) [phone]   PARENT CHILD HEALTH (Maternity/NICU/Pediatrics) [phone]   [facility] [phone]   [facility] [phone]   [facility] [phone]   [facility] [phone]   [facility] [phone]   [facility] [phone]   [facility] [phone]   [facility]  [facility] [phone]   [facility] [phone]   [facility] [phone]   [facility] [phone]   [facility] [phone]

## 2024-09-16 NOTE — ASSESSMENT & PLAN NOTE
Patient noted some mild swelling  of RUE and associated discomfort (mainly itching and some tenderness primarily along inside of right elbow/forearm with some associated bruising). Per patient she noticed this in hospital prior to arrival to rehab, she thinks in setting of attempted blood draw on the RUE. She does presently have PICC at the RUE site.  Symptoms mild/stable per patient  Clinically seems less likely to be infection/cellulitis, more likely superficial phlebitis  Have started patient on DVT prophylaxis dose heparin in any case while at rehab  Encourage extremity elevation, topical ice as tolerated  Ordered topical voltaren  Ordered ultrasound RUE to r/o DVT  Continue to monitor closely

## 2024-09-16 NOTE — ASSESSMENT & PLAN NOTE
Baseline Hb seems around 9-11 with limited data  Likely related to CKD, hx of iron deficiency on labs; likely with acute component related to marrow suppression in setting of acute infection and antibiotics  -monitor on routine labs  -monitor for acute bleed - no present signs  -consider further workup, Heme consult if persistent/worsening  -transfuse PRN Hb <7

## 2024-09-16 NOTE — ASSESSMENT & PLAN NOTE
-stable per patient  -continue famotidine  -recommend OOB with meals, sit upright for at least 30 minutes afterwards, avoid trigger foods  -continue to monitor  -follow up with PCP, GI as appropriate

## 2024-09-16 NOTE — ASSESSMENT & PLAN NOTE
See plan under sepsis  Had been recommended repeat MRI of spine in several weeks, can coordinate with ID if patient still in-house

## 2024-09-16 NOTE — ASSESSMENT & PLAN NOTE
Patient noted a scalp lesion present since perhaps 1-2 years. She believes he first noted it after a hair perm appointment.  Questionably source of recent bacteremia?  Dermatology was consulted inpatient and recommended outpatient follow-up  Continue to monitor, continue local care as appropriate  Per patient the lesion has at times bled though she admits to scratching at it at times. She has not noted any acute change to the lesion recently. It is an irregular, scabbed appearing lesion. May require biopsy outpatient.  Patient does not presently feel she needs any topical treatment for it  Follow up with PCP, Dermatology as appropriate

## 2024-09-16 NOTE — ASSESSMENT & PLAN NOTE
Chronic history  No acute exacerbation presently. Did transiently have O2 requirement inpatient in setting of COVID, see plan under COVID  Monitor respiratory status - seems stable/baseline per patient, on room air  Continue breathing regimen  Consider respiratory consult if needed  Follow up with PCP, Pulmonology as appropriate

## 2024-09-16 NOTE — ASSESSMENT & PLAN NOTE
Seems to be a chronic issue  No clear noted hx of CHF however recent echos have generally been limited/difficult studies so unclear what her present EF is  Monitor weight  Monitor volume status clinically - bilateral LE edema reported stable/chronic, no orthopnea  Encourage elevation, compression of lower extremities as able  Continue bumex with hold parameters  Follow up with PCP, Cardiology as appropriate

## 2024-09-16 NOTE — ASSESSMENT & PLAN NOTE
Monitor BP - generally around 110s-130s systolic with limited data  Avoid hypotension  No acute cardiac complaints  Continue regimen including carvedilol 12.5mg BID, nifedipine ER 60mg daily, losartan 50mg daily (reduced from 100mg as of 9/16), bumex 2mg daily, with hold parameters as appropriate  Follow up with PCP, Cardiology as appropriate

## 2024-09-16 NOTE — ASSESSMENT & PLAN NOTE
Multifactorial in setting of hospitalization, infections, pain  -PT/OT  -fall precautions  -encourage appropriate DME use  -SW to follow for safe discharge planning/homecare services as appropriate

## 2024-09-16 NOTE — ASSESSMENT & PLAN NOTE
Lab Results   Component Value Date    HGBA1C 7.9 (A) 07/02/2024       Monitor glucose - fasting glucose in high 100s with limited data so far  Avoid hypoglycemia  Continue regimen including sitagliptin 50mg daily, insulin lantus 26u daily  Insulin sliding scale coverage at rehab  Adjust regimen as appropriate with more data  Encourage lifestyle modifications including healthy diet, weight management, exercise as appropriate  Follow up with PCP, Endocrine/Ophthalmology/Podiatry outpatient as appropriate

## 2024-09-16 NOTE — ASSESSMENT & PLAN NOTE
Noted history  No acute cardiac complaints  Continue regimen including aspirin, statin, beta blocker  PRN nitro  Follow up with PCP, Cardiology as appropriate

## 2024-09-16 NOTE — PROGRESS NOTES
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 pectoris  Noted history  No acute cardiac complaints  Continue regimen including aspirin, statin, beta blocker  PRN nitro  Follow up with PCP, Cardiology as appropriate    Scalp lesion  Patient noted a scalp lesion present since perhaps 1-2 years. She believes he first noted it after a hair perm appointment.  Questionably source of recent bacteremia?  Dermatology was consulted inpatient and recommended outpatient follow-up  Continue to monitor, continue local care as appropriate  Per patient the lesion has at times bled though she admits to scratching at it at times. She has not noted any acute change to the lesion recently. It is an irregular, scabbed appearing lesion. May require biopsy outpatient.  Patient does not presently feel she needs any topical treatment for it  Follow up with PCP, Dermatology as appropriate        Pain and swelling of right upper extremity  Patient noted some mild swelling  of RUE and associated discomfort (mainly itching and some tenderness primarily along inside of right elbow/forearm with some associated bruising). Per patient she noticed this in hospital prior to arrival to rehab, she thinks in setting of attempted blood draw on the RUE. She does presently have PICC at the RUE site.  Symptoms mild/stable per patient  Clinically seems less likely to be infection/cellulitis, more likely superficial phlebitis  Have  started patient on DVT prophylaxis dose heparin in any case while at rehab  Encourage extremity elevation, topical ice as tolerated  Ordered topical voltaren  Ordered ultrasound RUE to r/o DVT  Continue to monitor closely     Bilateral lower extremity edema  Seems to be a chronic issue  No clear noted hx of CHF however recent echos have generally been limited/difficult studies so unclear what her present EF is  Monitor weight  Monitor volume status clinically - bilateral LE edema reported stable/chronic, no orthopnea  Encourage elevation, compression of lower extremities as able  Continue bumex with hold parameters  Follow up with PCP, Cardiology as appropriate         Pain: up to severe level at low back  Rehab Potential:Fair  Patient Informed of Medical Condition: yes   Patient is Capable of Understanding Their Right: yes   Discharge Plan: home  Vaccination:   Immunization History   Administered Date(s) Administered    COVID-19 PFIZER VACCINE 0.3 ML IM 03/01/2021, 03/22/2021, 10/08/2021, 04/14/2022    COVID-19 Pfizer vac 6m-4y curtis-sucrose 3 mcg/0.2 ML IM (maroon cap) 04/14/2022    INFLUENZA 11/08/2005, 10/24/2006, 10/01/2007, 10/15/2008, 09/25/2009, 09/27/2010, 12/19/2013, 10/01/2015, 12/14/2016, 12/29/2016, 10/16/2017, 09/11/2018, 10/25/2019, 10/08/2020, 09/27/2021, 10/21/2022, 10/11/2023    Influenza Split High Dose Preservative Free IM 09/23/2014, 10/01/2015, 12/14/2016, 10/16/2017    Influenza, high dose seasonal 0.7 mL 09/11/2018, 10/25/2019, 10/08/2020, 09/27/2021, 10/21/2022, 10/11/2023    Influenza, seasonal, injectable 11/14/2011, 10/15/2012, 09/26/2013    Pneumococcal Conjugate 13-Valent 12/29/2015, 01/19/2016    Pneumococcal Polysaccharide PPV23 09/25/2009    Tdap 11/12/2009       DVT ppx: heparin    Advanced Directives: patient verbalizes HCP as kelsey Hoyos  Code status:Full Code Extensive discussion/education with patient today regarding their wishes with respect to resuscitative measures;  discussed as appropriate potential risks vs benefits of resuscitative measures; patient verbalizes understanding, appears to have capacity to make this decision presently, and clearly verbalizes a desire for Full Code, would like to be revived (but does express that she would with not to persist via artificial means if in vegetative state if no meaningful hope of recovery)  PCP: Kaden      -Total time spent on this encounter today including documentation and workup review, face to face time, history and exam, and documentation/orders was approximately 80 minutes.  -This note will be copied to [facility] EMR where vitals and medication orders are placed.    Corwin Trujillo D.O.  Geriatric Medicine  9/16/2024 12:17 PM

## 2024-09-17 ENCOUNTER — NURSING HOME VISIT (OUTPATIENT)
Dept: GERIATRICS | Facility: OTHER | Age: 79
End: 2024-09-17
Payer: COMMERCIAL

## 2024-09-17 DIAGNOSIS — U07.1 COVID: ICD-10-CM

## 2024-09-17 DIAGNOSIS — I10 PRIMARY HYPERTENSION: ICD-10-CM

## 2024-09-17 DIAGNOSIS — M79.601 PAIN AND SWELLING OF RIGHT UPPER EXTREMITY: ICD-10-CM

## 2024-09-17 DIAGNOSIS — M79.89 PAIN AND SWELLING OF RIGHT UPPER EXTREMITY: ICD-10-CM

## 2024-09-17 DIAGNOSIS — K59.03 DRUG-INDUCED CONSTIPATION: ICD-10-CM

## 2024-09-17 DIAGNOSIS — A41.01 SEPSIS DUE TO METHICILLIN SUSCEPTIBLE STAPHYLOCOCCUS AUREUS (MSSA) WITHOUT ACUTE ORGAN DYSFUNCTION (HCC): ICD-10-CM

## 2024-09-17 DIAGNOSIS — M54.42 ACUTE BILATERAL LOW BACK PAIN WITH LEFT-SIDED SCIATICA: Primary | ICD-10-CM

## 2024-09-17 LAB
GLUCOSE SERPL-MCNC: 145 MG/DL (ref 65–140)
GLUCOSE SERPL-MCNC: 181 MG/DL (ref 65–140)
GLUCOSE SERPL-MCNC: 231 MG/DL (ref 65–140)

## 2024-09-17 PROCEDURE — 82948 REAGENT STRIP/BLOOD GLUCOSE: CPT

## 2024-09-17 PROCEDURE — 99309 SBSQ NF CARE MODERATE MDM 30: CPT | Performed by: STUDENT IN AN ORGANIZED HEALTH CARE EDUCATION/TRAINING PROGRAM

## 2024-09-17 NOTE — PROGRESS NOTES
[facility]  Facility: [facility]    PROGRESS NOTE  Nursing Home Place of Service: nursing home place of service: POS [facility] Skilled Care-Part A Coverage    NAME: Porsha Hoyos  : 1945 AGE: 79 y.o. SEX: female MRN: [id number]  DATE OF ENCOUNTER: 2024    Assessment and Plan     Problem List Items Addressed This Visit       Primary hypertension     Monitor BP - generally around 120s-130s systolic  Avoid hypotension  No acute cardiac complaints  Continue regimen including carvedilol 12.5mg BID, nifedipine ER 60mg daily, losartan 50mg daily (reduced from 100mg as of ), bumex 2mg daily, with hold parameters as appropriate  Follow up with PCP, Cardiology as appropriate           Acute on Chronic Back Pain in the Setting of Sepsis, MSSA Bacteremia - Primary     With chronic low back pain (generally L>R with associated intermittent sciatica) with hx of spinal stenosis and reported spinal surgery  With recent acute exacerbation outpatient (without associated trauma) leading to present hospitalization  Likely related to sepsis in setting of presumed osteomyelitis per inpatient workup  Monitor pain - acute component gradually improving but not yet to baseline  Current regimen including: tylenol 975mg TID, methocarbamol 750mg TID, tramadol 50mg BID (chronic med), oxycodone 2.5mg q6hr PRN, topical voltaren, gabapentin 100mg TID  Adjust/wean regimen as appropriate. As of  added topical voltaren. As of  extensive discussion, patient prefers no opioid titration, titrated methocarbamol and added low dose gabapentin and increased voltaren from BID to QID. Would aim to wean off oxycodone by discharge to avoid mixing multiple opioids in outpatient setting  See plan under sepsis         Pain and swelling of right upper extremity     Patient had noted some mild swelling  of RUE and associated discomfort (mainly itching and some tenderness primarily along inside of right  elbow/forearm with some associated bruising). Per patient she noticed this in hospital prior to arrival to rehab, she thinks in setting of attempted blood draw on the RUE. She does presently have PICC at the RUE site.  Symptoms mild/stable per patient  Clinically seems less likely to be infection/cellulitis, more likely superficial phlebitis  Have started patient on DVT prophylaxis dose heparin in any case while at rehab  Encourage extremity elevation, topical ice as tolerated  topical voltaren  Ordered ultrasound RUE to r/o DVT - negative  Continue to monitor closely - appears to be gradually improving/fading         Drug-induced constipation     Patient endorses chronic constipation  At risk due to hospitalization, relative immobility, comorbidities, chronic opioid  Monitor stool output - last BM a few days ago small/hard requiring straining which likely exacerbates her back pain and discomfort. Passing gas OK.  Bowel regimen at facility: miralax daily, docusate BID, and senna BID, lactulose BID, adjust as appropriate; bisacodyl suppository PRN, giving one on 9/17. Discussed enema, patient prefers to wait on this for now.  Encourage mobility as tolerated, PO hydration as appropriate, high fiber diet/prune juice (in outpatient setting as appropriate)  Goal is for 1 easy BM every 1-2 days           COVID     Tested positive on 9/7/24 for COVID  Symptoms earlier on including reported fevers/chills and mild cough, overall improved/resolving - at this time seems recovering well from COVID standpoint  Did require supplemental O2 in hospital temporarily  Was treated with Paxlovid course inpatient  Monitor respiratory status - stable on room air  Supportive care, encourage IS  Isolation as per facility protocol           Sepsis (HCC): SIRS criteria MSSA Bacteremia and COVID infection     Noted POA to recent hospitalization, likely multifactorial in setting of COVID and concern for MSSA bacteremia and  osteomyelitis  Evaluated by ID inpatient  Continue IV cefazolin for 6 week course (through 10/21/24)  Monitor labs while on IV abx in accordance with ID recs  Monitor for acute/recurrent infectious symptoms - no new/acute symptoms, acute sepsis seems resolved with ongoing treatment  Follow up with PCP, ID as appropriate                Chief Complaint     Follow up pain, constipation    History of Present Illness     Porsha Hoyos is a 79 y.o. female who was seen today for follow up. PMH including HTN, HLD, CAD, PAD, CVA, chronic back pain/spinal stenosis, DM2, hypothyroidism, CKD, GERD, asthma    Asked to see patient today regarding ongoing issues with back pain and constipation.    Patient seen and examined in room  Others present: none  Patient laying in bed  Appears relatively comfortable at rest, awake, alert, oriented to situation, able to converse appropriately  Patient polite, appears in fair spirits, Aox3, appears to be a reasonable historian, mentation seems stable from my prior visit. Notes she does not feel too well recently mainly due to her ongoing acute on chronic back pain (L>R with associated L sciatica). Pain continues to be a problem, she feels it is limiting her therapy participation. Pain worsens quite a bit on movement. She does feel the pain is stable, not any worse since being here, but not better either.  No acute pain anywhere else.  Breathing fine, on room air, no acute SOB, no orthopnea. Feels her breathing is at baseline with no major COVID related symptoms, earlier mild cough largely resolved. (Has chronic mild dyspnea on significant exertion like climbing a hill but otherwise no JONES with short walks.)  No recent CP/palpitations or orthostatic lightheadedness  Appetite generally poor (she thought it has generally been poor since  passed around 6 months ago and probably even worse recently in hospital); no acute swallowing concerns  Urinating well without acute symptoms  Last BM a  few days ago small and hard, she strained today but could not have one, she has been passing gas, she feels constipated and bloated. No N/V  Does not feel acutely sick or confused  No acute cardiopulmonary, or urinary symptoms; see ROS for more details.     No further questions or acute concerns identified.     Lab Review:  9/16: BMP with Cr 1.57 (recent peak 2.66 on 9/9, baseline around 1.3-1.7), GFR (baseline 30s); CBC with WBC 10.39 (recent peak 11.68 on 9/8), Hb 10.0 (normocytic, baseline seems around 9-11 with limited data); blood cultures 9/10 NGTD; UA 9/8 and 9/10 generally unremarkable for infection; blood cultures 9/7 one NGTD and one with Staph aureus; COVID positive 9/7              Lab Results   Component Value Date     HGBA1C 7.9 (A) 07/02/2024            Lab Results   Component Value Date     ZHW2FSAYZXPE 1.779 07/10/2024     TSH 1.81 01/18/2024            Lab Results   Component Value Date     AZMEACKC69 4,288 (H) 07/03/2022            Lab Results   Component Value Date     IRON 15 (L) 06/29/2022     TIBC 257 06/29/2022     FERRITIN 942 (H) 06/29/2022            Lab Results   Component Value Date     CHOLESTEROL 119 07/10/2024            Lab Results   Component Value Date     HDL 50 07/10/2024            Lab Results   Component Value Date     TRIG 131 07/10/2024            Lab Results   Component Value Date     NONHDLC 84 01/25/2022            Lab Results   Component Value Date     LDLCALC 43 07/10/2024            US RUE 9/16  CONCLUSION:  Impression  RIGHT UPPER LIMB:  No evidence of acute or chronic deep vein thrombosis.  No evidence of superficial thrombophlebitis noted.  Doppler evaluation shows a normal response to augmentation maneuvers.  LEFT UPPER LIMB LIMITED:  Evaluation shows no evidence of thrombus in the internal jugular vein,  subclavian vein, and the innominate vein.     XR chest PICC line portable  Result Date: 9/13/2024  No acute cardiopulmonary disease. Right PICC crossing the  midline with tip along the left heart border at the level of the main pulmonary artery. Per comparison with the chest CT, this traverses a retroesophageal right brachiocephalic vein with its tip in a persistent left SVC.     Echo follow up/limited w/ contrast if indicated  Result Date: 9/11/2024  Narrative:   Left Ventricle: Left ventricle is not well visualized. Systolic function cannot be assessed. Wall motion cannot be accurately assessed.   Right Ventricle: Right ventricle is not well visualized. Systolic function is grossly normal.   Tricuspid Valve: The right ventricular systolic pressure is mildly elevated. The estimated right ventricular systolic pressure is 43.00 mmHg. Severely technically limited echo, LV function cannot be assessed on the basis of this echo.  Recommend PENNY if endocarditis evaluation is required.      MRI lumbar spine w wo contrast  Result Date: 9/10/2024  1. Multilevel lumbar spondylosis, as described above, contributing to at most severe canal stenosis at L3-L4, and multilevel neural foraminal stenosis, worst on the right at L2-L3 and L3-L4, on the left at L4-L5. 2. T2/STIR signal hyperintensity involving the intervertebral discs at L2-L4, with mild adjacent paraspinal enhancement right greater left at these levels, but no obvious endplate edema on STIR images noted, or definite endplate destruction. There are corresponding sclerotic endplate changes on CT at these levels, with likely vacuum phenomena noted anteriorly involving the intervertebral disc at L3-L4. These findings are likely due to extensive degenerative disc disease/degenerative endplate changes but a discitis osteomyelitis cannot be entirely excluded. Recommend correlation with the patient's clinical history, as well as follow-up lumbar spine MRI in 3 to 4 weeks time without and with contrast to document stability.     US kidney and bladder  Result Date: 9/10/2024  No acute findings.      CTA dissection protocol chest  abdomen pelvis w wo contrast  Result Date: 9/7/2024  No aortic aneurysm, dissection or other acute pathology. 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                   Encounter Date: 09/07/24   ECG 12 lead   Result Value     Ventricular Rate 86     Atrial Rate 86     CT Interval 206     QRSD Interval 78     QT Interval 350     QTC Interval 418     P Axis 12     QRS Axis 5     T Wave Axis 221     Narrative     Normal sinus rhythm with sinus arrhythmia  Nonspecific ST and T wave abnormality  Abnormal ECG  When compared with ECG of 25-JUN-2022 19:43,  T wave inversion more evident in Inferior leads  Confirmed by Rob Ling ([id number]) on 9/8/2024 1:05:24 PM            Echo Jan 2024: EF 55-60, technically difficult study        [facility] reviewed 9/16/2024 08/12/2024 07/02/2024 1 Tramadol Hcl 50 Mg Tablet 60.00 30 If m [id number] Bat ([id number]) 0 20.00 MME Comm Ins [place]   07/05/2024 07/02/2024 1 Tramadol Hcl 50 Mg Tablet 60.00 30 If m [id number] Bat ([id number]) 0 20.00 MME Comm Ins [place]   06/06/2024 05/09/2024 1 Tramadol Hcl 50 Mg Tablet 60.00 30 If m [id number] Bat ([id number]) 0 20.00 MME Comm Ins [place]   05/09/2024 05/09/2024 1 Tramadol Hcl 50 Mg Tablet 60.00 30 If m [id number] Bat ([id number]) 0 20.00 MME Comm Ins [place]   04/08/2024 03/05/2024 1 Tramadol Hcl 50 Mg Tablet 60.00 30 If m [id number] Bat ([id number]) 0 20.00 MME Comm Ins [place]   03/05/2024 03/05/2024 1 Tramadol Hcl 50 Mg Tablet 60.00 30 If m [id number] Bat ([id number]) 0 20.00 MME Comm Ins [place]   02/03/2024 01/03/2024 1 Tramadol Hcl 50 Mg Tablet 60.00 30 If m [id number] Bat ([id number]) 0 20.00 MME Comm Ins [place]   01/03/2024 01/03/2024 1 Tramadol Hcl 50 Mg Tablet 60.00 30 If m [id number] Bat ([id number]) 0 20.00 MME Comm Ins [place]           The following portions of the patient's history were reviewed and updated as appropriate: allergies, current medications, past family history, past medical history, past social history, past surgical history and  problem list.    Review of Systems     Review of Systems   Constitutional:  Positive for appetite change. Negative for chills, diaphoresis and fever.   HENT:  Negative for drooling, ear pain, hearing loss (mild, baseline), rhinorrhea, sore throat and trouble swallowing.    Eyes:  Negative for pain, discharge, redness, itching and visual disturbance.   Respiratory:  Negative for cough, chest tightness, shortness of breath (baseline) and wheezing.    Cardiovascular:  Positive for leg swelling. Negative for chest pain and palpitations.   Gastrointestinal:  Positive for constipation. Negative for abdominal pain (some supragastric discomfort), blood in stool, diarrhea, nausea and vomiting.   Genitourinary:  Negative for difficulty urinating, dysuria, flank pain and hematuria.   Musculoskeletal:  Positive for back pain and gait problem. Negative for arthralgias and neck pain.   Skin:  Negative for color change.   Neurological:  Positive for weakness. Negative for dizziness, facial asymmetry, speech difficulty, light-headedness and headaches.   Psychiatric/Behavioral:  Negative for agitation, behavioral problems and confusion. The patient is not nervous/anxious and is not hyperactive.    All other systems reviewed and are negative.      Active Problem List     Patient Active Problem List   Diagnosis    Primary hypertension    Mixed hyperlipidemia    Vulvar lesion    Encntr for gyn exam (general) (routine) w abnormal findings    Vaginal atrophy    Acute on Chronic Back Pain in the Setting of Sepsis, MSSA Bacteremia    Acute pain of right shoulder    Pain and swelling of right upper extremity    Acute pain of right shoulder due to trauma    Fall at home    Imbalance    Type 2 diabetes mellitus with diabetic chronic kidney disease (HCC)    Type 2 diabetes mellitus with diabetic polyneuropathy (HCC)    Long term (current) use of insulin (HCC)    Type 2 diabetes mellitus with stable proliferative diabetic retinopathy, bilateral  (HCC)    Hypothyroidism    Stage 3b chronic kidney disease (HCC)    History of colon polyps    Gastroesophageal reflux disease    Asthma without status asthmaticus without complication    Elevated LFTs    Acute kidney injury superimposed on chronic kidney disease  (HCC)    Pancytopenia (HCC)    Shortness of breath    Anemia    Vitamin deficiency    Drug-induced constipation    Peripheral vascular disease, unspecified (HCC)    Metatarsalgia of left foot    Proteinuria    Hordeolum externum of left upper eyelid    Nausea and vomiting    Type 2 diabetes mellitus with complication, with long-term current use of insulin (HCC)    COVID    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HCC)    Sepsis (HCC): SIRS criteria MSSA Bacteremia and COVID infection    Osteomyelitis (HCC)    Advance care planning    At risk for delirium    Physical deconditioning    Coronary artery disease involving native coronary artery of native heart without angina pectoris    Scalp lesion    Bilateral lower extremity edema       Objective     Vital Signs:     BP: 122/43 mmHg  9/17/2024 15:19   Temp:97 °F  9/17/2024 15:19 Pulse:68 bpm  9/17/2024 15:19 Weight:148.5 Lbs  9/17/2024 05:06   Resp:21 Breaths/min  9/17/2024 15:19 BS:231 mg/dL  9/17/2024 12:15 O2:93 %  9/17/2024 07:52 Pain:5  9/17/2024 13:28       Physical Exam  Vitals reviewed.   Constitutional:       General: She is not in acute distress.     Appearance: She is not toxic-appearing or diaphoretic.   HENT:      Head: Normocephalic and atraumatic.      Right Ear: External ear normal.      Left Ear: External ear normal.      Nose: Nose normal. No rhinorrhea.      Mouth/Throat:      Mouth: Mucous membranes are dry.      Pharynx: Oropharynx is clear. No posterior oropharyngeal erythema.   Eyes:      General: No scleral icterus.        Right eye: No discharge.         Left eye: No discharge.      Extraocular Movements: Extraocular movements intact.      Conjunctiva/sclera: Conjunctivae normal.      Pupils: Pupils  are equal, round, and reactive to light.   Cardiovascular:      Rate and Rhythm: Normal rate and regular rhythm.   Pulmonary:      Effort: Pulmonary effort is normal. No respiratory distress.      Breath sounds: Normal breath sounds. No wheezing or rales.   Abdominal:      General: Bowel sounds are normal.      Palpations: Abdomen is soft.      Tenderness: There is abdominal tenderness (supragastric tenderness). There is no guarding.   Musculoskeletal:         General: Swelling present. No tenderness.      Cervical back: No rigidity.      Comments: Trace bilateral lower extremity edema  Trace RUE swelling compared to LUE  PICC in place at [facility]. Some mild surrounding bruising and some mild bruising/rash at inside of R elbow region, minimally tender, fading from prior  Hypertonic musculature at left low back and left thigh   Skin:     General: Skin is warm and dry.      Coloration: Skin is not jaundiced.      Comments: Top of scalp centrally with ~1 inch diameter irregular scabbed lesion   Neurological:      General: No focal deficit present.      Mental Status: She is alert and oriented to person, place, and time. Mental status is at baseline.   Psychiatric:         Mood and Affect: Mood normal.         Behavior: Behavior normal.         Thought Content: Thought content normal.         Judgment: Judgment normal.         Pertinent Laboratory/Diagnostic Studies:  Laboratory and Imaging studies reviewed. Full report in the paper chart.     Current Medications   Medications reviewed and updated in facility chart.      -Total time spent on this encounter today including documentation and workup review, face to face time, history and exam, and documentation/orders was approximately 40 minutes.  -This note will be copied to [facility] EMR where vitals and medication orders are placed.    Corwin Trujillo D.O.  Geriatric Medicine  9/17/2024 5:19 PM

## 2024-09-17 NOTE — ASSESSMENT & PLAN NOTE
Patient endorses chronic constipation  At risk due to hospitalization, relative immobility, comorbidities, chronic opioid  Monitor stool output - last BM a few days ago small/hard requiring straining which likely exacerbates her back pain and discomfort. Passing gas OK.  Bowel regimen at facility: miralax daily, docusate BID, and senna BID, lactulose BID, adjust as appropriate; bisacodyl suppository PRN, giving one on 9/17. Discussed enema, patient prefers to wait on this for now.  Encourage mobility as tolerated, PO hydration as appropriate, high fiber diet/prune juice (in outpatient setting as appropriate)  Goal is for 1 easy BM every 1-2 days

## 2024-09-17 NOTE — ASSESSMENT & PLAN NOTE
Monitor BP - generally around 120s-130s systolic  Avoid hypotension  No acute cardiac complaints  Continue regimen including carvedilol 12.5mg BID, nifedipine ER 60mg daily, losartan 50mg daily (reduced from 100mg as of 9/16), bumex 2mg daily, with hold parameters as appropriate  Follow up with PCP, Cardiology as appropriate

## 2024-09-17 NOTE — ASSESSMENT & PLAN NOTE
With chronic low back pain (generally L>R with associated intermittent sciatica) with hx of spinal stenosis and reported spinal surgery  With recent acute exacerbation outpatient (without associated trauma) leading to present hospitalization  Likely related to sepsis in setting of presumed osteomyelitis per inpatient workup  Monitor pain - acute component gradually improving but not yet to baseline  Current regimen including: tylenol 975mg TID, methocarbamol 750mg TID, tramadol 50mg BID (chronic med), oxycodone 2.5mg q6hr PRN, topical voltaren, gabapentin 100mg TID  Adjust/wean regimen as appropriate. As of 9/16 added topical voltaren. As of 9/17 extensive discussion, patient prefers no opioid titration, titrated methocarbamol and added low dose gabapentin and increased voltaren from BID to QID. Would aim to wean off oxycodone by discharge to avoid mixing multiple opioids in outpatient setting  See plan under sepsis

## 2024-09-17 NOTE — ASSESSMENT & PLAN NOTE
Patient had noted some mild swelling  of RUE and associated discomfort (mainly itching and some tenderness primarily along inside of right elbow/forearm with some associated bruising). Per patient she noticed this in hospital prior to arrival to rehab, she thinks in setting of attempted blood draw on the RUE. She does presently have PICC at the RUE site.  Symptoms mild/stable per patient  Clinically seems less likely to be infection/cellulitis, more likely superficial phlebitis  Have started patient on DVT prophylaxis dose heparin in any case while at rehab  Encourage extremity elevation, topical ice as tolerated  topical voltaren  Ordered ultrasound RUE to r/o DVT - negative  Continue to monitor closely - appears to be gradually improving/fading

## 2024-09-17 NOTE — ASSESSMENT & PLAN NOTE
Tested positive on 9/7/24 for COVID  Symptoms earlier on including reported fevers/chills and mild cough, overall improved/resolving - at this time seems recovering well from COVID standpoint  Did require supplemental O2 in hospital temporarily  Was treated with Paxlovid course inpatient  Monitor respiratory status - stable on room air  Supportive care, encourage IS  Isolation as per facility protocol

## 2024-09-18 ENCOUNTER — APPOINTMENT (INPATIENT)
Dept: RADIOLOGY | Facility: HOSPITAL | Age: 79
End: 2024-09-18
Payer: COMMERCIAL

## 2024-09-18 ENCOUNTER — HOSPITAL ENCOUNTER (INPATIENT)
Facility: HOSPITAL | Age: 79
LOS: 7 days | Discharge: NURSING, REHAB OR LONG-TERM CARE | DRG: 178 | End: 2024-09-25
Attending: INTERNAL MEDICINE | Admitting: INTERNAL MEDICINE
Payer: COMMERCIAL

## 2024-09-18 ENCOUNTER — APPOINTMENT (INPATIENT)
Dept: RADIOLOGY | Facility: HOSPITAL | Age: 79
DRG: 178 | End: 2024-09-18
Payer: COMMERCIAL

## 2024-09-18 ENCOUNTER — NURSING HOME VISIT (OUTPATIENT)
Age: 79
End: 2024-09-18
Payer: COMMERCIAL

## 2024-09-18 VITALS
RESPIRATION RATE: 19 BRPM | SYSTOLIC BLOOD PRESSURE: 155 MMHG | HEART RATE: 86 BPM | BODY MASS INDEX: 30.19 KG/M2 | WEIGHT: 152 LBS | OXYGEN SATURATION: 95 % | TEMPERATURE: 97 F | DIASTOLIC BLOOD PRESSURE: 66 MMHG

## 2024-09-18 DIAGNOSIS — I10 HYPERTENSION, UNSPECIFIED TYPE: ICD-10-CM

## 2024-09-18 DIAGNOSIS — R41.82 ALTERED MENTAL STATUS: Primary | ICD-10-CM

## 2024-09-18 DIAGNOSIS — K59.00 CONSTIPATION: ICD-10-CM

## 2024-09-18 DIAGNOSIS — D63.1 ANEMIA DUE TO STAGE 3B CHRONIC KIDNEY DISEASE  (HCC): ICD-10-CM

## 2024-09-18 DIAGNOSIS — K21.9 GERD WITHOUT ESOPHAGITIS: ICD-10-CM

## 2024-09-18 DIAGNOSIS — K59.03 DRUG-INDUCED CONSTIPATION: ICD-10-CM

## 2024-09-18 DIAGNOSIS — E11.8 TYPE 2 DIABETES MELLITUS WITH COMPLICATION, WITH LONG-TERM CURRENT USE OF INSULIN (HCC): ICD-10-CM

## 2024-09-18 DIAGNOSIS — N18.30 BENIGN HYPERTENSION WITH CKD (CHRONIC KIDNEY DISEASE) STAGE III (HCC): ICD-10-CM

## 2024-09-18 DIAGNOSIS — N17.9 ACUTE KIDNEY INJURY SUPERIMPOSED ON CHRONIC KIDNEY DISEASE  (HCC): ICD-10-CM

## 2024-09-18 DIAGNOSIS — R68.89 FLU-LIKE SYMPTOMS: ICD-10-CM

## 2024-09-18 DIAGNOSIS — N18.9 ACUTE KIDNEY INJURY SUPERIMPOSED ON CHRONIC KIDNEY DISEASE  (HCC): ICD-10-CM

## 2024-09-18 DIAGNOSIS — E03.9 ACQUIRED HYPOTHYROIDISM: ICD-10-CM

## 2024-09-18 DIAGNOSIS — K21.9 GASTROESOPHAGEAL REFLUX DISEASE, UNSPECIFIED WHETHER ESOPHAGITIS PRESENT: ICD-10-CM

## 2024-09-18 DIAGNOSIS — M79.89 PAIN AND SWELLING OF RIGHT UPPER EXTREMITY: ICD-10-CM

## 2024-09-18 DIAGNOSIS — A41.9 SEPSIS (HCC): ICD-10-CM

## 2024-09-18 DIAGNOSIS — R60.0 BILATERAL LOWER EXTREMITY EDEMA: ICD-10-CM

## 2024-09-18 DIAGNOSIS — K56.609 SBO (SMALL BOWEL OBSTRUCTION) (HCC): Primary | ICD-10-CM

## 2024-09-18 DIAGNOSIS — R45.89 FLAT AFFECT: ICD-10-CM

## 2024-09-18 DIAGNOSIS — E03.9 HYPOTHYROIDISM, UNSPECIFIED TYPE: ICD-10-CM

## 2024-09-18 DIAGNOSIS — M79.601 PAIN AND SWELLING OF RIGHT UPPER EXTREMITY: ICD-10-CM

## 2024-09-18 DIAGNOSIS — M54.50 ACUTE MIDLINE LOW BACK PAIN, UNSPECIFIED WHETHER SCIATICA PRESENT: ICD-10-CM

## 2024-09-18 DIAGNOSIS — M54.42 ACUTE BILATERAL LOW BACK PAIN WITH LEFT-SIDED SCIATICA: ICD-10-CM

## 2024-09-18 DIAGNOSIS — Z79.4 TYPE 2 DIABETES MELLITUS WITH STAGE 3B CHRONIC KIDNEY DISEASE, WITH LONG-TERM CURRENT USE OF INSULIN (HCC): ICD-10-CM

## 2024-09-18 DIAGNOSIS — I12.9 BENIGN HYPERTENSION WITH CKD (CHRONIC KIDNEY DISEASE) STAGE III (HCC): ICD-10-CM

## 2024-09-18 DIAGNOSIS — I10 PRIMARY HYPERTENSION: ICD-10-CM

## 2024-09-18 DIAGNOSIS — Z79.4 TYPE 2 DIABETES MELLITUS WITH COMPLICATION, WITH LONG-TERM CURRENT USE OF INSULIN (HCC): ICD-10-CM

## 2024-09-18 DIAGNOSIS — I25.10 CORONARY ARTERY DISEASE INVOLVING NATIVE CORONARY ARTERY OF NATIVE HEART WITHOUT ANGINA PECTORIS: ICD-10-CM

## 2024-09-18 DIAGNOSIS — J45.909 ASTHMA WITHOUT STATUS ASTHMATICUS WITHOUT COMPLICATION, UNSPECIFIED ASTHMA SEVERITY, UNSPECIFIED WHETHER PERSISTENT: ICD-10-CM

## 2024-09-18 DIAGNOSIS — E11.22 TYPE 2 DIABETES MELLITUS WITH STAGE 3B CHRONIC KIDNEY DISEASE, WITH LONG-TERM CURRENT USE OF INSULIN (HCC): ICD-10-CM

## 2024-09-18 DIAGNOSIS — N18.32 ANEMIA DUE TO STAGE 3B CHRONIC KIDNEY DISEASE  (HCC): ICD-10-CM

## 2024-09-18 DIAGNOSIS — R53.81 PHYSICAL DECONDITIONING: ICD-10-CM

## 2024-09-18 DIAGNOSIS — N18.32 TYPE 2 DIABETES MELLITUS WITH STAGE 3B CHRONIC KIDNEY DISEASE, WITH LONG-TERM CURRENT USE OF INSULIN (HCC): ICD-10-CM

## 2024-09-18 DIAGNOSIS — M86.08 ACUTE HEMATOGENOUS OSTEOMYELITIS OF OTHER SITE (HCC): ICD-10-CM

## 2024-09-18 DIAGNOSIS — I77.1 CELIAC ARTERY STENOSIS (HCC): ICD-10-CM

## 2024-09-18 DIAGNOSIS — I73.9 PERIPHERAL VASCULAR DISEASE, UNSPECIFIED (HCC): ICD-10-CM

## 2024-09-18 DIAGNOSIS — U07.1 COVID: ICD-10-CM

## 2024-09-18 DIAGNOSIS — E78.5 HYPERLIPIDEMIA, UNSPECIFIED HYPERLIPIDEMIA TYPE: ICD-10-CM

## 2024-09-18 DIAGNOSIS — N18.30 STAGE 3 CHRONIC KIDNEY DISEASE, UNSPECIFIED WHETHER STAGE 3A OR 3B CKD (HCC): ICD-10-CM

## 2024-09-18 DIAGNOSIS — E79.0 HYPERURICEMIA: ICD-10-CM

## 2024-09-18 DIAGNOSIS — A41.01 SEPSIS DUE TO METHICILLIN SUSCEPTIBLE STAPHYLOCOCCUS AUREUS (MSSA) WITHOUT ACUTE ORGAN DYSFUNCTION (HCC): ICD-10-CM

## 2024-09-18 DIAGNOSIS — L98.9 SCALP LESION: ICD-10-CM

## 2024-09-18 PROBLEM — M46.46 DISCITIS OF LUMBAR REGION: Status: ACTIVE | Noted: 2024-09-18

## 2024-09-18 PROBLEM — B95.61 MSSA BACTEREMIA: Status: ACTIVE | Noted: 2024-09-18

## 2024-09-18 PROBLEM — R78.81 MSSA BACTEREMIA: Status: ACTIVE | Noted: 2024-09-18

## 2024-09-18 LAB
GLUCOSE SERPL-MCNC: 113 MG/DL (ref 65–140)
GLUCOSE SERPL-MCNC: 137 MG/DL (ref 65–140)
GLUCOSE SERPL-MCNC: 145 MG/DL (ref 65–140)

## 2024-09-18 PROCEDURE — 74022 RADEX COMPL AQT ABD SERIES: CPT

## 2024-09-18 PROCEDURE — 99222 1ST HOSP IP/OBS MODERATE 55: CPT | Performed by: SPECIALIST

## 2024-09-18 PROCEDURE — 99223 1ST HOSP IP/OBS HIGH 75: CPT | Performed by: INTERNAL MEDICINE

## 2024-09-18 PROCEDURE — 0D9670Z DRAINAGE OF STOMACH WITH DRAINAGE DEVICE, VIA NATURAL OR ARTIFICIAL OPENING: ICD-10-PCS | Performed by: INTERNAL MEDICINE

## 2024-09-18 PROCEDURE — 99316 NF DSCHRG MGMT 30 MIN+: CPT

## 2024-09-18 PROCEDURE — 71045 X-RAY EXAM CHEST 1 VIEW: CPT

## 2024-09-18 PROCEDURE — 82948 REAGENT STRIP/BLOOD GLUCOSE: CPT

## 2024-09-18 RX ORDER — BUDESONIDE AND FORMOTEROL FUMARATE DIHYDRATE 160; 4.5 UG/1; UG/1
2 AEROSOL RESPIRATORY (INHALATION) 2 TIMES DAILY
Status: DISCONTINUED | OUTPATIENT
Start: 2024-09-18 | End: 2024-09-25 | Stop reason: HOSPADM

## 2024-09-18 RX ORDER — HEPARIN SODIUM 5000 [USP'U]/ML
5000 INJECTION, SOLUTION INTRAVENOUS; SUBCUTANEOUS 3 TIMES DAILY
Status: DISCONTINUED | OUTPATIENT
Start: 2024-09-18 | End: 2024-09-25 | Stop reason: HOSPADM

## 2024-09-18 RX ORDER — CEFAZOLIN SODIUM 2 G/50ML
2000 SOLUTION INTRAVENOUS EVERY 12 HOURS
Status: DISCONTINUED | OUTPATIENT
Start: 2024-09-18 | End: 2024-09-18 | Stop reason: HOSPADM

## 2024-09-18 RX ORDER — METHOCARBAMOL 750 MG/1
750 TABLET, FILM COATED ORAL 3 TIMES DAILY
Status: ON HOLD
Start: 2024-09-18 | End: 2024-09-25

## 2024-09-18 RX ORDER — HEPARIN SODIUM 5000 [USP'U]/ML
5000 INJECTION, SOLUTION INTRAVENOUS; SUBCUTANEOUS EVERY 8 HOURS SCHEDULED
Status: DISCONTINUED | OUTPATIENT
Start: 2024-09-18 | End: 2024-09-18 | Stop reason: HOSPADM

## 2024-09-18 RX ORDER — LACTULOSE 10 G/15ML
20 SOLUTION ORAL 2 TIMES DAILY
COMMUNITY
End: 2024-09-25

## 2024-09-18 RX ORDER — ALBUTEROL SULFATE 90 UG/1
2 INHALANT RESPIRATORY (INHALATION) 4 TIMES DAILY
Status: DISCONTINUED | OUTPATIENT
Start: 2024-09-18 | End: 2024-09-21

## 2024-09-18 RX ORDER — LOSARTAN POTASSIUM 100 MG/1
50 TABLET ORAL DAILY
Status: ON HOLD
Start: 2024-09-18 | End: 2024-09-25

## 2024-09-18 RX ORDER — SODIUM CHLORIDE 9 MG/ML
100 INJECTION, SOLUTION INTRAVENOUS CONTINUOUS
Status: DISCONTINUED | OUTPATIENT
Start: 2024-09-18 | End: 2024-09-19

## 2024-09-18 RX ORDER — HEPARIN SODIUM 5000 [USP'U]/ML
5000 INJECTION, SOLUTION INTRAVENOUS; SUBCUTANEOUS EVERY 8 HOURS SCHEDULED
Status: DISCONTINUED | OUTPATIENT
Start: 2024-09-18 | End: 2024-09-18

## 2024-09-18 RX ORDER — BISACODYL 10 MG
10 SUPPOSITORY, RECTAL RECTAL DAILY
Status: DISCONTINUED | OUTPATIENT
Start: 2024-09-18 | End: 2024-09-21

## 2024-09-18 RX ORDER — NITROGLYCERIN 0.4 MG/1
0.4 TABLET SUBLINGUAL
Status: DISCONTINUED | OUTPATIENT
Start: 2024-09-18 | End: 2024-09-18 | Stop reason: HOSPADM

## 2024-09-18 RX ORDER — BISACODYL 10 MG
10 SUPPOSITORY, RECTAL RECTAL DAILY PRN
Status: DISCONTINUED | OUTPATIENT
Start: 2024-09-18 | End: 2024-09-18

## 2024-09-18 RX ORDER — GABAPENTIN 100 MG/1
100 CAPSULE ORAL 3 TIMES DAILY
Status: ON HOLD | COMMUNITY
End: 2024-09-25

## 2024-09-18 RX ORDER — LIDOCAINE 50 MG/G
1 PATCH TOPICAL DAILY
Status: DISCONTINUED | OUTPATIENT
Start: 2024-09-19 | End: 2024-09-25 | Stop reason: HOSPADM

## 2024-09-18 RX ORDER — CEFAZOLIN SODIUM 2 G/50ML
2000 SOLUTION INTRAVENOUS EVERY 12 HOURS
Status: DISCONTINUED | OUTPATIENT
Start: 2024-09-18 | End: 2024-09-25 | Stop reason: HOSPADM

## 2024-09-18 RX ORDER — ALBUTEROL SULFATE 90 UG/1
2 INHALANT RESPIRATORY (INHALATION) 4 TIMES DAILY
Status: DISCONTINUED | OUTPATIENT
Start: 2024-09-18 | End: 2024-09-18 | Stop reason: HOSPADM

## 2024-09-18 RX ORDER — INSULIN LISPRO 100 [IU]/ML
1-5 INJECTION, SOLUTION INTRAVENOUS; SUBCUTANEOUS EVERY 6 HOURS SCHEDULED
Status: DISCONTINUED | OUTPATIENT
Start: 2024-09-18 | End: 2024-09-21

## 2024-09-18 RX ORDER — BISACODYL 10 MG
10 SUPPOSITORY, RECTAL RECTAL DAILY PRN
Status: DISCONTINUED | OUTPATIENT
Start: 2024-09-18 | End: 2024-09-18 | Stop reason: HOSPADM

## 2024-09-18 RX ORDER — SODIUM CHLORIDE 9 MG/ML
75 INJECTION, SOLUTION INTRAVENOUS CONTINUOUS
Status: DISCONTINUED | OUTPATIENT
Start: 2024-09-18 | End: 2024-09-18 | Stop reason: HOSPADM

## 2024-09-18 RX ORDER — INSULIN LISPRO 100 [IU]/ML
1-5 INJECTION, SOLUTION INTRAVENOUS; SUBCUTANEOUS EVERY 6 HOURS SCHEDULED
Status: DISCONTINUED | OUTPATIENT
Start: 2024-09-18 | End: 2024-09-18 | Stop reason: HOSPADM

## 2024-09-18 RX ORDER — HEPARIN SODIUM 5000 [USP'U]/ML
5000 INJECTION, SOLUTION INTRAVENOUS; SUBCUTANEOUS 3 TIMES DAILY
COMMUNITY
End: 2024-09-25

## 2024-09-18 RX ORDER — BUDESONIDE AND FORMOTEROL FUMARATE DIHYDRATE 160; 4.5 UG/1; UG/1
2 AEROSOL RESPIRATORY (INHALATION) 2 TIMES DAILY
Status: DISCONTINUED | OUTPATIENT
Start: 2024-09-18 | End: 2024-09-18 | Stop reason: HOSPADM

## 2024-09-18 RX ADMIN — Medication 2 G: at 22:10

## 2024-09-18 RX ADMIN — BISACODYL 10 MG: 10 SUPPOSITORY RECTAL at 22:11

## 2024-09-18 RX ADMIN — SODIUM CHLORIDE 100 ML/HR: 0.9 INJECTION, SOLUTION INTRAVENOUS at 22:30

## 2024-09-18 RX ADMIN — ALBUTEROL SULFATE 2 PUFF: 90 AEROSOL, METERED RESPIRATORY (INHALATION) at 22:25

## 2024-09-18 RX ADMIN — HEPARIN SODIUM 5000 UNITS: 5000 INJECTION INTRAVENOUS; SUBCUTANEOUS at 17:59

## 2024-09-18 RX ADMIN — ALBUTEROL SULFATE 2 PUFF: 90 AEROSOL, METERED RESPIRATORY (INHALATION) at 17:59

## 2024-09-18 RX ADMIN — BUDESONIDE AND FORMOTEROL FUMARATE DIHYDRATE 2 PUFF: 160; 4.5 AEROSOL RESPIRATORY (INHALATION) at 17:59

## 2024-09-18 RX ADMIN — HEPARIN SODIUM 5000 UNITS: 5000 INJECTION INTRAVENOUS; SUBCUTANEOUS at 22:11

## 2024-09-18 RX ADMIN — CEFAZOLIN SODIUM 2000 MG: 2 SOLUTION INTRAVENOUS at 17:59

## 2024-09-18 NOTE — ASSESSMENT & PLAN NOTE
Lab Results   Component Value Date    HGBA1C 7.9 (A) 07/02/2024   Hold oral hypoglycemic agents  Hold Lantus while n.p.o.  Placed on sliding scale insulin

## 2024-09-18 NOTE — ASSESSMENT & PLAN NOTE
Stable per patient  Continue famotidine  Recommend OOB with meals, sit upright for at least 30 minutes afterwards, avoid trigger foods  Continue to monitor  Follow up with PCP, GI as appropriate

## 2024-09-18 NOTE — ASSESSMENT & PLAN NOTE
Baseline Hgb seems around 9-11 with limited data  Likely related to CKD, hx of iron deficiency on labs; likely with acute component related to marrow suppression in setting of acute infection and antibiotics  Hgb 10.1>9.4<10.6  Continue to monitor on routine labs  Monitor for acute bleed - no present signs  Consider further workup, for persistent/worsening  Transfuse PRN Hgb <7  Continue to monitor for acute changes  Follow up with PCP as appropriate

## 2024-09-18 NOTE — ASSESSMENT & PLAN NOTE
Multifactorial in setting of hospitalization, infections, pain  Continue PT/OT  Maintain fall and safety precautions  Encourage appropriate DME use  SW to follow for safe discharge planning/homecare services as appropriate  Follow up with PCP as appropriate

## 2024-09-18 NOTE — ASSESSMENT & PLAN NOTE
Patient endorses chronic constipation  At risk due to hospitalization, relative immobility, comorbidities, chronic opioid  Monitor stool output - last BM 9/15 small/hard requiring straining which likely exacerbates her back pain and discomfort.   9/17 1am patient started with abdominal pain, nausea, vomit x1 brown liquid.  2 small hard stools in last 4 days   S/p stool softener, senna, Miralax, lactulose, suppository and enema  Patient NPO as of 1am  9/18 Abdominal XRAY: Stomach is air-filled and moderately distended. Multiple dilated small bowel loops with associated differential and broad air/fluid levels, suspicious for small bowel obstruction.   Abdomen firm, tender to palpation, + BS, passing gas  Bowel regimen at facility: miralax daily, docusate BID, and senna BID, lactulose BID, adjust as appropriate; bisacodyl suppository PRN.  See plan under SBO  Follow up with GI/surgery as appropriate

## 2024-09-18 NOTE — QUICK NOTE
NG tube in place  Chest x-ray confirmed placement in the left quadrant below the diaphragm  Placed on low intermittent suction -90 mmHg and flush every 4 hours  Continue Dulcolax suppository daily  Simplify medications until tolerating p.o.  Watch for hypoglycemia

## 2024-09-18 NOTE — ASSESSMENT & PLAN NOTE
Patient had noted some mild swelling  of RUE and associated discomfort (mainly itching and some tenderness primarily along inside of right elbow/forearm with some associated bruising). Per patient she noticed this in hospital prior to arrival to rehab, she thinks in setting of attempted blood draw on the RUE. She does presently have PICC at the RUE site.  Symptoms mild/stable per patient  Clinically seems less likely to be infection/cellulitis, more likely superficial phlebitis  Have started patient on DVT prophylaxis dose heparin in any case while at rehab  Encourage extremity elevation, topical ice as tolerated  topical voltaren  US RUE to r/o DVT - negative  Continue to monitor closely - appears to be gradually improving/fading  Follow up with PCP as appropriate

## 2024-09-18 NOTE — ASSESSMENT & PLAN NOTE
Lab Results   Component Value Date    EGFR 30 09/16/2024    EGFR 46 09/14/2024    EGFR 43 09/13/2024    CREATININE 1.57 (H) 09/16/2024    CREATININE 1.11 09/14/2024    CREATININE 1.18 09/13/2024   Noted to have CINDY inpatient, likely multifactorial related to contrast use, hypotension/sepsis  Evaluated by Nephrology inpatient. Losartan and bumex were held temporarily inpatient.  Baseline Cr around 1.3-1.7, Baseline GFR 30s  Monitor renal function on routine labs - CINDY appears generally improved back to near/within baseline  Avoid nephrotoxins, NSAIDs as able  Encourage PO hydration, respecting volume status  Follow up with PCP, Nephrology as appropriate

## 2024-09-18 NOTE — PROGRESS NOTES
[facility] Care  Facility: [facility]    DISCHARGE SUMMARY  Nursing Home Place of Service: 31: SNF/Short Term Rehab    NAME: Porsha Hoyos  : 1945 AGE: 79 y.o. SEX: female MRN: [id number]  DATE OF ENCOUNTER: 2024    DATE OF ADMISSION: 24 DATE of Transfer: 24 DISCHARGE DISPOSITION: Stable    HPI: Porsha Hoyos is a 79-year-old female with past medical history including HTN, HLD, CAD, PAD, CVA, chronic back pain/spinal stenosis, DM2, hypothyroidism, CKD, GERD, and asthma.     Reason for admission: Patient was hospitalized at [facility] from  to 24 with acute on chronic back pain. Patient found to have sepsis in setting of COVID (required supplemental O2 and treated with 5 days paxlovid).  Found to have 1 of 2 blood cultures positive for MSSA, evaluated by ID, maintained on IV abx transitioned to IV cefazolin with repeat blood cultures negative and echo negative for vegetations though spine imaging concerning for possible osteomyelitis, therefore patient to complete 6 weeks IV cefazolin via PICC (through 10/21/24) for presumed osteomyelitis.  During stay patient also had CINDY considered likely multifactorial in setting of contrast use and hypotension, evaluated by Nephrology and improved with gentle hydration. Patient was also evaluated by Vascular due to findings of celiac and L renal artery stenosis, with no acute intervention indicated. Evaluated by Dermatology for scalp lesion.     Course of stay: Patient was admitted to [facility] for rehabilitation due to physical deconditioning and medical management. Significant events during the stay include: SBO. The patient participated in PT/OT.     Patient being transferred to 7th floor for SBO. Patient is presently NPO, receiving IV antibiotics and IV fluids. Upon exam patient is resting in bed. She reports abdominal tenderness, nausea appears to be improved. Patient  has active bowel sounds and passing gas. Patient breathing is at baseline on room air.  Patient is in no acute distress, denies chest pain, shortness of breath, N/V/D.  Patient is cleared for transfer.    Labs Reviewed  CBC:   Results from Last 12 Months   Lab Units 09/16/24  0546 09/13/24  0356 09/12/24  0520   WBC Thousand/uL 10.39*   < > 7.37   RBC Million/uL 3.17*   < > 3.12*   HEMOGLOBIN g/dL 10.0*   < > 9.9*   HEMATOCRIT % 29.5*   < > 29.5*   MCV fL 93   < > 95   MCH pg 31.5   < > 31.7   MCHC g/dL 33.9   < > 33.6   RDW % 13.8   < > 14.2   MPV fL 10.8   < > 11.8   PLATELETS Thousands/uL 298   < > 114*   NRBC AUTO /100 WBCs  --   --  0   SEGS PCT %  --   --  73   LYMPHO PCT % 14   < > 13*   MONO PCT % 5   < > 12   EOS PCT % 2   < > 1   BASOS PCT % 0   < > 0   TOTAL NEUT ABS Thousands/µL  --   --  5.34   LYMPHS ABS Thousands/µL  --   --  0.95   MONOS ABS Thousand/µL  --   --  0.88   EOS ABS Thousand/uL 0.21   < > 0.08    < > = values in this interval not displayed.     Chemistry Profile:   Results from Last 12 Months   Lab Units 09/16/24  0546 09/10/24  0606 09/09/24  0433 09/08/24  0443 09/07/24  1655 07/10/24  1014   POTASSIUM mmol/L 4.5   < > 3.9 3.8 5.0 5.0   CHLORIDE mmol/L 103   < > 103 108 102 105   CO2 mmol/L 22   < > 25 23 26 29   BUN mg/dL 61*   < > 49* 38* 43* 44*   CREATININE mg/dL 1.57*   < > 2.66* 1.11 1.19 1.30   GLUCOSE FASTING mg/dL  --   --   --  110*  --  101*   GLUCOSE RANDOM mg/dL 196*   < > 150* 110 227*  --    CALCIUM mg/dL 9.7   < > 9.0 8.9 10.1 9.7   MAGNESIUM mg/dL  --   --  2.4 1.7*  --  2.2   PHOSPHORUS mg/dL  --   --   --   --   --  4.6*   AST U/L  --   --   --   --  36 26   ALT U/L  --   --   --   --  30 29   ALK PHOS U/L  --   --   --   --  80 85   EGFR ml/min/1.73sq m 30   < > 16 46 42 38    < > = values in this interval not displayed.       Assessment/Plan:  SBO (small bowel obstruction) ([facility])  9/17 1am patient started with abdominal pain, nausea, vomit x1 brown liquid.  2 small  hard stools in last 4 days   S/p stool softener, senna, Miralax, lactulose, suppository and enema  Patient NPO as of 1am  9/18 Abdominal XRAY: Stomach is air-filled and moderately distended. Multiple dilated small bowel loops with associated differential and broad air/fluid levels, suspicious for small bowel obstruction.   Abdomen firm, tender to palpation, + BS, passing gas  Patient being transferred to [facility] for NG tube placement  Started NS 80ml/hr  Continue Zofran PRN  Continue to monitor for acute changes  Follow up with GI/Surgery as appropriate     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HCC)  Noted on inpatient workup  Evaluated by Vascular inpatient with no acute intervention indicated. Considered unlikely to be contributing to patient's presenting symptoms.  Continue aspirin, statin  Follow up with PCP, Vascular as appropriate    Coronary artery disease involving native coronary artery of native heart without angina pectoris  Noted history  No acute cardiac complaints  Continue regimen including aspirin, statin, beta blocker  PRN nitro  Follow up with PCP, Cardiology as appropriate    Peripheral vascular disease, unspecified (HCC)  Continue statin, aspirin  Follow up with PCP, Vascular as appropriate    Primary hypertension  BP stable, SBP low 100's-150's  Continue to monitor B  No acute cardiac complaints  Avoid hypotension  Continue carvedilol 12.5mg BID, nifedipine ER 60mg daily, losartan 50mg daily (reduced from 100mg as of 9/16), bumex 2mg daily, with hold parameters as appropriate  Follow up with PCP, Cardiology as appropriate      Asthma without status asthmaticus without complication  Chronic history  No acute exacerbation presently. Did transiently have O2 requirement inpatient in setting of COVID, see plan under COVID  Monitor respiratory status - seems stable/baseline per patient, on room air  Continue breathing regimen  Consider respiratory consult if needed  Follow up with PCP, Pulmonology as  appropriate    Drug-induced constipation  Patient endorses chronic constipation  At risk due to hospitalization, relative immobility, comorbidities, chronic opioid  Monitor stool output - last BM 9/15 small/hard requiring straining which likely exacerbates her back pain and discomfort.   9/17 1am patient started with abdominal pain, nausea, vomit x1 brown liquid.  2 small hard stools in last 4 days   S/p stool softener, senna, Miralax, lactulose, suppository and enema  Patient NPO as of 1am  9/18 Abdominal XRAY: Stomach is air-filled and moderately distended. Multiple dilated small bowel loops with associated differential and broad air/fluid levels, suspicious for small bowel obstruction.   Abdomen firm, tender to palpation, + BS, passing gas  Bowel regimen at facility: miralax daily, docusate BID, and senna BID, lactulose BID, adjust as appropriate; bisacodyl suppository PRN.  See plan under SBO  Follow up with GI/surgery as appropriate       Gastroesophageal reflux disease  Stable per patient  Continue famotidine  Recommend OOB with meals, sit upright for at least 30 minutes afterwards, avoid trigger foods  Continue to monitor  Follow up with PCP, GI as appropriate      Hypothyroidism  Recent TSH WNL  Continue levothyroxine  Follow up with PCP, Endocrinology as appropriate     Type 2 diabetes mellitus with diabetic chronic kidney disease (HCC)    Lab Results   Component Value Date    HGBA1C 7.9 (A) 07/02/2024   Monitor glucose - fasting glucose in high 100s, later in the day 200's-400's, with limited data  Avoid hypoglycemia  Continue regimen including sitagliptin 50mg daily, insulin lantus 26u daily  Insulin sliding scale coverage at rehab  Adjust regimen as appropriate with more data  Encourage lifestyle modifications including healthy diet, weight management, exercise as appropriate  Follow up with PCP, Endocrine/Ophthalmology/Podiatry outpatient as appropriate      Osteomyelitis (HCC)  See plan under  sepsis  Had been recommended repeat MRI of spine in several weeks, can coordinate with ID if patient still in-house  Follow up with ID as appropriate     Scalp lesion  Patient noted a scalp lesion present since perhaps 1-2 years. She believes he first noted it after a hair perm appointment.  Questionably source of recent bacteremia?  Dermatology was consulted inpatient and recommended outpatient follow-up  Continue to monitor, continue local care as appropriate  Per patient the lesion has at times bled though she admits to scratching at it at times. She has not noted any acute change to the lesion recently. It is an irregular, scabbed appearing lesion. May require biopsy outpatient.  Patient does not presently feel she needs any topical treatment for it  Follow up with PCP, Dermatology as appropriate    Acute kidney injury superimposed on chronic kidney disease  (HCC)  Lab Results   Component Value Date    EGFR 30 09/16/2024    EGFR 46 09/14/2024    EGFR 43 09/13/2024    CREATININE 1.57 (H) 09/16/2024    CREATININE 1.11 09/14/2024    CREATININE 1.18 09/13/2024   Noted to have CINDY inpatient, likely multifactorial related to contrast use, hypotension/sepsis  Evaluated by Nephrology inpatient. Losartan and bumex were held temporarily inpatient.  Baseline Cr around 1.3-1.7, Baseline GFR 30s  Monitor renal function on routine labs - CINDY appears generally improved back to near/within baseline  Avoid nephrotoxins, NSAIDs as able  Encourage PO hydration, respecting volume status  Follow up with PCP, Nephrology as appropriate    Physical deconditioning  Multifactorial in setting of hospitalization, infections, pain  Continue PT/OT  Maintain fall and safety precautions  Encourage appropriate DME use  SW to follow for safe discharge planning/homecare services as appropriate  Follow up with PCP as appropriate     Anemia  Baseline Hgb seems around 9-11 with limited data  Likely related to CKD, hx of iron deficiency on labs;  likely with acute component related to marrow suppression in setting of acute infection and antibiotics  Hgb 10.1>9.4<10.6  Continue to monitor on routine labs  Monitor for acute bleed - no present signs  Consider further workup, for persistent/worsening  Transfuse PRN Hgb <7  Continue to monitor for acute changes  Follow up with PCP as appropriate     Acute on Chronic Back Pain in the Setting of Sepsis, MSSA Bacteremia  With chronic low back pain (generally L>R with associated intermittent sciatica) with history of spinal stenosis and reported spinal surgery  With recent acute exacerbation outpatient (without associated trauma) leading to present hospitalization  Likely related to sepsis in setting of presumed osteomyelitis per inpatient workup  Monitor pain - acute component gradually improving but not yet to baseline  Current regimen including: tylenol 975mg TID, methocarbamol 750mg TID, tramadol 50mg BID (chronic med), oxycodone 2.5mg q6hr PRN, topical voltaren, gabapentin 100mg TID  Adjust/wean regimen as appropriate. As of 9/16 added topical voltaren. As of 9/17 extensive discussion, patient prefers no opioid titration, titrated methocarbamol and added low dose gabapentin and increased voltaren from BID to QID. Would aim to wean off oxycodone by discharge to avoid mixing multiple opioids in outpatient setting  See plan under sepsis  Follow up with PCP as appropriate     Bilateral lower extremity edema  Seems to be a chronic issue  No clear noted history of CHF however recent echos have generally been limited/difficult studies so unclear what her present EF is  Monitor weight; weights appear to be trending up, although variability in bed scale  Monitor volume status clinically - bilateral LE edema reported stable/chronic, no orthopnea, lungs clear, appears dry.   Encourage elevation, compression of lower extremities as able  Continue bumex with hold parameters  Follow up with PCP, Cardiology as  appropriate      Pain and swelling of right upper extremity  Patient had noted some mild swelling  of RUE and associated discomfort (mainly itching and some tenderness primarily along inside of right elbow/forearm with some associated bruising). Per patient she noticed this in hospital prior to arrival to rehab, she thinks in setting of attempted blood draw on the RUE. She does presently have PICC at the RUE site.  Symptoms mild/stable per patient  Clinically seems less likely to be infection/cellulitis, more likely superficial phlebitis  Have started patient on DVT prophylaxis dose heparin in any case while at rehab  Encourage extremity elevation, topical ice as tolerated  topical voltaren  US RUE to r/o DVT - negative  Continue to monitor closely - appears to be gradually improving/fading  Follow up with PCP as appropriate     COVID  Tested positive on 9/7/24 for COVID  Symptoms earlier on including reported fevers/chills and mild cough, overall improved/resolving - at this time seems recovering well from COVID standpoint  Did require supplemental O2 in hospital temporarily  Was treated with Paxlovid course inpatient  Monitor respiratory status - stable on room air  Supportive care, encourage IS  Patient off isolation as of today  Follow up with PCP as appropriate       Sepsis (HCC): SIRS criteria MSSA Bacteremia and COVID infection  Noted POA to recent hospitalization, likely multifactorial in setting of COVID and concern for MSSA bacteremia and osteomyelitis  Evaluated by ID inpatient  Continue IV cefazolin for 6 week course (through 10/21/24)  Monitor labs while on IV abx in accordance with ID recs  Monitor for acute/recurrent infectious symptoms - no new/acute symptoms, acute sepsis seems resolved with ongoing treatment  Follow up with PCP, ID as appropriate     Review of Systems   Constitutional:  Positive for appetite change. Negative for chills, diaphoresis and fever.   HENT:  Negative for hearing loss  (mild, baseline), rhinorrhea, sore throat and trouble swallowing.    Respiratory:  Negative for cough, chest tightness, shortness of breath (baseline) and wheezing.    Cardiovascular:  Positive for leg swelling. Negative for chest pain and palpitations.   Gastrointestinal:  Positive for abdominal distention, abdominal pain (some supragastric discomfort) and constipation. Negative for blood in stool, diarrhea, nausea and vomiting.   Genitourinary:  Negative for difficulty urinating, dysuria, flank pain and hematuria.   Musculoskeletal:  Positive for back pain and gait problem. Negative for arthralgias and neck pain.   Neurological:  Positive for weakness. Negative for dizziness, facial asymmetry, speech difficulty, light-headedness and headaches.   All other systems reviewed and are negative.      Vital Signs:   Vitals:   Vitals:    09/18/24 0919   BP: 155/66   Pulse: 86   Resp: 19   Temp: (!) 97 °F (36.1 °C)   SpO2: 95%     Exam:   Physical Exam  Vitals and nursing note reviewed.   Constitutional:       General: She is not in acute distress.     Appearance: She is not toxic-appearing or diaphoretic.   HENT:      Head: Normocephalic and atraumatic.      Right Ear: External ear normal.      Left Ear: External ear normal.      Nose: Nose normal. No congestion or rhinorrhea.      Mouth/Throat:      Mouth: Mucous membranes are dry.   Eyes:      General: No scleral icterus.        Right eye: No discharge.         Left eye: No discharge.      Conjunctiva/sclera: Conjunctivae normal.   Cardiovascular:      Rate and Rhythm: Normal rate and regular rhythm.   Pulmonary:      Effort: Pulmonary effort is normal. No respiratory distress.      Breath sounds: Normal breath sounds. No wheezing, rhonchi or rales.   Abdominal:      General: Bowel sounds are normal. There is distension.      Tenderness: There is abdominal tenderness. There is no guarding or rebound.   Musculoskeletal:         General: Swelling present.      Right lower  leg: Edema present.      Left lower leg: Edema present.      Comments: Trace BLE edema  Trace RUE swelling compared to LUE  PICC in place at RUE. Some mild surrounding bruising and some mild bruising/rash at inside of Right elbow region, minimally tender, fading from prior     Skin:     General: Skin is warm and dry.      Comments: Top of scalp centrally with ~1 inch diameter irregular scabbed lesion   Neurological:      General: No focal deficit present.      Mental Status: She is alert and oriented to person, place, and time. Mental status is at baseline.      Motor: Weakness present.      Gait: Gait abnormal.   Psychiatric:         Mood and Affect: Mood normal.         Behavior: Behavior normal.         Thought Content: Thought content normal.         Judgment: Judgment normal.       Discharge Medications: See discharge medication list which was reviewed and signed.    Status at time of discharge: Stable    Discussion with patient/family as appropriate and further instructions:  -Fall precautions  -Aspiration precautions  -Bleeding precautions  -Monitor for signs/symptoms of infection  -Medication list was reviewed and signed  -DME form was completed    Follow-up Recommendations: Please follow-up with your primary care physician within 7-10 days of discharge to review medication changes and current status.     Problem List Follow-up Recommendations:      -Total time spent on this encounter today including documentation and workup review, face to face time, history and exam, and documentation/orders was approximately 50 minutes.  -This note will be copied to [facility] EMR where vitals and medication orders are placed.    SUDHEER Rico  Geriatric Medicine  9/18/2024 11:59 AM

## 2024-09-18 NOTE — ASSESSMENT & PLAN NOTE
With chronic low back pain (generally L>R with associated intermittent sciatica) with history of spinal stenosis and reported spinal surgery  With recent acute exacerbation outpatient (without associated trauma) leading to present hospitalization  Likely related to sepsis in setting of presumed osteomyelitis per inpatient workup  Monitor pain - acute component gradually improving but not yet to baseline  Current regimen including: tylenol 975mg TID, methocarbamol 750mg TID, tramadol 50mg BID (chronic med), oxycodone 2.5mg q6hr PRN, topical voltaren, gabapentin 100mg TID  Adjust/wean regimen as appropriate. As of 9/16 added topical voltaren. As of 9/17 extensive discussion, patient prefers no opioid titration, titrated methocarbamol and added low dose gabapentin and increased voltaren from BID to QID. Would aim to wean off oxycodone by discharge to avoid mixing multiple opioids in outpatient setting  See plan under sepsis  Follow up with PCP as appropriate

## 2024-09-18 NOTE — PLAN OF CARE
Problem: Prexisting or High Potential for Compromised Skin Integrity  Goal: Skin integrity is maintained or improved  Description: INTERVENTIONS:  - Identify patients at risk for skin breakdown  - Assess and monitor skin integrity  - Assess and monitor nutrition and hydration status  - Monitor labs   - Assess for incontinence   - Turn and reposition patient  - Assist with mobility/ambulation  - Relieve pressure over bony prominences  - Avoid friction and shearing  - Provide appropriate hygiene as needed including keeping skin clean and dry  - Evaluate need for skin moisturizer/barrier cream  - Collaborate with interdisciplinary team   - Patient/family teaching  - Consider wound care consult   Outcome: Progressing    Problem: PAIN - ADULT  Goal: Verbalizes/displays adequate comfort level or baseline comfort level  Description: Interventions:  - Encourage patient to monitor pain and request assistance  - Assess pain using appropriate pain scale  - Administer analgesics based on type and severity of pain and evaluate response  - Implement non-pharmacological measures as appropriate and evaluate response  - Consider cultural and social influences on pain and pain management  - Notify physician/advanced practitioner if interventions unsuccessful or patient reports new pain  Outcome: Progressing     Problem: GASTROINTESTINAL - ADULT  Goal: Minimal or absence of nausea and/or vomiting  Description: INTERVENTIONS:  - Administer IV fluids if ordered to ensure adequate hydration  - Maintain NPO status until nausea and vomiting are resolved  - Nasogastric tube if ordered  - Administer ordered antiemetic medications as needed  - Provide nonpharmacologic comfort measures as appropriate  - Advance diet as tolerated, if ordered  - Consider nutrition services referral to assist patient with adequate nutrition and appropriate food choices  Outcome: Progressing     Problem: GASTROINTESTINAL - ADULT  Goal: Maintains or returns to  baseline bowel function  Description: INTERVENTIONS:  - Assess bowel function  - Encourage oral fluids to ensure adequate hydration  - Administer IV fluids if ordered to ensure adequate hydration  - Administer ordered medications as needed  - Encourage mobilization and activity  - Consider nutritional services referral to assist patient with adequate nutrition and appropriate food choices  Outcome: Progressing     Problem: GASTROINTESTINAL - ADULT  Goal: Maintains adequate nutritional intake  Description: INTERVENTIONS:  - Monitor percentage of each meal consumed  - Identify factors contributing to decreased intake, treat as appropriate  - Assist with meals as needed  - Monitor I&O, weight, and lab values if indicated  - Obtain nutrition services referral as needed  Outcome: Progressing     Problem: METABOLIC, FLUID AND ELECTROLYTES - ADULT  Goal: Electrolytes maintained within normal limits  Description: INTERVENTIONS:  - Monitor labs and assess patient for signs and symptoms of electrolyte imbalances  - Administer electrolyte replacement as ordered  - Monitor response to electrolyte replacements, including repeat lab results as appropriate  - Instruct patient on fluid and nutrition as appropriate  Outcome: Progressing     Problem: Knowledge Deficit  Goal: Patient/family/caregiver demonstrates understanding of disease process, treatment plan, medications, and discharge instructions  Description: Complete learning assessment and assess knowledge base.  Interventions:  - Provide teaching at level of understanding  - Provide teaching via preferred learning methods  Outcome: Progressing     Problem: DISCHARGE PLANNING  Goal: Discharge to home or other facility with appropriate resources  Description: INTERVENTIONS:  - Identify barriers to discharge w/patient and caregiver  - Arrange for needed discharge resources and transportation as appropriate  - Identify discharge learning needs (meds, wound care, etc.)  - Arrange  for interpretive services to assist at discharge as needed  - Refer to Case Management Department for coordinating discharge planning if the patient needs post-hospital services based on physician/advanced practitioner order or complex needs related to functional status, cognitive ability, or social support system  Outcome: Progressing

## 2024-09-18 NOTE — ASSESSMENT & PLAN NOTE
Seems to be a chronic issue  No clear noted history of CHF however recent echos have generally been limited/difficult studies so unclear what her present EF is  Monitor weight; weights appear to be trending up, although variability in bed scale  Monitor volume status clinically - bilateral LE edema reported stable/chronic, no orthopnea, lungs clear, appears dry.   Encourage elevation, compression of lower extremities as able  Continue bumex with hold parameters  Follow up with PCP, Cardiology as appropriate

## 2024-09-18 NOTE — ASSESSMENT & PLAN NOTE
Lab Results   Component Value Date    HGBA1C 7.9 (A) 07/02/2024   Monitor glucose - fasting glucose in high 100s, later in the day 200's-400's, with limited data  Avoid hypoglycemia  Continue regimen including sitagliptin 50mg daily, insulin lantus 26u daily  Insulin sliding scale coverage at rehab  Adjust regimen as appropriate with more data  Encourage lifestyle modifications including healthy diet, weight management, exercise as appropriate  Follow up with PCP, Endocrine/Ophthalmology/Podiatry outpatient as appropriate

## 2024-09-18 NOTE — ASSESSMENT & PLAN NOTE
BP stable, SBP low 100's-150's  Continue to monitor B  No acute cardiac complaints  Avoid hypotension  Continue carvedilol 12.5mg BID, nifedipine ER 60mg daily, losartan 50mg daily (reduced from 100mg as of 9/16), bumex 2mg daily, with hold parameters as appropriate  Follow up with PCP, Cardiology as appropriate

## 2024-09-18 NOTE — ASSESSMENT & PLAN NOTE
L3-4 discitis/osteomyelitis in the setting of MSSA bacteremia  Continue IV cefazolin 2 g every 12 hours until 10/21/2024  Anticipate return to [facility] when medically stable
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
## 2024-09-18 NOTE — ASSESSMENT & PLAN NOTE
Discitis/osteomyelitis of the lumbar spine in the setting of bacteremia   Continue IV cefazolin 2 g every 12 until 10/21/2024, 6 weeks total  ID follow-up  Per discussion with case management and physical therapy, she will need another formal PT OT evaluation and authorization before going back to [facility]

## 2024-09-18 NOTE — ASSESSMENT & PLAN NOTE
See plan under sepsis  Had been recommended repeat MRI of spine in several weeks, can coordinate with ID if patient still in-house  Follow up with ID as appropriate

## 2024-09-18 NOTE — ASSESSMENT & PLAN NOTE
Tested positive on 9/7/24 for COVID  Symptoms earlier on including reported fevers/chills and mild cough, overall improved/resolving - at this time seems recovering well from COVID standpoint  Did require supplemental O2 in hospital temporarily  Was treated with Paxlovid course inpatient  Monitor respiratory status - stable on room air  Supportive care, encourage IS  Patient off isolation as of today  Follow up with PCP as appropriate
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
## 2024-09-18 NOTE — ASSESSMENT & PLAN NOTE
She developed abdominal pain and vomiting while at [facility]  Obstruction series x-ray confirmed moderately distended air-filled stomach with multiple dilated small bowel loops suspicious for small bowel obstruction  She was transferred to the seventh floor tower for NG tube placement and medical/surgical management  Simplify medications and placed on n.p.o.

## 2024-09-18 NOTE — ASSESSMENT & PLAN NOTE
9/17 1am patient started with abdominal pain, nausea, vomit x1 brown liquid.  2 small hard stools in last 4 days   S/p stool softener, senna, Miralax, lactulose, suppository and enema  Patient NPO as of 1am  9/18 Abdominal XRAY: Stomach is air-filled and moderately distended. Multiple dilated small bowel loops with associated differential and broad air/fluid levels, suspicious for small bowel obstruction.   Abdomen firm, tender to palpation, + BS, passing gas  Patient being transferred to [facility] for NG tube placement  Started NS 80ml/hr  Continue Zofran PRN  Continue to monitor for acute changes  Follow up with GI/Surgery as appropriate

## 2024-09-19 ENCOUNTER — APPOINTMENT (INPATIENT)
Dept: RADIOLOGY | Facility: HOSPITAL | Age: 79
DRG: 178 | End: 2024-09-19
Payer: COMMERCIAL

## 2024-09-19 PROBLEM — M79.89 SWELLING OF FOREARM: Status: ACTIVE | Noted: 2024-09-19

## 2024-09-19 LAB
ANION GAP SERPL CALCULATED.3IONS-SCNC: 11 MMOL/L (ref 4–13)
BUN SERPL-MCNC: 56 MG/DL (ref 5–25)
CALCIUM SERPL-MCNC: 9.3 MG/DL (ref 8.4–10.2)
CHLORIDE SERPL-SCNC: 109 MMOL/L (ref 96–108)
CO2 SERPL-SCNC: 20 MMOL/L (ref 21–32)
CREAT SERPL-MCNC: 1.09 MG/DL (ref 0.6–1.3)
ERYTHROCYTE [DISTWIDTH] IN BLOOD BY AUTOMATED COUNT: 14.2 % (ref 11.6–15.1)
GFR SERPL CREATININE-BSD FRML MDRD: 47 ML/MIN/1.73SQ M
GLUCOSE SERPL-MCNC: 111 MG/DL (ref 65–140)
GLUCOSE SERPL-MCNC: 112 MG/DL (ref 65–140)
GLUCOSE SERPL-MCNC: 216 MG/DL (ref 65–140)
GLUCOSE SERPL-MCNC: 97 MG/DL (ref 65–140)
GLUCOSE SERPL-MCNC: 97 MG/DL (ref 65–140)
HCT VFR BLD AUTO: 27.7 % (ref 34.8–46.1)
HGB BLD-MCNC: 8.9 G/DL (ref 11.5–15.4)
MCH RBC QN AUTO: 32.4 PG (ref 26.8–34.3)
MCHC RBC AUTO-ENTMCNC: 32.1 G/DL (ref 31.4–37.4)
MCV RBC AUTO: 101 FL (ref 82–98)
PLATELET # BLD AUTO: 381 THOUSANDS/UL (ref 149–390)
PMV BLD AUTO: 10.5 FL (ref 8.9–12.7)
POTASSIUM SERPL-SCNC: 4.9 MMOL/L (ref 3.5–5.3)
RBC # BLD AUTO: 2.75 MILLION/UL (ref 3.81–5.12)
SODIUM SERPL-SCNC: 140 MMOL/L (ref 135–147)
WBC # BLD AUTO: 13.1 THOUSAND/UL (ref 4.31–10.16)

## 2024-09-19 PROCEDURE — 82948 REAGENT STRIP/BLOOD GLUCOSE: CPT

## 2024-09-19 PROCEDURE — 80048 BASIC METABOLIC PNL TOTAL CA: CPT | Performed by: INTERNAL MEDICINE

## 2024-09-19 PROCEDURE — 99231 SBSQ HOSP IP/OBS SF/LOW 25: CPT | Performed by: SPECIALIST

## 2024-09-19 PROCEDURE — 97167 OT EVAL HIGH COMPLEX 60 MIN: CPT

## 2024-09-19 PROCEDURE — 97163 PT EVAL HIGH COMPLEX 45 MIN: CPT

## 2024-09-19 PROCEDURE — 74022 RADEX COMPL AQT ABD SERIES: CPT

## 2024-09-19 PROCEDURE — 99232 SBSQ HOSP IP/OBS MODERATE 35: CPT | Performed by: INTERNAL MEDICINE

## 2024-09-19 PROCEDURE — 85027 COMPLETE CBC AUTOMATED: CPT | Performed by: INTERNAL MEDICINE

## 2024-09-19 RX ORDER — DEXTROSE MONOHYDRATE AND SODIUM CHLORIDE 5; .9 G/100ML; G/100ML
100 INJECTION, SOLUTION INTRAVENOUS CONTINUOUS
Status: DISCONTINUED | OUTPATIENT
Start: 2024-09-19 | End: 2024-09-19

## 2024-09-19 RX ADMIN — SODIUM CHLORIDE 100 ML/HR: 0.9 INJECTION, SOLUTION INTRAVENOUS at 09:02

## 2024-09-19 RX ADMIN — HEPARIN SODIUM 5000 UNITS: 5000 INJECTION INTRAVENOUS; SUBCUTANEOUS at 09:03

## 2024-09-19 RX ADMIN — DEXTROSE AND SODIUM CHLORIDE 100 ML/HR: 5; .9 INJECTION, SOLUTION INTRAVENOUS at 16:46

## 2024-09-19 RX ADMIN — Medication 2 G: at 09:03

## 2024-09-19 RX ADMIN — Medication 2 G: at 13:44

## 2024-09-19 RX ADMIN — BUDESONIDE AND FORMOTEROL FUMARATE DIHYDRATE 2 PUFF: 160; 4.5 AEROSOL RESPIRATORY (INHALATION) at 17:25

## 2024-09-19 RX ADMIN — ALBUTEROL SULFATE 2 PUFF: 90 AEROSOL, METERED RESPIRATORY (INHALATION) at 17:25

## 2024-09-19 RX ADMIN — CEFAZOLIN SODIUM 2000 MG: 2 SOLUTION INTRAVENOUS at 05:42

## 2024-09-19 RX ADMIN — Medication 2 G: at 21:44

## 2024-09-19 RX ADMIN — LIDOCAINE 1 PATCH: 700 PATCH TOPICAL at 09:09

## 2024-09-19 RX ADMIN — ALBUTEROL SULFATE 2 PUFF: 90 AEROSOL, METERED RESPIRATORY (INHALATION) at 21:44

## 2024-09-19 RX ADMIN — CEFAZOLIN SODIUM 2000 MG: 2 SOLUTION INTRAVENOUS at 17:33

## 2024-09-19 RX ADMIN — INSULIN LISPRO 1 UNITS: 100 INJECTION, SOLUTION INTRAVENOUS; SUBCUTANEOUS at 19:09

## 2024-09-19 RX ADMIN — Medication 2 G: at 17:25

## 2024-09-19 RX ADMIN — ALBUTEROL SULFATE 2 PUFF: 90 AEROSOL, METERED RESPIRATORY (INHALATION) at 13:43

## 2024-09-19 RX ADMIN — BISACODYL 10 MG: 10 SUPPOSITORY RECTAL at 09:25

## 2024-09-19 RX ADMIN — BUDESONIDE AND FORMOTEROL FUMARATE DIHYDRATE 2 PUFF: 160; 4.5 AEROSOL RESPIRATORY (INHALATION) at 09:03

## 2024-09-19 RX ADMIN — HEPARIN SODIUM 5000 UNITS: 5000 INJECTION INTRAVENOUS; SUBCUTANEOUS at 21:44

## 2024-09-19 RX ADMIN — HEPARIN SODIUM 5000 UNITS: 5000 INJECTION INTRAVENOUS; SUBCUTANEOUS at 17:06

## 2024-09-19 RX ADMIN — ALBUTEROL SULFATE 2 PUFF: 90 AEROSOL, METERED RESPIRATORY (INHALATION) at 09:03

## 2024-09-19 NOTE — PLAN OF CARE
Problem: PHYSICAL THERAPY ADULT  Goal: Performs mobility at highest level of function for planned discharge setting.  See evaluation for individualized goals.  Description: Treatment/Interventions: ADL retraining, Functional transfer training, Therapeutic exercise, LE strengthening/ROM, Elevations, Endurance training, Patient/family training, Equipment eval/education, Bed mobility, Gait training, Spoke to nursing, Spoke to case management, OT  Equipment Recommended: Walker       See flowsheet documentation for full assessment, interventions and recommendations.  Outcome: Progressing  Note: Prognosis: Good  Problem List: Decreased strength, Decreased endurance, Impaired balance, Decreased mobility, Decreased skin integrity, Pain, Decreased range of motion, Decreased coordination, Impaired sensation, Obesity     Barriers to Discharge: Inaccessible home environment, Decreased caregiver support     Rehab Resource Intensity Level, PT: II (Moderate Resource Intensity)    See flowsheet documentation for full assessment.

## 2024-09-19 NOTE — PROGRESS NOTES
Patient seen and examined.    2 large BMs overnight.  Belly less distended.    NG tube with minimal drainage    Obstruction series improved.    Impression: Ileus versus partial SBO versus constipation overall improved.    Plan: Clamp NG tube for 2 hours.  If tolerates DC NG tube and start clear liquids.  Discussed with internal medicine.  Agree.

## 2024-09-19 NOTE — PLAN OF CARE
Problem: GASTROINTESTINAL - ADULT  Goal: Maintains or returns to baseline bowel function  Description: INTERVENTIONS:  - Assess bowel function  - Encourage oral fluids to ensure adequate hydration  - Administer IV fluids if ordered to ensure adequate hydration  - Administer ordered medications as needed  - Encourage mobilization and activity  - Consider nutritional services referral to assist patient with adequate nutrition and appropriate food choices  Outcome: Progressing     Problem: GASTROINTESTINAL - ADULT  Goal: Oral mucous membranes remain intact  Description: INTERVENTIONS  - Assess oral mucosa and hygiene practices  - Implement preventative oral hygiene regimen  - Implement oral medicated treatments as ordered  - Initiate Nutrition services referral as needed  Outcome: Progressing     Problem: METABOLIC, FLUID AND ELECTROLYTES - ADULT  Goal: Glucose maintained within target range  Description: INTERVENTIONS:  - Monitor Blood Glucose as ordered  - Assess for signs and symptoms of hyperglycemia and hypoglycemia  - Administer ordered medications to maintain glucose within target range  - Assess nutritional intake and initiate nutrition service referral as needed  Outcome: Progressing

## 2024-09-19 NOTE — ASSESSMENT & PLAN NOTE
Right forearm swelling with right upper arm PICC in place.  The PICC itself is functioning properly  She had an upper extremity venous Doppler on 9/16/2024 which was negative for DVT  There is no distal discoloration or pain  Right forearm swelling may just be mechanical due to the presence of PICC  Discussed with patient and nurse.  Will apply 1 or 2 pillows underneath the right forearm and continue observation

## 2024-09-19 NOTE — PLAN OF CARE
Problem: OCCUPATIONAL THERAPY ADULT  Goal: Performs self-care activities at highest level of function for planned discharge setting.  See evaluation for individualized goals.  Description: Treatment Interventions: ADL retraining, Functional transfer training, UE strengthening/ROM, Endurance training, Continued evaluation, Energy conservation        See flowsheet documentation for full assessment, interventions and recommendations.   Outcome: Progressing  Note: Limitation: Decreased ADL status, Decreased UE ROM, Decreased endurance, Decreased UE strength  Prognosis: Good  Assessment: See note.     Rehab Resource Intensity Level, OT: II (Moderate Resource Intensity)

## 2024-09-19 NOTE — PROGRESS NOTES
Progress Note - Hospitalist   Name: Porsha Hoyos 79 y.o. female I MRN: [id number]  Unit/Bed#: 7T [facility] 717-01 I Date of Admission: 9/18/2024   Date of Service: 9/19/2024 I Hospital Day: 1    Assessment & Plan  SBO (small bowel obstruction) ([facility])  Small bowel obstruction versus ileus in the setting of constipation  She had at least 2 significant bowel movements overnight  Abdomen is less distended  We will repeat another obstruction series x-ray today and if improved, NGT will be either clamped or discontinued  Continue bowel regimen  Advance diet and resume oral regimen when small bowel obstruction is resolved.  Discussed with Dr. Brush  Osteomyelitis ([facility])  Discitis/osteomyelitis of the lumbar spine in the setting of bacteremia   Continue IV cefazolin 2 g every 12 until 10/21/2024, 6 weeks total  ID follow-up  Per discussion with case management and physical therapy, she will need another formal PT OT evaluation and authorization before going back to [facility]    Swelling of forearm  Right forearm swelling with right upper arm PICC in place.  The PICC itself is functioning properly  She had an upper extremity venous Doppler on 9/16/2024 which was negative for DVT  There is no distal discoloration or pain  Right forearm swelling may just be mechanical due to the presence of PICC  Discussed with patient and nurse.  Will apply 1 or 2 pillows underneath the right forearm and continue observation  Mixed hyperlipidemia  Resume Lipitor when tolerating p.o.  Type 2 diabetes mellitus with diabetic chronic kidney disease (HCC)  Lab Results   Component Value Date    HGBA1C 7.9 (A) 07/02/2024     Blood sugar stable.  Resume Lantus when tolerating p.o.  Continue sliding scale insulin for now  Acute kidney injury superimposed on chronic kidney disease  (HCC)    Mild CINDY with creatinine 1.57 on 9/16/2024  Resolved with creatinine down to 1.09 and back to baseline  Resume diet once SBO has resolved  Avoid hypotension  Avoid nephrotoxic  agents    VTE Pharmacologic Prophylaxis: VTE Score: 3 Moderate Risk (Score 3-4) - Pharmacological DVT Prophylaxis Ordered: heparin.    Patient Centered Rounds: I performed bedside rounds with nursing staff today.   Discussions with Specialists or Other Care Team Provider: Case management      Current Length of Stay: 1 day(s)  Current Patient Status: Inpatient   Certification Statement: The patient will continue to require additional inpatient hospital stay due to SBO  Discharge Plan: Anticipate discharge in 24-48 hrs to rehab facility.    Code Status: Level 1 - Full Code    Subjective   Seen and examined during rounds.  Discussed with her nurse.  She reportedly had at least 2 large bowel movements overnight  There is less output from her NG tube  She mentioned that her right forearm has been swollen since the PICC line was placed on 2024.  It is not painful and there is no discoloration.  Has normal movements.  The PICC line is functioning properly.  She had a venous Doppler on 2024 which was negative for DVT    Objective     Vitals:   Temp (24hrs), Av.4 °F (36.3 °C), Min:97.3 °F (36.3 °C), Max:97.6 °F (36.4 °C)    Temp:  [97.3 °F (36.3 °C)-97.6 °F (36.4 °C)] 97.3 °F (36.3 °C)  HR:  [86-89] 89  Resp:  [18] 18  BP: (165-169)/(64-68) 166/68  SpO2:  [94 %-96 %] 94 %  Body mass index is 30.77 kg/m².     Input and Output Summary (last 24 hours):     Intake/Output Summary (Last 24 hours) at 2024 1029  Last data filed at 2024 0902  Gross per 24 hour   Intake 1571.67 ml   Output 160 ml   Net 1411.67 ml       Physical Exam  Vitals reviewed.   HENT:      Head: Normocephalic and atraumatic.      Nose: No congestion or rhinorrhea.      Comments: NGT in place  Eyes:      General: No scleral icterus.  Cardiovascular:      Rate and Rhythm: Normal rate and regular rhythm.   Pulmonary:      Breath sounds: No wheezing or rhonchi.   Abdominal:      Palpations: Abdomen is soft.      Tenderness: There is no  abdominal tenderness. There is no guarding.      Comments: Abdomen is less distended compared to yesterday   Musculoskeletal:      Comments: Right upper arm PICC  Right forearm is bigger compared to the left   Skin:     General: Skin is warm and dry.   Neurological:      Comments: Awake alert.   Psychiatric:         Mood and Affect: Mood normal.         Behavior: Behavior normal.        Lines/Drains:  Lines/Drains/Airways       Active Status       Name Placement date Placement time Site Days    PICC Line 09/13/24 Right Brachial 09/13/24  1222  Brachial  5    NG/OG/Enteral Tube 16 Fr Right nare 09/18/24  1615  Right nare  less than 1                    Central Line:  Goal for removal: N/A - Discharging with PICC for IV ABX/medications               Lab Results: I have reviewed the following results:    Results from last 7 days   Lab Units 09/19/24  0534 09/16/24  0546   WBC Thousand/uL 13.10* 10.39*   HEMOGLOBIN g/dL 8.9* 10.0*   HEMATOCRIT % 27.7* 29.5*   PLATELETS Thousands/uL 381 298   BANDS PCT %  --  1   LYMPHO PCT %  --  14   MONO PCT %  --  5   EOS PCT %  --  2     Results from last 7 days   Lab Units 09/19/24  0534   SODIUM mmol/L 140   POTASSIUM mmol/L 4.9   CHLORIDE mmol/L 109*   CO2 mmol/L 20*   BUN mg/dL 56*   CREATININE mg/dL 1.09   ANION GAP mmol/L 11   CALCIUM mg/dL 9.3   GLUCOSE RANDOM mg/dL 97         Results from last 7 days   Lab Units 09/19/24  0535 09/19/24  0003 09/18/24  1748 09/18/24  1100 09/18/24  0533 09/17/24  2119 09/17/24  1154 09/17/24  0603 09/16/24  2010 09/16/24  1644 09/16/24  1136 09/16/24  0607   POC GLUCOSE mg/dl 97 111 113 137 145* 181* 231* 145* 218* 261* 252* 196*               Recent Cultures (last 7 days):         Imaging Review: Reviewed radiology reports from this admission including: xray(s) and Ultrasound(s).      Last 24 Hours Medication List:     Current Facility-Administered Medications:     albuterol (PROVENTIL HFA,VENTOLIN HFA) inhaler 2 puff, 4x Daily    bisacodyl  (DULCOLAX) rectal suppository 10 mg, Daily    budesonide-formoterol (SYMBICORT) 160-4.5 mcg/act inhaler 2 puff, BID    ceFAZolin (ANCEF) IVPB (premix in dextrose) 2,000 mg 50 mL, Q12H, Last Rate: 2,000 mg (09/19/24 0542)    Diclofenac Sodium (VOLTAREN) 1 % topical gel 2 g, 4x Daily    heparin (porcine) subcutaneous injection 5,000 Units, TID    insulin lispro (HumALOG/ADMELOG) 100 units/mL subcutaneous injection 1-5 Units, Q6H KORI **AND** Fingerstick Glucose (POCT), Q6H    lidocaine (LIDODERM) 5 % patch 1 patch, Daily    sodium chloride 0.9 % infusion, Continuous, Last Rate: 100 mL/hr (09/19/24 0902)    Administrative Statements   Today, Patient Was Seen By: Jared Gomez MD      **Please Note: This note may have been constructed using a voice recognition system.**

## 2024-09-19 NOTE — PLAN OF CARE
Problem: Prexisting or High Potential for Compromised Skin Integrity  Goal: Skin integrity is maintained or improved  Description: INTERVENTIONS:  - Identify patients at risk for skin breakdown  - Assess and monitor skin integrity  - Assess and monitor nutrition and hydration status  - Monitor labs   - Assess for incontinence   - Turn and reposition patient  - Assist with mobility/ambulation  - Relieve pressure over bony prominences  - Avoid friction and shearing  - Provide appropriate hygiene as needed including keeping skin clean and dry  - Evaluate need for skin moisturizer/barrier cream  - Collaborate with interdisciplinary team   - Patient/family teaching  - Consider wound care consult   Outcome: Progressing     Problem: PAIN - ADULT  Goal: Verbalizes/displays adequate comfort level or baseline comfort level  Description: Interventions:  - Encourage patient to monitor pain and request assistance  - Assess pain using appropriate pain scale  - Administer analgesics based on type and severity of pain and evaluate response  - Implement non-pharmacological measures as appropriate and evaluate response  - Consider cultural and social influences on pain and pain management  - Notify physician/advanced practitioner if interventions unsuccessful or patient reports new pain  Outcome: Progressing     Problem: INFECTION - ADULT  Goal: Absence or prevention of progression during hospitalization  Description: INTERVENTIONS:  - Assess and monitor for signs and symptoms of infection  - Monitor lab/diagnostic results  - Monitor all insertion sites, i.e. indwelling lines, tubes, and drains  - Monitor endotracheal if appropriate and nasal secretions for changes in amount and color  - [place] appropriate cooling/warming therapies per order  - Administer medications as ordered  - Instruct and encourage patient and family to use good hand hygiene technique  - Identify and instruct in appropriate isolation precautions for  identified infection/condition  Outcome: Progressing     Problem: SAFETY ADULT  Goal: Patient will remain free of falls  Description: INTERVENTIONS:  - Educate patient/family on patient safety including physical limitations  - Instruct patient to call for assistance with activity   - Consult OT/PT to assist with strengthening/mobility   - Keep Call bell within reach  - Keep bed low and locked with side rails adjusted as appropriate  - Keep care items and personal belongings within reach  - Initiate and maintain comfort rounds  - Make Fall Risk Sign visible to staff  - Apply yellow socks and bracelet for high fall risk patients  - Consider moving patient to room near nurses station  Outcome: Progressing  Goal: Maintain or return to baseline ADL function  Description: INTERVENTIONS:  -  Assess patient's ability to carry out ADLs; assess patient's baseline for ADL function and identify physical deficits which impact ability to perform ADLs (bathing, care of mouth/teeth, toileting, grooming, dressing, etc.)  - Assess/evaluate cause of self-care deficits   - Assess range of motion  - Assess patient's mobility; develop plan if impaired  - Assess patient's need for assistive devices and provide as appropriate  - Encourage maximum independence but intervene and supervise when necessary  - Involve family in performance of ADLs  - Assess for home care needs following discharge   - Consider OT consult to assist with ADL evaluation and planning for discharge  - Provide patient education as appropriate  Outcome: Progressing  Goal: Maintains/Returns to pre admission functional level  Description: INTERVENTIONS:  - Perform AM-PAC 6 Click Basic Mobility/ Daily Activity assessment daily.  - Set and communicate daily mobility goal to care team and patient/family/caregiver.   - Collaborate with rehabilitation services on mobility goals if consulted  -   Problem: SAFETY ADULT  Goal: Patient will remain free of falls  Description:  INTERVENTIONS:  - Educate patient/family on patient safety including physical limitations  - Instruct patient to call for assistance with activity   - Consult OT/PT to assist with strengthening/mobility   - Keep Call bell within reach  - Keep bed low and locked with side rails adjusted as appropriate  - Keep care items and personal belongings within reach  - Initiate and maintain comfort rounds  - Make Fall Risk Sign visible to staff  - Apply yellow socks and bracelet for high fall risk patients  - Consider moving patient to room near nurses station  Outcome: Progressing  Goal: Maintain or return to baseline ADL function  Description: INTERVENTIONS:  -  Assess patient's ability to carry out ADLs; assess patient's baseline for ADL function and identify physical deficits which impact ability to perform ADLs (bathing, care of mouth/teeth, toileting, grooming, dressing, etc.)  - Assess/evaluate cause of self-care deficits   - Assess range of motion  - Assess patient's mobility; develop plan if impaired  - Assess patient's need for assistive devices and provide as appropriate  - Encourage maximum independence but intervene and supervise when necessary  - Involve family in performance of ADLs  - Assess for home care needs following discharge   - Consider OT consult to assist with ADL evaluation and planning for discharge  - Provide patient education as appropriate  Outcome: Progressing  Goal: Maintains/Returns to pre admission functional level  Description: INTERVENTIONS:  - Perform AM-PAC 6 Click Basic Mobility/ Daily Activity assessment daily.  - Set and communicate daily mobility goal to care team and patient/family/caregiver.   - Collaborate with rehabilitation services on mobility goals if consulted    Problem: DISCHARGE PLANNING  Goal: Discharge to home or other facility with appropriate resources  Description: INTERVENTIONS:  - Identify barriers to discharge w/patient and caregiver  - Arrange for needed discharge  resources and transportation as appropriate  - Identify discharge learning needs (meds, wound care, etc.)  - Arrange for interpretive services to assist at discharge as needed  - Refer to Case Management Department for coordinating discharge planning if the patient needs post-hospital services based on physician/advanced practitioner order or complex needs related to functional status, cognitive ability, or social support system  Outcome: Progressing     Problem: Knowledge Deficit  Goal: Patient/family/caregiver demonstrates understanding of disease process, treatment plan, medications, and discharge instructions  Description: Complete learning assessment and assess knowledge base.  Interventions:  - Provide teaching at level of understanding  - Provide teaching via preferred learning methods  Outcome: Progressing     Problem: GASTROINTESTINAL - ADULT  Goal: Minimal or absence of nausea and/or vomiting  Description: INTERVENTIONS:  - Administer IV fluids if ordered to ensure adequate hydration  - Maintain NPO status until nausea and vomiting are resolved  - Nasogastric tube if ordered  - Administer ordered antiemetic medications as needed  - Provide nonpharmacologic comfort measures as appropriate  - Advance diet as tolerated, if ordered  - Consider nutrition services referral to assist patient with adequate nutrition and appropriate food choices  Outcome: Progressing  Goal: Maintains or returns to baseline bowel function  Description: INTERVENTIONS:  - Assess bowel function  - Encourage oral fluids to ensure adequate hydration  - Administer IV fluids if ordered to ensure adequate hydration  - Administer ordered medications as needed  - Encourage mobilization and activity  - Consider nutritional services referral to assist patient with adequate nutrition and appropriate food choices  Outcome: Progressing  Goal: Maintains adequate nutritional intake  Description: INTERVENTIONS:  - Monitor percentage of each meal  consumed  - Identify factors contributing to decreased intake, treat as appropriate  - Assist with meals as needed  - Monitor I&O, weight, and lab values if indicated  - Obtain nutrition services referral as needed  Outcome: Progressing  Goal: Establish and maintain optimal ostomy function  Description: INTERVENTIONS:  - Assess bowel function  - Encourage oral fluids to ensure adequate hydration  - Administer IV fluids if ordered to ensure adequate hydration   - Administer ordered medications as needed  - Encourage mobilization and activity  - Nutrition services referral to assist patient with appropriate food choices  - Assess stoma site  - Consider wound care consult   Outcome: Progressing  Goal: Oral mucous membranes remain intact  Description: INTERVENTIONS  - Assess oral mucosa and hygiene practices  - Implement preventative oral hygiene regimen  - Implement oral medicated treatments as ordered  - Initiate Nutrition services referral as needed  Outcome: Progressing     Problem: METABOLIC, FLUID AND ELECTROLYTES - ADULT  Goal: Electrolytes maintained within normal limits  Description: INTERVENTIONS:  - Monitor labs and assess patient for signs and symptoms of electrolyte imbalances  - Administer electrolyte replacement as ordered  - Monitor response to electrolyte replacements, including repeat lab results as appropriate  - Instruct patient on fluid and nutrition as appropriate  Outcome: Progressing  Goal: Fluid balance maintained  Description: INTERVENTIONS:  - Monitor labs   - Monitor I/O and WT  - Instruct patient on fluid and nutrition as appropriate  - Assess for signs & symptoms of volume excess or deficit  Outcome: Progressing  Goal: Glucose maintained within target range  Description: INTERVENTIONS:  - Monitor Blood Glucose as ordered  - Assess for signs and symptoms of hyperglycemia and hypoglycemia  - Administer ordered medications to maintain glucose within target range  - Assess nutritional intake  and initiate nutrition service referral as needed  Outcome: Progressing   - Out of bed for toileting  - Record patient progress and toleration of activity level   Outcome: Progressing   - Out of bed for toileting  - Record patient progress and toleration of activity level   Outcome: Progressing

## 2024-09-19 NOTE — CASE MANAGEMENT
Case Management Assessment & Discharge Planning Note    Patient name Porsha Hoyos  Location 7[facility] 717/7[facility] 717-01 MRN [id number]  : 1945 Date 2024       Current Admission Date: 2024  Current Admission Diagnosis:SBO (small bowel obstruction) ([facility])   Patient Active Problem List    Diagnosis Date Noted Date Diagnosed    Swelling of forearm 2024     SBO (small bowel obstruction) ([facility]) 2024     MSSA bacteremia 2024     Discitis of lumbar region 2024     Advance care planning 2024     At risk for delirium 2024     Physical deconditioning 2024     Coronary artery disease involving native coronary artery of native heart without angina pectoris 2024     Scalp lesion 2024     Bilateral lower extremity edema 2024     Osteomyelitis ([facility]) 2024     COVID 2024     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[facility]) 2024     Sepsis ([facility]): SIRS criteria MSSA Bacteremia and COVID infection 2024     Type 2 diabetes mellitus with complication, with long-term current use of insulin ([facility]) 2024     Nausea and vomiting 2023     Hordeolum externum of left upper eyelid 08/15/2023     Proteinuria 2023     Metatarsalgia of left foot 06/15/2023     Peripheral vascular disease, unspecified ([facility]) 2023     Drug-induced constipation 2022     Anemia 2022     Vitamin deficiency 2022     Shortness of breath 2022     Elevated LFTs 2022     Acute kidney injury superimposed on chronic kidney disease  ([facility]) 2022     Pancytopenia ([facility]) 2022     Asthma without status asthmaticus without complication 2022     History of colon polyps 2022     Gastroesophageal reflux disease 2022     Stage 3b chronic kidney disease ([facility]) 2022     Hypothyroidism 2021     Type 2 diabetes mellitus with diabetic chronic kidney disease ([facility]) 2021     Type 2 diabetes mellitus with diabetic  polyneuropathy (HCC) 05/20/2021     Long term (current) use of insulin (HCC) 05/20/2021     Type 2 diabetes mellitus with stable proliferative diabetic retinopathy, bilateral (HCC) 05/20/2021     Acute pain of right shoulder 05/20/2019     Pain and swelling of right upper extremity 05/20/2019     Acute pain of right shoulder due to trauma 05/20/2019     Fall at home 05/20/2019     Imbalance 05/20/2019     Acute on Chronic Back Pain in the Setting of Sepsis, MSSA Bacteremia 12/31/2018     Encntr for gyn exam (general) (routine) w abnormal findings 08/28/2018     Vaginal atrophy 08/28/2018     Vulvar lesion 02/05/2018     Primary hypertension 01/29/2018     Mixed hyperlipidemia 01/29/2018       LOS (days): 1  Geometric Mean LOS (GMLOS) (days): 3.1  Days to GMLOS:2.3     OBJECTIVE:    Risk of Unplanned Readmission Score: 18.02         Current admission status: Inpatient       [place]   [facility] [place] [address] [address]  Phone: [phone] Fax: [phone]    Primary Care Provider: João Alfonso MD    Primary Insurance: [employer] REP  Secondary Insurance:     ASSESSMENT:  Active Health Care Proxies    There are no active Health Care Proxies on file.       Readmission Root Cause  30 Day Readmission: Yes  Who directed you to return to the hospital?: Other (comment) ([facility])  Did you understand whom to contact if you had questions or problems?: Yes  Were you able to get your prescriptions filled when you left the hospital?: Yes  Did you take your medications as prescribed?: Yes  Were you able to get to your follow-up appointments?: No  Reason:: Readmitted prior to appointment  During previous admission, was a post-acute recommendation made?: Yes  What post-acute resources were offered?: STR  Patient was readmitted due to: SBO  Action Plan: NGT placed, GI following    Patient Information  Mental Status: Alert    Social Determinants of Health (SDOH)       Flowsheet Row Most Recent Value   Housing Stability    In the last 12 months, was there a time when you were not able to pay the mortgage or rent on time? N   In the past 12 months, how many times have you moved where you were living? 0   At any time in the past 12 months, were you homeless or living in a shelter (including now)? N   Transportation Needs    In the past 12 months, has lack of transportation kept you from medical appointments or from getting medications? no   In the past 12 months, has lack of transportation kept you from meetings, work, or from getting things needed for daily living? No   Food Insecurity    Within the past 12 months, you worried that your food would run out before you got the money to buy more. Never true   Within the past 12 months, the food you bought just didn't last and you didn't have money to get more. Never true   Utilities    In the past 12 months has the electric, gas, oil, or water company threatened to shut off services in your home? No            DISCHARGE DETAILS:    Other Referral/Resources/Interventions Provided:  Interventions: Short Term Rehab  Referral Comments: STR referral sent to [facility] for [facility] via [facility].    Additional Comments: CM met with pt at bedside who reports that there have been no changes since assessment completed on 9/10/24. Pt resides alone in a ranch style home that has a ramp to enter, however her son/DIL are with pt 4 out of 7 days of the week. They are planning to spend a few weeks with pt upon completion of STR to assist with any needs. Pt requested a referral back to [facility] where she was admitted from, referral sent via [facility]. CM department to follow.

## 2024-09-19 NOTE — CASE MANAGEMENT
Case Management Discharge Planning Note    Patient name Porsha Hoyos  Location [facility] 717/7[facility] 717-01 MRN [id number]  : 1945 Date 2024       Current Admission Date: 2024  Current Admission Diagnosis:SBO (small bowel obstruction) ([facility])   Patient Active Problem List    Diagnosis Date Noted Date Diagnosed    Swelling of forearm 2024     SBO (small bowel obstruction) ([facility]) 2024     MSSA bacteremia 2024     Discitis of lumbar region 2024     Advance care planning 2024     At risk for delirium 2024     Physical deconditioning 2024     Coronary artery disease involving native coronary artery of native heart without angina pectoris 2024     Scalp lesion 2024     Bilateral lower extremity edema 2024     Osteomyelitis ([facility]) 2024     COVID 2024     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[facility]) 2024     Sepsis ([facility]): SIRS criteria MSSA Bacteremia and COVID infection 2024     Type 2 diabetes mellitus with complication, with long-term current use of insulin ([facility]) 2024     Nausea and vomiting 2023     Hordeolum externum of left upper eyelid 08/15/2023     Proteinuria 2023     Metatarsalgia of left foot 06/15/2023     Peripheral vascular disease, unspecified ([facility]) 2023     Drug-induced constipation 2022     Anemia 2022     Vitamin deficiency 2022     Shortness of breath 2022     Elevated LFTs 2022     Acute kidney injury superimposed on chronic kidney disease  ([facility]) 2022     Pancytopenia ([facility]) 2022     Asthma without status asthmaticus without complication 2022     History of colon polyps 2022     Gastroesophageal reflux disease 2022     Stage 3b chronic kidney disease ([facility]) 2022     Hypothyroidism 2021     Type 2 diabetes mellitus with diabetic chronic kidney disease ([facility]) 2021     Type 2 diabetes mellitus with diabetic polyneuropathy ([facility])  05/20/2021     Long term (current) use of insulin (HCC) 05/20/2021     Type 2 diabetes mellitus with stable proliferative diabetic retinopathy, bilateral (HCC) 05/20/2021     Acute pain of right shoulder 05/20/2019     Pain and swelling of right upper extremity 05/20/2019     Acute pain of right shoulder due to trauma 05/20/2019     Fall at home 05/20/2019     Imbalance 05/20/2019     Acute on Chronic Back Pain in the Setting of Sepsis, MSSA Bacteremia 12/31/2018     Encntr for gyn exam (general) (routine) w abnormal findings 08/28/2018     Vaginal atrophy 08/28/2018     Vulvar lesion 02/05/2018     Primary hypertension 01/29/2018     Mixed hyperlipidemia 01/29/2018       LOS (days): 1  Geometric Mean LOS (GMLOS) (days): 3.1  Days to GMLOS:2.1     OBJECTIVE:  Risk of Unplanned Readmission Score: 18.07         Current admission status: Inpatient   Preferred Pharmacy:   [facility] - [place] - [address] [address]  Phone: [phone] Fax: [phone]    Primary Care Provider: João Alfonso MD    Primary Insurance: [employer] RE[facility]  Secondary Insurance:     DISCHARGE DETAILS:     Additional Comments: Per [facility] communication, pt accepted back to [facility], where she was admitted from, at time of discharge.  department to follow.
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
## 2024-09-19 NOTE — ASSESSMENT & PLAN NOTE
Lab Results   Component Value Date    HGBA1C 7.9 (A) 07/02/2024     Blood sugar stable.  Resume Lantus when tolerating p.o.  Continue sliding scale insulin for now

## 2024-09-19 NOTE — ASSESSMENT & PLAN NOTE
Small bowel obstruction versus ileus in the setting of constipation  She had at least 2 significant bowel movements overnight  Abdomen is less distended  We will repeat another obstruction series x-ray today and if improved, NGT will be either clamped or discontinued  Continue bowel regimen  Advance diet and resume oral regimen when small bowel obstruction is resolved.  Discussed with Dr. Brush
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
## 2024-09-20 ENCOUNTER — APPOINTMENT (INPATIENT)
Dept: CT IMAGING | Facility: HOSPITAL | Age: 79
DRG: 178 | End: 2024-09-20
Payer: COMMERCIAL

## 2024-09-20 ENCOUNTER — APPOINTMENT (INPATIENT)
Dept: MRI IMAGING | Facility: HOSPITAL | Age: 79
DRG: 178 | End: 2024-09-20
Payer: COMMERCIAL

## 2024-09-20 PROBLEM — N18.9 ACUTE KIDNEY INJURY SUPERIMPOSED ON CHRONIC KIDNEY DISEASE  (HCC): Status: RESOLVED | Noted: 2022-06-27 | Resolved: 2024-09-20

## 2024-09-20 PROBLEM — R41.82 CHANGE IN MENTAL STATE: Status: ACTIVE | Noted: 2024-09-20

## 2024-09-20 PROBLEM — N17.9 ACUTE KIDNEY INJURY SUPERIMPOSED ON CHRONIC KIDNEY DISEASE  (HCC): Status: RESOLVED | Noted: 2022-06-27 | Resolved: 2024-09-20

## 2024-09-20 LAB
2HR DELTA HS TROPONIN: -3 NG/L
ANION GAP SERPL CALCULATED.3IONS-SCNC: 14 MMOL/L (ref 4–13)
ANION GAP SERPL CALCULATED.3IONS-SCNC: 8 MMOL/L (ref 4–13)
ATRIAL RATE: 85 BPM
ATRIAL RATE: 95 BPM
BASOPHILS # BLD AUTO: 0.03 THOUSANDS/ΜL (ref 0–0.1)
BASOPHILS NFR BLD AUTO: 0 % (ref 0–1)
BUN SERPL-MCNC: 37 MG/DL (ref 5–25)
BUN SERPL-MCNC: 44 MG/DL (ref 5–25)
CALCIUM SERPL-MCNC: 9.3 MG/DL (ref 8.4–10.2)
CALCIUM SERPL-MCNC: 9.4 MG/DL (ref 8.4–10.2)
CARDIAC TROPONIN I PNL SERPL HS: 26 NG/L
CARDIAC TROPONIN I PNL SERPL HS: 29 NG/L
CHLORIDE SERPL-SCNC: 111 MMOL/L (ref 96–108)
CHLORIDE SERPL-SCNC: 113 MMOL/L (ref 96–108)
CO2 SERPL-SCNC: 17 MMOL/L (ref 21–32)
CO2 SERPL-SCNC: 22 MMOL/L (ref 21–32)
CREAT SERPL-MCNC: 0.84 MG/DL (ref 0.6–1.3)
CREAT SERPL-MCNC: 0.9 MG/DL (ref 0.6–1.3)
EOSINOPHIL # BLD AUTO: 0.16 THOUSAND/ΜL (ref 0–0.61)
EOSINOPHIL NFR BLD AUTO: 2 % (ref 0–6)
ERYTHROCYTE [DISTWIDTH] IN BLOOD BY AUTOMATED COUNT: 14.6 % (ref 11.6–15.1)
GFR SERPL CREATININE-BSD FRML MDRD: 60 ML/MIN/1.73SQ M
GFR SERPL CREATININE-BSD FRML MDRD: 65 ML/MIN/1.73SQ M
GLUCOSE SERPL-MCNC: 131 MG/DL (ref 65–140)
GLUCOSE SERPL-MCNC: 166 MG/DL (ref 65–140)
GLUCOSE SERPL-MCNC: 168 MG/DL (ref 65–140)
GLUCOSE SERPL-MCNC: 178 MG/DL (ref 65–140)
GLUCOSE SERPL-MCNC: 194 MG/DL (ref 65–140)
GLUCOSE SERPL-MCNC: 213 MG/DL (ref 65–140)
GLUCOSE SERPL-MCNC: 264 MG/DL (ref 65–140)
HCT VFR BLD AUTO: 27 % (ref 34.8–46.1)
HGB BLD-MCNC: 8.4 G/DL (ref 11.5–15.4)
IMM GRANULOCYTES # BLD AUTO: 0.16 THOUSAND/UL (ref 0–0.2)
IMM GRANULOCYTES NFR BLD AUTO: 2 % (ref 0–2)
LYMPHOCYTES # BLD AUTO: 1.25 THOUSANDS/ΜL (ref 0.6–4.47)
LYMPHOCYTES NFR BLD AUTO: 15 % (ref 14–44)
MCH RBC QN AUTO: 31.5 PG (ref 26.8–34.3)
MCHC RBC AUTO-ENTMCNC: 31.1 G/DL (ref 31.4–37.4)
MCV RBC AUTO: 101 FL (ref 82–98)
MONOCYTES # BLD AUTO: 0.62 THOUSAND/ΜL (ref 0.17–1.22)
MONOCYTES NFR BLD AUTO: 7 % (ref 4–12)
NEUTROPHILS # BLD AUTO: 6.32 THOUSANDS/ΜL (ref 1.85–7.62)
NEUTS SEG NFR BLD AUTO: 74 % (ref 43–75)
NRBC BLD AUTO-RTO: 0 /100 WBCS
P AXIS: 1 DEGREES
P AXIS: 21 DEGREES
PLATELET # BLD AUTO: 400 THOUSANDS/UL (ref 149–390)
PMV BLD AUTO: 10.3 FL (ref 8.9–12.7)
POTASSIUM SERPL-SCNC: 5.2 MMOL/L (ref 3.5–5.3)
POTASSIUM SERPL-SCNC: 5.5 MMOL/L (ref 3.5–5.3)
PR INTERVAL: 184 MS
PR INTERVAL: 198 MS
QRS AXIS: -18 DEGREES
QRS AXIS: -31 DEGREES
QRSD INTERVAL: 70 MS
QRSD INTERVAL: 74 MS
QT INTERVAL: 338 MS
QT INTERVAL: 364 MS
QTC INTERVAL: 424 MS
QTC INTERVAL: 433 MS
RBC # BLD AUTO: 2.67 MILLION/UL (ref 3.81–5.12)
SODIUM SERPL-SCNC: 142 MMOL/L (ref 135–147)
SODIUM SERPL-SCNC: 143 MMOL/L (ref 135–147)
T WAVE AXIS: 207 DEGREES
T WAVE AXIS: 233 DEGREES
VENTRICULAR RATE: 85 BPM
VENTRICULAR RATE: 95 BPM
WBC # BLD AUTO: 8.54 THOUSAND/UL (ref 4.31–10.16)

## 2024-09-20 PROCEDURE — NC001 PR NO CHARGE

## 2024-09-20 PROCEDURE — 85025 COMPLETE CBC W/AUTO DIFF WBC: CPT | Performed by: INTERNAL MEDICINE

## 2024-09-20 PROCEDURE — 80048 BASIC METABOLIC PNL TOTAL CA: CPT | Performed by: INTERNAL MEDICINE

## 2024-09-20 PROCEDURE — 70496 CT ANGIOGRAPHY HEAD: CPT

## 2024-09-20 PROCEDURE — 93010 ELECTROCARDIOGRAM REPORT: CPT | Performed by: INTERNAL MEDICINE

## 2024-09-20 PROCEDURE — 70551 MRI BRAIN STEM W/O DYE: CPT

## 2024-09-20 PROCEDURE — NC001 PR NO CHARGE: Performed by: SPECIALIST

## 2024-09-20 PROCEDURE — 93005 ELECTROCARDIOGRAM TRACING: CPT

## 2024-09-20 PROCEDURE — 99233 SBSQ HOSP IP/OBS HIGH 50: CPT | Performed by: INTERNAL MEDICINE

## 2024-09-20 PROCEDURE — 70498 CT ANGIOGRAPHY NECK: CPT

## 2024-09-20 PROCEDURE — 80048 BASIC METABOLIC PNL TOTAL CA: CPT

## 2024-09-20 PROCEDURE — 84484 ASSAY OF TROPONIN QUANT: CPT

## 2024-09-20 PROCEDURE — G0508 CRIT CARE TELEHEA CONSULT 60: HCPCS | Performed by: PSYCHIATRY & NEUROLOGY

## 2024-09-20 PROCEDURE — 82948 REAGENT STRIP/BLOOD GLUCOSE: CPT

## 2024-09-20 RX ORDER — CARVEDILOL 12.5 MG/1
12.5 TABLET ORAL 2 TIMES DAILY WITH MEALS
Status: DISCONTINUED | OUTPATIENT
Start: 2024-09-20 | End: 2024-09-21

## 2024-09-20 RX ORDER — ATORVASTATIN CALCIUM 80 MG/1
80 TABLET, FILM COATED ORAL
Status: DISCONTINUED | OUTPATIENT
Start: 2024-09-20 | End: 2024-09-25 | Stop reason: HOSPADM

## 2024-09-20 RX ORDER — LOSARTAN POTASSIUM 25 MG/1
25 TABLET ORAL DAILY
Status: DISCONTINUED | OUTPATIENT
Start: 2024-09-20 | End: 2024-09-22

## 2024-09-20 RX ORDER — HYDRALAZINE HYDROCHLORIDE 20 MG/ML
5 INJECTION INTRAMUSCULAR; INTRAVENOUS EVERY 6 HOURS PRN
Status: DISCONTINUED | OUTPATIENT
Start: 2024-09-20 | End: 2024-09-25 | Stop reason: HOSPADM

## 2024-09-20 RX ADMIN — ALBUTEROL SULFATE 2 PUFF: 90 AEROSOL, METERED RESPIRATORY (INHALATION) at 21:39

## 2024-09-20 RX ADMIN — HEPARIN SODIUM 5000 UNITS: 5000 INJECTION INTRAVENOUS; SUBCUTANEOUS at 21:39

## 2024-09-20 RX ADMIN — ASPIRIN 81 MG: 81 TABLET, COATED ORAL at 13:11

## 2024-09-20 RX ADMIN — INSULIN LISPRO 2 UNITS: 100 INJECTION, SOLUTION INTRAVENOUS; SUBCUTANEOUS at 17:58

## 2024-09-20 RX ADMIN — Medication 2 G: at 13:11

## 2024-09-20 RX ADMIN — IOHEXOL 90 ML: 350 INJECTION, SOLUTION INTRAVENOUS at 09:02

## 2024-09-20 RX ADMIN — CEFAZOLIN SODIUM 2000 MG: 2 SOLUTION INTRAVENOUS at 17:46

## 2024-09-20 RX ADMIN — INSULIN LISPRO 1 UNITS: 100 INJECTION, SOLUTION INTRAVENOUS; SUBCUTANEOUS at 14:53

## 2024-09-20 RX ADMIN — ATORVASTATIN CALCIUM 80 MG: 80 TABLET, FILM COATED ORAL at 16:58

## 2024-09-20 RX ADMIN — LOSARTAN POTASSIUM 25 MG: 25 TABLET, FILM COATED ORAL at 13:07

## 2024-09-20 RX ADMIN — HYDRALAZINE HYDROCHLORIDE 5 MG: 20 INJECTION INTRAMUSCULAR; INTRAVENOUS at 16:57

## 2024-09-20 RX ADMIN — CARVEDILOL 12.5 MG: 12.5 TABLET, FILM COATED ORAL at 13:07

## 2024-09-20 RX ADMIN — ALBUTEROL SULFATE 2 PUFF: 90 AEROSOL, METERED RESPIRATORY (INHALATION) at 17:59

## 2024-09-20 RX ADMIN — ALBUTEROL SULFATE 2 PUFF: 90 AEROSOL, METERED RESPIRATORY (INHALATION) at 14:58

## 2024-09-20 RX ADMIN — HEPARIN SODIUM 5000 UNITS: 5000 INJECTION INTRAVENOUS; SUBCUTANEOUS at 16:57

## 2024-09-20 RX ADMIN — BUDESONIDE AND FORMOTEROL FUMARATE DIHYDRATE 2 PUFF: 160; 4.5 AEROSOL RESPIRATORY (INHALATION) at 17:59

## 2024-09-20 RX ADMIN — CEFAZOLIN SODIUM 2000 MG: 2 SOLUTION INTRAVENOUS at 05:53

## 2024-09-20 RX ADMIN — Medication 2 G: at 17:47

## 2024-09-20 NOTE — PROGRESS NOTES
Progress Note - Hospitalist   Name: Porsha Hoyos 79 y.o. female I MRN: [id number]  Unit/Bed#: 7[facility] 717-01 I Date of Admission: 9/18/2024   Date of Service: 9/20/2024 I Hospital Day: 2    Assessment & Plan  Change in mental state  New mumbling speech, upper and lower extremity weakness noted during rounds today at 840.  Last known well 9 PM 9/19/2024  Rapid response and stroke alert were called  Stat CTs ordered.     Discussed with Dr. Weems.  She may be having a developing right MCA stroke.  Stat MRI ordered.  SBO (small bowel obstruction) ([facility])  Small bowel obstruction versus ileus in the setting of constipation  She has had several bowel movements with Dulcolax suppository daily and milk of molasses enema x 1 on 9/19/2024  NG tube been removed  Repeat chest x-ray on 9/19/2024 showed improvement in  stomach distention  Hold advancing diet  due to problem #1 above  Osteomyelitis ([facility])  Overall stable  Discitis/osteomyelitis of the lumbar spine in the setting of bacteremia   Continue IV cefazolin 2 g every 12 until 10/21/2024, 6 weeks total  Eventual return to [facility] to continue rehab  Swelling of forearm  Right forearm swelling with right upper arm PICC in place.  The PICC itself is functioning properly  She had an upper extremity venous Doppler on 9/16/2024 which was negative for DVT  There is no distal discoloration or pain  Right forearm swelling may just be mechanical due to the presence of PICC  Continue elevation and observation.  Mixed hyperlipidemia  Resume Lipitor when tolerating p.o.  Type 2 diabetes mellitus with diabetic chronic kidney disease (HCC)  Lab Results   Component Value Date    HGBA1C 7.9 (A) 07/02/2024     Blood sugar stable.  Continue sliding scale insulin for now  Acute kidney injury superimposed on chronic kidney disease  (HCC) (Resolved: 9/20/2024)  Lab Results   Component Value Date    EGFR 60 09/20/2024    EGFR 47 09/19/2024    EGFR 30 09/16/2024    CREATININE 0.90 09/20/2024     CREATININE 1.09 2024    CREATININE 1.57 (H) 2024       VTE Pharmacologic Prophylaxis: VTE Score: 3 heparin    Patient Centered Rounds: I performed bedside rounds with nursing staff today.   Discussions with Specialists or Other Care Team Provider: Discussed with ED, discussed with rapid response team, discussed with neurology Dr. Weems    Education and Discussions with Family / Patient: Updated  (son) via phone.    Current Length of Stay: 2 day(s)  Current Patient Status: Inpatient   Certification Statement: The patient will continue to require additional inpatient hospital stay due to change in mental status, possible stroke  Discharge Plan:  Depending on MRI findings.  May need transfer to higher level of care if additional treatment/TNK needed    Code Status: Level 1 - Full Code    Subjective   Seen and examined this morning.  She was only mumbling  She was only able to lift her arms against gravity   She was only able to wiggle her toes    Objective     Vitals:   Temp (24hrs), Av °F (36.7 °C), Min:97.1 °F (36.2 °C), Max:98.8 °F (37.1 °C)    Temp:  [97.1 °F (36.2 °C)-98.8 °F (37.1 °C)] 97.1 °F (36.2 °C)  HR:  [87-88] 87  Resp:  [16-18] 16  BP: (162-173)/(56-70) 173/56  SpO2:  [93 %-96 %] 93 %  Body mass index is 30.77 kg/m².     Input and Output Summary (last 24 hours):     Intake/Output Summary (Last 24 hours) at 2024 0900  Last data filed at 2024 1703  Gross per 24 hour   Intake 2793.34 ml   Output 400 ml   Net 2393.34 ml       Physical Exam  Constitutional:       Appearance: She is not ill-appearing.   HENT:      Head: Normocephalic and atraumatic.      Nose: No congestion or rhinorrhea.   Eyes:      General: No scleral icterus.  Cardiovascular:      Rate and Rhythm: Regular rhythm. Tachycardia present.   Pulmonary:      Breath sounds: Normal breath sounds.   Abdominal:      General: There is no distension.      Palpations: Abdomen is soft.      Tenderness: There is no  abdominal tenderness. There is no guarding.   Skin:     General: Skin is warm and dry.   Neurological:      Comments: Mumbling speech  + Bilateral drift  Able to lift arms against gravity briefly  Only able to wiggle toes.  Unable to lift against gravity   Psychiatric:         Behavior: Behavior normal.     Lines/Drains:  Lines/Drains/Airways       Active Status       Name Placement date Placement time Site Days    PICC Line 09/13/24 Right Brachial 09/13/24  1222  Brachial  6                    Central Line:  Goal for removal: N/A - Discharging with PICC for IV ABX/medications         Telemetry:  Telemetry Orders (From admission, onward)               24 Hour Telemetry Monitoring  Continuous x 24 Hours (Telem)        Question:  Reason for 24 Hour Telemetry  Answer:  TIA/Suspected CVA/ Confirmed CVA                              Lab Results: I have reviewed the following results:    Results from last 7 days   Lab Units 09/20/24  0542 09/19/24  0534 09/16/24  0546   WBC Thousand/uL 8.54   < > 10.39*   HEMOGLOBIN g/dL 8.4*   < > 10.0*   HEMATOCRIT % 27.0*   < > 29.5*   PLATELETS Thousands/uL 400*   < > 298   BANDS PCT %  --   --  1   SEGS PCT % 74  --   --    LYMPHO PCT % 15  --  14   MONO PCT % 7  --  5   EOS PCT % 2  --  2    < > = values in this interval not displayed.     Results from last 7 days   Lab Units 09/20/24  0542   SODIUM mmol/L 143   POTASSIUM mmol/L 5.2   CHLORIDE mmol/L 113*   CO2 mmol/L 22   BUN mg/dL 44*   CREATININE mg/dL 0.90   ANION GAP mmol/L 8   CALCIUM mg/dL 9.3   GLUCOSE RANDOM mg/dL 168*         Results from last 7 days   Lab Units 09/20/24  0844 09/20/24  0539 09/20/24  0008 09/19/24  1750 09/19/24  1153 09/19/24  0535 09/19/24  0003 09/18/24  1748 09/18/24  1100 09/18/24  0533 09/17/24  2119 09/17/24  1154   POC GLUCOSE mg/dl 178* 131 166* 216* 112 97 111 113 137 145* 181* 231*               Recent Cultures (last 7 days):         Imaging Review: Personally reviewed the following image  studies in PACS and associated radiology reports: CT head. My interpretation of the radiology images/reports is: CT head discussed with neurology.  She may have developing stroke in the right MCA territory.  Did not appreciate intracerebral hemorrhage.      Last 24 Hours Medication List:     Current Facility-Administered Medications:     albuterol (PROVENTIL HFA,VENTOLIN HFA) inhaler 2 puff, 4x Daily    bisacodyl (DULCOLAX) rectal suppository 10 mg, Daily    budesonide-formoterol (SYMBICORT) 160-4.5 mcg/act inhaler 2 puff, BID    ceFAZolin (ANCEF) IVPB (premix in dextrose) 2,000 mg 50 mL, Q12H, Last Rate: 2,000 mg (09/20/24 0553)    Diclofenac Sodium (VOLTAREN) 1 % topical gel 2 g, 4x Daily    heparin (porcine) subcutaneous injection 5,000 Units, TID    insulin lispro (HumALOG/ADMELOG) 100 units/mL subcutaneous injection 1-5 Units, Q6H KORI **AND** Fingerstick Glucose (POCT), Q6H    lidocaine (LIDODERM) 5 % patch 1 patch, Daily      **Please Note: This note may have been constructed using a voice recognition system.**

## 2024-09-20 NOTE — PLAN OF CARE
Problem: Prexisting or High Potential for Compromised Skin Integrity  Goal: Skin integrity is maintained or improved  Description: INTERVENTIONS:  - Identify patients at risk for skin breakdown  - Assess and monitor skin integrity  - Assess and monitor nutrition and hydration status  - Monitor labs   - Assess for incontinence   - Turn and reposition patient  - Assist with mobility/ambulation  - Relieve pressure over bony prominences  - Avoid friction and shearing  - Provide appropriate hygiene as needed including keeping skin clean and dry  - Evaluate need for skin moisturizer/barrier cream  - Collaborate with interdisciplinary team   - Patient/family teaching  - Consider wound care consult   Outcome: Progressing     Problem: GASTROINTESTINAL - ADULT  Goal: Maintains or returns to baseline bowel function  Description: INTERVENTIONS:  - Assess bowel function  - Encourage oral fluids to ensure adequate hydration  - Administer IV fluids if ordered to ensure adequate hydration  - Administer ordered medications as needed  - Encourage mobilization and activity  - Consider nutritional services referral to assist patient with adequate nutrition and appropriate food choices  Outcome: Progressing     Problem: GASTROINTESTINAL - ADULT  Goal: Maintains adequate nutritional intake  Description: INTERVENTIONS:  - Monitor percentage of each meal consumed  - Identify factors contributing to decreased intake, treat as appropriate  - Assist with meals as needed  - Monitor I&O, weight, and lab values if indicated  - Obtain nutrition services referral as needed  Outcome: Progressing     Problem: GASTROINTESTINAL - ADULT  Goal: Oral mucous membranes remain intact  Description: INTERVENTIONS  - Assess oral mucosa and hygiene practices  - Implement preventative oral hygiene regimen  - Implement oral medicated treatments as ordered  - Initiate Nutrition services referral as needed  Outcome: Progressing     Problem: METABOLIC, FLUID AND  ELECTROLYTES - ADULT  Goal: Glucose maintained within target range  Description: INTERVENTIONS:  - Monitor Blood Glucose as ordered  - Assess for signs and symptoms of hyperglycemia and hypoglycemia  - Administer ordered medications to maintain glucose within target range  - Assess nutritional intake and initiate nutrition service referral as needed  Outcome: Progressing

## 2024-09-20 NOTE — ASSESSMENT & PLAN NOTE
New mumbling speech, upper and lower extremity weakness noted during rounds today at 840.  Last known well 9 PM 9/19/2024  Rapid response and stroke alert were called  Stat CTs ordered.     Discussed with Dr. Weems.  She may be having a developing right MCA stroke.  Stat MRI ordered.

## 2024-09-20 NOTE — TELEMEDICINE
Tele-Consultation - Neurology   Name: Porsha Hoyos 79 y.o. female I MRN: [id number]  Unit/Bed#: Transfer 01 I Date of Admission: 9/18/2024   Date of Service: 9/20/2024 I Hospital Day: 2   Inpatient consult to Neurology  Consult performed by: Norris Weems DO  Consult ordered by: Juana Anderson MD      Physician Requesting Evaluation: Jared San Pe*   Reason for Evaluation / Principal Problem: stroke alert    Assessment & Plan  Change in mental state  80 y/o F with HTN, HLD, DM, and prior R pontine stroke that presents with acute change in mental status following recent admission for bacteremia/osteomyelitis and SBO. MRI without evidence of stroke contributing. Suspect hypertensive/toxic-metabolic encephalopathy. Mental status now improved.    -continue neurochecks; notify with changes  -HCT reviewed - apparent acute ischemia in R insula; chronic R duke stroke  -CTA reviewed - no significant/acute vascular abnormality; moderate b/l intracranial ICA and vert stenoses  -MRI brain w/o contrast reviewed - no evidence of acute ischemia  -telemetry; apparent L atrial dilation on CTA with high likelihood for underlying Afib  -continue ASA 81mg daily  -continue home statin  -goal BP normotension with gradual reduction  -toxic/metabolic and infectious w/u as per primary team  -no further recs at this time; call with questions    Thrombolytic Decision: Patient not a candidate. Unclear time of onset outside appropriate time window.    Recommendations for outpatient neurological follow up have yet to be determined.      Hx and PE limited by: mental status change  Patient last known well: 9pm  Stroke alert called: 8:44am  Neurology time of arrival: immediate, by phone    HPI: Porsha Hoyos is a 79 y.o. female with HTN, HLD, DM, and prior R pontine stroke who presents with mental status change. Pt was recently admitted at [place] for osteomyelitis and bacteremia and had been successfully treated with antibiotics.  She went to a transitional floor at  and then transferred to med surg at  with small bowel obstruction, which has since improved. She was LKN at 9pm per nursing staff, then found during rounds this morning at 8:40am with significant mental status change. Pt is described to be somnolent and nonverbal but able to follow a couple simple commands. All extremities drop to the floor. No gaze preference or facial asymmetry. No clear asymmetry in extremity movements. No seizure activity. She was awake, alert, and oriented last night. BP noted to be in 200s/90s at this time.    HCT and CTA were personally reviewed - evidence of acute ischemia affecting the R insula; chronic R pontine infarct; no significant/acute vascular abnormality present. Pt is outside of the traditional window for TNK - stat MRI was requested and obtained, which did not reveal evidence of stroke.    Review of Systems   Unable to perform ROS: Mental status change       Objective      Temp:  [97.1 °F (36.2 °C)-98.8 °F (37.1 °C)] 97.1 °F (36.2 °C)  HR:  [87-96] 96  Resp:  [16-18] 18  BP: (162-200)/() 197/102  O2 Device: None (Room air)          I/O last 24 hours:  In: 2793.3 [P.O.:480; I.V.:2283.3; NG/GT:30]  Out: 400 [Urine:400]  Lines/Drains/Airways       Active Status       Name Placement date Placement time Site Days    PICC Line 09/13/24 Right Brachial 09/13/24  1222  Brachial  6                  Physical Exam  Constitutional:       Appearance: She is well-developed.   HENT:      Head: Normocephalic and atraumatic.   Eyes:      Extraocular Movements: EOM normal.      Conjunctiva/sclera: Conjunctivae normal.   Pulmonary:      Effort: No respiratory distress.   Abdominal:      General: There is no distension.   Musculoskeletal:         General: No swelling.      Cervical back: No rigidity.   Skin:     Coloration: Skin is not jaundiced.   Psychiatric:         Speech: Speech normal.     Neurologic Exam     Mental Status   Speech: speech is  normal   Lethargic but does attend to examiner  Tracks across the room  Able to identify self and hospital as well as month/year  Follows simple midline and appendicular commands     Cranial Nerves     CN III, IV, VI   Extraocular motions are normal.     CN V   Facial sensation intact.     CN VII   Facial expression full, symmetric.     CN XII   Tongue deviation: none  Blinks to threat bilaterally     Motor Exam Some antigravity in both uppers, 3/5  At least 2/5 in both lowers, no antigravity  Appears effort dependent  No clear asymmetry     Sensory Exam   Light touch normal.     Gait, Coordination, and Reflexes Unable to perform FTN/HTS due to generalized weakness       NIHSS:  1a.Level of Consciousness: 1 = Not alert, but arousable with minimal stimulation   1b. LOC Questions: 0 = Answers both correctly   1c. LOC Commands: 0 = Obeys both correctly   2. Best Gaze: 0 = Normal   3. Visual: 0 = No visual field loss   4. Facial Palsy: 0=Normal symmetric movement   5a. Motor Right Arm: 2=Some effort against gravity, limb cannot get to or maintain (if cured) 90 (or 45) degrees, drifts down to bed, but has some effort against gravity   5b. Motor Left Arm: 2=Some effort against gravity, limb cannot get to or maintain (if cured) 90 (or 45) degrees, drifts down to bed, but has some effort against gravity   6a. Motor Right Leg: 3=No effort against gravity, limb falls   6b. Motor Left Leg: 3=No effort against gravity, limb falls   7. Limb Ataxia:  0=Absent   8. Sensory: 0=Normal; no sensory loss   9. Best Language:  0=No aphasia, normal   10. Dysarthria: 0=Normal articulation   11. Extinction and Inattention (formerly Neglect): 0=No abnormality   Total Score: 11     Time NIHSS was completed: 10:50am    Modified Timewell Score:  1 (No significant disability. Able to carry out all usual activities, despite some symptoms)      Lab Results: I have reviewed the following results: CBC/BMP:   .     09/20/24  0542   WBC 8.54   HGB 8.4*    HCT 27.0*   *   SODIUM 143   K 5.2   *   CO2 22   BUN 44*   CREATININE 0.90   GLUC 168*    , LFTs: No new results in last 24 hours. , PTT/INR:No new results in last 24 hours.   Imaging Review: Personally reviewed the following image studies/reports in PACS and discussed pertinent findings with Radiology: CT head, CT angio, and MRI brain. My interpretation of the radiology images/reports is: as noted.  Other Studies: No additional pertinent studies reviewed.  VTE Prophylaxis: Heparin    Code Status: Level 1 - Full Code  Advance Directive and Living Will: Yes      Critical Care Time Statement: Upon my evaluation, this patient had a high probability of imminent or life-threatening deterioration due to acute ischemic stroke, which required my direct attention, intervention, and personal management.  I spent a total of 90 minutes directly providing critical care services, including evaluating for the presence of life-threatening injuries or illnesses, management of organ system failure(s) , complex medical decision making (to support/prevent further life-threatening deterioration)., and interpretation of acute imaging . This time is exclusive of procedures, teaching, family meetings, and any prior time recorded by providers other than myself.       VIRTUAL CARE DOCUMENTATION:     1. This service was provided via Telemedicine using [employer]     2. Parties in the room with patient during teleconsult Family member: son     3. Confidentiality My office door was closed     4. Participants No one else was in the room    5. Patient acknowledged consent and understanding of privacy and security of the  Telemedicine consult. I informed the patient that I have reviewed their record in Epic and presented the opportunity for them to ask any questions regarding the visit today.  The patient agreed to participate.    6. I have spent a total time of 90 minutes in caring for this patient on the day of the  visit/encounter including Diagnostic results, Patient and family education, Impressions, Counseling / Coordination of care, Documenting in the medical record, Reviewing / ordering tests, medicine, procedures  , Obtaining or reviewing history  , and Communicating with other healthcare professionals .

## 2024-09-20 NOTE — ASSESSMENT & PLAN NOTE
Small bowel obstruction versus ileus in the setting of constipation  She has had several bowel movements with Dulcolax suppository daily and milk of molasses enema x 1 on 9/19/2024  NG tube been removed  Repeat chest x-ray on 9/19/2024 showed improvement in  stomach distention  Hold advancing diet  due to problem #1 above

## 2024-09-20 NOTE — ASSESSMENT & PLAN NOTE
Lab Results   Component Value Date    EGFR 60 09/20/2024    EGFR 47 09/19/2024    EGFR 30 09/16/2024    CREATININE 0.90 09/20/2024    CREATININE 1.09 09/19/2024    CREATININE 1.57 (H) 09/16/2024

## 2024-09-20 NOTE — ASSESSMENT & PLAN NOTE
Lab Results   Component Value Date    HGBA1C 7.9 (A) 07/02/2024     Blood sugar stable.  Continue sliding scale insulin for now

## 2024-09-20 NOTE — RAPID RESPONSE
Rapid Response Note  Porsha Hoyos 79 y.o. female MRN: [id number]  Unit/Bed#: 7T [facility] 717-01 Encounter: [id number]    Rapid Response Notification(s):   Response called date/time:  9/20/2024 8:42 AM  Level of care:  [facility]  Rapid response location:  [facility] unit  Primary reason for rapid response call:  Acute change in neuro status    Rapid Response Intervention(s):   Airway:  None  Breathing:  None  Circulation:  None  Fluids administered:  None  Medications administered:  None       Assessment:   Change in mental status with weakness in the extremities, Ddx include MCA although  it's unlikely because course with unilateral weakness verus hemorrhagic stroke base on the location can present weakness in the face, speaking and sensitivity which could not be assess in this patient.     Plan:   CT scan head and neck  Neurology consult  Stroke protocol pathway      Rapid Response Outcome:   Transfer:  Remain on floor  Code Status: Level 1 (Full Code)      Family notified:      Background/Situation:   Porsha Hoyos is a 79 y.o. female with PMH of with HTN, HLD, DM  who presents with mental acute changes in mental status. The patient has been hospitalized for bacteremia/osteomyelitis and SBO.     Review of Systems  Unable to perform complete P. E due to mental status changes      Objective:   Vitals:    09/19/24 0749 09/19/24 1537 09/20/24 0009 09/20/24 0706   BP: 166/68 166/66 162/70 (!) 173/56   BP Location: Left arm Left arm Left arm Left arm   Pulse: 89 87 88 87   Resp: 18 18 18 16   Temp: (!) 97.3 °F (36.3 °C) 98.2 °F (36.8 °C) 98.8 °F (37.1 °C) (!) 97.1 °F (36.2 °C)   TempSrc: Temporal Temporal Temporal Temporal   SpO2: 94% 96% 96% 93%   Weight:       Height:         Physical Exam  Neurologic: Reponse to command. No able to move her extremities Treva coma score: 11 points  Cardiovascular: Regular rhythm, no murmurs

## 2024-09-20 NOTE — ASSESSMENT & PLAN NOTE
80 y/o F with HTN, HLD, DM, and prior R pontine stroke that presents with acute change in mental status following recent admission for bacteremia/osteomyelitis and SBO. MRI without evidence of stroke contributing. Suspect hypertensive/toxic-metabolic encephalopathy. Mental status now improved.    -continue neurochecks; notify with changes  -HCT reviewed - apparent acute ischemia in R insula; chronic R duke stroke  -CTA reviewed - no significant/acute vascular abnormality; moderate b/l intracranial ICA and vert stenoses  -MRI brain w/o contrast reviewed - no evidence of acute ischemia  -telemetry; apparent L atrial dilation on CTA with high likelihood for underlying Afib  -continue ASA 81mg daily  -continue home statin  -goal BP normotension with gradual reduction  -toxic/metabolic and infectious w/u as per primary team  -no further recs at this time; call with questions

## 2024-09-20 NOTE — ASSESSMENT & PLAN NOTE
Right forearm swelling with right upper arm PICC in place.  The PICC itself is functioning properly  She had an upper extremity venous Doppler on 9/16/2024 which was negative for DVT  There is no distal discoloration or pain  Right forearm swelling may just be mechanical due to the presence of PICC  Continue elevation and observation.

## 2024-09-20 NOTE — ASSESSMENT & PLAN NOTE
Overall stable  Discitis/osteomyelitis of the lumbar spine in the setting of bacteremia   Continue IV cefazolin 2 g every 12 until 10/21/2024, 6 weeks total  Eventual return to [facility] to continue rehab

## 2024-09-20 NOTE — PROGRESS NOTES
Discussed with internal medicine.    Change in mental status.  Bilateral upper and lower extremity weakness.    CT scan of the head demonstrates what may be a right sided CVA.    MRI pending of the brain pending.    As per internal medicine and neurology.

## 2024-09-21 ENCOUNTER — APPOINTMENT (INPATIENT)
Dept: RADIOLOGY | Facility: HOSPITAL | Age: 79
DRG: 178 | End: 2024-09-21
Payer: COMMERCIAL

## 2024-09-21 PROBLEM — I67.4 HYPERTENSIVE ENCEPHALOPATHY: Status: ACTIVE | Noted: 2024-09-20

## 2024-09-21 LAB
4HR DELTA HS TROPONIN: -4 NG/L
ANION GAP SERPL CALCULATED.3IONS-SCNC: 9 MMOL/L (ref 4–13)
BASOPHILS # BLD AUTO: 0.03 THOUSANDS/ΜL (ref 0–0.1)
BASOPHILS NFR BLD AUTO: 0 % (ref 0–1)
BUN SERPL-MCNC: 30 MG/DL (ref 5–25)
CALCIUM SERPL-MCNC: 9.5 MG/DL (ref 8.4–10.2)
CARDIAC TROPONIN I PNL SERPL HS: 25 NG/L
CHLORIDE SERPL-SCNC: 113 MMOL/L (ref 96–108)
CO2 SERPL-SCNC: 22 MMOL/L (ref 21–32)
CREAT SERPL-MCNC: 0.78 MG/DL (ref 0.6–1.3)
EOSINOPHIL # BLD AUTO: 0.11 THOUSAND/ΜL (ref 0–0.61)
EOSINOPHIL NFR BLD AUTO: 1 % (ref 0–6)
ERYTHROCYTE [DISTWIDTH] IN BLOOD BY AUTOMATED COUNT: 14.8 % (ref 11.6–15.1)
GFR SERPL CREATININE-BSD FRML MDRD: 71 ML/MIN/1.73SQ M
GLUCOSE SERPL-MCNC: 198 MG/DL (ref 65–140)
GLUCOSE SERPL-MCNC: 224 MG/DL (ref 65–140)
GLUCOSE SERPL-MCNC: 235 MG/DL (ref 65–140)
GLUCOSE SERPL-MCNC: 250 MG/DL (ref 65–140)
GLUCOSE SERPL-MCNC: 256 MG/DL (ref 65–140)
GLUCOSE SERPL-MCNC: 258 MG/DL (ref 65–140)
GLUCOSE SERPL-MCNC: 265 MG/DL (ref 65–140)
HCT VFR BLD AUTO: 28.6 % (ref 34.8–46.1)
HGB BLD-MCNC: 9.1 G/DL (ref 11.5–15.4)
IMM GRANULOCYTES # BLD AUTO: 0.13 THOUSAND/UL (ref 0–0.2)
IMM GRANULOCYTES NFR BLD AUTO: 2 % (ref 0–2)
LYMPHOCYTES # BLD AUTO: 0.88 THOUSANDS/ΜL (ref 0.6–4.47)
LYMPHOCYTES NFR BLD AUTO: 10 % (ref 14–44)
MCH RBC QN AUTO: 31.9 PG (ref 26.8–34.3)
MCHC RBC AUTO-ENTMCNC: 31.8 G/DL (ref 31.4–37.4)
MCV RBC AUTO: 100 FL (ref 82–98)
MONOCYTES # BLD AUTO: 0.6 THOUSAND/ΜL (ref 0.17–1.22)
MONOCYTES NFR BLD AUTO: 7 % (ref 4–12)
NEUTROPHILS # BLD AUTO: 7 THOUSANDS/ΜL (ref 1.85–7.62)
NEUTS SEG NFR BLD AUTO: 80 % (ref 43–75)
NRBC BLD AUTO-RTO: 0 /100 WBCS
PLATELET # BLD AUTO: 394 THOUSANDS/UL (ref 149–390)
PMV BLD AUTO: 9.9 FL (ref 8.9–12.7)
POTASSIUM SERPL-SCNC: 4.9 MMOL/L (ref 3.5–5.3)
RBC # BLD AUTO: 2.85 MILLION/UL (ref 3.81–5.12)
SODIUM SERPL-SCNC: 144 MMOL/L (ref 135–147)
WBC # BLD AUTO: 8.75 THOUSAND/UL (ref 4.31–10.16)

## 2024-09-21 PROCEDURE — 84484 ASSAY OF TROPONIN QUANT: CPT

## 2024-09-21 PROCEDURE — 85025 COMPLETE CBC W/AUTO DIFF WBC: CPT | Performed by: INTERNAL MEDICINE

## 2024-09-21 PROCEDURE — 82948 REAGENT STRIP/BLOOD GLUCOSE: CPT

## 2024-09-21 PROCEDURE — 74022 RADEX COMPL AQT ABD SERIES: CPT

## 2024-09-21 PROCEDURE — 80048 BASIC METABOLIC PNL TOTAL CA: CPT | Performed by: INTERNAL MEDICINE

## 2024-09-21 PROCEDURE — 99232 SBSQ HOSP IP/OBS MODERATE 35: CPT | Performed by: INTERNAL MEDICINE

## 2024-09-21 RX ORDER — AMOXICILLIN 250 MG
2 CAPSULE ORAL
Status: DISCONTINUED | OUTPATIENT
Start: 2024-09-21 | End: 2024-09-25 | Stop reason: HOSPADM

## 2024-09-21 RX ORDER — WATER 10 ML/10ML
INJECTION INTRAMUSCULAR; INTRAVENOUS; SUBCUTANEOUS
Status: COMPLETED
Start: 2024-09-21 | End: 2024-09-21

## 2024-09-21 RX ORDER — ALBUTEROL SULFATE 90 UG/1
2 INHALANT RESPIRATORY (INHALATION) EVERY 4 HOURS PRN
Status: DISCONTINUED | OUTPATIENT
Start: 2024-09-21 | End: 2024-09-25 | Stop reason: HOSPADM

## 2024-09-21 RX ORDER — CARVEDILOL 12.5 MG/1
25 TABLET ORAL 2 TIMES DAILY WITH MEALS
Status: DISCONTINUED | OUTPATIENT
Start: 2024-09-21 | End: 2024-09-25 | Stop reason: HOSPADM

## 2024-09-21 RX ORDER — INSULIN LISPRO 100 [IU]/ML
1-5 INJECTION, SOLUTION INTRAVENOUS; SUBCUTANEOUS
Status: DISCONTINUED | OUTPATIENT
Start: 2024-09-22 | End: 2024-09-25 | Stop reason: HOSPADM

## 2024-09-21 RX ORDER — BISACODYL 10 MG
10 SUPPOSITORY, RECTAL RECTAL DAILY PRN
Status: DISCONTINUED | OUTPATIENT
Start: 2024-09-21 | End: 2024-09-25 | Stop reason: HOSPADM

## 2024-09-21 RX ORDER — INSULIN GLARGINE 100 [IU]/ML
25 INJECTION, SOLUTION SUBCUTANEOUS
Status: DISCONTINUED | OUTPATIENT
Start: 2024-09-21 | End: 2024-09-22

## 2024-09-21 RX ORDER — INSULIN LISPRO 100 [IU]/ML
1-5 INJECTION, SOLUTION INTRAVENOUS; SUBCUTANEOUS
Status: DISCONTINUED | OUTPATIENT
Start: 2024-09-21 | End: 2024-09-25 | Stop reason: HOSPADM

## 2024-09-21 RX ADMIN — CARVEDILOL 25 MG: 12.5 TABLET, FILM COATED ORAL at 08:06

## 2024-09-21 RX ADMIN — ALTEPLASE 2 MG: 2.2 INJECTION, POWDER, LYOPHILIZED, FOR SOLUTION INTRAVENOUS at 11:24

## 2024-09-21 RX ADMIN — INSULIN LISPRO 2 UNITS: 100 INJECTION, SOLUTION INTRAVENOUS; SUBCUTANEOUS at 11:40

## 2024-09-21 RX ADMIN — HEPARIN SODIUM 5000 UNITS: 5000 INJECTION INTRAVENOUS; SUBCUTANEOUS at 16:07

## 2024-09-21 RX ADMIN — BUDESONIDE AND FORMOTEROL FUMARATE DIHYDRATE 2 PUFF: 160; 4.5 AEROSOL RESPIRATORY (INHALATION) at 08:07

## 2024-09-21 RX ADMIN — INSULIN GLARGINE 25 UNITS: 100 INJECTION, SOLUTION SUBCUTANEOUS at 21:56

## 2024-09-21 RX ADMIN — Medication 2 G: at 08:05

## 2024-09-21 RX ADMIN — LOSARTAN POTASSIUM 25 MG: 25 TABLET, FILM COATED ORAL at 08:06

## 2024-09-21 RX ADMIN — BUDESONIDE AND FORMOTEROL FUMARATE DIHYDRATE 2 PUFF: 160; 4.5 AEROSOL RESPIRATORY (INHALATION) at 17:28

## 2024-09-21 RX ADMIN — INSULIN LISPRO 1 UNITS: 100 INJECTION, SOLUTION INTRAVENOUS; SUBCUTANEOUS at 01:51

## 2024-09-21 RX ADMIN — HEPARIN SODIUM 5000 UNITS: 5000 INJECTION INTRAVENOUS; SUBCUTANEOUS at 08:06

## 2024-09-21 RX ADMIN — WATER: 1 INJECTION INTRAMUSCULAR; INTRAVENOUS; SUBCUTANEOUS at 08:06

## 2024-09-21 RX ADMIN — ASPIRIN 81 MG: 81 TABLET, COATED ORAL at 08:06

## 2024-09-21 RX ADMIN — INSULIN LISPRO 2 UNITS: 100 INJECTION, SOLUTION INTRAVENOUS; SUBCUTANEOUS at 17:28

## 2024-09-21 RX ADMIN — HEPARIN SODIUM 5000 UNITS: 5000 INJECTION INTRAVENOUS; SUBCUTANEOUS at 21:56

## 2024-09-21 RX ADMIN — HYDRALAZINE HYDROCHLORIDE 5 MG: 20 INJECTION INTRAMUSCULAR; INTRAVENOUS at 08:33

## 2024-09-21 RX ADMIN — INSULIN LISPRO 2 UNITS: 100 INJECTION, SOLUTION INTRAVENOUS; SUBCUTANEOUS at 21:55

## 2024-09-21 RX ADMIN — CARVEDILOL 25 MG: 12.5 TABLET, FILM COATED ORAL at 16:06

## 2024-09-21 RX ADMIN — Medication 2 G: at 21:56

## 2024-09-21 RX ADMIN — Medication 2 G: at 17:28

## 2024-09-21 RX ADMIN — LIDOCAINE 1 PATCH: 700 PATCH TOPICAL at 08:05

## 2024-09-21 RX ADMIN — ALTEPLASE 2 MG: 2.2 INJECTION, POWDER, LYOPHILIZED, FOR SOLUTION INTRAVENOUS at 07:55

## 2024-09-21 RX ADMIN — CEFAZOLIN SODIUM 2000 MG: 2 SOLUTION INTRAVENOUS at 05:09

## 2024-09-21 RX ADMIN — SENNOSIDES AND DOCUSATE SODIUM 2 TABLET: 50; 8.6 TABLET ORAL at 21:56

## 2024-09-21 RX ADMIN — INSULIN LISPRO 1 UNITS: 100 INJECTION, SOLUTION INTRAVENOUS; SUBCUTANEOUS at 05:22

## 2024-09-21 RX ADMIN — ATORVASTATIN CALCIUM 80 MG: 80 TABLET, FILM COATED ORAL at 16:06

## 2024-09-21 RX ADMIN — CEFAZOLIN SODIUM 2000 MG: 2 SOLUTION INTRAVENOUS at 17:28

## 2024-09-21 NOTE — ASSESSMENT & PLAN NOTE
Overall stable  Discitis/osteomyelitis of the lumbar spine in the setting of bacteremia   Continue IV cefazolin 2 g every 12 until 10/21/2024, 6 weeks total  Eventual return to [facility] to continue rehab

## 2024-09-21 NOTE — PLAN OF CARE
Problem: GASTROINTESTINAL - ADULT  Goal: Minimal or absence of nausea and/or vomiting  Description: INTERVENTIONS:  - Administer IV fluids if ordered to ensure adequate hydration  - Maintain NPO status until nausea and vomiting are resolved  - Nasogastric tube if ordered  - Administer ordered antiemetic medications as needed  - Provide nonpharmacologic comfort measures as appropriate  - Advance diet as tolerated, if ordered  - Consider nutrition services referral to assist patient with adequate nutrition and appropriate food choices  Outcome: Progressing     Problem: GASTROINTESTINAL - ADULT  Goal: Maintains adequate nutritional intake  Description: INTERVENTIONS:  - Monitor percentage of each meal consumed  - Identify factors contributing to decreased intake, treat as appropriate  - Assist with meals as needed  - Monitor I&O, weight, and lab values if indicated  - Obtain nutrition services referral as needed  Outcome: Progressing     Problem: METABOLIC, FLUID AND ELECTROLYTES - ADULT  Goal: Glucose maintained within target range  Description: INTERVENTIONS:  - Monitor Blood Glucose as ordered  - Assess for signs and symptoms of hyperglycemia and hypoglycemia  - Administer ordered medications to maintain glucose within target range  - Assess nutritional intake and initiate nutrition service referral as needed  Outcome: Progressing     Problem: INFECTION - ADULT  Goal: Absence or prevention of progression during hospitalization  Description: INTERVENTIONS:  - Assess and monitor for signs and symptoms of infection  - Monitor lab/diagnostic results  - Monitor all insertion sites, i.e. indwelling lines, tubes, and drains  - Monitor endotracheal if appropriate and nasal secretions for changes in amount and color  - [place] appropriate cooling/warming therapies per order  - Administer medications as ordered  - Instruct and encourage patient and family to use good hand hygiene technique  - Identify and instruct in  appropriate isolation precautions for identified infection/condition  Outcome: Progressing     Problem: PAIN - ADULT  Goal: Verbalizes/displays adequate comfort level or baseline comfort level  Description: Interventions:  - Encourage patient to monitor pain and request assistance  - Assess pain using appropriate pain scale  - Administer analgesics based on type and severity of pain and evaluate response  - Implement non-pharmacological measures as appropriate and evaluate response  - Consider cultural and social influences on pain and pain management  - Notify physician/advanced practitioner if interventions unsuccessful or patient reports new pain  Outcome: Progressing     Problem: Prexisting or High Potential for Compromised Skin Integrity  Goal: Skin integrity is maintained or improved  Description: INTERVENTIONS:  - Identify patients at risk for skin breakdown  - Assess and monitor skin integrity  - Assess and monitor nutrition and hydration status  - Monitor labs   - Assess for incontinence   - Turn and reposition patient  - Assist with mobility/ambulation  - Relieve pressure over bony prominences  - Avoid friction and shearing  - Provide appropriate hygiene as needed including keeping skin clean and dry  - Evaluate need for skin moisturizer/barrier cream  - Collaborate with interdisciplinary team   - Patient/family teaching  - Consider wound care consult   Outcome: Progressing     Problem: Knowledge Deficit  Goal: Patient/family/caregiver demonstrates understanding of disease process, treatment plan, medications, and discharge instructions  Description: Complete learning assessment and assess knowledge base.  Interventions:  - Provide teaching at level of understanding  - Provide teaching via preferred learning methods  Outcome: Progressing     Problem: DISCHARGE PLANNING  Goal: Discharge to home or other facility with appropriate resources  Description: INTERVENTIONS:  - Identify barriers to discharge  w/patient and caregiver  - Arrange for needed discharge resources and transportation as appropriate  - Identify discharge learning needs (meds, wound care, etc.)  - Arrange for interpretive services to assist at discharge as needed  - Refer to Case Management Department for coordinating discharge planning if the patient needs post-hospital services based on physician/advanced practitioner order or complex needs related to functional status, cognitive ability, or social support system  Outcome: Progressing

## 2024-09-21 NOTE — ASSESSMENT & PLAN NOTE
Increased rease Coreg to 25 mg twice daily   if still uncontrolled, increase losartan to 50 mg daily

## 2024-09-21 NOTE — PROGRESS NOTES
Progress Note - Hospitalist   Name: Porsha Hoyos 79 y.o. female I MRN: [id number]  Unit/Bed#: 7T [facility] 717-01 I Date of Admission: 9/18/2024   Date of Service: 9/21/2024 I Hospital Day: 3    Assessment & Plan  SBO (small bowel obstruction) (HCC)  Small bowel obstruction versus ileus in the setting of constipation  Clinically improved  She has had several large bowel movement  Tolerating clears  Follow-up repeat abdominal obstruction series.  If SBO has resolved, advance diet  Hypertensive encephalopathy  She had a change in mental status requiring a rapid response and stroke alert yesterday  Stroke has been ruled out  The change in mental status  was felt to be from hypertensive encephalopathy  Increased Coreg to 25 mg twice daily  Added hydralazine as needed  Osteomyelitis (HCC)  Overall stable  Discitis/osteomyelitis of the lumbar spine in the setting of bacteremia   Continue IV cefazolin 2 g every 12 until 10/21/2024, 6 weeks total  Eventual return to [facility] to continue rehab  Swelling of forearm  Right forearm swelling with right upper arm PICC in place.  The PICC itself is functioning properly  She had an upper extremity venous Doppler on 9/16/2024 which was negative for DVT  There is no distal discoloration or pain  Right forearm swelling may just be mechanical due to the presence of PICC  Continue elevation and observation.  Mixed hyperlipidemia  Await chest abdominal x-ray  Resume Lipitor  Type 2 diabetes mellitus with diabetic chronic kidney disease (HCC)  Lab Results   Component Value Date    HGBA1C 7.9 (A) 07/02/2024     Blood sugar stable.  Continue sliding scale insulin for now  Resume Lantus when diet is advanced  Primary hypertension  Increased rease Coreg to 25 mg twice daily   if still uncontrolled, increase losartan to 50 mg daily      VTE Pharmacologic Prophylaxis: VTE Score: 3 Moderate Risk (Score 3-4) - Pharmacological DVT Prophylaxis Ordered: heparin.    Patient Centered Rounds: I performed  bedside rounds with nursing staff today.   Discussions with Specialists or Other Care Team Provider: surgery    Current Length of Stay: 3 day(s)  Current Patient Status: Inpatient   Certification Statement: The patient will continue to require additional inpatient hospital stay due to htn  Discharge Plan: Anticipate discharge in 24-48 hrs to rehab facility.    Code Status: Level 1 - Full Code    Subjective   + Hungry  + More solid large bowel movement reported overnight    Objective     Vitals:   Temp (24hrs), Av.3 °F (36.3 °C), Min:97.2 °F (36.2 °C), Max:97.4 °F (36.3 °C)    Temp:  [97.2 °F (36.2 °C)-97.4 °F (36.3 °C)] 97.4 °F (36.3 °C)  HR:  [85-93] 85  Resp:  [18-20] 18  BP: (185-204)/(60-96) 204/96  SpO2:  [96 %] 96 %  Body mass index is 30.77 kg/m².     Input and Output Summary (last 24 hours):   No intake or output data in the 24 hours ending 24 1019    Physical Exam  Vitals reviewed.   Constitutional:       Appearance: She is not ill-appearing.   HENT:      Head: Normocephalic and atraumatic.      Nose: No congestion or rhinorrhea.   Eyes:      General: No scleral icterus.  Cardiovascular:      Rate and Rhythm: Normal rate and regular rhythm.   Pulmonary:      Breath sounds: No wheezing.   Abdominal:      Palpations: Abdomen is soft.      Tenderness: There is no abdominal tenderness. There is no guarding.   Musculoskeletal:      Comments: Right  upper arm PICC noted.  There is mild swelling of the forearm with no redness, tenderness or  cyanosis.   Neurological:      Comments: More awake and alert  Soft extremities  Speech fluent         Lines/Drains:  Lines/Drains/Airways       Active Status       Name Placement date Placement time Site Days    PICC Line 24 Right Brachial 24  1222  Brachial  7                    Central Line:  Goal for removal: N/A - Chronic PICC         Telemetry:  Telemetry Orders (From admission, onward)               24 Hour Telemetry Monitoring  Continuous x 24  Hours (Telem)        Question:  Reason for 24 Hour Telemetry  Answer:  TIA/Suspected CVA/ Confirmed CVA                                  Lab Results: I have reviewed the following results:    Results from last 7 days   Lab Units 09/21/24  0814 09/19/24  0534 09/16/24  0546   WBC Thousand/uL 8.75   < > 10.39*   HEMOGLOBIN g/dL 9.1*   < > 10.0*   HEMATOCRIT % 28.6*   < > 29.5*   PLATELETS Thousands/uL 394*   < > 298   BANDS PCT %  --   --  1   SEGS PCT % 80*   < >  --    LYMPHO PCT % 10*   < > 14   MONO PCT % 7   < > 5   EOS PCT % 1   < > 2    < > = values in this interval not displayed.     Results from last 7 days   Lab Units 09/21/24  0814   SODIUM mmol/L 144   POTASSIUM mmol/L 4.9   CHLORIDE mmol/L 113*   CO2 mmol/L 22   BUN mg/dL 30*   CREATININE mg/dL 0.78   ANION GAP mmol/L 9   CALCIUM mg/dL 9.5   GLUCOSE RANDOM mg/dL 235*         Results from last 7 days   Lab Units 09/21/24  0517 09/21/24  0136 09/20/24  1753 09/20/24  1432 09/20/24  0844 09/20/24  0539 09/20/24  0008 09/19/24  1750 09/19/24  1153 09/19/24  0535 09/19/24  0003 09/18/24  1748   POC GLUCOSE mg/dl 224* 198* 264* 194* 178* 131 166* 216* 112 97 111 113               Recent Cultures (last 7 days):         Imaging Review: Reviewed radiology reports from this admission including: MRI brain and xray(s).      Last 24 Hours Medication List:     Current Facility-Administered Medications:     albuterol (PROVENTIL HFA,VENTOLIN HFA) inhaler 2 puff, Q4H PRN    alteplase (CATHFLO) injection 2 mg, Once    aspirin (ECOTRIN LOW STRENGTH) EC tablet 81 mg, Daily    atorvastatin (LIPITOR) tablet 80 mg, Daily With Dinner    bisacodyl (DULCOLAX) rectal suppository 10 mg, Daily    budesonide-formoterol (SYMBICORT) 160-4.5 mcg/act inhaler 2 puff, BID    carvedilol (COREG) tablet 25 mg, BID With Meals    ceFAZolin (ANCEF) IVPB (premix in dextrose) 2,000 mg 50 mL, Q12H, Last Rate: 2,000 mg (09/21/24 3035)    Diclofenac Sodium (VOLTAREN) 1 % topical gel 2 g, 4x Daily     heparin (porcine) subcutaneous injection 5,000 Units, TID    hydrALAZINE (APRESOLINE) injection 5 mg, Q6H PRN    insulin lispro (HumALOG/ADMELOG) 100 units/mL subcutaneous injection 1-5 Units, Q6H KORI **AND** Fingerstick Glucose (POCT), Q6H    lidocaine (LIDODERM) 5 % patch 1 patch, Daily    losartan (COZAAR) tablet 25 mg, Daily    Administrative Statements   Today, Patient Was Seen By: Jared Gomez MD      **Please Note: This note may have been constructed using a voice recognition system.**

## 2024-09-21 NOTE — ASSESSMENT & PLAN NOTE
She had a change in mental status requiring a rapid response and stroke alert yesterday  Stroke has been ruled out  The change in mental status  was felt to be from hypertensive encephalopathy  Increased Coreg to 25 mg twice daily  Added hydralazine as needed

## 2024-09-21 NOTE — ASSESSMENT & PLAN NOTE
Lab Results   Component Value Date    HGBA1C 7.9 (A) 07/02/2024     Blood sugar stable.  Continue sliding scale insulin for now  Resume Lantus when diet is advanced

## 2024-09-21 NOTE — ASSESSMENT & PLAN NOTE
Small bowel obstruction versus ileus in the setting of constipation  Clinically improved  She has had several large bowel movement  Tolerating clears  Follow-up repeat abdominal obstruction series.  If SBO has resolved, advance diet

## 2024-09-22 LAB
ANION GAP SERPL CALCULATED.3IONS-SCNC: 6 MMOL/L (ref 4–13)
BASOPHILS # BLD AUTO: 0.02 THOUSANDS/ΜL (ref 0–0.1)
BASOPHILS NFR BLD AUTO: 0 % (ref 0–1)
BUN SERPL-MCNC: 28 MG/DL (ref 5–25)
CALCIUM SERPL-MCNC: 9.3 MG/DL (ref 8.4–10.2)
CHLORIDE SERPL-SCNC: 112 MMOL/L (ref 96–108)
CO2 SERPL-SCNC: 25 MMOL/L (ref 21–32)
CREAT SERPL-MCNC: 0.74 MG/DL (ref 0.6–1.3)
EOSINOPHIL # BLD AUTO: 0.2 THOUSAND/ΜL (ref 0–0.61)
EOSINOPHIL NFR BLD AUTO: 3 % (ref 0–6)
ERYTHROCYTE [DISTWIDTH] IN BLOOD BY AUTOMATED COUNT: 14.7 % (ref 11.6–15.1)
GFR SERPL CREATININE-BSD FRML MDRD: 76 ML/MIN/1.73SQ M
GLUCOSE SERPL-MCNC: 172 MG/DL (ref 65–140)
GLUCOSE SERPL-MCNC: 186 MG/DL (ref 65–140)
GLUCOSE SERPL-MCNC: 188 MG/DL (ref 65–140)
GLUCOSE SERPL-MCNC: 206 MG/DL (ref 65–140)
GLUCOSE SERPL-MCNC: 242 MG/DL (ref 65–140)
HCT VFR BLD AUTO: 27.3 % (ref 34.8–46.1)
HGB BLD-MCNC: 9 G/DL (ref 11.5–15.4)
IMM GRANULOCYTES # BLD AUTO: 0.06 THOUSAND/UL (ref 0–0.2)
IMM GRANULOCYTES NFR BLD AUTO: 1 % (ref 0–2)
LYMPHOCYTES # BLD AUTO: 0.76 THOUSANDS/ΜL (ref 0.6–4.47)
LYMPHOCYTES NFR BLD AUTO: 11 % (ref 14–44)
MCH RBC QN AUTO: 31.5 PG (ref 26.8–34.3)
MCHC RBC AUTO-ENTMCNC: 33 G/DL (ref 31.4–37.4)
MCV RBC AUTO: 96 FL (ref 82–98)
MONOCYTES # BLD AUTO: 0.55 THOUSAND/ΜL (ref 0.17–1.22)
MONOCYTES NFR BLD AUTO: 8 % (ref 4–12)
NEUTROPHILS # BLD AUTO: 5.17 THOUSANDS/ΜL (ref 1.85–7.62)
NEUTS SEG NFR BLD AUTO: 77 % (ref 43–75)
NRBC BLD AUTO-RTO: 0 /100 WBCS
PLATELET # BLD AUTO: 344 THOUSANDS/UL (ref 149–390)
PMV BLD AUTO: 10 FL (ref 8.9–12.7)
POTASSIUM SERPL-SCNC: 4.3 MMOL/L (ref 3.5–5.3)
RBC # BLD AUTO: 2.86 MILLION/UL (ref 3.81–5.12)
SODIUM SERPL-SCNC: 143 MMOL/L (ref 135–147)
WBC # BLD AUTO: 6.76 THOUSAND/UL (ref 4.31–10.16)

## 2024-09-22 PROCEDURE — 99232 SBSQ HOSP IP/OBS MODERATE 35: CPT | Performed by: INTERNAL MEDICINE

## 2024-09-22 PROCEDURE — 80048 BASIC METABOLIC PNL TOTAL CA: CPT | Performed by: INTERNAL MEDICINE

## 2024-09-22 PROCEDURE — 85025 COMPLETE CBC W/AUTO DIFF WBC: CPT | Performed by: INTERNAL MEDICINE

## 2024-09-22 PROCEDURE — 82948 REAGENT STRIP/BLOOD GLUCOSE: CPT

## 2024-09-22 RX ORDER — INSULIN GLARGINE 100 [IU]/ML
30 INJECTION, SOLUTION SUBCUTANEOUS
Status: DISCONTINUED | OUTPATIENT
Start: 2024-09-22 | End: 2024-09-25 | Stop reason: HOSPADM

## 2024-09-22 RX ORDER — LOSARTAN POTASSIUM 50 MG/1
100 TABLET ORAL DAILY
Status: DISCONTINUED | OUTPATIENT
Start: 2024-09-22 | End: 2024-09-25 | Stop reason: HOSPADM

## 2024-09-22 RX ORDER — LOSARTAN POTASSIUM 50 MG/1
50 TABLET ORAL DAILY
Status: DISCONTINUED | OUTPATIENT
Start: 2024-09-22 | End: 2024-09-22

## 2024-09-22 RX ADMIN — SENNOSIDES AND DOCUSATE SODIUM 2 TABLET: 50; 8.6 TABLET ORAL at 22:18

## 2024-09-22 RX ADMIN — Medication 2 G: at 12:32

## 2024-09-22 RX ADMIN — CARVEDILOL 25 MG: 12.5 TABLET, FILM COATED ORAL at 17:14

## 2024-09-22 RX ADMIN — INSULIN LISPRO 2 UNITS: 100 INJECTION, SOLUTION INTRAVENOUS; SUBCUTANEOUS at 22:18

## 2024-09-22 RX ADMIN — INSULIN GLARGINE 30 UNITS: 100 INJECTION, SOLUTION SUBCUTANEOUS at 22:18

## 2024-09-22 RX ADMIN — LOSARTAN POTASSIUM 100 MG: 50 TABLET, FILM COATED ORAL at 08:28

## 2024-09-22 RX ADMIN — HEPARIN SODIUM 5000 UNITS: 5000 INJECTION INTRAVENOUS; SUBCUTANEOUS at 08:29

## 2024-09-22 RX ADMIN — CARVEDILOL 25 MG: 12.5 TABLET, FILM COATED ORAL at 08:29

## 2024-09-22 RX ADMIN — ATORVASTATIN CALCIUM 80 MG: 80 TABLET, FILM COATED ORAL at 17:14

## 2024-09-22 RX ADMIN — INSULIN LISPRO 1 UNITS: 100 INJECTION, SOLUTION INTRAVENOUS; SUBCUTANEOUS at 06:05

## 2024-09-22 RX ADMIN — CEFAZOLIN SODIUM 2000 MG: 2 SOLUTION INTRAVENOUS at 17:14

## 2024-09-22 RX ADMIN — HEPARIN SODIUM 5000 UNITS: 5000 INJECTION INTRAVENOUS; SUBCUTANEOUS at 17:14

## 2024-09-22 RX ADMIN — ASPIRIN 81 MG: 81 TABLET, COATED ORAL at 08:29

## 2024-09-22 RX ADMIN — HEPARIN SODIUM 5000 UNITS: 5000 INJECTION INTRAVENOUS; SUBCUTANEOUS at 22:18

## 2024-09-22 RX ADMIN — BUDESONIDE AND FORMOTEROL FUMARATE DIHYDRATE 2 PUFF: 160; 4.5 AEROSOL RESPIRATORY (INHALATION) at 08:44

## 2024-09-22 RX ADMIN — LIDOCAINE 1 PATCH: 700 PATCH TOPICAL at 08:30

## 2024-09-22 RX ADMIN — INSULIN LISPRO 1 UNITS: 100 INJECTION, SOLUTION INTRAVENOUS; SUBCUTANEOUS at 17:17

## 2024-09-22 RX ADMIN — Medication 2 G: at 17:28

## 2024-09-22 RX ADMIN — INSULIN LISPRO 1 UNITS: 100 INJECTION, SOLUTION INTRAVENOUS; SUBCUTANEOUS at 12:32

## 2024-09-22 RX ADMIN — CEFAZOLIN SODIUM 2000 MG: 2 SOLUTION INTRAVENOUS at 05:34

## 2024-09-22 RX ADMIN — BUDESONIDE AND FORMOTEROL FUMARATE DIHYDRATE 2 PUFF: 160; 4.5 AEROSOL RESPIRATORY (INHALATION) at 17:19

## 2024-09-22 RX ADMIN — HYDRALAZINE HYDROCHLORIDE 5 MG: 20 INJECTION INTRAMUSCULAR; INTRAVENOUS at 22:57

## 2024-09-22 RX ADMIN — Medication 2 G: at 08:39

## 2024-09-22 RX ADMIN — Medication 2 G: at 22:18

## 2024-09-22 NOTE — ASSESSMENT & PLAN NOTE
Small bowel obstruction versus ileus in the setting of constipation  Clinically  and radiologically improved  Diet advanced  Added Debbie-Colace  on top of Dulcolax for bowel regimen  Avoid narcotics if possible

## 2024-09-22 NOTE — ASSESSMENT & PLAN NOTE
Lab Results   Component Value Date    HGBA1C 7.9 (A) 07/02/2024     Continue sliding scale insulin for now  Lantus restarted at that she is back on diabetic diet.  Increase to 30 units at bedtime tonight

## 2024-09-22 NOTE — PROGRESS NOTES
Progress Note - Hospitalist   Name: Posrha Hoyos 79 y.o. female I MRN: [id number]  Unit/Bed#: 7T [facility] 717-01 I Date of Admission: 9/18/2024   Date of Service: 9/22/2024 I Hospital Day: 4    Assessment & Plan  SBO (small bowel obstruction) ([facility])  Small bowel obstruction versus ileus in the setting of constipation  Clinically  and radiologically improved  Diet advanced  Added Debbie-Colace  on top of Dulcolax for bowel regimen  Avoid narcotics if possible  Hypertensive encephalopathy  She had a change in mental status requiring a rapid response and stroke alert on 9/20/2024  Stroke has been ruled out  The change in mental status  was felt to be from hypertensive encephalopathy  Increased Coreg to 25 mg twice daily  losartan dose also increased to home dose 100 mg daily   Hydralazine as needed for systolic blood pressure more than 180  Discitis of lumbar region  Overall stable  Discitis/osteomyelitis of the lumbar spine in the setting of bacteremia   Continue IV cefazolin 2 g every 12 until 10/21/2024, 6 weeks total  Eventual return to [facility] to continue rehab  Swelling of forearm  Right forearm swelling with right upper arm PICC in place.  The PICC itself is functioning properly  She had an upper extremity venous Doppler on 9/16/2024 which was negative for DVT  There is no distal discoloration or pain  Right forearm swelling may just be mechanical due to the presence of PICC  Continue elevation and observation.  Mixed hyperlipidemia  Continue Lipitor  Type 2 diabetes mellitus with diabetic chronic kidney disease (HCC)  Lab Results   Component Value Date    HGBA1C 7.9 (A) 07/02/2024     Continue sliding scale insulin for now  Lantus restarted at that she is back on diabetic diet.  Increase to 30 units at bedtime tonight  Primary hypertension  Increased rease Coreg to 25 mg twice daily   Losartan increased to 100 mg daily (home dose)    VTE Pharmacologic Prophylaxis: VTE Score: 3 Moderate Risk (Score 3-4) - Pharmacological  "DVT Prophylaxis Ordered: heparin.      Discussions with Specialists or Other Care Team Provider: Case management    Education and Discussions with Family / Patient: Updated  (son) at bedside.  Discussed with Rob.  We talked about her POLST form which stated DNR/comfort measures if she goes into cardiac arrest.  Will change CODE STATUS    Current Length of Stay: 4 day(s)  Current Patient Status: Inpatient   Certification Statement: The patient will continue to require additional inpatient hospital stay due to SBO  Discharge Plan: Anticipate discharge in 24-48 hrs to rehab facility.    Code Status: Level 1 - Full Code    Subjective   She still has back pain but this is not severe.  She has not required narcotics  We talked about the effect of narcotics  which led to her severe constipation and eventual SBO.  We talked about blood pressure that is still high and need to adjust her medications.  She was worried about medication that \" almost killed me\"    Objective     Vitals:   Temp (24hrs), Av.5 °F (36.4 °C), Min:97 °F (36.1 °C), Max:98 °F (36.7 °C)    Temp:  [97 °F (36.1 °C)-98 °F (36.7 °C)] 97.5 °F (36.4 °C)  HR:  [71-80] 71  Resp:  [18-22] 22  BP: (173-186)/(62-86) 180/62  SpO2:  [96 %-98 %] 98 %  Body mass index is 30.77 kg/m².     Input and Output Summary (last 24 hours):     Intake/Output Summary (Last 24 hours) at 2024 1249  Last data filed at 2024 1700  Gross per 24 hour   Intake 120 ml   Output --   Net 120 ml       Physical Exam  Vitals reviewed.   Constitutional:       Appearance: She is not ill-appearing.   HENT:      Head: Normocephalic and atraumatic.      Nose: No congestion or rhinorrhea.   Eyes:      General: No scleral icterus.  Cardiovascular:      Rate and Rhythm: Normal rate and regular rhythm.   Pulmonary:      Breath sounds: No stridor. No wheezing or rhonchi.   Abdominal:      Tenderness: There is no abdominal tenderness. There is no guarding.   Musculoskeletal: "      Right lower leg: No edema.      Left lower leg: No edema.   Skin:     General: Skin is warm and dry.   Psychiatric:         Mood and Affect: Mood normal.         Behavior: Behavior normal.         Lines/Drains:  Lines/Drains/Airways       Active Status       Name Placement date Placement time Site Days    PICC Line 09/13/24 Right Brachial 09/13/24  1222  Brachial  9                    Central Line:  Goal for removal: N/A - Discharging with PICC for IV ABX/medications               Lab Results: I have reviewed the following results:    Results from last 7 days   Lab Units 09/22/24  0542 09/19/24  0534 09/16/24  0546   WBC Thousand/uL 6.76   < > 10.39*   HEMOGLOBIN g/dL 9.0*   < > 10.0*   HEMATOCRIT % 27.3*   < > 29.5*   PLATELETS Thousands/uL 344   < > 298   BANDS PCT %  --   --  1   SEGS PCT % 77*   < >  --    LYMPHO PCT % 11*   < > 14   MONO PCT % 8   < > 5   EOS PCT % 3   < > 2    < > = values in this interval not displayed.     Results from last 7 days   Lab Units 09/22/24  0542   SODIUM mmol/L 143   POTASSIUM mmol/L 4.3   CHLORIDE mmol/L 112*   CO2 mmol/L 25   BUN mg/dL 28*   CREATININE mg/dL 0.74   ANION GAP mmol/L 6   CALCIUM mg/dL 9.3   GLUCOSE RANDOM mg/dL 206*         Results from last 7 days   Lab Units 09/22/24  1049 09/22/24  0536 09/21/24  2027 09/21/24  1607 09/21/24  1208 09/21/24  1135 09/21/24  0517 09/21/24  0136 09/20/24  1753 09/20/24  1432 09/20/24  0844 09/20/24  0539   POC GLUCOSE mg/dl 186* 188* 250* 265* 258* 256* 224* 198* 264* 194* 178* 131               Recent Cultures (last 7 days):         Imaging Review: Reviewed radiology reports from this admission including: xray(s).      Last 24 Hours Medication List:     Current Facility-Administered Medications:     albuterol (PROVENTIL HFA,VENTOLIN HFA) inhaler 2 puff, Q4H PRN    aspirin (ECOTRIN LOW STRENGTH) EC tablet 81 mg, Daily    atorvastatin (LIPITOR) tablet 80 mg, Daily With Dinner    bisacodyl (DULCOLAX) rectal suppository 10 mg,  Daily PRN    budesonide-formoterol (SYMBICORT) 160-4.5 mcg/act inhaler 2 puff, BID    carvedilol (COREG) tablet 25 mg, BID With Meals    ceFAZolin (ANCEF) IVPB (premix in dextrose) 2,000 mg 50 mL, Q12H, Last Rate: 2,000 mg (09/22/24 0534)    Diclofenac Sodium (VOLTAREN) 1 % topical gel 2 g, 4x Daily    heparin (porcine) subcutaneous injection 5,000 Units, TID    hydrALAZINE (APRESOLINE) injection 5 mg, Q6H PRN    insulin glargine (LANTUS) subcutaneous injection 25 Units 0.25 mL, HS    insulin lispro (HumALOG/ADMELOG) 100 units/mL subcutaneous injection 1-5 Units, TID AC **AND** Fingerstick Glucose (POCT), TID AC    insulin lispro (HumALOG/ADMELOG) 100 units/mL subcutaneous injection 1-5 Units, HS    lidocaine (LIDODERM) 5 % patch 1 patch, Daily    losartan (COZAAR) tablet 100 mg, Daily    senna-docusate sodium (SENOKOT S) 8.6-50 mg per tablet 2 tablet, HS      **Please Note: This note may have been constructed using a voice recognition system.**

## 2024-09-22 NOTE — ASSESSMENT & PLAN NOTE
She had a change in mental status requiring a rapid response and stroke alert on 9/20/2024  Stroke has been ruled out  The change in mental status  was felt to be from hypertensive encephalopathy  Increased Coreg to 25 mg twice daily  losartan dose also increased to home dose 100 mg daily   Hydralazine as needed for systolic blood pressure more than 180

## 2024-09-22 NOTE — ASSESSMENT & PLAN NOTE
Overall stable  Discitis/osteomyelitis of the lumbar spine in the setting of bacteremia   Continue IV cefazolin 2 g every 12 until 10/21/2024, 6 weeks total  Eventual return to [facility] to continue rehab

## 2024-09-22 NOTE — ASSESSMENT & PLAN NOTE
Overall stable  Discitis/osteomyelitis of the lumbar spine in the setting of bacteremia   Continue IV cefazolin 2 g every 12 until 10/21/2024, 6 weeks total  Eventual return to [facility] to continue rehab

## 2024-09-22 NOTE — PLAN OF CARE
Problem: Prexisting or High Potential for Compromised Skin Integrity  Goal: Skin integrity is maintained or improved  Description: INTERVENTIONS:  - Identify patients at risk for skin breakdown  - Assess and monitor skin integrity  - Assess and monitor nutrition and hydration status  - Monitor labs   - Assess for incontinence   - Turn and reposition patient  - Assist with mobility/ambulation  - Relieve pressure over bony prominences  - Avoid friction and shearing  - Provide appropriate hygiene as needed including keeping skin clean and dry  - Evaluate need for skin moisturizer/barrier cream  - Collaborate with interdisciplinary team   - Patient/family teaching  - Consider wound care consult   Outcome: Progressing     Problem: PAIN - ADULT  Goal: Verbalizes/displays adequate comfort level or baseline comfort level  Description: Interventions:  - Encourage patient to monitor pain and request assistance  - Assess pain using appropriate pain scale  - Administer analgesics based on type and severity of pain and evaluate response  - Implement non-pharmacological measures as appropriate and evaluate response  - Consider cultural and social influences on pain and pain management  - Notify physician/advanced practitioner if interventions unsuccessful or patient reports new pain  Outcome: Progressing     Problem: METABOLIC, FLUID AND ELECTROLYTES - ADULT  Goal: Electrolytes maintained within normal limits  Description: INTERVENTIONS:  - Monitor labs and assess patient for signs and symptoms of electrolyte imbalances  - Administer electrolyte replacement as ordered  - Monitor response to electrolyte replacements, including repeat lab results as appropriate  - Instruct patient on fluid and nutrition as appropriate  Outcome: Progressing     Problem: METABOLIC, FLUID AND ELECTROLYTES - ADULT  Goal: Fluid balance maintained  Description: INTERVENTIONS:  - Monitor labs   - Monitor I/O and WT  - Instruct patient on fluid and  nutrition as appropriate  - Assess for signs & symptoms of volume excess or deficit  Outcome: Progressing     Problem: METABOLIC, FLUID AND ELECTROLYTES - ADULT  Goal: Glucose maintained within target range  Description: INTERVENTIONS:  - Monitor Blood Glucose as ordered  - Assess for signs and symptoms of hyperglycemia and hypoglycemia  - Administer ordered medications to maintain glucose within target range  - Assess nutritional intake and initiate nutrition service referral as needed  Outcome: Progressing     Problem: GASTROINTESTINAL - ADULT  Goal: Minimal or absence of nausea and/or vomiting  Description: INTERVENTIONS:  - Administer IV fluids if ordered to ensure adequate hydration  - Maintain NPO status until nausea and vomiting are resolved  - Nasogastric tube if ordered  - Administer ordered antiemetic medications as needed  - Provide nonpharmacologic comfort measures as appropriate  - Advance diet as tolerated, if ordered  - Consider nutrition services referral to assist patient with adequate nutrition and appropriate food choices  Outcome: Progressing     Problem: GASTROINTESTINAL - ADULT  Goal: Maintains or returns to baseline bowel function  Description: INTERVENTIONS:  - Assess bowel function  - Encourage oral fluids to ensure adequate hydration  - Administer IV fluids if ordered to ensure adequate hydration  - Administer ordered medications as needed  - Encourage mobilization and activity  - Consider nutritional services referral to assist patient with adequate nutrition and appropriate food choices  Outcome: Progressing     Problem: GASTROINTESTINAL - ADULT  Goal: Maintains adequate nutritional intake  Description: INTERVENTIONS:  - Monitor percentage of each meal consumed  - Identify factors contributing to decreased intake, treat as appropriate  - Assist with meals as needed  - Monitor I&O, weight, and lab values if indicated  - Obtain nutrition services referral as needed  Outcome: Progressing      Problem: Knowledge Deficit  Goal: Patient/family/caregiver demonstrates understanding of disease process, treatment plan, medications, and discharge instructions  Description: Complete learning assessment and assess knowledge base.  Interventions:  - Provide teaching at level of understanding  - Provide teaching via preferred learning methods  Outcome: Progressing     Problem: DISCHARGE PLANNING  Goal: Discharge to home or other facility with appropriate resources  Description: INTERVENTIONS:  - Identify barriers to discharge w/patient and caregiver  - Arrange for needed discharge resources and transportation as appropriate  - Identify discharge learning needs (meds, wound care, etc.)  - Arrange for interpretive services to assist at discharge as needed  - Refer to Case Management Department for coordinating discharge planning if the patient needs post-hospital services based on physician/advanced practitioner order or complex needs related to functional status, cognitive ability, or social support system  Outcome: Progressing

## 2024-09-23 PROBLEM — R45.89 FLAT AFFECT: Status: ACTIVE | Noted: 2024-09-23

## 2024-09-23 LAB
ANION GAP SERPL CALCULATED.3IONS-SCNC: 7 MMOL/L (ref 4–13)
BASOPHILS # BLD AUTO: 0.03 THOUSANDS/ΜL (ref 0–0.1)
BASOPHILS NFR BLD AUTO: 1 % (ref 0–1)
BUN SERPL-MCNC: 23 MG/DL (ref 5–25)
CALCIUM SERPL-MCNC: 9 MG/DL (ref 8.4–10.2)
CHLORIDE SERPL-SCNC: 114 MMOL/L (ref 96–108)
CO2 SERPL-SCNC: 24 MMOL/L (ref 21–32)
CREAT SERPL-MCNC: 0.69 MG/DL (ref 0.6–1.3)
EOSINOPHIL # BLD AUTO: 0.16 THOUSAND/ΜL (ref 0–0.61)
EOSINOPHIL NFR BLD AUTO: 3 % (ref 0–6)
ERYTHROCYTE [DISTWIDTH] IN BLOOD BY AUTOMATED COUNT: 14.9 % (ref 11.6–15.1)
GFR SERPL CREATININE-BSD FRML MDRD: 81 ML/MIN/1.73SQ M
GLUCOSE SERPL-MCNC: 194 MG/DL (ref 65–140)
GLUCOSE SERPL-MCNC: 195 MG/DL (ref 65–140)
GLUCOSE SERPL-MCNC: 198 MG/DL (ref 65–140)
GLUCOSE SERPL-MCNC: 221 MG/DL (ref 65–140)
GLUCOSE SERPL-MCNC: 224 MG/DL (ref 65–140)
GLUCOSE SERPL-MCNC: 231 MG/DL (ref 65–140)
HCT VFR BLD AUTO: 26.3 % (ref 34.8–46.1)
HGB BLD-MCNC: 8.7 G/DL (ref 11.5–15.4)
IMM GRANULOCYTES # BLD AUTO: 0.04 THOUSAND/UL (ref 0–0.2)
IMM GRANULOCYTES NFR BLD AUTO: 1 % (ref 0–2)
LYMPHOCYTES # BLD AUTO: 1.01 THOUSANDS/ΜL (ref 0.6–4.47)
LYMPHOCYTES NFR BLD AUTO: 17 % (ref 14–44)
MCH RBC QN AUTO: 31.5 PG (ref 26.8–34.3)
MCHC RBC AUTO-ENTMCNC: 33.1 G/DL (ref 31.4–37.4)
MCV RBC AUTO: 95 FL (ref 82–98)
MONOCYTES # BLD AUTO: 0.52 THOUSAND/ΜL (ref 0.17–1.22)
MONOCYTES NFR BLD AUTO: 9 % (ref 4–12)
NEUTROPHILS # BLD AUTO: 4.22 THOUSANDS/ΜL (ref 1.85–7.62)
NEUTS SEG NFR BLD AUTO: 69 % (ref 43–75)
NRBC BLD AUTO-RTO: 0 /100 WBCS
PLATELET # BLD AUTO: 319 THOUSANDS/UL (ref 149–390)
PMV BLD AUTO: 10.7 FL (ref 8.9–12.7)
POTASSIUM SERPL-SCNC: 4 MMOL/L (ref 3.5–5.3)
RBC # BLD AUTO: 2.76 MILLION/UL (ref 3.81–5.12)
SODIUM SERPL-SCNC: 145 MMOL/L (ref 135–147)
WBC # BLD AUTO: 5.98 THOUSAND/UL (ref 4.31–10.16)

## 2024-09-23 PROCEDURE — 82948 REAGENT STRIP/BLOOD GLUCOSE: CPT

## 2024-09-23 PROCEDURE — 99233 SBSQ HOSP IP/OBS HIGH 50: CPT | Performed by: FAMILY MEDICINE

## 2024-09-23 PROCEDURE — 85025 COMPLETE CBC W/AUTO DIFF WBC: CPT | Performed by: INTERNAL MEDICINE

## 2024-09-23 PROCEDURE — 97530 THERAPEUTIC ACTIVITIES: CPT

## 2024-09-23 PROCEDURE — 80048 BASIC METABOLIC PNL TOTAL CA: CPT | Performed by: INTERNAL MEDICINE

## 2024-09-23 PROCEDURE — 97116 GAIT TRAINING THERAPY: CPT

## 2024-09-23 PROCEDURE — 97535 SELF CARE MNGMENT TRAINING: CPT

## 2024-09-23 RX ORDER — ACETAMINOPHEN 325 MG/1
975 TABLET ORAL EVERY 8 HOURS SCHEDULED
Status: DISCONTINUED | OUTPATIENT
Start: 2024-09-23 | End: 2024-09-25 | Stop reason: HOSPADM

## 2024-09-23 RX ADMIN — INSULIN GLARGINE 30 UNITS: 100 INJECTION, SOLUTION SUBCUTANEOUS at 21:01

## 2024-09-23 RX ADMIN — INSULIN LISPRO 2 UNITS: 100 INJECTION, SOLUTION INTRAVENOUS; SUBCUTANEOUS at 12:45

## 2024-09-23 RX ADMIN — CEFAZOLIN SODIUM 2000 MG: 2 SOLUTION INTRAVENOUS at 05:41

## 2024-09-23 RX ADMIN — Medication 2 G: at 21:10

## 2024-09-23 RX ADMIN — LOSARTAN POTASSIUM 100 MG: 50 TABLET, FILM COATED ORAL at 08:02

## 2024-09-23 RX ADMIN — CARVEDILOL 25 MG: 12.5 TABLET, FILM COATED ORAL at 16:22

## 2024-09-23 RX ADMIN — LIDOCAINE 1 PATCH: 700 PATCH TOPICAL at 08:02

## 2024-09-23 RX ADMIN — CEFAZOLIN SODIUM 2000 MG: 2 SOLUTION INTRAVENOUS at 17:13

## 2024-09-23 RX ADMIN — HYDRALAZINE HYDROCHLORIDE 5 MG: 20 INJECTION INTRAMUSCULAR; INTRAVENOUS at 08:02

## 2024-09-23 RX ADMIN — INSULIN LISPRO 1 UNITS: 100 INJECTION, SOLUTION INTRAVENOUS; SUBCUTANEOUS at 16:23

## 2024-09-23 RX ADMIN — ASPIRIN 81 MG: 81 TABLET, COATED ORAL at 08:02

## 2024-09-23 RX ADMIN — HEPARIN SODIUM 5000 UNITS: 5000 INJECTION INTRAVENOUS; SUBCUTANEOUS at 16:23

## 2024-09-23 RX ADMIN — INSULIN LISPRO 1 UNITS: 100 INJECTION, SOLUTION INTRAVENOUS; SUBCUTANEOUS at 21:13

## 2024-09-23 RX ADMIN — HEPARIN SODIUM 5000 UNITS: 5000 INJECTION INTRAVENOUS; SUBCUTANEOUS at 08:02

## 2024-09-23 RX ADMIN — ACETAMINOPHEN 975 MG: 325 TABLET ORAL at 22:47

## 2024-09-23 RX ADMIN — BUDESONIDE AND FORMOTEROL FUMARATE DIHYDRATE 2 PUFF: 160; 4.5 AEROSOL RESPIRATORY (INHALATION) at 17:13

## 2024-09-23 RX ADMIN — Medication 2 G: at 12:47

## 2024-09-23 RX ADMIN — CARVEDILOL 25 MG: 12.5 TABLET, FILM COATED ORAL at 08:02

## 2024-09-23 RX ADMIN — INSULIN LISPRO 1 UNITS: 100 INJECTION, SOLUTION INTRAVENOUS; SUBCUTANEOUS at 08:20

## 2024-09-23 RX ADMIN — BUDESONIDE AND FORMOTEROL FUMARATE DIHYDRATE 2 PUFF: 160; 4.5 AEROSOL RESPIRATORY (INHALATION) at 08:03

## 2024-09-23 RX ADMIN — HEPARIN SODIUM 5000 UNITS: 5000 INJECTION INTRAVENOUS; SUBCUTANEOUS at 21:10

## 2024-09-23 RX ADMIN — ATORVASTATIN CALCIUM 80 MG: 80 TABLET, FILM COATED ORAL at 16:22

## 2024-09-23 RX ADMIN — Medication 2 G: at 17:24

## 2024-09-23 RX ADMIN — Medication 2 G: at 08:03

## 2024-09-23 RX ADMIN — ACETAMINOPHEN 975 MG: 325 TABLET ORAL at 17:12

## 2024-09-23 NOTE — ASSESSMENT & PLAN NOTE
Continue Coreg at increased dose of 25 mg twice daily   Losartan increased to 100 mg daily (home dose)

## 2024-09-23 NOTE — ASSESSMENT & PLAN NOTE
Patient with limited information, flat affect, concern for undiagnosed depression  Requested geriatric medicine evaluation

## 2024-09-23 NOTE — PLAN OF CARE
Problem: OCCUPATIONAL THERAPY ADULT  Goal: Performs self-care activities at highest level of function for planned discharge setting.  See evaluation for individualized goals.  Description: Treatment Interventions: ADL retraining, Functional transfer training, Endurance training, Continued evaluation, Activityengagement, Energy conservation          See flowsheet documentation for full assessment, interventions and recommendations.   Outcome: Progressing  Note: Limitation: Decreased ADL status, Decreased UE ROM, Decreased endurance, Decreased UE strength  Prognosis: Good  Assessment: Pt seen for OT txt. Pt continues to be limited by back pain. Pt assisted to standing and with ambulation Mikel with RW. Pt requires verbal cues for walker management and body mechanics. Pt continues to require assist to manage LB ADLs and toilet hygiene. Pt remains below baseline, would benefit from continued OT services to address deficits.      Rehab Resource Intensity Level, OT: II (Moderate Resource Intensity)

## 2024-09-23 NOTE — ASSESSMENT & PLAN NOTE
Small bowel obstruction versus ileus in the setting of constipation  Clinically  and radiologically improved  Diet advanced, patient tolerating  Continue Debbie-Colace along with Dulcolax for bowel regimen  Avoid narcotics if possible

## 2024-09-23 NOTE — OCCUPATIONAL THERAPY NOTE
Occupational Therapy Treatment Note     Patient Name: Porsha Hoyos  Today's Date: 9/23/2024  Problem List  Principal Problem:    SBO (small bowel obstruction) (HCC)  Active Problems:    Primary hypertension    Mixed hyperlipidemia    Type 2 diabetes mellitus with diabetic chronic kidney disease (HCC)    Discitis of lumbar region    Swelling of forearm    Hypertensive encephalopathy    Flat affect          09/23/24 1108   OT Last Visit   OT Visit Date 09/23/24   Note Type   Note Type Treatment for insurance authorization   Pain Assessment   Pain Assessment Tool 0-10   Pain Score 7   Pain Location/Orientation Location: Back   Hospital Pain Intervention(s) Repositioned   ADL   LB Dressing Assistance 3  Moderate Assistance   LB Dressing Deficit Thread RLE into underwear;Thread LLE into underwear;Pull up over hips   Toileting Assistance  3  Moderate Assistance   Toileting Deficit Clothing management up;Clothing management down;Perineal hygiene   Bed Mobility   Rolling R 4  Minimal assistance   Supine to Sit 3  Moderate assistance   Additional items Assist x 1;Increased time required;LE management   Transfers   Sit to Stand 4  Minimal assistance   Additional items Assist x 1   Stand to Sit 4  Minimal assistance   Additional items Assist x 1   Functional Mobility   Functional Mobility 4  Minimal assistance   Additional items Rolling walker   Cognition   Overall Cognitive Status WFL   Activity Tolerance   Activity Tolerance Patient tolerated treatment well   Assessment   Assessment Pt seen for OT txt. Pt continues to be limited by back pain. Pt assisted to standing and with ambulation Mikel with RW. Pt requires verbal cues for walker management and body mechanics. Pt continues to require assist to manage LB ADLs and toilet hygiene. Pt remains below baseline, would benefit from continued OT services to address deficits.    Plan   Treatment Interventions ADL retraining;Functional transfer training;Endurance  training;Continued evaluation;Activityengagement;Energy conservation   Goal Expiration Date 10/03/24   OT Treatment Day 1   OT Frequency 3-5x/wk   Discharge Recommendation   Rehab Resource Intensity Level, OT II (Moderate Resource Intensity)   AM-PAC Daily Activity Inpatient   Lower Body Dressing 2   Bathing 2   Toileting 2   Upper Body Dressing 2   Grooming 2   Eating 3   Daily Activity Raw Score 13   Daily Activity Standardized Score (Calc for Raw Score >=11) 32.03
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
## 2024-09-23 NOTE — PLAN OF CARE
Problem: Prexisting or High Potential for Compromised Skin Integrity  Goal: Skin integrity is maintained or improved  Description: INTERVENTIONS:  - Identify patients at risk for skin breakdown  - Assess and monitor skin integrity  - Assess and monitor nutrition and hydration status  - Monitor labs   - Assess for incontinence   - Turn and reposition patient  - Assist with mobility/ambulation  - Relieve pressure over bony prominences  - Avoid friction and shearing  - Provide appropriate hygiene as needed including keeping skin clean and dry  - Evaluate need for skin moisturizer/barrier cream  - Collaborate with interdisciplinary team   - Patient/family teaching  - Consider wound care consult   Outcome: Progressing     Problem: PAIN - ADULT  Goal: Verbalizes/displays adequate comfort level or baseline comfort level  Description: Interventions:  - Encourage patient to monitor pain and request assistance  - Assess pain using appropriate pain scale  - Administer analgesics based on type and severity of pain and evaluate response  - Implement non-pharmacological measures as appropriate and evaluate response  - Consider cultural and social influences on pain and pain management  - Notify physician/advanced practitioner if interventions unsuccessful or patient reports new pain  Outcome: Progressing     Problem: INFECTION - ADULT  Goal: Absence or prevention of progression during hospitalization  Description: INTERVENTIONS:  - Assess and monitor for signs and symptoms of infection  - Monitor lab/diagnostic results  - Monitor all insertion sites, i.e. indwelling lines, tubes, and drains  - Monitor endotracheal if appropriate and nasal secretions for changes in amount and color  - [place] appropriate cooling/warming therapies per order  - Administer medications as ordered  - Instruct and encourage patient and family to use good hand hygiene technique  - Identify and instruct in appropriate isolation precautions for  identified infection/condition  Outcome: Progressing     Problem: SAFETY ADULT  Goal: Patient will remain free of falls  Description: INTERVENTIONS:  - Educate patient/family on patient safety including physical limitations  - Instruct patient to call for assistance with activity   - Consult OT/PT to assist with strengthening/mobility   - Keep Call bell within reach  - Keep bed low and locked with side rails adjusted as appropriate  - Keep care items and personal belongings within reach  - Initiate and maintain comfort rounds  - Make Fall Risk Sign visible to staff  - Apply yellow socks and bracelet for high fall risk patients  - Consider moving patient to room near nurses station  Outcome: Progressing  Goal: Maintain or return to baseline ADL function  Description: INTERVENTIONS:  -  Assess patient's ability to carry out ADLs; assess patient's baseline for ADL function and identify physical deficits which impact ability to perform ADLs (bathing, care of mouth/teeth, toileting, grooming, dressing, etc.)  - Assess/evaluate cause of self-care deficits   - Assess range of motion  - Assess patient's mobility; develop plan if impaired  - Assess patient's need for assistive devices and provide as appropriate  - Encourage maximum independence but intervene and supervise when necessary  - Involve family in performance of ADLs  - Assess for home care needs following discharge   - Consider OT consult to assist with ADL evaluation and planning for discharge  - Provide patient education as appropriate  Outcome: Progressing  Goal: Maintains/Returns to pre admission functional level  Description: INTERVENTIONS:  - Perform AM-PAC 6 Click Basic Mobility/ Daily Activity assessment daily.  - Set and communicate daily mobility goal to care team and patient/family/caregiver.   - Collaborate with rehabilitation services on mobility goals if consulted  - Out of bed for toileting  - Record patient progress and toleration of activity level    Outcome: Progressing     Problem: DISCHARGE PLANNING  Goal: Discharge to home or other facility with appropriate resources  Description: INTERVENTIONS:  - Identify barriers to discharge w/patient and caregiver  - Arrange for needed discharge resources and transportation as appropriate  - Identify discharge learning needs (meds, wound care, etc.)  - Arrange for interpretive services to assist at discharge as needed  - Refer to Case Management Department for coordinating discharge planning if the patient needs post-hospital services based on physician/advanced practitioner order or complex needs related to functional status, cognitive ability, or social support system  Outcome: Progressing     Problem: Knowledge Deficit  Goal: Patient/family/caregiver demonstrates understanding of disease process, treatment plan, medications, and discharge instructions  Description: Complete learning assessment and assess knowledge base.  Interventions:  - Provide teaching at level of understanding  - Provide teaching via preferred learning methods  Outcome: Progressing     Problem: GASTROINTESTINAL - ADULT  Goal: Minimal or absence of nausea and/or vomiting  Description: INTERVENTIONS:  - Administer IV fluids if ordered to ensure adequate hydration  - Maintain NPO status until nausea and vomiting are resolved  - Nasogastric tube if ordered  - Administer ordered antiemetic medications as needed  - Provide nonpharmacologic comfort measures as appropriate  - Advance diet as tolerated, if ordered  - Consider nutrition services referral to assist patient with adequate nutrition and appropriate food choices  Outcome: Progressing  Goal: Maintains or returns to baseline bowel function  Description: INTERVENTIONS:  - Assess bowel function  - Encourage oral fluids to ensure adequate hydration  - Administer IV fluids if ordered to ensure adequate hydration  - Administer ordered medications as needed  - Encourage mobilization and activity  -  Consider nutritional services referral to assist patient with adequate nutrition and appropriate food choices  Outcome: Progressing  Goal: Maintains adequate nutritional intake  Description: INTERVENTIONS:  - Monitor percentage of each meal consumed  - Identify factors contributing to decreased intake, treat as appropriate  - Assist with meals as needed  - Monitor I&O, weight, and lab values if indicated  - Obtain nutrition services referral as needed  Outcome: Progressing  Goal: Establish and maintain optimal ostomy function  Description: INTERVENTIONS:  - Assess bowel function  - Encourage oral fluids to ensure adequate hydration  - Administer IV fluids if ordered to ensure adequate hydration   - Administer ordered medications as needed  - Encourage mobilization and activity  - Nutrition services referral to assist patient with appropriate food choices  - Assess stoma site  - Consider wound care consult   Outcome: Progressing  Goal: Oral mucous membranes remain intact  Description: INTERVENTIONS  - Assess oral mucosa and hygiene practices  - Implement preventative oral hygiene regimen  - Implement oral medicated treatments as ordered  - Initiate Nutrition services referral as needed  Outcome: Progressing     Problem: METABOLIC, FLUID AND ELECTROLYTES - ADULT  Goal: Electrolytes maintained within normal limits  Description: INTERVENTIONS:  - Monitor labs and assess patient for signs and symptoms of electrolyte imbalances  - Administer electrolyte replacement as ordered  - Monitor response to electrolyte replacements, including repeat lab results as appropriate  - Instruct patient on fluid and nutrition as appropriate  Outcome: Progressing  Goal: Fluid balance maintained  Description: INTERVENTIONS:  - Monitor labs   - Monitor I/O and WT  - Instruct patient on fluid and nutrition as appropriate  - Assess for signs & symptoms of volume excess or deficit  Outcome: Progressing  Goal: Glucose maintained within target  range  Description: INTERVENTIONS:  - Monitor Blood Glucose as ordered  - Assess for signs and symptoms of hyperglycemia and hypoglycemia  - Administer ordered medications to maintain glucose within target range  - Assess nutritional intake and initiate nutrition service referral as needed  Outcome: Progressing

## 2024-09-23 NOTE — ASSESSMENT & PLAN NOTE
Overall stable  Discitis/osteomyelitis of the lumbar spine in the setting of bacteremia   Continue IV cefazolin 2 g every 12 until 10/21/2024, 6 weeks total  Eventual return to [facility] to continue rehab

## 2024-09-23 NOTE — ASSESSMENT & PLAN NOTE
Was noted for change in mental status requiring a rapid response and stroke alert on 9/20/2024  Stroke has been ruled out  The change in mental status was felt to be from hypertensive encephalopathy   Coreg was increased to 25 mg twice daily  Losartan dose also increased to home dose 100 mg daily   Hydralazine as needed for systolic blood pressure more than 180

## 2024-09-23 NOTE — ASSESSMENT & PLAN NOTE
Lab Results   Component Value Date    HGBA1C 7.9 (A) 07/02/2024     Blood glucose mostly in acceptable range  Continue Lantus 30 units at bedtime, patient with limited oral intake  Monitor blood glucose, insulin sliding scale

## 2024-09-23 NOTE — PLAN OF CARE
Problem: PHYSICAL THERAPY ADULT  Goal: Performs mobility at highest level of function for planned discharge setting.  See evaluation for individualized goals.  Description: Treatment/Interventions: ADL retraining, LE strengthening/ROM, Functional transfer training, Elevations, Therapeutic exercise, Endurance training, Patient/family training, Equipment eval/education, Bed mobility, Gait training, Spoke to nursing, Spoke to case management, OT  Equipment Recommended: Walker       See flowsheet documentation for full assessment, interventions and recommendations.  Outcome: Progressing  Note: Prognosis: Good  Problem List: Decreased strength, Decreased endurance, Impaired balance, Decreased mobility, Decreased skin integrity, Pain, Decreased range of motion, Decreased coordination, Impaired sensation, Obesity  Assessment: Pt seen today for treatment and additional assessment for ins auth. She continues to be limited due  to fatigue and lower back pain. Reports that opiod pain meds stopped recently due to potential for constipation and this has worsened back pain significantly. Able to tolerate short distance ambulation and transfers however reports pain is worse with all activity. Continues to require inpt rehab at D/C  Barriers to Discharge: Inaccessible home environment, Decreased caregiver support     Rehab Resource Intensity Level, PT: II (Moderate Resource Intensity)    See flowsheet documentation for full assessment.

## 2024-09-23 NOTE — PROGRESS NOTES
Progress Note - Hospitalist   Name: Porsha Hoyos 79 y.o. female I MRN: [id number]  Unit/Bed#: 7T [facility] 717-01 I Date of Admission: 9/18/2024   Date of Service: 9/23/2024 I Hospital Day: 5    Assessment & Plan  SBO (small bowel obstruction) (formerly [facility])  Small bowel obstruction versus ileus in the setting of constipation  Clinically  and radiologically improved  Diet advanced, patient tolerating  Continue Debbie-Colace along with Dulcolax for bowel regimen  Avoid narcotics if possible  Hypertensive encephalopathy  Was noted for change in mental status requiring a rapid response and stroke alert on 9/20/2024  Stroke has been ruled out  The change in mental status was felt to be from hypertensive encephalopathy   Coreg was increased to 25 mg twice daily  Losartan dose also increased to home dose 100 mg daily   Hydralazine as needed for systolic blood pressure more than 180  Discitis of lumbar region  Overall stable  Discitis/osteomyelitis of the lumbar spine in the setting of bacteremia   Continue IV cefazolin 2 g every 12 until 10/21/2024, 6 weeks total  Eventual return to [facility] to continue rehab  Swelling of forearm  Right forearm swelling with right upper arm PICC in place.  The PICC itself is functioning properly  She had an upper extremity venous Doppler on 9/16/2024 which was negative for DVT  There is no distal discoloration or pain  Right forearm swelling may just be mechanical due to the presence of PICC  Continue elevation and observation.  Mixed hyperlipidemia  Continue Lipitor  Type 2 diabetes mellitus with diabetic chronic kidney disease (HCC)  Lab Results   Component Value Date    HGBA1C 7.9 (A) 07/02/2024     Blood glucose mostly in acceptable range  Continue Lantus 30 units at bedtime, patient with limited oral intake  Monitor blood glucose, insulin sliding scale  Primary hypertension  Continue Coreg at increased dose of 25 mg twice daily   Losartan increased to 100 mg daily (home dose)  Flat affect  Patient with  limited information, flat affect, concern for undiagnosed depression  Requested geriatric medicine evaluation    VTE Pharmacologic Prophylaxis: VTE Score: 3 Moderate Risk (Score 3-4) - Pharmacological DVT Prophylaxis Ordered: heparin.    Mobility:   Basic Mobility Inpatient Raw Score: 16  JH-HLM Goal: 5: Stand one or more mins  JH-HLM Achieved: 4: Move to chair/commode  JH-HLM Goal NOT achieved. Continue with multidisciplinary rounding and encourage appropriate mobility to improve upon JH-HLM goals.    Patient Centered Rounds: I performed bedside rounds with nursing staff today.   Discussions with Specialists or Other Care Team Provider: CM    Education and Discussions with Family / Patient: Patient declined call to .     Current Length of Stay: 5 day(s)  Current Patient Status: Inpatient   Certification Statement: The patient will continue to require additional inpatient hospital stay due to close monitoring  Discharge Plan: Anticipate discharge in 48-72 hrs to discharge location to be determined pending rehab evaluations.    Code Status: Level 1 - Full Code    Subjective   Patient is complaining of generalized weakness, abdominal discomfort, back pain.  Otherwise provides minimal history/information.    Objective     Vitals:   Temp (24hrs), Av.1 °F (36.2 °C), Min:96.9 °F (36.1 °C), Max:97.2 °F (36.2 °C)    Temp:  [96.9 °F (36.1 °C)-97.2 °F (36.2 °C)] 96.9 °F (36.1 °C)  HR:  [73-78] 73  Resp:  [18-19] 19  BP: (146-220)/(61-86) 146/61  SpO2:  [96 %-99 %] 97 %  Body mass index is 30.77 kg/m².     Input and Output Summary (last 24 hours):     Intake/Output Summary (Last 24 hours) at 2024 1147  Last data filed at 2024 0928  Gross per 24 hour   Intake 1320 ml   Output --   Net 1320 ml       Physical Exam  Constitutional:       Appearance: She is ill-appearing.   HENT:      Head: Normocephalic and atraumatic.      Nose: No congestion.   Cardiovascular:      Rate and Rhythm: Normal rate and  regular rhythm.   Pulmonary:      Effort: No respiratory distress.   Abdominal:      Tenderness: There is abdominal tenderness (Mild, generalized).   Musculoskeletal:      Right lower leg: No edema.      Left lower leg: No edema.   Skin:     General: Skin is warm and dry.   Psychiatric:         Behavior: Behavior is slowed and withdrawn.          Lines/Drains:  Lines/Drains/Airways       Active Status       Name Placement date Placement time Site Days    PICC Line 09/13/24 Right Brachial 09/13/24  1222  Brachial  9                    Central Line:  Goal for removal: Will discontinue when hemodynamically stable.               Lab Results: I have reviewed the following results:    Results from last 7 days   Lab Units 09/23/24  0531   WBC Thousand/uL 5.98   HEMOGLOBIN g/dL 8.7*   HEMATOCRIT % 26.3*   PLATELETS Thousands/uL 319   SEGS PCT % 69   LYMPHO PCT % 17   MONO PCT % 9   EOS PCT % 3     Results from last 7 days   Lab Units 09/23/24  0531   SODIUM mmol/L 145   POTASSIUM mmol/L 4.0   CHLORIDE mmol/L 114*   CO2 mmol/L 24   BUN mg/dL 23   CREATININE mg/dL 0.69   ANION GAP mmol/L 7   CALCIUM mg/dL 9.0   GLUCOSE RANDOM mg/dL 198*         Results from last 7 days   Lab Units 09/23/24  0529 09/22/24  2042 09/22/24  1640 09/22/24  1049 09/22/24  0536 09/21/24  2027 09/21/24  1607 09/21/24  1208 09/21/24  1135 09/21/24  0517 09/21/24  0136 09/20/24  1753   POC GLUCOSE mg/dl 195* 242* 172* 186* 188* 250* 265* 258* 256* 224* 198* 264*               Recent Cultures (last 7 days):         Imaging Review: Reviewed radiology reports from this admission including: xray(s).    Last 24 Hours Medication List:     Current Facility-Administered Medications:     albuterol (PROVENTIL HFA,VENTOLIN HFA) inhaler 2 puff, Q4H PRN    aspirin (ECOTRIN LOW STRENGTH) EC tablet 81 mg, Daily    atorvastatin (LIPITOR) tablet 80 mg, Daily With Dinner    bisacodyl (DULCOLAX) rectal suppository 10 mg, Daily PRN    budesonide-formoterol (SYMBICORT)  160-4.5 mcg/act inhaler 2 puff, BID    carvedilol (COREG) tablet 25 mg, BID With Meals    ceFAZolin (ANCEF) IVPB (premix in dextrose) 2,000 mg 50 mL, Q12H, Last Rate: 2,000 mg (09/23/24 0541)    Diclofenac Sodium (VOLTAREN) 1 % topical gel 2 g, 4x Daily    heparin (porcine) subcutaneous injection 5,000 Units, TID    hydrALAZINE (APRESOLINE) injection 5 mg, Q6H PRN    insulin glargine (LANTUS) subcutaneous injection 30 Units 0.3 mL, HS    insulin lispro (HumALOG/ADMELOG) 100 units/mL subcutaneous injection 1-5 Units, TID AC **AND** Fingerstick Glucose (POCT), TID AC    insulin lispro (HumALOG/ADMELOG) 100 units/mL subcutaneous injection 1-5 Units, HS    lidocaine (LIDODERM) 5 % patch 1 patch, Daily    losartan (COZAAR) tablet 100 mg, Daily    senna-docusate sodium (SENOKOT S) 8.6-50 mg per tablet 2 tablet, HS    Administrative Statements   Today, Patient Was Seen By: Selina Frias MD  I have spent a total time of 35 minutes in caring for this patient on the day of the visit/encounter including Documenting in the medical record, Reviewing / ordering tests, medicine, procedures  , Obtaining or reviewing history  , and Communicating with other healthcare professionals .    **Please Note: This note may have been constructed using a voice recognition system.**

## 2024-09-24 LAB
ANION GAP SERPL CALCULATED.3IONS-SCNC: 8 MMOL/L (ref 4–13)
BASOPHILS # BLD AUTO: 0.03 THOUSANDS/ΜL (ref 0–0.1)
BASOPHILS NFR BLD AUTO: 1 % (ref 0–1)
BUN SERPL-MCNC: 21 MG/DL (ref 5–25)
CALCIUM SERPL-MCNC: 8.9 MG/DL (ref 8.4–10.2)
CHLORIDE SERPL-SCNC: 112 MMOL/L (ref 96–108)
CO2 SERPL-SCNC: 24 MMOL/L (ref 21–32)
CREAT SERPL-MCNC: 0.87 MG/DL (ref 0.6–1.3)
EOSINOPHIL # BLD AUTO: 0.16 THOUSAND/ΜL (ref 0–0.61)
EOSINOPHIL NFR BLD AUTO: 3 % (ref 0–6)
ERYTHROCYTE [DISTWIDTH] IN BLOOD BY AUTOMATED COUNT: 14.6 % (ref 11.6–15.1)
GFR SERPL CREATININE-BSD FRML MDRD: 62 ML/MIN/1.73SQ M
GLUCOSE SERPL-MCNC: 136 MG/DL (ref 65–140)
GLUCOSE SERPL-MCNC: 142 MG/DL (ref 65–140)
GLUCOSE SERPL-MCNC: 200 MG/DL (ref 65–140)
GLUCOSE SERPL-MCNC: 219 MG/DL (ref 65–140)
GLUCOSE SERPL-MCNC: 254 MG/DL (ref 65–140)
HCT VFR BLD AUTO: 26.5 % (ref 34.8–46.1)
HGB BLD-MCNC: 8.7 G/DL (ref 11.5–15.4)
IMM GRANULOCYTES # BLD AUTO: 0.05 THOUSAND/UL (ref 0–0.2)
IMM GRANULOCYTES NFR BLD AUTO: 1 % (ref 0–2)
LYMPHOCYTES # BLD AUTO: 0.65 THOUSANDS/ΜL (ref 0.6–4.47)
LYMPHOCYTES NFR BLD AUTO: 14 % (ref 14–44)
MAGNESIUM SERPL-MCNC: 1.8 MG/DL (ref 1.9–2.7)
MCH RBC QN AUTO: 31.6 PG (ref 26.8–34.3)
MCHC RBC AUTO-ENTMCNC: 32.8 G/DL (ref 31.4–37.4)
MCV RBC AUTO: 96 FL (ref 82–98)
MONOCYTES # BLD AUTO: 0.47 THOUSAND/ΜL (ref 0.17–1.22)
MONOCYTES NFR BLD AUTO: 10 % (ref 4–12)
NEUTROPHILS # BLD AUTO: 3.33 THOUSANDS/ΜL (ref 1.85–7.62)
NEUTS SEG NFR BLD AUTO: 71 % (ref 43–75)
NRBC BLD AUTO-RTO: 0 /100 WBCS
PLATELET # BLD AUTO: 297 THOUSANDS/UL (ref 149–390)
PMV BLD AUTO: 10.8 FL (ref 8.9–12.7)
POTASSIUM SERPL-SCNC: 3.8 MMOL/L (ref 3.5–5.3)
RBC # BLD AUTO: 2.75 MILLION/UL (ref 3.81–5.12)
SODIUM SERPL-SCNC: 144 MMOL/L (ref 135–147)
WBC # BLD AUTO: 4.69 THOUSAND/UL (ref 4.31–10.16)

## 2024-09-24 PROCEDURE — 82948 REAGENT STRIP/BLOOD GLUCOSE: CPT

## 2024-09-24 PROCEDURE — 85025 COMPLETE CBC W/AUTO DIFF WBC: CPT | Performed by: FAMILY MEDICINE

## 2024-09-24 PROCEDURE — 99232 SBSQ HOSP IP/OBS MODERATE 35: CPT | Performed by: FAMILY MEDICINE

## 2024-09-24 PROCEDURE — 99223 1ST HOSP IP/OBS HIGH 75: CPT | Performed by: INTERNAL MEDICINE

## 2024-09-24 PROCEDURE — 83735 ASSAY OF MAGNESIUM: CPT | Performed by: FAMILY MEDICINE

## 2024-09-24 PROCEDURE — 80048 BASIC METABOLIC PNL TOTAL CA: CPT | Performed by: FAMILY MEDICINE

## 2024-09-24 RX ORDER — HYDRALAZINE HYDROCHLORIDE 25 MG/1
25 TABLET, FILM COATED ORAL EVERY 8 HOURS SCHEDULED
Status: DISCONTINUED | OUTPATIENT
Start: 2024-09-24 | End: 2024-09-25

## 2024-09-24 RX ORDER — CALCIUM CARBONATE 500 MG/1
500 TABLET, CHEWABLE ORAL 3 TIMES DAILY PRN
Status: DISCONTINUED | OUTPATIENT
Start: 2024-09-24 | End: 2024-09-25 | Stop reason: HOSPADM

## 2024-09-24 RX ORDER — PANTOPRAZOLE SODIUM 40 MG/1
40 TABLET, DELAYED RELEASE ORAL
Status: DISCONTINUED | OUTPATIENT
Start: 2024-09-24 | End: 2024-09-25 | Stop reason: HOSPADM

## 2024-09-24 RX ORDER — METHOCARBAMOL 500 MG/1
500 TABLET, FILM COATED ORAL EVERY 6 HOURS PRN
Status: DISCONTINUED | OUTPATIENT
Start: 2024-09-24 | End: 2024-09-25 | Stop reason: HOSPADM

## 2024-09-24 RX ADMIN — HYDRALAZINE HYDROCHLORIDE 25 MG: 25 TABLET ORAL at 15:38

## 2024-09-24 RX ADMIN — HYDRALAZINE HYDROCHLORIDE 25 MG: 25 TABLET ORAL at 21:42

## 2024-09-24 RX ADMIN — BUDESONIDE AND FORMOTEROL FUMARATE DIHYDRATE 2 PUFF: 160; 4.5 AEROSOL RESPIRATORY (INHALATION) at 08:35

## 2024-09-24 RX ADMIN — CEFAZOLIN SODIUM 2000 MG: 2 SOLUTION INTRAVENOUS at 06:37

## 2024-09-24 RX ADMIN — CARVEDILOL 25 MG: 12.5 TABLET, FILM COATED ORAL at 08:35

## 2024-09-24 RX ADMIN — LIDOCAINE 1 PATCH: 700 PATCH TOPICAL at 08:35

## 2024-09-24 RX ADMIN — HEPARIN SODIUM 5000 UNITS: 5000 INJECTION INTRAVENOUS; SUBCUTANEOUS at 08:36

## 2024-09-24 RX ADMIN — BUDESONIDE AND FORMOTEROL FUMARATE DIHYDRATE 2 PUFF: 160; 4.5 AEROSOL RESPIRATORY (INHALATION) at 17:48

## 2024-09-24 RX ADMIN — Medication 2 G: at 17:48

## 2024-09-24 RX ADMIN — CEFAZOLIN SODIUM 2000 MG: 2 SOLUTION INTRAVENOUS at 17:48

## 2024-09-24 RX ADMIN — INSULIN LISPRO 1 UNITS: 100 INJECTION, SOLUTION INTRAVENOUS; SUBCUTANEOUS at 11:44

## 2024-09-24 RX ADMIN — ACETAMINOPHEN 975 MG: 325 TABLET ORAL at 06:35

## 2024-09-24 RX ADMIN — CALCIUM CARBONATE (ANTACID) CHEW TAB 500 MG 500 MG: 500 CHEW TAB at 09:22

## 2024-09-24 RX ADMIN — INSULIN LISPRO 2 UNITS: 100 INJECTION, SOLUTION INTRAVENOUS; SUBCUTANEOUS at 16:46

## 2024-09-24 RX ADMIN — PANTOPRAZOLE SODIUM 40 MG: 40 TABLET, DELAYED RELEASE ORAL at 09:22

## 2024-09-24 RX ADMIN — LOSARTAN POTASSIUM 100 MG: 50 TABLET, FILM COATED ORAL at 08:35

## 2024-09-24 RX ADMIN — ACETAMINOPHEN 975 MG: 325 TABLET ORAL at 21:42

## 2024-09-24 RX ADMIN — CARVEDILOL 25 MG: 12.5 TABLET, FILM COATED ORAL at 15:38

## 2024-09-24 RX ADMIN — ACETAMINOPHEN 975 MG: 325 TABLET ORAL at 13:06

## 2024-09-24 RX ADMIN — Medication 2 G: at 11:45

## 2024-09-24 RX ADMIN — Medication 2 G: at 08:36

## 2024-09-24 RX ADMIN — INSULIN GLARGINE 30 UNITS: 100 INJECTION, SOLUTION SUBCUTANEOUS at 21:42

## 2024-09-24 RX ADMIN — ATORVASTATIN CALCIUM 80 MG: 80 TABLET, FILM COATED ORAL at 15:38

## 2024-09-24 RX ADMIN — INSULIN LISPRO 2 UNITS: 100 INJECTION, SOLUTION INTRAVENOUS; SUBCUTANEOUS at 21:43

## 2024-09-24 RX ADMIN — ASPIRIN 81 MG: 81 TABLET, COATED ORAL at 08:36

## 2024-09-24 RX ADMIN — HEPARIN SODIUM 5000 UNITS: 5000 INJECTION INTRAVENOUS; SUBCUTANEOUS at 21:42

## 2024-09-24 RX ADMIN — HEPARIN SODIUM 5000 UNITS: 5000 INJECTION INTRAVENOUS; SUBCUTANEOUS at 15:38

## 2024-09-24 NOTE — CASE MANAGEMENT
[facility] [place] has received APPROVED authorization.  Insurance:   [facility]  Called in by Rep: Rizwana LONGO#    Authorization received for: SNF  Facility: [facility]  Authorization #:[id number]  Start of Care:09/24  Next Review Date:09/30    Submit next review to: [phone]      Care Manager notified:Jackie Baeza     Please reach out to CM for updates on any clinical information.

## 2024-09-24 NOTE — CASE MANAGEMENT
[facility] received request for authorization from Care Manager.  Authorization request submitted for: SNF  Facility Name:  [facility] NPI: [id number]  Facility MD:  Dr. Corwin Trujillo NPI: [id number]  Authorization initiated by contacting insurance:  [facility]  Via: Fax  Clinicals submitted via fax #  [phone]      Care Manager notified: Jackie Baeza      Updates to authorization status will be noted in chart. Please reach out to CM for updates on any clinical information.

## 2024-09-24 NOTE — PROGRESS NOTES
Progress Note - Hospitalist   Name: Porsha Hoyos 79 y.o. female I MRN: [id number]  Unit/Bed#: 7T [facility] 717-01 I Date of Admission: 9/18/2024   Date of Service: 9/24/2024 I Hospital Day: 6    Assessment & Plan  SBO (small bowel obstruction) (HCC)  Small bowel obstruction versus ileus in the setting of constipation  Clinically and radiologically resolved  Diet advanced, patient tolerating  Continue Debbie-Colace along with Dulcolax for bowel regimen  Avoid narcotics if possible  Hypertensive encephalopathy  Was noted for change in mental status requiring a rapid response and stroke alert on 9/20/2024  Stroke has been ruled out  The change in mental status was felt to be from hypertensive encephalopathy   Coreg was increased to 25 mg twice daily  Losartan dose also increased to home dose 100 mg daily   Hydralazine 10 mg TID added  Discitis of lumbar region  Overall stable  Discitis/osteomyelitis of the lumbar spine in the setting of bacteremia   Continue IV cefazolin 2 g every 12 until 10/21/2024, 6 weeks total  Add Robaxin - avoid narcotics in the setting of recent SBO  Eventual return to [facility] to continue rehab  Swelling of forearm  Right forearm swelling with right upper arm PICC in place.  The PICC itself is functioning properly  She had an upper extremity venous Doppler on 9/16/2024 which was negative for DVT  There is no distal discoloration or pain  Right forearm swelling may just be mechanical due to the presence of PICC  Continue elevation and observation.  Mixed hyperlipidemia  Continue Lipitor  Type 2 diabetes mellitus with diabetic chronic kidney disease (HCC)  Lab Results   Component Value Date    HGBA1C 7.9 (A) 07/02/2024     Blood glucose mostly in acceptable range  Continue Lantus 30 units at bedtime, patient with limited oral intake  Monitor blood glucose, insulin sliding scale  Primary hypertension  Continue Coreg at increased dose of 25 mg twice daily   Losartan increased to 100 mg daily (home  dose)  Flat affect  Patient with limited information, flat affect, concern for undiagnosed depression  Requested geriatric medicine evaluation  Considerations to initiate Remeron 7.5 mg at bedtime to help with appetite and sleep, patient is unsure if she is interested in a new medication but is willing to consider    VTE Pharmacologic Prophylaxis: VTE Score: 3 Moderate Risk (Score 3-4) - Pharmacological DVT Prophylaxis Ordered: heparin.    Mobility:   Basic Mobility Inpatient Raw Score: 15  -[facility] Goal: 4: Move to chair/commode  -[facility] Achieved: 4: Move to chair/commode      Patient Centered Rounds: I performed bedside rounds with nursing staff today.   Discussions with Specialists or Other Care Team Provider: Geriatric medicine    Education and Discussions with Family / Patient: ongoing update of son    Current Length of Stay: 6 day(s)  Current Patient Status: Inpatient   Certification Statement: The patient will continue to require additional inpatient hospital stay due to close monitoring  Discharge Plan: Anticipate discharge in 24-48 hrs to rehab facility.    Code Status: Level 1 - Full Code    Subjective   Patient currently voices no acute complaints.    Objective     Vitals:   Temp (24hrs), Av.6 °F (36.4 °C), Min:97.2 °F (36.2 °C), Max:97.8 °F (36.6 °C)    Temp:  [97.2 °F (36.2 °C)-97.8 °F (36.6 °C)] 97.8 °F (36.6 °C)  HR:  [72-80] 76  Resp:  [18] 18  BP: (144-221)/(70-84) 144/70  SpO2:  [95 %-97 %] 95 %  Body mass index is 30.77 kg/m².     Input and Output Summary (last 24 hours):     Intake/Output Summary (Last 24 hours) at 2024 1451  Last data filed at 2024 1306  Gross per 24 hour   Intake 960 ml   Output 300 ml   Net 660 ml       Physical Exam  Constitutional:       General: She is not in acute distress.  HENT:      Head: Normocephalic and atraumatic.   Eyes:      Conjunctiva/sclera: Conjunctivae normal.   Pulmonary:      Effort: No respiratory distress.      Breath sounds: No wheezing or  rales.   Abdominal:      General: There is no distension.      Tenderness: There is no abdominal tenderness. There is no guarding.   Musculoskeletal:      Right lower leg: No edema.      Left lower leg: No edema.   Skin:     General: Skin is warm and dry.   Neurological:      Mental Status: She is oriented to person, place, and time.         Lines/Drains:  Lines/Drains/Airways       Active Status       Name Placement date Placement time Site Days    PICC Line 09/13/24 Right Brachial 09/13/24  1222  Brachial  11                    Central Line:  Goal for removal: N/A - Discharging with PICC for IV ABX/medications               Lab Results: I have reviewed the following results:    Results from last 7 days   Lab Units 09/24/24  0635   WBC Thousand/uL 4.69   HEMOGLOBIN g/dL 8.7*   HEMATOCRIT % 26.5*   PLATELETS Thousands/uL 297   SEGS PCT % 71   LYMPHO PCT % 14   MONO PCT % 10   EOS PCT % 3     Results from last 7 days   Lab Units 09/24/24  0635   SODIUM mmol/L 144   POTASSIUM mmol/L 3.8   CHLORIDE mmol/L 112*   CO2 mmol/L 24   BUN mg/dL 21   CREATININE mg/dL 0.87   ANION GAP mmol/L 8   CALCIUM mg/dL 8.9   GLUCOSE RANDOM mg/dL 136         Results from last 7 days   Lab Units 09/24/24  1109 09/24/24  0605 09/23/24  2059 09/23/24  1803 09/23/24  1539 09/23/24  1228 09/23/24  0529 09/22/24  2042 09/22/24  1640 09/22/24  1049 09/22/24  0536 09/21/24 2027   POC GLUCOSE mg/dl 200* 142* 221* 231* 194* 224* 195* 242* 172* 186* 188* 250*               Recent Cultures (last 7 days):         Imaging Review: no new      Last 24 Hours Medication List:     Current Facility-Administered Medications:     acetaminophen (TYLENOL) tablet 975 mg, Q8H KORI    albuterol (PROVENTIL HFA,VENTOLIN HFA) inhaler 2 puff, Q4H PRN    aspirin (ECOTRIN LOW STRENGTH) EC tablet 81 mg, Daily    atorvastatin (LIPITOR) tablet 80 mg, Daily With Dinner    bisacodyl (DULCOLAX) rectal suppository 10 mg, Daily PRN    budesonide-formoterol (SYMBICORT) 160-4.5  mcg/act inhaler 2 puff, BID    calcium carbonate (TUMS) chewable tablet 500 mg, TID PRN    carvedilol (COREG) tablet 25 mg, BID With Meals    ceFAZolin (ANCEF) IVPB (premix in dextrose) 2,000 mg 50 mL, Q12H, Last Rate: 2,000 mg (09/24/24 0637)    Diclofenac Sodium (VOLTAREN) 1 % topical gel 2 g, 4x Daily    heparin (porcine) subcutaneous injection 5,000 Units, TID    hydrALAZINE (APRESOLINE) injection 5 mg, Q6H PRN    hydrALAZINE (APRESOLINE) tablet 25 mg, Q8H KORI    insulin glargine (LANTUS) subcutaneous injection 30 Units 0.3 mL, HS    insulin lispro (HumALOG/ADMELOG) 100 units/mL subcutaneous injection 1-5 Units, TID AC **AND** Fingerstick Glucose (POCT), TID AC    insulin lispro (HumALOG/ADMELOG) 100 units/mL subcutaneous injection 1-5 Units, HS    lidocaine (LIDODERM) 5 % patch 1 patch, Daily    losartan (COZAAR) tablet 100 mg, Daily    methocarbamol (ROBAXIN) tablet 500 mg, Q6H PRN    pantoprazole (PROTONIX) EC tablet 40 mg, Early Morning    senna-docusate sodium (SENOKOT S) 8.6-50 mg per tablet 2 tablet, HS    Administrative Statements   Today, Patient Was Seen By: Selina Frias MD      **Please Note: This note may have been constructed using a voice recognition system.**

## 2024-09-24 NOTE — PLAN OF CARE
Problem: Prexisting or High Potential for Compromised Skin Integrity  Goal: Skin integrity is maintained or improved  Description: INTERVENTIONS:  - Identify patients at risk for skin breakdown  - Assess and monitor skin integrity  - Assess and monitor nutrition and hydration status  - Monitor labs   - Assess for incontinence   - Turn and reposition patient  - Assist with mobility/ambulation  - Relieve pressure over bony prominences  - Avoid friction and shearing  - Provide appropriate hygiene as needed including keeping skin clean and dry  - Evaluate need for skin moisturizer/barrier cream  - Collaborate with interdisciplinary team   - Patient/family teaching  - Consider wound care consult   Outcome: Progressing     Problem: PAIN - ADULT  Goal: Verbalizes/displays adequate comfort level or baseline comfort level  Description: Interventions:  - Encourage patient to monitor pain and request assistance  - Assess pain using appropriate pain scale  - Administer analgesics based on type and severity of pain and evaluate response  - Implement non-pharmacological measures as appropriate and evaluate response  - Consider cultural and social influences on pain and pain management  - Notify physician/advanced practitioner if interventions unsuccessful or patient reports new pain  Outcome: Progressing     Problem: INFECTION - ADULT  Goal: Absence or prevention of progression during hospitalization  Description: INTERVENTIONS:  - Assess and monitor for signs and symptoms of infection  - Monitor lab/diagnostic results  - Monitor all insertion sites, i.e. indwelling lines, tubes, and drains  - Monitor endotracheal if appropriate and nasal secretions for changes in amount and color  - Kirvin appropriate cooling/warming therapies per order  - Administer medications as ordered  - Instruct and encourage patient and family to use good hand hygiene technique  - Identify and instruct in appropriate isolation precautions for  identified infection/condition  Outcome: Progressing

## 2024-09-24 NOTE — ASSESSMENT & PLAN NOTE
Patient with limited information, flat affect, concern for undiagnosed depression  Requested geriatric medicine evaluation  Considerations to initiate Remeron 7.5 mg at bedtime to help with appetite and sleep, patient is unsure if she is interested in a new medication but is willing to consider

## 2024-09-24 NOTE — ASSESSMENT & PLAN NOTE
Overall stable  Discitis/osteomyelitis of the lumbar spine in the setting of bacteremia   Continue IV cefazolin 2 g every 12 until 10/21/2024, 6 weeks total  Add Robaxin - avoid narcotics in the setting of recent SBO  Eventual return to [facility] to continue rehab

## 2024-09-24 NOTE — ASSESSMENT & PLAN NOTE
Was noted for change in mental status requiring a rapid response and stroke alert on 9/20/2024  Stroke has been ruled out  The change in mental status was felt to be from hypertensive encephalopathy   Coreg was increased to 25 mg twice daily  Losartan dose also increased to home dose 100 mg daily   Hydralazine 10 mg TID added

## 2024-09-24 NOTE — CASE MANAGEMENT
Case Management Discharge Planning Note    Patient name Porsha Hoyos  Location 7T [facility] 717/7T [facility] 717-01 MRN [id number]  : 1945 Date 2024       Current Admission Date: 2024  Current Admission Diagnosis:SBO (small bowel obstruction) ([facility])   Patient Active Problem List    Diagnosis Date Noted Date Diagnosed    Flat affect 2024     Hypertensive encephalopathy 2024     Swelling of forearm 2024     SBO (small bowel obstruction) ([facility]) 2024     MSSA bacteremia 2024     Discitis of lumbar region 2024     Advance care planning 2024     At risk for delirium 2024     Physical deconditioning 2024     Coronary artery disease involving native coronary artery of native heart without angina pectoris 2024     Scalp lesion 2024     Bilateral lower extremity edema 2024     Osteomyelitis ([facility]) 2024     COVID 2024     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[facility]) 2024     Sepsis ([facility]): SIRS criteria MSSA Bacteremia and COVID infection 2024     Type 2 diabetes mellitus with complication, with long-term current use of insulin ([facility]) 2024     Nausea and vomiting 2023     Hordeolum externum of left upper eyelid 08/15/2023     Proteinuria 2023     Metatarsalgia of left foot 06/15/2023     Peripheral vascular disease, unspecified ([facility]) 2023     Drug-induced constipation 2022     Anemia 2022     Vitamin deficiency 2022     Shortness of breath 2022     Elevated LFTs 2022     Pancytopenia ([facility]) 2022     Asthma without status asthmaticus without complication 2022     History of colon polyps 2022     Gastroesophageal reflux disease 2022     Stage 3b chronic kidney disease ([facility]) 2022     Hypothyroidism 2021     Type 2 diabetes mellitus with diabetic chronic kidney disease ([facility]) 2021     Type 2 diabetes mellitus with diabetic polyneuropathy ([facility]) 2021      Long term (current) use of insulin (HCC) 05/20/2021     Type 2 diabetes mellitus with stable proliferative diabetic retinopathy, bilateral (HCC) 05/20/2021     Acute pain of right shoulder 05/20/2019     Pain and swelling of right upper extremity 05/20/2019     Acute pain of right shoulder due to trauma 05/20/2019     Fall at home 05/20/2019     Imbalance 05/20/2019     Acute on Chronic Back Pain in the Setting of Sepsis, MSSA Bacteremia 12/31/2018     Encntr for gyn exam (general) (routine) w abnormal findings 08/28/2018     Vaginal atrophy 08/28/2018     Vulvar lesion 02/05/2018     Primary hypertension 01/29/2018     Mixed hyperlipidemia 01/29/2018       LOS (days): 6  Geometric Mean LOS (GMLOS) (days): 3.1  Days to GMLOS:-2.9     OBJECTIVE:  Risk of Unplanned Readmission Score: 18.38         Current admission status: Inpatient   Preferred Pharmacy:   [facility] [place] - [address] [address]  Phone: [phone] Fax: [phone]    Primary Care Provider: João Alfonso MD    Primary Insurance: [employer]  Secondary Insurance:     DISCHARGE DETAILS:                                                                                                               Facility Insurance Auth Number: [id number]

## 2024-09-24 NOTE — PLAN OF CARE
Problem: PAIN - ADULT  Goal: Verbalizes/displays adequate comfort level or baseline comfort level  Description: Interventions:  - Encourage patient to monitor pain and request assistance  - Assess pain using appropriate pain scale  - Administer analgesics based on type and severity of pain and evaluate response  - Implement non-pharmacological measures as appropriate and evaluate response  - Consider cultural and social influences on pain and pain management  - Notify physician/advanced practitioner if interventions unsuccessful or patient reports new pain  Outcome: Progressing     Problem: INFECTION - ADULT  Goal: Absence or prevention of progression during hospitalization  Description: INTERVENTIONS:  - Assess and monitor for signs and symptoms of infection  - Monitor lab/diagnostic results  - Monitor all insertion sites, i.e. indwelling lines, tubes, and drains  - Monitor endotracheal if appropriate and nasal secretions for changes in amount and color  - [place] appropriate cooling/warming therapies per order  - Administer medications as ordered  - Instruct and encourage patient and family to use good hand hygiene technique  - Identify and instruct in appropriate isolation precautions for identified infection/condition  Outcome: Progressing     Problem: SAFETY ADULT  Goal: Patient will remain free of falls  Description: INTERVENTIONS:  - Educate patient/family on patient safety including physical limitations  - Instruct patient to call for assistance with activity   - Consult OT/PT to assist with strengthening/mobility   - Keep Call bell within reach  - Keep bed low and locked with side rails adjusted as appropriate  - Keep care items and personal belongings within reach  - Initiate and maintain comfort rounds  - Make Fall Risk Sign visible to staff  - Apply yellow socks and bracelet for high fall risk patients  - Consider moving patient to room near nurses station  Outcome: Progressing  Goal: Maintain or  return to baseline ADL function  Description: INTERVENTIONS:  -  Assess patient's ability to carry out ADLs; assess patient's baseline for ADL function and identify physical deficits which impact ability to perform ADLs (bathing, care of mouth/teeth, toileting, grooming, dressing, etc.)  - Assess/evaluate cause of self-care deficits   - Assess range of motion  - Assess patient's mobility; develop plan if impaired  - Assess patient's need for assistive devices and provide as appropriate  - Encourage maximum independence but intervene and supervise when necessary  - Involve family in performance of ADLs  - Assess for home care needs following discharge   - Consider OT consult to assist with ADL evaluation and planning for discharge  - Provide patient education as appropriate  Outcome: Progressing  Goal: Maintains/Returns to pre admission functional level  Description: INTERVENTIONS:  - Perform AM-PAC 6 Click Basic Mobility/ Daily Activity assessment daily.  - Set and communicate daily mobility goal to care team and patient/family/caregiver.   - Collaborate with rehabilitation services on mobility goals if consulted  - Out of bed for toileting  - Record patient progress and toleration of activity level   Outcome: Progressing     Problem: DISCHARGE PLANNING  Goal: Discharge to home or other facility with appropriate resources  Description: INTERVENTIONS:  - Identify barriers to discharge w/patient and caregiver  - Arrange for needed discharge resources and transportation as appropriate  - Identify discharge learning needs (meds, wound care, etc.)  - Arrange for interpretive services to assist at discharge as needed  - Refer to Case Management Department for coordinating discharge planning if the patient needs post-hospital services based on physician/advanced practitioner order or complex needs related to functional status, cognitive ability, or social support system  Outcome: Progressing     Problem: Knowledge  Deficit  Goal: Patient/family/caregiver demonstrates understanding of disease process, treatment plan, medications, and discharge instructions  Description: Complete learning assessment and assess knowledge base.  Interventions:  - Provide teaching at level of understanding  - Provide teaching via preferred learning methods  Outcome: Progressing     Problem: GASTROINTESTINAL - ADULT  Goal: Minimal or absence of nausea and/or vomiting  Description: INTERVENTIONS:  - Administer IV fluids if ordered to ensure adequate hydration  - Maintain NPO status until nausea and vomiting are resolved  - Nasogastric tube if ordered  - Administer ordered antiemetic medications as needed  - Provide nonpharmacologic comfort measures as appropriate  - Advance diet as tolerated, if ordered  - Consider nutrition services referral to assist patient with adequate nutrition and appropriate food choices  Outcome: Progressing  Goal: Maintains or returns to baseline bowel function  Description: INTERVENTIONS:  - Assess bowel function  - Encourage oral fluids to ensure adequate hydration  - Administer IV fluids if ordered to ensure adequate hydration  - Administer ordered medications as needed  - Encourage mobilization and activity  - Consider nutritional services referral to assist patient with adequate nutrition and appropriate food choices  Outcome: Progressing  Goal: Maintains adequate nutritional intake  Description: INTERVENTIONS:  - Monitor percentage of each meal consumed  - Identify factors contributing to decreased intake, treat as appropriate  - Assist with meals as needed  - Monitor I&O, weight, and lab values if indicated  - Obtain nutrition services referral as needed  Outcome: Progressing  Goal: Establish and maintain optimal ostomy function  Description: INTERVENTIONS:  - Assess bowel function  - Encourage oral fluids to ensure adequate hydration  - Administer IV fluids if ordered to ensure adequate hydration   - Administer  ordered medications as needed  - Encourage mobilization and activity  - Nutrition services referral to assist patient with appropriate food choices  - Assess stoma site  - Consider wound care consult   Outcome: Progressing  Goal: Oral mucous membranes remain intact  Description: INTERVENTIONS  - Assess oral mucosa and hygiene practices  - Implement preventative oral hygiene regimen  - Implement oral medicated treatments as ordered  - Initiate Nutrition services referral as needed  Outcome: Progressing

## 2024-09-24 NOTE — CASE MANAGEMENT
Case Management Discharge Planning Note    Patient name Porsha Hoyos  Location 7[facility] 717/7T [facility] 717-01 MRN [id number]  : 1945 Date 2024       Current Admission Date: 2024  Current Admission Diagnosis:SBO (small bowel obstruction) (Formerly [facility])   Patient Active Problem List    Diagnosis Date Noted Date Diagnosed    Flat affect 2024     Hypertensive encephalopathy 2024     Swelling of forearm 2024     SBO (small bowel obstruction) (Formerly [facility]) 2024     MSSA bacteremia 2024     Discitis of lumbar region 2024     Advance care planning 2024     At risk for delirium 2024     Physical deconditioning 2024     Coronary artery disease involving native coronary artery of native heart without angina pectoris 2024     Scalp lesion 2024     Bilateral lower extremity edema 2024     Osteomyelitis (Formerly [facility]) 2024     COVID 2024     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Formerly [facility]) 2024     Sepsis (Formerly [facility]): SIRS criteria MSSA Bacteremia and COVID infection 2024     Type 2 diabetes mellitus with complication, with long-term current use of insulin (Formerly [facility]) 2024     Nausea and vomiting 2023     Hordeolum externum of left upper eyelid 08/15/2023     Proteinuria 2023     Metatarsalgia of left foot 06/15/2023     Peripheral vascular disease, unspecified (Formerly [facility]) 2023     Drug-induced constipation 2022     Anemia 2022     Vitamin deficiency 2022     Shortness of breath 2022     Elevated LFTs 2022     Pancytopenia (Formerly [facility]) 2022     Asthma without status asthmaticus without complication 2022     History of colon polyps 2022     Gastroesophageal reflux disease 2022     Stage 3b chronic kidney disease (Formerly [facility]) 2022     Hypothyroidism 2021     Type 2 diabetes mellitus with diabetic chronic kidney disease ([facility]ly [facility]) 2021     Type 2 diabetes mellitus with diabetic polyneuropathy ([facility]) 2021      Long term (current) use of insulin (HCC) 05/20/2021     Type 2 diabetes mellitus with stable proliferative diabetic retinopathy, bilateral (HCC) 05/20/2021     Acute pain of right shoulder 05/20/2019     Pain and swelling of right upper extremity 05/20/2019     Acute pain of right shoulder due to trauma 05/20/2019     Fall at home 05/20/2019     Imbalance 05/20/2019     Acute on Chronic Back Pain in the Setting of Sepsis, MSSA Bacteremia 12/31/2018     Encntr for gyn exam (general) (routine) w abnormal findings 08/28/2018     Vaginal atrophy 08/28/2018     Vulvar lesion 02/05/2018     Primary hypertension 01/29/2018     Mixed hyperlipidemia 01/29/2018       LOS (days): 6  Geometric Mean LOS (GMLOS) (days): 3.1  Days to GMLOS:-2.8     OBJECTIVE:  Risk of Unplanned Readmission Score: 17.96         Current admission status: Inpatient   Preferred Pharmacy:   [facility] - [place] - [address] [address]  Phone: [phone] Fax: [phone]    Primary Care Provider: João Alfonso MD    Primary Insurance: [employer]  Secondary Insurance:     DISCHARGE DETAILS:    Additional Comments: Patient reviewed during care coordination rounds with Dr. Frias who informed that pt is medically stable for discharge. CM tasked support team to request insurance authorization,  [facility] aware of same. CM department to follow for auth determination.

## 2024-09-24 NOTE — ASSESSMENT & PLAN NOTE
Small bowel obstruction versus ileus in the setting of constipation  Clinically and radiologically resolved  Diet advanced, patient tolerating  Continue Debbie-Colace along with Dulcolax for bowel regimen  Avoid narcotics if possible
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
## 2024-09-25 ENCOUNTER — HOSPITAL ENCOUNTER (INPATIENT)
Facility: HOSPITAL | Age: 79
LOS: 29 days | Discharge: HOME HEALTH | End: 2024-10-24
Attending: STUDENT IN AN ORGANIZED HEALTH CARE EDUCATION/TRAINING PROGRAM | Admitting: STUDENT IN AN ORGANIZED HEALTH CARE EDUCATION/TRAINING PROGRAM
Payer: COMMERCIAL

## 2024-09-25 VITALS
DIASTOLIC BLOOD PRESSURE: 62 MMHG | RESPIRATION RATE: 18 BRPM | SYSTOLIC BLOOD PRESSURE: 147 MMHG | OXYGEN SATURATION: 95 % | HEART RATE: 78 BPM | HEIGHT: 59 IN | BODY MASS INDEX: 30.71 KG/M2 | TEMPERATURE: 97.7 F | WEIGHT: 152.34 LBS

## 2024-09-25 DIAGNOSIS — I10 PRIMARY HYPERTENSION: ICD-10-CM

## 2024-09-25 DIAGNOSIS — L60.2 HYPERTROPHIC TOENAIL: ICD-10-CM

## 2024-09-25 DIAGNOSIS — A41.01 SEPSIS DUE TO METHICILLIN SUSCEPTIBLE STAPHYLOCOCCUS AUREUS (MSSA) WITHOUT ACUTE ORGAN DYSFUNCTION (HCC): ICD-10-CM

## 2024-09-25 DIAGNOSIS — F32.A DEPRESSION, UNSPECIFIED DEPRESSION TYPE: ICD-10-CM

## 2024-09-25 DIAGNOSIS — R63.0 POOR APPETITE: Primary | ICD-10-CM

## 2024-09-25 DIAGNOSIS — M86.9 OSTEOMYELITIS, UNSPECIFIED SITE, UNSPECIFIED TYPE (HCC): ICD-10-CM

## 2024-09-25 LAB
FLUAV RNA RESP QL NAA+PROBE: NEGATIVE
FLUBV RNA RESP QL NAA+PROBE: NEGATIVE
GLUCOSE SERPL-MCNC: 107 MG/DL (ref 65–140)
GLUCOSE SERPL-MCNC: 156 MG/DL (ref 65–140)
GLUCOSE SERPL-MCNC: 214 MG/DL (ref 65–140)
GLUCOSE SERPL-MCNC: 335 MG/DL (ref 65–140)
RSV RNA RESP QL NAA+PROBE: NEGATIVE
SARS-COV-2 RNA RESP QL NAA+PROBE: POSITIVE

## 2024-09-25 PROCEDURE — 99239 HOSP IP/OBS DSCHRG MGMT >30: CPT | Performed by: FAMILY MEDICINE

## 2024-09-25 PROCEDURE — 82948 REAGENT STRIP/BLOOD GLUCOSE: CPT

## 2024-09-25 PROCEDURE — 99232 SBSQ HOSP IP/OBS MODERATE 35: CPT | Performed by: INTERNAL MEDICINE

## 2024-09-25 PROCEDURE — 0241U HB NFCT DS VIR RESP RNA 4 TRGT: CPT | Performed by: FAMILY MEDICINE

## 2024-09-25 RX ORDER — ALLOPURINOL 100 MG/1
200 TABLET ORAL DAILY
Qty: 180 TABLET | Refills: 0
Start: 2024-09-25

## 2024-09-25 RX ORDER — ONDANSETRON 4 MG/1
4 TABLET, FILM COATED ORAL EVERY 8 HOURS PRN
Qty: 20 TABLET | Refills: 0
Start: 2024-09-25

## 2024-09-25 RX ORDER — NITROGLYCERIN 0.4 MG/1
0.4 TABLET SUBLINGUAL
Qty: 30 TABLET | Refills: 0
Start: 2024-09-25

## 2024-09-25 RX ORDER — HYDRALAZINE HYDROCHLORIDE 25 MG/1
50 TABLET, FILM COATED ORAL EVERY 8 HOURS SCHEDULED
Status: DISCONTINUED | OUTPATIENT
Start: 2024-09-25 | End: 2024-09-25 | Stop reason: HOSPADM

## 2024-09-25 RX ORDER — LIDOCAINE 50 MG/G
1 PATCH TOPICAL DAILY
Qty: 30 PATCH | Refills: 0
Start: 2024-09-26

## 2024-09-25 RX ORDER — PANTOPRAZOLE SODIUM 40 MG/1
40 TABLET, DELAYED RELEASE ORAL
Qty: 30 TABLET | Refills: 0 | Status: ON HOLD
Start: 2024-09-26 | End: 2024-10-23 | Stop reason: SDUPTHER

## 2024-09-25 RX ORDER — INSULIN GLARGINE 100 [IU]/ML
26 INJECTION, SOLUTION SUBCUTANEOUS DAILY
Qty: 10 ML | Refills: 0 | Status: ON HOLD
Start: 2024-09-25 | End: 2024-09-26

## 2024-09-25 RX ORDER — MULTIVIT-MIN/FOLIC ACID/LUTEIN 500-250MCG
1 TABLET,CHEWABLE ORAL DAILY
Qty: 30 TABLET | Refills: 0
Start: 2024-09-25 | End: 2024-12-24

## 2024-09-25 RX ORDER — ATORVASTATIN CALCIUM 80 MG/1
80 TABLET, FILM COATED ORAL DAILY
Qty: 30 TABLET | Refills: 0 | Status: ON HOLD
Start: 2024-09-25 | End: 2024-10-23 | Stop reason: SDUPTHER

## 2024-09-25 RX ORDER — ALBUTEROL SULFATE 90 UG/1
2 INHALANT RESPIRATORY (INHALATION) 4 TIMES DAILY
Qty: 18 G | Refills: 0
Start: 2024-09-25

## 2024-09-25 RX ORDER — MIRTAZAPINE 7.5 MG/1
7.5 TABLET, FILM COATED ORAL
Status: DISCONTINUED | OUTPATIENT
Start: 2024-09-25 | End: 2024-09-25 | Stop reason: HOSPADM

## 2024-09-25 RX ORDER — LOSARTAN POTASSIUM 100 MG/1
100 TABLET ORAL DAILY
Qty: 30 TABLET | Refills: 0 | Status: ON HOLD
Start: 2024-09-25 | End: 2024-10-23 | Stop reason: SDUPTHER

## 2024-09-25 RX ORDER — ONDANSETRON 2 MG/ML
4 INJECTION INTRAMUSCULAR; INTRAVENOUS EVERY 6 HOURS PRN
Status: DISCONTINUED | OUTPATIENT
Start: 2024-09-25 | End: 2024-09-25 | Stop reason: HOSPADM

## 2024-09-25 RX ORDER — BISACODYL 10 MG
10 SUPPOSITORY, RECTAL RECTAL DAILY PRN
Qty: 12 SUPPOSITORY | Refills: 0
Start: 2024-09-25

## 2024-09-25 RX ORDER — FAMOTIDINE 10 MG
20 TABLET ORAL DAILY
Qty: 60 TABLET | Refills: 0 | Status: ON HOLD
Start: 2024-09-25 | End: 2024-10-23 | Stop reason: SDUPTHER

## 2024-09-25 RX ORDER — MIRTAZAPINE 7.5 MG/1
7.5 TABLET, FILM COATED ORAL
Qty: 30 TABLET | Refills: 0 | Status: ON HOLD
Start: 2024-09-25 | End: 2024-10-23 | Stop reason: SDUPTHER

## 2024-09-25 RX ORDER — ACETAMINOPHEN 325 MG/1
975 TABLET ORAL EVERY 8 HOURS SCHEDULED
Qty: 90 TABLET | Refills: 0
Start: 2024-09-25

## 2024-09-25 RX ORDER — BUDESONIDE AND FORMOTEROL FUMARATE DIHYDRATE 160; 4.5 UG/1; UG/1
2 AEROSOL RESPIRATORY (INHALATION) 2 TIMES DAILY
Qty: 120 G | Refills: 0
Start: 2024-09-25 | End: 2024-12-24

## 2024-09-25 RX ORDER — AMOXICILLIN 250 MG
2 CAPSULE ORAL
Qty: 30 TABLET | Refills: 0
Start: 2024-09-25

## 2024-09-25 RX ORDER — LEVOTHYROXINE SODIUM 100 UG/1
100 TABLET ORAL DAILY
Qty: 30 TABLET | Refills: 0
Start: 2024-09-25

## 2024-09-25 RX ORDER — NIFEDIPINE 60 MG/1
60 TABLET, EXTENDED RELEASE ORAL DAILY
Qty: 30 TABLET | Refills: 0 | Status: ON HOLD
Start: 2024-09-25 | End: 2024-10-10 | Stop reason: ALTCHOICE

## 2024-09-25 RX ORDER — GABAPENTIN 100 MG/1
100 CAPSULE ORAL 3 TIMES DAILY
Qty: 90 CAPSULE | Refills: 0 | Status: ON HOLD
Start: 2024-09-25 | End: 2024-10-01

## 2024-09-25 RX ORDER — HYDRALAZINE HYDROCHLORIDE 10 MG/1
10 TABLET, FILM COATED ORAL 3 TIMES DAILY
Qty: 90 TABLET | Refills: 0 | Status: ON HOLD
Start: 2024-09-25 | End: 2024-10-17

## 2024-09-25 RX ORDER — METHOCARBAMOL 500 MG/1
500 TABLET, FILM COATED ORAL EVERY 6 HOURS PRN
Qty: 90 TABLET | Refills: 0 | Status: ON HOLD
Start: 2024-09-25 | End: 2024-10-01

## 2024-09-25 RX ORDER — CARVEDILOL 25 MG/1
12.5 TABLET ORAL 2 TIMES DAILY WITH MEALS
Qty: 30 TABLET | Refills: 0 | Status: ON HOLD
Start: 2024-09-25 | End: 2024-10-23 | Stop reason: SDUPTHER

## 2024-09-25 RX ORDER — CEFAZOLIN SODIUM 2 G/50ML
2000 SOLUTION INTRAVENOUS EVERY 12 HOURS
Qty: 1 EACH | Refills: 0 | Status: ON HOLD
Start: 2024-09-25 | End: 2024-10-23

## 2024-09-25 RX ADMIN — CEFAZOLIN SODIUM 2000 MG: 2 SOLUTION INTRAVENOUS at 05:26

## 2024-09-25 RX ADMIN — METHOCARBAMOL TABLETS 500 MG: 500 TABLET, COATED ORAL at 00:24

## 2024-09-25 RX ADMIN — INSULIN LISPRO 1 UNITS: 100 INJECTION, SOLUTION INTRAVENOUS; SUBCUTANEOUS at 11:22

## 2024-09-25 RX ADMIN — HEPARIN SODIUM 5000 UNITS: 5000 INJECTION INTRAVENOUS; SUBCUTANEOUS at 08:18

## 2024-09-25 RX ADMIN — ACETAMINOPHEN 975 MG: 325 TABLET ORAL at 13:20

## 2024-09-25 RX ADMIN — LIDOCAINE 1 PATCH: 700 PATCH TOPICAL at 08:18

## 2024-09-25 RX ADMIN — ASPIRIN 81 MG: 81 TABLET, COATED ORAL at 08:18

## 2024-09-25 RX ADMIN — CARVEDILOL 25 MG: 12.5 TABLET, FILM COATED ORAL at 08:18

## 2024-09-25 RX ADMIN — BUDESONIDE AND FORMOTEROL FUMARATE DIHYDRATE 2 PUFF: 160; 4.5 AEROSOL RESPIRATORY (INHALATION) at 08:23

## 2024-09-25 RX ADMIN — HYDRALAZINE HYDROCHLORIDE 25 MG: 25 TABLET ORAL at 05:32

## 2024-09-25 RX ADMIN — ACETAMINOPHEN 975 MG: 325 TABLET ORAL at 05:26

## 2024-09-25 RX ADMIN — ONDANSETRON 4 MG: 2 INJECTION INTRAMUSCULAR; INTRAVENOUS at 09:32

## 2024-09-25 RX ADMIN — HYDRALAZINE HYDROCHLORIDE 50 MG: 25 TABLET ORAL at 13:20

## 2024-09-25 RX ADMIN — HYDRALAZINE HYDROCHLORIDE 5 MG: 20 INJECTION INTRAMUSCULAR; INTRAVENOUS at 08:27

## 2024-09-25 RX ADMIN — PANTOPRAZOLE SODIUM 40 MG: 40 TABLET, DELAYED RELEASE ORAL at 05:26

## 2024-09-25 RX ADMIN — LOSARTAN POTASSIUM 100 MG: 50 TABLET, FILM COATED ORAL at 08:18

## 2024-09-25 NOTE — ASSESSMENT & PLAN NOTE
Was noted for change in mental status requiring a rapid response and stroke alert on 9/20/2024  Stroke has been ruled out  The change in mental status was felt to be from hypertensive encephalopathy   Coreg was increased to 25 mg twice daily  Losartan dose also increased to home dose 100 mg daily   Hydralazine 10 mg TID added  Resume Procardia on discharge

## 2024-09-25 NOTE — ASSESSMENT & PLAN NOTE
Small bowel obstruction versus ileus in the setting of constipation  Clinically and radiologically resolved  Diet advanced, patient tolerating  Continue bowel regimen on discharge  Avoid narcotics

## 2024-09-25 NOTE — CASE MANAGEMENT
Case Management Discharge Planning Note    Patient name Porsha Hoyos  Location 7T [facility] 717/7T [facility] 717-01 MRN [id number]  : 1945 Date 2024       Current Admission Date: 2024  Current Admission Diagnosis:SBO (small bowel obstruction) ([facility])   Patient Active Problem List    Diagnosis Date Noted Date Diagnosed    Flat affect 2024     Hypertensive encephalopathy 2024     Swelling of forearm 2024     SBO (small bowel obstruction) ([facility]) 2024     MSSA bacteremia 2024     Discitis of lumbar region 2024     Advance care planning 2024     At risk for delirium 2024     Physical deconditioning 2024     Coronary artery disease involving native coronary artery of native heart without angina pectoris 2024     Scalp lesion 2024     Bilateral lower extremity edema 2024     Osteomyelitis ([facility]) 2024     COVID 2024     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[facility]) 2024     Sepsis ([facility]): SIRS criteria MSSA Bacteremia and COVID infection 2024     Type 2 diabetes mellitus with complication, with long-term current use of insulin ([facility]) 2024     Nausea and vomiting 2023     Hordeolum externum of left upper eyelid 08/15/2023     Proteinuria 2023     Metatarsalgia of left foot 06/15/2023     Peripheral vascular disease, unspecified ([facility]) 2023     Drug-induced constipation 2022     Anemia 2022     Vitamin deficiency 2022     Shortness of breath 2022     Elevated LFTs 2022     Pancytopenia ([facility]) 2022     Asthma without status asthmaticus without complication 2022     History of colon polyps 2022     Gastroesophageal reflux disease 2022     Stage 3b chronic kidney disease ([facility]) 2022     Hypothyroidism 2021     Type 2 diabetes mellitus with diabetic chronic kidney disease ([facility]) 2021     Type 2 diabetes mellitus with diabetic polyneuropathy ([facility]) 2021      Long term (current) use of insulin (HCC) 05/20/2021     Type 2 diabetes mellitus with stable proliferative diabetic retinopathy, bilateral (HCC) 05/20/2021     Acute pain of right shoulder 05/20/2019     Pain and swelling of right upper extremity 05/20/2019     Acute pain of right shoulder due to trauma 05/20/2019     Fall at home 05/20/2019     Imbalance 05/20/2019     Acute on Chronic Back Pain in the Setting of Sepsis, MSSA Bacteremia 12/31/2018     Encntr for gyn exam (general) (routine) w abnormal findings 08/28/2018     Vaginal atrophy 08/28/2018     Vulvar lesion 02/05/2018     Primary hypertension 01/29/2018     Mixed hyperlipidemia 01/29/2018       LOS (days): 7  Geometric Mean LOS (GMLOS) (days): 3.1  Days to GMLOS:-3.9     OBJECTIVE:  Risk of Unplanned Readmission Score: 19.41         Current admission status: Inpatient   Preferred Pharmacy:   [facility] - [place] - [address]  [place] [address]  Phone: [phone] Fax: [phone]    Primary Care Provider: João Alfonso MD    Primary Insurance: [employer] RE[employer]  Secondary Insurance:     DISCHARGE DETAILS:    Discharge planning discussed with:: Patient, son and DIL  Freedom of Choice: Yes     Treatment Team Recommendation: Short Term Rehab  Discharge Destination Plan:: Short Term Rehab  Transport at Discharge : Other (Comment) (will transport to TCU via WC and RN)          Additional Comments: Message from TCU patient can admit today.  Needs a COVID/RSV/FLU prior to transfer.  Update to Dr Frias and nurse Mireya bryant.  Met with patient, son and DIL at bedside to discuss discharge planning.    Accepting Facility Name, City & State : [facility]  Receiving Facility/Agency Phone Number: [phone]  Facility/Agency Fax Number: [phone]

## 2024-09-25 NOTE — ASSESSMENT & PLAN NOTE
Continue Coreg at increased dose of 25 mg twice daily   Losartan increased to 100 mg daily (home dose)  Added hydralazine 10 mg TID  Resume home Procardia

## 2024-09-25 NOTE — ASSESSMENT & PLAN NOTE
Overall stable  Discitis/osteomyelitis of the lumbar spine in the setting of bacteremia   Continue IV cefazolin 2 g every 12 until 10/21/2024, 6 weeks total  Continue supportive medications - avoid narcotics in the setting of recent SBO  Stable to return to [facility] to continue rehab

## 2024-09-25 NOTE — ASSESSMENT & PLAN NOTE
Patient with limited information, flat affect, concern for undiagnosed depression  Appreciate geriatric medicine evaluation  Started on Remeron 7.5 mg at bedtime to help with appetite and sleep

## 2024-09-25 NOTE — PLAN OF CARE
Problem: Prexisting or High Potential for Compromised Skin Integrity  Goal: Skin integrity is maintained or improved  Description: INTERVENTIONS:  - Identify patients at risk for skin breakdown  - Assess and monitor skin integrity  - Assess and monitor nutrition and hydration status  - Monitor labs   - Assess for incontinence   - Turn and reposition patient  - Assist with mobility/ambulation  - Relieve pressure over bony prominences  - Avoid friction and shearing  - Provide appropriate hygiene as needed including keeping skin clean and dry  - Evaluate need for skin moisturizer/barrier cream  - Collaborate with interdisciplinary team   - Patient/family teaching  - Consider wound care consult   Outcome: Progressing     Problem: PAIN - ADULT  Goal: Verbalizes/displays adequate comfort level or baseline comfort level  Description: Interventions:  - Encourage patient to monitor pain and request assistance  - Assess pain using appropriate pain scale  - Administer analgesics based on type and severity of pain and evaluate response  - Implement non-pharmacological measures as appropriate and evaluate response  - Consider cultural and social influences on pain and pain management  - Notify physician/advanced practitioner if interventions unsuccessful or patient reports new pain  Outcome: Progressing     Problem: INFECTION - ADULT  Goal: Absence or prevention of progression during hospitalization  Description: INTERVENTIONS:  - Assess and monitor for signs and symptoms of infection  - Monitor lab/diagnostic results  - Monitor all insertion sites, i.e. indwelling lines, tubes, and drains  - Monitor endotracheal if appropriate and nasal secretions for changes in amount and color  - Terrell appropriate cooling/warming therapies per order  - Administer medications as ordered  - Instruct and encourage patient and family to use good hand hygiene technique  - Identify and instruct in appropriate isolation precautions for  identified infection/condition  Outcome: Progressing     Problem: SAFETY ADULT  Goal: Patient will remain free of falls  Description: INTERVENTIONS:  - Educate patient/family on patient safety including physical limitations  - Instruct patient to call for assistance with activity   - Consult OT/PT to assist with strengthening/mobility   - Keep Call bell within reach  - Keep bed low and locked with side rails adjusted as appropriate  - Keep care items and personal belongings within reach  - Initiate and maintain comfort rounds  - Make Fall Risk Sign visible to staff  - - Apply yellow socks and bracelet for high fall risk patients  - Consider moving patient to room near nurses station  Outcome: Progressing  Goal: Maintain or return to baseline ADL function  Description: INTERVENTIONS:  -  Assess patient's ability to carry out ADLs; assess patient's baseline for ADL function and identify physical deficits which impact ability to perform ADLs (bathing, care of mouth/teeth, toileting, grooming, dressing, etc.)  - Assess/evaluate cause of self-care deficits   - Assess range of motion  - Assess patient's mobility; develop plan if impaired  - Assess patient's need for assistive devices and provide as appropriate  - Encourage maximum independence but intervene and supervise when necessary  - Involve family in performance of ADLs  - Assess for home care needs following discharge   - Consider OT consult to assist with ADL evaluation and planning for discharge  - Provide patient education as appropriate  Outcome: Progressing  Goal: Maintains/Returns to pre admission functional level  Description: INTERVENTIONS:  - Perform AM-PAC 6 Click Basic Mobility/ Daily Activity assessment daily.  - Set and communicate daily mobility goal to care team and patient/family/caregiver.   - Collaborate with rehabilitation services on mobility goals if consulted  - Out of bed for toileting  - Record patient progress and toleration of activity  level   Outcome: Progressing     Problem: DISCHARGE PLANNING  Goal: Discharge to home or other facility with appropriate resources  Description: INTERVENTIONS:  - Identify barriers to discharge w/patient and caregiver  - Arrange for needed discharge resources and transportation as appropriate  - Identify discharge learning needs (meds, wound care, etc.)  - Arrange for interpretive services to assist at discharge as needed  - Refer to Case Management Department for coordinating discharge planning if the patient needs post-hospital services based on physician/advanced practitioner order or complex needs related to functional status, cognitive ability, or social support system  Outcome: Progressing     Problem: Knowledge Deficit  Goal: Patient/family/caregiver demonstrates understanding of disease process, treatment plan, medications, and discharge instructions  Description: Complete learning assessment and assess knowledge base.  Interventions:  - Provide teaching at level of understanding  - Provide teaching via preferred learning methods  Outcome: Progressing     Problem: GASTROINTESTINAL - ADULT  Goal: Minimal or absence of nausea and/or vomiting  Description: INTERVENTIONS:  - Administer IV fluids if ordered to ensure adequate hydration  - Maintain NPO status until nausea and vomiting are resolved  - Nasogastric tube if ordered  - Administer ordered antiemetic medications as needed  - Provide nonpharmacologic comfort measures as appropriate  - Advance diet as tolerated, if ordered  - Consider nutrition services referral to assist patient with adequate nutrition and appropriate food choices  Outcome: Progressing  Goal: Maintains or returns to baseline bowel function  Description: INTERVENTIONS:  - Assess bowel function  - Encourage oral fluids to ensure adequate hydration  - Administer IV fluids if ordered to ensure adequate hydration  - Administer ordered medications as needed  - Encourage mobilization and  activity  - Consider nutritional services referral to assist patient with adequate nutrition and appropriate food choices  Outcome: Progressing  Goal: Maintains adequate nutritional intake  Description: INTERVENTIONS:  - Monitor percentage of each meal consumed  - Identify factors contributing to decreased intake, treat as appropriate  - Assist with meals as needed  - Monitor I&O, weight, and lab values if indicated  - Obtain nutrition services referral as needed  Outcome: Progressing  Goal: Establish and maintain optimal ostomy function  Description: INTERVENTIONS:  - Assess bowel function  - Encourage oral fluids to ensure adequate hydration  - Administer IV fluids if ordered to ensure adequate hydration   - Administer ordered medications as needed  - Encourage mobilization and activity  - Nutrition services referral to assist patient with appropriate food choices  - Assess stoma site  - Consider wound care consult   Outcome: Progressing  Goal: Oral mucous membranes remain intact  Description: INTERVENTIONS  - Assess oral mucosa and hygiene practices  - Implement preventative oral hygiene regimen  - Implement oral medicated treatments as ordered  - Initiate Nutrition services referral as needed  Outcome: Progressing     Problem: METABOLIC, FLUID AND ELECTROLYTES - ADULT  Goal: Electrolytes maintained within normal limits  Description: INTERVENTIONS:  - Monitor labs and assess patient for signs and symptoms of electrolyte imbalances  - Administer electrolyte replacement as ordered  - Monitor response to electrolyte replacements, including repeat lab results as appropriate  - Instruct patient on fluid and nutrition as appropriate  Outcome: Progressing  Goal: Fluid balance maintained  Description: INTERVENTIONS:  - Monitor labs   - Monitor I/O and WT  - Instruct patient on fluid and nutrition as appropriate  - Assess for signs & symptoms of volume excess or deficit  Outcome: Progressing  Goal: Glucose maintained  within target range  Description: INTERVENTIONS:  - Monitor Blood Glucose as ordered  - Assess for signs and symptoms of hyperglycemia and hypoglycemia  - Administer ordered medications to maintain glucose within target range  - Assess nutritional intake and initiate nutrition service referral as needed  Outcome: Progressing

## 2024-09-25 NOTE — DISCHARGE SUMMARY
Discharge Summary - Hospitalist   Name: Porsha Hoyos 79 y.o. female I MRN: [id number]  Unit/Bed#: 7T [facility] 717-01 I Date of Admission: 9/18/2024   Date of Service: 9/25/2024 I Hospital Day: 7     Assessment & Plan  SBO (small bowel obstruction) (HCC)  Small bowel obstruction versus ileus in the setting of constipation  Clinically and radiologically resolved  Diet advanced, patient tolerating  Continue bowel regimen on discharge  Avoid narcotics   Hypertensive encephalopathy  Was noted for change in mental status requiring a rapid response and stroke alert on 9/20/2024  Stroke has been ruled out  The change in mental status was felt to be from hypertensive encephalopathy   Coreg was increased to 25 mg twice daily  Losartan dose also increased to home dose 100 mg daily   Hydralazine 10 mg TID added  Resume Procardia on discharge  Discitis of lumbar region  Overall stable  Discitis/osteomyelitis of the lumbar spine in the setting of bacteremia   Continue IV cefazolin 2 g every 12 until 10/21/2024, 6 weeks total  Continue supportive medications - avoid narcotics in the setting of recent SBO  Stable to return to [facility] to continue rehab  Swelling of forearm  Right forearm swelling with right upper arm PICC in place.  The PICC itself is functioning properly  She had an upper extremity venous Doppler on 9/16/2024 which was negative for DVT  There is no distal discoloration or pain  Right forearm swelling may just be mechanical due to the presence of PICC  Continue elevation and observation.  Mixed hyperlipidemia  Continue Lipitor  Type 2 diabetes mellitus with diabetic chronic kidney disease (HCC)  Lab Results   Component Value Date    HGBA1C 7.9 (A) 07/02/2024     Blood glucose mostly in acceptable range  Continue Lantus 30 units at bedtime, patient with limited oral intake  Monitor blood glucose, insulin sliding scale  Primary hypertension  Continue Coreg at increased dose of 25 mg twice daily   Losartan increased to 100 mg  daily (home dose)  Added hydralazine 10 mg TID  Resume home Procardia   Flat affect  Patient with limited information, flat affect, concern for undiagnosed depression  Appreciate geriatric medicine evaluation  Started on Remeron 7.5 mg at bedtime to help with appetite and sleep     Medical Problems       Resolved Problems  Date Reviewed: 9/18/2024            Resolved    Acute kidney injury superimposed on chronic kidney disease  (HCC) 9/20/2024     Resolved by  Jared Gomez MD        Discharging Physician / Practitioner: Selina Frias MD  PCP: João Alfonso MD  Admission Date:   Admission Orders (From admission, onward)       Ordered        09/18/24 1656  INPATIENT ADMISSION  Once                          Discharge Date: 09/25/24    Consultations During Hospital Stay:  General surgery, Neurology, Geriatric medicine    Procedures Performed:   XR abdomen obstruction series   Final Result by Amanda Clayton MD (09/21 1143)      Continued improved small bowel distention. There is air seen throughout the large bowel.      Right-sided PICC line tip likely located within a left-sided SVC.            Workstation performed: [id number]         MRI brain wo contrast   Final Result by Joshua Parada MD (09/20 1022)      No acute infarct identified. Specifically, no acute infarct noted involving the right insular adjacent right temporal lobe as suspected on noncontrast head CT.      Old lacunar infarct involving the right aspect of the duke.      Mild chronic microangiopathic change involving the white matter of both cerebral hemispheres, mildly progressed when compared to the prior examination from 2005.      Partial opacification of the bilateral mastoid air cells.         I personally discussed this study with Dr. Norris Weems on 9/20/2024 at 10:05 a.m.            Workstation performed: [id number]         CTA stroke alert (head/neck)   Final Result by Joshua Parada MD (09/20 0924)      No large vessel  occlusion identified on CT angiogram of the head. At least moderate stenoses involving the bilateral cavernous and supraclinoid internal carotid arteries due to predominant calcified atherosclerotic plaque, and at least moderate stenoses    involving the bilateral intradural vertebral arteries.      No hemodynamically significant stenosis or dissection identified on CT angiogram of the neck.      Pericardial effusion, bilateral pleural effusions.      Left-sided superior vena cava. Mild left atrial enlargement.      Multilevel cervical spondylosis, with erosive changes also noted involving the atlantodens joint, and multiple bilateral facet joints particularly at the level of C4-C5, with may be due to osteoarthritis, but an inflammatory/crystalline arthropathy    cannot be excluded. Correlation with the patient's clinical and past medical history is recommended.         Findings were directly discussed with Norris Weems at 9:05 a.m. on 9/20/2024.      Workstation performed: [id number]         CT stroke alert brain   Final Result by Joshua Parada MD (09/20 0938)      New area of low-attenuation involving the right insula, and which may extend into the adjacent right temporal lobe, indicative of a developing infarct in this region. No associated hemorrhage. No significant mass effect or midline shift. Recommend    further relation with brain MR.      Old lacunar infarct involving the right aspect of the duke.      Findings were directly discussed with Norris Weems at approximately 9:05 a.m on 9/20/2024.      Workstation performed: [id number]         XR abdomen obstruction series   Final Result by Dejan Dominguez MD (09/19 1271)      Interval nasogastric tube placement with tip overlying the proximal stomach. Decreased gastric distention. Slightly improved small bowel distention.         Workstation performed: [id number]         XR chest portable   Final Result by Nancy Neal MD (09/18 7187)   A feeding tube has  "been inserted the tip of which lies in the left upper quadrant below the diaphragm            Workstation performed: [id number]               Significant Findings / Test Results:   Results from last 7 days   Lab Units 09/24/24  0635   WBC Thousand/uL 4.69   HEMOGLOBIN g/dL 8.7*   HEMATOCRIT % 26.5*   PLATELETS Thousands/uL 297     Results from last 7 days   Lab Units 09/24/24  0635   SODIUM mmol/L 144   POTASSIUM mmol/L 3.8   CHLORIDE mmol/L 112*   CO2 mmol/L 24   BUN mg/dL 21   CREATININE mg/dL 0.87   CALCIUM mg/dL 8.9       Test Results Pending at Discharge (will require follow up):   None     Outpatient Tests Requested:  None    Complications:  confusion    Reason for Admission: SBO    Hospital Course:   Porsha Hoyos is a 79 y.o. female patient with history of discitis/osteomyelitis on IV antibiotics, hypertension, diabetes who originally presented to the hospital on 9/18/2024 due to SBO.  SBO resolved with supportive management.  During hospitalization the patient was noted for acute change in mental status which was deemed to be due to hypertensive encephalopathy and resolved.  The patient was discharged back to [facility] unit in improved and stable condition.  She is to continue her IV antibiotics.  Her blood pressure medications adjusted.  Her narcotic pain medications have been discontinued.    Please see above list of diagnoses and related plan for additional information.     Condition at Discharge: stable    Discharge Day Visit / Exam:   /62 (BP Location: Left arm)   Pulse 78   Temp 97.7 °F (36.5 °C) (Temporal)   Resp 18   Ht 4' 11\" (1.499 m)   Wt 69.1 kg (152 lb 5.4 oz)   LMP  (LMP Unknown)   SpO2 95%   BMI 30.77 kg/m²     Physical Exam  Constitutional:       General: She is not in acute distress.  HENT:      Head: Normocephalic and atraumatic.   Eyes:      Conjunctiva/sclera: Conjunctivae normal.   Cardiovascular:      Rate and Rhythm: Normal rate and regular rhythm. "   Pulmonary:      Effort: No respiratory distress.      Breath sounds: No wheezing or rales.   Abdominal:      General: There is no distension.      Tenderness: There is no abdominal tenderness. There is no guarding.   Musculoskeletal:         General: Tenderness (spinal) present.   Skin:     General: Skin is warm and dry.   Neurological:      Mental Status: She is oriented to person, place, and time.   Psychiatric:         Mood and Affect: Mood is depressed.          Discussion with Family: Updated  (son) at bedside.    Discharge instructions/Information to patient and family:   See after visit summary for information provided to patient and family.      Provisions for Follow-Up Care:  See after visit summary for information related to follow-up care and any pertinent home health orders.      Mobility at time of Discharge:   Basic Mobility Inpatient Raw Score: 16  JH-HLM Goal: 5: Stand one or more mins  JH-HLM Achieved: 4: Move to chair/commode  HLM Goal NOT achieved. Continue to encourage mobility in post discharge setting.     Disposition:   Other Skilled Nursing Facility at [facility]    Planned Readmission: No    Discharge Medications:  See after visit summary for reconciled discharge medications provided to patient and/or family.      Administrative Statements   Discharge Statement:  I have spent a total time of 35 minutes in caring for this patient on the day of the visit/encounter. >30 minutes of time was spent on: Risks and benefits of tx options, Patient and family education, Counseling / Coordination of care, Documenting in the medical record, Reviewing / ordering tests, medicine, procedures  , and Communicating with other healthcare professionals .    **Please Note: This note may have been constructed using a voice recognition system**

## 2024-09-25 NOTE — PROGRESS NOTES
Progress Note - Geriatric Medicine   Porsha SEN Hoyos 79 y.o. female MRN: [id number]  Unit/Bed#: 7T [facility] 717-01 Encounter: [id number]      Assessment/Plan:    Cognitive Screening  Patient has no documented history of memory loss or cognitive impairment  She admits to having some forgetfulness at baseline  She is alert and oriented x 4 and exam today  Most recent TSH on 7/10/2024 noted to be 1.779  Most recent vitamin B12 level noted to be 4288  CT of the brain on 9/20/2024 revealed mild chronic microangiopathic changes  No MoCA or cognitive testing noted in epic  Maintain delirium precautions as discussed below  Redirect to reorient as needed  Keep physically, mentally, and socially active    Delirium Precautions   Current mentation: alert and oriented x 4  Patient is at high risk secondary to age, recent osteomyelitis of the lumbar spine in the setting of bacteremia requiring prolonged antibiotics, antibiotic use, hypertension, depression, chronic pain, sleep disturbances, vision impairment, hearing impairment, and prolonged hospitalization  Maintain delirium precautions   Provide redirection, reorientation, and distraction techniques  Maintain fall and safety precautions   Assist with ADLs/IADLs  Avoid deliriogenic medications such as tramadol, benzodiazepines, anticholinergics, benadryl  Treat pain using geriatric pain protocol   Encourage oral hydration and nutrition   Monitor for constipation and urinary retention   Implement sleep hygiene and limit night time interuptions   Maintain sleep-wake cycle   Encourage early and frequent mobilization   Most recent EKG on 9/20/2024 revealed a QTc interval of 433  If all other interventions are unsuccessful for acute agitation and behaviors, can consider Zyprexa 2.5 mg IM Q 8 hours prn   Would avoid benzodiazepines such as Ativan as these can worsen delirium     Deconditioning   Patient is at increased risk for deconditioning secondary to recent osteomyelitis of the lumbar  spine in the setting of bacteremia requiring prolonged antibiotics, antibiotic use, hypertension, depression, chronic pain, sleep disturbances, weakness, gait dysfunction, and hospitalization    Continue to optimize diet, hydration, and mobility for healing   Stage III Chronic Kidney Disease   Baseline creatinine unclear though recently appears to be 0.8-1.1  Creatinine on labs yesterday stable at 0.87  Avoid nephrotoxic medication and renal dose medication  Keep hydrated  Type II Diabetes with Long-Term Current Use of Insulin   Blood sugar log reviewed and blood sugars have been elevated but acceptable  Recent hemoglobin A1c on 7/2/2024 noted to be 7.9 which is acceptable for the patients age  Per chart review, the patients home regimen includes  Lantus 26 units daily  NovoLog 5 units daily with lunch and dinner  Her current regimen includes  Humalog SSI with meals   Lantus 30 units every morning   Continue insulin and diet control  Avoid hypoglycemia  Anemia   Baseline hemoglobin appears to be 9-10  Hemoglobin on labs yesterday noted to be 8.7  Continue to monitor CBC  Transfuse for hemoglobin < 7  Monitor for signs and symptoms of infection, dehydration, DVT, and skin breakdown    Frailty   Clinical Frail Scale: 5- Mildly Frail  More evident slowing, needs help high order IADLs (transport, bills, medications)  Progressively impairs shopping and walking outside alone, meal prep and housework  Most recent albumin on 9/7/2024 noted to be 4.0  Consider nutrition consult  Encourage protein supplementation     Ambulatory Dysfunction/Falls  Patient notes no recent falls  She had not been ambulating with any assistive devices prior to her initial hospitalization earlier this month  PT/OT consulted to assist with strengthening/mobility and assist with discharge planning to appropriate level of care  Assess patient frequently for physical needs, encourage use of assistant devices as needed and directed by PT/OT  Identify  cognitive and physical deficits and behaviors that affect risk of falls  Consider moving patient closer to nursing station to monitor more closely for impulsive behavior which may increase risk of falls  [place] fall and safety precautions   Educate patient/family on patient safety including physical limitations and importance of using call bell for assistance   Modify environment to reduce risk of injury including disconnecting from pole when not in use, ensuring adequate lighting in room and restroom, ensuring that path to restroom is clear and free of trip hazards  Out of bed as tolerated    Impaired Vision   Patient does admit to some vision impairment  She states she does wear eyeglasses  Recommend use of corrective lenses at all appropriate times  Encourage adequate lighting and encourage use of assistance with ambulation  Keep personal belongings close to avoid reaching  Encourage appropriate footwear at all times  Recommend large font for printed materials provided to patient    Impaired Hearing   Patient does admit to some hearing impairment  She denies using hearing aids  Hearing impairment strongly correlated with depression, cognitive impairment, delirium and falls in the older adult  Use hearing aids or sound amplifier  Speak face to face  Use clear dictation and enunciation of words    Dentition/Appetite   Patient denies denture use  She states she has a fairly poor appetite since the passing of her  earlier this year  She is not currently on any protein supplementation  Will order Glucerna with meals   Discussed mirtazapine yesterday to help with appetite stimulation, sleep, and depression and patient declined  She is agreeable to trialing this today   Will order mirtazapine 7.5 mg daily at bedtime   Ensure meal consistency is appropriate for all abilities   Consider nutrition consult   Continue aspiration precautions     Elimination   Patient is continent of bowel and bladder at  baseline  Denies any voiding difficulties at baseline  She states that she does have some difficulty with constipation at baseline  She states that she does take Dulcolax pills as needed which is helpful  She did present from [facility] for this admission secondary to a small bowel obstruction  She did have an NG tube but this is since been discontinued  She has been having bowel movements and her last documented bowel movement was today  Current bowel regimen includes   Senna-S 17.2-100 mg daily at bedtime  Dulcolax suppository 10 mg daily prn   Monitor for constipation and urinary retention     Insomnia   Patient notes she has not been sleeping well since the passing of her  earlier this year  She denies taking any medication to help her with sleep  We did discuss the possibility of starting mirtazapine as this medication helps with sleep, appetite, and depression  She declined yesterday but is now agreeable   Will start mirtazapine 7.5 mg daily at bedtime   Could also consider administering melatonin at bedtime though this would only help with sleep  First line is behavioral therapy   Avoid sedative hypnotics including benzodiazepines and benadryl  Encourage staying awake during the day   Encourage daytime activities and morning exercise   Decrease or eliminate daytime naps   Avoid caffeine especially during late afternoon and evening hours  Establish a nighttime routine  Implement sleep hygiene and limit nighttime interruptions    Anxiety/Depression  Patient does not appear to have a history of anxiety or depression  She states that she might be feeling depressed  She notes that her   earlier this year and her son  approximately 2 years ago from stomach cancer  She has been having flat affect and is quite tearful on my exam today   She is not able to express what is upsetting to her  Discussed the use of mirtazapine and its benefits to help increase appetite, improve mood, and assist  with sleep yesterday but patient declined at that time  Discussed again today and patient is now agreeable   Will trial mirtazapine 7.5 mg daily at bedtime   If patient has increased lethargy on this dose would recommend increasing to 15 mg as lower doses of this medication tend to be more sedating   We did discuss some things to keep her mind busy   She was provided with some word finds   Continue supportive care     Discitis/Osteomyelitis of Lumbar Region  Patient initially presented to the hospital earlier this month with acute on chronic back pain  She was noted to meet sepsis criteria and blood cultures were positive for MSSA bacteremia  An MRI of the lumbar spine revealed extensive degenerative disc disease as well as possible discitis and osteomyelitis  The patient did undergo PICC line placement and will need a total of 6 weeks of IV antibiotics  Patient will be going to rehab post-hospitalization  Patient notes that she continues to have back pain though she is denying pain on exam today and denied pain for nursing this morning   She admits that tylenol helps a bit but is unsure if the lidocaine patches do much  She states she had been on tramadol in the past for chronic back pain which worked well for her  Chronic now on hold as the patient was readmitted with a small bowel obstruction suspected to be secondary to narcotic use  Patient states that when she does have pain often times it will radiate down her left leg  She states she does have some numbness at times when the pain radiates  Can consider starting gabapentin for pain as some of this pain may be neuropathic in nature  If considering restarting narcotics would recommend the geriatric pain protocol  Oxycodone 2.5 mg po Q 4 prn moderate pain  Oxycodone 5 mg po Q 4 prn severe pain   Any nonpharmacological methods of pain management  Recommend continued follow-up with ID  Management per ID and primary team      Subjective:   The patient is being seen  "and evaluated today at the bedside for geriatric follow-up. She is noted to be lying in bed comfortably in no acute distress. She is alert and oriented x 4 on exam today. She is currently denying pain. She is noted to be tearful throughout the exam but is unable to express what is bothering her. She denies chest pain and shortness of breath. She notes that she was nauseous this morning and did get medication for this. She notes that she is not eating very much and is not sleeping well.     Care was coordinated with patients nurse Mireya. She notes no acute issues or events overnight.     Care was also coordinated with Dr. Frias.       Review of Systems   Constitutional:  Positive for activity change and appetite change (poor appetite). Negative for fatigue.   HENT:  Positive for hearing loss. Negative for dental problem.    Eyes:  Positive for visual disturbance.   Respiratory:  Negative for cough and shortness of breath.    Cardiovascular:  Negative for chest pain and leg swelling.   Gastrointestinal:  Negative for abdominal distention and abdominal pain.   Genitourinary:  Negative for difficulty urinating and dysuria.   Musculoskeletal:  Positive for gait problem. Negative for arthralgias and back pain.   Skin:  Negative for color change and pallor.   Neurological:  Positive for weakness. Negative for speech difficulty.   Psychiatric/Behavioral:  Positive for sleep disturbance. Negative for agitation and confusion. The patient is not nervous/anxious.          Objective:     Vitals: Blood pressure (!) 195/74, pulse 72, temperature 97.9 °F (36.6 °C), temperature source Temporal, resp. rate 18, height 4' 11\" (1.499 m), weight 69.1 kg (152 lb 5.4 oz), SpO2 96%.,Body mass index is 30.77 kg/m².      Intake/Output Summary (Last 24 hours) at 9/25/2024 1011  Last data filed at 9/25/2024 0900  Gross per 24 hour   Intake 540 ml   Output 200 ml   Net 340 ml       Current Medications: Reviewed    Physical Exam:   Physical " "Exam  Vitals and nursing note reviewed.   Constitutional:       General: She is not in acute distress.     Appearance: She is not ill-appearing.   HENT:      Head: Normocephalic.      Mouth/Throat:      Mouth: Mucous membranes are moist.   Eyes:      General: No scleral icterus.     Conjunctiva/sclera: Conjunctivae normal.   Cardiovascular:      Rate and Rhythm: Normal rate and regular rhythm.   Pulmonary:      Effort: Pulmonary effort is normal. No respiratory distress.   Abdominal:      General: Bowel sounds are normal. There is no distension.      Palpations: Abdomen is soft.      Tenderness: There is no abdominal tenderness.   Musculoskeletal:         General: No swelling or tenderness.   Skin:     General: Skin is warm and dry.   Neurological:      General: No focal deficit present.      Mental Status: She is alert and oriented to person, place, and time. Mental status is at baseline.      Motor: Weakness present.      Gait: Gait abnormal.   Psychiatric:         Mood and Affect: Mood is depressed. Affect is tearful.          Invasive Devices       Peripherally Inserted Central Catheter Line  Duration             PICC Line 09/13/24 Right Brachial 11 days                    Lab, Imaging and other studies: No new labs or imaging noted.    Please note:  Voice-recognition software may have been used in the preparation of this document.  Occasional wrong word or \"sound-alike\" substitutions may have occurred due to the inherent limitations of voice recognition software.  Interpretation should be guided by context.    "

## 2024-09-26 ENCOUNTER — NURSING HOME VISIT (OUTPATIENT)
Dept: GERIATRICS | Facility: OTHER | Age: 79
End: 2024-09-26
Payer: COMMERCIAL

## 2024-09-26 DIAGNOSIS — K56.609 SBO (SMALL BOWEL OBSTRUCTION) (HCC): Primary | ICD-10-CM

## 2024-09-26 DIAGNOSIS — D63.1 ANEMIA DUE TO STAGE 3B CHRONIC KIDNEY DISEASE  (HCC): ICD-10-CM

## 2024-09-26 DIAGNOSIS — A41.01 SEPSIS DUE TO METHICILLIN SUSCEPTIBLE STAPHYLOCOCCUS AUREUS (MSSA) WITHOUT ACUTE ORGAN DYSFUNCTION (HCC): ICD-10-CM

## 2024-09-26 DIAGNOSIS — N17.9 ACUTE KIDNEY INJURY SUPERIMPOSED ON CHRONIC KIDNEY DISEASE  (HCC): ICD-10-CM

## 2024-09-26 DIAGNOSIS — K59.03 DRUG-INDUCED CONSTIPATION: ICD-10-CM

## 2024-09-26 DIAGNOSIS — I77.4 CELIAC ARTERY STENOSIS (HCC): ICD-10-CM

## 2024-09-26 DIAGNOSIS — R60.0 BILATERAL LOWER EXTREMITY EDEMA: ICD-10-CM

## 2024-09-26 DIAGNOSIS — I73.9 PERIPHERAL VASCULAR DISEASE, UNSPECIFIED (HCC): ICD-10-CM

## 2024-09-26 DIAGNOSIS — R53.81 PHYSICAL DECONDITIONING: ICD-10-CM

## 2024-09-26 DIAGNOSIS — U07.1 COVID: ICD-10-CM

## 2024-09-26 DIAGNOSIS — N18.9 ACUTE KIDNEY INJURY SUPERIMPOSED ON CHRONIC KIDNEY DISEASE  (HCC): ICD-10-CM

## 2024-09-26 DIAGNOSIS — Z79.4 TYPE 2 DIABETES MELLITUS WITH COMPLICATION, WITH LONG-TERM CURRENT USE OF INSULIN (HCC): ICD-10-CM

## 2024-09-26 DIAGNOSIS — I25.10 CORONARY ARTERY DISEASE INVOLVING NATIVE CORONARY ARTERY OF NATIVE HEART WITHOUT ANGINA PECTORIS: ICD-10-CM

## 2024-09-26 DIAGNOSIS — Z79.4 TYPE 2 DIABETES MELLITUS WITH STAGE 3B CHRONIC KIDNEY DISEASE, WITH LONG-TERM CURRENT USE OF INSULIN (HCC): ICD-10-CM

## 2024-09-26 DIAGNOSIS — L98.9 SCALP LESION: ICD-10-CM

## 2024-09-26 DIAGNOSIS — Z71.89 ADVANCE CARE PLANNING: ICD-10-CM

## 2024-09-26 DIAGNOSIS — E78.2 MIXED HYPERLIPIDEMIA: ICD-10-CM

## 2024-09-26 DIAGNOSIS — E11.22 TYPE 2 DIABETES MELLITUS WITH STAGE 3B CHRONIC KIDNEY DISEASE, WITH LONG-TERM CURRENT USE OF INSULIN (HCC): ICD-10-CM

## 2024-09-26 DIAGNOSIS — N18.32 TYPE 2 DIABETES MELLITUS WITH STAGE 3B CHRONIC KIDNEY DISEASE, WITH LONG-TERM CURRENT USE OF INSULIN (HCC): ICD-10-CM

## 2024-09-26 DIAGNOSIS — E03.9 ACQUIRED HYPOTHYROIDISM: ICD-10-CM

## 2024-09-26 DIAGNOSIS — J45.909 ASTHMA WITHOUT STATUS ASTHMATICUS WITHOUT COMPLICATION, UNSPECIFIED ASTHMA SEVERITY, UNSPECIFIED WHETHER PERSISTENT: ICD-10-CM

## 2024-09-26 DIAGNOSIS — I77.1 CELIAC ARTERY STENOSIS (HCC): ICD-10-CM

## 2024-09-26 DIAGNOSIS — M86.9 OSTEOMYELITIS, UNSPECIFIED SITE, UNSPECIFIED TYPE (HCC): ICD-10-CM

## 2024-09-26 DIAGNOSIS — E11.8 TYPE 2 DIABETES MELLITUS WITH COMPLICATION, WITH LONG-TERM CURRENT USE OF INSULIN (HCC): ICD-10-CM

## 2024-09-26 DIAGNOSIS — I67.4 HYPERTENSIVE ENCEPHALOPATHY: ICD-10-CM

## 2024-09-26 DIAGNOSIS — M54.42 ACUTE BILATERAL LOW BACK PAIN WITH LEFT-SIDED SCIATICA: ICD-10-CM

## 2024-09-26 DIAGNOSIS — Z91.89 AT RISK FOR DELIRIUM: ICD-10-CM

## 2024-09-26 DIAGNOSIS — R63.0 POOR APPETITE: ICD-10-CM

## 2024-09-26 DIAGNOSIS — K21.9 GASTROESOPHAGEAL REFLUX DISEASE, UNSPECIFIED WHETHER ESOPHAGITIS PRESENT: ICD-10-CM

## 2024-09-26 DIAGNOSIS — I10 PRIMARY HYPERTENSION: ICD-10-CM

## 2024-09-26 DIAGNOSIS — N18.32 ANEMIA DUE TO STAGE 3B CHRONIC KIDNEY DISEASE  (HCC): ICD-10-CM

## 2024-09-26 LAB
GLUCOSE SERPL-MCNC: 178 MG/DL (ref 65–140)
GLUCOSE SERPL-MCNC: 197 MG/DL (ref 65–140)
GLUCOSE SERPL-MCNC: 209 MG/DL (ref 65–140)
GLUCOSE SERPL-MCNC: 218 MG/DL (ref 65–140)

## 2024-09-26 PROCEDURE — 82948 REAGENT STRIP/BLOOD GLUCOSE: CPT

## 2024-09-26 PROCEDURE — 99306 1ST NF CARE HIGH MDM 50: CPT | Performed by: STUDENT IN AN ORGANIZED HEALTH CARE EDUCATION/TRAINING PROGRAM

## 2024-09-26 RX ORDER — BUMETANIDE 2 MG/1
2 TABLET ORAL DAILY
Qty: 30 TABLET | Refills: 0 | Status: SHIPPED | OUTPATIENT
Start: 2024-09-26

## 2024-09-26 RX ORDER — INSULIN GLARGINE 100 [IU]/ML
30 INJECTION, SOLUTION SUBCUTANEOUS DAILY
Status: SHIPPED
Start: 2024-09-26 | End: 2025-03-25

## 2024-09-26 NOTE — ASSESSMENT & PLAN NOTE
Lab Results   Component Value Date    EGFR 62 09/24/2024    EGFR 81 09/23/2024    EGFR 76 09/22/2024    CREATININE 0.87 09/24/2024    CREATININE 0.69 09/23/2024    CREATININE 0.74 09/22/2024       Noted to have CINDY recently inpatient, likely multifactorial related to contrast use, hypotension/sepsis  Evaluated by Nephrology inpatient. Losartan and bumex were held temporarily inpatient.  Monitor renal function on routine labs - CINDY appears improved back to within baseline  Avoid nephrotoxins, NSAIDs as able  Encourage PO hydration, respecting volume status  Follow up with PCP, Nephrology as appropriate

## 2024-09-26 NOTE — ASSESSMENT & PLAN NOTE
Baseline Hb seems around 9-11 with limited data  Likely related to CKD, hx of iron deficiency on labs; likely with acute component related to marrow suppression in setting of acute infection and antibiotics  -monitor on routine labs - fairly stable  -empirically start on oral iron at rehab due to hx iron deficiency  -monitor for acute bleed - no present signs  -consider further workup, Heme consult if persistent/worsening  -transfuse PRN Hb <7

## 2024-09-26 NOTE — ASSESSMENT & PLAN NOTE
Patient endorses chronic constipation  At risk due to hospitalization, relative immobility, comorbidities, hx of chronic opioid (now off opioid)  Monitor stool output - constipation much improved  Bowel regimen at facility: miralax daily PRN, senna/docusate, adjust as appropriate; bisacodyl suppository PRN  Encourage mobility as tolerated, PO hydration as appropriate, high fiber diet/prune juice (in outpatient setting as appropriate)  Goal is for 1 easy BM every 1-2 days

## 2024-09-26 NOTE — ASSESSMENT & PLAN NOTE
Recent SBO in setting of constipation  Managed inpatient with NG tube and supportive care with clinical resolution  See plan under constipation

## 2024-09-26 NOTE — ASSESSMENT & PLAN NOTE
Lab Results   Component Value Date    HGBA1C 7.9 (A) 07/02/2024       Monitor glucose - fasting glucose generally mid-high 100s with limited data so far  Avoid hypoglycemia  Continue regimen including sitagliptin 50mg daily, insulin lantus 30u daily  Insulin sliding scale coverage at rehab  Adjust regimen as appropriate with more data  Encourage lifestyle modifications including healthy diet, weight management, exercise as appropriate  Follow up with PCP, Endocrine/Ophthalmology/Podiatry outpatient as appropriate

## 2024-09-26 NOTE — ASSESSMENT & PLAN NOTE
Noted inpatient with AMS requiring rapid response 9/20/24  Stroke ruled out on inpatient workup  Thought to be related to hypertension  HTN regimen was adjusted, see plan under HTN  Mentation seems at baseline presently at rehab

## 2024-09-26 NOTE — ASSESSMENT & PLAN NOTE
Patient has endorsed generally poor appetite since passing of her  around 6 months ago  Was acutely worsened recently in setting of constipation/SBO (now resolved)  Was started on mirtazapine inpatient, appears tolerating well and per patient helping with appetite and sleep

## 2024-09-26 NOTE — ASSESSMENT & PLAN NOTE
Noted POA to recent hospitalization, likely multifactorial in setting of COVID and concern for MSSA bacteremia and osteomyelitis  Evaluated by ID inpatient  Continue IV cefazolin for 6 week course (through 10/21/24)  Monitor labs while on IV abx in accordance with ID recs (CBCd, BMP, ESR/CRP)  Monitor for acute/recurrent infectious symptoms - no new/acute symptoms, acute sepsis seems resolved with ongoing treatment  Follow up with PCP, ID as appropriate

## 2024-09-26 NOTE — ASSESSMENT & PLAN NOTE
Tested positive on 9/7/24 for COVID  Symptoms earlier on including reported fevers/chills and mild cough, overall improved/resolving - at this time seems recovering well from COVID standpoint  Did require supplemental O2 in hospital temporarily  Was treated with Paxlovid course inpatient  Monitor respiratory status - stable on room air  Supportive care, encourage IS  Isolation as per facility protocol - completed and off isolation  Acute COVID appears clinically resolved (though could be contributory to ongoing weakness)

## 2024-09-26 NOTE — UTILIZATION REVIEW
NOTIFICATION OF ADMISSION DISCHARGE   This is a Notification of Discharge from [facility]. Please be advised that this patient has been discharge from our facility. Below you will find the admission and discharge date and time including the patient’s disposition.   UTILIZATION REVIEW CONTACT:  Ana Laura Albert MA  Utilization   Network Utilization Review Department  Phone: [phone] x carefully listen to the prompts. All voicemails are confidential.  Email: [email or web address]     ADMISSION INFORMATION  PRESENTATION DATE: 9/18/2024  2:38 PM  OBERVATION ADMISSION DATE: N/A  INPATIENT ADMISSION DATE: 9/18/24  2:38 PM   DISCHARGE DATE: 9/25/2024  4:31 PM   DISPOSITION:Non [facility] SNF/TCU/SNU    Network Utilization Review Department  ATTENTION: Please call with any questions or concerns to [phone] and carefully listen to the prompts so that you are directed to the right person. All voicemails are confidential.   For Discharge needs, contact Care Management DC Support Team at [phone] opt. 2  Send all requests for admission clinical reviews, approved or denied determinations and any other requests to dedicated fax number below belonging to the campus where the patient is receiving treatment. List of dedicated fax numbers for the Facilities:  FACILITY NAME UR FAX NUMBER   ADMISSION DENIALS (Administrative/Medical Necessity) [phone]   DISCHARGE SUPPORT TEAM ([facility]) [phone]   PARENT CHILD HEALTH (Maternity/NICU/Pediatrics) [phone]   [facility] [phone]   [facility] [phone]   [facility] [phone]   [facility] [phone]   [facility] [phone]   [facility] [phone]   [facility] [phone]   [facility]  [phone]   [facility] [phone]   [facility] [phone]   [facility] [phone]   [facility] [phone]

## 2024-09-26 NOTE — ASSESSMENT & PLAN NOTE
Monitor BP - has been somewhat variable ranging low to high 100s systolic  Avoid hypotension  No acute cardiac complaints  Regimen was adjusted recently inpatient due to HTN  Continue regimen including carvedilol 12.5mg BID, nifedipine ER 60mg daily, losartan 100mg daily, hydralazine 10mg TID, bumex 2mg daily, with hold parameters as appropriate  Adjust as appropriate with more data  Follow up with PCP, Cardiology as appropriate

## 2024-09-26 NOTE — ASSESSMENT & PLAN NOTE
With chronic low back pain (generally L>R with associated intermittent sciatica) with hx of spinal stenosis and reported spinal surgery  With recent acute exacerbation outpatient (without associated trauma) leading to present hospitalization  Likely related to sepsis in setting of presumed osteomyelitis per inpatient workup  Monitor pain - acute component gradually improving to baseline chronic pain level  Current regimen including: tylenol 975mg TID, methocarbamol 500mg q6hr PRN, gabapentin 100mg TID, lidocaine patch  Adjust/wean regimen as appropriate. Opioids discontinued inpatient in setting of constipation/SBO, patient feels back pain generally tolerable without opioids at present, aim to manage her pain without opioids if possible.  See plan under sepsis

## 2024-09-26 NOTE — PROGRESS NOTES
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 coverage at rehab  Adjust regimen as appropriate with more data  Encourage lifestyle modifications including healthy diet, weight management, exercise as appropriate  Follow up with PCP, Endocrine/Ophthalmology/Podiatry outpatient as appropriate      Hypertensive encephalopathy  Noted inpatient with AMS requiring rapid response 9/20/24  Stroke ruled out on inpatient workup  Thought to be related to hypertension  HTN regimen was adjusted, see plan under HTN  Mentation seems at baseline presently at rehab    Osteomyelitis ([facility])  See plan under sepsis  Had been recommended repeat MRI of spine in several weeks, can coordinate with ID if patient still in-house    Scalp lesion  Patient noted a scalp lesion present since perhaps 1-2 years. She believes he first noted it after a hair perm appointment.  Questionably source of recent bacteremia?  Dermatology was consulted inpatient and recommended outpatient follow-up  Continue to monitor, continue local care as appropriate  Per patient the lesion has at times bled though she admits to scratching at it at times. She has not noted any acute change to the lesion recently. It is an irregular, scabbed appearing lesion. May require biopsy outpatient.  Patient does not presently feel she needs  any topical treatment for it  Follow up with PCP, Dermatology as appropriate    Acute kidney injury superimposed on chronic kidney disease  (HCC)  Lab Results   Component Value Date    EGFR 62 09/24/2024    EGFR 81 09/23/2024    EGFR 76 09/22/2024    CREATININE 0.87 09/24/2024    CREATININE 0.69 09/23/2024    CREATININE 0.74 09/22/2024       Noted to have CINDY recently inpatient, likely multifactorial related to contrast use, hypotension/sepsis  Evaluated by Nephrology inpatient. Losartan and bumex were held temporarily inpatient.  Monitor renal function on routine labs - CINDY appears improved back to within baseline  Avoid nephrotoxins, NSAIDs as able  Encourage PO hydration, respecting volume status  Follow up with PCP, Nephrology as appropriate      At risk for delirium  Delirium precautions  -Patient is high risk of delirium due to age, comorbidities, hospitalization/unfamiliar environment  -delirium precautions  -maintain normal sleep/wake cycle  -minimize overnight interruptions, group overnight vitals/labs/nursing checks as possible  -dim lights, close blinds and turn off tv to minimize stimulation and encourage sleep environment in evenings  -ensure that pain is well controlled  -monitor for fecal and urinary retention which may precipitate delirium  -encourage early mobilization and ambulation  -provide frequent reorientation and redirection  -encourage family and friends at the bedside to help help calm patient if anxious  -avoid medications which may precipitate or worsen delirium such as tramadol, benzodiazepine, anticholinergics, and benadryl  -encourage hydration and nutrition   -redirect unwanted behaviors as first line, avoid physical restraints, use chemical restraint only if all other attempts have been unsuccessful      Poor appetite  Patient has endorsed generally poor appetite since passing of her  around 6 months ago  Was acutely worsened recently in setting of constipation/SBO (now  resolved)  Was started on mirtazapine inpatient, appears tolerating well and per patient helping with appetite and sleep    Physical deconditioning  Multifactorial in setting of hospitalization, infections, pain  -PT/OT  -fall precautions  -encourage appropriate DME use  -SW to follow for safe discharge planning/homecare services as appropriate      Anemia  Baseline Hb seems around 9-11 with limited data  Likely related to CKD, hx of iron deficiency on labs; likely with acute component related to marrow suppression in setting of acute infection and antibiotics  -monitor on routine labs - fairly stable  -empirically start on oral iron at rehab due to hx iron deficiency  -monitor for acute bleed - no present signs  -consider further workup, Heme consult if persistent/worsening  -transfuse PRN Hb <7      Advance care planning  HCP and code status discussion as per note      Bilateral lower extremity edema  Seems to be a chronic issue  No clear noted hx of CHF however recent echos have generally been limited/difficult studies so unclear what her present EF is  Monitor weight  Monitor volume status clinically - bilateral LE edema reported stable/chronic, no orthopnea  Encourage elevation, compression of lower extremities as able  Continue bumex with hold parameters  Follow up with PCP, Cardiology as appropriate      COVID  Tested positive on 9/7/24 for COVID  Symptoms earlier on including reported fevers/chills and mild cough, overall improved/resolving - at this time seems recovering well from COVID standpoint  Did require supplemental O2 in hospital temporarily  Was treated with Paxlovid course inpatient  Monitor respiratory status - stable on room air  Supportive care, encourage IS  Isolation as per facility protocol - completed and off isolation  Acute COVID appears clinically resolved (though could be contributory to ongoing weakness)    Mixed hyperlipidemia  Continue statin  Encourage lifestyle modifications  including healthy diet, weight management, exercise as appropriate      Sepsis (HCC): SIRS criteria MSSA Bacteremia and COVID infection  Noted POA to recent hospitalization, likely multifactorial in setting of COVID and concern for MSSA bacteremia and osteomyelitis  Evaluated by ID inpatient  Continue IV cefazolin for 6 week course (through 10/21/24)  Monitor labs while on IV abx in accordance with ID recs (CBCd, BMP, ESR/CRP)  Monitor for acute/recurrent infectious symptoms - no new/acute symptoms, acute sepsis seems resolved with ongoing treatment  Follow up with PCP, ID as appropriate         Pain: chronic low back pain  Rehab Potential:Fair  Patient Informed of Medical Condition: yes   Patient is Capable of Understanding Their Right: yes   Discharge Plan: home  Vaccination:   Immunization History   Administered Date(s) Administered    COVID-19 PFIZER VACCINE 0.3 ML IM 03/01/2021, 03/22/2021, 10/08/2021, 04/14/2022    COVID-19 Pfizer vac 6m-4y curtis-sucrose 3 mcg/0.2 ML IM (maroon cap) 04/14/2022    INFLUENZA 11/08/2005, 10/24/2006, 10/01/2007, 10/15/2008, 09/25/2009, 09/27/2010, 12/19/2013, 10/01/2015, 12/14/2016, 12/29/2016, 10/16/2017, 09/11/2018, 10/25/2019, 10/08/2020, 09/27/2021, 10/21/2022, 10/11/2023    Influenza Split High Dose Preservative Free IM 09/23/2014, 10/01/2015, 12/14/2016, 10/16/2017    Influenza, high dose seasonal 0.7 mL 09/11/2018, 10/25/2019, 10/08/2020, 09/27/2021, 10/21/2022, 10/11/2023    Influenza, seasonal, injectable 11/14/2011, 10/15/2012, 09/26/2013    Pneumococcal Conjugate 13-Valent 12/29/2015, 01/19/2016    Pneumococcal Polysaccharide PPV23 09/25/2009    Tdap 11/12/2009       DVT ppx: heparin    Advanced Directives: patient confirms HCP as kelsey Hoyos  Code status:DNR (Per discussion with resident or POA) Extensive discussion/education revisited after acute hospitalization with patient today regarding their wishes with respect to resuscitative measures; discussed as  appropriate potential risks vs benefits of resuscitative measures; patient verbalizes understanding, appears to have capacity to make this decision presently, and clearly verbalizes a desire for DNR/DNI, citing she has considered this very carefully and very clear about wanting no CPR or intubation if her heart stops. Reviewed a POLST form with her which she signed and was scanned to chart.  PCP: Kaden      -Total time spent on this encounter today including documentation and workup review, face to face time, history and exam, and documentation/orders was approximately 75 minutes.  -This note will be copied to [facility] EMR where vitals and medication orders are placed.    Corwin Trujillo D.O.  Geriatric Medicine  9/26/2024 3:43 PM

## 2024-09-27 LAB
GLUCOSE SERPL-MCNC: 168 MG/DL (ref 65–140)
GLUCOSE SERPL-MCNC: 185 MG/DL (ref 65–140)
GLUCOSE SERPL-MCNC: 210 MG/DL (ref 65–140)
GLUCOSE SERPL-MCNC: 259 MG/DL (ref 65–140)

## 2024-09-27 PROCEDURE — 82948 REAGENT STRIP/BLOOD GLUCOSE: CPT

## 2024-09-28 LAB
GLUCOSE SERPL-MCNC: 214 MG/DL (ref 65–140)
GLUCOSE SERPL-MCNC: 232 MG/DL (ref 65–140)
GLUCOSE SERPL-MCNC: 239 MG/DL (ref 65–140)
GLUCOSE SERPL-MCNC: 263 MG/DL (ref 65–140)

## 2024-09-28 PROCEDURE — 82948 REAGENT STRIP/BLOOD GLUCOSE: CPT

## 2024-09-29 LAB
GLUCOSE SERPL-MCNC: 125 MG/DL (ref 65–140)
GLUCOSE SERPL-MCNC: 132 MG/DL (ref 65–140)
GLUCOSE SERPL-MCNC: 155 MG/DL (ref 65–140)
GLUCOSE SERPL-MCNC: 173 MG/DL (ref 65–140)

## 2024-09-29 PROCEDURE — 82948 REAGENT STRIP/BLOOD GLUCOSE: CPT
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
## 2024-09-30 ENCOUNTER — APPOINTMENT (INPATIENT)
Dept: RADIOLOGY | Facility: HOSPITAL | Age: 79
End: 2024-09-30
Payer: COMMERCIAL

## 2024-09-30 ENCOUNTER — NURSING HOME VISIT (OUTPATIENT)
Age: 79
End: 2024-09-30
Payer: COMMERCIAL

## 2024-09-30 DIAGNOSIS — M54.42 ACUTE BILATERAL LOW BACK PAIN WITH LEFT-SIDED SCIATICA: ICD-10-CM

## 2024-09-30 DIAGNOSIS — M86.9 OSTEOMYELITIS, UNSPECIFIED SITE, UNSPECIFIED TYPE (HCC): ICD-10-CM

## 2024-09-30 DIAGNOSIS — N18.9 ACUTE KIDNEY INJURY SUPERIMPOSED ON CHRONIC KIDNEY DISEASE  (HCC): ICD-10-CM

## 2024-09-30 DIAGNOSIS — I73.9 PERIPHERAL VASCULAR DISEASE, UNSPECIFIED (HCC): ICD-10-CM

## 2024-09-30 DIAGNOSIS — K21.9 GASTROESOPHAGEAL REFLUX DISEASE, UNSPECIFIED WHETHER ESOPHAGITIS PRESENT: ICD-10-CM

## 2024-09-30 DIAGNOSIS — E11.22 TYPE 2 DIABETES MELLITUS WITH STAGE 3B CHRONIC KIDNEY DISEASE, WITH LONG-TERM CURRENT USE OF INSULIN (HCC): ICD-10-CM

## 2024-09-30 DIAGNOSIS — N18.32 ANEMIA DUE TO STAGE 3B CHRONIC KIDNEY DISEASE  (HCC): ICD-10-CM

## 2024-09-30 DIAGNOSIS — Z79.4 TYPE 2 DIABETES MELLITUS WITH STAGE 3B CHRONIC KIDNEY DISEASE, WITH LONG-TERM CURRENT USE OF INSULIN (HCC): ICD-10-CM

## 2024-09-30 DIAGNOSIS — N17.9 ACUTE KIDNEY INJURY SUPERIMPOSED ON CHRONIC KIDNEY DISEASE  (HCC): ICD-10-CM

## 2024-09-30 DIAGNOSIS — M54.50 ACUTE MIDLINE LOW BACK PAIN, UNSPECIFIED WHETHER SCIATICA PRESENT: ICD-10-CM

## 2024-09-30 DIAGNOSIS — N18.32 TYPE 2 DIABETES MELLITUS WITH STAGE 3B CHRONIC KIDNEY DISEASE, WITH LONG-TERM CURRENT USE OF INSULIN (HCC): ICD-10-CM

## 2024-09-30 DIAGNOSIS — K59.03 DRUG-INDUCED CONSTIPATION: ICD-10-CM

## 2024-09-30 DIAGNOSIS — D63.1 ANEMIA DUE TO STAGE 3B CHRONIC KIDNEY DISEASE  (HCC): ICD-10-CM

## 2024-09-30 DIAGNOSIS — R63.0 POOR APPETITE: ICD-10-CM

## 2024-09-30 DIAGNOSIS — I10 PRIMARY HYPERTENSION: ICD-10-CM

## 2024-09-30 DIAGNOSIS — A41.01 SEPSIS DUE TO METHICILLIN SUSCEPTIBLE STAPHYLOCOCCUS AUREUS (MSSA) WITHOUT ACUTE ORGAN DYSFUNCTION (HCC): Primary | ICD-10-CM

## 2024-09-30 DIAGNOSIS — R60.0 BILATERAL LOWER EXTREMITY EDEMA: ICD-10-CM

## 2024-09-30 DIAGNOSIS — K56.609 SBO (SMALL BOWEL OBSTRUCTION) (HCC): ICD-10-CM

## 2024-09-30 DIAGNOSIS — I77.4 CELIAC ARTERY STENOSIS (HCC): ICD-10-CM

## 2024-09-30 LAB
GLUCOSE SERPL-MCNC: 157 MG/DL (ref 65–140)
GLUCOSE SERPL-MCNC: 166 MG/DL (ref 65–140)
GLUCOSE SERPL-MCNC: 235 MG/DL (ref 65–140)
GLUCOSE SERPL-MCNC: 236 MG/DL (ref 65–140)

## 2024-09-30 PROCEDURE — 74018 RADEX ABDOMEN 1 VIEW: CPT

## 2024-09-30 PROCEDURE — 82948 REAGENT STRIP/BLOOD GLUCOSE: CPT

## 2024-09-30 PROCEDURE — 99310 SBSQ NF CARE HIGH MDM 45: CPT

## 2024-09-30 NOTE — ASSESSMENT & PLAN NOTE
BP variable, 's-190's, likely pain contributing to higher readings  Continue to monitor BP   No acute cardiac complaints  Avoid hypotension  Regimen was adjusted recently inpatient due to HTN  Continue carvedilol 12.5mg BID, nifedipine ER 60mg daily, losartan 100mg daily, hydralazine 10mg TID, bumex 1mg daily (changed as if 9/30 from 2mg), with hold parameters as appropriate  Adjust once pain is controlled as necessary  Follow up with PCP, Cardiology as appropriate

## 2024-10-01 ENCOUNTER — NURSING HOME VISIT (OUTPATIENT)
Age: 79
End: 2024-10-01
Payer: COMMERCIAL

## 2024-10-01 VITALS
WEIGHT: 151.2 LBS | BODY MASS INDEX: 30.54 KG/M2 | SYSTOLIC BLOOD PRESSURE: 158 MMHG | HEART RATE: 80 BPM | OXYGEN SATURATION: 95 % | TEMPERATURE: 96.3 F | DIASTOLIC BLOOD PRESSURE: 70 MMHG | RESPIRATION RATE: 18 BRPM

## 2024-10-01 VITALS
TEMPERATURE: 96.4 F | HEART RATE: 83 BPM | WEIGHT: 152.2 LBS | BODY MASS INDEX: 30.74 KG/M2 | SYSTOLIC BLOOD PRESSURE: 163 MMHG | DIASTOLIC BLOOD PRESSURE: 70 MMHG | RESPIRATION RATE: 18 BRPM | OXYGEN SATURATION: 95 %

## 2024-10-01 DIAGNOSIS — I10 PRIMARY HYPERTENSION: Primary | ICD-10-CM

## 2024-10-01 DIAGNOSIS — R53.81 PHYSICAL DECONDITIONING: ICD-10-CM

## 2024-10-01 DIAGNOSIS — A41.01 SEPSIS DUE TO METHICILLIN SUSCEPTIBLE STAPHYLOCOCCUS AUREUS (MSSA) WITHOUT ACUTE ORGAN DYSFUNCTION (HCC): ICD-10-CM

## 2024-10-01 DIAGNOSIS — M54.42 ACUTE BILATERAL LOW BACK PAIN WITH LEFT-SIDED SCIATICA: ICD-10-CM

## 2024-10-01 DIAGNOSIS — M86.9 OSTEOMYELITIS, UNSPECIFIED SITE, UNSPECIFIED TYPE (HCC): ICD-10-CM

## 2024-10-01 DIAGNOSIS — R60.0 BILATERAL LOWER EXTREMITY EDEMA: ICD-10-CM

## 2024-10-01 DIAGNOSIS — K59.03 DRUG-INDUCED CONSTIPATION: ICD-10-CM

## 2024-10-01 LAB
GLUCOSE SERPL-MCNC: 121 MG/DL (ref 65–140)
GLUCOSE SERPL-MCNC: 173 MG/DL (ref 65–140)
GLUCOSE SERPL-MCNC: 215 MG/DL (ref 65–140)
GLUCOSE SERPL-MCNC: 227 MG/DL (ref 65–140)

## 2024-10-01 PROCEDURE — 82948 REAGENT STRIP/BLOOD GLUCOSE: CPT

## 2024-10-01 PROCEDURE — 99309 SBSQ NF CARE MODERATE MDM 30: CPT

## 2024-10-01 RX ORDER — GABAPENTIN 100 MG/1
200 CAPSULE ORAL 3 TIMES DAILY
Start: 2024-10-01 | End: 2024-10-04

## 2024-10-01 RX ORDER — BACLOFEN 5 MG/1
5 TABLET ORAL EVERY 8 HOURS PRN
Status: ON HOLD | COMMUNITY

## 2024-10-01 RX ORDER — BUMETANIDE 2 MG/1
1 TABLET ORAL DAILY
Start: 2024-10-01 | End: 2024-10-08

## 2024-10-01 NOTE — ASSESSMENT & PLAN NOTE
Lab Results   Component Value Date    EGFR 32 09/30/2024    EGFR 62 09/24/2024    EGFR 81 09/23/2024    CREATININE 1.48 (H) 09/30/2024    CREATININE 0.87 09/24/2024    CREATININE 0.69 09/23/2024   Noted to have CINDY recently inpatient, likely multifactorial related to contrast use, hypotension/sepsis  Evaluated by Nephrology inpatient. Losartan and bumex were held temporarily inpatient.  Today renal functions elevated, patient reports to poor PO intake over the weekend.   Monitor renal function on routine labs  IV fluids ordered- 50mL/hr for a total of 500mL  Decreased Bumex from 2mg to 1mg daily  Avoid nephrotoxins, NSAIDs as able  Encourage PO hydration, respecting volume status  Consider nephrology consult if renal function persistently elevated.   Follow up with PCP, Nephrology as appropriate

## 2024-10-01 NOTE — ASSESSMENT & PLAN NOTE
Patient has endorsed generally poor appetite since passing of her  around 6 months ago.   Was started on mirtazapine inpatient, appears tolerating well.  Nutrition consult placed  Continue to monitor  Follow up with PCP as appropriate

## 2024-10-01 NOTE — ASSESSMENT & PLAN NOTE
Stable per patient  Continue  Famotidine and Pantoprazole  Avoid citrus, spicy, caffeine, and chocolate  Avoid eating/drinking 1 hour prior to laying down  Continue to monitor  Follow up with PCP as appropriate

## 2024-10-01 NOTE — ASSESSMENT & PLAN NOTE
Patient endorses chronic constipation  At risk due to hospitalization, relative immobility, comorbidities, history of chronic opioid (now off opioid)  Monitor stool output - Per nursing large BM 9/30  Bowel regimen at facility: miralax daily PRN, senna/docusate, lactulose, adjust as appropriate; bisacodyl suppository PRN  Encourage mobility as tolerated, PO hydration as appropriate, high fiber diet/prune juice (in outpatient setting as appropriate)  Goal is for 1 easy BM every 1-2 days  9/30 Abdominal xray ordered to assess for stool burden: Nonobstructive bowel gas pattern   Continue to monitor  Follow up with PCP as appropriate
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
## 2024-10-01 NOTE — ASSESSMENT & PLAN NOTE
With chronic low back pain (generally L>R with associated intermittent sciatica) with history of spinal stenosis and reported spinal surgery. Recent acute exacerbation outpatient (without associated trauma) leading to present hospitalization.  Likely related to sepsis in setting of presumed osteomyelitis per inpatient workup.  Monitor pain - per patient she continues to have significant pain, although appears more comfortable today.   Current regimen including: tylenol 975mg TID, Baclofen 5mg TID, gabapentin 200mg TID, lidocaine patch and Voltaren gel.   Adjust/wean regimen as appropriate. As of 9/30 per patient methocarbamol not effective, changed to Tizanidine 2mg which caused increased sedation in patient, changed to Baclofen 5mg TID. Gabapentin also increased to 200mg TID. Opioids discontinued inpatient in setting of constipation/SBO, due to ongoing constipation avoid opioids if possible.  See plan under sepsis  Continue to monitor  Follow up with PCP as appropriate

## 2024-10-01 NOTE — ASSESSMENT & PLAN NOTE
See plan under sepsis  Had been recommended repeat MRI of spine in several weeks, can coordinate with ID if patient still in-house  Patient to follow up with ID on 10/9

## 2024-10-01 NOTE — ASSESSMENT & PLAN NOTE
With chronic low back pain (generally L>R with associated intermittent sciatica) with history of spinal stenosis and reported spinal surgery. Recent acute exacerbation outpatient (without associated trauma) leading to present hospitalization.  Likely related to sepsis in setting of presumed osteomyelitis per inpatient workup.  Monitor pain - per patient pain significantly worse today  Current regimen including: tylenol 975mg TID, Baclofen 5mg TID, gabapentin 200mg TID, lidocaine patch and Voltaren gel.   Adjust/wean regimen as appropriate. As of 9/30 per patient methocarbamol not effective, changed to Tizanidine 2mg which caused increased sedation in patient, changed to Baclofen 5mg TID. Gabapentin also increased to 200mg TID. Opioids discontinued inpatient in setting of constipation/SBO, due to ongoing constipation avoid opioids if possible.  See plan under sepsis  Continue to monitor  Follow up with PCP as appropriate

## 2024-10-01 NOTE — ASSESSMENT & PLAN NOTE
Lab Results   Component Value Date    HGBA1C 7.9 (A) 07/02/2024   Monitor glucose - fasting glucose generally mid-high 100s, later in the day high 100's-200's.   Avoid hypoglycemia  Continue sitagliptin 50mg daily, insulin lantus 30u daily  Insulin sliding scale coverage at rehab  Adjust regimen as appropriate with more data  Encourage lifestyle modifications including healthy diet, weight management, exercise as appropriate  Follow up with PCP, Endocrine/Ophthalmology/Podiatry outpatient as appropriate
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
## 2024-10-01 NOTE — ASSESSMENT & PLAN NOTE
Seems to be a chronic issue  No clear noted history of CHF however recent echos have generally been limited/difficult studies so unclear what her present EF is  Monitor weight- weight slightly up but stable  Monitor volume status clinically - BLE edema +1 pitting, lungs clear, appears dry, denies orthopnea  Encourage elevation, compression of lower extremities as able  Continue bumex with hold parameters  Follow up with PCP, Cardiology as appropriate

## 2024-10-01 NOTE — ASSESSMENT & PLAN NOTE
Recent SBO in setting of constipation  Managed inpatient with NG tube and supportive care with clinical resolution  See plan under constipation  Follow up with PCP, GI as appropriate

## 2024-10-01 NOTE — ASSESSMENT & PLAN NOTE
Noted POA to recent hospitalization, likely multifactorial in setting of COVID and concern for MSSA bacteremia and osteomyelitis. Evaluated by ID inpatient  Continue IV cefazolin for 6 week course (through 10/21/24)  Monitor labs while on IV abx in accordance with ID recs (CBCd, BMP, ESR/CRP)  Monitor for acute/recurrent infectious symptoms - no new/acute symptoms, acute sepsis seems resolved with ongoing treatment  Follow up with PCP, ID on 10/9

## 2024-10-01 NOTE — ASSESSMENT & PLAN NOTE
Patient endorses chronic constipation  At risk due to hospitalization, relative immobility, comorbidities, history of chronic opioid (now off opioid)  Monitor stool output - Per nursing no significant BM since 9/18  Bowel regimen at facility: miralax daily PRN, senna/docusate, lactulose, adjust as appropriate; bisacodyl suppository PRN  Encourage mobility as tolerated, PO hydration as appropriate, high fiber diet/prune juice (in outpatient setting as appropriate)  Goal is for 1 easy BM every 1-2 days  9/30 Abdominal xray ordered to assess for stool burden: Nonobstructive bowel gas pattern   Continue to monitor  Follow up with PCP as appropriate

## 2024-10-01 NOTE — ASSESSMENT & PLAN NOTE
Baseline Hgb seems around 9-11 with limited data  Likely related to CKD, history of iron deficiency on labs; likely with acute component related to marrow suppression in setting of acute infection and antibiotics  Hgb 8.2<8.7  Continue to monitor on routine labs  FOBT ordered   Continue iron supplements   Monitor for acute bleed - no present signs  Consider further workup, for persistent/worsening  Transfuse PRN Hgb <7  Continue to monitor for acute changes  Follow up with PCP as appropriate

## 2024-10-01 NOTE — ASSESSMENT & PLAN NOTE
Multifactorial in setting of hospitalization, infections, pain  Continue PT/OT   Assist with ADLs  Encourage PO nutrition and hydration.  Encourage appropriate DME use    following for discharge planning.  Maintain fall and safety precautions.  Follow up with PCP as appropriate

## 2024-10-02 ENCOUNTER — NURSING HOME VISIT (OUTPATIENT)
Age: 79
End: 2024-10-02
Payer: COMMERCIAL

## 2024-10-02 VITALS
SYSTOLIC BLOOD PRESSURE: 147 MMHG | TEMPERATURE: 97.4 F | RESPIRATION RATE: 19 BRPM | WEIGHT: 152.4 LBS | HEART RATE: 68 BPM | OXYGEN SATURATION: 96 % | DIASTOLIC BLOOD PRESSURE: 60 MMHG | BODY MASS INDEX: 30.78 KG/M2

## 2024-10-02 DIAGNOSIS — N18.32 ANEMIA DUE TO STAGE 3B CHRONIC KIDNEY DISEASE  (HCC): ICD-10-CM

## 2024-10-02 DIAGNOSIS — M54.42 ACUTE BILATERAL LOW BACK PAIN WITH LEFT-SIDED SCIATICA: ICD-10-CM

## 2024-10-02 DIAGNOSIS — R60.0 BILATERAL LOWER EXTREMITY EDEMA: ICD-10-CM

## 2024-10-02 DIAGNOSIS — D63.1 ANEMIA DUE TO STAGE 3B CHRONIC KIDNEY DISEASE  (HCC): ICD-10-CM

## 2024-10-02 DIAGNOSIS — I10 PRIMARY HYPERTENSION: Primary | ICD-10-CM

## 2024-10-02 DIAGNOSIS — K59.03 DRUG-INDUCED CONSTIPATION: ICD-10-CM

## 2024-10-02 DIAGNOSIS — N17.9 ACUTE KIDNEY INJURY SUPERIMPOSED ON CHRONIC KIDNEY DISEASE  (HCC): ICD-10-CM

## 2024-10-02 DIAGNOSIS — A41.01 SEPSIS DUE TO METHICILLIN SUSCEPTIBLE STAPHYLOCOCCUS AUREUS (MSSA) WITHOUT ACUTE ORGAN DYSFUNCTION (HCC): ICD-10-CM

## 2024-10-02 DIAGNOSIS — N18.9 ACUTE KIDNEY INJURY SUPERIMPOSED ON CHRONIC KIDNEY DISEASE  (HCC): ICD-10-CM

## 2024-10-02 DIAGNOSIS — M86.9 OSTEOMYELITIS, UNSPECIFIED SITE, UNSPECIFIED TYPE (HCC): ICD-10-CM

## 2024-10-02 LAB
GLUCOSE SERPL-MCNC: 218 MG/DL (ref 65–140)
GLUCOSE SERPL-MCNC: 238 MG/DL (ref 65–140)
GLUCOSE SERPL-MCNC: 268 MG/DL (ref 65–140)
GLUCOSE SERPL-MCNC: 301 MG/DL (ref 65–140)
GLUCOSE SERPL-MCNC: 332 MG/DL (ref 65–140)

## 2024-10-02 PROCEDURE — 99309 SBSQ NF CARE MODERATE MDM 30: CPT

## 2024-10-02 PROCEDURE — 82948 REAGENT STRIP/BLOOD GLUCOSE: CPT

## 2024-10-03 LAB
GLUCOSE SERPL-MCNC: 219 MG/DL (ref 65–140)
GLUCOSE SERPL-MCNC: 228 MG/DL (ref 65–140)
GLUCOSE SERPL-MCNC: 229 MG/DL (ref 65–140)
GLUCOSE SERPL-MCNC: 260 MG/DL (ref 65–140)

## 2024-10-03 PROCEDURE — 82948 REAGENT STRIP/BLOOD GLUCOSE: CPT

## 2024-10-03 NOTE — ASSESSMENT & PLAN NOTE
Patient endorses chronic constipation  At risk due to hospitalization, relative immobility, comorbidities, history of chronic opioid (now off opioid)  Monitor stool output - Per nursing large BM 9/30, small BM 10/2  Bowel regimen at facility: miralax daily PRN, senna/docusate, lactulose, adjust as appropriate; bisacodyl suppository PRN  Encourage mobility as tolerated, PO hydration as appropriate, high fiber diet/prune juice (in outpatient setting as appropriate)  Goal is for 1 easy BM every 1-2 days  9/30 Abdominal xray ordered to assess for stool burden: Nonobstructive bowel gas pattern   Continue to monitor  Follow up with PCP as appropriate

## 2024-10-03 NOTE — ASSESSMENT & PLAN NOTE
Lab Results   Component Value Date    EGFR 36 10/02/2024    EGFR 32 09/30/2024    EGFR 62 09/24/2024    CREATININE 1.37 (H) 10/02/2024    CREATININE 1.48 (H) 09/30/2024    CREATININE 0.87 09/24/2024   Noted to have CIDNY recently inpatient, likely multifactorial related to contrast use, hypotension/sepsis  Evaluated by Nephrology inpatient. Losartan and bumex were held temporarily inpatient.  Renal functions slightly improved although still elevated  Monitor renal function on routine labs  10/1 S/p IV fluids 50mL/hr for a total of 500mL  Decreased Bumex from 2mg to 1mg daily  Avoid nephrotoxins, NSAIDs as able  Encourage PO hydration, respecting volume status  Consider nephrology consult if renal function persistently elevated.   Follow up with PCP, Nephrology as appropriate

## 2024-10-03 NOTE — ASSESSMENT & PLAN NOTE
With chronic low back pain (generally L>R with associated intermittent sciatica) with history of spinal stenosis and reported spinal surgery. Recent acute exacerbation outpatient (without associated trauma) leading to present hospitalization.  Likely related to sepsis in setting of presumed osteomyelitis per inpatient workup.  Monitor pain - per patient she continues to have significant pain, although appears more comfortable.   Current regimen including: tylenol 975mg TID, Baclofen 5mg TID, gabapentin 200mg TID, lidocaine patch and Voltaren gel.   Adjust/wean regimen as appropriate. As of 9/30 per patient methocarbamol not effective, changed to Tizanidine 2mg which caused increased sedation in patient, changed to Baclofen 5mg TID. Gabapentin also increased to 200mg TID. Opioids discontinued inpatient in setting of constipation/SBO, due to ongoing constipation avoid opioids if possible.  See plan under sepsis  Continue to monitor  Follow up with PCP as appropriate

## 2024-10-04 ENCOUNTER — NURSING HOME VISIT (OUTPATIENT)
Age: 79
End: 2024-10-04

## 2024-10-04 VITALS
DIASTOLIC BLOOD PRESSURE: 68 MMHG | RESPIRATION RATE: 18 BRPM | BODY MASS INDEX: 31.29 KG/M2 | SYSTOLIC BLOOD PRESSURE: 150 MMHG | WEIGHT: 154.9 LBS | OXYGEN SATURATION: 96 % | HEART RATE: 69 BPM

## 2024-10-04 DIAGNOSIS — R53.81 PHYSICAL DECONDITIONING: ICD-10-CM

## 2024-10-04 DIAGNOSIS — F32.9 REACTIVE DEPRESSION: ICD-10-CM

## 2024-10-04 DIAGNOSIS — I10 PRIMARY HYPERTENSION: ICD-10-CM

## 2024-10-04 DIAGNOSIS — N17.9 ACUTE KIDNEY INJURY SUPERIMPOSED ON CHRONIC KIDNEY DISEASE  (HCC): Primary | ICD-10-CM

## 2024-10-04 DIAGNOSIS — K59.03 DRUG-INDUCED CONSTIPATION: ICD-10-CM

## 2024-10-04 DIAGNOSIS — N18.9 ACUTE KIDNEY INJURY SUPERIMPOSED ON CHRONIC KIDNEY DISEASE  (HCC): Primary | ICD-10-CM

## 2024-10-04 DIAGNOSIS — M54.50 ACUTE MIDLINE LOW BACK PAIN, UNSPECIFIED WHETHER SCIATICA PRESENT: ICD-10-CM

## 2024-10-04 PROBLEM — F32.A DEPRESSION: Status: ACTIVE | Noted: 2024-10-04

## 2024-10-04 LAB
GLUCOSE SERPL-MCNC: 226 MG/DL (ref 65–140)
GLUCOSE SERPL-MCNC: 253 MG/DL (ref 65–140)
GLUCOSE SERPL-MCNC: 256 MG/DL (ref 65–140)
GLUCOSE SERPL-MCNC: 264 MG/DL (ref 65–140)

## 2024-10-04 PROCEDURE — 82948 REAGENT STRIP/BLOOD GLUCOSE: CPT

## 2024-10-04 RX ORDER — GABAPENTIN 100 MG/1
100 CAPSULE ORAL 3 TIMES DAILY
Start: 2024-10-04

## 2024-10-04 RX ORDER — GABAPENTIN 100 MG/1
200 CAPSULE ORAL 3 TIMES DAILY
Start: 2024-10-04 | End: 2024-10-04

## 2024-10-04 NOTE — ASSESSMENT & PLAN NOTE
"With chronic low back pain (generally L>R with associated intermittent sciatica) with history of spinal stenosis and reported spinal surgery. Recent acute exacerbation outpatient (without associated trauma) leading to present hospitalization.  Likely related to sepsis in setting of presumed osteomyelitis per inpatient workup.  Monitor pain - per patient pain improving \"a little\"  Current regimen including: tylenol 975mg TID, Baclofen 5mg TID, gabapentin 100mg TID, lidocaine patch and Voltaren gel.   Adjust/wean regimen as appropriate. As of 9/30 per patient methocarbamol not effective, changed to Tizanidine 2mg which caused increased sedation in patient, changed to Baclofen 5mg TID. Gabapentin also increased to 200mg TID. As of 10/4 due to increased sedation decreased gabapentin from 200mg TID to 100mg TID.   Opioids discontinued inpatient in setting of constipation/SBO, due to ongoing constipation avoid opioids if possible.  See plan under sepsis  Continue to monitor  Follow up with PCP as appropriate   "
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
## 2024-10-04 NOTE — ASSESSMENT & PLAN NOTE
Lab Results   Component Value Date    EGFR 39 10/04/2024    EGFR 36 10/02/2024    EGFR 32 09/30/2024    CREATININE 1.27 10/04/2024    CREATININE 1.37 (H) 10/02/2024    CREATININE 1.48 (H) 09/30/2024   Noted to have CINDY recently inpatient, likely multifactorial related to contrast use, hypotension/sepsis  Evaluated by Nephrology inpatient. Losartan and bumex were held temporarily inpatient.  Renal functions improving  Monitor renal function on routine labs  10/1 S/p IV fluids 50mL/hr for a total of 500mL  Continue Bumex 2mg daily   Avoid nephrotoxins, NSAIDs as able  Encourage PO hydration, respecting volume status  Consider nephrology consult if renal function persistently elevated.   Follow up with PCP, Nephrology as appropriate

## 2024-10-04 NOTE — ASSESSMENT & PLAN NOTE
BP variable, 's-190's, likely pain contributing to higher readings  Continue to monitor BP   No acute cardiac complaints  Avoid hypotension  Regimen was adjusted recently inpatient due to HTN  Continue carvedilol 12.5mg BID, nifedipine ER 60mg daily, losartan 100mg daily, hydralazine 10mg TID, bumex 2mg daily (as of 9/30 decreased to 1mg daily. As of 10/4 renal functions improved, resumed 2mg daily), with hold parameters  Adjust once pain is controlled as necessary  Follow up with PCP, Cardiology as appropriate

## 2024-10-04 NOTE — ASSESSMENT & PLAN NOTE
Patient endorses chronic constipation  At risk due to hospitalization, relative immobility, comorbidities, history of chronic opioid (now off opioid)  Monitor stool output - Per nursing large BM 10/4  Bowel regimen at facility: miralax daily PRN, senna/docusate, lactulose, adjust as appropriate; bisacodyl suppository PRN  Encourage mobility as tolerated, PO hydration as appropriate, high fiber diet/prune juice (in outpatient setting as appropriate)  Goal is for 1 easy BM every 1-2 days  9/30 Abdominal xray ordered to assess for stool burden: Nonobstructive bowel gas pattern   Continue to monitor  Follow up with PCP as appropriate

## 2024-10-04 NOTE — ASSESSMENT & PLAN NOTE
Patient reports to feeling down/sad. Patient with history of loss of her  in February and loss of her son a few years ago. Recent hospitalization adding to depression.   Patient recently started on Mirtazapine inpatient    Discussion with patient regarding adding antidepressant (SSRI), patient would like to take time to think about adding a new medication.  Spiritual consult ordered  Continue supportive measures  Encourage activity and engagement  Will continue care in collaboration with psychiatry services prn  Continue to monitor for acute changes in condition   Follow up with PCP as appropriate

## 2024-10-05 LAB
GLUCOSE SERPL-MCNC: 210 MG/DL (ref 65–140)
GLUCOSE SERPL-MCNC: 246 MG/DL (ref 65–140)
GLUCOSE SERPL-MCNC: 266 MG/DL (ref 65–140)
GLUCOSE SERPL-MCNC: 302 MG/DL (ref 65–140)

## 2024-10-05 PROCEDURE — 82948 REAGENT STRIP/BLOOD GLUCOSE: CPT

## 2024-10-06 LAB
GLUCOSE SERPL-MCNC: 263 MG/DL (ref 65–140)
GLUCOSE SERPL-MCNC: 269 MG/DL (ref 65–140)
GLUCOSE SERPL-MCNC: 296 MG/DL (ref 65–140)
GLUCOSE SERPL-MCNC: 329 MG/DL (ref 65–140)

## 2024-10-06 PROCEDURE — 82948 REAGENT STRIP/BLOOD GLUCOSE: CPT

## 2024-10-07 ENCOUNTER — NURSING HOME VISIT (OUTPATIENT)
Age: 79
End: 2024-10-07
Payer: COMMERCIAL

## 2024-10-07 VITALS
WEIGHT: 149.8 LBS | SYSTOLIC BLOOD PRESSURE: 106 MMHG | BODY MASS INDEX: 30.26 KG/M2 | DIASTOLIC BLOOD PRESSURE: 66 MMHG | OXYGEN SATURATION: 95 % | HEART RATE: 72 BPM | RESPIRATION RATE: 19 BRPM | TEMPERATURE: 98.1 F

## 2024-10-07 DIAGNOSIS — M86.9 OSTEOMYELITIS, UNSPECIFIED SITE, UNSPECIFIED TYPE (HCC): Primary | ICD-10-CM

## 2024-10-07 DIAGNOSIS — E11.22 TYPE 2 DIABETES MELLITUS WITH STAGE 3B CHRONIC KIDNEY DISEASE, WITH LONG-TERM CURRENT USE OF INSULIN (HCC): ICD-10-CM

## 2024-10-07 DIAGNOSIS — N18.9 ACUTE KIDNEY INJURY SUPERIMPOSED ON CHRONIC KIDNEY DISEASE  (HCC): ICD-10-CM

## 2024-10-07 DIAGNOSIS — F32.9 REACTIVE DEPRESSION: ICD-10-CM

## 2024-10-07 DIAGNOSIS — D63.1 ANEMIA DUE TO STAGE 3B CHRONIC KIDNEY DISEASE  (HCC): ICD-10-CM

## 2024-10-07 DIAGNOSIS — Z79.4 TYPE 2 DIABETES MELLITUS WITH STAGE 3B CHRONIC KIDNEY DISEASE, WITH LONG-TERM CURRENT USE OF INSULIN (HCC): ICD-10-CM

## 2024-10-07 DIAGNOSIS — M54.42 ACUTE BILATERAL LOW BACK PAIN WITH LEFT-SIDED SCIATICA: ICD-10-CM

## 2024-10-07 DIAGNOSIS — N17.9 ACUTE KIDNEY INJURY SUPERIMPOSED ON CHRONIC KIDNEY DISEASE  (HCC): ICD-10-CM

## 2024-10-07 DIAGNOSIS — A41.01 SEPSIS DUE TO METHICILLIN SUSCEPTIBLE STAPHYLOCOCCUS AUREUS (MSSA) WITHOUT ACUTE ORGAN DYSFUNCTION (HCC): ICD-10-CM

## 2024-10-07 DIAGNOSIS — N18.32 TYPE 2 DIABETES MELLITUS WITH STAGE 3B CHRONIC KIDNEY DISEASE, WITH LONG-TERM CURRENT USE OF INSULIN (HCC): ICD-10-CM

## 2024-10-07 DIAGNOSIS — N18.32 ANEMIA DUE TO STAGE 3B CHRONIC KIDNEY DISEASE  (HCC): ICD-10-CM

## 2024-10-07 DIAGNOSIS — R60.0 BILATERAL LOWER EXTREMITY EDEMA: ICD-10-CM

## 2024-10-07 DIAGNOSIS — I10 PRIMARY HYPERTENSION: ICD-10-CM

## 2024-10-07 DIAGNOSIS — M54.50 ACUTE MIDLINE LOW BACK PAIN, UNSPECIFIED WHETHER SCIATICA PRESENT: ICD-10-CM

## 2024-10-07 DIAGNOSIS — K59.03 DRUG-INDUCED CONSTIPATION: ICD-10-CM

## 2024-10-07 DIAGNOSIS — R53.81 PHYSICAL DECONDITIONING: ICD-10-CM

## 2024-10-07 LAB
GLUCOSE SERPL-MCNC: 233 MG/DL (ref 65–140)
GLUCOSE SERPL-MCNC: 239 MG/DL (ref 65–140)
GLUCOSE SERPL-MCNC: 276 MG/DL (ref 65–140)
GLUCOSE SERPL-MCNC: 278 MG/DL (ref 65–140)

## 2024-10-07 PROCEDURE — 99310 SBSQ NF CARE HIGH MDM 45: CPT

## 2024-10-07 PROCEDURE — 82948 REAGENT STRIP/BLOOD GLUCOSE: CPT
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
## 2024-10-08 LAB
GLUCOSE SERPL-MCNC: 161 MG/DL (ref 65–140)
GLUCOSE SERPL-MCNC: 205 MG/DL (ref 65–140)
GLUCOSE SERPL-MCNC: 205 MG/DL (ref 65–140)
GLUCOSE SERPL-MCNC: 212 MG/DL (ref 65–140)

## 2024-10-08 PROCEDURE — 82948 REAGENT STRIP/BLOOD GLUCOSE: CPT

## 2024-10-08 RX ORDER — TRAMADOL HYDROCHLORIDE 25 MG/1
25 TABLET, COATED ORAL 2 TIMES DAILY
Status: ON HOLD | COMMUNITY

## 2024-10-08 RX ORDER — BUMETANIDE 2 MG/1
2 TABLET ORAL DAILY
Start: 2024-10-08

## 2024-10-08 NOTE — ASSESSMENT & PLAN NOTE
Patient endorses chronic constipation  At risk due to hospitalization, relative immobility, comorbidities, history of chronic opioid (now off opioid)  Monitor stool output - Per nursing patient has been having regular bowel movements, approximately every other day. Last BM 10/6.   Bowel regimen at facility: miralax daily PRN, senna/docusate, lactulose, adjust as appropriate; bisacodyl suppository PRN  Encourage mobility as tolerated, PO hydration as appropriate, high fiber diet/prune juice (in outpatient setting as appropriate)  Goal is for 1 easy BM every 1-2 days  9/30 Abdominal xray ordered to assess for stool burden: Nonobstructive bowel gas pattern   Continue to monitor  Follow up with PCP as appropriate

## 2024-10-08 NOTE — ASSESSMENT & PLAN NOTE
With chronic low back pain (generally L>R with associated intermittent sciatica) with history of spinal stenosis and reported spinal surgery. Recent acute exacerbation outpatient (without associated trauma) leading to present hospitalization.  Likely related to sepsis in setting of presumed osteomyelitis per inpatient workup.  Monitor pain - per patient pain worse since lowering gabapentin. Patient sensitive to medications, difficulty finding balance between managing pain and causing increased sedation. Patient previously on tramadol outpatient, was being held due to SBO. Patient now having regular bowel movements, will trial tramadol 25mg BID (previously on 50mg BID)  Current regimen including: tylenol 975mg TID, Baclofen 5mg TID PRN, gabapentin 100mg TID, lidocaine patch and Voltaren gel, tramadol 25mg BID.   Adjust/wean regimen as appropriate. As of 9/30 per patient methocarbamol not effective, changed to Tizanidine 2mg which caused increased sedation in patient, changed to Baclofen 5mg TID. Gabapentin also increased to 200mg TID. As of 10/4 due to increased sedation decreased gabapentin from 200mg TID to 100mg TID. As of 10/7 pain not controlled will trial tramadol 25mg BID, changed baclofen to TID PRN.   Monitor stool output, increased risk of SBO/constipation.   See plan under sepsis  Continue to monitor  Follow up with PCP as appropriate

## 2024-10-08 NOTE — ASSESSMENT & PLAN NOTE
Lab Results   Component Value Date    EGFR 46 10/07/2024    EGFR 39 10/04/2024    EGFR 36 10/02/2024    CREATININE 1.12 10/07/2024    CREATININE 1.27 10/04/2024    CREATININE 1.37 (H) 10/02/2024   Noted to have CINDY recently inpatient, likely multifactorial related to contrast use, hypotension/sepsis  Evaluated by Nephrology inpatient. Losartan and bumex were held temporarily inpatient.  Renal functions improving  Monitor renal function on routine labs  10/1 S/p IV fluids 50mL/hr for a total of 500mL  Continue Bumex 2mg daily   Avoid nephrotoxins, NSAIDs as able  Encourage PO hydration, respecting volume status  Consider nephrology consult if renal function persistently elevated.   Follow up with PCP, Nephrology as appropriate

## 2024-10-08 NOTE — ASSESSMENT & PLAN NOTE
Lab Results   Component Value Date    HGBA1C 7.9 (A) 07/02/2024   Monitor glucose - fasting glucose have been elevated ranging high 100's- mid 200's over the last week (patient appetite improving), later in the day high 200's-300's.   Avoid hypoglycemia  Continue sitagliptin 50mg daily, insulin lantus 32u daily (as of 10/8 increased from 30u)   Insulin sliding scale coverage at rehab, as of 10/8 increased from low sliding scale to moderate.   Adjust regimen as appropriate with more data  Encourage lifestyle modifications including healthy diet, weight management, exercise as appropriate  Follow up with PCP, Endocrine/Ophthalmology/Podiatry outpatient as appropriate
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
## 2024-10-08 NOTE — ASSESSMENT & PLAN NOTE
Baseline Hgb seems around 9-11 with limited data  Likely related to CKD, history of iron deficiency on labs; likely with acute component related to marrow suppression in setting of acute infection and antibiotics  Hgb 8.2=8.2=8.2<8.7  Continue to monitor on routine labs  FOBT negative   Continue iron supplements   Last b12 was from 2022, will order b12 labs  Monitor for acute bleed - no present signs  Consider further workup, for persistent/worsening  Transfuse PRN Hgb <7  Continue to monitor for acute changes  Follow up with PCP as appropriate

## 2024-10-08 NOTE — ASSESSMENT & PLAN NOTE
BP variable, 's-190's, likely pain contributing to higher readings  Continue to monitor BP   No acute cardiac complaints  Avoid hypotension  Regimen was adjusted recently inpatient due to HTN  Continue carvedilol 12.5mg BID, nifedipine ER 60mg daily, losartan 100mg daily, hydralazine 10mg TID, bumex 2mg daily (as of 9/30 decreased Bumex to 1mg daily. As of 10/4 renal functions improved, resumed 2mg daily), with hold parameters  Adjust once pain is controlled as necessary  Follow up with PCP, Cardiology as appropriate

## 2024-10-09 PROBLEM — Z45.2 PICC (PERIPHERALLY INSERTED CENTRAL CATHETER) IN PLACE: Status: ACTIVE | Noted: 2024-10-09

## 2024-10-09 LAB
GLUCOSE SERPL-MCNC: 210 MG/DL (ref 65–140)
GLUCOSE SERPL-MCNC: 230 MG/DL (ref 65–140)
GLUCOSE SERPL-MCNC: 263 MG/DL (ref 65–140)
GLUCOSE SERPL-MCNC: 293 MG/DL (ref 65–140)

## 2024-10-09 PROCEDURE — 82948 REAGENT STRIP/BLOOD GLUCOSE: CPT

## 2024-10-09 PROCEDURE — 99255 IP/OBS CONSLTJ NEW/EST HI 80: CPT | Performed by: NURSE PRACTITIONER

## 2024-10-09 NOTE — ASSESSMENT & PLAN NOTE
Lab Results   Component Value Date    HGBA1C 7.9 (A) 07/02/2024   Elevated blood glucose is risk factor for wounds and infection.   -recommend tight glycemic control  -blood glucose management per primary service

## 2024-10-09 NOTE — ASSESSMENT & PLAN NOTE
Per patient lesion has been present for month. This is likely portal of entry for her bacteremia above. Lesion is concerning for possible squamous cell carcinoma. Recommend dermatology evaluation for further work up and possbil biopsy vs excision. She does have outpatient referral pending and family will call their office to make sure appointment gets moved to after patient's expected discharge.  -serial scalp exams  -recommend dermatology evaluation and possible biopsy vs excision
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
## 2024-10-09 NOTE — ASSESSMENT & PLAN NOTE
Lab Results   Component Value Date    EGFR 46 10/07/2024    EGFR 39 10/04/2024    EGFR 36 10/02/2024    CREATININE 1.12 10/07/2024    CREATININE 1.27 10/04/2024    CREATININE 1.37 (H) 10/02/2024   This can impact antibiotic dosing. Upon review of patient's available medical records it appears her baseline creatinine is approximately 1.2-1.5. Creatinine is within baseline this week at 1.12.  -weekly creatinine while on IV antibiotic  -dose adjust antibiotic for renal function as needed  -avoid nephrotoxins

## 2024-10-09 NOTE — ASSESSMENT & PLAN NOTE
Likely seeded from recent MSSA bacteremia. 9/9/2024 MRI of the lumbar spine showed hyperintensity of the intervertebral L2-4 region with mild adjacent paraspinal enhancement and corresponding sclerotic endplate changes. Findings suggest possible discitis/osteomyelitis. She has a RUE PICC intact and has been completing a 6 week course of IV Cefazolin. She's been tolerating her antibiotic without difficulty. I personally reviewed her most recent lab work from 10/7/2024 which showed normal WBC count = 5.06, stable creatinine = 1.12, improved sed rate to 26, and CRP improved to 12.8. I will continue patient on IV antibiotic as planned. Anticipate transition to PO Cefadroxil after completion of IV treatment which she will remain on until inflammatory markers normalize vs plateau.   -continue IV cefazolin through 10/21/2024  -continue weekly CBCD, creatinine, sed rate, and CRP while on IV antibiotic  -anticipate transition to PO Cefadroxil after completing IV treatment which patient will remain on until inflammatory markers normalize vs plateau  -monitor vitals  -PICC to be removed by facility RN after final dose of IV antibiotic on 10/21/2024  -patient will follow up in the outpatient ID office after discharge from [facility], will coordinate her appointment close to discharge
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
## 2024-10-09 NOTE — ASSESSMENT & PLAN NOTE
RUE PICC intact. She has had some swelling of the RUE. Venous duplex without DVT or superficial thrombophlebitis. This is now improved. PICC has been functioning without difficulty.  -serial exams and care of PICC  -PICC to be removed by facility RN after patient's final dose of IV antibiotic on 10/21/2024

## 2024-10-09 NOTE — ASSESSMENT & PLAN NOTE
Unclear etiology. Possibly secondary to scalp lesion. This likely seeded the lumbar spine. Patient cleared her bacteremia while inpatient and TTE showed no obvious vegetation. The patient remains on IV antibiotic as above for ongoing treatment of bacteremia and spinal discitis/osteomyelitis.   -antibiotic as above  -weekly CBCD and creatinine while on IV antibiotic  -monitor vitals

## 2024-10-10 ENCOUNTER — NURSING HOME VISIT (OUTPATIENT)
Dept: GERIATRICS | Facility: OTHER | Age: 79
End: 2024-10-10
Payer: COMMERCIAL

## 2024-10-10 DIAGNOSIS — R60.0 BILATERAL LOWER EXTREMITY EDEMA: ICD-10-CM

## 2024-10-10 DIAGNOSIS — A41.01 SEPSIS DUE TO METHICILLIN SUSCEPTIBLE STAPHYLOCOCCUS AUREUS (MSSA) WITHOUT ACUTE ORGAN DYSFUNCTION (HCC): Primary | ICD-10-CM

## 2024-10-10 DIAGNOSIS — I10 PRIMARY HYPERTENSION: ICD-10-CM

## 2024-10-10 DIAGNOSIS — M54.42 ACUTE BILATERAL LOW BACK PAIN WITH LEFT-SIDED SCIATICA: ICD-10-CM

## 2024-10-10 DIAGNOSIS — E11.22 TYPE 2 DIABETES MELLITUS WITH STAGE 3B CHRONIC KIDNEY DISEASE, WITH LONG-TERM CURRENT USE OF INSULIN (HCC): ICD-10-CM

## 2024-10-10 DIAGNOSIS — N18.9 ACUTE KIDNEY INJURY SUPERIMPOSED ON CHRONIC KIDNEY DISEASE  (HCC): ICD-10-CM

## 2024-10-10 DIAGNOSIS — N18.32 TYPE 2 DIABETES MELLITUS WITH STAGE 3B CHRONIC KIDNEY DISEASE, WITH LONG-TERM CURRENT USE OF INSULIN (HCC): ICD-10-CM

## 2024-10-10 DIAGNOSIS — K59.03 DRUG-INDUCED CONSTIPATION: ICD-10-CM

## 2024-10-10 DIAGNOSIS — Z79.4 TYPE 2 DIABETES MELLITUS WITH STAGE 3B CHRONIC KIDNEY DISEASE, WITH LONG-TERM CURRENT USE OF INSULIN (HCC): ICD-10-CM

## 2024-10-10 DIAGNOSIS — N17.9 ACUTE KIDNEY INJURY SUPERIMPOSED ON CHRONIC KIDNEY DISEASE  (HCC): ICD-10-CM

## 2024-10-10 DIAGNOSIS — N18.32 ANEMIA DUE TO STAGE 3B CHRONIC KIDNEY DISEASE  (HCC): ICD-10-CM

## 2024-10-10 DIAGNOSIS — D63.1 ANEMIA DUE TO STAGE 3B CHRONIC KIDNEY DISEASE  (HCC): ICD-10-CM

## 2024-10-10 LAB
GLUCOSE SERPL-MCNC: 116 MG/DL (ref 65–140)
GLUCOSE SERPL-MCNC: 122 MG/DL (ref 65–140)
GLUCOSE SERPL-MCNC: 134 MG/DL (ref 65–140)
GLUCOSE SERPL-MCNC: 143 MG/DL (ref 65–140)

## 2024-10-10 PROCEDURE — 82948 REAGENT STRIP/BLOOD GLUCOSE: CPT

## 2024-10-10 PROCEDURE — 99309 SBSQ NF CARE MODERATE MDM 30: CPT | Performed by: STUDENT IN AN ORGANIZED HEALTH CARE EDUCATION/TRAINING PROGRAM

## 2024-10-10 NOTE — ASSESSMENT & PLAN NOTE
Patient endorses chronic constipation  At risk due to hospitalization, relative immobility, comorbidities, hx of chronic opioid  Monitor stool output - constipation much improved overall but still intermittent issue  Bowel regimen at facility: miralax changed to daily from PRN, senna-docusate combo changed to senna 2 tabs qHS and docusate 100mg BID, also has PRN lactulose, adjust as appropriate; bisacodyl suppository PRN  Encourage mobility as tolerated, PO hydration as appropriate, high fiber diet/prune juice (in outpatient setting as appropriate)  Goal is for 1 easy BM every 1-2 days

## 2024-10-10 NOTE — ASSESSMENT & PLAN NOTE
Monitor BP - has been somewhat variable ranging low to high 100s systolic, generally above goal  Avoid hypotension  No acute cardiac complaints  Regimen was adjusted recently inpatient due to HTN  Continue regimen including carvedilol 12.5mg BID, losartan 100mg daily, hydralazine 10mg TID, bumex 2mg daily, with hold parameters as appropriate  (Patient has also been on nifedipine inpatient but reports she does not take this outpatient due to history of adverse effect (reports it worsens her peripheral edema and also believes it was related to an episode of jaundice historically); patient asks to discontinue nifedipine and also to avoid medications of the same class. Have discontinued. Discussed that she needs better BP control and with shared decision making will start on low dose chlorthalidone 25mg daily in addition to above regimen as this may help her peripheral edema and BP as well)  Adjust as appropriate  Follow up with PCP, Cardiology as appropriate

## 2024-10-10 NOTE — ASSESSMENT & PLAN NOTE
Baseline Hb seems around 9-11 with limited data  Likely related to CKD, hx of iron deficiency on labs; likely with acute component related to marrow suppression in setting of acute infection and antibiotics  -monitor on routine labs - fairly stable  -empirically started on oral iron at rehab due to hx iron deficiency  -monitor for acute bleed - no present signs  -consider further workup, Heme consult if persistent/worsening  -transfuse PRN Hb <7

## 2024-10-10 NOTE — ASSESSMENT & PLAN NOTE
Lab Results   Component Value Date    HGBA1C 7.9 (A) 07/02/2024       Monitor glucose - fasting glucose generally in low 200s recently, not well controlled, but has been labile  Avoid hypoglycemia  Continue regimen including sitagliptin 50mg daily, insulin lantus 34u daily (increased slightly, with caution as her sugars are labile historically)  Insulin sliding scale coverage at rehab  Adjust regimen as appropriate with more data  Encourage lifestyle modifications including healthy diet, weight management, exercise as appropriate  Follow up with PCP, Endocrine/Ophthalmology/Podiatry outpatient as appropriate

## 2024-10-10 NOTE — ASSESSMENT & PLAN NOTE
With chronic low back pain (generally L>R with associated intermittent sciatica) with hx of spinal stenosis and reported spinal surgery  With recent acute exacerbation outpatient (without associated trauma) leading to present hospitalization  Likely related to sepsis in setting of presumed osteomyelitis per inpatient workup  Monitor pain - acute component gradually improving to baseline chronic pain level, still worsens with therapy  Current regimen including: tylenol 975mg TID, baclofen 5mg TID PRN, gabapentin 100mg TID, lidocaine patch/voltaren gel, tramadol 25mg BID  Adjust/wean regimen as appropriate. Opioids had been stopped inpatient, have been reintroduced at lower dose now for her chronic pain. Methocarbamol was not as effective and tizanidine seemed to cause sedation, seems better controlled with PRN baclofen. Use caution with pain regimen as she has had issues with constipation and with sedation with higher doses.  See plan under sepsis

## 2024-10-10 NOTE — PROGRESS NOTES
[facility]  Facility: [facility]    PROGRESS NOTE  Nursing Home Place of Service: nursing home place of service: POS [facility] Coverage    NAME: Porsha Hoyos  : 1945 AGE: 79 y.o. SEX: female MRN: [id number]  DATE OF ENCOUNTER: 10/10/2024    Assessment and Plan     Problem List Items Addressed This Visit       Primary hypertension     Monitor BP - has been somewhat variable ranging low to high 100s systolic, generally above goal  Avoid hypotension  No acute cardiac complaints  Regimen was adjusted recently inpatient due to HTN  Continue regimen including carvedilol 12.5mg BID, losartan 100mg daily, hydralazine 10mg TID, bumex 2mg daily, with hold parameters as appropriate  (Patient has also been on nifedipine inpatient but reports she does not take this outpatient due to history of adverse effect (reports it worsens her peripheral edema and also believes it was related to an episode of jaundice historically); patient asks to discontinue nifedipine and also to avoid medications of the same class. Have discontinued. Discussed that she needs better BP control and with shared decision making will start on low dose chlorthalidone 25mg daily in addition to above regimen as this may help her peripheral edema and BP as well)  Adjust as appropriate  Follow up with PCP, Cardiology as appropriate           Acute on Chronic Back Pain in the Setting of Sepsis, MSSA Bacteremia     With chronic low back pain (generally L>R with associated intermittent sciatica) with hx of spinal stenosis and reported spinal surgery  With recent acute exacerbation outpatient (without associated trauma) leading to present hospitalization  Likely related to sepsis in setting of presumed osteomyelitis per inpatient workup  Monitor pain - acute component gradually improving to baseline chronic pain level, still worsens with therapy  Current regimen including: tylenol 975mg TID,  baclofen 5mg TID PRN, gabapentin 100mg TID, lidocaine patch/voltaren gel, tramadol 25mg BID  Adjust/wean regimen as appropriate. Opioids had been stopped inpatient, have been reintroduced at lower dose now for her chronic pain. Methocarbamol was not as effective and tizanidine seemed to cause sedation, seems better controlled with PRN baclofen. Use caution with pain regimen as she has had issues with constipation and with sedation with higher doses.  See plan under sepsis         Type 2 diabetes mellitus with diabetic chronic kidney disease (HCC)       Lab Results   Component Value Date    HGBA1C 7.9 (A) 07/02/2024       Monitor glucose - fasting glucose generally in low 200s recently, not well controlled, but has been labile  Avoid hypoglycemia  Continue regimen including sitagliptin 50mg daily, insulin lantus 34u daily (increased slightly, with caution as her sugars are labile historically)  Insulin sliding scale coverage at rehab  Adjust regimen as appropriate with more data  Encourage lifestyle modifications including healthy diet, weight management, exercise as appropriate  Follow up with PCP, Endocrine/Ophthalmology/Podiatry outpatient as appropriate           Acute kidney injury superimposed on chronic kidney disease  (HCC)     Lab Results   Component Value Date    EGFR 51 10/10/2024    EGFR 46 10/07/2024    EGFR 39 10/04/2024    CREATININE 1.02 10/10/2024    CREATININE 1.12 10/07/2024    CREATININE 1.27 10/04/2024       Noted to have CINDY recently inpatient, likely multifactorial related to contrast use, hypotension/sepsis  Evaluated by Nephrology inpatient. Losartan and bumex were held temporarily inpatient.  Monitor renal function on routine labs - CINDY appears improved back to within baseline  Avoid nephrotoxins, NSAIDs as able  Encourage PO hydration, respecting volume status  Follow up with PCP, Nephrology as appropriate           Anemia     Baseline Hb seems around 9-11 with limited data  Likely related  "to CKD, hx of iron deficiency on labs; likely with acute component related to marrow suppression in setting of acute infection and antibiotics  -monitor on routine labs - fairly stable  -empirically started on oral iron at rehab due to hx iron deficiency  -monitor for acute bleed - no present signs  -consider further workup, Heme consult if persistent/worsening  -transfuse PRN Hb <7           Drug-induced constipation     Patient endorses chronic constipation  At risk due to hospitalization, relative immobility, comorbidities, hx of chronic opioid  Monitor stool output - constipation much improved overall but still intermittent issue  Bowel regimen at facility: miralax changed to daily from PRN, senna-docusate combo changed to senna 2 tabs qHS and docusate 100mg BID, also has PRN lactulose, adjust as appropriate; bisacodyl suppository PRN  Encourage mobility as tolerated, PO hydration as appropriate, high fiber diet/prune juice (in outpatient setting as appropriate)  Goal is for 1 easy BM every 1-2 days           Sepsis (HCC): SIRS criteria MSSA Bacteremia and COVID infection - Primary     Noted POA to recent hospitalization, likely multifactorial in setting of COVID and concern for MSSA bacteremia and associated spinal osteomyelitis  Evaluated by ID inpatient and following at rehab  Continue IV cefazolin for 6 week course (through 10/21/24)  Monitor labs while on IV abx in accordance with ID recs (CBCd, BMP, ESR/CRP)  As per ID: anticipate transition to PO Cefadroxil after completing IV treatment which patient will remain on until inflammatory markers normalize vs plateau\"  Monitor for acute/recurrent infectious symptoms - no new/acute symptoms, acute sepsis seems resolved with ongoing treatment  Follow up with PCP, ID as appropriate         Bilateral lower extremity edema     Seems to be a chronic issue  No clear noted hx of CHF however recent echos have generally been limited/difficult studies so unclear what her " present EF is  Monitor weight - fairly stable, no alarming uptrend  Monitor volume status clinically - bilateral LE edema seems stable/reported chronic, no orthopnea  Encourage elevation, compression of lower extremities as able  Continue bumex with hold parameters  Follow up with PCP, Cardiology as appropriate                  Chief Complaint     Follow up constipation, HTN    History of Present Illness     Porsha Hoyos is a 79 y.o. female who was seen today for follow up. PMH including HTN, HLD, CAD, PAD, CVA, chronic back pain/spinal stenosis, DM2, hypothyroidism, CKD, GERD, asthma      Patient seen and examined in room  Others present: granddaughter and granddaughter's SO  Patient laying in bed  Appears comfortable, awake, alert, oriented to situation, able to converse appropriately  Patient polite, appears in good spirits, Aox3, appears to be a reasonable historian, mentation seems stable compared to prior. Notes she feels OK overall, feels therapy going well and gradually making progress, she does tend to get exacerbation of her back pain with therapy but feels the back pain is overall gradually improving. She notes she was able to walk with therapy today and that went well. (She has back pain chronically located at the lower back, L>R and sometimes with sciatica mainly on left side. Was acutely exacerbated recently without trauma in setting of discitis/osteomyelitis; acute exacerbation is improving to baseline level of chronic back pain).  No acute pain anywhere else.  Breathing fine, on room air, no acute SOB, no orthopnea. Feels her breathing is at baseline. (Has chronic mild dyspnea on significant exertion at baseline but otherwise no JONES with short walks.)  No recent CP/palpitations or orthostatic lightheadedness  Appetite stable recently with improvement to constipation/SBO and with initiation on mirtazapine inpatient; no acute swallowing concerns  Urinating well without acute symptoms  Last BM  yesterday was a bit hard, she is worried about being a bit constipated again though not severe, no abd pain/N/V or notable distension or tenderness  Does not feel acutely sick or confused  No acute cardiopulmonary, or urinary symptoms; see ROS for more details.     No further questions or acute concerns identified.     Lab Review:  9/16: BMP with Cr 1.57 (recent peak 2.66 on 9/9, baseline around 1.3-1.7), GFR (baseline 30s-40s); CBC with WBC 10.39 (recent peak 11.68 on 9/8), Hb 10.0 (normocytic, baseline seems around 9-11 with limited data); blood cultures 9/10 NGTD; UA 9/8 and 9/10 generally unremarkable for infection; blood cultures 9/7 one NGTD and one with Staph aureus; COVID positive 9/7 9/24: BMP generally stable/non-actionable, Cr 0.87, GFR 62; CBC with Hb 8.7 (borderline macrocytic, stable); Mg 1.8  10/4: BMP with Cr 1.27, GFR 39;   10/7: BMP with Cr 1.12, GFR 46; CBC with Hb 8.2  10/10: BMP with Cr 1.02, GFR 51; Hb 8.7; B12 700                 Lab Results   Component Value Date     HGBA1C 7.9 (A) 07/02/2024            Lab Results   Component Value Date     BZU5LCNVTDMB 1.779 07/10/2024     TSH 1.81 01/18/2024            Lab Results   Component Value Date     OHLJPJQL83 4,288 (H) 07/03/2022            Lab Results   Component Value Date     IRON 15 (L) 06/29/2022     TIBC 257 06/29/2022     FERRITIN 942 (H) 06/29/2022            Lab Results   Component Value Date     CHOLESTEROL 119 07/10/2024            Lab Results   Component Value Date     HDL 50 07/10/2024            Lab Results   Component Value Date     TRIG 131 07/10/2024            Lab Results   Component Value Date     NONHDLC 84 01/25/2022            Lab Results   Component Value Date     LDLCALC 43 07/10/2024            XR abdomen 9/30  Nonobstructive bowel gas pattern.      XR abdomen obstruction series  Result Date: 9/21/2024  Continued improved small bowel distention. There is air seen throughout the large bowel. Right-sided PICC line tip  likely located within a left-sided SVC.     MRI brain wo contrast  Result Date: 9/20/2024  Impression: No acute infarct identified. Specifically, no acute infarct noted involving the right insular adjacent right temporal lobe as suspected on noncontrast head CT. Old lacunar infarct involving the right aspect of the duke. Mild chronic microangiopathic change involving the white matter of both cerebral hemispheres, mildly progressed when compared to the prior examination from 2005. Partial opacification of the bilateral mastoid air cells.     CT stroke alert brain  Result Date: 9/20/2024  New area of low-attenuation involving the right insula, and which may extend into the adjacent right temporal lobe, indicative of a developing infarct in this region. No associated hemorrhage. No significant mass effect or midline shift. Recommend further relation with brain MR. Old lacunar infarct involving the right aspect of the duke.      CTA stroke alert (head/neck)  Result Date: 9/20/2024  No large vessel occlusion identified on CT angiogram of the head. At least moderate stenoses involving the bilateral cavernous and supraclinoid internal carotid arteries due to predominant calcified atherosclerotic plaque, and at least moderate stenoses involving the bilateral intradural vertebral arteries. No hemodynamically significant stenosis or dissection identified on CT angiogram of the neck. Pericardial effusion, bilateral pleural effusions. Left-sided superior vena cava. Mild left atrial enlargement. Multilevel cervical spondylosis, with erosive changes also noted involving the atlantodens joint, and multiple bilateral facet joints particularly at the level of C4-C5, with may be due to osteoarthritis, but an inflammatory/crystalline arthropathy cannot be excluded. Correlation with the patient's clinical and past medical history is recommended.      XR abdomen obstruction series  Result Date: 9/19/2024  Interval nasogastric tube  placement with tip overlying the proximal stomach. Decreased gastric distention. Slightly improved small bowel distention.     XR chest portable  Result Date: 9/18/2024  A feeding tube has been inserted the tip of which lies in the left upper quadrant below the diaphragm      XR abdomen obstruction series  Result Date: 9/18/2024  Stomach is air-filled and moderately distended. Multiple dilated small bowel loops with associated differential and broad air/fluid levels, suspicious for small bowel obstruction. Correlation with the patient's symptoms recommended. Other findings as above.      VAS upper limb venous duplex scan, unilateral/limited  Result Date: 9/16/2024  Impression RIGHT UPPER LIMB: No evidence of acute or chronic deep vein thrombosis. No evidence of superficial thrombophlebitis noted. Doppler evaluation shows a normal response to augmentation maneuvers.  LEFT UPPER LIMB LIMITED: Evaluation shows no evidence of thrombus in the internal jugular vein, subclavian vein, and the innominate vein.       XR chest PICC line portable  Result Date: 9/13/2024  No acute cardiopulmonary disease. Right PICC crossing the midline with tip along the left heart border at the level of the main pulmonary artery. Per comparison with the chest CT, this traverses a retroesophageal right brachiocephalic vein with its tip in a persistent left SVC.     Echo follow up/limited w/ contrast if indicated  Result Date: 9/11/2024  Narrative:   Left Ventricle: Left ventricle is not well visualized. Systolic function cannot be assessed. Wall motion cannot be accurately assessed.   Right Ventricle: Right ventricle is not well visualized. Systolic function is grossly normal.   Tricuspid Valve: The right ventricular systolic pressure is mildly elevated. The estimated right ventricular systolic pressure is 43.00 mmHg. Severely technically limited echo, LV function cannot be assessed on the basis of this echo.  Recommend PENNY if endocarditis  evaluation is required.      MRI lumbar spine w wo contrast  Result Date: 9/10/2024  1. Multilevel lumbar spondylosis, as described above, contributing to at most severe canal stenosis at L3-L4, and multilevel neural foraminal stenosis, worst on the right at L2-L3 and L3-L4, on the left at L4-L5. 2. T2/STIR signal hyperintensity involving the intervertebral discs at L2-L4, with mild adjacent paraspinal enhancement right greater left at these levels, but no obvious endplate edema on STIR images noted, or definite endplate destruction. There are corresponding sclerotic endplate changes on CT at these levels, with likely vacuum phenomena noted anteriorly involving the intervertebral disc at L3-L4. These findings are likely due to extensive degenerative disc disease/degenerative endplate changes but a discitis osteomyelitis cannot be entirely excluded. Recommend correlation with the patient's clinical history, as well as follow-up lumbar spine MRI in 3 to 4 weeks time without and with contrast to document stability.     US kidney and bladder  Result Date: 9/10/2024  No acute findings.      CTA dissection protocol chest abdomen pelvis w wo contrast  Result Date: 9/7/2024  No aortic aneurysm, dissection or other acute pathology. 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                   Encounter Date: 09/18/24   ECG 12 lead   Result Value     Ventricular Rate 95     Atrial Rate 95     MT Interval 198     QRSD Interval 74     QT Interval 338     QTC Interval 424     P Axis 21     QRS Axis -31     T Wave Axis 233     Narrative     Sinus rhythm with Premature supraventricular complexes  Left axis deviation  ST & T wave abnormality, consider inferior ischemia  ST & T wave abnormality, consider anterolateral ischemia  Abnormal ECG  When compared with ECG of 07-SEP-2024 16:18,  Premature supraventricular complexes are now Present  QRS axis Shifted  left  Confirmed by Annabelle Beltran ([id number]) on 9/20/2024 5:33:00 PM            Echo Jan 2024: EF 55-60, technically difficult study        [place]MP reviewed 9/26/2024 08/12/2024 07/02/2024 1 Tramadol Hcl 50 Mg Tablet 60.00 30 If Ahm [id number] Bat ([id number]) 0 20.00 [place] Comm [place]   07/05/2024 07/02/2024 1 Tramadol Hcl 50 Mg Tablet 60.00 30 If Ahm [id number] Bat ([id number]) 0 20.00 [place]   06/06/2024 05/09/2024 1 Tramadol Hcl 50 Mg Tablet 60.00 30 If Ahm [id number] Bat ([id number]) 0 20.00 [place]   05/09/2024 05/09/2024 1 Tramadol Hcl 50 Mg Tablet 60.00 30 If Ahm [id number] Bat ([id number]) 0 20.00 [place]   04/08/2024 03/05/2024 1 Tramadol Hcl 50 Mg Tablet 60.00 30 If m [id number] Bat ([id number]) 0 20.00 [place]   03/05/2024 03/05/2024 1 Tramadol Hcl 50 Mg Tablet 60.00 30 If Ahm [id number] Bat ([id number]) 0 20.00 [place]   02/03/2024 01/03/2024 1 Tramadol Hcl 50 Mg Tablet 60.00 30 If Ahm [id number] Bat ([id number]) 0 20.00 [place]   01/03/2024 01/03/2024 1 Tramadol Hcl 50 Mg Tablet 60.00 30 If m [id number] Bat ([id number]) 0 20.00 [place]        The following portions of the patient's history were reviewed and updated as appropriate: allergies, current medications, past family history, past medical history, past social history, past surgical history and problem list.    Review of Systems     Review of Systems   Constitutional:  Positive for appetite change (generally low, stable). Negative for chills, diaphoresis and fever.   HENT:  Negative for drooling, ear pain, hearing loss (mild, baseline), rhinorrhea, sore throat and trouble swallowing.    Eyes:  Negative for pain, discharge, redness, itching and visual disturbance.   Respiratory:  Negative for cough, chest tightness, shortness of breath (baseline) and wheezing.    Cardiovascular:  Positive for leg swelling. Negative for chest pain and palpitations.   Gastrointestinal:  Positive for constipation (mild). Negative for abdominal pain, blood in stool, diarrhea, nausea  and vomiting.   Genitourinary:  Negative for dysuria, flank pain and hematuria.   Musculoskeletal:  Positive for back pain (chronic) and gait problem. Negative for arthralgias and neck pain.   Skin:  Negative for color change.   Neurological:  Positive for weakness (gradually improving). Negative for dizziness, facial asymmetry, speech difficulty, light-headedness and headaches.   Psychiatric/Behavioral:  Negative for agitation, behavioral problems and confusion. The patient is not nervous/anxious and is not hyperactive.    All other systems reviewed and are negative.      Active Problem List     Patient Active Problem List   Diagnosis    Primary hypertension    Mixed hyperlipidemia    Vulvar lesion    Encntr for gyn exam (general) (routine) w abnormal findings    Vaginal atrophy    Acute on Chronic Back Pain in the Setting of Sepsis, MSSA Bacteremia    Acute pain of right shoulder    Pain and swelling of right upper extremity    Acute pain of right shoulder due to trauma    Fall at home    Imbalance    Type 2 diabetes mellitus with diabetic chronic kidney disease (HCC)    Type 2 diabetes mellitus with diabetic polyneuropathy ([facility])    Long term (current) use of insulin ([facility])    Type 2 diabetes mellitus with stable proliferative diabetic retinopathy, bilateral ([facility])    Hypothyroidism    Stage 3b chronic kidney disease ([facility])    History of colon polyps    Gastroesophageal reflux disease    Asthma without status asthmaticus without complication    Elevated LFTs    Acute kidney injury superimposed on chronic kidney disease  (HCC)    Pancytopenia ([facility])    Shortness of breath    Anemia    Vitamin deficiency    Drug-induced constipation    Peripheral vascular disease, unspecified ([facility])    Metatarsalgia of left foot    Proteinuria    Hordeolum externum of left upper eyelid    Nausea and vomiting    Type 2 diabetes mellitus with complication, with long-term current use of insulin ([facility])    COVID    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[facility])     Sepsis ([facility]): SIRS criteria MSSA Bacteremia and COVID infection    Osteomyelitis ([facility])    Advance care planning    At risk for delirium    Physical deconditioning    Coronary artery disease involving native coronary artery of native heart without angina pectoris    Scalp lesion    Bilateral lower extremity edema    SBO (small bowel obstruction) ([facility])    MSSA bacteremia    Discitis of lumbar region    Swelling of forearm    Hypertensive encephalopathy    Flat affect    Poor appetite    Reactive depression    PICC (peripherally inserted central catheter) in place       Objective     Vital Signs:     BP: 174/60 mmHg  10/10/2024 14:12   Temp:97.5 °F  10/10/2024 08:31 Pulse:77 bpm  10/10/2024 08:31 Weight:148.6 Lbs  10/10/2024 06:11   Resp:18 Breaths/min  10/10/2024 08:31 BS:116 mg/dL  10/10/2024 11:52 O2:98 %  10/10/2024 08:31 Pain:6  10/10/2024 09:07       Physical Exam  Vitals reviewed.   Constitutional:       General: She is not in acute distress.     Appearance: She is not toxic-appearing or diaphoretic.   HENT:      Head: Normocephalic and atraumatic.      Right Ear: External ear normal.      Left Ear: External ear normal.      Nose: Nose normal. No rhinorrhea.      Mouth/Throat:      Mouth: Mucous membranes are dry.      Pharynx: Oropharynx is clear. No posterior oropharyngeal erythema.   Eyes:      General: No scleral icterus.        Right eye: No discharge.         Left eye: No discharge.      Extraocular Movements: Extraocular movements intact.      Conjunctiva/sclera: Conjunctivae normal.      Pupils: Pupils are equal, round, and reactive to light.   Cardiovascular:      Rate and Rhythm: Normal rate and regular rhythm.   Pulmonary:      Effort: Pulmonary effort is normal. No respiratory distress.      Breath sounds: Normal breath sounds. No wheezing or rales.   Abdominal:      General: Bowel sounds are normal. There is no distension.      Palpations: Abdomen is soft.      Tenderness: There is no abdominal  tenderness. There is no guarding.   Musculoskeletal:         General: Swelling present. No tenderness.      Cervical back: No rigidity.      Comments: 1+ bilateral lower extremity edema, compression stockings in place   Skin:     General: Skin is warm and dry.      Coloration: Skin is not jaundiced.      Comments: Top of scalp centrally with ~1 inch diameter irregular scabbed lesion   Neurological:      General: No focal deficit present.      Mental Status: She is alert and oriented to person, place, and time. Mental status is at baseline.   Psychiatric:         Mood and Affect: Mood normal.         Behavior: Behavior normal.         Thought Content: Thought content normal.         Judgment: Judgment normal.         Pertinent Laboratory/Diagnostic Studies:  Laboratory and Imaging studies reviewed. Full report in the paper chart.     Current Medications   Medications reviewed and updated in facility chart.      -Total time spent on this encounter today including documentation and workup review, face to face time, history and exam, and documentation/orders was approximately 40 minutes.  -This note will be copied to [facility] EMR where vitals and medication orders are placed.    Crowin Trujillo D.O.  Geriatric Medicine  10/10/2024 3:24 PM

## 2024-10-10 NOTE — ASSESSMENT & PLAN NOTE
"Noted POA to recent hospitalization, likely multifactorial in setting of COVID and concern for MSSA bacteremia and associated spinal osteomyelitis  Evaluated by ID inpatient and following at rehab  Continue IV cefazolin for 6 week course (through 10/21/24)  Monitor labs while on IV abx in accordance with ID recs (CBCd, BMP, ESR/CRP)  As per ID: anticipate transition to PO Cefadroxil after completing IV treatment which patient will remain on until inflammatory markers normalize vs plateau\"  Monitor for acute/recurrent infectious symptoms - no new/acute symptoms, acute sepsis seems resolved with ongoing treatment  Follow up with PCP, ID as appropriate  "

## 2024-10-10 NOTE — ASSESSMENT & PLAN NOTE
Lab Results   Component Value Date    EGFR 51 10/10/2024    EGFR 46 10/07/2024    EGFR 39 10/04/2024    CREATININE 1.02 10/10/2024    CREATININE 1.12 10/07/2024    CREATININE 1.27 10/04/2024       Noted to have CINDY recently inpatient, likely multifactorial related to contrast use, hypotension/sepsis  Evaluated by Nephrology inpatient. Losartan and bumex were held temporarily inpatient.  Monitor renal function on routine labs - CINDY appears improved back to within baseline  Avoid nephrotoxins, NSAIDs as able  Encourage PO hydration, respecting volume status  Follow up with PCP, Nephrology as appropriate

## 2024-10-10 NOTE — ASSESSMENT & PLAN NOTE
Seems to be a chronic issue  No clear noted hx of CHF however recent echos have generally been limited/difficult studies so unclear what her present EF is  Monitor weight - fairly stable, no alarming uptrend  Monitor volume status clinically - bilateral LE edema seems stable/reported chronic, no orthopnea  Encourage elevation, compression of lower extremities as able  Continue bumex with hold parameters  Follow up with PCP, Cardiology as appropriate

## 2024-10-11 ENCOUNTER — NURSING HOME VISIT (OUTPATIENT)
Age: 79
End: 2024-10-11
Payer: COMMERCIAL

## 2024-10-11 VITALS
WEIGHT: 148.6 LBS | HEART RATE: 78 BPM | DIASTOLIC BLOOD PRESSURE: 62 MMHG | OXYGEN SATURATION: 93 % | TEMPERATURE: 98.1 F | SYSTOLIC BLOOD PRESSURE: 150 MMHG | RESPIRATION RATE: 18 BRPM | BODY MASS INDEX: 30.01 KG/M2

## 2024-10-11 DIAGNOSIS — E03.9 ACQUIRED HYPOTHYROIDISM: ICD-10-CM

## 2024-10-11 DIAGNOSIS — I10 PRIMARY HYPERTENSION: Primary | ICD-10-CM

## 2024-10-11 DIAGNOSIS — D63.1 ANEMIA DUE TO STAGE 3B CHRONIC KIDNEY DISEASE  (HCC): ICD-10-CM

## 2024-10-11 DIAGNOSIS — A41.01 SEPSIS DUE TO METHICILLIN SUSCEPTIBLE STAPHYLOCOCCUS AUREUS (MSSA) WITHOUT ACUTE ORGAN DYSFUNCTION (HCC): ICD-10-CM

## 2024-10-11 DIAGNOSIS — N18.9 ACUTE KIDNEY INJURY SUPERIMPOSED ON CHRONIC KIDNEY DISEASE  (HCC): ICD-10-CM

## 2024-10-11 DIAGNOSIS — K59.03 DRUG-INDUCED CONSTIPATION: ICD-10-CM

## 2024-10-11 DIAGNOSIS — M86.9 OSTEOMYELITIS, UNSPECIFIED SITE, UNSPECIFIED TYPE (HCC): ICD-10-CM

## 2024-10-11 DIAGNOSIS — N18.32 ANEMIA DUE TO STAGE 3B CHRONIC KIDNEY DISEASE  (HCC): ICD-10-CM

## 2024-10-11 DIAGNOSIS — M54.42 ACUTE BILATERAL LOW BACK PAIN WITH LEFT-SIDED SCIATICA: ICD-10-CM

## 2024-10-11 DIAGNOSIS — N17.9 ACUTE KIDNEY INJURY SUPERIMPOSED ON CHRONIC KIDNEY DISEASE  (HCC): ICD-10-CM

## 2024-10-11 PROBLEM — A41.9 SEPSIS (HCC): Status: RESOLVED | Noted: 2024-09-08 | Resolved: 2024-10-11

## 2024-10-11 LAB
ATRIAL RATE: 88 BPM
GLUCOSE SERPL-MCNC: 109 MG/DL (ref 65–140)
GLUCOSE SERPL-MCNC: 112 MG/DL (ref 65–140)
GLUCOSE SERPL-MCNC: 116 MG/DL (ref 65–140)
GLUCOSE SERPL-MCNC: 118 MG/DL (ref 65–140)
GLUCOSE SERPL-MCNC: 124 MG/DL (ref 65–140)
GLUCOSE SERPL-MCNC: 132 MG/DL (ref 65–140)
GLUCOSE SERPL-MCNC: 146 MG/DL (ref 65–140)
GLUCOSE SERPL-MCNC: 63 MG/DL (ref 65–140)
P AXIS: 13 DEGREES
PR INTERVAL: 218 MS
QRS AXIS: -17 DEGREES
QRSD INTERVAL: 82 MS
QT INTERVAL: 318 MS
QTC INTERVAL: 384 MS
T WAVE AXIS: 191 DEGREES
VENTRICULAR RATE: 88 BPM

## 2024-10-11 PROCEDURE — NC001 PR NO CHARGE: Performed by: FAMILY MEDICINE

## 2024-10-11 PROCEDURE — 93005 ELECTROCARDIOGRAM TRACING: CPT

## 2024-10-11 PROCEDURE — 93010 ELECTROCARDIOGRAM REPORT: CPT | Performed by: STUDENT IN AN ORGANIZED HEALTH CARE EDUCATION/TRAINING PROGRAM

## 2024-10-11 PROCEDURE — 99310 SBSQ NF CARE HIGH MDM 45: CPT

## 2024-10-11 PROCEDURE — 82948 REAGENT STRIP/BLOOD GLUCOSE: CPT

## 2024-10-11 NOTE — ASSESSMENT & PLAN NOTE
With chronic low back pain (generally L>R with associated intermittent sciatica) with history of spinal stenosis and reported spinal surgery. Recent acute exacerbation outpatient (without associated trauma) leading to present hospitalization.  Likely related to sepsis in setting of presumed osteomyelitis per inpatient workup.  Monitor pain - per patient pain has improved since adding tramadol 25mg BID (previously on 50mg BID)  Current regimen including: tylenol 975mg TID, Baclofen 5mg TID PRN, gabapentin 100mg TID, lidocaine patch and Voltaren gel, tramadol 25mg BID.   Adjust/wean regimen as appropriate. As of 9/30 per patient methocarbamol not effective, changed to Tizanidine 2mg which caused increased sedation in patient, changed to Baclofen 5mg TID. Gabapentin also increased to 200mg TID. As of 10/4 due to increased sedation decreased gabapentin from 200mg TID to 100mg TID. As of 10/7 pain not controlled will trial tramadol 25mg BID, changed baclofen to TID PRN. As of 10/11 pain better controlled.   Monitor stool output, increased risk of SBO/constipation.   See plan under sepsis  Continue to monitor  Follow up with PCP as appropriate

## 2024-10-11 NOTE — ASSESSMENT & PLAN NOTE
See plan under sepsis  Had been recommended repeat MRI of spine in several weeks, can coordinate with ID if patient still in-house  Patient evaluated by ID 10/9: Anticipate transition to PO Cefadroxil after completion of IV treatment which she will remain on until inflammatory markers normalize vs plateau.   Follow up with ID post discharge.

## 2024-10-11 NOTE — ASSESSMENT & PLAN NOTE
Noted POA to recent hospitalization, likely multifactorial in setting of COVID and concern for MSSA bacteremia and osteomyelitis. Evaluated by ID inpatient  Continue IV cefazolin for 6 week course (through 10/21/24)  Monitor labs while on IV abx in accordance with ID recs (CBCd, BMP, ESR/CRP)  Monitor for acute/recurrent infectious symptoms - no new/acute symptoms, acute sepsis seems resolved with ongoing treatment  Evaluated by ID on 10/9: Anticipate transition to PO Cefadroxil after completion of IV treatment which she will remain on until inflammatory markers normalize vs plateau.   Follow up with ID post discharge

## 2024-10-11 NOTE — ASSESSMENT & PLAN NOTE
TSH from 10/10 elevated 10.38, T4 pending, previously stable, likely due to recent infection.  Continue levothyroxine  Recommend repeat TSH in 4-6 weeks  Follow up with PCP, Endocrinology post discharge

## 2024-10-11 NOTE — ASSESSMENT & PLAN NOTE
Baseline Hgb seems around 9-11 with limited data  Likely related to CKD, history of iron deficiency on labs; likely with acute component related to marrow suppression in setting of acute infection and antibiotics  Hgb 9.2>8.7>8.2=8.2=8.2  Continue to monitor on routine labs  FOBT negative   Continue iron supplements   B12 WNL   Monitor for acute bleed - no present signs  Consider further workup, for persistent/worsening  Transfuse PRN Hgb <7  Continue to monitor for acute changes  Follow up with PCP as appropriate

## 2024-10-11 NOTE — ASSESSMENT & PLAN NOTE
Overnight patient noted to be hypertensive although asymptomatic, 's, rapid response called and patient given additional BP medication with improvement. EKG obtained with normal sinus rhythm, with first block degree. No ST abnormalities.   BP variable, generally 's-190's  Continue to monitor BP   No acute cardiac complaints  Avoid hypotension  Continue carvedilol 12.5mg BID, losartan 100mg daily, hydralazine 10mg TID, bumex 2mg daily, and chlorthalidone 25mg daily with hold parameters, additional hydralazine 10mg q8hr PRN for SBP >180.   (Patient has also been on nifedipine inpatient but reports she does not take this outpatient due to history of adverse effect (reports it worsens her peripheral edema and also believes it was related to an episode of jaundice historically); patient asks to discontinue nifedipine and also to avoid medications of the same class. Since discontinued. As of 10/10 chlorthalidone 25mg daily started. As of 10/11 added PRN hydralazine for SBP>180.  Adjust once pain is controlled as necessary  Follow up with PCP, Cardiology as appropriate

## 2024-10-11 NOTE — RAPID RESPONSE
Rapid Response Note  Porsha Hoyos 79 y.o. female MRN: [id number]  Unit/Bed#: -01 Encounter: [id number]    Rapid Response Notification(s):   Response called date/time:  10/11/2024 4:00 AM  Response team arrival date/time:  10/11/2024 4:02 AM  Response end date/time:  10/11/2024 4:34 AM  Rapid response location:  Acute rehab center  Primary reason for rapid response call:  Acute change in BP    Rapid Response Intervention(s):   Airway:  None  Breathing:  None  Circulation:  None  Fluids administered:  None  Medications administered:  Other (comment) (carvedilol 25 mg tab)       Assessment:   Elevated /77 mmHg. Pulse 127  Asymptomatic except for mild headache score 3/10.   No signs of infection  No  neurologic deficit.   Hypoglycemia at 66 resolved to 130 post snacks   EKG obtained with normal sinus rhythm, with first block degree. No ST abnormalities.       Plan:   Given carvedilol 25 mg tab   Recheck BP in 1 hour  Blood work: CBC, CMP, TSH      Rapid Response Outcome:   Transfer:  Remain on floor  Primary service notified of transfer: No    Code Status: Level 3 (DNAR and DNI)      Family notified: no      Background/Situation:   Porsha Hoyos is a 79 y.o. female with PMH of HTN, HLD, CAD, PAD, CVA, chronic back pain/spinal stenosis, DM2, hypothyroidism, CKD, GERD, asthma who was admitted for osteomyelitis on IV antibiotics. Rapid response was called due to continue elevated BP since 11 pm staying ~ 190 systolic. The patient was found laying on bed, responding to commands, and spontaneous eye opening. reports mild headache 3/10. No signs of acute stroke alert. Denies chest pain, palpitations or dizziness.      Home regimen for HTN include  carvedilol 12.5mg BID, losartan 100mg daily, hydralazine 10mg TID, bumex 2mg daily.    Per nurse she received hydralazine 10 mg twice and Nifedipine 30 mg once, and her BP remained elevated ~190 systolic.     Patient remains asymptomatic, low suspicion for  cardiopulmonary cause, other causes could be rebound effect from hydralazine, dehydration, uncontrolled hyperthyroidism, pain, anxiety, medication noncompliance however per chart review patient regimen was adjusted due to uncontrolled BP during this hospitalization, which chlorthalidone 25mg daily was added yesterday to her regular regimen.     Review of Systems   Constitutional: Negative.    HENT: Negative.     Eyes: Negative.    Respiratory: Negative.     Cardiovascular: Negative.    Gastrointestinal: Negative.    Genitourinary: Negative.    Skin: Negative.    Allergic/Immunologic: Negative.    Neurological:  Positive for headaches.   Hematological: Negative.    Psychiatric/Behavioral: Negative.         Objective:   There were no vitals filed for this visit.  Physical Exam  Vitals reviewed.   Constitutional:       General: She is not in acute distress.     Appearance: Normal appearance. She is not ill-appearing or diaphoretic.   HENT:      Head: Normocephalic and atraumatic.      Nose: Nose normal. No congestion.      Mouth/Throat:      Mouth: Mucous membranes are moist.      Pharynx: Oropharynx is clear.   Eyes:      Extraocular Movements: Extraocular movements intact.      Pupils: Pupils are equal, round, and reactive to light.   Cardiovascular:      Rate and Rhythm: Regular rhythm. Tachycardia present.      Pulses: Normal pulses.      Heart sounds: Normal heart sounds. No murmur heard.     No friction rub.   Pulmonary:      Effort: Pulmonary effort is normal.      Breath sounds: Normal breath sounds. No wheezing, rhonchi or rales.   Musculoskeletal:      Right lower leg: No edema.      Left lower leg: No edema.   Skin:     General: Skin is warm.      Capillary Refill: Capillary refill takes less than 2 seconds.   Neurological:      General: No focal deficit present.      Mental Status: She is alert and oriented to person, place, and time.      Sensory: No sensory deficit.      Motor: No weakness.   Psychiatric:          Mood and Affect: Mood normal.

## 2024-10-11 NOTE — ASSESSMENT & PLAN NOTE
Lab Results   Component Value Date    EGFR 67 10/11/2024    EGFR 51 10/10/2024    EGFR 46 10/07/2024    CREATININE 0.82 10/11/2024    CREATININE 1.02 10/10/2024    CREATININE 1.12 10/07/2024   Noted to have CINDY recently inpatient, likely multifactorial related to contrast use, hypotension/sepsis. Evaluated by Nephrology inpatient. Losartan and bumex were held temporarily inpatient.  Renal functions improving  Monitor renal function on routine labs  10/1 S/p IV fluids 50mL/hr for a total of 500mL  Continue Bumex 2mg daily   Avoid nephrotoxins, NSAIDs as able  Encourage PO hydration, respecting volume status  Consider nephrology consult if renal function persistently elevated.   Follow up with PCP, Nephrology as appropriate

## 2024-10-11 NOTE — ASSESSMENT & PLAN NOTE
Patient endorses chronic constipation  At risk due to hospitalization, relative immobility, comorbidities, history of chronic opioid  Monitor stool output - Per nursing patient has been having regular bowel movements, approximately every other day. Last BM today.   Bowel regimen at facility: miralax daily PRN, senna/docusate, lactulose, adjust as appropriate; bisacodyl suppository PRN  Encourage mobility as tolerated, PO hydration as appropriate, high fiber diet/prune juice (in outpatient setting as appropriate)  Goal is for 1 easy BM every 1-2 days  9/30 Abdominal xray ordered to assess for stool burden: Nonobstructive bowel gas pattern   Continue to monitor  Follow up with PCP as appropriate
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
## 2024-10-12 LAB
GLUCOSE SERPL-MCNC: 151 MG/DL (ref 65–140)
GLUCOSE SERPL-MCNC: 180 MG/DL (ref 65–140)
GLUCOSE SERPL-MCNC: 194 MG/DL (ref 65–140)
GLUCOSE SERPL-MCNC: 260 MG/DL (ref 65–140)

## 2024-10-12 PROCEDURE — 82948 REAGENT STRIP/BLOOD GLUCOSE: CPT

## 2024-10-13 LAB
GLUCOSE SERPL-MCNC: 197 MG/DL (ref 65–140)
GLUCOSE SERPL-MCNC: 220 MG/DL (ref 65–140)
GLUCOSE SERPL-MCNC: 232 MG/DL (ref 65–140)
GLUCOSE SERPL-MCNC: 253 MG/DL (ref 65–140)

## 2024-10-13 PROCEDURE — 82948 REAGENT STRIP/BLOOD GLUCOSE: CPT
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
## 2024-10-14 ENCOUNTER — NURSING HOME VISIT (OUTPATIENT)
Age: 79
End: 2024-10-14
Payer: COMMERCIAL

## 2024-10-14 VITALS
RESPIRATION RATE: 18 BRPM | OXYGEN SATURATION: 91 % | SYSTOLIC BLOOD PRESSURE: 164 MMHG | DIASTOLIC BLOOD PRESSURE: 74 MMHG | HEART RATE: 75 BPM | WEIGHT: 140 LBS | TEMPERATURE: 96.3 F | BODY MASS INDEX: 28.28 KG/M2

## 2024-10-14 DIAGNOSIS — K59.03 DRUG-INDUCED CONSTIPATION: ICD-10-CM

## 2024-10-14 DIAGNOSIS — R53.81 PHYSICAL DECONDITIONING: ICD-10-CM

## 2024-10-14 DIAGNOSIS — I10 PRIMARY HYPERTENSION: ICD-10-CM

## 2024-10-14 DIAGNOSIS — N18.32 ANEMIA DUE TO STAGE 3B CHRONIC KIDNEY DISEASE  (HCC): ICD-10-CM

## 2024-10-14 DIAGNOSIS — D63.1 ANEMIA DUE TO STAGE 3B CHRONIC KIDNEY DISEASE  (HCC): ICD-10-CM

## 2024-10-14 DIAGNOSIS — M86.9 OSTEOMYELITIS, UNSPECIFIED SITE, UNSPECIFIED TYPE (HCC): Primary | ICD-10-CM

## 2024-10-14 DIAGNOSIS — M54.42 ACUTE BILATERAL LOW BACK PAIN WITH LEFT-SIDED SCIATICA: ICD-10-CM

## 2024-10-14 DIAGNOSIS — N18.32 STAGE 3B CHRONIC KIDNEY DISEASE (HCC): ICD-10-CM

## 2024-10-14 DIAGNOSIS — R60.0 BILATERAL LOWER EXTREMITY EDEMA: ICD-10-CM

## 2024-10-14 LAB
GLUCOSE SERPL-MCNC: 207 MG/DL (ref 65–140)
GLUCOSE SERPL-MCNC: 247 MG/DL (ref 65–140)
GLUCOSE SERPL-MCNC: 274 MG/DL (ref 65–140)
GLUCOSE SERPL-MCNC: 279 MG/DL (ref 65–140)

## 2024-10-14 PROCEDURE — 82948 REAGENT STRIP/BLOOD GLUCOSE: CPT

## 2024-10-14 PROCEDURE — 99309 SBSQ NF CARE MODERATE MDM 30: CPT

## 2024-10-14 NOTE — ASSESSMENT & PLAN NOTE
BP variable, generally 's-190's  Continue to monitor BP   No acute cardiac complaints  Avoid hypotension  Continue carvedilol 12.5mg BID, losartan 100mg daily, hydralazine 10mg TID, bumex 2mg daily, and chlorthalidone 50mg daily with hold parameters, additional hydralazine 10mg q8hr PRN for SBP >180.   (Patient has also been on nifedipine inpatient but reports she does not take this outpatient due to history of adverse effect (reports it worsens her peripheral edema and also believes it was related to an episode of jaundice historically); patient asks to discontinue nifedipine and also to avoid medications of the same class. Since discontinued.   As of 10/10 chlorthalidone 25mg daily started. As of 10/11 added PRN hydralazine 10mg q8 for SBP>180. As of 10/14 increased Chlorthalidone to 50mg daily from 25mg.   Adjust as necessary  Follow up with PCP, Cardiology as appropriate

## 2024-10-14 NOTE — ASSESSMENT & PLAN NOTE
Seems to be a chronic issue  No clear noted history of CHF however recent echos have generally been limited/difficult studies so unclear what her present EF is  Monitor weight- weight trending down  Monitor volume status clinically - BLE edema +1, lungs clear, appears dry, denies orthopnea  Encourage elevation, compression of lower extremities as able  Continue bumex with hold parameters  Follow up with PCP, Cardiology as appropriate

## 2024-10-14 NOTE — ASSESSMENT & PLAN NOTE
With chronic low back pain (generally L>R with associated intermittent sciatica) with history of spinal stenosis and reported spinal surgery. Recent acute exacerbation outpatient (without associated trauma) leading to present hospitalization.  Likely related to sepsis in setting of presumed osteomyelitis per inpatient workup.  Monitor pain - per patient pain has improved since adding tramadol 25mg BID (previously on 50mg BID)  Current regimen including: tylenol 975mg TID, Baclofen 5mg TID PRN, gabapentin 100mg TID, lidocaine patch and Voltaren gel, tramadol 25mg BID.   Adjust/wean regimen as appropriate. As of 9/30 per patient methocarbamol not effective, changed to Tizanidine 2mg which caused increased sedation in patient, changed to Baclofen 5mg TID. Gabapentin also increased to 200mg TID. As of 10/4 due to increased sedation decreased gabapentin from 200mg TID to 100mg TID. As of 10/7 pain not controlled will trial tramadol 25mg BID, changed baclofen to TID PRN. As of 10/11 pain better controlled. As of 10/14 pain better controlled and not requiring baclofen as often.   Monitor stool output, increased risk of SBO/constipation.   See plan under sepsis  Continue to monitor  Follow up with PCP as appropriate

## 2024-10-14 NOTE — ASSESSMENT & PLAN NOTE
Lab Results   Component Value Date    EGFR 62 10/14/2024    EGFR 67 10/11/2024    EGFR 51 10/10/2024    CREATININE 0.87 10/14/2024    CREATININE 0.82 10/11/2024    CREATININE 1.02 10/10/2024   Noted to have CINDY recently inpatient, likely multifactorial related to contrast use, hypotension/sepsis. Evaluated by Nephrology inpatient. Losartan and bumex were held temporarily inpatient.  Renal functions improving  Monitor renal function on routine labs  10/1 S/p IV fluids 50mL/hr for a total of 500mL  Continue Bumex 2mg daily   Avoid nephrotoxins, NSAIDs as able  Encourage PO hydration, respecting volume status  Consider nephrology consult if renal function persistently elevated.   Follow up with PCP, Nephrology as appropriate

## 2024-10-14 NOTE — ASSESSMENT & PLAN NOTE
Baseline Hgb seems around 9-11 with limited data  Likely related to CKD, history of iron deficiency on labs; likely with acute component related to marrow suppression in setting of acute infection and antibiotics  Hgb 9.4>9.2>8.7>8.2=8.2=8.2  Continue to monitor on routine labs  FOBT negative   Continue iron supplements   B12 WNL   Monitor for acute bleed - no present signs  Consider further workup, for persistent/worsening  Transfuse PRN Hgb <7  Continue to monitor for acute changes  Follow up with PCP as appropriate

## 2024-10-15 ENCOUNTER — NURSING HOME VISIT (OUTPATIENT)
Dept: GERIATRICS | Facility: OTHER | Age: 79
End: 2024-10-15
Payer: COMMERCIAL

## 2024-10-15 DIAGNOSIS — N18.32 ANEMIA DUE TO STAGE 3B CHRONIC KIDNEY DISEASE  (HCC): ICD-10-CM

## 2024-10-15 DIAGNOSIS — I16.0 HYPERTENSIVE URGENCY: ICD-10-CM

## 2024-10-15 DIAGNOSIS — N18.9 ACUTE KIDNEY INJURY SUPERIMPOSED ON CHRONIC KIDNEY DISEASE  (HCC): ICD-10-CM

## 2024-10-15 DIAGNOSIS — N18.32 TYPE 2 DIABETES MELLITUS WITH STAGE 3B CHRONIC KIDNEY DISEASE, WITH LONG-TERM CURRENT USE OF INSULIN (HCC): ICD-10-CM

## 2024-10-15 DIAGNOSIS — D63.1 ANEMIA DUE TO STAGE 3B CHRONIC KIDNEY DISEASE  (HCC): ICD-10-CM

## 2024-10-15 DIAGNOSIS — I10 PRIMARY HYPERTENSION: Primary | ICD-10-CM

## 2024-10-15 DIAGNOSIS — E11.22 TYPE 2 DIABETES MELLITUS WITH STAGE 3B CHRONIC KIDNEY DISEASE, WITH LONG-TERM CURRENT USE OF INSULIN (HCC): ICD-10-CM

## 2024-10-15 DIAGNOSIS — K59.03 DRUG-INDUCED CONSTIPATION: ICD-10-CM

## 2024-10-15 DIAGNOSIS — A41.01 SEPSIS DUE TO METHICILLIN SUSCEPTIBLE STAPHYLOCOCCUS AUREUS (MSSA) WITHOUT ACUTE ORGAN DYSFUNCTION (HCC): ICD-10-CM

## 2024-10-15 DIAGNOSIS — M54.42 ACUTE BILATERAL LOW BACK PAIN WITH LEFT-SIDED SCIATICA: ICD-10-CM

## 2024-10-15 DIAGNOSIS — Z79.4 TYPE 2 DIABETES MELLITUS WITH STAGE 3B CHRONIC KIDNEY DISEASE, WITH LONG-TERM CURRENT USE OF INSULIN (HCC): ICD-10-CM

## 2024-10-15 DIAGNOSIS — R60.0 BILATERAL LOWER EXTREMITY EDEMA: ICD-10-CM

## 2024-10-15 DIAGNOSIS — M86.9 OSTEOMYELITIS, UNSPECIFIED SITE, UNSPECIFIED TYPE (HCC): ICD-10-CM

## 2024-10-15 DIAGNOSIS — N17.9 ACUTE KIDNEY INJURY SUPERIMPOSED ON CHRONIC KIDNEY DISEASE  (HCC): ICD-10-CM

## 2024-10-15 LAB
GLUCOSE SERPL-MCNC: 161 MG/DL (ref 65–140)
GLUCOSE SERPL-MCNC: 179 MG/DL (ref 65–140)
GLUCOSE SERPL-MCNC: 209 MG/DL (ref 65–140)
GLUCOSE SERPL-MCNC: 210 MG/DL (ref 65–140)

## 2024-10-15 PROCEDURE — 99310 SBSQ NF CARE HIGH MDM 45: CPT | Performed by: STUDENT IN AN ORGANIZED HEALTH CARE EDUCATION/TRAINING PROGRAM

## 2024-10-15 PROCEDURE — 82948 REAGENT STRIP/BLOOD GLUCOSE: CPT

## 2024-10-15 NOTE — PROGRESS NOTES
[facility]  Facility: [facility]    PROGRESS NOTE  Nursing Home Place of Service: nursing home place of service: POS [facility] Coverage    NAME: Porsha Hoyos  : 1945 AGE: 79 y.o. SEX: female MRN: [id number]  DATE OF ENCOUNTER: 10/15/2024    Assessment and Plan     Problem List Items Addressed This Visit       Primary hypertension - Primary     Patient has numerous risk factors for severe HTN including TORIBIO (non-compliant with CPAP) and evidence of renal artery stenosis on imaging  Monitor BP - has been somewhat variable ranging low to high 100s systolic, rarely >200, generally above goal  Avoid hypotension  No acute cardiac complaints  Continue regimen including carvedilol 12.5mg BID, losartan 100mg daily, hydralazine 25mg TID, bumex 2mg daily, chlorthalidone 50mg daily, doxazosin 2mg qHS with hold parameters as appropriate  (Patient had previously been on nifedipine but had requested to discontinue it due to concerns over leg swelling and she also felt in the past it had contributed to an episode of jaundice a few years ago, she adamantly refuses to be on any calcium channel blockers including amlodipine)  Adjust as appropriate. Regimen was adjusted recently inpatient due to HTN and is being actively adjusted at rehab due to ongoing issue. As of 10/15 titrated hydralazine dose and resumed on doxazosin which she had previously been on and tolerated well. Avoid titrating carvedilol for now as per her outpatient Cardiology records she has had bradycardia and pauses with higher doses of carvedilol. If further control needed could consider titrating doxazosin, adding clonidine  Follow up with PCP, Cardiology as appropriate. Consider further workup including metabolic/endocrine workup as appropriate outpatient if HTN remains resistant.           Acute on Chronic Back Pain in the Setting of Sepsis, MSSA Bacteremia     With chronic low back pain (generally  L>R with associated intermittent sciatica) with hx of spinal stenosis and reported spinal surgery  With recent acute exacerbation outpatient (without associated trauma) leading to present hospitalization  Likely related to sepsis in setting of presumed osteomyelitis per inpatient workup  Monitor pain - acute component gradually improving to baseline chronic pain level  Current regimen including: tylenol 975mg TID, baclofen 5mg TID PRN, lidocaine patch/voltaren gel, tramadol 25mg BID  Adjust/wean regimen as appropriate. Opioids had been stopped inpatient, have been reintroduced at lower dose for her chronic pain and seems to be tolerating well. Methocarbamol was not as effective and tizanidine seemed to cause sedation, seems better controlled with PRN baclofen. Use caution with pain regimen as she has had issues with constipation and with sedation with higher doses. As of 10/15 discontinued gabapentin as pain improved/stable recently  See plan under sepsis         Type 2 diabetes mellitus with diabetic chronic kidney disease (HCC)       Lab Results   Component Value Date    HGBA1C 7.9 (A) 07/02/2024       Monitor glucose - fasting glucose variable ranging from low 100s to low 200s, has been labile but overall seems improved recently  Avoid hypoglycemia  Continue regimen including sitagliptin 50mg daily, insulin lantus 34u daily  Insulin sliding scale coverage at rehab  Adjust regimen as appropriate. Use caution with titration as her sugars have historically been labile and with hypoglycemia.  Encourage lifestyle modifications including healthy diet, weight management, exercise as appropriate  Follow up with PCP, Endocrine/Ophthalmology/Podiatry outpatient as appropriate           Acute kidney injury superimposed on chronic kidney disease  (HCC)     Lab Results   Component Value Date    EGFR 62 10/14/2024    EGFR 67 10/11/2024    EGFR 51 10/10/2024    CREATININE 0.87 10/14/2024    CREATININE 0.82 10/11/2024     CREATININE 1.02 10/10/2024       Noted to have CINDY recently inpatient, likely multifactorial related to contrast use, hypotension/sepsis  Evaluated by Nephrology inpatient. Losartan and bumex were held temporarily inpatient.  Monitor renal function on routine labs - CINDY appears improved back to within baseline. Will continue monitoring with recent changes to diuretic regimen.  Avoid nephrotoxins, NSAIDs as able  Encourage PO hydration, respecting volume status  Follow up with PCP, Nephrology as appropriate           Anemia     Baseline Hb seems around 9-11 with limited data  Likely related to CKD, hx of iron deficiency on labs; likely with acute component related to marrow suppression in setting of acute infection and antibiotics  -monitor on routine labs - fairly stable  -empirically started on oral iron at rehab due to hx iron deficiency  -monitor for acute bleed - no present signs  -consider further workup, Heme consult if persistent/worsening  -transfuse PRN Hb <7           Drug-induced constipation     Patient endorses chronic constipation  At risk due to hospitalization, relative immobility, comorbidities, chronic opioid  Monitor stool output - constipation much improved overall, last BM yesterday easy  Bowel regimen at facility: continue miralax, docusate, senna regimen, has PRN lactulose as well, adjust as appropriate; bisacodyl suppository PRN  Encourage mobility as tolerated, PO hydration as appropriate, high fiber diet/prune juice (in outpatient setting as appropriate)  Goal is for 1 easy BM every 1-2 days           Sepsis (HCC): SIRS criteria MSSA Bacteremia and COVID infection     Noted POA to recent hospitalization, likely multifactorial in setting of COVID and concern for MSSA bacteremia and associated spinal osteomyelitis  Evaluated by ID inpatient and following at rehab  Continue IV cefazolin for 6 week course (through 10/21/24)  Monitor labs while on IV abx in accordance with ID recs (CBCd, BMP,  "ESR/CRP)  As per ID: anticipate transition to PO Cefadroxil after completing IV treatment which patient will remain on until inflammatory markers normalize vs plateau\"  Had been recommended repeat MRI of spine in several weeks, can coordinate with ID if patient still in-house  Monitor for acute/recurrent infectious symptoms - no new/acute symptoms, acute sepsis seems resolved with ongoing treatment  Follow up with PCP, ID as appropriate         Osteomyelitis (HCC)     See plan under sepsis  Had been recommended repeat MRI of spine in several weeks, can coordinate with ID if patient still in-house         Bilateral lower extremity edema     Seems to be a chronic issue  No clear noted hx of CHF however recent echos have generally been limited/difficult studies so unclear what her present EF is  Monitor weight - some recent downtrend likely related to diuretic effect from resumption of bumex and starting/titrating chlorthalidone  Monitor volume status clinically - bilateral LE edema seems improved, no orthopnea  Encourage elevation, compression of lower extremities as able  Continue bumex with hold parameters  Follow up with PCP, Cardiology as appropriate           Hypertensive urgency     As per note, patient with SBP >180 at times including recently overnight/today, generally asymptomatic without sign of end-organ damage but at high risk for developing such  See plan under primary hypertension                  Chief Complaint     Follow up HTN    History of Present Illness     Porsha Hoyos is a 79 y.o. female who was seen today for follow up. PMH including HTN, HLD, CAD, PAD, CVA, chronic back pain/spinal stenosis, DM2, hypothyroidism, CKD, GERD, asthma      Overnight patient's BP was around 200 systolic, I was on call overnight and coordinated care with nursing including giving additional doses of hydralazine and coreg overnight; patient had been generally asymptomatic apart from mild headache but no CP/SOB. " Headache since resolved without recurrence as BP improved.    Patient seen and examined in room this morning.  Others present: granddaughter and granddaughter's SO  Patient laying in bed  Appears comfortable, awake, alert, oriented to situation, able to converse appropriately  Patient polite, appears in good spirits, Aox3, appears to be a reasonable historian, mentation seems stable compared to prior. Notes she feels fine overall, did not sleep too well last night due to having vitals checked several times, but otherwise overall still feels therapy going well and gradually making progress  Her back pain is gradually improving and stable near baseline recently.  (She has back pain chronically located at the lower back, L>R and sometimes with sciatica mainly on left side. Was acutely exacerbated recently without trauma in setting of discitis/osteomyelitis; acute exacerbation is improving to baseline level of chronic back pain).  No acute pain anywhere else.  Breathing fine, on room air, no acute SOB, no orthopnea. Feels her breathing is at baseline. (Has chronic mild dyspnea on significant exertion at baseline but otherwise no JONES with short walks.)  No recent CP//pressure/palpitations or orthostatic lightheadedness/dizziness  Appetite stable recently (with management of constipation and with initiation on mirtazapine inpatient); no acute swallowing concerns  Urinating well without acute symptoms  Last BM yesterday was easy/normal, feels constipation better, no abd pain/N/V  Does not feel acutely sick or confused  No acute cardiopulmonary, or urinary symptoms; see ROS for more details.     No further questions or acute concerns identified.     Lab Review:  9/16: BMP with Cr 1.57 (recent peak 2.66 on 9/9, baseline around 1.3-1.7), GFR (baseline 30s-40s); CBC with WBC 10.39 (recent peak 11.68 on 9/8), Hb 10.0 (normocytic, baseline seems around 9-11 with limited data); blood cultures 9/10 NGTD; UA 9/8 and 9/10 generally  unremarkable for infection; blood cultures 9/7 one NGTD and one with Staph aureus; COVID positive 9/7 9/24: BMP generally stable/non-actionable, Cr 0.87, GFR 62; CBC with Hb 8.7 (borderline macrocytic, stable); Mg 1.8  10/4: BMP with Cr 1.27, GFR 39;   10/7: BMP with Cr 1.12, GFR 46; CBC with Hb 8.2  10/10: BMP with Cr 1.02, GFR 51; Hb 8.7; B12 700  10/14: BMP generally stable/non-actionable, GFR 62; CBC with Hb 9.4                 Lab Results   Component Value Date     HGBA1C 7.9 (A) 07/02/2024            Lab Results   Component Value Date     PLW2NCSCDDUX 1.779 07/10/2024     TSH 1.81 01/18/2024            Lab Results   Component Value Date     KJVGVMET39 4,288 (H) 07/03/2022            Lab Results   Component Value Date     IRON 15 (L) 06/29/2022     TIBC 257 06/29/2022     FERRITIN 942 (H) 06/29/2022            Lab Results   Component Value Date     CHOLESTEROL 119 07/10/2024            Lab Results   Component Value Date     HDL 50 07/10/2024            Lab Results   Component Value Date     TRIG 131 07/10/2024            Lab Results   Component Value Date     NONHDLC 84 01/25/2022            Lab Results   Component Value Date     LDLCALC 43 07/10/2024            XR abdomen 9/30  Nonobstructive bowel gas pattern.      XR abdomen obstruction series  Result Date: 9/21/2024  Continued improved small bowel distention. There is air seen throughout the large bowel. Right-sided PICC line tip likely located within a left-sided SVC.     MRI brain wo contrast  Result Date: 9/20/2024  Impression: No acute infarct identified. Specifically, no acute infarct noted involving the right insular adjacent right temporal lobe as suspected on noncontrast head CT. Old lacunar infarct involving the right aspect of the duke. Mild chronic microangiopathic change involving the white matter of both cerebral hemispheres, mildly progressed when compared to the prior examination from 2005. Partial opacification of the bilateral mastoid air  cells.     CT stroke alert brain  Result Date: 9/20/2024  New area of low-attenuation involving the right insula, and which may extend into the adjacent right temporal lobe, indicative of a developing infarct in this region. No associated hemorrhage. No significant mass effect or midline shift. Recommend further relation with brain MR. Old lacunar infarct involving the right aspect of the duke.      CTA stroke alert (head/neck)  Result Date: 9/20/2024  No large vessel occlusion identified on CT angiogram of the head. At least moderate stenoses involving the bilateral cavernous and supraclinoid internal carotid arteries due to predominant calcified atherosclerotic plaque, and at least moderate stenoses involving the bilateral intradural vertebral arteries. No hemodynamically significant stenosis or dissection identified on CT angiogram of the neck. Pericardial effusion, bilateral pleural effusions. Left-sided superior vena cava. Mild left atrial enlargement. Multilevel cervical spondylosis, with erosive changes also noted involving the atlantodens joint, and multiple bilateral facet joints particularly at the level of C4-C5, with may be due to osteoarthritis, but an inflammatory/crystalline arthropathy cannot be excluded. Correlation with the patient's clinical and past medical history is recommended.      XR abdomen obstruction series  Result Date: 9/19/2024  Interval nasogastric tube placement with tip overlying the proximal stomach. Decreased gastric distention. Slightly improved small bowel distention.     XR chest portable  Result Date: 9/18/2024  A feeding tube has been inserted the tip of which lies in the left upper quadrant below the diaphragm      XR abdomen obstruction series  Result Date: 9/18/2024  Stomach is air-filled and moderately distended. Multiple dilated small bowel loops with associated differential and broad air/fluid levels, suspicious for small bowel obstruction. Correlation with the  patient's symptoms recommended. Other findings as above.      VAS upper limb venous duplex scan, unilateral/limited  Result Date: 9/16/2024  Impression RIGHT UPPER LIMB: No evidence of acute or chronic deep vein thrombosis. No evidence of superficial thrombophlebitis noted. Doppler evaluation shows a normal response to augmentation maneuvers.  LEFT UPPER LIMB LIMITED: Evaluation shows no evidence of thrombus in the internal jugular vein, subclavian vein, and the innominate vein.       XR chest PICC line portable  Result Date: 9/13/2024  No acute cardiopulmonary disease. Right PICC crossing the midline with tip along the left heart border at the level of the main pulmonary artery. Per comparison with the chest CT, this traverses a retroesophageal right brachiocephalic vein with its tip in a persistent left SVC.     Echo follow up/limited w/ contrast if indicated  Result Date: 9/11/2024  Narrative:   Left Ventricle: Left ventricle is not well visualized. Systolic function cannot be assessed. Wall motion cannot be accurately assessed.   Right Ventricle: Right ventricle is not well visualized. Systolic function is grossly normal.   Tricuspid Valve: The right ventricular systolic pressure is mildly elevated. The estimated right ventricular systolic pressure is 43.00 mmHg. Severely technically limited echo, LV function cannot be assessed on the basis of this echo.  Recommend PENNY if endocarditis evaluation is required.      MRI lumbar spine w wo contrast  Result Date: 9/10/2024  1. Multilevel lumbar spondylosis, as described above, contributing to at most severe canal stenosis at L3-L4, and multilevel neural foraminal stenosis, worst on the right at L2-L3 and L3-L4, on the left at L4-L5. 2. T2/STIR signal hyperintensity involving the intervertebral discs at L2-L4, with mild adjacent paraspinal enhancement right greater left at these levels, but no obvious endplate edema on STIR images noted, or definite endplate  destruction. There are corresponding sclerotic endplate changes on CT at these levels, with likely vacuum phenomena noted anteriorly involving the intervertebral disc at L3-L4. These findings are likely due to extensive degenerative disc disease/degenerative endplate changes but a discitis osteomyelitis cannot be entirely excluded. Recommend correlation with the patient's clinical history, as well as follow-up lumbar spine MRI in 3 to 4 weeks time without and with contrast to document stability.     US kidney and bladder  Result Date: 9/10/2024  No acute findings.      CTA dissection protocol chest abdomen pelvis w wo contrast  Result Date: 9/7/2024  No aortic aneurysm, dissection or other acute pathology. 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                   Encounter Date: 09/18/24   ECG 12 lead   Result Value     Ventricular Rate 95     Atrial Rate 95     AZ Interval 198     QRSD Interval 74     QT Interval 338     QTC Interval 424     P Axis 21     QRS Axis -31     T Wave Axis 233     Narrative     Sinus rhythm with Premature supraventricular complexes  Left axis deviation  ST & T wave abnormality, consider inferior ischemia  ST & T wave abnormality, consider anterolateral ischemia  Abnormal ECG  When compared with ECG of 07-SEP-2024 16:18,  Premature supraventricular complexes are now Present  QRS axis Shifted left  Confirmed by Annabelle Beltran ([id number]) on 9/20/2024 5:33:00 PM            Echo Jan 2024: EF 55-60, technically difficult study        JHON CASTORENA reviewed 9/26/2024 08/12/2024 07/02/2024 1 Tramadol Hcl 50 Mg Tablet 60.00 30 If Ahm [id number] Bat ([id number]) 0 20.00 MME Comm Ins [place]   07/05/2024 07/02/2024 1 Tramadol Hcl 50 Mg Tablet 60.00 30 If Ahm [id number] Bat ([id number]) 0 20.00 MME Comm Ins [place]   06/06/2024 05/09/2024 1 Tramadol Hcl 50 Mg Tablet 60.00 30 If Ahm [id number] Bat ([id number]) 0 20.00 MME Comm Ins [place]   05/09/2024 05/09/2024 1 Tramadol Hcl  50 Mg Tablet 60.00 30 If [facility] [id number] Bat ([id number]) 0 20.00 MME Comm Ins [place]   04/08/2024 03/05/2024 1 Tramadol Hcl 50 Mg Tablet 60.00 30 If m [id number] Bat ([id number]) 0 20.00 MME Comm Ins [place]   03/05/2024 03/05/2024 1 Tramadol Hcl 50 Mg Tablet 60.00 30 If [facility] [id number] Bat ([id number]) 0 20.00 MME Comm Ins [place]   02/03/2024 01/03/2024 1 Tramadol Hcl 50 Mg Tablet 60.00 30 If m [id number] Bat ([id number]) 0 20.00 MME Comm Ins [place]   01/03/2024 01/03/2024 1 Tramadol Hcl 50 Mg Tablet 60.00 30 If [facility] [id number] Bat ([id number]) 0 20.00 MME Comm Ins [place]        The following portions of the patient's history were reviewed and updated as appropriate: allergies, current medications, past family history, past medical history, past social history, past surgical history and problem list.    Review of Systems     Review of Systems   Constitutional:  Positive for appetite change (generally low, stable). Negative for chills, diaphoresis and fever.   HENT:  Negative for drooling, ear pain, hearing loss (mild, baseline), rhinorrhea, sore throat and trouble swallowing.    Eyes:  Negative for pain, discharge, redness, itching and visual disturbance.   Respiratory:  Negative for cough, chest tightness, shortness of breath (baseline) and wheezing.    Cardiovascular:  Positive for leg swelling (improved). Negative for chest pain and palpitations.   Gastrointestinal:  Negative for abdominal pain, blood in stool, constipation (mild/improved), diarrhea, nausea and vomiting.   Genitourinary:  Negative for dysuria, flank pain and hematuria.   Musculoskeletal:  Positive for back pain (chronic) and gait problem. Negative for arthralgias and neck pain.   Skin:  Negative for color change.   Neurological:  Positive for weakness (gradually improving). Negative for dizziness, facial asymmetry, speech difficulty, light-headedness and headaches.   Psychiatric/Behavioral:  Negative for agitation, behavioral problems and confusion. The patient is not nervous/anxious and is not hyperactive.     All other systems reviewed and are negative.      Active Problem List     Patient Active Problem List   Diagnosis    Primary hypertension    Mixed hyperlipidemia    Vulvar lesion    Encntr for gyn exam (general) (routine) w abnormal findings    Vaginal atrophy    Acute on Chronic Back Pain in the Setting of Sepsis, MSSA Bacteremia    Acute pain of right shoulder    Pain and swelling of right upper extremity    Acute pain of right shoulder due to trauma    Fall at home    Imbalance    Type 2 diabetes mellitus with diabetic chronic kidney disease (HCC)    Type 2 diabetes mellitus with diabetic polyneuropathy (HCC)    Long term (current) use of insulin ([facility])    Type 2 diabetes mellitus with stable proliferative diabetic retinopathy, bilateral (HCC)    Hypothyroidism    Stage 3b chronic kidney disease ([facility])    History of colon polyps    Gastroesophageal reflux disease    Asthma without status asthmaticus without complication    Elevated LFTs    Acute kidney injury superimposed on chronic kidney disease  ([facility])    Pancytopenia ([facility])    Shortness of breath    Anemia    Vitamin deficiency    Drug-induced constipation    Peripheral vascular disease, unspecified ([facility])    Metatarsalgia of left foot    Proteinuria    Hordeolum externum of left upper eyelid    Nausea and vomiting    Type 2 diabetes mellitus with complication, with long-term current use of insulin ([facility])    COVID    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[facility])    Sepsis ([facility]): SIRS criteria MSSA Bacteremia and COVID infection    Osteomyelitis ([facility])    Advance care planning    At risk for delirium    Physical deconditioning    Coronary artery disease involving native coronary artery of native heart without angina pectoris    Scalp lesion    Bilateral lower extremity edema    SBO (small bowel obstruction) ([facility])    MSSA bacteremia    Discitis of lumbar region    Swelling of forearm    Hypertensive encephalopathy    Flat affect    Poor appetite    Reactive depression    PICC  (peripherally inserted central catheter) in place    Hypertensive urgency       Objective     Vital Signs:     BP: 142/50 mmHg  10/15/2024 08:55   Temp:96.8 °F  10/15/2024 07:14 Pulse:78 bpm  10/15/2024 08:56   Weight:136 Lbs  10/15/2024 05:23   Resp:18 Breaths/min  10/15/2024 07:14 BS:179 mg/dL  10/15/2024 05:55 O2:91 %  10/15/2024 07:20 Pain:0  10/15/2024 09:11       Physical Exam  Vitals reviewed.   Constitutional:       General: She is not in acute distress.     Appearance: She is not toxic-appearing or diaphoretic.   HENT:      Head: Normocephalic and atraumatic.      Right Ear: External ear normal.      Left Ear: External ear normal.      Nose: Nose normal. No rhinorrhea.      Mouth/Throat:      Mouth: Mucous membranes are dry.      Pharynx: Oropharynx is clear. No posterior oropharyngeal erythema.   Eyes:      General: No scleral icterus.        Right eye: No discharge.         Left eye: No discharge.      Extraocular Movements: Extraocular movements intact.      Conjunctiva/sclera: Conjunctivae normal.      Pupils: Pupils are equal, round, and reactive to light.   Cardiovascular:      Rate and Rhythm: Normal rate and regular rhythm.   Pulmonary:      Effort: Pulmonary effort is normal. No respiratory distress.      Breath sounds: Normal breath sounds. No wheezing or rales.   Abdominal:      General: Bowel sounds are normal. There is no distension.      Palpations: Abdomen is soft.      Tenderness: There is no abdominal tenderness. There is no guarding.   Musculoskeletal:         General: Swelling present. No tenderness.      Cervical back: No rigidity.      Comments: trace bilateral lower extremity edema   Skin:     General: Skin is warm and dry.      Coloration: Skin is not jaundiced.      Comments: Top of scalp centrally with ~1 inch diameter irregular scabbed lesion   Neurological:      General: No focal deficit present.      Mental Status: She is alert and oriented to person, place, and time. Mental  status is at baseline.   Psychiatric:         Mood and Affect: Mood normal.         Behavior: Behavior normal.         Thought Content: Thought content normal.         Judgment: Judgment normal.         Pertinent Laboratory/Diagnostic Studies:  Laboratory and Imaging studies reviewed. Full report in the paper chart.     Current Medications   Medications reviewed and updated in facility chart.      -Total time spent on this encounter today including documentation and workup review, face to face time, history and exam, and documentation/orders was approximately 55 minutes.  -This note will be copied to [facility] EMR where vitals and medication orders are placed.    Corwin Trujillo D.O.  Geriatric Medicine  10/15/2024 11:49 AM

## 2024-10-15 NOTE — ASSESSMENT & PLAN NOTE
Patient endorses chronic constipation  At risk due to hospitalization, relative immobility, comorbidities, chronic opioid  Monitor stool output - constipation much improved overall, last BM yesterday easy  Bowel regimen at facility: continue miralax, docusate, senna regimen, has PRN lactulose as well, adjust as appropriate; bisacodyl suppository PRN  Encourage mobility as tolerated, PO hydration as appropriate, high fiber diet/prune juice (in outpatient setting as appropriate)  Goal is for 1 easy BM every 1-2 days

## 2024-10-15 NOTE — ASSESSMENT & PLAN NOTE
Seems to be a chronic issue  No clear noted hx of CHF however recent echos have generally been limited/difficult studies so unclear what her present EF is  Monitor weight - some recent downtrend likely related to diuretic effect from resumption of bumex and starting/titrating chlorthalidone  Monitor volume status clinically - bilateral LE edema seems improved, no orthopnea  Encourage elevation, compression of lower extremities as able  Continue bumex with hold parameters  Follow up with PCP, Cardiology as appropriate

## 2024-10-15 NOTE — ASSESSMENT & PLAN NOTE
Lab Results   Component Value Date    EGFR 62 10/14/2024    EGFR 67 10/11/2024    EGFR 51 10/10/2024    CREATININE 0.87 10/14/2024    CREATININE 0.82 10/11/2024    CREATININE 1.02 10/10/2024       Noted to have CINDY recently inpatient, likely multifactorial related to contrast use, hypotension/sepsis  Evaluated by Nephrology inpatient. Losartan and bumex were held temporarily inpatient.  Monitor renal function on routine labs - CINDY appears improved back to within baseline. Will continue monitoring with recent changes to diuretic regimen.  Avoid nephrotoxins, NSAIDs as able  Encourage PO hydration, respecting volume status  Follow up with PCP, Nephrology as appropriate

## 2024-10-15 NOTE — ASSESSMENT & PLAN NOTE
"Noted POA to recent hospitalization, likely multifactorial in setting of COVID and concern for MSSA bacteremia and associated spinal osteomyelitis  Evaluated by ID inpatient and following at rehab  Continue IV cefazolin for 6 week course (through 10/21/24)  Monitor labs while on IV abx in accordance with ID recs (CBCd, BMP, ESR/CRP)  As per ID: anticipate transition to PO Cefadroxil after completing IV treatment which patient will remain on until inflammatory markers normalize vs plateau\"  Had been recommended repeat MRI of spine in several weeks, can coordinate with ID if patient still in-house  Monitor for acute/recurrent infectious symptoms - no new/acute symptoms, acute sepsis seems resolved with ongoing treatment  Follow up with PCP, ID as appropriate  "

## 2024-10-15 NOTE — ASSESSMENT & PLAN NOTE
Patient has numerous risk factors for severe HTN including TORIBIO (non-compliant with CPAP) and evidence of renal artery stenosis on imaging  Monitor BP - has been somewhat variable ranging low to high 100s systolic, rarely >200, generally above goal  Avoid hypotension  No acute cardiac complaints  Continue regimen including carvedilol 12.5mg BID, losartan 100mg daily, hydralazine 25mg TID, bumex 2mg daily, chlorthalidone 50mg daily, doxazosin 2mg qHS with hold parameters as appropriate  (Patient had previously been on nifedipine but had requested to discontinue it due to concerns over leg swelling and she also felt in the past it had contributed to an episode of jaundice a few years ago, she adamantly refuses to be on any calcium channel blockers including amlodipine)  Adjust as appropriate. Regimen was adjusted recently inpatient due to HTN and is being actively adjusted at rehab due to ongoing issue. As of 10/15 titrated hydralazine dose and resumed on doxazosin which she had previously been on and tolerated well. Avoid titrating carvedilol for now as per her outpatient Cardiology records she has had bradycardia and pauses with higher doses of carvedilol. If further control needed could consider titrating doxazosin, adding clonidine  Follow up with PCP, Cardiology as appropriate. Consider further workup including metabolic/endocrine workup as appropriate outpatient if HTN remains resistant.     Primary osteoarthritis of left knee

## 2024-10-15 NOTE — ASSESSMENT & PLAN NOTE
Lab Results   Component Value Date    HGBA1C 7.9 (A) 07/02/2024       Monitor glucose - fasting glucose variable ranging from low 100s to low 200s, has been labile but overall seems improved recently  Avoid hypoglycemia  Continue regimen including sitagliptin 50mg daily, insulin lantus 34u daily  Insulin sliding scale coverage at rehab  Adjust regimen as appropriate. Use caution with titration as her sugars have historically been labile and with hypoglycemia.  Encourage lifestyle modifications including healthy diet, weight management, exercise as appropriate  Follow up with PCP, Endocrine/Ophthalmology/Podiatry outpatient as appropriate

## 2024-10-15 NOTE — ASSESSMENT & PLAN NOTE
As per note, patient with SBP >180 at times including recently overnight/today, generally asymptomatic without sign of end-organ damage but at high risk for developing such  See plan under primary hypertension

## 2024-10-16 ENCOUNTER — NURSING HOME VISIT (OUTPATIENT)
Age: 79
End: 2024-10-16
Payer: COMMERCIAL

## 2024-10-16 VITALS
WEIGHT: 136 LBS | RESPIRATION RATE: 18 BRPM | OXYGEN SATURATION: 93 % | SYSTOLIC BLOOD PRESSURE: 180 MMHG | BODY MASS INDEX: 27.47 KG/M2 | TEMPERATURE: 97.2 F | DIASTOLIC BLOOD PRESSURE: 68 MMHG | HEART RATE: 80 BPM

## 2024-10-16 DIAGNOSIS — F32.9 REACTIVE DEPRESSION: ICD-10-CM

## 2024-10-16 DIAGNOSIS — K59.03 DRUG-INDUCED CONSTIPATION: ICD-10-CM

## 2024-10-16 DIAGNOSIS — N18.32 STAGE 3B CHRONIC KIDNEY DISEASE (HCC): ICD-10-CM

## 2024-10-16 DIAGNOSIS — I10 PRIMARY HYPERTENSION: Primary | ICD-10-CM

## 2024-10-16 DIAGNOSIS — M54.42 ACUTE BILATERAL LOW BACK PAIN WITH LEFT-SIDED SCIATICA: ICD-10-CM

## 2024-10-16 LAB
GLUCOSE SERPL-MCNC: 135 MG/DL (ref 65–140)
GLUCOSE SERPL-MCNC: 143 MG/DL (ref 65–140)
GLUCOSE SERPL-MCNC: 169 MG/DL (ref 65–140)
GLUCOSE SERPL-MCNC: 175 MG/DL (ref 65–140)
GLUCOSE SERPL-MCNC: 196 MG/DL (ref 65–140)

## 2024-10-16 PROCEDURE — 99310 SBSQ NF CARE HIGH MDM 45: CPT

## 2024-10-16 PROCEDURE — 99222 1ST HOSP IP/OBS MODERATE 55: CPT

## 2024-10-16 PROCEDURE — 82948 REAGENT STRIP/BLOOD GLUCOSE: CPT

## 2024-10-16 NOTE — ASSESSMENT & PLAN NOTE
Lab Results   Component Value Date    EGFR 62 10/14/2024    EGFR 67 10/11/2024    EGFR 51 10/10/2024    CREATININE 0.87 10/14/2024    CREATININE 0.82 10/11/2024    CREATININE 1.02 10/10/2024   This can impact antibiotic dosing. Upon review of patient's available medical records it appears her baseline creatinine is approximately 1.2-1.5. Creatinine is within baseline this week at 0.87.  -weekly creatinine while on IV antibiotic  -dose adjust antibiotic for renal function as needed  -avoid nephrotoxins

## 2024-10-16 NOTE — ASSESSMENT & PLAN NOTE
Patient with rapid response earlier in the week and has since been having consistently high blood pressure that are 180's-190's systolic. Now also with blurry vision in both eyes. Her blood pressure medication regimen is significantly more than prior to her admission. She had previous CTA C/A/P which showed severe celiac artery stenosis but not other major vascular abnormalities. Adrenals also ok on that study. Consider repeating now. Recommend evaluation by cardiology.  -consider repeat CTA C/A/P  -recommend formal cardiology evaluation

## 2024-10-16 NOTE — ASSESSMENT & PLAN NOTE
Patient mood stable.  Patient with history of loss of her  in February and loss of her son a few years ago. Recent hospitalization adding to depression.   Patient recently started on Mirtazapine inpatient    Discussion with patient regarding adding antidepressant (SSRI), patient would like to hold on adding any medication at this time.   Spiritual consult ordered  Continue supportive measures  Encourage activity and engagement  Will continue care in collaboration with psychiatry services prn  Continue to monitor for acute changes in condition   Follow up with PCP as appropriate

## 2024-10-16 NOTE — ASSESSMENT & PLAN NOTE
Patient has numerous risk factors for severe HTN including TORIBIO (non-compliant with CPAP) and evidence of renal artery stenosis on imaging.   BP variable  strike-out   10/16/2024 15:16 180 / 68 mmHg   strike-out   10/16/2024 14:03 190 / 62 mmHg    strike-out   10/16/2024 11:42 164 / 48 mmHg   strike-out   10/16/2024 07:06 150 / 56 mmHg    strike-out   10/16/2024 05:44 164 / 58 mmHg   strike-out   10/16/2024 03:02 143 / 64 mmHg    strike-out   10/15/2024 21:27 166 / 71 mmHg    strike-out   10/15/2024 17:06 173 / 82 mmHg    strike-out   10/15/2024 16:01 182 / 73 mmHg    strike-out   10/15/2024 14:42 188 / 64 mmHg    strike-out   10/15/2024 13:47 185 / 78 mmHg    strike-out   10/15/2024 11:36 190 / 62 mmHg   strike-out   10/15/2024 08:55 142 / 50 mmHg   strike-out   10/15/2024 07:14 160 / 62 mmHg    strike-out   10/15/2024 06:29 198 / 70 mmHg  strike-out   10/15/2024 05:13 192 / 82 mmHg   strike-out   10/15/2024 01:20 170 / 74 mmHg   Continue to monitor BP   No acute cardiac complaints  Avoid hypotension  Continue regimen including carvedilol 12.5mg BID, losartan 100mg daily, hydralazine 25mg TID, bumex 2mg daily, chlorthalidone 50mg daily, doxazosin 2mg qHS with hold parameters as appropriate  (Patient had previously been on nifedipine but had requested to discontinue it due to concerns over leg swelling and she also felt in the past it had contributed to an episode of jaundice a few years ago, she adamantly refuses to be on any calcium channel blockers including amlodipine)  Adjust as appropriate. Regimen was adjusted recently inpatient due to HTN and is being actively adjusted at rehab due to ongoing issue. As of 10/15 titrated hydralazine dose and resumed on doxazosin which she had previously been on and tolerated well. Avoid titrating carvedilol for now as per her outpatient Cardiology records she has had bradycardia and pauses with higher doses of carvedilol. If further control needed could consider titrating  doxazosin, adding clonidine  Cardiology consulted   Consider further workup including metabolic/endocrine workup as appropriate outpatient if HTN remains resistant.  Adjust as necessary  Follow up with PCP, Cardiology as appropriate
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
## 2024-10-16 NOTE — ASSESSMENT & PLAN NOTE
With chronic low back pain (generally L>R with associated intermittent sciatica) with history of spinal stenosis and reported spinal surgery. Recent acute exacerbation outpatient (without associated trauma) leading to present hospitalization.  Likely related to sepsis in setting of presumed osteomyelitis per inpatient workup.  Monitor pain - per patient pain has improved since adding tramadol 25mg BID (previously on 50mg BID)  Current regimen including: tylenol 975mg TID, baclofen 5mg TID PRN, lidocaine patch/voltaren gel, tramadol 25mg BID  Adjust/wean regimen as appropriate. Opioids had been stopped inpatient, have been reintroduced at lower dose for her chronic pain and seems to be tolerating well. Methocarbamol was not as effective and tizanidine seemed to cause sedation, seems better controlled with PRN baclofen. Use caution with pain regimen as she has had issues with constipation and with sedation with higher doses. As of 10/15 discontinued gabapentin as pain improved/stable recently  See plan under sepsis  Monitor stool output, increased risk of SBO/constipation.   See plan under sepsis  Continue to monitor  Follow up with PCP as appropriate
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
## 2024-10-16 NOTE — ASSESSMENT & PLAN NOTE
Likely seeded from recent MSSA bacteremia. 9/9/2024 MRI of the lumbar spine showed hyperintensity of the intervertebral L2-4 region with mild adjacent paraspinal enhancement and corresponding sclerotic endplate changes. Findings suggest possible discitis/osteomyelitis. She has a RUE PICC intact and has been completing a 6 week course of IV Cefazolin. She's been tolerating her antibiotic without difficulty. I personally reviewed her most recent lab work from 10/14/2024 which showed normal WBC count = 6.25, stable creatinine = 0.87, sed rate = 39, and CRP improved to 8.4. I will continue patient on IV antibiotic as planned. Anticipate transition to PO Cefadroxil after completion of IV treatment which she will remain on until inflammatory markers normalize vs plateau.   -continue IV cefazolin through 10/21/2024  -continue weekly CBCD, creatinine, sed rate, and CRP while on IV antibiotic  -anticipate transition to PO Cefadroxil, 500mg BID, after completing IV treatment which patient will remain on until inflammatory markers normalize vs plateau  -monitor vitals  -PICC to be removed by facility RN after final dose of IV antibiotic on 10/21/2024  -patient will follow up in the outpatient ID office after discharge from [facility], will coordinate her appointment close to discharge

## 2024-10-16 NOTE — ASSESSMENT & PLAN NOTE
Lab Results   Component Value Date    EGFR 62 10/14/2024    EGFR 67 10/11/2024    EGFR 51 10/10/2024    CREATININE 0.87 10/14/2024    CREATININE 0.82 10/11/2024    CREATININE 1.02 10/10/2024   Noted to have CINDY recently inpatient, likely multifactorial related to contrast use, hypotension/sepsis. Evaluated by Nephrology inpatient. Losartan and bumex were held temporarily inpatient.  Renal functions stable   Monitor renal function on routine labs  10/1 S/p IV fluids 50mL/hr for a total of 500mL  Continue Bumex 2mg daily   Avoid nephrotoxins, NSAIDs as able  Encourage PO hydration, respecting volume status  Consider nephrology consult if renal function persistently elevated.   Follow up with PCP, Nephrology as appropriate

## 2024-10-16 NOTE — PROGRESS NOTES
Progress Note - Infectious Disease   Name: Porsha Hoyos 79 y.o. female I MRN: [id number]  Unit/Bed#: [facility] 557-01 I Date of Admission: 9/25/2024   Date of Service: 10/16/2024 I Hospital Day: 21     Assessment & Plan  Osteomyelitis (HCC)  Likely seeded from recent MSSA bacteremia. 9/9/2024 MRI of the lumbar spine showed hyperintensity of the intervertebral L2-4 region with mild adjacent paraspinal enhancement and corresponding sclerotic endplate changes. Findings suggest possible discitis/osteomyelitis. She has a RUE PICC intact and has been completing a 6 week course of IV Cefazolin. She's been tolerating her antibiotic without difficulty. I personally reviewed her most recent lab work from 10/14/2024 which showed normal WBC count = 6.25, stable creatinine = 0.87, sed rate = 39, and CRP improved to 8.4. I will continue patient on IV antibiotic as planned. Anticipate transition to PO Cefadroxil after completion of IV treatment which she will remain on until inflammatory markers normalize vs plateau.   -continue IV cefazolin through 10/21/2024  -continue weekly CBCD, creatinine, sed rate, and CRP while on IV antibiotic  -anticipate transition to PO Cefadroxil, 500mg BID, after completing IV treatment which patient will remain on until inflammatory markers normalize vs plateau  -monitor vitals  -PICC to be removed by facility RN after final dose of IV antibiotic on 10/21/2024  -patient will follow up in the outpatient ID office after discharge from [facility], will coordinate her appointment close to discharge   MSSA bacteremia  Unclear etiology. Possibly secondary to scalp lesion. This likely seeded the lumbar spine. Patient cleared her bacteremia while inpatient and TTE showed no obvious vegetation. The patient remains on IV antibiotic as above for ongoing treatment of bacteremia and spinal discitis/osteomyelitis.   -antibiotic as above  -weekly CBCD and creatinine while on IV antibiotic  -monitor vitals  Type 2 diabetes  mellitus with complication, with long-term current use of insulin ([facility])  Lab Results   Component Value Date    HGBA1C 7.9 (A) 07/02/2024   Elevated blood glucose is risk factor for wounds and infection.   -recommend tight glycemic control  -blood glucose management per primary service   Stage 3b chronic kidney disease ([facility])  Lab Results   Component Value Date    EGFR 62 10/14/2024    EGFR 67 10/11/2024    EGFR 51 10/10/2024    CREATININE 0.87 10/14/2024    CREATININE 0.82 10/11/2024    CREATININE 1.02 10/10/2024   This can impact antibiotic dosing. Upon review of patient's available medical records it appears her baseline creatinine is approximately 1.2-1.5. Creatinine is within baseline this week at 0.87.  -weekly creatinine while on IV antibiotic  -dose adjust antibiotic for renal function as needed  -avoid nephrotoxins  Scalp lesion  Per patient lesion has been present for month. This is likely portal of entry for her bacteremia above. Lesion is concerning for possible squamous cell carcinoma. Recommend dermatology evaluation for further work up and possbil biopsy vs excision. She does have outpatient referral pending and family will call their office to make sure appointment gets moved to after patient's expected discharge.  -serial scalp exams  -recommend dermatology evaluation and possible biopsy vs excision   PICC (peripherally inserted central catheter) in place  RUE PICC intact. She has had some swelling of the RUE. Venous duplex without DVT or superficial thrombophlebitis. This is now improved. PICC has been functioning without difficulty.  -serial exams and care of PICC  -PICC to be removed by facility RN after patient's final dose of IV antibiotic on 10/21/2024  Hypertensive urgency  Patient with rapid response earlier in the week and has since been having consistently high blood pressure that are 180's-190's systolic. Now also with blurry vision in both eyes. Her blood pressure medication regimen is  significantly more than prior to her admission. She had previous CTA C/A/P which showed severe celiac artery stenosis but not other major vascular abnormalities. Adrenals also ok on that study. Consider repeating now. Recommend evaluation by cardiology.  -consider repeat CTA C/A/P  -recommend formal cardiology evaluation    Above plan was discussed in detail with patient at the bedside.  Above plan was discussed in detail with primary service who agrees with plan for ongoing IV antibiotic.    Antibiotics:  Cefazolin    Subjective:  Patient reports she's feeling very tired today. Notes her vision has been getting more and more blurry since yesterday. She has no fever, chills, sweats, shakes; no nausea, vomiting, abdominal pain, diarrhea, or dysuria; no cough, shortness of breath, or chest pain. Patient reports much less back pain than last week, has only gotten as high as 3/10. No new symptoms.    Physical Exam:   General Appearance:  Alert, interactive, nontoxic, no acute distress. She appears comfortable supine in bed and then sitting on edge of bed. Patient appears chronically ill and debilitated.   Throat: Oropharynx moist without lesions.    Lungs:   Clear to auscultation bilaterally; no wheezes, rhonchi or rales; respirations unlabored on room air.   Heart:  RRR; no murmur, rub or gallop.   Abdomen:   Soft, non-tender, non-distended, positive bowel sounds.     Extremities: No clubbing or cyanosis, improving B/L LE edema. RUE PICC intact.   Skin: No new rashes noted on exposed skin.     Labs, Imaging, & Other studies:   All pertinent labs and imaging studies were personally reviewed  Results from last 7 days   Lab Units 10/14/24  0811 10/11/24  0430 10/10/24  0746   WBC Thousand/uL 6.25 7.59  --    HEMOGLOBIN g/dL 9.4* 9.2* 8.7*   PLATELETS Thousands/uL 188 193  --      Results from last 7 days   Lab Units 10/14/24  0811   POTASSIUM mmol/L 3.5   CHLORIDE mmol/L 103   CO2 mmol/L 30   BUN mg/dL 27*   CREATININE  mg/dL 0.87   EGFR ml/min/1.73sq m 62   CALCIUM mg/dL 9.4

## 2024-10-16 NOTE — ASSESSMENT & PLAN NOTE
Patient endorses chronic constipation  At risk due to hospitalization, relative immobility, comorbidities, history of chronic opioid  Monitor stool output - Per nursing patient has been having regular bowel movements, approximately every other day. Last BM 10/16.   Bowel regimen at facility: miralax daily PRN, senna/docusate, lactulose, adjust as appropriate; bisacodyl suppository PRN  Encourage mobility as tolerated, PO hydration as appropriate, high fiber diet/prune juice (in outpatient setting as appropriate)  Goal is for 1 easy BM every 1-2 days  9/30 Abdominal xray ordered to assess for stool burden: Nonobstructive bowel gas pattern   Continue to monitor  Follow up with PCP as appropriate

## 2024-10-17 LAB
GLUCOSE SERPL-MCNC: 116 MG/DL (ref 65–140)
GLUCOSE SERPL-MCNC: 119 MG/DL (ref 65–140)
GLUCOSE SERPL-MCNC: 165 MG/DL (ref 65–140)
GLUCOSE SERPL-MCNC: 175 MG/DL (ref 65–140)

## 2024-10-17 PROCEDURE — 99232 SBSQ HOSP IP/OBS MODERATE 35: CPT | Performed by: STUDENT IN AN ORGANIZED HEALTH CARE EDUCATION/TRAINING PROGRAM

## 2024-10-17 PROCEDURE — 82948 REAGENT STRIP/BLOOD GLUCOSE: CPT

## 2024-10-17 RX ORDER — DOXAZOSIN 4 MG/1
4 TABLET ORAL 2 TIMES DAILY
Status: ON HOLD | COMMUNITY
End: 2024-10-23 | Stop reason: SDUPTHER

## 2024-10-17 RX ORDER — HYDRALAZINE HYDROCHLORIDE 50 MG/1
50 TABLET, FILM COATED ORAL 3 TIMES DAILY
Start: 2024-10-17 | End: 2024-10-23 | Stop reason: SDUPTHER

## 2024-10-17 RX ORDER — CHLORTHALIDONE 50 MG/1
50 TABLET ORAL DAILY
Status: ON HOLD | COMMUNITY
End: 2024-10-23 | Stop reason: SDUPTHER

## 2024-10-17 NOTE — PROGRESS NOTES
Cardiology Progress Note - Porsha Hoyos 79 y.o. female MRN: [id number]    Unit/Bed#: -01 Encounter: [id number]      Assessment & Plan:    Uncontrolled hypertension  - Current antihypertensive Rx carvedilol 12.5 mg twice daily, losartan 100 mg daily, hydralazine 50 mg 3 times daily, chlorthalidone 50 mg daily, Bumex 2 mg daily and doxazosin 4 mg twice daily  - Patient refused to be on any calcium channel blockers, reports having a bad reaction to nifedipine during hospitalization at [facility]    Coronary artery disease  - History of MI    Prior symptomatic bradycardia  - Patient had symptomatic bradycardia on carvedilol 25 mg twice daily  - Resolved with reduction of carvedilol to 12.5 mg twice daily    MSSA bacteremia  - 1 out of 2 blood cultures on admission on 9/7/2024 grew MSSA  - Concern for possible discitis/osteomyelitis  - Plan to continue IV antibiotics until 10/21    Remote CVA    TORIBIO  - Noncompliant with CPAP    Small bowel obstruction  - Patient hospitalized from 9/18 to 9/25 for small bowel obstruction versus ileus which resolved with conservative management    Stage 3b chronic kidney disease ([facility])    Type 2 diabetes mellitus with complication, with long-term current use of insulin ([facility])    Reactive depression    Scalp lesion    Summary:  -Initial blood pressure was around 181/90 at 7 AM, then 170/82 at 8 AM, followed by 150/50 at 9 AM and then 180/58 at 1:24 PM  - During my visit with the patient, supine blood pressure systolics were in the 160s and upon sitting up in bed, systolic blood pressure improved to 120  - Likely also has a component of supine hypertension  - Continue with Coreg 12.5 mg twice daily, losartan 100 mg daily, hydralazine 50 mg 3 times daily, chlorthalidone 50 mg daily, Bumex 2 mg daily and doxazosin 4 mg twice daily  - If supine blood pressure is elevated, recommend repeating blood pressure in the seated position before adjusting her medications further  - Otherwise  stable from a cardiac standpoint    Cardiology will sign off.  If she has persistent uncontrolled hypertension again, please reach out to the on-call cardiologist.    Subjective:   No significant events overnight.  She reports feeling okay today and has no complaints.  Denies chest pain, shortness of breath, orthopnea, abdominal pain, nausea, vomiting, fever, chills, headache, dizziness or palpitations.    Objective:     Vitals: There were no vitals taken for this visit., There is no height or weight on file to calculate BMI.,     No intake or output data in the 24 hours ending 10/17/24 1131        Physical Exam:    GEN: Porsha Hoyso appears well, alert and oriented x 3, pleasant and cooperative   HEENT: Mucous membranes moist, no scleral icterus, no conjunctival pallor  NECK: No elevated JVD  HEART: Regular rate and rhythm, normal S1 and S2, no murmurs or rubs   LUNGS: clear to auscultation bilaterally; no wheezes, rales, or rhonchi   ABDOMEN: normal bowel sounds, soft, no tenderness, no distention  EXTREMITIES: peripheral pulses normal; no lower extremity edema   NEURO: no focal findings   SKIN: No lesions or rashes on exposed skin      No current facility-administered medications for this encounter.    Labs & Results:    Lab Results   Component Value Date    CKTOTAL 179 09/07/2024       Lab Results   Component Value Date    CALCIUM 9.0 10/17/2024     01/22/2018    K 3.2 (L) 10/17/2024    CO2 30 10/17/2024     10/17/2024    BUN 26 (H) 10/17/2024    CREATININE 0.91 10/17/2024       Lab Results   Component Value Date    WBC 6.25 10/14/2024    HGB 9.4 (L) 10/14/2024    HCT 31.3 (L) 10/14/2024     (H) 10/14/2024     10/14/2024           Lab Results   Component Value Date    CHOL 119 01/22/2018    CHOL 119 01/22/2018     Lab Results   Component Value Date    HDL 50 07/10/2024    HDL 33 (L) 09/18/2023     Lab Results   Component Value Date    LDLCALC 43 07/10/2024    LDLCALC 25 09/18/2023      Lab Results   Component Value Date    TRIG 131 07/10/2024    TRIG 249 (H) 09/18/2023       Lab Results   Component Value Date    ALT 30 09/07/2024    AST 36 09/07/2024    ALKPHOS 80 09/07/2024         EKG personally reviewed by )Raffy Judd MD. No acute changes

## 2024-10-18 ENCOUNTER — NURSING HOME VISIT (OUTPATIENT)
Age: 79
End: 2024-10-18
Payer: COMMERCIAL

## 2024-10-18 VITALS
TEMPERATURE: 96.9 F | WEIGHT: 135 LBS | SYSTOLIC BLOOD PRESSURE: 128 MMHG | HEART RATE: 81 BPM | RESPIRATION RATE: 18 BRPM | BODY MASS INDEX: 27.27 KG/M2 | OXYGEN SATURATION: 94 % | DIASTOLIC BLOOD PRESSURE: 50 MMHG

## 2024-10-18 DIAGNOSIS — M54.42 ACUTE BILATERAL LOW BACK PAIN WITH LEFT-SIDED SCIATICA: ICD-10-CM

## 2024-10-18 DIAGNOSIS — R53.81 PHYSICAL DECONDITIONING: ICD-10-CM

## 2024-10-18 DIAGNOSIS — I10 PRIMARY HYPERTENSION: Primary | ICD-10-CM

## 2024-10-18 DIAGNOSIS — R11.2 NAUSEA AND VOMITING, UNSPECIFIED VOMITING TYPE: ICD-10-CM

## 2024-10-18 DIAGNOSIS — N18.9 ACUTE KIDNEY INJURY SUPERIMPOSED ON CHRONIC KIDNEY DISEASE  (HCC): ICD-10-CM

## 2024-10-18 DIAGNOSIS — N17.9 ACUTE KIDNEY INJURY SUPERIMPOSED ON CHRONIC KIDNEY DISEASE  (HCC): ICD-10-CM

## 2024-10-18 LAB
GLUCOSE SERPL-MCNC: 102 MG/DL (ref 65–140)
GLUCOSE SERPL-MCNC: 142 MG/DL (ref 65–140)
GLUCOSE SERPL-MCNC: 172 MG/DL (ref 65–140)
GLUCOSE SERPL-MCNC: 293 MG/DL (ref 65–140)
GLUCOSE SERPL-MCNC: 87 MG/DL (ref 65–140)

## 2024-10-18 PROCEDURE — 99309 SBSQ NF CARE MODERATE MDM 30: CPT

## 2024-10-18 PROCEDURE — 82948 REAGENT STRIP/BLOOD GLUCOSE: CPT
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
## 2024-10-19 LAB
GLUCOSE SERPL-MCNC: 117 MG/DL (ref 65–140)
GLUCOSE SERPL-MCNC: 128 MG/DL (ref 65–140)
GLUCOSE SERPL-MCNC: 140 MG/DL (ref 65–140)
GLUCOSE SERPL-MCNC: 88 MG/DL (ref 65–140)

## 2024-10-19 PROCEDURE — 82948 REAGENT STRIP/BLOOD GLUCOSE: CPT

## 2024-10-19 NOTE — ASSESSMENT & PLAN NOTE
Upon exam patient reports to nausea that started last night after eating a turkey sandwich, patient denies abdominal pain or vomiting, her last BM was this morning (normal).   Continue Zofran PRN  Make sure patient is taking medication with crackers/food.   Continue to monitor  Follow up with PCP as appropriate

## 2024-10-19 NOTE — ASSESSMENT & PLAN NOTE
Patient has numerous risk factors for severe HTN including TORIBIO (non-compliant with CPAP) and evidence of renal artery stenosis on imaging.   BP improving since medication adjustment   Cardiology evaluated and feels patient likely has a component of supine hypertension, recommend only taking BP while sitting.    Continue to monitor BP   No acute cardiac complaints  Avoid hypotension  Continue regimen including carvedilol 12.5mg BID, losartan 100mg daily, hydralazine 50mg TID, bumex 2mg daily, chlorthalidone 50mg daily, doxazosin 4mg BID with hold parameters as appropriate  (Patient had previously been on nifedipine but had requested to discontinue it due to concerns over leg swelling and she also felt in the past it had contributed to an episode of jaundice a few years ago, she adamantly refuses to be on any calcium channel blockers including amlodipine)  Regimen was adjusted recently inpatient due to HTN.   Avoid titrating carvedilol for now as per her outpatient Cardiology records she has had bradycardia and pauses with higher doses of carvedilol. If further control needed could consider adding clonidine  Cardiology following   Consider further workup including metabolic/endocrine workup as appropriate outpatient if HTN remains resistant.  Adjust as necessary  Follow up with PCP, Cardiology as appropriate

## 2024-10-19 NOTE — ASSESSMENT & PLAN NOTE
Lab Results   Component Value Date    EGFR 59 10/17/2024    EGFR 62 10/14/2024    EGFR 67 10/11/2024    CREATININE 0.91 10/17/2024    CREATININE 0.87 10/14/2024    CREATININE 0.82 10/11/2024       Noted to have CINDY recently inpatient, likely multifactorial related to contrast use, hypotension/sepsis  Evaluated by Nephrology inpatient. Losartan and bumex were held temporarily inpatient.  Monitor renal function on routine labs - CINDY appears improved back to within baseline. Will continue monitoring with recent changes to diuretic regimen.  Avoid nephrotoxins, NSAIDs as able  Encourage PO hydration, respecting volume status  Follow up with PCP, Nephrology as appropriate

## 2024-10-20 LAB
GLUCOSE SERPL-MCNC: 118 MG/DL (ref 65–140)
GLUCOSE SERPL-MCNC: 120 MG/DL (ref 65–140)
GLUCOSE SERPL-MCNC: 125 MG/DL (ref 65–140)
GLUCOSE SERPL-MCNC: 141 MG/DL (ref 65–140)
GLUCOSE SERPL-MCNC: 71 MG/DL (ref 65–140)
GLUCOSE SERPL-MCNC: 94 MG/DL (ref 65–140)

## 2024-10-20 PROCEDURE — 82948 REAGENT STRIP/BLOOD GLUCOSE: CPT

## 2024-10-21 ENCOUNTER — NURSING HOME VISIT (OUTPATIENT)
Age: 79
End: 2024-10-21
Payer: COMMERCIAL

## 2024-10-21 VITALS
TEMPERATURE: 96.7 F | SYSTOLIC BLOOD PRESSURE: 169 MMHG | DIASTOLIC BLOOD PRESSURE: 73 MMHG | OXYGEN SATURATION: 96 % | WEIGHT: 136.6 LBS | BODY MASS INDEX: 27.59 KG/M2 | RESPIRATION RATE: 18 BRPM | HEART RATE: 75 BPM

## 2024-10-21 DIAGNOSIS — N17.9 ACUTE KIDNEY INJURY SUPERIMPOSED ON CHRONIC KIDNEY DISEASE  (HCC): Primary | ICD-10-CM

## 2024-10-21 DIAGNOSIS — A41.01 SEPSIS DUE TO METHICILLIN SUSCEPTIBLE STAPHYLOCOCCUS AUREUS (MSSA) WITHOUT ACUTE ORGAN DYSFUNCTION (HCC): ICD-10-CM

## 2024-10-21 DIAGNOSIS — I10 PRIMARY HYPERTENSION: ICD-10-CM

## 2024-10-21 DIAGNOSIS — Z79.4 TYPE 2 DIABETES MELLITUS WITH STAGE 3B CHRONIC KIDNEY DISEASE, WITH LONG-TERM CURRENT USE OF INSULIN (HCC): ICD-10-CM

## 2024-10-21 DIAGNOSIS — K59.03 DRUG-INDUCED CONSTIPATION: ICD-10-CM

## 2024-10-21 DIAGNOSIS — N18.32 TYPE 2 DIABETES MELLITUS WITH STAGE 3B CHRONIC KIDNEY DISEASE, WITH LONG-TERM CURRENT USE OF INSULIN (HCC): ICD-10-CM

## 2024-10-21 DIAGNOSIS — D63.1 ANEMIA DUE TO STAGE 3B CHRONIC KIDNEY DISEASE  (HCC): ICD-10-CM

## 2024-10-21 DIAGNOSIS — N18.32 ANEMIA DUE TO STAGE 3B CHRONIC KIDNEY DISEASE  (HCC): ICD-10-CM

## 2024-10-21 DIAGNOSIS — E11.22 TYPE 2 DIABETES MELLITUS WITH STAGE 3B CHRONIC KIDNEY DISEASE, WITH LONG-TERM CURRENT USE OF INSULIN (HCC): ICD-10-CM

## 2024-10-21 DIAGNOSIS — N18.9 ACUTE KIDNEY INJURY SUPERIMPOSED ON CHRONIC KIDNEY DISEASE  (HCC): Primary | ICD-10-CM

## 2024-10-21 DIAGNOSIS — M54.42 ACUTE BILATERAL LOW BACK PAIN WITH LEFT-SIDED SCIATICA: ICD-10-CM

## 2024-10-21 LAB
GLUCOSE SERPL-MCNC: 122 MG/DL (ref 65–140)
GLUCOSE SERPL-MCNC: 125 MG/DL (ref 65–140)
GLUCOSE SERPL-MCNC: 170 MG/DL (ref 65–140)
GLUCOSE SERPL-MCNC: 50 MG/DL (ref 65–140)
GLUCOSE SERPL-MCNC: 53 MG/DL (ref 65–140)
GLUCOSE SERPL-MCNC: 72 MG/DL (ref 65–140)
GLUCOSE SERPL-MCNC: 99 MG/DL (ref 65–140)

## 2024-10-21 PROCEDURE — 82948 REAGENT STRIP/BLOOD GLUCOSE: CPT

## 2024-10-21 PROCEDURE — 99309 SBSQ NF CARE MODERATE MDM 30: CPT

## 2024-10-21 NOTE — ASSESSMENT & PLAN NOTE
Baseline Hgb seems around 9-11 with limited data  Likely related to CKD, history of iron deficiency on labs; likely with acute component related to marrow suppression in setting of acute infection and antibiotics  Hgb 8.5=8.5<9.4>9.2>8.7>8.2=8.2=8.2  Continue to monitor on routine labs  FOBT negative   Continue iron supplements   B12 WNL   Monitor for acute bleed - no present signs  Consider further workup, for persistent/worsening  Transfuse PRN Hgb <7  Continue to monitor for acute changes  Follow up with PCP as appropriate

## 2024-10-21 NOTE — ASSESSMENT & PLAN NOTE
Patient has numerous risk factors for severe HTN including TORIBIO (non-compliant with CPAP) and evidence of renal artery stenosis on imaging.   Regimen was adjusted recently inpatient due to HTN.   Cardiology evaluated and feels patient likely has a component of supine hypertension, recommend only taking BP while sitting.    Continue to monitor BP; SBP recently 120's 160's  No acute cardiac complaints  Avoid hypotension  Continue regimen including carvedilol 12.5mg BID, losartan 100mg daily, hydralazine 50mg TID, bumex 2mg daily (on hold due to CINDY), chlorthalidone 50mg daily, doxazosin 2mg BID (lowered from 4mg BID due to nausea) with hold parameters as appropriate.   Patient was also started on clonidine 10/26 which cause metallic/bitter taste, patient requested dc of medication.   (Patient had previously been on nifedipine but had requested to discontinue it due to concerns over leg swelling and she also felt in the past it had contributed to an episode of jaundice a few years ago, she adamantly refuses to be on any calcium channel blockers including amlodipine)  Avoid titrating carvedilol for now as per her outpatient Cardiology records she has had bradycardia and pauses with higher doses of carvedilol.   Cardiology following   Consider further workup including metabolic/endocrine workup as appropriate outpatient if HTN remains resistant.  Adjust as necessary  Follow up with PCP, Cardiology as appropriate
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
## 2024-10-21 NOTE — ASSESSMENT & PLAN NOTE
Lab Results   Component Value Date    EGFR 29 10/21/2024    EGFR 59 10/17/2024    EGFR 62 10/14/2024    CREATININE 1.62 (H) 10/21/2024    CREATININE 0.91 10/17/2024    CREATININE 0.87 10/14/2024   Today noted to have CINDY, likely related to poor PO intake and diuretics, patient had nausea over the weekend and has not been eating or drinking much.   Patient also noted to have CINDY while inpatient.  Evaluated by Nephrology inpatient. Losartan and bumex were held temporarily inpatient.  Monitor renal function on routine labs, repeat BW 10/22  Hold Bumex  Avoid nephrotoxins, NSAIDs as able  Encourage PO hydration, respecting volume status  Follow up with PCP, Nephrology as appropriate

## 2024-10-21 NOTE — ASSESSMENT & PLAN NOTE
Patient endorses chronic constipation  At risk due to hospitalization, relative immobility, comorbidities, history of chronic opioid  Monitor stool output - Per nursing patient has been having regular bowel movements, approximately every other day. Last BM 10/20.   Bowel regimen at facility: miralax daily PRN, senna/docusate, lactulose, adjust as appropriate; bisacodyl suppository PRN  Encourage mobility as tolerated, PO hydration as appropriate, high fiber diet/prune juice (in outpatient setting as appropriate)  Goal is for 1 easy BM every 1-2 days  9/30 Abdominal xray ordered to assess for stool burden: Nonobstructive bowel gas pattern   Continue to monitor  Follow up with PCP as appropriate

## 2024-10-21 NOTE — ASSESSMENT & PLAN NOTE
Lab Results   Component Value Date    HGBA1C 7.9 (A) 07/02/2024   Monitor glucose - fasting glucose had been variable ranging from low 100s to low 200s, most recently due to poor PO intake patient has had multiple low BS over the last few days.   Avoid hypoglycemia  Continue regimen including sitagliptin 50mg daily, insulin lantus 32u daily (decreased as of 10/21 from 34u due to low fasting sugars)  Insulin sliding scale coverage at rehab  Adjust regimen as appropriate.   Encourage lifestyle modifications including healthy diet, weight management, exercise as appropriate  Follow up with PCP, Endocrine/Ophthalmology/Podiatry outpatient as appropriate

## 2024-10-22 LAB
GLUCOSE SERPL-MCNC: 102 MG/DL (ref 65–140)
GLUCOSE SERPL-MCNC: 149 MG/DL (ref 65–140)
GLUCOSE SERPL-MCNC: 183 MG/DL (ref 65–140)
GLUCOSE SERPL-MCNC: 193 MG/DL (ref 65–140)
GLUCOSE SERPL-MCNC: 41 MG/DL (ref 65–140)
GLUCOSE SERPL-MCNC: 80 MG/DL (ref 65–140)

## 2024-10-22 PROCEDURE — 82948 REAGENT STRIP/BLOOD GLUCOSE: CPT

## 2024-10-23 ENCOUNTER — HOME HEALTH ADMISSION (OUTPATIENT)
Dept: HOME HEALTH SERVICES | Facility: HOME HEALTHCARE | Age: 79
End: 2024-10-23
Payer: COMMERCIAL

## 2024-10-23 ENCOUNTER — NURSING HOME VISIT (OUTPATIENT)
Age: 79
End: 2024-10-23
Payer: COMMERCIAL

## 2024-10-23 VITALS
OXYGEN SATURATION: 96 % | BODY MASS INDEX: 27.06 KG/M2 | SYSTOLIC BLOOD PRESSURE: 147 MMHG | DIASTOLIC BLOOD PRESSURE: 65 MMHG | TEMPERATURE: 98.1 F | RESPIRATION RATE: 24 BRPM | HEART RATE: 79 BPM | WEIGHT: 134 LBS

## 2024-10-23 DIAGNOSIS — E78.5 HYPERLIPIDEMIA, UNSPECIFIED HYPERLIPIDEMIA TYPE: ICD-10-CM

## 2024-10-23 DIAGNOSIS — N18.30 BENIGN HYPERTENSION WITH CKD (CHRONIC KIDNEY DISEASE) STAGE III (HCC): ICD-10-CM

## 2024-10-23 DIAGNOSIS — L98.9 SCALP LESION: ICD-10-CM

## 2024-10-23 DIAGNOSIS — E78.2 MIXED HYPERLIPIDEMIA: ICD-10-CM

## 2024-10-23 DIAGNOSIS — N17.9 ACUTE KIDNEY INJURY SUPERIMPOSED ON CHRONIC KIDNEY DISEASE  (HCC): ICD-10-CM

## 2024-10-23 DIAGNOSIS — R63.0 POOR APPETITE: ICD-10-CM

## 2024-10-23 DIAGNOSIS — K59.03 DRUG-INDUCED CONSTIPATION: ICD-10-CM

## 2024-10-23 DIAGNOSIS — Z79.4 TYPE 2 DIABETES MELLITUS WITH STAGE 3B CHRONIC KIDNEY DISEASE, WITH LONG-TERM CURRENT USE OF INSULIN (HCC): ICD-10-CM

## 2024-10-23 DIAGNOSIS — M79.601 PAIN AND SWELLING OF RIGHT UPPER EXTREMITY: ICD-10-CM

## 2024-10-23 DIAGNOSIS — N18.32 STAGE 3B CHRONIC KIDNEY DISEASE (HCC): ICD-10-CM

## 2024-10-23 DIAGNOSIS — R45.89 FLAT AFFECT: ICD-10-CM

## 2024-10-23 DIAGNOSIS — R53.81 PHYSICAL DECONDITIONING: ICD-10-CM

## 2024-10-23 DIAGNOSIS — I73.9 PERIPHERAL VASCULAR DISEASE, UNSPECIFIED (HCC): ICD-10-CM

## 2024-10-23 DIAGNOSIS — K21.9 GERD WITHOUT ESOPHAGITIS: ICD-10-CM

## 2024-10-23 DIAGNOSIS — M86.9 OSTEOMYELITIS, UNSPECIFIED SITE, UNSPECIFIED TYPE (HCC): ICD-10-CM

## 2024-10-23 DIAGNOSIS — N18.9 ACUTE KIDNEY INJURY SUPERIMPOSED ON CHRONIC KIDNEY DISEASE  (HCC): ICD-10-CM

## 2024-10-23 DIAGNOSIS — I77.4 CELIAC ARTERY STENOSIS (HCC): ICD-10-CM

## 2024-10-23 DIAGNOSIS — Z91.89 AT RISK FOR DELIRIUM: ICD-10-CM

## 2024-10-23 DIAGNOSIS — M79.89 PAIN AND SWELLING OF RIGHT UPPER EXTREMITY: ICD-10-CM

## 2024-10-23 DIAGNOSIS — N18.32 TYPE 2 DIABETES MELLITUS WITH STAGE 3B CHRONIC KIDNEY DISEASE, WITH LONG-TERM CURRENT USE OF INSULIN (HCC): ICD-10-CM

## 2024-10-23 DIAGNOSIS — K56.609 SBO (SMALL BOWEL OBSTRUCTION) (HCC): ICD-10-CM

## 2024-10-23 DIAGNOSIS — Z79.4 TYPE 2 DIABETES MELLITUS WITH COMPLICATION, WITH LONG-TERM CURRENT USE OF INSULIN (HCC): ICD-10-CM

## 2024-10-23 DIAGNOSIS — R60.0 BILATERAL LOWER EXTREMITY EDEMA: ICD-10-CM

## 2024-10-23 DIAGNOSIS — R11.2 NAUSEA AND VOMITING, UNSPECIFIED VOMITING TYPE: ICD-10-CM

## 2024-10-23 DIAGNOSIS — F32.9 REACTIVE DEPRESSION: ICD-10-CM

## 2024-10-23 DIAGNOSIS — M54.42 ACUTE BILATERAL LOW BACK PAIN WITH LEFT-SIDED SCIATICA: ICD-10-CM

## 2024-10-23 DIAGNOSIS — I67.4 HYPERTENSIVE ENCEPHALOPATHY: ICD-10-CM

## 2024-10-23 DIAGNOSIS — N18.32 ANEMIA DUE TO STAGE 3B CHRONIC KIDNEY DISEASE  (HCC): ICD-10-CM

## 2024-10-23 DIAGNOSIS — E11.8 TYPE 2 DIABETES MELLITUS WITH COMPLICATION, WITH LONG-TERM CURRENT USE OF INSULIN (HCC): ICD-10-CM

## 2024-10-23 DIAGNOSIS — E03.9 ACQUIRED HYPOTHYROIDISM: ICD-10-CM

## 2024-10-23 DIAGNOSIS — A41.9 SEPSIS (HCC): Primary | ICD-10-CM

## 2024-10-23 DIAGNOSIS — D50.9 IRON DEFICIENCY ANEMIA, UNSPECIFIED IRON DEFICIENCY ANEMIA TYPE: ICD-10-CM

## 2024-10-23 DIAGNOSIS — E11.22 TYPE 2 DIABETES MELLITUS WITH STAGE 3B CHRONIC KIDNEY DISEASE, WITH LONG-TERM CURRENT USE OF INSULIN (HCC): ICD-10-CM

## 2024-10-23 DIAGNOSIS — I10 HYPERTENSION, UNSPECIFIED TYPE: ICD-10-CM

## 2024-10-23 DIAGNOSIS — K21.9 GASTROESOPHAGEAL REFLUX DISEASE, UNSPECIFIED WHETHER ESOPHAGITIS PRESENT: ICD-10-CM

## 2024-10-23 DIAGNOSIS — I25.10 CORONARY ARTERY DISEASE INVOLVING NATIVE CORONARY ARTERY OF NATIVE HEART WITHOUT ANGINA PECTORIS: ICD-10-CM

## 2024-10-23 DIAGNOSIS — J45.909 ASTHMA WITHOUT STATUS ASTHMATICUS WITHOUT COMPLICATION, UNSPECIFIED ASTHMA SEVERITY, UNSPECIFIED WHETHER PERSISTENT: ICD-10-CM

## 2024-10-23 DIAGNOSIS — I12.9 BENIGN HYPERTENSION WITH CKD (CHRONIC KIDNEY DISEASE) STAGE III (HCC): ICD-10-CM

## 2024-10-23 DIAGNOSIS — U07.1 COVID: ICD-10-CM

## 2024-10-23 DIAGNOSIS — I10 PRIMARY HYPERTENSION: ICD-10-CM

## 2024-10-23 DIAGNOSIS — D63.1 ANEMIA DUE TO STAGE 3B CHRONIC KIDNEY DISEASE  (HCC): ICD-10-CM

## 2024-10-23 LAB
GLUCOSE SERPL-MCNC: 145 MG/DL (ref 65–140)
GLUCOSE SERPL-MCNC: 157 MG/DL (ref 65–140)
GLUCOSE SERPL-MCNC: 201 MG/DL (ref 65–140)
GLUCOSE SERPL-MCNC: 209 MG/DL (ref 65–140)
GLUCOSE SERPL-MCNC: 59 MG/DL (ref 65–140)
GLUCOSE SERPL-MCNC: 75 MG/DL (ref 65–140)
GLUCOSE SERPL-MCNC: 89 MG/DL (ref 65–140)

## 2024-10-23 PROCEDURE — 82948 REAGENT STRIP/BLOOD GLUCOSE: CPT

## 2024-10-23 PROCEDURE — 99316 NF DSCHRG MGMT 30 MIN+: CPT

## 2024-10-23 RX ORDER — MIRTAZAPINE 7.5 MG/1
7.5 TABLET, FILM COATED ORAL
Qty: 30 TABLET | Refills: 0 | Status: SHIPPED | OUTPATIENT
Start: 2024-10-23

## 2024-10-23 RX ORDER — TRAMADOL HYDROCHLORIDE 25 MG/1
25 TABLET, COATED ORAL 2 TIMES DAILY
Qty: 20 TABLET | Refills: 0 | Status: SHIPPED | OUTPATIENT
Start: 2024-10-23

## 2024-10-23 RX ORDER — INSULIN GLARGINE 100 [IU]/ML
24 INJECTION, SOLUTION SUBCUTANEOUS DAILY
Start: 2024-10-23 | End: 2025-04-21

## 2024-10-23 RX ORDER — HYDRALAZINE HYDROCHLORIDE 50 MG/1
50 TABLET, FILM COATED ORAL 3 TIMES DAILY
Qty: 90 TABLET | Refills: 0 | Status: SHIPPED | OUTPATIENT
Start: 2024-10-23

## 2024-10-23 RX ORDER — ATORVASTATIN CALCIUM 80 MG/1
80 TABLET, FILM COATED ORAL DAILY
Qty: 30 TABLET | Refills: 0 | Status: SHIPPED | OUTPATIENT
Start: 2024-10-23

## 2024-10-23 RX ORDER — CHLORTHALIDONE 50 MG/1
50 TABLET ORAL DAILY
Qty: 30 TABLET | Refills: 0 | Status: SHIPPED | OUTPATIENT
Start: 2024-10-23

## 2024-10-23 RX ORDER — FAMOTIDINE 20 MG/1
20 TABLET, FILM COATED ORAL DAILY
Qty: 30 TABLET | Refills: 0 | Status: SHIPPED | OUTPATIENT
Start: 2024-10-23

## 2024-10-23 RX ORDER — DOXAZOSIN 2 MG/1
2 TABLET ORAL 2 TIMES DAILY
Qty: 60 TABLET | Refills: 0 | Status: SHIPPED | OUTPATIENT
Start: 2024-10-23

## 2024-10-23 RX ORDER — PANTOPRAZOLE SODIUM 40 MG/1
40 TABLET, DELAYED RELEASE ORAL
Qty: 30 TABLET | Refills: 0 | Status: SHIPPED | OUTPATIENT
Start: 2024-10-23

## 2024-10-23 RX ORDER — FERROUS SULFATE 325(65) MG
325 TABLET ORAL
Qty: 30 TABLET | Refills: 0 | Status: SHIPPED | OUTPATIENT
Start: 2024-10-23

## 2024-10-23 RX ORDER — CEFADROXIL 500 MG/1
500 CAPSULE ORAL EVERY 12 HOURS SCHEDULED
Qty: 60 CAPSULE | Refills: 0 | Status: SHIPPED | OUTPATIENT
Start: 2024-10-25 | End: 2024-11-29

## 2024-10-23 RX ORDER — BUMETANIDE 2 MG/1
2 TABLET ORAL DAILY
Qty: 30 TABLET | Refills: 0 | Status: SHIPPED | OUTPATIENT
Start: 2024-10-23

## 2024-10-23 RX ORDER — LOSARTAN POTASSIUM 100 MG/1
100 TABLET ORAL DAILY
Qty: 30 TABLET | Refills: 0 | Status: SHIPPED | OUTPATIENT
Start: 2024-10-23

## 2024-10-23 RX ORDER — CARVEDILOL 25 MG/1
12.5 TABLET ORAL 2 TIMES DAILY WITH MEALS
Qty: 30 TABLET | Refills: 0 | Status: SHIPPED | OUTPATIENT
Start: 2024-10-23 | End: 2024-11-22

## 2024-10-23 NOTE — ASSESSMENT & PLAN NOTE
Baseline Hgb seems around 9-11 with limited data  Likely related to CKD, history of iron deficiency on labs; likely with acute component related to marrow suppression in setting of acute infection and antibiotics  Hgb 8.8>8.5=8.5<9.4>9.2>8.7>8.2=8.2=8.2  Continue to monitor on routine labs  FOBT negative   Continue iron supplements   B12 WNL   H&H ordered for patient to do next week with [facility] nursing.   Monitor for acute bleed - no present signs  Consider further workup, for persistent/worsening  Transfuse PRN Hgb <7  Continue to monitor for acute changes  Follow up with PCP as appropriate

## 2024-10-23 NOTE — QUICK NOTE
Patient going home from [facility] today. PICC should be removed before patient leaves the facility. She has appropriately transitioned off IV antibiotic to oral Cefadroxil, 500mg BID.  She is planned to remain on this oral treatment until her inflammatory markers normalize vs plateau. Outpatient ID office has been made aware of his discharge needs including follow up appointment in 3 weeks and lab work (CBCD, creatinine, sed rate, CRP) to be collected every other week while on antibiotic treatment. They will reach out to patient to coordinate labs and follow up care.

## 2024-10-23 NOTE — ASSESSMENT & PLAN NOTE
Patient had noted some mild swelling  of RUE and associated discomfort (mainly itching and some tenderness primarily along inside of right elbow/forearm with some associated bruising). Per patient she noticed this in hospital prior to arrival to rehab, she thinks in setting of attempted blood draw on the RUE. She does presently have PICC at the RUE site.  Symptoms mild/stable per patient  Have started patient on DVT prophylaxis dose heparin in any case while at rehab  Encourage extremity elevation, topical ice as tolerated  topical voltaren  US RUE to r/o DVT - negative  Continue to monitor closely - much improved   Follow up with PCP as appropriate

## 2024-10-23 NOTE — ASSESSMENT & PLAN NOTE
10/19 patient with nausea she associates with Doxazosin.   No further episodes   Continue Zofran PRN  Make sure patient is taking medication with crackers/food.   Continue to monitor  Follow up with PCP as appropriate

## 2024-10-23 NOTE — ASSESSMENT & PLAN NOTE
Noted inpatient with AMS requiring rapid response 9/20/24  Stroke ruled out on inpatient workup  Thought to be related to hypertension  HTN regimen was adjusted, see plan under HTN  Mentation seems at baseline presently at rehab  Follow up with PCP as appropriate

## 2024-10-23 NOTE — ASSESSMENT & PLAN NOTE
Noted POA to recent hospitalization, likely multifactorial in setting of COVID and concern for MSSA bacteremia and osteomyelitis. Evaluated by ID inpatient  IV cefazolin for 6 week course (through 10/21/24) since completed   Monitor labs while on IV abx in accordance with ID recs (CBCd, BMP, ESR/CRP)  Monitor for acute/recurrent infectious symptoms - no new/acute symptoms, acute sepsis seems resolved with ongoing treatment  Evaluated by ID on 10/9: Anticipate transition to PO Cefadroxil after completion of IV treatment which she will remain on until inflammatory markers normalize vs plateau.   Cefadroxil  prescription sent to pharmacy   Follow up with ID post discharge

## 2024-10-23 NOTE — PROGRESS NOTES
[facility] Care  Facility: [facility]    DISCHARGE SUMMARY  Nursing [place] Place of Service: 31: SNF/Short Term Rehab    NAME: Porsha Hoyos  : 1945 AGE: 79 y.o. SEX: female MRN: [id number]  DATE OF ENCOUNTER: 10/23/2024    DATE OF ADMISSION: 24 DATE OF DISCHARGE: 10/24/24 DISCHARGE DISPOSITION: Stable    HPI:   Porsha Hoyos is a 79-year-old female with past medical history including HTN, HLD, CAD, PAD, CVA, chronic back pain/spinal stenosis, DM2, hypothyroidism, CKD, GERD, and asthma.     Reason for admission: Patient initially hospitalized at [facility] from  to 24 with acute on chronic back pain, found to have sepsis in setting of COVID (required supplemental O2 and treated with 5 days paxlovid). Patient also found to have 1 of 2 blood cultures positive for MSSA, evaluated by ID, maintained on IV abx transitioned to IV cefazolin with repeat blood cultures negative and echo negative for vegetations though spine imaging concerning for possible osteomyelitis, therefore patient to complete 6 weeks IV cefazolin via PICC (through 10/21/24) for presumed osteomyelitis.  During stay patient also had CINDY considered likely multifactorial in setting of contrast use and hypotension, evaluated by Nephrology and improved with gentle hydration.  Patient was evaluated by Vascular due to findings of celiac and left renal artery stenosis, with no acute intervention indicated. Patient also was evaluated by Dermatology for scalp lesion.  Patient was at [facility] for rehab from -24, while at rehab patient had worsening abdominal pain/constipation resistant to bowel regimen and was transferred to inpatient setting due to concern of SBO (unable to manage NG tube at rehab facility).  Subsequently patient was hospitalized at [facility] from -24 for SBO.  SBO resolved with NG tube/supportive care, opioid pain meds were able to be discontinued, her course was complicated by  acute AMS on 9/20 for which stroke workup was negative and was thought to be hypertensive encephalopathy which improved with BP management; ultimately deemed stable for return to rehab.      Course of stay: Patient was admitted to [facility] Unit for rehabilitation due to physical deconditioning and medical management. Significant events during the stay include: uncontrolled HTN. The patient participated in PT/OT.     Patient is being seen and examined today for discharge planning and follow-up on acute and chronic medical conditions. While admitted to [facility] patient received comprehensive rehabilitation services with an overall progression in her functional status.  Patient able to ambulate 225 feet with walker, and able to navigate 2 stairs.  Patient is independent with ADLs and requires some assistance IADLs.  Patient will be going home with VNA nursing and PT/OT services. Prior to admission patient lived alone in a mobile home. Her  passed away around 6 months ago, son sometimes stays with her. Reports at baseline she is quite independent at home including ADLs, meals, medications, finances.  Upon discharge patient will be returning home and her son and daughter in law are moving in with her.  Medications reviewed with patient and granddaughter; verbalized understanding. Upon exam patient is resting in recliner, accompanied by her granddaughter. Patient reports some concern regarding being along at home, granddaughter reassured patient that when her dad can't be there she will come over during the day, patient also stated her neighbor comes to visit regularly too. Patient states nausea has improved and no longer has bitter taste in her mouth. Her BP is better controlled today as well as her renal functions. Patient offers no medical complaints at this time. Her appetite remains low, she is having regular bowel movements, last BM today. Patient is in no acute distress, denies  chest pain, shortness of breath, abdominal pain, N/V/C/D.  Patient is cleared for discharge.  Patient reports she has a BP cuff at home and will maintain log for Cardiology. Patient has follow up appointments scheduled with Infectious Disease on 11/13. Recommend patient schedule a follow up with Cardiology in the next 7-10 days for close monitoring of blood pressure. I also recommend follow up with PCP in the next 7-10 days for continued monitoring of renal functions, TSH, blood sugar and hemoglobin. Follow up with Dermatology regarding scalp lesion. Prescriptions sent to pharmacy as requested.     Labs Reviewed  CBC:   Results from Last 12 Months   Lab Units 10/22/24  0528 10/21/24  0757 09/30/24  0759 09/24/24  0635   WBC Thousand/uL  --  3.75*   < > 4.69   RBC Million/uL  --  2.69*   < > 2.75*   HEMOGLOBIN g/dL 8.8* 8.5*  8.5*   < > 8.7*   HEMATOCRIT % 29.0* 26.9*  26.9*   < > 26.5*   MCV fL  --  100*   < > 96   MCH pg  --  31.6   < > 31.6   MCHC g/dL  --  31.6   < > 32.8   RDW %  --  14.6   < > 14.6   MPV fL  --  12.5   < > 10.8   PLATELETS Thousands/uL  --  161   < > 297   NRBC AUTO /100 WBCs  --   --   --  0   SEGS PCT %  --   --   --  71   LYMPHO PCT %  --   --   --  14   MONO PCT %  --   --   --  10   EOS PCT %  --   --   --  3   BASOS PCT %  --   --   --  1   TOTAL NEUT ABS Thousands/µL  --   --   --  3.33   LYMPHS ABS Thousands/µL  --   --   --  0.65   MONOS ABS Thousand/µL  --   --   --  0.47   EOS ABS Thousand/µL  --   --   --  0.16    < > = values in this interval not displayed.     Chemistry Profile:   Results from Last 12 Months   Lab Units 10/23/24  0733 10/04/24  0756 10/02/24  0738 09/09/24  0433 09/08/24  0443 09/07/24  1655 07/10/24  1014 07/10/24  1014   POTASSIUM mmol/L 4.0   < > 5.1   < > 3.8 5.0  --  5.0   CHLORIDE mmol/L 103   < > 107   < > 108 102  --  105   CO2 mmol/L 29   < > 23   < > 23 26  --  29   BUN mg/dL 35*   < > 43*   < > 38* 43*  --  44*   CREATININE mg/dL 1.30   < > 1.37*   <  > 1.11 1.19  --  1.30   GLUCOSE FASTING mg/dL  --   --   --   --  110*  --   --  101*   GLUCOSE RANDOM mg/dL 61*   < > 225*   < > 110 227*   < >  --    CALCIUM mg/dL 9.1   < > 9.0   < > 8.9 10.1  --  9.7   MAGNESIUM mg/dL  --   --  2.0   < > 1.7*  --   --  2.2   PHOSPHORUS mg/dL  --   --   --   --   --   --   --  4.6*   AST U/L  --   --   --   --   --  36  --  26   ALT U/L  --   --   --   --   --  30  --  29   ALK PHOS U/L  --   --   --   --   --  80  --  85   EGFR ml/min/1.73sq m 38   < > 36   < > 46 42  --  38    < > = values in this interval not displayed.       Assessment/Plan:  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HCC)  Noted on inpatient workup  Evaluated by Vascular inpatient with no acute intervention indicated. Considered unlikely to be contributing to patient's presenting symptoms.  Continue aspirin, statin  Follow up with PCP, Vascular as appropriate    Coronary artery disease involving native coronary artery of native heart without angina pectoris  Noted history  No acute cardiac complaints  Continue regimen including aspirin, statin, beta blocker  PRN nitro  Follow up with PCP, Cardiology as appropriate    Peripheral vascular disease, unspecified (HCC)  Continue statin, aspirin  Follow up with PCP, Vascular as appropriate    Asthma without status asthmaticus without complication  Chronic history  No acute exacerbation presently. Did transiently have O2 requirement inpatient in setting of COVID, see plan under COVID  Monitor respiratory status - seems stable/baseline per patient, on room air  Continue breathing regimen  Consider respiratory consult if needed  Follow up with PCP, Pulmonology as appropriate    Drug-induced constipation  Patient endorses chronic constipation  At risk due to hospitalization, relative immobility, comorbidities, history of chronic opioid  Monitor stool output - Per nursing patient has been having regular bowel movements, approximately every other day. Last BM 10/22.   Bowel regimen at  facility: miralax daily PRN, senna/docusate, lactulose, adjust as appropriate; bisacodyl suppository PRN  Encourage mobility as tolerated, PO hydration as appropriate, high fiber diet/prune juice (in outpatient setting as appropriate)  Goal is for 1 easy BM every 1-2 days  9/30 Abdominal xray ordered to assess for stool burden: Nonobstructive bowel gas pattern   Continue to monitor  Follow up with PCP as appropriate     Gastroesophageal reflux disease  Stable per patient  Continue  Famotidine and Pantoprazole  Avoid citrus, spicy, caffeine, and chocolate  Avoid eating/drinking 1 hour prior to laying down  Continue to monitor  Follow up with PCP as appropriate       Nausea and vomiting  10/19 patient with nausea she associates with Doxazosin.   No further episodes   Continue Zofran PRN  Make sure patient is taking medication with crackers/food.   Continue to monitor  Follow up with PCP as appropriate       SBO (small bowel obstruction) (HCC)  Recent SBO in setting of constipation  Managed inpatient with NG tube and supportive care with clinical resolution  See plan under constipation  Follow up with PCP, GI as appropriate     Hypothyroidism  TSH from 10/10 elevated 10.38, previously stable, likely due to recent infection.  Continue levothyroxine  Recommend repeat TSH in 4-6 weeks  Follow up with PCP, Endocrinology post discharge    Type 2 diabetes mellitus with diabetic chronic kidney disease (HCC)    Lab Results   Component Value Date    HGBA1C 7.9 (A) 07/02/2024   Monitor glucose - fasting glucose had been variable ranging from low 100s to low 200s, most recently due to poor PO intake patient has had multiple low BS over the last few days.   Avoid hypoglycemia  Continue regimen including sitagliptin 50mg daily, insulin lantus 24u daily (decreased as of 10/21 from 34u 10/22 28u due to low fasting sugars). Using caution due to patient discharge tomorrow and at risk of hypoglycemia.   Insulin sliding scale coverage at  rehab  Adjust regimen as appropriate.   Encourage lifestyle modifications including healthy diet, weight management, exercise as appropriate  Follow up with PCP, Endocrine/Ophthalmology/Podiatry in the next 7-10 days       Hypertensive encephalopathy  Noted inpatient with AMS requiring rapid response 9/20/24  Stroke ruled out on inpatient workup  Thought to be related to hypertension  HTN regimen was adjusted, see plan under HTN  Mentation seems at baseline presently at rehab  Follow up with PCP as appropriate     Osteomyelitis (HCC)  See plan under sepsis  Had been recommended repeat MRI of spine in several weeks, can coordinate with ID if patient still in-house  Patient evaluated by ID 10/9: Anticipate transition to PO Cefadroxil after completion of IV treatment which she will remain on until inflammatory markers normalize vs plateau.   Prescription sent to pharmacy for 30 day supply of cefadroxil.   Follow up with ID post discharge.     Scalp lesion  Patient noted a scalp lesion present since perhaps 1-2 years. She believes he first noted it after a hair perm appointment.  Questionably source of recent bacteremia?  Dermatology was consulted inpatient and recommended outpatient follow-up  Continue to monitor, continue local care as appropriate  Per patient the lesion has at times bled though she admits to scratching at it at times. She has not noted any acute change to the lesion recently. It is an irregular, scabbed appearing lesion. May require biopsy outpatient.  Patient does not presently feel she needs any topical treatment for it  Follow up with PCP, Dermatology as appropriate    Acute kidney injury superimposed on chronic kidney disease  (HCC)  Lab Results   Component Value Date    EGFR 38 10/23/2024    EGFR 29 10/22/2024    EGFR 29 10/21/2024    CREATININE 1.30 10/23/2024    CREATININE 1.62 (H) 10/22/2024    CREATININE 1.62 (H) 10/21/2024   Renal functions stable presently   Patient also noted to have CINDY  while inpatient.  Evaluated by Nephrology inpatient. Losartan and bumex were held temporarily inpatient.  Monitor renal function on routine labs  Continue Bumex 2mg daily   Avoid nephrotoxins, NSAIDs as able  Encourage PO hydration, respecting volume status  Follow up with PCP, Nephrology as appropriate    Reactive depression  Patient mood stable.  Patient with history of loss of her  in February and loss of her son a few years ago. Recent hospitalization adding to depression.   Patient recently started on Mirtazapine inpatient    Discussion with patient regarding adding antidepressant (SSRI), patient would like to hold on adding any medication at this time.   Spiritual consult ordered  Continue supportive measures  Encourage activity and engagement  Will continue care in collaboration with psychiatry services prn  Continue to monitor for acute changes in condition   Follow up with PCP as appropriate     Anemia  Baseline Hgb seems around 9-11 with limited data  Likely related to CKD, history of iron deficiency on labs; likely with acute component related to marrow suppression in setting of acute infection and antibiotics  Hgb 8.8>8.5=8.5<9.4>9.2>8.7>8.2=8.2=8.2  Continue to monitor on routine labs  FOBT negative   Continue iron supplements   B12 WNL   H&H ordered for patient to do next week with VNA nursing.   Monitor for acute bleed - no present signs  Consider further workup, for persistent/worsening  Transfuse PRN Hgb <7  Continue to monitor for acute changes  Follow up with PCP as appropriate     Physical deconditioning  Multifactorial in setting of hospitalization, infections, pain  Continue PT/OT   Assist with ADLs  Encourage PO nutrition and hydration.  Encourage appropriate DME use    following for discharge planning.  Maintain fall and safety precautions.  Follow up with PCP as appropriate         Acute on Chronic Back Pain in the Setting of Sepsis, MSSA Bacteremia  With chronic low  back pain (generally L>R with associated intermittent sciatica) with history of spinal stenosis and reported spinal surgery. Recent acute exacerbation outpatient (without associated trauma) leading to present hospitalization.  Likely related to sepsis in setting of presumed osteomyelitis per inpatient workup.  Monitor pain - per patient pain has improved since adding tramadol 25mg BID (previously on 50mg BID)  Current regimen including: tylenol 975mg TID, lidocaine patch/voltaren gel, tramadol 25mg BID  Adjust/wean regimen as appropriate. Opioids had been stopped inpatient, have been reintroduced at lower dose for her chronic pain and seems to be tolerating well. Methocarbamol was not as effective and tizanidine seemed to cause sedation, seems better controlled with PRN baclofen. Use caution with pain regimen as she has had issues with constipation and with sedation with higher doses. As of 10/15 discontinued gabapentin as pain improved/stable recently. As of 10/21 discontinued baclofen.   See plan under sepsis  Monitor stool output, increased risk of SBO/constipation.   See plan under sepsis  Continue to monitor  Follow up with PCP as appropriate     Bilateral lower extremity edema  Seems to be a chronic issue  No clear noted history of CHF however recent echos have generally been limited/difficult studies so unclear what her present EF is  Monitor weight- weight trending down  Monitor volume status clinically - BLE edema +1, lungs clear, appears dry, denies orthopnea  Encourage elevation, compression of lower extremities as able  Continue bumex with hold parameters  Follow up with PCP, Cardiology as appropriate        Pain and swelling of right upper extremity  Patient had noted some mild swelling  of RUE and associated discomfort (mainly itching and some tenderness primarily along inside of right elbow/forearm with some associated bruising). Per patient she noticed this in hospital prior to arrival to rehab, she  thinks in setting of attempted blood draw on the RUE. She does presently have PICC at the RUE site.  Symptoms mild/stable per patient  Have started patient on DVT prophylaxis dose heparin in any case while at rehab  Encourage extremity elevation, topical ice as tolerated  topical voltaren  US RUE to r/o DVT - negative  Continue to monitor closely - much improved   Follow up with PCP as appropriate     COVID  Tested positive on 9/7/24 for COVID  Symptoms earlier on including reported fevers/chills and mild cough   Did require supplemental O2 in hospital temporarily  Was treated with Paxlovid course inpatient  Monitor respiratory status - stable on room air  Supportive care, encourage IS  Patient off isolation  Follow up with PCP as appropriate       Mixed hyperlipidemia  Continue statin  Encourage lifestyle modifications including healthy diet, weight management, exercise as appropriate  Follow up with PCP as appropriate     Poor appetite  Patient has endorsed generally poor appetite since passing of her  around 6 months ago.   Was started on mirtazapine inpatient, appears tolerating well.  Nutrition consult placed  Continue to monitor  Follow up with PCP as appropriate     Sepsis (HCC): SIRS criteria MSSA Bacteremia and COVID infection  Noted POA to recent hospitalization, likely multifactorial in setting of COVID and concern for MSSA bacteremia and osteomyelitis. Evaluated by ID inpatient  IV cefazolin for 6 week course (through 10/21/24) since completed   Monitor labs while on IV abx in accordance with ID recs (CBCd, BMP, ESR/CRP)  Monitor for acute/recurrent infectious symptoms - no new/acute symptoms, acute sepsis seems resolved with ongoing treatment  Evaluated by ID on 10/9: Anticipate transition to PO Cefadroxil after completion of IV treatment which she will remain on until inflammatory markers normalize vs plateau.   Cefadroxil  prescription sent to pharmacy   Follow up with ID post discharge      Review of Systems   Constitutional:  Positive for appetite change (improving). Negative for chills, diaphoresis, fatigue and fever.   HENT:  Negative for congestion, hearing loss (mild, baseline), rhinorrhea, sore throat and trouble swallowing.    Respiratory:  Negative for cough, chest tightness, shortness of breath (baseline) and wheezing.    Cardiovascular:  Positive for leg swelling. Negative for chest pain and palpitations.   Gastrointestinal:  Negative for abdominal distention, abdominal pain, blood in stool, constipation, diarrhea, nausea and vomiting.   Genitourinary:  Negative for difficulty urinating, dysuria, flank pain and hematuria.   Musculoskeletal:  Positive for back pain (improving) and gait problem. Negative for arthralgias.   Neurological:  Positive for weakness. Negative for dizziness, facial asymmetry, light-headedness and headaches.   All other systems reviewed and are negative.      Vital Signs:   Vitals:   Vitals:    10/23/24 0944   BP: 147/65   Pulse: 79   Resp: (!) 24   Temp: 98.1 °F (36.7 °C)   SpO2: 96%       Exam:   Physical Exam  Vitals and nursing note reviewed.   Constitutional:       General: She is not in acute distress.     Appearance: Normal appearance. She is not toxic-appearing or diaphoretic.   HENT:      Head: Normocephalic and atraumatic.      Right Ear: External ear normal.      Left Ear: External ear normal.      Nose: Nose normal. No congestion or rhinorrhea.      Mouth/Throat:      Mouth: Mucous membranes are dry.   Eyes:      General: No scleral icterus.        Right eye: No discharge.         Left eye: No discharge.      Conjunctiva/sclera: Conjunctivae normal.   Cardiovascular:      Rate and Rhythm: Normal rate and regular rhythm.   Pulmonary:      Effort: Pulmonary effort is normal. No respiratory distress.      Breath sounds: Normal breath sounds. No wheezing, rhonchi or rales.   Abdominal:      General: Bowel sounds are normal. There is no distension.       Tenderness: There is no abdominal tenderness. There is no guarding or rebound.   Musculoskeletal:         General: Swelling (RUE much improved) present.      Right lower leg: Edema present.      Left lower leg: Edema present.      Comments: BLE 0.5/1+ pitting edema  PICC in place at RUE.      Skin:     General: Skin is warm and dry.   Neurological:      General: No focal deficit present.      Mental Status: She is alert and oriented to person, place, and time. Mental status is at baseline.      Motor: Weakness present.      Gait: Gait abnormal.   Psychiatric:         Mood and Affect: Mood normal.         Behavior: Behavior normal.         Thought Content: Thought content normal.         Judgment: Judgment normal.           Discharge Medications: See discharge medication list which was reviewed and signed.    Status at time of discharge: Stable    Discussion with patient/family as appropriate and further instructions:  -Fall precautions  -Aspiration precautions  -Bleeding precautions  -Monitor for signs/symptoms of infection  -Medication list was reviewed and signed  -DME form was completed    Follow-up Recommendations: Please follow-up with your primary care physician within 7-10 days of discharge to review medication changes and current status.     Problem List Follow-up Recommendations:      -Total time spent on this encounter today including documentation and workup review, face to face time, history and exam, and documentation/orders was approximately 50 minutes.  -This note will be copied to [facility] EMR where vitals and medication orders are placed.    SUDHEER Rico  Geriatric Medicine  10/23/2024 10:19 AM

## 2024-10-23 NOTE — ASSESSMENT & PLAN NOTE
With chronic low back pain (generally L>R with associated intermittent sciatica) with history of spinal stenosis and reported spinal surgery. Recent acute exacerbation outpatient (without associated trauma) leading to present hospitalization.  Likely related to sepsis in setting of presumed osteomyelitis per inpatient workup.  Monitor pain - per patient pain has improved since adding tramadol 25mg BID (previously on 50mg BID)  Current regimen including: tylenol 975mg TID, lidocaine patch/voltaren gel, tramadol 25mg BID  Adjust/wean regimen as appropriate. Opioids had been stopped inpatient, have been reintroduced at lower dose for her chronic pain and seems to be tolerating well. Methocarbamol was not as effective and tizanidine seemed to cause sedation, seems better controlled with PRN baclofen. Use caution with pain regimen as she has had issues with constipation and with sedation with higher doses. As of 10/15 discontinued gabapentin as pain improved/stable recently. As of 10/21 discontinued baclofen.   See plan under sepsis  Monitor stool output, increased risk of SBO/constipation.   See plan under sepsis  Continue to monitor  Follow up with PCP as appropriate

## 2024-10-23 NOTE — ASSESSMENT & PLAN NOTE
See plan under sepsis  Had been recommended repeat MRI of spine in several weeks, can coordinate with ID if patient still in-house  Patient evaluated by ID 10/9: Anticipate transition to PO Cefadroxil after completion of IV treatment which she will remain on until inflammatory markers normalize vs plateau.   Prescription sent to pharmacy for 30 day supply of cefadroxil.   Follow up with ID post discharge.

## 2024-10-23 NOTE — ASSESSMENT & PLAN NOTE
Lab Results   Component Value Date    EGFR 38 10/23/2024    EGFR 29 10/22/2024    EGFR 29 10/21/2024    CREATININE 1.30 10/23/2024    CREATININE 1.62 (H) 10/22/2024    CREATININE 1.62 (H) 10/21/2024   Renal functions stable presently   Patient also noted to have CINDY while inpatient.  Evaluated by Nephrology inpatient. Losartan and bumex were held temporarily inpatient.  Monitor renal function on routine labs  Continue Bumex 2mg daily   Avoid nephrotoxins, NSAIDs as able  Encourage PO hydration, respecting volume status  Follow up with PCP, Nephrology as appropriate

## 2024-10-23 NOTE — ASSESSMENT & PLAN NOTE
Lab Results   Component Value Date    HGBA1C 7.9 (A) 07/02/2024   Monitor glucose - fasting glucose had been variable ranging from low 100s to low 200s, most recently due to poor PO intake patient has had multiple low BS over the last few days.   Avoid hypoglycemia  Continue regimen including sitagliptin 50mg daily, insulin lantus 24u daily (decreased as of 10/21 from 34u 10/22 28u due to low fasting sugars). Using caution due to patient discharge tomorrow and at risk of hypoglycemia.   Insulin sliding scale coverage at rehab  Adjust regimen as appropriate.   Encourage lifestyle modifications including healthy diet, weight management, exercise as appropriate  Follow up with PCP, Endocrine/Ophthalmology/Podiatry in the next 7-10 days

## 2024-10-23 NOTE — ASSESSMENT & PLAN NOTE
Patient endorses chronic constipation  At risk due to hospitalization, relative immobility, comorbidities, history of chronic opioid  Monitor stool output - Per nursing patient has been having regular bowel movements, approximately every other day. Last BM 10/22.   Bowel regimen at facility: miralax daily PRN, senna/docusate, lactulose, adjust as appropriate; bisacodyl suppository PRN  Encourage mobility as tolerated, PO hydration as appropriate, high fiber diet/prune juice (in outpatient setting as appropriate)  Goal is for 1 easy BM every 1-2 days  9/30 Abdominal xray ordered to assess for stool burden: Nonobstructive bowel gas pattern   Continue to monitor  Follow up with PCP as appropriate

## 2024-10-23 NOTE — ASSESSMENT & PLAN NOTE
Tested positive on 9/7/24 for COVID  Symptoms earlier on including reported fevers/chills and mild cough   Did require supplemental O2 in hospital temporarily  Was treated with Paxlovid course inpatient  Monitor respiratory status - stable on room air  Supportive care, encourage IS  Patient off isolation  Follow up with PCP as appropriate

## 2024-10-23 NOTE — ASSESSMENT & PLAN NOTE
TSH from 10/10 elevated 10.38, previously stable, likely due to recent infection.  Continue levothyroxine  Recommend repeat TSH in 4-6 weeks  Follow up with PCP, Endocrinology post discharge

## 2024-10-24 LAB
GLUCOSE SERPL-MCNC: 112 MG/DL (ref 65–140)
GLUCOSE SERPL-MCNC: 134 MG/DL (ref 65–140)

## 2024-10-24 PROCEDURE — 82948 REAGENT STRIP/BLOOD GLUCOSE: CPT

## 2024-10-25 ENCOUNTER — HOME CARE VISIT (OUTPATIENT)
Dept: HOME HEALTH SERVICES | Facility: HOME HEALTHCARE | Age: 79
End: 2024-10-25
Payer: COMMERCIAL

## 2024-10-25 VITALS
HEART RATE: 80 BPM | OXYGEN SATURATION: 99 % | SYSTOLIC BLOOD PRESSURE: 130 MMHG | TEMPERATURE: 97.3 F | DIASTOLIC BLOOD PRESSURE: 44 MMHG

## 2024-10-25 DIAGNOSIS — B95.61 MSSA BACTEREMIA: Primary | ICD-10-CM

## 2024-10-25 DIAGNOSIS — M46.46 DISCITIS OF LUMBAR REGION: ICD-10-CM

## 2024-10-25 DIAGNOSIS — R78.81 MSSA BACTEREMIA: Primary | ICD-10-CM

## 2024-10-25 PROCEDURE — 400013 VN SOC

## 2024-10-25 PROCEDURE — G0299 HHS/HOSPICE OF RN EA 15 MIN: HCPCS

## 2024-10-25 NOTE — CASE COMMUNICATION
TC with SUDHEER Mendieta, [phone], Patient had Spiritual consult done in rehab mirtazapine started. Tawnya WILLARD sent message to PCP and patient to follow up with PCP and cardiology. Extensive conversations had with patient to log BP and why Mirtazapine was started. Patient should only be using 24 units lantus at night not anything else no short acting insulin. Other mediations need to be addressed by PCP and cardiology.

## 2024-10-25 NOTE — CASE COMMUNICATION
Addendum to admission note  Major Drug-Drug interaction between traMADol HCl and mirtazapine   Serotonergic effects of this combination may be additive. The risk for serotonin syndrome/toxicity may be increased. Additionally, additive CNS depression may occur.

## 2024-10-25 NOTE — PROGRESS NOTES
OPAT NOTE      Supervising/Discharge provider: Taco    Diagnosis: MSSA Bacteremia, OM    Drug: Cefadroxil    Dose/Route/Frequency: 500mg PO Q12    Labs/Frequency: CBCD Cre ESR CRP every other week    End Date: Inflammatory markers normalize/plateau    Infusion/VNA/SNF contact: RAVI    Next appointment: 11/13      MA assigned:  Sofia

## 2024-10-25 NOTE — CASE COMMUNICATION
TC to PCP [facility] left message with Moo to report need to address admission note. PCP not if office today and note will be forwarded to two MD's on call. Please in basket RN any new orders.      Medication discrepancies or Major drug interactions: Reports 10 lb weight loss in last year and poor appetite. Patient reports BP high all the time. Now hypotensive 130/44 right arm HR 80. Reports constant anxiety patient scored a 19  moderate severe depression on PHQ-9 scale and reports 7-11 days thoughts that she would be better off dead, or of hurting self in some way. Reports no plans of hurting self. Numbness in fingertips since going to hospital. RN asked patient to pull up 20 units of air in insulin syringe. Patient pulled up 2 units. RN reeducated patient then asked to pull up 12 units of air patient pulled up 0 units. Notified son that patient needs close murcia pervision with insulin. May benefit from pens.   Abnormal clinical findings: Per [facility] Transfer/Discharge Report 10/24/24:   Needs a refill called in for Nitro 0.4 place 1 tab under the tongue (sublingual) as needed for chest pain every 5 minutes x 3 doses If chest pain persists call 911 (unless otherwise directed by MD) and Zofran 4 mg Q8 PRN for NV. Educated Sennosides 8.6 mg 2 tab HS and Colace 100 mg  BID are over the counter.   Patient reports no need forbisacodyl rectal supp, lactulose or saline mist spray ok to DC?   Patient ordered iron 65 Fe but hs 27 mg in home, Calcium 315 mg+ 200 unit ordered but Calcium 600 mg+D3 800 unit in home, ok to continue?   Not ordered but was taking Vitamin E 400 iu daily, Melatonin 10 mg PRN for insomnia, vitamin C 1000 mg daily, Omega 3 Fish oil 1000 mg daily, Mag64 128 mg daily, Flonase 50 mcg SC N for allergies, Centrum Silver Adults 50+ 1 tab daily, Montelukast 10 mg HS, Methocarbamol 500 mg TID PRN for muscle spasms, Humulin R insulin sliding scale crossed off on order sheet.  OK to continue?   Response needed, please respond via In Basket   [facility] has Admitted your patient to Home Health service with the following disciplines: SN, PT and OT   Patient stated goals of care: Walk without walker.   Potential barriers to g oal achievement: medication discrepancies   Primary focus of home health care:Cardiac Circulatory   Anticipated visit pattern and next visit date: 1w1 2w3   Thank you for allowing us to participate in the care of your patient.   Allan Morrison RN

## 2024-10-25 NOTE — CASE COMMUNICATION
Medication discrepancies or Major drug interactions: Reports 10 lb weight loss in last year and poor appetite. Patient reports BP high all the time. Now hypotensive 130/44 right arm HR 80. Reports constant anxiety patient scored a 19 moderate severe depression on PHQ-9 scale and reports 7-11 days thoughts that she would be better off dead, or of hurting self in some way. Reports no plans of hurting self. Numbness in fingertips since goi ng to hospital. RN asked patient to pull up 20 units of air in insulin syringe. Patient pulled up 2 units. RN reeducated patient then asked to pull up 12 units of air patient pulled up 0 units. Notified son that patient needs close supervision with insulin. May benefit from pens.    Abnormal clinical findings: Per [facility] Transfer/Discharge Report 10/24/24:   Needs a refill called in for Nitro 0.4 place 1 t ab under the tongue (sublingual) as needed for chest pain every 5 minutes x 3 doses If chest pain persists call 911 (unless otherwise directed by MD) and Zofran 4 mg Q8 PRN for NV. Educated Sennosides 8.6 mg 2 tab HS and Colace 100 mg BID are over the counter.    Patient reports no need forbisacodyl rectal supp,  lactulose or saline mist spray ok to DC?  Patient ordered iron 65 Fe but hs 27 mg in home, Calcium 315 mg+ 200 unit ordered b ut Calcium 600 mg+D3 800 unit in home, ok to continue?    Not ordered but was taking Vitamin E 400 iu daily, Melatonin 10 mg PRN for insomnia, vitamin C 1000 mg daily, Omega 3 Fish oil 1000 mg daily, Mag64 128 mg daily, Flonase 50 mcg PRN for allergies, Centrum Silver Adults 50+ 1 tab daily, Montelukast 10 mg HS, Methocarbamol 500 mg TID PRN for muscle spasms, Humulin R insulin sliding scale crossed off on order sheet. OK to continue?     Response needed, please respond via In Basket  [facility] with the following disciplines: SN, PT and OT  Patient stated goals of  care: Walk without walker.   Potential barriers to goal achievement: medication discrepancies  Primary focus of home health care:Cardiac Circulatory  Anticipated visit pattern and next visit date: 1w1 2w3  Thank you for allowing us to participate in the ca re of your patient.      Allan Morrison RN

## 2024-10-28 ENCOUNTER — HOME CARE VISIT (OUTPATIENT)
Dept: HOME HEALTH SERVICES | Facility: HOME HEALTHCARE | Age: 79
End: 2024-10-28
Payer: COMMERCIAL

## 2024-10-28 ENCOUNTER — TELEPHONE (OUTPATIENT)
Dept: INFECTIOUS DISEASES | Facility: CLINIC | Age: 79
End: 2024-10-28

## 2024-10-28 VITALS — HEART RATE: 93 BPM | SYSTOLIC BLOOD PRESSURE: 170 MMHG | DIASTOLIC BLOOD PRESSURE: 60 MMHG | OXYGEN SATURATION: 96 %

## 2024-10-28 PROCEDURE — G0151 HHCP-SERV OF PT,EA 15 MIN: HCPCS

## 2024-10-28 NOTE — TELEPHONE ENCOUNTER
Spoke with patient.  She is tolerating the antibiotics well.  She verbalizes understanding of abx plan, every other week labs, and follow up in the office. She will call if she has questions/concerns.

## 2024-10-29 ENCOUNTER — HOME CARE VISIT (OUTPATIENT)
Dept: HOME HEALTH SERVICES | Facility: HOME HEALTHCARE | Age: 79
End: 2024-10-29
Payer: COMMERCIAL

## 2024-10-29 ENCOUNTER — OFFICE VISIT (OUTPATIENT)
Dept: INTERNAL MEDICINE CLINIC | Age: 79
End: 2024-10-29
Payer: COMMERCIAL

## 2024-10-29 VITALS
DIASTOLIC BLOOD PRESSURE: 70 MMHG | BODY MASS INDEX: 25.6 KG/M2 | WEIGHT: 127 LBS | SYSTOLIC BLOOD PRESSURE: 136 MMHG | OXYGEN SATURATION: 98 % | HEIGHT: 59 IN | TEMPERATURE: 98.5 F | HEART RATE: 87 BPM

## 2024-10-29 DIAGNOSIS — M86.9 OSTEOMYELITIS, UNSPECIFIED SITE, UNSPECIFIED TYPE (HCC): ICD-10-CM

## 2024-10-29 DIAGNOSIS — Z79.4 TYPE 2 DIABETES MELLITUS WITH STABLE PROLIFERATIVE RETINOPATHY OF BOTH EYES, WITH LONG-TERM CURRENT USE OF INSULIN (HCC): ICD-10-CM

## 2024-10-29 DIAGNOSIS — E11.3553 TYPE 2 DIABETES MELLITUS WITH STABLE PROLIFERATIVE RETINOPATHY OF BOTH EYES, WITH LONG-TERM CURRENT USE OF INSULIN (HCC): ICD-10-CM

## 2024-10-29 DIAGNOSIS — I25.10 CORONARY ARTERY DISEASE INVOLVING NATIVE CORONARY ARTERY OF NATIVE HEART WITHOUT ANGINA PECTORIS: ICD-10-CM

## 2024-10-29 DIAGNOSIS — E11.22 TYPE 2 DIABETES MELLITUS WITH STAGE 3B CHRONIC KIDNEY DISEASE, WITH LONG-TERM CURRENT USE OF INSULIN (HCC): ICD-10-CM

## 2024-10-29 DIAGNOSIS — I16.0 HYPERTENSIVE URGENCY: ICD-10-CM

## 2024-10-29 DIAGNOSIS — D61.818 PANCYTOPENIA (HCC): ICD-10-CM

## 2024-10-29 DIAGNOSIS — E03.9 ACQUIRED HYPOTHYROIDISM: ICD-10-CM

## 2024-10-29 DIAGNOSIS — E11.42 TYPE 2 DIABETES MELLITUS WITH DIABETIC POLYNEUROPATHY, WITH LONG-TERM CURRENT USE OF INSULIN (HCC): ICD-10-CM

## 2024-10-29 DIAGNOSIS — I73.9 PERIPHERAL VASCULAR DISEASE, UNSPECIFIED (HCC): ICD-10-CM

## 2024-10-29 DIAGNOSIS — I10 PRIMARY HYPERTENSION: Primary | ICD-10-CM

## 2024-10-29 DIAGNOSIS — Z79.4 TYPE 2 DIABETES MELLITUS WITH COMPLICATION, WITH LONG-TERM CURRENT USE OF INSULIN (HCC): ICD-10-CM

## 2024-10-29 DIAGNOSIS — I77.4 CELIAC ARTERY STENOSIS (HCC): ICD-10-CM

## 2024-10-29 DIAGNOSIS — Z23 ENCOUNTER FOR IMMUNIZATION: ICD-10-CM

## 2024-10-29 DIAGNOSIS — E11.8 TYPE 2 DIABETES MELLITUS WITH COMPLICATION, WITH LONG-TERM CURRENT USE OF INSULIN (HCC): ICD-10-CM

## 2024-10-29 DIAGNOSIS — Z79.4 LONG TERM (CURRENT) USE OF INSULIN (HCC): ICD-10-CM

## 2024-10-29 DIAGNOSIS — K56.609 SBO (SMALL BOWEL OBSTRUCTION) (HCC): ICD-10-CM

## 2024-10-29 DIAGNOSIS — N18.32 TYPE 2 DIABETES MELLITUS WITH STAGE 3B CHRONIC KIDNEY DISEASE, WITH LONG-TERM CURRENT USE OF INSULIN (HCC): ICD-10-CM

## 2024-10-29 DIAGNOSIS — Z79.4 TYPE 2 DIABETES MELLITUS WITH STAGE 3B CHRONIC KIDNEY DISEASE, WITH LONG-TERM CURRENT USE OF INSULIN (HCC): ICD-10-CM

## 2024-10-29 DIAGNOSIS — Z79.4 TYPE 2 DIABETES MELLITUS WITH DIABETIC POLYNEUROPATHY, WITH LONG-TERM CURRENT USE OF INSULIN (HCC): ICD-10-CM

## 2024-10-29 DIAGNOSIS — N18.32 STAGE 3B CHRONIC KIDNEY DISEASE (HCC): ICD-10-CM

## 2024-10-29 DIAGNOSIS — L98.9 SCALP LESION: ICD-10-CM

## 2024-10-29 DIAGNOSIS — N17.9 ACUTE KIDNEY INJURY SUPERIMPOSED ON CHRONIC KIDNEY DISEASE  (HCC): ICD-10-CM

## 2024-10-29 DIAGNOSIS — N18.9 ACUTE KIDNEY INJURY SUPERIMPOSED ON CHRONIC KIDNEY DISEASE  (HCC): ICD-10-CM

## 2024-10-29 PROBLEM — R11.2 NAUSEA AND VOMITING: Status: RESOLVED | Noted: 2023-09-21 | Resolved: 2024-10-29

## 2024-10-29 PROBLEM — I67.4 HYPERTENSIVE ENCEPHALOPATHY: Status: RESOLVED | Noted: 2024-09-20 | Resolved: 2024-10-29

## 2024-10-29 PROCEDURE — 90662 IIV NO PRSV INCREASED AG IM: CPT

## 2024-10-29 PROCEDURE — 99496 TRANSJ CARE MGMT HIGH F2F 7D: CPT | Performed by: INTERNAL MEDICINE

## 2024-10-29 PROCEDURE — G0008 ADMIN INFLUENZA VIRUS VAC: HCPCS

## 2024-10-29 RX ORDER — INSULIN LISPRO 100 [IU]/ML
8 INJECTION, SOLUTION INTRAVENOUS; SUBCUTANEOUS
COMMUNITY
Start: 2024-10-08

## 2024-10-29 NOTE — ASSESSMENT & PLAN NOTE
Patient with osteomyelitis of lumbar spine.  Was on IV antibiotic as per ID instruction during hospitalization and rehab now she is on oral cefpodoxime 500 mg p.o. twice daily.  Does have appointment with infectious disease specialist in next couple of weeks

## 2024-10-29 NOTE — ASSESSMENT & PLAN NOTE
Lab Results   Component Value Date    HGBA1C 7.9 (A) 07/02/2024   Continue with present regimen.  Continue to monitor blood sugar at home.  Will check hemoglobin A1c prior to next visit

## 2024-10-29 NOTE — ASSESSMENT & PLAN NOTE
Lab Results   Component Value Date    HGBA1C 7.9 (A) 07/02/2024   Continue with present regimen and diabetic diet   Left voicemail for patient explaining that Provider is not in until Friday.  Did encourage [employer] as another means for communication.  Otherwise encouraged patient to call on Friday to see if he can discuss Further with Rosalia or her nurse.

## 2024-10-29 NOTE — ASSESSMENT & PLAN NOTE
Lab Results   Component Value Date    EGFR 42 10/24/2024    EGFR 38 10/23/2024    EGFR 29 10/22/2024    CREATININE 1.20 10/24/2024    CREATININE 1.30 10/23/2024    CREATININE 1.62 (H) 10/22/2024   Stable.  Will continue to monitor.
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
## 2024-10-29 NOTE — ASSESSMENT & PLAN NOTE
Lab Results   Component Value Date    EGFR 42 10/24/2024    EGFR 38 10/23/2024    EGFR 29 10/22/2024    CREATININE 1.20 10/24/2024    CREATININE 1.30 10/23/2024    CREATININE 1.62 (H) 10/22/2024   Acute kidney injury resolved.  Renal function returned to baseline.

## 2024-10-29 NOTE — ASSESSMENT & PLAN NOTE
Lab Results   Component Value Date    HGBA1C 7.9 (A) 07/02/2024   Continue with present regimen.  Renal function is stable.  Will continue to monitor.

## 2024-10-30 ENCOUNTER — HOME CARE VISIT (OUTPATIENT)
Dept: HOME HEALTH SERVICES | Facility: HOME HEALTHCARE | Age: 79
End: 2024-10-30
Payer: COMMERCIAL

## 2024-10-30 ENCOUNTER — TELEPHONE (OUTPATIENT)
Dept: LAB | Facility: HOSPITAL | Age: 79
End: 2024-10-30

## 2024-10-30 VITALS
RESPIRATION RATE: 16 BRPM | OXYGEN SATURATION: 98 % | DIASTOLIC BLOOD PRESSURE: 60 MMHG | SYSTOLIC BLOOD PRESSURE: 122 MMHG | HEART RATE: 82 BPM | TEMPERATURE: 98 F

## 2024-10-30 PROCEDURE — G0299 HHS/HOSPICE OF RN EA 15 MIN: HCPCS

## 2024-10-31 ENCOUNTER — HOME CARE VISIT (OUTPATIENT)
Dept: HOME HEALTH SERVICES | Facility: HOME HEALTHCARE | Age: 79
End: 2024-10-31
Payer: COMMERCIAL

## 2024-10-31 VITALS — SYSTOLIC BLOOD PRESSURE: 144 MMHG | DIASTOLIC BLOOD PRESSURE: 60 MMHG | HEART RATE: 75 BPM | OXYGEN SATURATION: 98 %

## 2024-10-31 PROCEDURE — G0180 MD CERTIFICATION HHA PATIENT: HCPCS

## 2024-10-31 PROCEDURE — G0151 HHCP-SERV OF PT,EA 15 MIN: HCPCS

## 2024-11-01 ENCOUNTER — HOME CARE VISIT (OUTPATIENT)
Dept: HOME HEALTH SERVICES | Facility: HOME HEALTHCARE | Age: 79
End: 2024-11-01
Payer: COMMERCIAL

## 2024-11-01 PROCEDURE — G0299 HHS/HOSPICE OF RN EA 15 MIN: HCPCS

## 2024-11-03 VITALS
HEART RATE: 72 BPM | SYSTOLIC BLOOD PRESSURE: 124 MMHG | RESPIRATION RATE: 16 BRPM | TEMPERATURE: 98 F | DIASTOLIC BLOOD PRESSURE: 60 MMHG | OXYGEN SATURATION: 97 %

## 2024-11-04 ENCOUNTER — TELEPHONE (OUTPATIENT)
Dept: LAB | Facility: HOSPITAL | Age: 79
End: 2024-11-04

## 2024-11-04 ENCOUNTER — HOME CARE VISIT (OUTPATIENT)
Dept: HOME HEALTH SERVICES | Facility: HOME HEALTHCARE | Age: 79
End: 2024-11-04
Payer: COMMERCIAL

## 2024-11-04 VITALS — OXYGEN SATURATION: 98 % | SYSTOLIC BLOOD PRESSURE: 164 MMHG | HEART RATE: 75 BPM | DIASTOLIC BLOOD PRESSURE: 68 MMHG

## 2024-11-04 PROCEDURE — G0151 HHCP-SERV OF PT,EA 15 MIN: HCPCS

## 2024-11-05 ENCOUNTER — TELEPHONE (OUTPATIENT)
Age: 79
End: 2024-11-05

## 2024-11-05 ENCOUNTER — HOME CARE VISIT (OUTPATIENT)
Dept: HOME HEALTH SERVICES | Facility: HOME HEALTHCARE | Age: 79
End: 2024-11-05
Payer: COMMERCIAL

## 2024-11-05 VITALS
SYSTOLIC BLOOD PRESSURE: 132 MMHG | TEMPERATURE: 98 F | HEART RATE: 88 BPM | OXYGEN SATURATION: 98 % | DIASTOLIC BLOOD PRESSURE: 78 MMHG | RESPIRATION RATE: 16 BRPM

## 2024-11-05 DIAGNOSIS — E11.8 TYPE 2 DIABETES MELLITUS WITH COMPLICATION, WITH LONG-TERM CURRENT USE OF INSULIN (HCC): ICD-10-CM

## 2024-11-05 DIAGNOSIS — Z79.4 TYPE 2 DIABETES MELLITUS WITH COMPLICATION, WITH LONG-TERM CURRENT USE OF INSULIN (HCC): ICD-10-CM

## 2024-11-05 DIAGNOSIS — M54.50 ACUTE MIDLINE LOW BACK PAIN, UNSPECIFIED WHETHER SCIATICA PRESENT: ICD-10-CM

## 2024-11-05 PROCEDURE — G0299 HHS/HOSPICE OF RN EA 15 MIN: HCPCS

## 2024-11-05 NOTE — TELEPHONE ENCOUNTER
Nurse form [facility] reporting patient's blood sugars have been ranging from 228-265 in am. There are concerns about how patient is administering insulin. She is asking for documentation of actual sliding scale for insulin lispro (HumaLOG) 100 units/mL injection.  Also, clarification for units and directions for insulin glargine (Lantus) 100 units/mL subcutaneous injection. Please Epic chat, message or call Jennifer [phone] to give information. Thank you

## 2024-11-05 NOTE — TELEPHONE ENCOUNTER
11/5 - pt was calling to see what time her appt was - told her phleb will be calling this afternoon with the time

## 2024-11-06 ENCOUNTER — APPOINTMENT (OUTPATIENT)
Dept: LAB | Facility: HOSPITAL | Age: 79
End: 2024-11-06
Payer: COMMERCIAL

## 2024-11-06 ENCOUNTER — HOME CARE VISIT (OUTPATIENT)
Dept: HOME HEALTH SERVICES | Facility: HOME HEALTHCARE | Age: 79
End: 2024-11-06
Payer: COMMERCIAL

## 2024-11-06 VITALS — SYSTOLIC BLOOD PRESSURE: 170 MMHG | DIASTOLIC BLOOD PRESSURE: 62 MMHG | HEART RATE: 72 BPM | OXYGEN SATURATION: 98 %

## 2024-11-06 DIAGNOSIS — E11.3553 TYPE 2 DIABETES MELLITUS WITH STABLE PROLIFERATIVE RETINOPATHY OF BOTH EYES, WITH LONG-TERM CURRENT USE OF INSULIN (HCC): ICD-10-CM

## 2024-11-06 DIAGNOSIS — B95.61 MSSA BACTEREMIA: Primary | ICD-10-CM

## 2024-11-06 DIAGNOSIS — Z79.4 TYPE 2 DIABETES MELLITUS WITH STABLE PROLIFERATIVE RETINOPATHY OF BOTH EYES, WITH LONG-TERM CURRENT USE OF INSULIN (HCC): ICD-10-CM

## 2024-11-06 DIAGNOSIS — R78.81 MSSA BACTEREMIA: Primary | ICD-10-CM

## 2024-11-06 DIAGNOSIS — N18.32 STAGE 3B CHRONIC KIDNEY DISEASE (HCC): ICD-10-CM

## 2024-11-06 DIAGNOSIS — M46.46 DISCITIS OF LUMBAR REGION: ICD-10-CM

## 2024-11-06 DIAGNOSIS — D50.9 IRON DEFICIENCY ANEMIA, UNSPECIFIED IRON DEFICIENCY ANEMIA TYPE: ICD-10-CM

## 2024-11-06 DIAGNOSIS — R78.81 MSSA BACTEREMIA: ICD-10-CM

## 2024-11-06 DIAGNOSIS — B95.61 MSSA BACTEREMIA: ICD-10-CM

## 2024-11-06 DIAGNOSIS — E03.9 ACQUIRED HYPOTHYROIDISM: ICD-10-CM

## 2024-11-06 LAB
ALBUMIN SERPL BCG-MCNC: 3.7 G/DL (ref 3.5–5)
ALP SERPL-CCNC: 107 U/L (ref 34–104)
ALT SERPL W P-5'-P-CCNC: 12 U/L (ref 7–52)
ANION GAP SERPL CALCULATED.3IONS-SCNC: 7 MMOL/L (ref 4–13)
AST SERPL W P-5'-P-CCNC: 21 U/L (ref 13–39)
BASOPHILS # BLD AUTO: 0.04 THOUSANDS/ÂΜL (ref 0–0.1)
BASOPHILS NFR BLD AUTO: 1 % (ref 0–1)
BILIRUB SERPL-MCNC: 0.32 MG/DL (ref 0.2–1)
BUN SERPL-MCNC: 33 MG/DL (ref 5–25)
CALCIUM SERPL-MCNC: 10.2 MG/DL (ref 8.4–10.2)
CHLORIDE SERPL-SCNC: 102 MMOL/L (ref 96–108)
CHOLEST SERPL-MCNC: 140 MG/DL
CO2 SERPL-SCNC: 30 MMOL/L (ref 21–32)
CREAT SERPL-MCNC: 0.89 MG/DL (ref 0.6–1.3)
CRP SERPL QL: 2.7 MG/L
EOSINOPHIL # BLD AUTO: 0.29 THOUSAND/ÂΜL (ref 0–0.61)
EOSINOPHIL NFR BLD AUTO: 4 % (ref 0–6)
ERYTHROCYTE [DISTWIDTH] IN BLOOD BY AUTOMATED COUNT: 13.8 % (ref 11.6–15.1)
ERYTHROCYTE [SEDIMENTATION RATE] IN BLOOD: 40 MM/HOUR (ref 0–29)
EST. AVERAGE GLUCOSE BLD GHB EST-MCNC: 157 MG/DL
GFR SERPL CREATININE-BSD FRML MDRD: 60 ML/MIN/1.73SQ M
GLUCOSE P FAST SERPL-MCNC: 127 MG/DL (ref 65–99)
HBA1C MFR BLD: 7.1 %
HCT VFR BLD AUTO: 34.9 % (ref 34.8–46.1)
HDLC SERPL-MCNC: 58 MG/DL
HGB BLD-MCNC: 10.7 G/DL (ref 11.5–15.4)
IMM GRANULOCYTES # BLD AUTO: 0.02 THOUSAND/UL (ref 0–0.2)
IMM GRANULOCYTES NFR BLD AUTO: 0 % (ref 0–2)
LDLC SERPL CALC-MCNC: 60 MG/DL (ref 0–100)
LYMPHOCYTES # BLD AUTO: 2.03 THOUSANDS/ÂΜL (ref 0.6–4.47)
LYMPHOCYTES NFR BLD AUTO: 30 % (ref 14–44)
MCH RBC QN AUTO: 30.3 PG (ref 26.8–34.3)
MCHC RBC AUTO-ENTMCNC: 30.7 G/DL (ref 31.4–37.4)
MCV RBC AUTO: 99 FL (ref 82–98)
MONOCYTES # BLD AUTO: 0.6 THOUSAND/ÂΜL (ref 0.17–1.22)
MONOCYTES NFR BLD AUTO: 9 % (ref 4–12)
NEUTROPHILS # BLD AUTO: 3.88 THOUSANDS/ÂΜL (ref 1.85–7.62)
NEUTS SEG NFR BLD AUTO: 56 % (ref 43–75)
NRBC BLD AUTO-RTO: 0 /100 WBCS
PLATELET # BLD AUTO: 188 THOUSANDS/UL (ref 149–390)
PMV BLD AUTO: 11.5 FL (ref 8.9–12.7)
POTASSIUM SERPL-SCNC: 4.4 MMOL/L (ref 3.5–5.3)
PROT SERPL-MCNC: 6.6 G/DL (ref 6.4–8.4)
RBC # BLD AUTO: 3.53 MILLION/UL (ref 3.81–5.12)
SODIUM SERPL-SCNC: 139 MMOL/L (ref 135–147)
TRIGL SERPL-MCNC: 109 MG/DL
TSH SERPL DL<=0.05 MIU/L-ACNC: 2.59 UIU/ML (ref 0.45–4.5)
WBC # BLD AUTO: 6.86 THOUSAND/UL (ref 4.31–10.16)

## 2024-11-06 PROCEDURE — 84443 ASSAY THYROID STIM HORMONE: CPT

## 2024-11-06 PROCEDURE — 83036 HEMOGLOBIN GLYCOSYLATED A1C: CPT

## 2024-11-06 PROCEDURE — 86140 C-REACTIVE PROTEIN: CPT

## 2024-11-06 PROCEDURE — G0151 HHCP-SERV OF PT,EA 15 MIN: HCPCS

## 2024-11-06 PROCEDURE — 85652 RBC SED RATE AUTOMATED: CPT

## 2024-11-06 PROCEDURE — 36415 COLL VENOUS BLD VENIPUNCTURE: CPT

## 2024-11-06 PROCEDURE — 80061 LIPID PANEL: CPT

## 2024-11-06 PROCEDURE — 80053 COMPREHEN METABOLIC PANEL: CPT

## 2024-11-06 PROCEDURE — 85025 COMPLETE CBC W/AUTO DIFF WBC: CPT

## 2024-11-06 RX ORDER — INSULIN GLARGINE 100 [IU]/ML
30 INJECTION, SOLUTION SUBCUTANEOUS DAILY
Qty: 10 ML | Refills: 1 | Status: CANCELLED | OUTPATIENT
Start: 2024-11-06

## 2024-11-06 RX ORDER — INSULIN GLARGINE 100 [IU]/ML
30 INJECTION, SOLUTION SUBCUTANEOUS DAILY
Qty: 10 ML | Refills: 1 | OUTPATIENT
Start: 2024-11-06 | End: 2025-05-05

## 2024-11-06 RX ORDER — INSULIN GLARGINE 100 [IU]/ML
30 INJECTION, SOLUTION SUBCUTANEOUS DAILY
Qty: 10 ML | Refills: 1 | Status: CANCELLED | OUTPATIENT
Start: 2024-11-06 | End: 2025-05-05

## 2024-11-06 RX ORDER — INSULIN LISPRO 100 [IU]/ML
8 INJECTION, SOLUTION INTRAVENOUS; SUBCUTANEOUS 2 TIMES DAILY WITH MEALS
Qty: 10 ML | Refills: 1 | OUTPATIENT
Start: 2024-11-06

## 2024-11-06 NOTE — TELEPHONE ENCOUNTER
"Spoke with Dr. Alfonso- Patient can take humalog BID with lunch and dinner since patient does not eat a big meal for breakfast.       Spoke with patient, aware of directions \"    Please advised to take Lantus 30 units in the morning  Humalog 8 units before lunch and dinner  No sliding scale coverage  Call us with blood sugar reading in 1 week.    Test blood glucose fasting in AM,  before lunch and 2 hours after dinner      "

## 2024-11-06 NOTE — TELEPHONE ENCOUNTER
Patient has multiple directions for lantus insulin. Please advise sliding scale directions.  Thank you

## 2024-11-07 ENCOUNTER — HOME CARE VISIT (OUTPATIENT)
Dept: HOME HEALTH SERVICES | Facility: HOME HEALTHCARE | Age: 79
End: 2024-11-07
Payer: COMMERCIAL

## 2024-11-07 DIAGNOSIS — A41.9 SEPSIS (HCC): ICD-10-CM

## 2024-11-07 DIAGNOSIS — M54.50 ACUTE MIDLINE LOW BACK PAIN, UNSPECIFIED WHETHER SCIATICA PRESENT: Primary | ICD-10-CM

## 2024-11-07 PROCEDURE — G0155 HHCP-SVS OF CSW,EA 15 MIN: HCPCS

## 2024-11-07 RX ORDER — TRAMADOL HYDROCHLORIDE 25 MG/1
25 TABLET, COATED ORAL 2 TIMES DAILY
Qty: 60 TABLET | Refills: 0 | Status: SHIPPED | OUTPATIENT
Start: 2024-11-07 | End: 2024-11-07

## 2024-11-07 RX ORDER — TRAMADOL HYDROCHLORIDE 50 MG/1
25 TABLET ORAL 2 TIMES DAILY
Qty: 15 TABLET | Refills: 0 | Status: SHIPPED | OUTPATIENT
Start: 2024-11-07

## 2024-11-07 NOTE — TELEPHONE ENCOUNTER
Called and spoke with patient- patient aware she will be getting 50 mg tablets that she needs to cut in half to take 25 mg BID.

## 2024-11-07 NOTE — TELEPHONE ENCOUNTER
[facility] calling regarding Rx: Tramadol 25mg. States having hard time finding medication. Pharmacy able to fill 50 mg tabs and have patient taking a half a tab.      Please review and advise.  Thank you

## 2024-11-07 NOTE — TELEPHONE ENCOUNTER
Medication: traMADol HCl 25 MG TABS     Dose/Frequency: Take 25 mg by mouth 2 (two) times a day     Quantity: 20    Pharmacy: [facility] - [place], [place] - [address]     Office:   [] PCP/Provider -   [x] Speciality/Provider - Authorized By: SUDHEER Mendieta  Dispense: 20 tablet  Refills: 0 ordered    Does the patient have enough for 3 days?   [x] Yes   [] No - Send as HP to POD

## 2024-11-08 ENCOUNTER — HOME CARE VISIT (OUTPATIENT)
Dept: HOME HEALTH SERVICES | Facility: HOME HEALTHCARE | Age: 79
End: 2024-11-08
Payer: COMMERCIAL

## 2024-11-08 VITALS
HEART RATE: 88 BPM | RESPIRATION RATE: 16 BRPM | TEMPERATURE: 98 F | DIASTOLIC BLOOD PRESSURE: 70 MMHG | OXYGEN SATURATION: 97 % | SYSTOLIC BLOOD PRESSURE: 132 MMHG

## 2024-11-08 PROCEDURE — G0299 HHS/HOSPICE OF RN EA 15 MIN: HCPCS

## 2024-11-11 ENCOUNTER — HOME CARE VISIT (OUTPATIENT)
Dept: HOME HEALTH SERVICES | Facility: HOME HEALTHCARE | Age: 79
End: 2024-11-11
Payer: COMMERCIAL

## 2024-11-11 VITALS — HEART RATE: 92 BPM | SYSTOLIC BLOOD PRESSURE: 178 MMHG | DIASTOLIC BLOOD PRESSURE: 68 MMHG | OXYGEN SATURATION: 99 %

## 2024-11-11 PROCEDURE — G0151 HHCP-SERV OF PT,EA 15 MIN: HCPCS

## 2024-11-12 ENCOUNTER — HOME CARE VISIT (OUTPATIENT)
Dept: HOME HEALTH SERVICES | Facility: HOME HEALTHCARE | Age: 79
End: 2024-11-12
Payer: COMMERCIAL

## 2024-11-12 VITALS
TEMPERATURE: 98.8 F | OXYGEN SATURATION: 98 % | DIASTOLIC BLOOD PRESSURE: 68 MMHG | HEART RATE: 78 BPM | SYSTOLIC BLOOD PRESSURE: 148 MMHG | RESPIRATION RATE: 16 BRPM

## 2024-11-12 PROCEDURE — G0299 HHS/HOSPICE OF RN EA 15 MIN: HCPCS

## 2024-11-12 NOTE — ASSESSMENT & PLAN NOTE
Per patient lesion has been present for month. This is likely portal of entry for her bacteremia above. Lesion is concerning for possible squamous cell carcinoma. Recommend dermatology evaluation for further work up and possible biopsy vs excision. She does have outpatient referral pending and family will call their office to make sure appointment gets moved to after patient's expected discharge.  -serial scalp exams  -recommend dermatology evaluation and possible biopsy vs excision

## 2024-11-12 NOTE — ASSESSMENT & PLAN NOTE
Unclear etiology. Possibly secondary to scalp lesion. This likely seeded the lumbar spine. Patient cleared her bacteremia while inpatient and TTE showed no obvious vegetation. The patient completed 6 weeks of IV antibiotic as above and remains on PO antibiotic as above for ongoing treatment until inflammatory markers stabilize/plateau.  -Completed 6 weeks of IV antibiotics as above  -Will remain on PO cefadroxil as above  -monitor temperature/vitals

## 2024-11-12 NOTE — ASSESSMENT & PLAN NOTE
Likely seeded from recent MSSA bacteremia. 9/9/2024 MRI of the lumbar spine showed hyperintensity of the intervertebral L2-4 region with mild adjacent paraspinal enhancement and corresponding sclerotic endplate changes. Findings suggest possible discitis/osteomyelitis. Patient completed a 6 week course of IV cefazolin on 10/21, PICC was removed, and she was transitioned to PO Cefadroxil which she will remain on until inflammatory markers normalize vs plateau. I personally reviewed her most recent lab work from 11/6/2024 which showed normal WBC count = 6.86, stable creatinine = 0.89, LFTs = WNL, sed rate = increased from 11 to 40, and CRP improved from 8.4 to 2.7.  -continue PO Cefadroxil, 500mg BID for now  -lab work (CBCD, creatinine, sed rate, CRP) to be collected every other week while on antibiotic treatment  -will re-check ESR when patient gets next labs drawn. If it remains stable or down-trends, will stop the PO cefadroxil as patient would have completed 10 weeks of antibiotic at that time  -Patient has an appointment tomorrow with home health. Will see if labs can be collected tomorrow.   -monitor vitals  -RTC PRN. Will review labs. If another appointment is needed, will call to schedule.

## 2024-11-12 NOTE — ASSESSMENT & PLAN NOTE
Lab Results   Component Value Date    HGBA1C 7.1 (H) 11/06/2024   Elevated blood glucose is risk factor for wounds and infection.   -recommend tight glycemic control  -blood glucose management per PCP

## 2024-11-12 NOTE — PROGRESS NOTES
Ambulatory Visit  Name: Porsha Hoyos      : 1945      MRN: [id number]  Encounter Provider: Rachael Leija PA-C  Encounter Date: 2024   Encounter department: [facility]    Assessment & Plan  Osteomyelitis (HCC)  Likely seeded from recent MSSA bacteremia. 2024 MRI of the lumbar spine showed hyperintensity of the intervertebral L2-4 region with mild adjacent paraspinal enhancement and corresponding sclerotic endplate changes. Findings suggest possible discitis/osteomyelitis. Patient completed a 6 week course of IV cefazolin on 10/21, PICC was removed, and she was transitioned to PO Cefadroxil which she will remain on until inflammatory markers normalize vs plateau. I personally reviewed her most recent lab work from 2024 which showed normal WBC count = 6.86, stable creatinine = 0.89, LFTs = WNL, sed rate = increased from 11 to 40, and CRP improved from 8.4 to 2.7.  -continue PO Cefadroxil, 500mg BID for now  -lab work (CBCD, creatinine, sed rate, CRP) to be collected every other week while on antibiotic treatment  -will re-check ESR when patient gets next labs drawn. If it remains stable or down-trends, will stop the PO cefadroxil as patient would have completed 10 weeks of antibiotic at that time  -Patient has an appointment tomorrow with home health. Will see if labs can be collected tomorrow.   -monitor vitals  -RTC PRN. Will review labs. If another appointment is needed, will call to schedule.        MSSA bacteremia  Unclear etiology. Possibly secondary to scalp lesion. This likely seeded the lumbar spine. Patient cleared her bacteremia while inpatient and TTE showed no obvious vegetation. The patient completed 6 weeks of IV antibiotic as above and remains on PO antibiotic as above for ongoing treatment until inflammatory markers stabilize/plateau.  -Completed 6 weeks of IV antibiotics as above  -Will remain on PO cefadroxil as above  -monitor  temperature/vitals       Type 2 diabetes mellitus with complication, with long-term current use of insulin ([facility])    Lab Results   Component Value Date    HGBA1C 7.1 (H) 11/06/2024   Elevated blood glucose is risk factor for wounds and infection.   -recommend tight glycemic control  -blood glucose management per PCP         Stage 3b chronic kidney disease (HCC)  Lab Results   Component Value Date    EGFR 60 11/06/2024    EGFR 42 10/24/2024    EGFR 38 10/23/2024    CREATININE 0.89 11/06/2024    CREATININE 1.20 10/24/2024    CREATININE 1.30 10/23/2024   This can impact antibiotic dosing. Upon review of patient's available medical records it appears her baseline creatinine is approximately 1.2-1.5. Creatinine is within baseline this week at 0.89.  -check serum creatinine every other week  -dose adjust antibiotic for renal function as needed  -avoid nephrotoxins         Scalp lesion  Per patient lesion has been present for month. This is likely portal of entry for her bacteremia above. Lesion is concerning for possible squamous cell carcinoma. Recommend dermatology evaluation for further work up and possible biopsy vs excision. She does have outpatient referral pending and family will call their office to make sure appointment gets moved to after patient's expected discharge.  -serial scalp exams  -recommend dermatology evaluation and possible biopsy vs excision          History of Present Illness     Porsha Hoyos is a 79 y.o. female with pmhx of DM, CKD, PVD, CAD, CVA and chronic low back pain, presented to ER at [facility] on 9/7 with acute worsening of chronic low back pain with chills. She was found to have MSSA bacteremia and lumbar discitis/osteomyelitis. Her hospital course was complicated by mild COVID-19 disease and hypertensive encephalopathy. The patient was then admitted to [facility] after prolonged hospitalization. She completed a 6-week course of IV cefazolin on 10/21 and was  transitioned to PO Cefadroxil for ongoing treatment until inflammatory markers normalize or plateau. She now presents to ID clinic for follow-up.     Patient states she is tolerating the PO cefadroxil well. Still has some intermittent back pain, but states it is not nearly as bad as when she presented to the hospital. She states she has had chronic back pain for years and even had back surgery. Has no hardware in place. States her back is stiff in the morning, but starts to loosen up throughout the day.     Patient has no fever, chills, sweats, shakes; no nausea, vomiting, abdominal pain, diarrhea, or dysuria; no cough, shortness of breath, or chest pain. No new symptoms.    History obtained from : patient  Review of Systems  Medical History Reviewed by provider this encounter:  Tobacco  Allergies  Meds  Problems  Med Hx  Surg Hx  Fam Hx       Current Outpatient Medications on File Prior to Visit   Medication Sig Dispense Refill    acetaminophen (TYLENOL) 325 mg tablet Take 3 tablets (975 mg total) by mouth every 8 (eight) hours 90 tablet 0    albuterol (Ventolin HFA) 90 mcg/act inhaler Inhale 2 puffs 4 (four) times a day 18 g 0    allopurinol (ZYLOPRIM) 100 mg tablet Take 2 tablets (200 mg total) by mouth daily 180 tablet 0    Aspirin 81 MG CAPS Take 81 mg by mouth Daily at 2am 30 capsule 0    atorvastatin (LIPITOR) 80 mg tablet Take 1 tablet (80 mg total) by mouth daily 30 tablet 0    budesonide-formoterol (Symbicort) 160-4.5 mcg/act inhaler Inhale 2 puffs 2 (two) times a day Rinse mouth after use. 120 g 0    bumetanide (BUMEX) 2 mg tablet Take 1 tablet (2 mg total) by mouth daily 30 tablet 0    Calcium Carbonate-Vit D-Min (Calcium 600+D Plus Minerals) 600-400 MG-UNIT CHEW Chew 1 tablet daily 30 tablet 0    cefadroxil (DURICEF) 500 mg capsule Take 1 capsule (500 mg total) by mouth every 12 (twelve) hours Do not start before October 25, 2024. 60 capsule 0    Cholecalciferol (Vitamin D3) 25 MCG (1000 UT) CAPS  Take 1 capsule (1,000 Units total) by mouth daily 30 capsule 0    Diclofenac Sodium (VOLTAREN) 1 % Apply 2 g topically daily at bedtime. Apply to back  Per [facility] Transfer/Discharge Report 10/24/24:      docusate sodium (COLACE) 100 mg capsule Take 100 mg by mouth 2 (two) times a day. Per [facility] Transfer/Discharge Report 10/24/24:      doxazosin (CARDURA) 2 mg tablet Take 1 tablet (2 mg total) by mouth 2 (two) times a day 60 tablet 0    famotidine (PEPCID) 20 mg tablet Take 1 tablet (20 mg total) by mouth daily 30 tablet 0    ferrous sulfate 325 (65 Fe) mg tablet Take 1 tablet (325 mg total) by mouth daily with breakfast 30 tablet 0    glucose blood (ONE TOUCH ULTRA TEST) test strip Test blood sugars 3 to 4 times a day 400 each 3    insulin glargine (Lantus) 100 units/mL subcutaneous injection Inject 24 Units under the skin daily Patient takes 30 units in the morning      insulin lispro (HumaLOG) 100 units/mL injection Inject 8 Units under the skin 3 (three) times a day with meals      levothyroxine 100 mcg tablet Take 1 tablet (100 mcg total) by mouth daily 30 tablet 0    lidocaine (LIDODERM) 5 % Apply 1 patch topically over 12 hours daily Remove & Discard patch within 12 hours or as directed by MD 30 patch 0    losartan (COZAAR) 100 MG tablet Take 1 tablet (100 mg total) by mouth daily 30 tablet 0    mirtazapine (REMERON) 7.5 MG tablet Take 1 tablet (7.5 mg total) by mouth daily at bedtime 30 tablet 0    ONE TOUCH LANCETS MISC by Does not apply route daily Test 2-3 times daily      polyethylene glycol (GlycoLax) 17 GM/SCOOP powder Take 17 g by mouth daily as needed (constipation). Per [facility] Transfer/Discharge Report 10/24/24:      sitaGLIPtin (Januvia) 50 mg tablet Take 1 tablet (50 mg total) by mouth daily 30 tablet 0    traMADol (Ultram) 50 mg tablet Take 0.5 tablets (25 mg total) by mouth 2 (two) times a day 15 tablet  "0    TRUEplus Insulin Syringe 31G X 5/16\" 0.5 ML MISC INJECT UNDER THE SKIN 4 (FOUR) TIMES A  each 5    chlorthalidone 50 MG tablet Take 1 tablet (50 mg total) by mouth daily (Patient not taking: Reported on 11/13/2024) 30 tablet 0    ondansetron (ZOFRAN) 4 mg tablet Take 1 tablet (4 mg total) by mouth every 8 (eight) hours as needed for nausea or vomiting (Patient not taking: Reported on 11/13/2024) 20 tablet 0    pantoprazole (PROTONIX) 40 mg tablet Take 1 tablet (40 mg total) by mouth daily in the early morning (Patient not taking: Reported on 11/13/2024) 30 tablet 0     No current facility-administered medications on file prior to visit.      Social History     Tobacco Use    Smoking status: Never    Smokeless tobacco: Never   Vaping Use    Vaping status: Never Used   Substance and Sexual Activity    Alcohol use: Never    Drug use: Never    Sexual activity: Never     Partners: Male     Comment: Kevin x 56 years     Objective     /60   Pulse 85   Temp (!) 96.9 °F (36.1 °C)   Resp 17   Ht 4' 11\" (1.499 m)   Wt 57.6 kg (127 lb)   LMP  (LMP Unknown)   SpO2 98%   BMI 25.65 kg/m²     Physical Exam     General Appearance:  Alert, interactive, nontoxic, no acute distress.   Throat: Oropharynx moist without lesions.    Lungs:   Clear to auscultation bilaterally; no wheezes, rhonchi or rales; respirations unlabored on room air.   Heart:  RRR; no murmur, rub or gallop.   Abdomen:   Soft, non-tender, non-distended, positive bowel sounds.     Back: No significant tenderness with palpation of lower back, no paraspinal tenderness with palpation   Extremities: No clubbing or cyanosis, trace edema   Skin: No new rashes, lesions, or draining wounds noted on exposed skin.     Labs, Imaging, & Other studies:   All pertinent labs and imaging studies were personally reviewed by me including    11/6/2024 CBCd and CMP: normal WBC count = 6.86, stable creatinine = 0.89, LFTs = WNL,   11/6/2024 ESR/CRP: sed rate = " increased from 11 to 40, and CRP improved from 8.4 to 2.7.      Rachael Leija PA-C  [facility]

## 2024-11-12 NOTE — ASSESSMENT & PLAN NOTE
Lab Results   Component Value Date    EGFR 60 11/06/2024    EGFR 42 10/24/2024    EGFR 38 10/23/2024    CREATININE 0.89 11/06/2024    CREATININE 1.20 10/24/2024    CREATININE 1.30 10/23/2024   This can impact antibiotic dosing. Upon review of patient's available medical records it appears her baseline creatinine is approximately 1.2-1.5. Creatinine is within baseline this week at 0.89.  -check serum creatinine every other week  -dose adjust antibiotic for renal function as needed  -avoid nephrotoxins

## 2024-11-13 ENCOUNTER — OFFICE VISIT (OUTPATIENT)
Dept: INFECTIOUS DISEASES | Facility: CLINIC | Age: 79
End: 2024-11-13
Payer: COMMERCIAL

## 2024-11-13 ENCOUNTER — HOME CARE VISIT (OUTPATIENT)
Dept: HOME HEALTH SERVICES | Facility: HOME HEALTHCARE | Age: 79
End: 2024-11-13
Payer: COMMERCIAL

## 2024-11-13 VITALS
BODY MASS INDEX: 25.6 KG/M2 | OXYGEN SATURATION: 98 % | HEART RATE: 85 BPM | DIASTOLIC BLOOD PRESSURE: 60 MMHG | SYSTOLIC BLOOD PRESSURE: 128 MMHG | WEIGHT: 127 LBS | HEIGHT: 59 IN | RESPIRATION RATE: 17 BRPM | TEMPERATURE: 96.9 F

## 2024-11-13 DIAGNOSIS — R78.81 MSSA BACTEREMIA: ICD-10-CM

## 2024-11-13 DIAGNOSIS — M86.9 OSTEOMYELITIS (HCC): Primary | ICD-10-CM

## 2024-11-13 DIAGNOSIS — E11.8 TYPE 2 DIABETES MELLITUS WITH COMPLICATION, WITH LONG-TERM CURRENT USE OF INSULIN (HCC): ICD-10-CM

## 2024-11-13 DIAGNOSIS — B95.61 MSSA BACTEREMIA: ICD-10-CM

## 2024-11-13 DIAGNOSIS — Z79.4 TYPE 2 DIABETES MELLITUS WITH COMPLICATION, WITH LONG-TERM CURRENT USE OF INSULIN (HCC): ICD-10-CM

## 2024-11-13 DIAGNOSIS — L98.9 SCALP LESION: ICD-10-CM

## 2024-11-13 DIAGNOSIS — N18.32 STAGE 3B CHRONIC KIDNEY DISEASE (HCC): ICD-10-CM

## 2024-11-13 PROCEDURE — 99214 OFFICE O/P EST MOD 30 MIN: CPT | Performed by: PHYSICIAN ASSISTANT

## 2024-11-13 NOTE — PATIENT INSTRUCTIONS
-continue Cefadroxil, 500mg twice daily  -Re-check labs next week including CBCd, creatinine, ESR and CRP. To be drawn by [facility].  -If ESR remains stable or down-trends, will stop the cefadroxil as that would complete 10 weeks of antibiotic    -Return to care as needed. But, I will call with your lab results next week if another appointment is required.

## 2024-11-14 ENCOUNTER — APPOINTMENT (OUTPATIENT)
Dept: LAB | Age: 79
End: 2024-11-14
Payer: COMMERCIAL

## 2024-11-14 ENCOUNTER — HOME CARE VISIT (OUTPATIENT)
Dept: HOME HEALTH SERVICES | Facility: HOME HEALTHCARE | Age: 79
End: 2024-11-14
Payer: COMMERCIAL

## 2024-11-14 VITALS
TEMPERATURE: 97.5 F | DIASTOLIC BLOOD PRESSURE: 65 MMHG | OXYGEN SATURATION: 98 % | SYSTOLIC BLOOD PRESSURE: 95 MMHG | HEART RATE: 66 BPM | RESPIRATION RATE: 16 BRPM

## 2024-11-14 DIAGNOSIS — B95.4 BACTERIAL INFECTION DUE TO STREPTOCOCCUS, GROUP G: ICD-10-CM

## 2024-11-14 DIAGNOSIS — R78.81 BACTEREMIA: ICD-10-CM

## 2024-11-14 PROBLEM — A41.9 SEPSIS (HCC): Status: RESOLVED | Noted: 2024-09-08 | Resolved: 2024-11-14

## 2024-11-14 LAB
BASOPHILS # BLD AUTO: 0.03 THOUSANDS/ÂΜL (ref 0–0.1)
BASOPHILS NFR BLD AUTO: 1 % (ref 0–1)
CREAT SERPL-MCNC: 0.82 MG/DL (ref 0.6–1.3)
CRP SERPL QL: <1 MG/L
EOSINOPHIL # BLD AUTO: 0.2 THOUSAND/ÂΜL (ref 0–0.61)
EOSINOPHIL NFR BLD AUTO: 3 % (ref 0–6)
ERYTHROCYTE [DISTWIDTH] IN BLOOD BY AUTOMATED COUNT: 13.8 % (ref 11.6–15.1)
ERYTHROCYTE [SEDIMENTATION RATE] IN BLOOD: 43 MM/HOUR (ref 0–29)
GFR SERPL CREATININE-BSD FRML MDRD: 67 ML/MIN/1.73SQ M
HCT VFR BLD AUTO: 35.5 % (ref 34.8–46.1)
HGB BLD-MCNC: 11 G/DL (ref 11.5–15.4)
IMM GRANULOCYTES # BLD AUTO: 0.03 THOUSAND/UL (ref 0–0.2)
IMM GRANULOCYTES NFR BLD AUTO: 1 % (ref 0–2)
LYMPHOCYTES # BLD AUTO: 1.56 THOUSANDS/ÂΜL (ref 0.6–4.47)
LYMPHOCYTES NFR BLD AUTO: 23 % (ref 14–44)
MCH RBC QN AUTO: 30 PG (ref 26.8–34.3)
MCHC RBC AUTO-ENTMCNC: 31 G/DL (ref 31.4–37.4)
MCV RBC AUTO: 97 FL (ref 82–98)
MONOCYTES # BLD AUTO: 0.48 THOUSAND/ÂΜL (ref 0.17–1.22)
MONOCYTES NFR BLD AUTO: 7 % (ref 4–12)
NEUTROPHILS # BLD AUTO: 4.36 THOUSANDS/ÂΜL (ref 1.85–7.62)
NEUTS SEG NFR BLD AUTO: 65 % (ref 43–75)
NRBC BLD AUTO-RTO: 0 /100 WBCS
PLATELET # BLD AUTO: 192 THOUSANDS/UL (ref 149–390)
PMV BLD AUTO: 12.4 FL (ref 8.9–12.7)
RBC # BLD AUTO: 3.67 MILLION/UL (ref 3.81–5.12)
WBC # BLD AUTO: 6.66 THOUSAND/UL (ref 4.31–10.16)

## 2024-11-14 PROCEDURE — 85025 COMPLETE CBC W/AUTO DIFF WBC: CPT

## 2024-11-14 PROCEDURE — G0299 HHS/HOSPICE OF RN EA 15 MIN: HCPCS

## 2024-11-14 PROCEDURE — 82565 ASSAY OF CREATININE: CPT

## 2024-11-14 PROCEDURE — 85652 RBC SED RATE AUTOMATED: CPT

## 2024-11-14 PROCEDURE — 36415 COLL VENOUS BLD VENIPUNCTURE: CPT

## 2024-11-14 PROCEDURE — 86140 C-REACTIVE PROTEIN: CPT

## 2024-11-19 ENCOUNTER — TELEPHONE (OUTPATIENT)
Dept: OTHER | Facility: HOSPITAL | Age: 79
End: 2024-11-19

## 2024-11-19 NOTE — TELEPHONE ENCOUNTER
Left message for patient to call back to review lab results and discuss next steps for antibiotics.

## 2024-11-19 NOTE — TELEPHONE ENCOUNTER
Spoke with Ms. Romain regarding her lab results. CRP has completely normalized and ESR has stabilized. She has now completed a total of 10 weeks combined IV and PO antibiotics. She reports that she feels well, has not had any fevers or chills, and her back pain is improved. Will stop antibiotics at this time and monitor closely off. Advised patient to continue to monitor her symptoms closely including for any development of sustained fevers over 100.4F, chills, worsening back pain, numbness/tingling, etc. If she develops any of these symptoms, she should re-present back to the ER for evaluation. Patient agreed and verbalized understanding. Advised to call the ID office with any additional questions or concerns.

## 2024-11-25 DIAGNOSIS — E11.8 TYPE 2 DIABETES MELLITUS WITH COMPLICATION, WITH LONG-TERM CURRENT USE OF INSULIN (HCC): ICD-10-CM

## 2024-11-25 DIAGNOSIS — Z79.4 TYPE 2 DIABETES MELLITUS WITH COMPLICATION, WITH LONG-TERM CURRENT USE OF INSULIN (HCC): ICD-10-CM

## 2024-11-25 DIAGNOSIS — M54.50 ACUTE MIDLINE LOW BACK PAIN, UNSPECIFIED WHETHER SCIATICA PRESENT: ICD-10-CM

## 2024-11-25 RX ORDER — TRAMADOL HYDROCHLORIDE 50 MG/1
25 TABLET ORAL 2 TIMES DAILY
Qty: 30 TABLET | Refills: 0 | Status: SHIPPED | OUTPATIENT
Start: 2024-11-25

## 2024-11-25 NOTE — TELEPHONE ENCOUNTER
Medication: sitaGLIPtin (Januvia)     Dose/Frequency: 50 mg    Quantity: 30    Pharmacy: [facility] [place], [address]     Office:   [x] PCP/Provider -   [] Speciality/Provider -     Does the patient have enough for 3 days?   [] Yes   [x] No - Send as HP to POD    _____________________________________    Medication: traMADol (Ultram)     Dose/Frequency: 50 mg    Quantity: 15    Pharmacy: [facility] [place], [address]     Office:   [x] PCP/Provider -   [] Speciality/Provider -     Does the patient have enough for 3 days?   [] Yes   [x] No - Send as HP to POD

## 2024-11-26 DIAGNOSIS — Z79.4 TYPE 2 DIABETES MELLITUS WITH COMPLICATION, WITH LONG-TERM CURRENT USE OF INSULIN (HCC): ICD-10-CM

## 2024-11-26 DIAGNOSIS — E11.8 TYPE 2 DIABETES MELLITUS WITH COMPLICATION, WITH LONG-TERM CURRENT USE OF INSULIN (HCC): ICD-10-CM

## 2024-11-27 RX ORDER — SITAGLIPTIN 50 MG/1
50 TABLET, FILM COATED ORAL DAILY
Qty: 30 TABLET | Refills: 0 | OUTPATIENT
Start: 2024-11-27

## 2024-12-02 DIAGNOSIS — E79.0 HYPERURICEMIA: ICD-10-CM

## 2024-12-04 RX ORDER — ALLOPURINOL 100 MG/1
TABLET ORAL
Qty: 180 TABLET | Refills: 0 | Status: SHIPPED | OUTPATIENT
Start: 2024-12-04

## 2024-12-18 ENCOUNTER — OFFICE VISIT (OUTPATIENT)
Dept: INTERNAL MEDICINE CLINIC | Age: 79
End: 2024-12-18
Payer: COMMERCIAL

## 2024-12-18 VITALS
HEART RATE: 65 BPM | OXYGEN SATURATION: 99 % | WEIGHT: 126 LBS | DIASTOLIC BLOOD PRESSURE: 60 MMHG | HEIGHT: 59 IN | SYSTOLIC BLOOD PRESSURE: 140 MMHG | TEMPERATURE: 97.6 F | BODY MASS INDEX: 25.4 KG/M2

## 2024-12-18 DIAGNOSIS — M54.50 ACUTE MIDLINE LOW BACK PAIN, UNSPECIFIED WHETHER SCIATICA PRESENT: ICD-10-CM

## 2024-12-18 DIAGNOSIS — E11.8 TYPE 2 DIABETES MELLITUS WITH COMPLICATION, WITH LONG-TERM CURRENT USE OF INSULIN (HCC): ICD-10-CM

## 2024-12-18 DIAGNOSIS — M86.9 OSTEOMYELITIS OF OTHER SITE, UNSPECIFIED TYPE (HCC): Primary | ICD-10-CM

## 2024-12-18 DIAGNOSIS — I10 HYPERTENSION, UNSPECIFIED TYPE: ICD-10-CM

## 2024-12-18 DIAGNOSIS — E03.9 HYPOTHYROIDISM, UNSPECIFIED TYPE: ICD-10-CM

## 2024-12-18 DIAGNOSIS — I77.4 CELIAC ARTERY STENOSIS (HCC): ICD-10-CM

## 2024-12-18 DIAGNOSIS — K56.609 SBO (SMALL BOWEL OBSTRUCTION) (HCC): ICD-10-CM

## 2024-12-18 DIAGNOSIS — L98.9 SCALP LESION: ICD-10-CM

## 2024-12-18 DIAGNOSIS — J45.909 PERSISTENT ASTHMA WITHOUT COMPLICATION, UNSPECIFIED ASTHMA SEVERITY: ICD-10-CM

## 2024-12-18 DIAGNOSIS — E03.9 ACQUIRED HYPOTHYROIDISM: ICD-10-CM

## 2024-12-18 DIAGNOSIS — E78.5 HYPERLIPIDEMIA, UNSPECIFIED HYPERLIPIDEMIA TYPE: ICD-10-CM

## 2024-12-18 DIAGNOSIS — I73.9 PERIPHERAL VASCULAR DISEASE, UNSPECIFIED (HCC): ICD-10-CM

## 2024-12-18 DIAGNOSIS — Z79.4 TYPE 2 DIABETES MELLITUS WITH COMPLICATION, WITH LONG-TERM CURRENT USE OF INSULIN (HCC): ICD-10-CM

## 2024-12-18 DIAGNOSIS — E79.0 HYPERURICEMIA: ICD-10-CM

## 2024-12-18 DIAGNOSIS — D61.818 PANCYTOPENIA (HCC): ICD-10-CM

## 2024-12-18 PROCEDURE — 99214 OFFICE O/P EST MOD 30 MIN: CPT

## 2024-12-18 PROCEDURE — G2211 COMPLEX E/M VISIT ADD ON: HCPCS

## 2024-12-18 RX ORDER — MONTELUKAST SODIUM 10 MG/1
TABLET ORAL
COMMUNITY
Start: 2024-11-14

## 2024-12-18 RX ORDER — TRAMADOL HYDROCHLORIDE 50 MG/1
25 TABLET ORAL 2 TIMES DAILY
Qty: 90 TABLET | Refills: 1 | Status: SHIPPED | OUTPATIENT
Start: 2024-12-18 | End: 2024-12-18

## 2024-12-18 RX ORDER — TRAMADOL HYDROCHLORIDE 50 MG/1
25 TABLET ORAL 2 TIMES DAILY
Qty: 15 TABLET | Refills: 0 | Status: SHIPPED | OUTPATIENT
Start: 2024-12-18

## 2024-12-18 RX ORDER — ATORVASTATIN CALCIUM 80 MG/1
80 TABLET, FILM COATED ORAL DAILY
Qty: 90 TABLET | Refills: 1 | Status: SHIPPED | OUTPATIENT
Start: 2024-12-18

## 2024-12-18 RX ORDER — ALLOPURINOL 100 MG/1
100 TABLET ORAL 2 TIMES DAILY
Qty: 180 TABLET | Refills: 1 | Status: SHIPPED | OUTPATIENT
Start: 2024-12-18

## 2024-12-18 RX ORDER — METHOCARBAMOL 500 MG/1
500 TABLET, FILM COATED ORAL 3 TIMES DAILY
Qty: 270 TABLET | Refills: 1 | Status: SHIPPED | OUTPATIENT
Start: 2024-12-18

## 2024-12-18 RX ORDER — CARVEDILOL 12.5 MG/1
TABLET ORAL
COMMUNITY
Start: 2024-11-14

## 2024-12-18 RX ORDER — BUDESONIDE AND FORMOTEROL FUMARATE DIHYDRATE 160; 4.5 UG/1; UG/1
2 AEROSOL RESPIRATORY (INHALATION) 2 TIMES DAILY
Qty: 10.2 G | Refills: 5 | Status: SHIPPED | OUTPATIENT
Start: 2024-12-18

## 2024-12-18 RX ORDER — TRAMADOL HYDROCHLORIDE 25 MG/1
25 TABLET, COATED ORAL EVERY 8 HOURS PRN
Qty: 90 TABLET | Refills: 0 | Status: SHIPPED | OUTPATIENT
Start: 2024-12-18

## 2024-12-18 RX ORDER — FLUTICASONE PROPIONATE 50 MCG
2 SPRAY, SUSPENSION (ML) NASAL DAILY
Qty: 9.9 ML | Refills: 5 | Status: SHIPPED | OUTPATIENT
Start: 2024-12-18

## 2024-12-18 RX ORDER — LEVOTHYROXINE SODIUM 100 UG/1
100 TABLET ORAL DAILY
Qty: 90 TABLET | Refills: 1 | Status: SHIPPED | OUTPATIENT
Start: 2024-12-18

## 2024-12-18 NOTE — ASSESSMENT & PLAN NOTE
Lab Results   Component Value Date    HGBA1C 7.1 (H) 11/06/2024   A1C improving from 7.9 to 7.1 11/6. Currently on Lantus 30U daily, humalog 8U BID, and sitagliptin 50mg daily. Patient records blood sugars at home, however did not have her notebook today. Diabetic foot exam positive for diminished sensation to pinprick in b/l feet and dry skin. No ulcers present. Skin intact. Pulses palpable b/l. Will f/u with podiatry 12/26/24. Patient due for retinal exam- last 8/22.    - c/w current regimen of lantus, humalog, and sitagliptin  - repeat A1C and lipid panel prior to next visit  - referred to ophthalmology for eye exam  - will f/u in 4 months        Orders:    sitaGLIPtin (Januvia) 50 mg tablet; Take 1 tablet (50 mg total) by mouth daily    CBC and differential; Future    Comprehensive metabolic panel; Future    Lipid panel; Future    Hemoglobin A1C; Future    Ambulatory Referral to Ophthalmology; Future

## 2024-12-18 NOTE — ASSESSMENT & PLAN NOTE
Patient with lesion on head concerning for malignancy. Underwent biopsy by dermatology last week. Pending results.    -f/u biopsy results

## 2024-12-18 NOTE — ASSESSMENT & PLAN NOTE
Patient hospitalized in 9/24 for MSSA bacteremia w/ likely seeding to bone. S/p 6 weeks IV cefazolin. On cefadoxil outpatient until ESR plateau. Cefadoxil discontinued by infectious disease doctor. Infectious disease will follow-up as needed.

## 2024-12-18 NOTE — PROGRESS NOTES
Name: Porsha Hoyos      : 1945      MRN: [id number]  Encounter Provider: Jayson Houston MD  Encounter Date: 2024   Encounter department: [facility] [place]  :  Assessment & Plan  Type 2 diabetes mellitus with complication, with long-term current use of insulin (HCC)    Lab Results   Component Value Date    HGBA1C 7.1 (H) 2024   A1C improving from 7.9 to 7.1 . Currently on Lantus 30U daily, humalog 8U BID, and sitagliptin 50mg daily. Patient records blood sugars at home, however did not have her notebook today. Diabetic foot exam positive for diminished sensation to pinprick in b/l feet and dry skin. No ulcers present. Skin intact. Pulses palpable b/l. Will f/u with podiatry 24. Patient due for retinal exam- last .    - c/w current regimen of lantus, humalog, and sitagliptin  - repeat A1C and lipid panel prior to next visit  - referred to ophthalmology for eye exam  - will f/u in 4 months        Orders:    sitaGLIPtin (Januvia) 50 mg tablet; Take 1 tablet (50 mg total) by mouth daily    CBC and differential; Future    Comprehensive metabolic panel; Future    Lipid panel; Future    Hemoglobin A1C; Future    Ambulatory Referral to Ophthalmology; Future    Acute midline low back pain, unspecified whether sciatica present  Currently denying any pain    -C/w current regimen  Orders:    traMADol (Ultram) 50 mg tablet; Take 0.5 tablets (25 mg total) by mouth 2 (two) times a day    methocarbamol (ROBAXIN) 500 mg tablet; Take 1 tablet (500 mg total) by mouth 3 (three) times a day    Hyperlipidemia, unspecified hyperlipidemia type  Lipid panel  normal    -c/w current regimen  Orders:    atorvastatin (LIPITOR) 80 mg tablet; Take 1 tablet (80 mg total) by mouth daily    Hyperuricemia  C/w allopurinol daily  Orders:    allopurinol (ZYLOPRIM) 100 mg tablet; Take 1 tablet (100 mg total) by mouth 2 (two) times a day    Osteomyelitis of other site, unspecified  type (HCC)  Patient hospitalized in 9/24 for MSSA bacteremia w/ likely seeding to bone. S/p 6 weeks IV cefazolin. On cefadoxil outpatient until ESR plateau. Cefadoxil discontinued by infectious disease doctor. Infectious disease will follow-up as needed.       Acquired hypothyroidism  TSH 2.58 on 11/6/24. Currently on Synthroid 100mcg daily.     -C/w synthroid       Hypertension, unspecified type  BP today 140/60. Asymptomatic   TORIBIO non-comp w/ CPAP  Follows w/ cardiology    -c/w losartan, doxazosin, chlorthalidone, and bumex  - f/u w/ cardiology 4/25       Peripheral vascular disease, unspecified (HCC)  C/w current regimen       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HCC)  C/w current regimen       SBO (small bowel obstruction) (HCC)  Resolved. Continue with current bowel regimen to prevent constipation,       Pancytopenia (HCC)  - Continue to monitor CBC       Persistent asthma without complication, unspecified asthma severity  Patient denies any complaint on current regimen. Will continue   Orders:    fluticasone (FLONASE) 50 mcg/act nasal spray; 2 sprays into each nostril daily    budesonide-formoterol (SYMBICORT) 160-4.5 mcg/act inhaler; Inhale 2 puffs 2 (two) times a day Rinse mouth after use.    Scalp lesion  Patient with lesion on head concerning for malignancy. Underwent biopsy by dermatology last week. Pending results.    -f/u biopsy results              History of Present Illness     Patient is an 78 y/o femal w/ PMH DM2, acquired hypothyroidism, resistant HTN, TORIBIO noncompliant w/ CPAP, and recent hospitalization for MSSA bacteremia who presents for diabetes follow-up. Patient states that she is compliant with her medications. She keeps track of her blood sugars at home, however she forgot to bring her logbook to the appointment today.     Patient endorses generalized fatigue since her hospitalization. She states that she just does not fee las string as she was before the hospitalization. She was recently hospitalized  "in September for MSSA bacteremia c/b osteomyelitis and hypertensive encephalopathy. S/p 6 weeks of IV cefazolin and several weeks of PO cefadoxil. Cleared by ID to discontinue antibiotics. She also endorses poor sleep quality since discharge from the hospital. She states that she has trouble falling asleep and staying asleep. Sleeps about 5 hours per night. Patient states may be anxiety related. She has tried taking melatonin at night which helps sometimes.      Review of Systems   Constitutional:  Positive for fatigue. Negative for chills and fever.   HENT:  Negative for sore throat.    Respiratory:  Negative for cough, chest tightness, shortness of breath and wheezing.    Cardiovascular:  Positive for chest pain. Negative for palpitations.   Gastrointestinal:  Positive for vomiting. Negative for abdominal pain, diarrhea and nausea.   Skin:  Negative for rash.   Neurological:  Negative for light-headedness and headaches.   Psychiatric/Behavioral:  Positive for sleep disturbance. The patient is nervous/anxious.        Objective   /60 (BP Location: Left arm, Patient Position: Sitting, Cuff Size: Standard)   Pulse 65   Temp 97.6 °F (36.4 °C) (Temporal)   Ht 4' 11\" (1.499 m)   Wt 57.2 kg (126 lb)   LMP  (LMP Unknown)   SpO2 99%   BMI 25.45 kg/m²      Physical Exam  Constitutional:       Appearance: Normal appearance.   HENT:      Head: Normocephalic and atraumatic.   Eyes:      Extraocular Movements: Extraocular movements intact.      Pupils: Pupils are equal, round, and reactive to light.   Cardiovascular:      Rate and Rhythm: Normal rate and regular rhythm.      Pulses: Normal pulses. no weak pulses.           Dorsalis pedis pulses are 2+ on the right side and 2+ on the left side.        Posterior tibial pulses are 2+ on the right side and 2+ on the left side.      Heart sounds: Normal heart sounds. No murmur heard.     No friction rub. No gallop.   Pulmonary:      Effort: Pulmonary effort is normal. No " respiratory distress.      Breath sounds: Normal breath sounds. No wheezing, rhonchi or rales.   Abdominal:      General: Abdomen is flat. Bowel sounds are normal. There is no distension.      Palpations: Abdomen is soft.      Tenderness: There is no abdominal tenderness. There is no guarding.      Hernia: No hernia is present.   Musculoskeletal:         General: Normal range of motion.        Feet:    Feet:      Right foot:      Skin integrity: Dry skin present. No ulcer, skin breakdown, erythema, warmth or callus.      Left foot:      Skin integrity: Dry skin present. No ulcer, skin breakdown, erythema, warmth or callus.   Skin:     General: Skin is dry.      Capillary Refill: Capillary refill takes less than 2 seconds.   Neurological:      General: No focal deficit present.      Mental Status: She is alert and oriented to person, place, and time. Mental status is at baseline.      Sensory: Sensory deficit (b/l feet w/ decreased sensation to pinprick) present.           Diabetic Foot Exam    Patient's shoes and socks removed.    Right Foot/Ankle   Right Foot Inspection  Skin Exam: skin normal and dry skin. Skin not intact, no warmth, no callus, no erythema, no maceration, no abnormal color, no pre-ulcer, no ulcer and no callus.     Toe Exam: ROM and strength within normal limits. Erythema    Sensory   Monofilament testing: diminished    Vascular  The right DP pulse is 2+. The right PT pulse is 2+.     Left Foot/Ankle  Left Foot Inspection  Skin Exam: skin normal and dry skin. Skin not intact, no warmth, no erythema, no maceration, normal color, no pre-ulcer, no ulcer and no callus.     Toe Exam: ROM and strength within normal limits. No erythema.     Sensory   Monofilament testing: diminished    Vascular  The left DP pulse is 2+. The left PT pulse is 2+.     Assign Risk Category  No deformity present  Loss of protective sensation  No weak pulses  Risk: 1

## 2024-12-18 NOTE — TELEPHONE ENCOUNTER
[facility] called she said they are unable to get Tramadol 25 mg from .  She asked if script could be changed possibly to 50 mg 0.5 tablet.    Thank you

## 2024-12-18 NOTE — PATIENT INSTRUCTIONS
- Continue taking all of your medications as previously. I have provided refills on the medications that you need  - We are going to have you do bloodwork prior to your next appointment  - Please see the eye doctor for your retinal exam. I am putting in a referral. Make sure to bring the piece of paper that I gave you to the appointment.  - You will return here for check-up in 4 months

## 2024-12-18 NOTE — ASSESSMENT & PLAN NOTE
Currently denying any pain    -C/w current regimen  Orders:    traMADol (Ultram) 50 mg tablet; Take 0.5 tablets (25 mg total) by mouth 2 (two) times a day    methocarbamol (ROBAXIN) 500 mg tablet; Take 1 tablet (500 mg total) by mouth 3 (three) times a day

## 2024-12-26 ENCOUNTER — OFFICE VISIT (OUTPATIENT)
Dept: PODIATRY | Facility: CLINIC | Age: 79
End: 2024-12-26
Payer: COMMERCIAL

## 2024-12-26 VITALS — WEIGHT: 125 LBS | BODY MASS INDEX: 25.2 KG/M2 | HEIGHT: 59 IN

## 2024-12-26 DIAGNOSIS — E11.42 DIABETIC POLYNEUROPATHY ASSOCIATED WITH TYPE 2 DIABETES MELLITUS (HCC): Primary | ICD-10-CM

## 2024-12-26 PROCEDURE — RECHECK: Performed by: PODIATRIST

## 2024-12-26 PROCEDURE — 11719 TRIM NAIL(S) ANY NUMBER: CPT | Performed by: PODIATRIST

## 2024-12-26 NOTE — PROGRESS NOTES
Established patient with class findings presents for nail care.  Vascular exam:  DP  0/4 bilateral; PT  0 4 bilateral   Dermatological exam:  Each toenail is thick and  dystrophic.  Skin on both feet dry and scaly and fissured prone.  Diagnosis:  Diabetes mellitus  Treatment: Trimmed multiple dystrophic toenails.  Recommended twice daily usage of a moisturizer.    Nail removal    Date/Time: 12/26/2024 5:45 PM    Performed by: Virgilio Buenrostro DPM  Authorized by: Virgilio Buenrostro DPM    Nails trimmed:     Number of nails trimmed:  10

## 2025-01-10 DIAGNOSIS — I10 PRIMARY HYPERTENSION: ICD-10-CM

## 2025-01-10 DIAGNOSIS — M54.50 ACUTE MIDLINE LOW BACK PAIN, UNSPECIFIED WHETHER SCIATICA PRESENT: ICD-10-CM

## 2025-01-10 RX ORDER — TRAMADOL HYDROCHLORIDE 25 MG/1
25 TABLET, COATED ORAL EVERY 8 HOURS PRN
Qty: 90 TABLET | Refills: 0 | Status: CANCELLED | OUTPATIENT
Start: 2025-01-10

## 2025-01-10 RX ORDER — DOXAZOSIN 2 MG/1
2 TABLET ORAL 2 TIMES DAILY
Qty: 60 TABLET | Refills: 0 | Status: SHIPPED | OUTPATIENT
Start: 2025-01-10

## 2025-01-10 NOTE — TELEPHONE ENCOUNTER
Medication: doxazosin (CARDURA) 2 mg tablet     Dose/Frequency: Take 1 tablet (2 mg total) by mouth 2 (two) times a day     Quantity: 60    Pharmacy: [address]     Office:   [] PCP/Provider -    [x] Speciality/Provider - SUDHEER Mendieta    Does the patient have enough for 3 days?   [x] Yes   [] No - Send as HP to POD    Medication: traMADol 25 MG TABS     Dose/Frequency: Take 25 mg by mouth every 8 (eight) hours as needed for moderate pain     Quantity: 980    Pharmacy: [address]     Office:   [x] PCP/Provider - João Alfonso MD   [] Speciality/Provider -     Does the patient have enough for 3 days?   [x] Yes   [] No - Send as HP to POD

## 2025-01-11 RX ORDER — TRAMADOL HYDROCHLORIDE 50 MG/1
25 TABLET ORAL 2 TIMES DAILY
Qty: 15 TABLET | Refills: 0 | Status: SHIPPED | OUTPATIENT
Start: 2025-01-11

## 2025-01-27 DIAGNOSIS — R60.0 BILATERAL LOWER EXTREMITY EDEMA: ICD-10-CM

## 2025-01-27 RX ORDER — BUMETANIDE 2 MG/1
2 TABLET ORAL DAILY
Qty: 90 TABLET | Refills: 1 | Status: SHIPPED | OUTPATIENT
Start: 2025-01-27 | End: 2025-02-07 | Stop reason: SDUPTHER

## 2025-01-27 NOTE — TELEPHONE ENCOUNTER
Medication: bumetanide (BUMEX) 2 mg tablet    Dose/Frequency: : Take 1 tablet (2 mg total) by mouth daily    Quantity: 30 tablet    Pharmacy:   [facility] [id number]    Office:   [x] PCP/Provider -   [] Speciality/Provider -     Does the patient have enough for 3 days?   [] Yes   [x] No - Send as HP to POD    Medication:   traMADol (Ultram) 50 mg tablet    Dose/Frequency:  Take 0.5 tablets (25 mg total) by mouth 2 (two) times a day    Quantity:  15 tablet    Pharmacy: [facility] [id number]    Office:   [x] PCP/Provider -   [] Speciality/Provider -     Does the patient have enough for 3 days?   [] Yes   [x] No - Send as HP to POD

## 2025-01-28 ENCOUNTER — PATIENT OUTREACH (OUTPATIENT)
Dept: HEMATOLOGY ONCOLOGY | Facility: CLINIC | Age: 80
End: 2025-01-28

## 2025-01-28 ENCOUNTER — DOCUMENTATION (OUTPATIENT)
Dept: HEMATOLOGY ONCOLOGY | Facility: CLINIC | Age: 80
End: 2025-01-28

## 2025-01-28 NOTE — PROGRESS NOTES
PN please retrieve outside records.     Pt referred to radiation oncology and should be scheduled within 7 days.  Please notify me if not scheduled within time frame.    Referral received/ Chart reviewed for work up completed     Imaging completed:    [] PET/CT   [] MRI   [] CT   [] US   [] Mammo   [] Bone scan   [x] N/A    Pathology completed:    Date:12/10/2024   Location:[facility], Accession # [id number]   []N/A    Additional records needed:    [] Genomic report   [] Genetic testing results   [] Office Note   [] Procedure/ Operative note   [] Lab results   [x] N/A      [] Radiation Oncology records retrieval needed (PN to route to rad/onc clerical pool once scheduled)  Date:  Location:

## 2025-01-28 NOTE — PROGRESS NOTES
Outreach to patient son, Rob. Left voicemail introducing myself and role as Oncology Nurse Navigator to assist with coordination of cancer care, discuss referral to radiation oncology, be a point of contact prior to oncology consult,  provide support and connect with available resources.  Provided contact information. Call back requested.      Future Appointments   Date Time Provider Department Center   4/29/2025 10:15 AM João Alfonso MD [facility]   7/8/2025  1:15 PM João Alfonso MD [facility]

## 2025-01-29 DIAGNOSIS — M54.50 ACUTE MIDLINE LOW BACK PAIN, UNSPECIFIED WHETHER SCIATICA PRESENT: ICD-10-CM

## 2025-01-29 RX ORDER — TRAMADOL HYDROCHLORIDE 50 MG/1
25 TABLET ORAL 2 TIMES DAILY
Qty: 15 TABLET | Refills: 1 | Status: SHIPPED | OUTPATIENT
Start: 2025-01-29

## 2025-01-29 NOTE — TELEPHONE ENCOUNTER
Medication: traMADol (Ultram) 50 mg tablet     Dose/Frequency: Take 0.5 tablets (25 mg total) by mouth 2 (two) times a day     Quantity: 15 tablet     Pharmacy: [facility] [place] - [address] [place] [address]     Office:   [x] PCP/Provider - João Alfonso MD   [] Speciality/Provider -     Does the patient have enough for 3 days?   [] Yes   [x] No - Send as HP to POD

## 2025-01-31 ENCOUNTER — PATIENT OUTREACH (OUTPATIENT)
Dept: HEMATOLOGY ONCOLOGY | Facility: CLINIC | Age: 80
End: 2025-01-31

## 2025-01-31 PROBLEM — C44.41 BASAL CELL CARCINOMA OF SCALP: Status: ACTIVE | Noted: 2025-01-31

## 2025-01-31 NOTE — PROGRESS NOTES
Initial outreach. Spoke to patient. Introduced myself and explained the role of an Oncology Nurse Navigator to assist with coordination of cancer care, preparation for upcoming appointment, be a point of contact prior to oncology consult, provide support and connect her with available resources. Discussed radiation oncology referral placed by Dr Cardoza for basal cell carcinoma of the scalp. Explained I will be her main contact until she they are established with their team and a treatment plan is established.     No prior radiation therapy. Patient lives alone. Limited support system. Her son, Rob lives in [place], and comes to stay with her Monday through Wednesday. She has another son who lives in [place], one son who passed away from stomach cancer and another son that she does not know where he is. She has a neighbor that checks in on her.     Introduced Adenike, and explained she will be her patient navigator, who will reach out after the first consult to introduce themselves. The PN will provide support, make sure she has a good understanding of the plan and schedule and to connect with additional resources if/when needed.     Reviewed [facility] office location. Explained [facility] and number provided. Assessed for barriers of care. My direct contact information provided. Patient is aware that she can call me should she need any further assistance. Patient was appreciative of call/assistance.     Cutaneous Symptoms:   Wounds Yes, describe: occasional drainage and bleeding when she [place] or washes hair   Fatigue No  Appetite: good  Weight : stable    Prior history of skin cancer: no    Dermatology Provider: Dr Cardoza  PCP: João Alfonso MD  Insurance:  [employer]

## 2025-01-31 NOTE — PROGRESS NOTES
Intake received, chart reviewed for need of external records.  Consulting: Dr. SHARMA  Scheduled on 2/4/2025  Dx: BCC OF SCALP   ICD: C44.41    Pathology slides requested:   From [facility]  phone [phone], via fax [phone]  Accession # [id number]  Slides will be sent directly to [facility] Pathology lab at  [address].

## 2025-02-04 ENCOUNTER — CONSULT (OUTPATIENT)
Dept: RADIATION ONCOLOGY | Facility: HOSPITAL | Age: 80
End: 2025-02-04
Attending: RADIOLOGY
Payer: COMMERCIAL

## 2025-02-04 VITALS
OXYGEN SATURATION: 98 % | RESPIRATION RATE: 18 BRPM | HEART RATE: 72 BPM | DIASTOLIC BLOOD PRESSURE: 76 MMHG | SYSTOLIC BLOOD PRESSURE: 138 MMHG | TEMPERATURE: 97.2 F

## 2025-02-04 DIAGNOSIS — C44.41 BASAL CELL CARCINOMA OF SCALP: Primary | ICD-10-CM

## 2025-02-04 PROCEDURE — 99204 OFFICE O/P NEW MOD 45 MIN: CPT | Performed by: RADIOLOGY
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
## 2025-02-04 NOTE — ASSESSMENT & PLAN NOTE
Ms. Hoyos is an 79-year-old female with a recently diagnosed Stage I Basal Cell carcinoma of the scalp.  The lesion had been present for approximately 1 year.  Shave biopsy on 12/10/2024 confirmed basal cell carcinoma with ulceration and scar.  Mohs surgery was considered, but given concern regarding closure and depth of invasion, it was felt that primary radiation may be a simpler option for the patient.  The lesion is certainly amenable to definitive radiation, which would be delivered over the course of 4 weeks.  It was explained that the treatment field would encompass the lesion with approximately 2 cm margin.  The associated risks and toxicities of treatment were discussed with the patient in detail, including, but not limited to, fatigue, erythema, partial alopecia, desquamation, and a small risk of nonhealing wound potentially requiring surgical closure.  The patient has agreed to proceed with treatment and will be scheduled for CT planning with treatment to begin shortly thereafter.

## 2025-02-05 ENCOUNTER — LAB REQUISITION (OUTPATIENT)
Dept: LAB | Facility: HOSPITAL | Age: 80
End: 2025-02-05
Payer: COMMERCIAL

## 2025-02-05 ENCOUNTER — PATIENT OUTREACH (OUTPATIENT)
Dept: HEMATOLOGY ONCOLOGY | Facility: CLINIC | Age: 80
End: 2025-02-05

## 2025-02-05 DIAGNOSIS — C44.41 BASAL CELL CARCINOMA OF SKIN OF SCALP AND NECK: ICD-10-CM

## 2025-02-05 DIAGNOSIS — C44.41 BASAL CELL CARCINOMA OF SCALP: Primary | ICD-10-CM

## 2025-02-05 PROCEDURE — 88321 CONSLTJ&REPRT SLD PREP ELSWR: CPT | Performed by: STUDENT IN AN ORGANIZED HEALTH CARE EDUCATION/TRAINING PROGRAM

## 2025-02-05 NOTE — PROGRESS NOTES
I reached out and spoke with Porsha, now that consults have been completed with the oncology teams. I introduced myself and explained my role as their Patient Navigator. I reviewed for any barriers to care and offered supportive services as needed. I reviewed and updated the members assigned to the care team in [facility]. She knows the members of the care team as well as how and when to contact them with any needs.     Distress Thermometer completed at this time. Patient scored 4/10. Referral to  placed.. She responded yes to worry and sadness grief and loss    She is currently struggling with transportation. Transportation options were reviewed. Patient provided transportation assistance with [employer].  Paperwork completed and sent to [place] dispatch and scanned into chart    She denies any uncontrolled symptoms. Discussed role of Palliative Care in symptom and side effect management. Declined referral at this time.    She states that she is eating and drinking as per usual with no unintentional weight loss.       Patient does not smoke.     She states she is well supported by family and friends.  She tells me she has 4 sons - Rob who is involved with her care and visits, 1 son passed away about 3 yrs ago from cancer, another son that lives in [place] with minimal contact and a 4th son that she hasn't heard from in many years. [place] owns her home but pays lot rent. She has one neighbor she is friendly with and visits with from time to time. She told me she still feels grief from the loss of her  of 63 1/2 yrs about a year ago. Community support groups discussed including the [facility] Support [facility]. Patient declined information at this time.     She feels she has adequate insurance coverage and denies any financial concerns at this time.     She verbalizes managing the schedules well.   Future Appointments   Date Time Provider Department Center   2/7/2025  3:30 PM  João Alfonso MD [facility]   2/17/2025  3:00 PM Jose M Lo MD [facility] Rad Onc [facility]   4/29/2025 10:15 AM João Alfonso MD [facility]   7/8/2025  1:15 PM João Alfonso MD [facility]        Based on individual needs I will follow up in about 2 weeks. I have provided my direct contact information and welcome them to contact me if needs as discussed above change. She was appreciative for the call. .

## 2025-02-06 ENCOUNTER — PATIENT OUTREACH (OUTPATIENT)
Dept: CASE MANAGEMENT | Facility: OTHER | Age: 80
End: 2025-02-06

## 2025-02-07 ENCOUNTER — PATIENT OUTREACH (OUTPATIENT)
Dept: HEMATOLOGY ONCOLOGY | Facility: CLINIC | Age: 80
End: 2025-02-07

## 2025-02-07 ENCOUNTER — OFFICE VISIT (OUTPATIENT)
Dept: INTERNAL MEDICINE CLINIC | Age: 80
End: 2025-02-07
Payer: COMMERCIAL

## 2025-02-07 VITALS
OXYGEN SATURATION: 97 % | BODY MASS INDEX: 26.37 KG/M2 | HEIGHT: 59 IN | TEMPERATURE: 97.5 F | DIASTOLIC BLOOD PRESSURE: 80 MMHG | HEART RATE: 66 BPM | SYSTOLIC BLOOD PRESSURE: 130 MMHG | WEIGHT: 130.8 LBS

## 2025-02-07 DIAGNOSIS — R60.0 BILATERAL LOWER EXTREMITY EDEMA: ICD-10-CM

## 2025-02-07 DIAGNOSIS — Z79.4 TYPE 2 DIABETES MELLITUS WITH STABLE PROLIFERATIVE RETINOPATHY OF BOTH EYES, WITH LONG-TERM CURRENT USE OF INSULIN (HCC): ICD-10-CM

## 2025-02-07 DIAGNOSIS — N18.32 CHRONIC KIDNEY DISEASE, STAGE 3B (HCC): ICD-10-CM

## 2025-02-07 DIAGNOSIS — E11.42 DIABETIC POLYNEUROPATHY ASSOCIATED WITH TYPE 2 DIABETES MELLITUS (HCC): ICD-10-CM

## 2025-02-07 DIAGNOSIS — I73.9 PERIPHERAL VASCULAR DISEASE, UNSPECIFIED (HCC): ICD-10-CM

## 2025-02-07 DIAGNOSIS — D61.818 PANCYTOPENIA (HCC): ICD-10-CM

## 2025-02-07 DIAGNOSIS — A41.9 SEPSIS (HCC): ICD-10-CM

## 2025-02-07 DIAGNOSIS — Z79.4 TYPE 2 DIABETES MELLITUS WITH COMPLICATION, WITH LONG-TERM CURRENT USE OF INSULIN (HCC): ICD-10-CM

## 2025-02-07 DIAGNOSIS — J45.909 ASTHMA WITHOUT STATUS ASTHMATICUS WITHOUT COMPLICATION, UNSPECIFIED ASTHMA SEVERITY, UNSPECIFIED WHETHER PERSISTENT: ICD-10-CM

## 2025-02-07 DIAGNOSIS — E11.3553 TYPE 2 DIABETES MELLITUS WITH STABLE PROLIFERATIVE RETINOPATHY OF BOTH EYES, WITH LONG-TERM CURRENT USE OF INSULIN (HCC): ICD-10-CM

## 2025-02-07 DIAGNOSIS — E11.8 TYPE 2 DIABETES MELLITUS WITH COMPLICATION, WITH LONG-TERM CURRENT USE OF INSULIN (HCC): ICD-10-CM

## 2025-02-07 DIAGNOSIS — I77.4 CELIAC ARTERY STENOSIS (HCC): ICD-10-CM

## 2025-02-07 DIAGNOSIS — M86.9 OSTEOMYELITIS OF OTHER SITE, UNSPECIFIED TYPE (HCC): ICD-10-CM

## 2025-02-07 DIAGNOSIS — C44.41 BASAL CELL CARCINOMA OF SCALP: ICD-10-CM

## 2025-02-07 DIAGNOSIS — F11.20 OPIOID DEPENDENCE, UNCOMPLICATED (HCC): ICD-10-CM

## 2025-02-07 DIAGNOSIS — I25.10 CORONARY ARTERY DISEASE INVOLVING NATIVE CORONARY ARTERY OF NATIVE HEART WITHOUT ANGINA PECTORIS: Primary | ICD-10-CM

## 2025-02-07 DIAGNOSIS — J45.909 PERSISTENT ASTHMA WITHOUT COMPLICATION, UNSPECIFIED ASTHMA SEVERITY: ICD-10-CM

## 2025-02-07 PROBLEM — K56.609 SBO (SMALL BOWEL OBSTRUCTION) (HCC): Status: RESOLVED | Noted: 2024-09-18 | Resolved: 2025-02-07

## 2025-02-07 PROCEDURE — G2211 COMPLEX E/M VISIT ADD ON: HCPCS | Performed by: INTERNAL MEDICINE

## 2025-02-07 PROCEDURE — 99214 OFFICE O/P EST MOD 30 MIN: CPT | Performed by: INTERNAL MEDICINE

## 2025-02-07 RX ORDER — ACETAMINOPHEN 325 MG/1
975 TABLET ORAL EVERY 8 HOURS SCHEDULED
Qty: 90 TABLET | Refills: 0 | Status: SHIPPED | OUTPATIENT
Start: 2025-02-07

## 2025-02-07 RX ORDER — ALBUTEROL SULFATE 90 UG/1
2 INHALANT RESPIRATORY (INHALATION) 4 TIMES DAILY
Qty: 18 G | Refills: 0 | Status: SHIPPED | OUTPATIENT
Start: 2025-02-07

## 2025-02-07 RX ORDER — HUMAN INSULIN 100 [IU]/ML
INJECTION, SOLUTION SUBCUTANEOUS
COMMUNITY
Start: 2025-01-13

## 2025-02-07 RX ORDER — NITROGLYCERIN 0.4 MG/1
0.4 TABLET SUBLINGUAL
Qty: 15 TABLET | Refills: 3 | Status: SHIPPED | OUTPATIENT
Start: 2025-02-07

## 2025-02-07 RX ORDER — BUMETANIDE 2 MG/1
2 TABLET ORAL DAILY
Qty: 90 TABLET | Refills: 1 | Status: SHIPPED | OUTPATIENT
Start: 2025-02-07

## 2025-02-07 RX ORDER — SYRINGE-NEEDLE,INSULIN,0.5 ML 31 GX5/16"
SYRINGE, EMPTY DISPOSABLE MISCELLANEOUS 4 TIMES DAILY
Qty: 120 EACH | Refills: 5 | Status: SHIPPED | OUTPATIENT
Start: 2025-02-07

## 2025-02-07 RX ORDER — FLUTICASONE PROPIONATE 50 MCG
2 SPRAY, SUSPENSION (ML) NASAL DAILY
Qty: 9.9 ML | Refills: 5 | Status: SHIPPED | OUTPATIENT
Start: 2025-02-07

## 2025-02-07 NOTE — ASSESSMENT & PLAN NOTE
Continue with present regimen  Orders:    nitroglycerin (NITROSTAT) 0.4 mg SL tablet; Place 1 tablet (0.4 mg total) under the tongue every 5 (five) minutes as needed for chest pain

## 2025-02-07 NOTE — ASSESSMENT & PLAN NOTE
Continue with Bumex 2 mg daily  Orders:    bumetanide (BUMEX) 2 mg tablet; Take 1 tablet (2 mg total) by mouth daily

## 2025-02-07 NOTE — ASSESSMENT & PLAN NOTE
Stable.  Will continue to monitor  Orders:    albuterol (Ventolin HFA) 90 mcg/act inhaler; Inhale 2 puffs 4 (four) times a day

## 2025-02-07 NOTE — ASSESSMENT & PLAN NOTE
Recently diagnosed basal cell carcinoma of scalp.  Also evaluated by medical oncologist and recommended for radiation therapy.  She does have appointment with radiation oncologist.

## 2025-02-07 NOTE — PROGRESS NOTES
"Name: Porsha Hoyos      : 1945      MRN: [id number]  Encounter Provider: João Alfonso MD  Encounter Date: 2025   Encounter department: [facility] PRIMARY CARE BATH  :  Assessment & Plan  Type 2 diabetes mellitus with complication, with long-term current use of insulin (HCC)    Lab Results   Component Value Date    HGBA1C 7.1 (H) 2024     Well-controlled on present regimen.  Continue with diabetic diet  Orders:    TRUEplus Insulin Syringe 31G X 16\" 0.5 ML MISC; Inject under the skin 4 (four) times a day    Asthma without status asthmaticus without complication, unspecified asthma severity, unspecified whether persistent  Stable.  Will continue to monitor  Orders:    albuterol (Ventolin HFA) 90 mcg/act inhaler; Inhale 2 puffs 4 (four) times a day    Bilateral lower extremity edema  Continue with Bumex 2 mg daily  Orders:    bumetanide (BUMEX) 2 mg tablet; Take 1 tablet (2 mg total) by mouth daily    Persistent asthma without complication, unspecified asthma severity  Doing well on present regimen  Orders:    fluticasone (FLONASE) 50 mcg/act nasal spray; 2 sprays into each nostril daily    Coronary artery disease involving native coronary artery of native heart without angina pectoris  Continue with present regimen  Orders:    nitroglycerin (NITROSTAT) 0.4 mg SL tablet; Place 1 tablet (0.4 mg total) under the tongue every 5 (five) minutes as needed for chest pain    Opioid dependence, uncomplicated (HCC)  Still need tramadol as needed for spinal stenosis       Pancytopenia (HCC)  Stable.  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, with long-term current use of insulin (HCC)    Lab Results   Component Value Date    HGBA1C 7.1 (H) 2024   Stable with present regimen         Peripheral vascular disease, unspecified (HCC)  Continue with present regimen       Diabetic polyneuropathy associated with type 2 diabetes " "mellitus (HCC)  Doing well on present regimen  Lab Results   Component Value Date    HGBA1C 7.1 (H) 11/06/2024            Chronic kidney disease, stage 3b (HCC)  Lab Results   Component Value Date    EGFR 67 11/14/2024    EGFR 60 11/06/2024    EGFR 42 10/24/2024    CREATININE 0.82 11/14/2024    CREATININE 0.89 11/06/2024    CREATININE 1.20 10/24/2024   Renal function is stable.  Will continue to monitor         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HCC)  No pain or symptoms       Basal cell carcinoma of scalp  Recently diagnosed basal cell carcinoma of scalp.  Also evaluated by medical oncologist and recommended for radiation therapy.  She does have appointment with radiation oncologist.              History of Present Illness   Patient came to office to discuss treatment options regarding recently diagnosed basal cell carcinoma of scalp.      Review of Systems   Constitutional:  Negative for fatigue and fever.   HENT:  Negative for congestion, ear discharge, ear pain, postnasal drip, sinus pressure, sore throat, tinnitus and trouble swallowing.    Eyes:  Negative for discharge, itching and visual disturbance.   Respiratory:  Negative for cough and shortness of breath.    Cardiovascular:  Negative for chest pain and palpitations.   Gastrointestinal:  Negative for abdominal pain, diarrhea, nausea and vomiting.   Endocrine: Negative for cold intolerance and polyuria.   Genitourinary:  Negative for difficulty urinating, dysuria and urgency.   Musculoskeletal:  Negative for arthralgias and neck pain.   Skin:  Negative for rash.   Allergic/Immunologic: Negative for environmental allergies.   Neurological:  Negative for dizziness, weakness and headaches.   Psychiatric/Behavioral:  The patient is not nervous/anxious.        Objective   /80 (BP Location: Left arm, Patient Position: Sitting, Cuff Size: Standard)   Pulse 66   Temp 97.5 °F (36.4 °C)   Ht 4' 11\" (1.499 m)   Wt 59.3 kg (130 lb 12.8 oz)   LMP  (LMP Unknown)   SpO2 " 97%   BMI 26.42 kg/m²      Physical Exam  Constitutional:       General: She is not in acute distress.     Appearance: She is well-developed.   HENT:      Head: Normocephalic.      Right Ear: External ear normal. There is no impacted cerumen.      Left Ear: External ear normal. There is no impacted cerumen.      Nose: No rhinorrhea.      Mouth/Throat:      Pharynx: No oropharyngeal exudate or posterior oropharyngeal erythema.   Eyes:      General: No scleral icterus.     Pupils: Pupils are equal, round, and reactive to light.   Neck:      Thyroid: No thyromegaly.      Trachea: No tracheal deviation.   Cardiovascular:      Rate and Rhythm: Normal rate and regular rhythm.      Heart sounds: Normal heart sounds. No murmur heard.  Pulmonary:      Effort: Pulmonary effort is normal. No respiratory distress.      Breath sounds: Normal breath sounds.   Chest:      Chest wall: No tenderness.   Abdominal:      General: Bowel sounds are normal.      Palpations: Abdomen is soft. There is no mass.      Tenderness: There is no abdominal tenderness.   Musculoskeletal:         General: Normal range of motion.      Cervical back: Normal range of motion and neck supple. No rigidity.      Right lower leg: No edema.      Left lower leg: No edema.   Lymphadenopathy:      Cervical: No cervical adenopathy.   Skin:     General: Skin is warm.      Findings: Lesion present. No erythema or rash.      Comments: About the size of quarter skin lesion with scab noted over top of scalp.   Neurological:      Mental Status: She is alert and oriented to person, place, and time.      Cranial Nerves: No cranial nerve deficit.   Psychiatric:         Mood and Affect: Mood normal.         Behavior: Behavior normal.

## 2025-02-07 NOTE — ASSESSMENT & PLAN NOTE
Lab Results   Component Value Date    HGBA1C 7.1 (H) 11/06/2024   Stable with present regimen

## 2025-02-07 NOTE — ASSESSMENT & PLAN NOTE
"  Lab Results   Component Value Date    HGBA1C 7.1 (H) 11/06/2024     Well-controlled on present regimen.  Continue with diabetic diet  Orders:    TRUEplus Insulin Syringe 31G X 5/16\" 0.5 ML MISC; Inject under the skin 4 (four) times a day    "

## 2025-02-10 ENCOUNTER — DOCUMENTATION (OUTPATIENT)
Dept: HEMATOLOGY ONCOLOGY | Facility: CLINIC | Age: 80
End: 2025-02-10

## 2025-02-10 PROCEDURE — 77263 THER RADIOLOGY TX PLNG CPLX: CPT | Performed by: RADIOLOGY

## 2025-02-10 NOTE — PROGRESS NOTES
Oncology Finance Counselor/Advocate placed call to patient. This writer informed patient that this writer is here to assist patient with billing questions, financial assistance, payment/payment plans, quotes, copayment assistance, insurance optimization, and insurance navigation.      This writer conducted a thorough benefit review of copayment, deductible, and out of pocket cost. This information is documented below and has been reviewed with patient.     Copayment: $0.00  Deductible: N/A  Out Of Pocket Cost: $6,000.00 / $6,000.00 Remaining  Insurance Optimization (Limited Benefit Vs Self-Pay): N/A     Interventions:   An introductory outreach call was made to the patient, who answered. This writer introduced herself and explained the scope of the Oncology  role. This writer provided contact information, reviewed insurance details, discussed the upcoming treatment date and time, and answered all questions. This writer reviewed veronika care with the PT and she indicated she would like to pursue. This writer requested the hospital financial counselors mail PT an application. PT grateful for call.     Information above was review thoroughly with patient and patient was advise of possible assistance programs/interventions. If any question arise patient can contact this writer at below information. This information was given to patient at time of contact.      Eva Curtis MPH  Phone: [phone]  Email: [email or web address]

## 2025-02-12 ENCOUNTER — PATIENT OUTREACH (OUTPATIENT)
Dept: CASE MANAGEMENT | Facility: OTHER | Age: 80
End: 2025-02-12

## 2025-02-12 NOTE — PROGRESS NOTES
Biopsychosocial and Barriers Assessment    Type of Cancer: Casal Cell Carcinoma of scalp  Treatment plan: Will be receiving 4 weeks of RT  Noted barriers to care: none  Cultural/Sikhism concerns: none  Hair Loss/ Wig resources needed: n/a    DT completed: yes  DT score: 4/10  Issues noted: worry/anxiety, sadness/depression, grief or loss and transportation    Marital status/Lives with: /Lives alone, however son stays with her a few days per week  Pt's support system: son and neighbor, her other children do not live close by  Mental Health history: none  Substance Abuse: none    Employment/income source: retired  Concerns with bills (treatment vs household): none  Noted issues with home: none    Narrative note:   OSW placed outreach TC to pt this day. OSW introduced self and role. Pt is a very pleasant woman with a dx of basal cell carcinoma of the scalp. She reports that she has good support from her one son. He stays with her a few times a week and she also has a neighbor that checks in on her. Pt states she is independent with her ADL's. She does not drive and has addressed transportation with [employer] from patient navigation. Pt states that she will be receiving 4 weeks of radiation. She meets with Dr. Lo on 2/17. OSW asked how she is coping emotionally and she admits that if she thinks about it too much it becomes overwhelming. OSW validated her feelings and reassured her that this is normal. OSW asked what she does to occupy her time and she states that she enjoys watching television and she plays games on her phone.   OSW offered to check in with her in a few weeks and she was appreciative.  OSW will continue to follow.

## 2025-02-17 ENCOUNTER — APPOINTMENT (OUTPATIENT)
Dept: RADIATION ONCOLOGY | Facility: HOSPITAL | Age: 80
End: 2025-02-17
Attending: RADIOLOGY
Payer: COMMERCIAL

## 2025-02-17 PROCEDURE — 77290 THER RAD SIMULAJ FIELD CPLX: CPT | Performed by: RADIOLOGY

## 2025-02-17 PROCEDURE — 77334 RADIATION TREATMENT AID(S): CPT | Performed by: RADIOLOGY

## 2025-02-19 PROCEDURE — 77334 RADIATION TREATMENT AID(S): CPT | Performed by: RADIOLOGY

## 2025-02-19 PROCEDURE — 77321 SPECIAL TELETX PORT PLAN: CPT | Performed by: RADIOLOGY

## 2025-02-25 DIAGNOSIS — R60.0 BILATERAL LOWER EXTREMITY EDEMA: ICD-10-CM

## 2025-02-25 DIAGNOSIS — M54.50 ACUTE MIDLINE LOW BACK PAIN, UNSPECIFIED WHETHER SCIATICA PRESENT: ICD-10-CM

## 2025-02-25 RX ORDER — BUMETANIDE 2 MG/1
2 TABLET ORAL DAILY
Qty: 90 TABLET | Refills: 1 | Status: CANCELLED | OUTPATIENT
Start: 2025-02-25

## 2025-02-25 RX ORDER — TRAMADOL HYDROCHLORIDE 25 MG/1
25 TABLET, COATED ORAL EVERY 8 HOURS PRN
Qty: 90 TABLET | Refills: 0 | Status: CANCELLED | OUTPATIENT
Start: 2025-02-25

## 2025-02-25 NOTE — TELEPHONE ENCOUNTER
2/25/25 last seen 2/17/25, next appointment 4/29/25, last filled 1/29/25- take 1/2 tab bid, checked the PDMP- bumex already filled 2/7/25- amt 90/1- not needed to be filled

## 2025-02-25 NOTE — TELEPHONE ENCOUNTER
Patient is requesting 30 days supply for traMADol    Medication:     traMADol 25 MG TAB       Dose/Frequency:     Take 25 mg by mouth every 8 (eight) hours as needed for moderate pain       Quantity: 90    Pharmacy: [address]     Office:   [x] PCP/Provider -   [] Speciality/Provider -     Does the patient have enough for 3 days?   [x] Yes   [] No - Send as HP to POD     Medication:     bumetanide (BUMEX) 2 mg tablet       Dose/Frequency:     Take 1 tablet (2 mg total) by mouth daily       Quantity: 90 tablet     Pharmacy: [address]     Office:   [x] PCP/Provider -   [] Speciality/Provider -     Does the patient have enough for 3 days?   [x] Yes   [] No - Send as HP to POD

## 2025-02-26 ENCOUNTER — APPOINTMENT (OUTPATIENT)
Dept: RADIATION ONCOLOGY | Facility: HOSPITAL | Age: 80
End: 2025-02-26
Attending: RADIOLOGY
Payer: COMMERCIAL

## 2025-02-26 ENCOUNTER — PATIENT OUTREACH (OUTPATIENT)
Dept: CASE MANAGEMENT | Facility: OTHER | Age: 80
End: 2025-02-26

## 2025-02-26 LAB
RAD ONC ARIA COURSE FIRST TREATMENT DATE: NORMAL
RAD ONC ARIA COURSE ID: NORMAL
RAD ONC ARIA COURSE INTENT: NORMAL
RAD ONC ARIA COURSE LAST TREATMENT DATE: NORMAL
RAD ONC ARIA COURSE SESSION NUMBER: 1
RAD ONC ARIA COURSE START DATE: NORMAL
RAD ONC ARIA COURSE TREATMENT ELAPSED DAYS: 0
RAD ONC ARIA PLAN FRACTIONS TREATED TO DATE: 1
RAD ONC ARIA PLAN ID: NORMAL
RAD ONC ARIA PLAN NAME: NORMAL
RAD ONC ARIA PLAN PRESCRIBED DOSE PER FRACTION: 2.5 GY
RAD ONC ARIA PLAN PRIMARY REFERENCE POINT: NORMAL
RAD ONC ARIA PLAN TOTAL FRACTIONS PRESCRIBED: 20
RAD ONC ARIA PLAN TOTAL PRESCRIBED DOSE: 5000 CGY
RAD ONC ARIA REFERENCE POINT DOSAGE GIVEN TO DATE: 2.5 GY
RAD ONC ARIA REFERENCE POINT DOSAGE GIVEN TO DATE: 2.88 GY
RAD ONC ARIA REFERENCE POINT ID: NORMAL
RAD ONC ARIA REFERENCE POINT ID: NORMAL
RAD ONC ARIA REFERENCE POINT SESSION DOSAGE GIVEN: 2.5 GY
RAD ONC ARIA REFERENCE POINT SESSION DOSAGE GIVEN: 2.88 GY

## 2025-02-26 PROCEDURE — 77427 RADIATION TX MANAGEMENT X5: CPT | Performed by: RADIOLOGY

## 2025-02-26 PROCEDURE — 77280 THER RAD SIMULAJ FIELD SMPL: CPT | Performed by: RADIOLOGY

## 2025-02-26 PROCEDURE — 77412 RADIATION TX DELIVERY LVL 3: CPT | Performed by: RADIOLOGY

## 2025-02-26 PROCEDURE — 77331 SPECIAL RADIATION DOSIMETRY: CPT | Performed by: RADIOLOGY

## 2025-02-26 RX ORDER — TRAMADOL HYDROCHLORIDE 50 MG/1
25 TABLET ORAL 2 TIMES DAILY
Qty: 15 TABLET | Refills: 1 | Status: SHIPPED | OUTPATIENT
Start: 2025-02-26

## 2025-02-26 NOTE — PROGRESS NOTES
OSW placed outreach TC to pt this day. She states that today is her first radiation treatment. She reports that she didn't sleep very well last night. We discussed it may be from feeling anxious about her first treatment. She states that she thinks this is the reason. She shared that since her hospitalization her sleep has not been very good. OSW wished her luck today and offered to check in with her in a few weeks. She was appreciative. OSW will continue to follow.

## 2025-02-27 ENCOUNTER — APPOINTMENT (OUTPATIENT)
Dept: RADIATION ONCOLOGY | Facility: HOSPITAL | Age: 80
End: 2025-02-27
Attending: RADIOLOGY
Payer: COMMERCIAL

## 2025-02-27 LAB
RAD ONC ARIA COURSE FIRST TREATMENT DATE: NORMAL
RAD ONC ARIA COURSE ID: NORMAL
RAD ONC ARIA COURSE INTENT: NORMAL
RAD ONC ARIA COURSE LAST TREATMENT DATE: NORMAL
RAD ONC ARIA COURSE SESSION NUMBER: 2
RAD ONC ARIA COURSE START DATE: NORMAL
RAD ONC ARIA COURSE TREATMENT ELAPSED DAYS: 1
RAD ONC ARIA PLAN FRACTIONS TREATED TO DATE: 2
RAD ONC ARIA PLAN ID: NORMAL
RAD ONC ARIA PLAN NAME: NORMAL
RAD ONC ARIA PLAN PRESCRIBED DOSE PER FRACTION: 2.5 GY
RAD ONC ARIA PLAN PRIMARY REFERENCE POINT: NORMAL
RAD ONC ARIA PLAN TOTAL FRACTIONS PRESCRIBED: 20
RAD ONC ARIA PLAN TOTAL PRESCRIBED DOSE: 5000 CGY
RAD ONC ARIA REFERENCE POINT DOSAGE GIVEN TO DATE: 5 GY
RAD ONC ARIA REFERENCE POINT DOSAGE GIVEN TO DATE: 5.75 GY
RAD ONC ARIA REFERENCE POINT ID: NORMAL
RAD ONC ARIA REFERENCE POINT ID: NORMAL
RAD ONC ARIA REFERENCE POINT SESSION DOSAGE GIVEN: 2.5 GY
RAD ONC ARIA REFERENCE POINT SESSION DOSAGE GIVEN: 2.88 GY

## 2025-02-27 PROCEDURE — 77412 RADIATION TX DELIVERY LVL 3: CPT | Performed by: RADIOLOGY

## 2025-02-28 ENCOUNTER — APPOINTMENT (OUTPATIENT)
Dept: RADIATION ONCOLOGY | Facility: HOSPITAL | Age: 80
End: 2025-02-28
Attending: RADIOLOGY
Payer: COMMERCIAL

## 2025-02-28 LAB
RAD ONC ARIA COURSE FIRST TREATMENT DATE: NORMAL
RAD ONC ARIA COURSE ID: NORMAL
RAD ONC ARIA COURSE INTENT: NORMAL
RAD ONC ARIA COURSE LAST TREATMENT DATE: NORMAL
RAD ONC ARIA COURSE SESSION NUMBER: 3
RAD ONC ARIA COURSE START DATE: NORMAL
RAD ONC ARIA COURSE TREATMENT ELAPSED DAYS: 2
RAD ONC ARIA PLAN FRACTIONS TREATED TO DATE: 3
RAD ONC ARIA PLAN ID: NORMAL
RAD ONC ARIA PLAN NAME: NORMAL
RAD ONC ARIA PLAN PRESCRIBED DOSE PER FRACTION: 2.5 GY
RAD ONC ARIA PLAN PRIMARY REFERENCE POINT: NORMAL
RAD ONC ARIA PLAN TOTAL FRACTIONS PRESCRIBED: 20
RAD ONC ARIA PLAN TOTAL PRESCRIBED DOSE: 5000 CGY
RAD ONC ARIA REFERENCE POINT DOSAGE GIVEN TO DATE: 7.5 GY
RAD ONC ARIA REFERENCE POINT DOSAGE GIVEN TO DATE: 8.63 GY
RAD ONC ARIA REFERENCE POINT ID: NORMAL
RAD ONC ARIA REFERENCE POINT ID: NORMAL
RAD ONC ARIA REFERENCE POINT SESSION DOSAGE GIVEN: 2.5 GY
RAD ONC ARIA REFERENCE POINT SESSION DOSAGE GIVEN: 2.88 GY

## 2025-02-28 PROCEDURE — 77412 RADIATION TX DELIVERY LVL 3: CPT | Performed by: STUDENT IN AN ORGANIZED HEALTH CARE EDUCATION/TRAINING PROGRAM

## 2025-03-03 ENCOUNTER — APPOINTMENT (OUTPATIENT)
Dept: RADIATION ONCOLOGY | Facility: HOSPITAL | Age: 80
End: 2025-03-03
Attending: RADIOLOGY
Payer: COMMERCIAL

## 2025-03-03 LAB
RAD ONC ARIA COURSE FIRST TREATMENT DATE: NORMAL
RAD ONC ARIA COURSE ID: NORMAL
RAD ONC ARIA COURSE INTENT: NORMAL
RAD ONC ARIA COURSE LAST TREATMENT DATE: NORMAL
RAD ONC ARIA COURSE SESSION NUMBER: 4
RAD ONC ARIA COURSE START DATE: NORMAL
RAD ONC ARIA COURSE TREATMENT ELAPSED DAYS: 5
RAD ONC ARIA PLAN FRACTIONS TREATED TO DATE: 4
RAD ONC ARIA PLAN ID: NORMAL
RAD ONC ARIA PLAN NAME: NORMAL
RAD ONC ARIA PLAN PRESCRIBED DOSE PER FRACTION: 2.5 GY
RAD ONC ARIA PLAN PRIMARY REFERENCE POINT: NORMAL
RAD ONC ARIA PLAN TOTAL FRACTIONS PRESCRIBED: 20
RAD ONC ARIA PLAN TOTAL PRESCRIBED DOSE: 5000 CGY
RAD ONC ARIA REFERENCE POINT DOSAGE GIVEN TO DATE: 10 GY
RAD ONC ARIA REFERENCE POINT DOSAGE GIVEN TO DATE: 11.5 GY
RAD ONC ARIA REFERENCE POINT ID: NORMAL
RAD ONC ARIA REFERENCE POINT ID: NORMAL
RAD ONC ARIA REFERENCE POINT SESSION DOSAGE GIVEN: 2.5 GY
RAD ONC ARIA REFERENCE POINT SESSION DOSAGE GIVEN: 2.88 GY

## 2025-03-03 PROCEDURE — 77412 RADIATION TX DELIVERY LVL 3: CPT | Performed by: RADIOLOGY

## 2025-03-04 ENCOUNTER — APPOINTMENT (OUTPATIENT)
Dept: RADIATION ONCOLOGY | Facility: HOSPITAL | Age: 80
End: 2025-03-04
Attending: RADIOLOGY
Payer: COMMERCIAL

## 2025-03-04 LAB
RAD ONC ARIA COURSE FIRST TREATMENT DATE: NORMAL
RAD ONC ARIA COURSE ID: NORMAL
RAD ONC ARIA COURSE INTENT: NORMAL
RAD ONC ARIA COURSE LAST TREATMENT DATE: NORMAL
RAD ONC ARIA COURSE SESSION NUMBER: 5
RAD ONC ARIA COURSE START DATE: NORMAL
RAD ONC ARIA COURSE TREATMENT ELAPSED DAYS: 6
RAD ONC ARIA PLAN FRACTIONS TREATED TO DATE: 5
RAD ONC ARIA PLAN ID: NORMAL
RAD ONC ARIA PLAN NAME: NORMAL
RAD ONC ARIA PLAN PRESCRIBED DOSE PER FRACTION: 2.5 GY
RAD ONC ARIA PLAN PRIMARY REFERENCE POINT: NORMAL
RAD ONC ARIA PLAN TOTAL FRACTIONS PRESCRIBED: 20
RAD ONC ARIA PLAN TOTAL PRESCRIBED DOSE: 5000 CGY
RAD ONC ARIA REFERENCE POINT DOSAGE GIVEN TO DATE: 12.5 GY
RAD ONC ARIA REFERENCE POINT DOSAGE GIVEN TO DATE: 14.38 GY
RAD ONC ARIA REFERENCE POINT ID: NORMAL
RAD ONC ARIA REFERENCE POINT ID: NORMAL
RAD ONC ARIA REFERENCE POINT SESSION DOSAGE GIVEN: 2.5 GY
RAD ONC ARIA REFERENCE POINT SESSION DOSAGE GIVEN: 2.88 GY

## 2025-03-04 PROCEDURE — 77412 RADIATION TX DELIVERY LVL 3: CPT | Performed by: STUDENT IN AN ORGANIZED HEALTH CARE EDUCATION/TRAINING PROGRAM

## 2025-03-04 PROCEDURE — 77336 RADIATION PHYSICS CONSULT: CPT | Performed by: RADIOLOGY

## 2025-03-05 ENCOUNTER — PATIENT OUTREACH (OUTPATIENT)
Dept: CASE MANAGEMENT | Facility: OTHER | Age: 80
End: 2025-03-05

## 2025-03-05 ENCOUNTER — APPOINTMENT (OUTPATIENT)
Dept: RADIATION ONCOLOGY | Facility: HOSPITAL | Age: 80
End: 2025-03-05
Attending: RADIOLOGY
Payer: COMMERCIAL

## 2025-03-05 LAB
RAD ONC ARIA COURSE FIRST TREATMENT DATE: NORMAL
RAD ONC ARIA COURSE ID: NORMAL
RAD ONC ARIA COURSE INTENT: NORMAL
RAD ONC ARIA COURSE LAST TREATMENT DATE: NORMAL
RAD ONC ARIA COURSE SESSION NUMBER: 6
RAD ONC ARIA COURSE START DATE: NORMAL
RAD ONC ARIA COURSE TREATMENT ELAPSED DAYS: 7
RAD ONC ARIA PLAN FRACTIONS TREATED TO DATE: 6
RAD ONC ARIA PLAN ID: NORMAL
RAD ONC ARIA PLAN NAME: NORMAL
RAD ONC ARIA PLAN PRESCRIBED DOSE PER FRACTION: 2.5 GY
RAD ONC ARIA PLAN PRIMARY REFERENCE POINT: NORMAL
RAD ONC ARIA PLAN TOTAL FRACTIONS PRESCRIBED: 20
RAD ONC ARIA PLAN TOTAL PRESCRIBED DOSE: 5000 CGY
RAD ONC ARIA REFERENCE POINT DOSAGE GIVEN TO DATE: 15 GY
RAD ONC ARIA REFERENCE POINT DOSAGE GIVEN TO DATE: 17.25 GY
RAD ONC ARIA REFERENCE POINT ID: NORMAL
RAD ONC ARIA REFERENCE POINT ID: NORMAL
RAD ONC ARIA REFERENCE POINT SESSION DOSAGE GIVEN: 2.5 GY
RAD ONC ARIA REFERENCE POINT SESSION DOSAGE GIVEN: 2.88 GY

## 2025-03-05 PROCEDURE — 77427 RADIATION TX MANAGEMENT X5: CPT | Performed by: RADIOLOGY

## 2025-03-05 PROCEDURE — 77412 RADIATION TX DELIVERY LVL 3: CPT | Performed by: RADIOLOGY

## 2025-03-06 ENCOUNTER — APPOINTMENT (OUTPATIENT)
Dept: RADIATION ONCOLOGY | Facility: HOSPITAL | Age: 80
End: 2025-03-06
Attending: RADIOLOGY
Payer: COMMERCIAL

## 2025-03-06 DIAGNOSIS — I10 PRIMARY HYPERTENSION: ICD-10-CM

## 2025-03-06 LAB
RAD ONC ARIA COURSE FIRST TREATMENT DATE: NORMAL
RAD ONC ARIA COURSE ID: NORMAL
RAD ONC ARIA COURSE INTENT: NORMAL
RAD ONC ARIA COURSE LAST TREATMENT DATE: NORMAL
RAD ONC ARIA COURSE SESSION NUMBER: 7
RAD ONC ARIA COURSE START DATE: NORMAL
RAD ONC ARIA COURSE TREATMENT ELAPSED DAYS: 8
RAD ONC ARIA PLAN FRACTIONS TREATED TO DATE: 7
RAD ONC ARIA PLAN ID: NORMAL
RAD ONC ARIA PLAN NAME: NORMAL
RAD ONC ARIA PLAN PRESCRIBED DOSE PER FRACTION: 2.5 GY
RAD ONC ARIA PLAN PRIMARY REFERENCE POINT: NORMAL
RAD ONC ARIA PLAN TOTAL FRACTIONS PRESCRIBED: 20
RAD ONC ARIA PLAN TOTAL PRESCRIBED DOSE: 5000 CGY
RAD ONC ARIA REFERENCE POINT DOSAGE GIVEN TO DATE: 17.5 GY
RAD ONC ARIA REFERENCE POINT DOSAGE GIVEN TO DATE: 20.13 GY
RAD ONC ARIA REFERENCE POINT ID: NORMAL
RAD ONC ARIA REFERENCE POINT ID: NORMAL
RAD ONC ARIA REFERENCE POINT SESSION DOSAGE GIVEN: 2.5 GY
RAD ONC ARIA REFERENCE POINT SESSION DOSAGE GIVEN: 2.88 GY

## 2025-03-06 PROCEDURE — 77412 RADIATION TX DELIVERY LVL 3: CPT | Performed by: INTERNAL MEDICINE

## 2025-03-06 NOTE — TELEPHONE ENCOUNTER
Medication: Doxazosin    Dose/Frequency: 2mg 1 tab twice day    Quantity: 60    Pharmacy: [facility]    Office:   [] PCP/Provider -   [x] Speciality/Provider - Bernie Clancy     Does the patient have enough for 3 days?   [x] Yes   [] No - Send as HP to POD

## 2025-03-07 ENCOUNTER — APPOINTMENT (OUTPATIENT)
Dept: RADIATION ONCOLOGY | Facility: HOSPITAL | Age: 80
End: 2025-03-07
Attending: RADIOLOGY
Payer: COMMERCIAL

## 2025-03-07 LAB
RAD ONC ARIA COURSE FIRST TREATMENT DATE: NORMAL
RAD ONC ARIA COURSE ID: NORMAL
RAD ONC ARIA COURSE INTENT: NORMAL
RAD ONC ARIA COURSE LAST TREATMENT DATE: NORMAL
RAD ONC ARIA COURSE SESSION NUMBER: 8
RAD ONC ARIA COURSE START DATE: NORMAL
RAD ONC ARIA COURSE TREATMENT ELAPSED DAYS: 9
RAD ONC ARIA PLAN FRACTIONS TREATED TO DATE: 8
RAD ONC ARIA PLAN ID: NORMAL
RAD ONC ARIA PLAN NAME: NORMAL
RAD ONC ARIA PLAN PRESCRIBED DOSE PER FRACTION: 2.5 GY
RAD ONC ARIA PLAN PRIMARY REFERENCE POINT: NORMAL
RAD ONC ARIA PLAN TOTAL FRACTIONS PRESCRIBED: 20
RAD ONC ARIA PLAN TOTAL PRESCRIBED DOSE: 5000 CGY
RAD ONC ARIA REFERENCE POINT DOSAGE GIVEN TO DATE: 20 GY
RAD ONC ARIA REFERENCE POINT DOSAGE GIVEN TO DATE: 23 GY
RAD ONC ARIA REFERENCE POINT ID: NORMAL
RAD ONC ARIA REFERENCE POINT ID: NORMAL
RAD ONC ARIA REFERENCE POINT SESSION DOSAGE GIVEN: 2.5 GY
RAD ONC ARIA REFERENCE POINT SESSION DOSAGE GIVEN: 2.88 GY

## 2025-03-07 PROCEDURE — 77412 RADIATION TX DELIVERY LVL 3: CPT | Performed by: STUDENT IN AN ORGANIZED HEALTH CARE EDUCATION/TRAINING PROGRAM

## 2025-03-07 NOTE — TELEPHONE ENCOUNTER
Previously ordered by geriatric doctor.     Please advise if you can take over prescribing this medication

## 2025-03-09 RX ORDER — DOXAZOSIN 2 MG/1
2 TABLET ORAL 2 TIMES DAILY
Qty: 60 TABLET | Refills: 5 | Status: SHIPPED | OUTPATIENT
Start: 2025-03-09

## 2025-03-10 ENCOUNTER — APPOINTMENT (OUTPATIENT)
Dept: RADIATION ONCOLOGY | Facility: HOSPITAL | Age: 80
End: 2025-03-10
Attending: RADIOLOGY
Payer: COMMERCIAL

## 2025-03-10 LAB
RAD ONC ARIA COURSE FIRST TREATMENT DATE: NORMAL
RAD ONC ARIA COURSE ID: NORMAL
RAD ONC ARIA COURSE INTENT: NORMAL
RAD ONC ARIA COURSE LAST TREATMENT DATE: NORMAL
RAD ONC ARIA COURSE SESSION NUMBER: 9
RAD ONC ARIA COURSE START DATE: NORMAL
RAD ONC ARIA COURSE TREATMENT ELAPSED DAYS: 12
RAD ONC ARIA PLAN FRACTIONS TREATED TO DATE: 9
RAD ONC ARIA PLAN ID: NORMAL
RAD ONC ARIA PLAN NAME: NORMAL
RAD ONC ARIA PLAN PRESCRIBED DOSE PER FRACTION: 2.5 GY
RAD ONC ARIA PLAN PRIMARY REFERENCE POINT: NORMAL
RAD ONC ARIA PLAN TOTAL FRACTIONS PRESCRIBED: 20
RAD ONC ARIA PLAN TOTAL PRESCRIBED DOSE: 5000 CGY
RAD ONC ARIA REFERENCE POINT DOSAGE GIVEN TO DATE: 22.5 GY
RAD ONC ARIA REFERENCE POINT DOSAGE GIVEN TO DATE: 25.88 GY
RAD ONC ARIA REFERENCE POINT ID: NORMAL
RAD ONC ARIA REFERENCE POINT ID: NORMAL
RAD ONC ARIA REFERENCE POINT SESSION DOSAGE GIVEN: 2.5 GY
RAD ONC ARIA REFERENCE POINT SESSION DOSAGE GIVEN: 2.88 GY

## 2025-03-10 PROCEDURE — 77412 RADIATION TX DELIVERY LVL 3: CPT | Performed by: RADIOLOGY

## 2025-03-11 ENCOUNTER — APPOINTMENT (OUTPATIENT)
Dept: RADIATION ONCOLOGY | Facility: HOSPITAL | Age: 80
End: 2025-03-11
Attending: RADIOLOGY
Payer: COMMERCIAL

## 2025-03-11 LAB
RAD ONC ARIA COURSE FIRST TREATMENT DATE: NORMAL
RAD ONC ARIA COURSE ID: NORMAL
RAD ONC ARIA COURSE INTENT: NORMAL
RAD ONC ARIA COURSE LAST TREATMENT DATE: NORMAL
RAD ONC ARIA COURSE SESSION NUMBER: 10
RAD ONC ARIA COURSE START DATE: NORMAL
RAD ONC ARIA COURSE TREATMENT ELAPSED DAYS: 13
RAD ONC ARIA PLAN FRACTIONS TREATED TO DATE: 10
RAD ONC ARIA PLAN ID: NORMAL
RAD ONC ARIA PLAN NAME: NORMAL
RAD ONC ARIA PLAN PRESCRIBED DOSE PER FRACTION: 2.5 GY
RAD ONC ARIA PLAN PRIMARY REFERENCE POINT: NORMAL
RAD ONC ARIA PLAN TOTAL FRACTIONS PRESCRIBED: 20
RAD ONC ARIA PLAN TOTAL PRESCRIBED DOSE: 5000 CGY
RAD ONC ARIA REFERENCE POINT DOSAGE GIVEN TO DATE: 25 GY
RAD ONC ARIA REFERENCE POINT DOSAGE GIVEN TO DATE: 28.75 GY
RAD ONC ARIA REFERENCE POINT ID: NORMAL
RAD ONC ARIA REFERENCE POINT ID: NORMAL
RAD ONC ARIA REFERENCE POINT SESSION DOSAGE GIVEN: 2.5 GY
RAD ONC ARIA REFERENCE POINT SESSION DOSAGE GIVEN: 2.88 GY

## 2025-03-11 PROCEDURE — 77412 RADIATION TX DELIVERY LVL 3: CPT | Performed by: STUDENT IN AN ORGANIZED HEALTH CARE EDUCATION/TRAINING PROGRAM

## 2025-03-11 PROCEDURE — 77336 RADIATION PHYSICS CONSULT: CPT | Performed by: RADIOLOGY

## 2025-03-12 ENCOUNTER — APPOINTMENT (OUTPATIENT)
Dept: RADIATION ONCOLOGY | Facility: HOSPITAL | Age: 80
End: 2025-03-12
Attending: RADIOLOGY
Payer: COMMERCIAL

## 2025-03-12 LAB
RAD ONC ARIA COURSE FIRST TREATMENT DATE: NORMAL
RAD ONC ARIA COURSE ID: NORMAL
RAD ONC ARIA COURSE INTENT: NORMAL
RAD ONC ARIA COURSE LAST TREATMENT DATE: NORMAL
RAD ONC ARIA COURSE SESSION NUMBER: 11
RAD ONC ARIA COURSE START DATE: NORMAL
RAD ONC ARIA COURSE TREATMENT ELAPSED DAYS: 14
RAD ONC ARIA PLAN FRACTIONS TREATED TO DATE: 11
RAD ONC ARIA PLAN ID: NORMAL
RAD ONC ARIA PLAN NAME: NORMAL
RAD ONC ARIA PLAN PRESCRIBED DOSE PER FRACTION: 2.5 GY
RAD ONC ARIA PLAN PRIMARY REFERENCE POINT: NORMAL
RAD ONC ARIA PLAN TOTAL FRACTIONS PRESCRIBED: 20
RAD ONC ARIA PLAN TOTAL PRESCRIBED DOSE: 5000 CGY
RAD ONC ARIA REFERENCE POINT DOSAGE GIVEN TO DATE: 27.5 GY
RAD ONC ARIA REFERENCE POINT DOSAGE GIVEN TO DATE: 31.63 GY
RAD ONC ARIA REFERENCE POINT ID: NORMAL
RAD ONC ARIA REFERENCE POINT ID: NORMAL
RAD ONC ARIA REFERENCE POINT SESSION DOSAGE GIVEN: 2.5 GY
RAD ONC ARIA REFERENCE POINT SESSION DOSAGE GIVEN: 2.88 GY

## 2025-03-12 PROCEDURE — 77412 RADIATION TX DELIVERY LVL 3: CPT | Performed by: INTERNAL MEDICINE

## 2025-03-12 PROCEDURE — 77427 RADIATION TX MANAGEMENT X5: CPT | Performed by: RADIOLOGY

## 2025-03-13 ENCOUNTER — APPOINTMENT (OUTPATIENT)
Dept: RADIATION ONCOLOGY | Facility: HOSPITAL | Age: 80
End: 2025-03-13
Attending: RADIOLOGY
Payer: COMMERCIAL

## 2025-03-13 LAB
RAD ONC ARIA COURSE FIRST TREATMENT DATE: NORMAL
RAD ONC ARIA COURSE ID: NORMAL
RAD ONC ARIA COURSE INTENT: NORMAL
RAD ONC ARIA COURSE LAST TREATMENT DATE: NORMAL
RAD ONC ARIA COURSE SESSION NUMBER: 12
RAD ONC ARIA COURSE START DATE: NORMAL
RAD ONC ARIA COURSE TREATMENT ELAPSED DAYS: 15
RAD ONC ARIA PLAN FRACTIONS TREATED TO DATE: 12
RAD ONC ARIA PLAN ID: NORMAL
RAD ONC ARIA PLAN NAME: NORMAL
RAD ONC ARIA PLAN PRESCRIBED DOSE PER FRACTION: 2.5 GY
RAD ONC ARIA PLAN PRIMARY REFERENCE POINT: NORMAL
RAD ONC ARIA PLAN TOTAL FRACTIONS PRESCRIBED: 20
RAD ONC ARIA PLAN TOTAL PRESCRIBED DOSE: 5000 CGY
RAD ONC ARIA REFERENCE POINT DOSAGE GIVEN TO DATE: 30 GY
RAD ONC ARIA REFERENCE POINT DOSAGE GIVEN TO DATE: 34.51 GY
RAD ONC ARIA REFERENCE POINT ID: NORMAL
RAD ONC ARIA REFERENCE POINT ID: NORMAL
RAD ONC ARIA REFERENCE POINT SESSION DOSAGE GIVEN: 2.5 GY
RAD ONC ARIA REFERENCE POINT SESSION DOSAGE GIVEN: 2.88 GY

## 2025-03-13 PROCEDURE — 77412 RADIATION TX DELIVERY LVL 3: CPT | Performed by: RADIOLOGY

## 2025-03-14 ENCOUNTER — APPOINTMENT (OUTPATIENT)
Dept: RADIATION ONCOLOGY | Facility: HOSPITAL | Age: 80
End: 2025-03-14
Attending: RADIOLOGY
Payer: COMMERCIAL

## 2025-03-14 LAB
RAD ONC ARIA COURSE FIRST TREATMENT DATE: NORMAL
RAD ONC ARIA COURSE ID: NORMAL
RAD ONC ARIA COURSE INTENT: NORMAL
RAD ONC ARIA COURSE LAST TREATMENT DATE: NORMAL
RAD ONC ARIA COURSE SESSION NUMBER: 13
RAD ONC ARIA COURSE START DATE: NORMAL
RAD ONC ARIA COURSE TREATMENT ELAPSED DAYS: 16
RAD ONC ARIA PLAN FRACTIONS TREATED TO DATE: 13
RAD ONC ARIA PLAN ID: NORMAL
RAD ONC ARIA PLAN NAME: NORMAL
RAD ONC ARIA PLAN PRESCRIBED DOSE PER FRACTION: 2.5 GY
RAD ONC ARIA PLAN PRIMARY REFERENCE POINT: NORMAL
RAD ONC ARIA PLAN TOTAL FRACTIONS PRESCRIBED: 20
RAD ONC ARIA PLAN TOTAL PRESCRIBED DOSE: 5000 CGY
RAD ONC ARIA REFERENCE POINT DOSAGE GIVEN TO DATE: 32.5 GY
RAD ONC ARIA REFERENCE POINT DOSAGE GIVEN TO DATE: 37.38 GY
RAD ONC ARIA REFERENCE POINT ID: NORMAL
RAD ONC ARIA REFERENCE POINT ID: NORMAL
RAD ONC ARIA REFERENCE POINT SESSION DOSAGE GIVEN: 2.5 GY
RAD ONC ARIA REFERENCE POINT SESSION DOSAGE GIVEN: 2.88 GY

## 2025-03-14 PROCEDURE — 77412 RADIATION TX DELIVERY LVL 3: CPT | Performed by: RADIOLOGY

## 2025-03-17 ENCOUNTER — APPOINTMENT (OUTPATIENT)
Dept: RADIATION ONCOLOGY | Facility: HOSPITAL | Age: 80
End: 2025-03-17
Attending: RADIOLOGY
Payer: COMMERCIAL

## 2025-03-17 LAB
RAD ONC ARIA COURSE FIRST TREATMENT DATE: NORMAL
RAD ONC ARIA COURSE ID: NORMAL
RAD ONC ARIA COURSE INTENT: NORMAL
RAD ONC ARIA COURSE LAST TREATMENT DATE: NORMAL
RAD ONC ARIA COURSE SESSION NUMBER: 14
RAD ONC ARIA COURSE START DATE: NORMAL
RAD ONC ARIA COURSE TREATMENT ELAPSED DAYS: 19
RAD ONC ARIA PLAN FRACTIONS TREATED TO DATE: 14
RAD ONC ARIA PLAN ID: NORMAL
RAD ONC ARIA PLAN NAME: NORMAL
RAD ONC ARIA PLAN PRESCRIBED DOSE PER FRACTION: 2.5 GY
RAD ONC ARIA PLAN PRIMARY REFERENCE POINT: NORMAL
RAD ONC ARIA PLAN TOTAL FRACTIONS PRESCRIBED: 20
RAD ONC ARIA PLAN TOTAL PRESCRIBED DOSE: 5000 CGY
RAD ONC ARIA REFERENCE POINT DOSAGE GIVEN TO DATE: 35 GY
RAD ONC ARIA REFERENCE POINT DOSAGE GIVEN TO DATE: 40.26 GY
RAD ONC ARIA REFERENCE POINT ID: NORMAL
RAD ONC ARIA REFERENCE POINT ID: NORMAL
RAD ONC ARIA REFERENCE POINT SESSION DOSAGE GIVEN: 2.5 GY
RAD ONC ARIA REFERENCE POINT SESSION DOSAGE GIVEN: 2.88 GY

## 2025-03-17 PROCEDURE — 77412 RADIATION TX DELIVERY LVL 3: CPT | Performed by: RADIOLOGY

## 2025-03-18 ENCOUNTER — APPOINTMENT (OUTPATIENT)
Dept: RADIATION ONCOLOGY | Facility: HOSPITAL | Age: 80
End: 2025-03-18
Attending: RADIOLOGY
Payer: COMMERCIAL

## 2025-03-18 LAB
RAD ONC ARIA COURSE FIRST TREATMENT DATE: NORMAL
RAD ONC ARIA COURSE ID: NORMAL
RAD ONC ARIA COURSE INTENT: NORMAL
RAD ONC ARIA COURSE LAST TREATMENT DATE: NORMAL
RAD ONC ARIA COURSE SESSION NUMBER: 15
RAD ONC ARIA COURSE START DATE: NORMAL
RAD ONC ARIA COURSE TREATMENT ELAPSED DAYS: 20
RAD ONC ARIA PLAN FRACTIONS TREATED TO DATE: 15
RAD ONC ARIA PLAN ID: NORMAL
RAD ONC ARIA PLAN NAME: NORMAL
RAD ONC ARIA PLAN PRESCRIBED DOSE PER FRACTION: 2.5 GY
RAD ONC ARIA PLAN PRIMARY REFERENCE POINT: NORMAL
RAD ONC ARIA PLAN TOTAL FRACTIONS PRESCRIBED: 20
RAD ONC ARIA PLAN TOTAL PRESCRIBED DOSE: 5000 CGY
RAD ONC ARIA REFERENCE POINT DOSAGE GIVEN TO DATE: 37.5 GY
RAD ONC ARIA REFERENCE POINT DOSAGE GIVEN TO DATE: 43.13 GY
RAD ONC ARIA REFERENCE POINT ID: NORMAL
RAD ONC ARIA REFERENCE POINT ID: NORMAL
RAD ONC ARIA REFERENCE POINT SESSION DOSAGE GIVEN: 2.5 GY
RAD ONC ARIA REFERENCE POINT SESSION DOSAGE GIVEN: 2.88 GY

## 2025-03-18 PROCEDURE — 77412 RADIATION TX DELIVERY LVL 3: CPT | Performed by: STUDENT IN AN ORGANIZED HEALTH CARE EDUCATION/TRAINING PROGRAM

## 2025-03-18 PROCEDURE — 77336 RADIATION PHYSICS CONSULT: CPT | Performed by: RADIOLOGY

## 2025-03-19 ENCOUNTER — APPOINTMENT (OUTPATIENT)
Dept: RADIATION ONCOLOGY | Facility: HOSPITAL | Age: 80
End: 2025-03-19
Attending: RADIOLOGY
Payer: COMMERCIAL

## 2025-03-19 DIAGNOSIS — E11.8 TYPE 2 DIABETES MELLITUS WITH COMPLICATION, WITH LONG-TERM CURRENT USE OF INSULIN (HCC): ICD-10-CM

## 2025-03-19 DIAGNOSIS — Z79.4 TYPE 2 DIABETES MELLITUS WITH COMPLICATION, WITH LONG-TERM CURRENT USE OF INSULIN (HCC): ICD-10-CM

## 2025-03-19 LAB
RAD ONC ARIA COURSE FIRST TREATMENT DATE: NORMAL
RAD ONC ARIA COURSE ID: NORMAL
RAD ONC ARIA COURSE INTENT: NORMAL
RAD ONC ARIA COURSE LAST TREATMENT DATE: NORMAL
RAD ONC ARIA COURSE SESSION NUMBER: 16
RAD ONC ARIA COURSE START DATE: NORMAL
RAD ONC ARIA COURSE TREATMENT ELAPSED DAYS: 21
RAD ONC ARIA PLAN FRACTIONS TREATED TO DATE: 16
RAD ONC ARIA PLAN ID: NORMAL
RAD ONC ARIA PLAN NAME: NORMAL
RAD ONC ARIA PLAN PRESCRIBED DOSE PER FRACTION: 2.5 GY
RAD ONC ARIA PLAN PRIMARY REFERENCE POINT: NORMAL
RAD ONC ARIA PLAN TOTAL FRACTIONS PRESCRIBED: 20
RAD ONC ARIA PLAN TOTAL PRESCRIBED DOSE: 5000 CGY
RAD ONC ARIA REFERENCE POINT DOSAGE GIVEN TO DATE: 40 GY
RAD ONC ARIA REFERENCE POINT DOSAGE GIVEN TO DATE: 46.01 GY
RAD ONC ARIA REFERENCE POINT ID: NORMAL
RAD ONC ARIA REFERENCE POINT ID: NORMAL
RAD ONC ARIA REFERENCE POINT SESSION DOSAGE GIVEN: 2.5 GY
RAD ONC ARIA REFERENCE POINT SESSION DOSAGE GIVEN: 2.88 GY

## 2025-03-19 PROCEDURE — 77427 RADIATION TX MANAGEMENT X5: CPT | Performed by: STUDENT IN AN ORGANIZED HEALTH CARE EDUCATION/TRAINING PROGRAM

## 2025-03-19 PROCEDURE — 77412 RADIATION TX DELIVERY LVL 3: CPT | Performed by: RADIOLOGY

## 2025-03-19 RX ORDER — INSULIN GLARGINE 100 [IU]/ML
30 INJECTION, SOLUTION SUBCUTANEOUS DAILY
Qty: 30 ML | Refills: 1 | Status: SHIPPED | OUTPATIENT
Start: 2025-03-19 | End: 2025-10-05

## 2025-03-19 NOTE — TELEPHONE ENCOUNTER
Medication: insulin glargine (Lantus) 100 units/mL subcutaneous injection     Dose/Frequency: Inject 30 Units under the skin in the morning     Quantity: 10 ml    Pharmacy: [facility] - [place] - [address] [facility]     Office:   [x] PCP/Provider -  João Alofnso MD   [] Speciality/Provider -     Does the patient have enough for 3 days?   [] Yes   [x] No - Send as HP to POD

## 2025-03-20 ENCOUNTER — APPOINTMENT (OUTPATIENT)
Dept: RADIATION ONCOLOGY | Facility: HOSPITAL | Age: 80
End: 2025-03-20
Attending: RADIOLOGY
Payer: COMMERCIAL

## 2025-03-20 LAB
RAD ONC ARIA COURSE FIRST TREATMENT DATE: NORMAL
RAD ONC ARIA COURSE ID: NORMAL
RAD ONC ARIA COURSE INTENT: NORMAL
RAD ONC ARIA COURSE LAST TREATMENT DATE: NORMAL
RAD ONC ARIA COURSE SESSION NUMBER: 17
RAD ONC ARIA COURSE START DATE: NORMAL
RAD ONC ARIA COURSE TREATMENT ELAPSED DAYS: 22
RAD ONC ARIA PLAN FRACTIONS TREATED TO DATE: 17
RAD ONC ARIA PLAN ID: NORMAL
RAD ONC ARIA PLAN NAME: NORMAL
RAD ONC ARIA PLAN PRESCRIBED DOSE PER FRACTION: 2.5 GY
RAD ONC ARIA PLAN PRIMARY REFERENCE POINT: NORMAL
RAD ONC ARIA PLAN TOTAL FRACTIONS PRESCRIBED: 20
RAD ONC ARIA PLAN TOTAL PRESCRIBED DOSE: 5000 CGY
RAD ONC ARIA REFERENCE POINT DOSAGE GIVEN TO DATE: 42.5 GY
RAD ONC ARIA REFERENCE POINT DOSAGE GIVEN TO DATE: 48.88 GY
RAD ONC ARIA REFERENCE POINT ID: NORMAL
RAD ONC ARIA REFERENCE POINT ID: NORMAL
RAD ONC ARIA REFERENCE POINT SESSION DOSAGE GIVEN: 2.5 GY
RAD ONC ARIA REFERENCE POINT SESSION DOSAGE GIVEN: 2.88 GY

## 2025-03-20 PROCEDURE — 77412 RADIATION TX DELIVERY LVL 3: CPT | Performed by: RADIOLOGY

## 2025-03-21 ENCOUNTER — APPOINTMENT (OUTPATIENT)
Dept: RADIATION ONCOLOGY | Facility: HOSPITAL | Age: 80
End: 2025-03-21
Attending: RADIOLOGY
Payer: COMMERCIAL

## 2025-03-21 LAB
RAD ONC ARIA COURSE FIRST TREATMENT DATE: NORMAL
RAD ONC ARIA COURSE ID: NORMAL
RAD ONC ARIA COURSE INTENT: NORMAL
RAD ONC ARIA COURSE LAST TREATMENT DATE: NORMAL
RAD ONC ARIA COURSE SESSION NUMBER: 18
RAD ONC ARIA COURSE START DATE: NORMAL
RAD ONC ARIA COURSE TREATMENT ELAPSED DAYS: 23
RAD ONC ARIA PLAN FRACTIONS TREATED TO DATE: 18
RAD ONC ARIA PLAN ID: NORMAL
RAD ONC ARIA PLAN NAME: NORMAL
RAD ONC ARIA PLAN PRESCRIBED DOSE PER FRACTION: 2.5 GY
RAD ONC ARIA PLAN PRIMARY REFERENCE POINT: NORMAL
RAD ONC ARIA PLAN TOTAL FRACTIONS PRESCRIBED: 20
RAD ONC ARIA PLAN TOTAL PRESCRIBED DOSE: 5000 CGY
RAD ONC ARIA REFERENCE POINT DOSAGE GIVEN TO DATE: 45 GY
RAD ONC ARIA REFERENCE POINT DOSAGE GIVEN TO DATE: 51.76 GY
RAD ONC ARIA REFERENCE POINT ID: NORMAL
RAD ONC ARIA REFERENCE POINT ID: NORMAL
RAD ONC ARIA REFERENCE POINT SESSION DOSAGE GIVEN: 2.5 GY
RAD ONC ARIA REFERENCE POINT SESSION DOSAGE GIVEN: 2.88 GY

## 2025-03-21 PROCEDURE — 77412 RADIATION TX DELIVERY LVL 3: CPT | Performed by: STUDENT IN AN ORGANIZED HEALTH CARE EDUCATION/TRAINING PROGRAM

## 2025-03-24 ENCOUNTER — APPOINTMENT (OUTPATIENT)
Dept: RADIATION ONCOLOGY | Facility: HOSPITAL | Age: 80
End: 2025-03-24
Attending: RADIOLOGY
Payer: COMMERCIAL

## 2025-03-24 ENCOUNTER — PATIENT OUTREACH (OUTPATIENT)
Dept: CASE MANAGEMENT | Facility: OTHER | Age: 80
End: 2025-03-24

## 2025-03-24 LAB
RAD ONC ARIA COURSE FIRST TREATMENT DATE: NORMAL
RAD ONC ARIA COURSE ID: NORMAL
RAD ONC ARIA COURSE INTENT: NORMAL
RAD ONC ARIA COURSE LAST TREATMENT DATE: NORMAL
RAD ONC ARIA COURSE SESSION NUMBER: 19
RAD ONC ARIA COURSE START DATE: NORMAL
RAD ONC ARIA COURSE TREATMENT ELAPSED DAYS: 26
RAD ONC ARIA PLAN FRACTIONS TREATED TO DATE: 19
RAD ONC ARIA PLAN ID: NORMAL
RAD ONC ARIA PLAN NAME: NORMAL
RAD ONC ARIA PLAN PRESCRIBED DOSE PER FRACTION: 2.5 GY
RAD ONC ARIA PLAN PRIMARY REFERENCE POINT: NORMAL
RAD ONC ARIA PLAN TOTAL FRACTIONS PRESCRIBED: 20
RAD ONC ARIA PLAN TOTAL PRESCRIBED DOSE: 5000 CGY
RAD ONC ARIA REFERENCE POINT DOSAGE GIVEN TO DATE: 47.5 GY
RAD ONC ARIA REFERENCE POINT DOSAGE GIVEN TO DATE: 54.63 GY
RAD ONC ARIA REFERENCE POINT ID: NORMAL
RAD ONC ARIA REFERENCE POINT ID: NORMAL
RAD ONC ARIA REFERENCE POINT SESSION DOSAGE GIVEN: 2.5 GY
RAD ONC ARIA REFERENCE POINT SESSION DOSAGE GIVEN: 2.88 GY

## 2025-03-24 PROCEDURE — 77412 RADIATION TX DELIVERY LVL 3: CPT | Performed by: STUDENT IN AN ORGANIZED HEALTH CARE EDUCATION/TRAINING PROGRAM

## 2025-03-24 NOTE — PROGRESS NOTES
OSW placed outreach TC to pt this day. Pt reports that she is doing well with her radiation treatments, and as of tomorrow, she will be finished. She reports some fatigue, but overall is doing well at this time. Pt does not have any SW needs at this time, therefore the referral will be closed. If any needs present in the future another referral can be placed at that time. OSW also encouraged her to call this writer if she needs anything.

## 2025-03-25 ENCOUNTER — APPOINTMENT (OUTPATIENT)
Dept: RADIATION ONCOLOGY | Facility: HOSPITAL | Age: 80
End: 2025-03-25
Attending: RADIOLOGY
Payer: COMMERCIAL

## 2025-03-25 LAB
RAD ONC ARIA COURSE FIRST TREATMENT DATE: NORMAL
RAD ONC ARIA COURSE ID: NORMAL
RAD ONC ARIA COURSE INTENT: NORMAL
RAD ONC ARIA COURSE LAST TREATMENT DATE: NORMAL
RAD ONC ARIA COURSE SESSION NUMBER: 20
RAD ONC ARIA COURSE START DATE: NORMAL
RAD ONC ARIA COURSE TREATMENT ELAPSED DAYS: 27
RAD ONC ARIA PLAN FRACTIONS TREATED TO DATE: 20
RAD ONC ARIA PLAN ID: NORMAL
RAD ONC ARIA PLAN NAME: NORMAL
RAD ONC ARIA PLAN PRESCRIBED DOSE PER FRACTION: 2.5 GY
RAD ONC ARIA PLAN PRIMARY REFERENCE POINT: NORMAL
RAD ONC ARIA PLAN TOTAL FRACTIONS PRESCRIBED: 20
RAD ONC ARIA PLAN TOTAL PRESCRIBED DOSE: 5000 CGY
RAD ONC ARIA REFERENCE POINT DOSAGE GIVEN TO DATE: 50 GY
RAD ONC ARIA REFERENCE POINT DOSAGE GIVEN TO DATE: 57.51 GY
RAD ONC ARIA REFERENCE POINT ID: NORMAL
RAD ONC ARIA REFERENCE POINT ID: NORMAL
RAD ONC ARIA REFERENCE POINT SESSION DOSAGE GIVEN: 2.5 GY
RAD ONC ARIA REFERENCE POINT SESSION DOSAGE GIVEN: 2.88 GY

## 2025-03-25 PROCEDURE — 77336 RADIATION PHYSICS CONSULT: CPT | Performed by: STUDENT IN AN ORGANIZED HEALTH CARE EDUCATION/TRAINING PROGRAM

## 2025-03-25 PROCEDURE — 77412 RADIATION TX DELIVERY LVL 3: CPT | Performed by: STUDENT IN AN ORGANIZED HEALTH CARE EDUCATION/TRAINING PROGRAM

## 2025-04-01 ENCOUNTER — OFFICE VISIT (OUTPATIENT)
Dept: PODIATRY | Facility: CLINIC | Age: 80
End: 2025-04-01
Payer: COMMERCIAL

## 2025-04-01 DIAGNOSIS — E11.42 DIABETIC POLYNEUROPATHY ASSOCIATED WITH TYPE 2 DIABETES MELLITUS (HCC): Primary | ICD-10-CM

## 2025-04-01 DIAGNOSIS — I73.9 PERIPHERAL VASCULAR DISEASE, UNSPECIFIED (HCC): ICD-10-CM

## 2025-04-01 PROCEDURE — RECHECK: Performed by: PODIATRIST

## 2025-04-01 PROCEDURE — 11719 TRIM NAIL(S) ANY NUMBER: CPT | Performed by: PODIATRIST

## 2025-04-01 NOTE — PROGRESS NOTES
Established patient with class findings presents for nail care.  Vascular exam:  DP  0/4 bilateral; PT  0 4 bilateral   Dermatological exam:  Each toenail is thick and  dystrophic.  Diagnosis:  Diabetes mellitus  Treatment: Trimmed multiple dystrophic toenails.     Nail removal    Date/Time: 4/1/2025 4:00 PM    Performed by: Virgilio Buenrostor DPM  Authorized by: Virgilio Buenrostro DPM    Nails trimmed:     Number of nails trimmed:  10

## 2025-04-07 DIAGNOSIS — M54.50 ACUTE MIDLINE LOW BACK PAIN, UNSPECIFIED WHETHER SCIATICA PRESENT: ICD-10-CM

## 2025-04-07 RX ORDER — TRAMADOL HYDROCHLORIDE 50 MG/1
25 TABLET ORAL 2 TIMES DAILY
Qty: 15 TABLET | Refills: 1 | Status: SHIPPED | OUTPATIENT
Start: 2025-04-07

## 2025-04-07 NOTE — TELEPHONE ENCOUNTER
Medication:   traMADol (Ultram) 50 mg tablet     Dose/Frequency: Take 0.5 tablets (25 mg total) by mouth 2 (two) times a day     Quantity: 15    Pharmacy: [facility] - [place] - [address] [facility]     Office:   [x] PCP/Provider -   [] Speciality/Provider -     Does the patient have enough for 3 days?   [x] Yes   [] No - Send as HP to POD

## 2025-04-07 NOTE — TELEPHONE ENCOUNTER
NOV 4/29/25    PROVIDER OUT OF OFFICE          [facility]  H&P  Name: Veronika Portillo 71 y.o. female I MRN: [id number]  Unit/Bed#: ED 15 I Date of Admission: 12/18/2023   Date of Service: 12/18/2023 I Hospital Day: 0      Assessment/Plan   * Asthma exacerbation  Assessment & Plan  Patient presented after having worsening shortness of breath for the past 2 to 3 days.  She has a history of having asthma exacerbations however she has not had any recent exacerbations over the past year.  Patient reports having a mild cough and congestion for the past several days as well.    Currently not requiring supplemental O2 and satting well on room air  VBG showed pH 7.38, CO2 45, bicarb 26.4  D-dimer 0.34  Procalcitonin less than 0.05  Lactic acid 1.0 WBC within normal limits  COVID/flu/RSV negative  Chest x-ray showed no acute cardiopulmonary pathology per my read, awaiting official read  Was given 125 mg of Solu-Medrol in the ED, will transition to 40 mg Solu-Medrol in a.m., will continue with nebulized treatments and magnesium    URI (upper respiratory infection)  Assessment & Plan  Likely viral however tested negative for COVID/flu/RSV  No evidence found on chest x-ray  Procalcitonin less than 0.05  WBC within normal    Hypertension  Assessment & Plan   BP on admission 160/74  Patient reports taking 20 mg of lisinopril daily however did not take medication today  We will continue with 20 mg lisinopril         VTE Pharmacologic Prophylaxis: VTE Score: 4 Moderate Risk (Score 3-4) - Pharmacological DVT Prophylaxis Ordered: heparin.  Code Status: Full code  Discussion with family: Updated  () at bedside.    Anticipated Length of Stay: Patient will be admitted on an observation basis with an anticipated length of stay of less than 2 midnights secondary to acute asthma exacerbation.    Total Time Spent on Date of Encounter in care of patient: 60 mins. This time was spent on one or more of the following: performing physical exam;  counseling and coordination of care; obtaining or reviewing history; documenting in the medical record; reviewing/ordering tests, medications or procedures; communicating with other healthcare professionals and discussing with patient's family/caregivers.    Chief Complaint: Shortness of breath    History of Present Illness:  Veronika Portillo is a 71 y.o. female with a PMH of asthma and hypertension who presents with shortness of breath.   Patient presented after having worsening shortness of breath for the past 2 to 3 days.  She has a history of having asthma exacerbations however she has not had any recent exacerbations over the past year.  Patient reports having a mild cough and congestion for the past several days as well.       Review of Systems:  Review of Systems   Constitutional:  Negative for chills, fatigue and fever.   HENT:  Positive for rhinorrhea and sinus pressure. Negative for ear pain and sore throat.    Eyes:  Negative for photophobia, pain and visual disturbance.   Respiratory:  Positive for cough, shortness of breath and wheezing. Negative for chest tightness.    Cardiovascular:  Negative for chest pain and palpitations.   Gastrointestinal:  Negative for abdominal pain, diarrhea, nausea and vomiting.   Genitourinary:  Negative for dysuria and hematuria.   Musculoskeletal:  Negative for arthralgias and back pain.   Skin:  Negative for color change and rash.   Neurological:  Negative for seizures and syncope.   All other systems reviewed and are negative.      Past Medical and Surgical History:   Past Medical History:   Diagnosis Date    Asthma     Hypertension        Past Surgical History:   Procedure Laterality Date    APPENDECTOMY         Meds/Allergies:  Prior to Admission medications    Medication Sig Start Date End Date Taking? Authorizing Provider   albuterol (ACCUNEB) 1.25 MG/3ML nebulizer solution Take 3 mL (1.25 mg total) by nebulization every 6 (six) hours as needed for wheezing or  shortness of breath 12/4/19   Shaun Georges PA-C   azithromycin (ZITHROMAX) 250 mg tablet Take 500 mg day 1, 250 mg days 2-5 12/20/17   LENO Willett   predniSONE 10 mg tablet Take 1 tablet (10 mg total) by mouth daily 6 tabs for 3 days and then decrease by one tab every 3 days until complete 12/4/19   Shaun Georges PA-C   predniSONE 20 mg tablet Take 60 mg by mouth daily 4 days, 40 mg by mouth daily 4 days, 20 mg by mouth daily 4 days 6/13/22   LENO Willett   predniSONE 20 mg tablet Take 40 mg (2 pills) for 5 days, 20 mg (1 pill) for 5 days, and 10 mg (1/2 pill) for 5 days 9/15/22   Hayde Clements DO   predniSONE 20 mg tablet Take 60 mg by mouth daily 4 days, 40 mg by mouth daily 4 days, 20 mg by mouth daily 4 days 1/12/23   Yvonne Parekh PA-C     I have reviewed home medications with patient personally.    Allergies:   Allergies   Allergen Reactions    Aspirin Anaphylaxis    Nsaids Facial Swelling       Social History:  Marital Status: /Civil Union   Occupation: N/AA  Patient Pre-hospital Living Situation: Home  Patient Pre-hospital Level of Mobility: walks  Patient Pre-hospital Diet Restrictions: Regular  Substance Use History:   Social History     Substance and Sexual Activity   Alcohol Use No     Social History     Tobacco Use   Smoking Status Former   Smokeless Tobacco Never     Social History     Substance and Sexual Activity   Drug Use No       Family History:  History reviewed. No pertinent family history.    Physical Exam:     Vitals:   Blood Pressure: 160/74 (12/18/23 1300)  Pulse: 84 (12/18/23 1300)  Temperature: 97.6 °F (36.4 °C) (12/18/23 1027)  Temp Source: Temporal (12/18/23 1027)  Respirations: (!) 23 (12/18/23 1300)  SpO2: 96 % (12/18/23 1300)    Physical Exam  Vitals and nursing note reviewed.   Constitutional:       Appearance: She is normal weight.   HENT:      Head: Normocephalic.      Nose: Nose normal.      Mouth/Throat:      Mouth: Mucous membranes are  moist.      Pharynx: Oropharynx is clear.   Eyes:      General: No scleral icterus.     Conjunctiva/sclera: Conjunctivae normal.      Pupils: Pupils are equal, round, and reactive to light.   Cardiovascular:      Rate and Rhythm: Normal rate and regular rhythm.      Heart sounds: No murmur heard.     No friction rub. No gallop.   Pulmonary:      Effort: Pulmonary effort is normal. No respiratory distress.      Breath sounds: No stridor. Wheezing present. No rhonchi or rales.   Abdominal:      General: Abdomen is flat.      Palpations: Abdomen is soft.   Musculoskeletal:         General: Normal range of motion.      Cervical back: Normal range of motion and neck supple.      Right lower leg: No edema.      Left lower leg: No edema.   Lymphadenopathy:      Cervical: No cervical adenopathy.   Skin:     General: Skin is warm.      Coloration: Skin is not jaundiced or pale.      Findings: No bruising, erythema or lesion.   Neurological:      General: No focal deficit present.      Mental Status: She is alert and oriented to person, place, and time. Mental status is at baseline.      Cranial Nerves: No cranial nerve deficit.      Motor: No weakness.   Psychiatric:         Mood and Affect: Mood normal.         Behavior: Behavior normal.         Thought Content: Thought content normal.          Additional Data:     Lab Results:  Results from last 7 days   Lab Units 12/18/23  1106   WBC Thousand/uL 7.78   HEMOGLOBIN g/dL 15.9*   HEMATOCRIT % 48.2*   PLATELETS Thousands/uL 223   NEUTROS PCT % 64   LYMPHS PCT % 21   MONOS PCT % 8   EOS PCT % 5     Results from last 7 days   Lab Units 12/18/23  1106   SODIUM mmol/L 139   POTASSIUM mmol/L 4.1   CHLORIDE mmol/L 105   CO2 mmol/L 28   BUN mg/dL 9   CREATININE mg/dL 0.90   ANION GAP mmol/L 6   CALCIUM mg/dL 9.3   ALBUMIN g/dL 4.5   TOTAL BILIRUBIN mg/dL 0.69   ALK PHOS U/L 65   ALT U/L 17   AST U/L 18   GLUCOSE RANDOM mg/dL 104     Results from last 7 days   Lab Units 12/18/23  1106    INR  0.83*             Results from last 7 days   Lab Units 12/18/23  1106   LACTIC ACID mmol/L 1.0   PROCALCITONIN ng/ml <0.05       Lines/Drains:  Invasive Devices       Peripheral Intravenous Line  Duration             Peripheral IV 12/18/23 Left;Proximal;Ventral (anterior) Forearm <1 day    Peripheral IV 12/18/23 Right;Ventral (anterior) Forearm <1 day                        Imaging: Reviewed radiology reports from this admission including: chest xray  XR chest 2 views    (Results Pending)       EKG and Other Studies Reviewed on Admission:   EKG: NSR. HR 93.    ** Please Note: This note has been constructed using a voice recognition system. **

## 2025-04-09 ENCOUNTER — PATIENT OUTREACH (OUTPATIENT)
Dept: HEMATOLOGY ONCOLOGY | Facility: CLINIC | Age: 80
End: 2025-04-09

## 2025-04-09 NOTE — PROGRESS NOTES
Porsha called regarding the need for transportation for her appointment with Dr. Lo 4/28. I confirmed this had already been scheduled via Round trip by the office. Porsha verbalized understanding

## 2025-04-16 DIAGNOSIS — M54.50 ACUTE MIDLINE LOW BACK PAIN, UNSPECIFIED WHETHER SCIATICA PRESENT: ICD-10-CM

## 2025-04-16 DIAGNOSIS — J45.909 ASTHMA WITHOUT STATUS ASTHMATICUS WITHOUT COMPLICATION, UNSPECIFIED ASTHMA SEVERITY, UNSPECIFIED WHETHER PERSISTENT: Primary | ICD-10-CM

## 2025-04-16 RX ORDER — MONTELUKAST SODIUM 10 MG/1
10 TABLET ORAL
Qty: 30 TABLET | Refills: 1 | Status: SHIPPED | OUTPATIENT
Start: 2025-04-16

## 2025-04-16 RX ORDER — METHOCARBAMOL 500 MG/1
500 TABLET, FILM COATED ORAL 3 TIMES DAILY
Qty: 270 TABLET | Refills: 1 | Status: SHIPPED | OUTPATIENT
Start: 2025-04-16

## 2025-04-16 NOTE — TELEPHONE ENCOUNTER
Medication: montelukast (SINGULAIR) 10 mg tablet     Dose/Frequency: TAKE 1 TABLET (10 MG TOTAL) BY MOUTH DAILY AT BEDTIME     Quantity: 30    Pharmacy: [facility] - [place] - [address] [place] [address]     Office:   [x] PCP/Provider - Dr Alfonso previously looks like   [] Speciality/Provider -     Does the patient have enough for 3 days?   [x] Yes   [] No - Send as HP to POD    methocarbamol (ROBAXIN) 500 mg tablet Take 1 tablet (500 mg total) by mouth 3 (three) times a day

## 2025-04-21 ENCOUNTER — APPOINTMENT (OUTPATIENT)
Dept: LAB | Age: 80
End: 2025-04-21
Payer: COMMERCIAL

## 2025-04-21 DIAGNOSIS — E11.22 TYPE 2 DIABETES MELLITUS WITH STAGE 3B CHRONIC KIDNEY DISEASE, WITH LONG-TERM CURRENT USE OF INSULIN (HCC): ICD-10-CM

## 2025-04-21 DIAGNOSIS — Z79.4 TYPE 2 DIABETES MELLITUS WITH STAGE 3B CHRONIC KIDNEY DISEASE, WITH LONG-TERM CURRENT USE OF INSULIN (HCC): ICD-10-CM

## 2025-04-21 DIAGNOSIS — N18.32 TYPE 2 DIABETES MELLITUS WITH STAGE 3B CHRONIC KIDNEY DISEASE, WITH LONG-TERM CURRENT USE OF INSULIN (HCC): ICD-10-CM

## 2025-04-21 DIAGNOSIS — N18.32 STAGE 3B CHRONIC KIDNEY DISEASE (HCC): ICD-10-CM

## 2025-04-21 DIAGNOSIS — Z79.4 TYPE 2 DIABETES MELLITUS WITH COMPLICATION, WITH LONG-TERM CURRENT USE OF INSULIN (HCC): ICD-10-CM

## 2025-04-21 DIAGNOSIS — E11.8 TYPE 2 DIABETES MELLITUS WITH COMPLICATION, WITH LONG-TERM CURRENT USE OF INSULIN (HCC): ICD-10-CM

## 2025-04-21 LAB
ALBUMIN SERPL BCG-MCNC: 3.6 G/DL (ref 3.5–5)
ALP SERPL-CCNC: 95 U/L (ref 34–104)
ALT SERPL W P-5'-P-CCNC: 32 U/L (ref 7–52)
ANION GAP SERPL CALCULATED.3IONS-SCNC: 6 MMOL/L (ref 4–13)
AST SERPL W P-5'-P-CCNC: 32 U/L (ref 13–39)
BASOPHILS # BLD AUTO: 0.04 THOUSANDS/ÂΜL (ref 0–0.1)
BASOPHILS NFR BLD AUTO: 1 % (ref 0–1)
BILIRUB SERPL-MCNC: 0.27 MG/DL (ref 0.2–1)
BUN SERPL-MCNC: 51 MG/DL (ref 5–25)
CALCIUM SERPL-MCNC: 9.6 MG/DL (ref 8.4–10.2)
CHLORIDE SERPL-SCNC: 106 MMOL/L (ref 96–108)
CO2 SERPL-SCNC: 31 MMOL/L (ref 21–32)
CREAT SERPL-MCNC: 1.16 MG/DL (ref 0.6–1.3)
EOSINOPHIL # BLD AUTO: 0.24 THOUSAND/ÂΜL (ref 0–0.61)
EOSINOPHIL NFR BLD AUTO: 4 % (ref 0–6)
ERYTHROCYTE [DISTWIDTH] IN BLOOD BY AUTOMATED COUNT: 13.3 % (ref 11.6–15.1)
EST. AVERAGE GLUCOSE BLD GHB EST-MCNC: 163 MG/DL
GFR SERPL CREATININE-BSD FRML MDRD: 44 ML/MIN/1.73SQ M
GLUCOSE P FAST SERPL-MCNC: 144 MG/DL (ref 65–99)
HBA1C MFR BLD: 7.3 %
HCT VFR BLD AUTO: 34.7 % (ref 34.8–46.1)
HGB BLD-MCNC: 11.4 G/DL (ref 11.5–15.4)
IMM GRANULOCYTES # BLD AUTO: 0.02 THOUSAND/UL (ref 0–0.2)
IMM GRANULOCYTES NFR BLD AUTO: 0 % (ref 0–2)
LYMPHOCYTES # BLD AUTO: 1.84 THOUSANDS/ÂΜL (ref 0.6–4.47)
LYMPHOCYTES NFR BLD AUTO: 32 % (ref 14–44)
MCH RBC QN AUTO: 32.2 PG (ref 26.8–34.3)
MCHC RBC AUTO-ENTMCNC: 32.9 G/DL (ref 31.4–37.4)
MCV RBC AUTO: 98 FL (ref 82–98)
MONOCYTES # BLD AUTO: 0.62 THOUSAND/ÂΜL (ref 0.17–1.22)
MONOCYTES NFR BLD AUTO: 11 % (ref 4–12)
NEUTROPHILS # BLD AUTO: 2.97 THOUSANDS/ÂΜL (ref 1.85–7.62)
NEUTS SEG NFR BLD AUTO: 52 % (ref 43–75)
NRBC BLD AUTO-RTO: 0 /100 WBCS
PLATELET # BLD AUTO: 139 THOUSANDS/UL (ref 149–390)
PMV BLD AUTO: 11.9 FL (ref 8.9–12.7)
POTASSIUM SERPL-SCNC: 5.1 MMOL/L (ref 3.5–5.3)
PROT SERPL-MCNC: 6 G/DL (ref 6.4–8.4)
RBC # BLD AUTO: 3.54 MILLION/UL (ref 3.81–5.12)
SODIUM SERPL-SCNC: 143 MMOL/L (ref 135–147)
WBC # BLD AUTO: 5.73 THOUSAND/UL (ref 4.31–10.16)

## 2025-04-21 PROCEDURE — 36415 COLL VENOUS BLD VENIPUNCTURE: CPT

## 2025-04-21 PROCEDURE — 83036 HEMOGLOBIN GLYCOSYLATED A1C: CPT

## 2025-04-21 PROCEDURE — 85025 COMPLETE CBC W/AUTO DIFF WBC: CPT

## 2025-04-21 PROCEDURE — 80061 LIPID PANEL: CPT

## 2025-04-21 PROCEDURE — 80053 COMPREHEN METABOLIC PANEL: CPT

## 2025-04-22 LAB
CHOLEST SERPL-MCNC: 137 MG/DL (ref ?–200)
HDLC SERPL-MCNC: 48 MG/DL
LDLC SERPL CALC-MCNC: 64 MG/DL (ref 0–100)
TRIGL SERPL-MCNC: 126 MG/DL (ref ?–150)

## 2025-04-28 ENCOUNTER — OFFICE VISIT (OUTPATIENT)
Dept: RADIATION ONCOLOGY | Facility: HOSPITAL | Age: 80
End: 2025-04-28
Attending: RADIOLOGY
Payer: COMMERCIAL

## 2025-04-28 VITALS
OXYGEN SATURATION: 99 % | TEMPERATURE: 96.7 F | HEART RATE: 68 BPM | DIASTOLIC BLOOD PRESSURE: 62 MMHG | SYSTOLIC BLOOD PRESSURE: 110 MMHG | RESPIRATION RATE: 18 BRPM

## 2025-04-28 DIAGNOSIS — C44.41 BASAL CELL CARCINOMA OF SCALP: Primary | ICD-10-CM

## 2025-04-28 PROCEDURE — 99211 OFF/OP EST MAY X REQ PHY/QHP: CPT | Performed by: RADIOLOGY

## 2025-04-28 PROCEDURE — G0463 HOSPITAL OUTPT CLINIC VISIT: HCPCS | Performed by: RADIOLOGY

## 2025-04-28 NOTE — ASSESSMENT & PLAN NOTE
Ms. Hoyos has done well in follow-up and her scalp has healed as expected approximately 1 month status post completion of definitive radiation for her basal cell carcinoma.  There is no evidence of residual disease.  There are no residual open wounds.  We have encouraged her to continue moisturizing the treated scalp on a daily basis and she will return to our department in 3 months for routine follow-up.  She was encouraged to reestablish follow-up with dermatology.

## 2025-04-28 NOTE — PROGRESS NOTES
Follow-up Visit   Name: Porsha Hoyos      : 1945      MRN: [id number]  Encounter Provider: Jose M Lo MD  Encounter Date: 2025   Encounter department: Replaced by [facility] RADIATION ONCOLOGY  :  Assessment & Plan  Basal cell carcinoma of scalp  Ms. Hoyos has done well in follow-up and her scalp has healed as expected approximately 1 month status post completion of definitive radiation for her basal cell carcinoma.  There is no evidence of residual disease.  There are no residual open wounds.  We have encouraged her to continue moisturizing the treated scalp on a daily basis and she will return to our department in 3 months for routine follow-up.  She was encouraged to reestablish follow-up with dermatology.           History of Present Illness   Chief Complaint   Patient presents with    Follow-up     Radiation Oncology    Pertinent Medical History   Porsha Hoyos 1945 is a 79 y.o. female with a recently diagnosed stage I basal cell carcinoma of the scalp.  She opted against surgical management and has now completed a course of definitive radiation therapy on 3/25/25. She presents today for 1 month EOT follow-up.     Oncology History   Cancer Staging   Basal cell carcinoma of scalp  Staging form: Cutaneous Carcinoma of the Head and Neck, AJCC 8th Edition  - Clinical: Stage I (cT1, cN0, cM0) - Signed by Jose M Lo MD on 2025  Extraosseous extension: Absent  Oncology History   Basal cell carcinoma of scalp   2025 Initial Diagnosis    Basal cell carcinoma of scalp     2025 -  Cancer Staged    Staging form: Cutaneous Carcinoma of the Head and Neck, AJCC 8th Edition  - Clinical: Stage I (cT1, cN0, cM0) - Signed by Jose M Lo MD on 2025  Extraosseous extension: Absent       2025 - 3/25/2025 Radiation    The patient saw @[facility]@ for radiation treatment. This is the current list of radiation treatment:  Radiation Treatments       Active   Reference Points  "  Scalp   Most recent treatment: Dose given: 250 cGy (on 3/25/2025)   Total: Dose given: 5,000 cGy   Elapsed Days: 27      ds pt   Most recent treatment: Dose given: 288 cGy (on 3/25/2025)   Total: Dose given: 5,751 cGy   Elapsed Days: 27           Historical   Plans   Scalp   Most recent treatment: Dose planned: 250 cGy (fraction 20 on 3/25/2025)   Total: Dose planned: 5,000 cGy (20 fractions)   Elapsed Days: 27                   Plan ID Energy Fractions Dose per Fraction (cGy) Dose Correction (cGy) Total Dose Delivered (cGy) Elapsed Days   Scalp 6E 20 /  250 0 5,000 27           Review of Systems Refer to nursing note.          Objective   /62 (BP Location: Left arm)   Pulse 68   Temp (!) 96.7 °F (35.9 °C) (Temporal)   Resp 18   LMP  (LMP Unknown)   SpO2 99%     Pain Screenin  ECOG  0  Physical Exam   The patient presents today in no apparent distress.  There is a large central area of alopecia on the frontal scalp representing the prior treatment field.  The site of her basal cell carcinoma is completely flat and well-healed with mild residual hyperpigmentation.  There is no evidence of residual disease.  No palpable pre-/postauricular, cervical or supraclavicular lymphadenopathy.  Normal respiratory effort.    Administrative Statements   I have spent a total time of 20 minutes in caring for this patient on the day of the visit/encounter including Prognosis, Instructions for management, Patient and family education, Impressions, Counseling / Coordination of care, Documenting in the medical record, and Obtaining or reviewing history  .  Portions of the record may have been created with voice recognition software.  Occasional wrong word or \"sound a like\" substitutions may have occurred due to the inherent limitations of voice recognition software.  Read the chart carefully and recognize, using context, where substitutions have occurred.  "
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
## 2025-04-29 ENCOUNTER — OFFICE VISIT (OUTPATIENT)
Dept: INTERNAL MEDICINE CLINIC | Age: 80
End: 2025-04-29
Payer: COMMERCIAL

## 2025-04-29 VITALS
BODY MASS INDEX: 27.26 KG/M2 | HEART RATE: 61 BPM | OXYGEN SATURATION: 99 % | SYSTOLIC BLOOD PRESSURE: 116 MMHG | TEMPERATURE: 96.8 F | HEIGHT: 59 IN | DIASTOLIC BLOOD PRESSURE: 74 MMHG | WEIGHT: 135.2 LBS

## 2025-04-29 DIAGNOSIS — E11.22 TYPE 2 DIABETES MELLITUS WITH STAGE 3B CHRONIC KIDNEY DISEASE, WITH LONG-TERM CURRENT USE OF INSULIN (HCC): ICD-10-CM

## 2025-04-29 DIAGNOSIS — Z79.4 TYPE 2 DIABETES MELLITUS WITH DIABETIC POLYNEUROPATHY, WITH LONG-TERM CURRENT USE OF INSULIN (HCC): ICD-10-CM

## 2025-04-29 DIAGNOSIS — Z13.820 ENCOUNTER FOR SCREENING FOR OSTEOPOROSIS: Primary | ICD-10-CM

## 2025-04-29 DIAGNOSIS — E11.42 TYPE 2 DIABETES MELLITUS WITH DIABETIC POLYNEUROPATHY, WITH LONG-TERM CURRENT USE OF INSULIN (HCC): ICD-10-CM

## 2025-04-29 DIAGNOSIS — Z79.4 TYPE 2 DIABETES MELLITUS WITH COMPLICATION, WITH LONG-TERM CURRENT USE OF INSULIN (HCC): ICD-10-CM

## 2025-04-29 DIAGNOSIS — D61.818 PANCYTOPENIA (HCC): ICD-10-CM

## 2025-04-29 DIAGNOSIS — E79.0 HYPERURICEMIA: ICD-10-CM

## 2025-04-29 DIAGNOSIS — M54.50 ACUTE MIDLINE LOW BACK PAIN, UNSPECIFIED WHETHER SCIATICA PRESENT: ICD-10-CM

## 2025-04-29 DIAGNOSIS — E78.5 HYPERLIPIDEMIA, UNSPECIFIED HYPERLIPIDEMIA TYPE: ICD-10-CM

## 2025-04-29 DIAGNOSIS — I77.4 CELIAC ARTERY STENOSIS (HCC): ICD-10-CM

## 2025-04-29 DIAGNOSIS — N18.32 STAGE 3B CHRONIC KIDNEY DISEASE (HCC): ICD-10-CM

## 2025-04-29 DIAGNOSIS — N18.32 TYPE 2 DIABETES MELLITUS WITH STAGE 3B CHRONIC KIDNEY DISEASE, WITH LONG-TERM CURRENT USE OF INSULIN (HCC): ICD-10-CM

## 2025-04-29 DIAGNOSIS — I73.9 PERIPHERAL VASCULAR DISEASE, UNSPECIFIED (HCC): ICD-10-CM

## 2025-04-29 DIAGNOSIS — Z79.4 LONG TERM (CURRENT) USE OF INSULIN (HCC): ICD-10-CM

## 2025-04-29 DIAGNOSIS — Z79.4 TYPE 2 DIABETES MELLITUS WITH STABLE PROLIFERATIVE RETINOPATHY OF BOTH EYES, WITH LONG-TERM CURRENT USE OF INSULIN (HCC): ICD-10-CM

## 2025-04-29 DIAGNOSIS — E11.3553 TYPE 2 DIABETES MELLITUS WITH STABLE PROLIFERATIVE RETINOPATHY OF BOTH EYES, WITH LONG-TERM CURRENT USE OF INSULIN (HCC): ICD-10-CM

## 2025-04-29 DIAGNOSIS — E11.8 TYPE 2 DIABETES MELLITUS WITH COMPLICATION, WITH LONG-TERM CURRENT USE OF INSULIN (HCC): ICD-10-CM

## 2025-04-29 DIAGNOSIS — I10 PRIMARY HYPERTENSION: ICD-10-CM

## 2025-04-29 DIAGNOSIS — R60.0 BILATERAL LOWER EXTREMITY EDEMA: ICD-10-CM

## 2025-04-29 DIAGNOSIS — J45.909 ASTHMA WITHOUT STATUS ASTHMATICUS WITHOUT COMPLICATION, UNSPECIFIED ASTHMA SEVERITY, UNSPECIFIED WHETHER PERSISTENT: ICD-10-CM

## 2025-04-29 DIAGNOSIS — E03.9 HYPOTHYROIDISM, UNSPECIFIED TYPE: ICD-10-CM

## 2025-04-29 DIAGNOSIS — F11.20 OPIOID DEPENDENCE, UNCOMPLICATED (HCC): ICD-10-CM

## 2025-04-29 DIAGNOSIS — Z79.4 TYPE 2 DIABETES MELLITUS WITH STAGE 3B CHRONIC KIDNEY DISEASE, WITH LONG-TERM CURRENT USE OF INSULIN (HCC): ICD-10-CM

## 2025-04-29 PROCEDURE — 99214 OFFICE O/P EST MOD 30 MIN: CPT | Performed by: INTERNAL MEDICINE

## 2025-04-29 PROCEDURE — G2211 COMPLEX E/M VISIT ADD ON: HCPCS | Performed by: INTERNAL MEDICINE

## 2025-04-29 RX ORDER — BUMETANIDE 2 MG/1
2 TABLET ORAL DAILY
Qty: 90 TABLET | Refills: 1 | Status: SHIPPED | OUTPATIENT
Start: 2025-04-29

## 2025-04-29 RX ORDER — MONTELUKAST SODIUM 10 MG/1
10 TABLET ORAL
Qty: 30 TABLET | Refills: 1 | Status: SHIPPED | OUTPATIENT
Start: 2025-04-29

## 2025-04-29 RX ORDER — LEVOTHYROXINE SODIUM 100 UG/1
100 TABLET ORAL DAILY
Qty: 90 TABLET | Refills: 1 | Status: SHIPPED | OUTPATIENT
Start: 2025-04-29

## 2025-04-29 RX ORDER — ATORVASTATIN CALCIUM 80 MG/1
80 TABLET, FILM COATED ORAL DAILY
Qty: 90 TABLET | Refills: 1 | Status: SHIPPED | OUTPATIENT
Start: 2025-04-29

## 2025-04-29 RX ORDER — ALLOPURINOL 100 MG/1
100 TABLET ORAL 2 TIMES DAILY
Qty: 180 TABLET | Refills: 1 | Status: SHIPPED | OUTPATIENT
Start: 2025-04-29

## 2025-04-29 RX ORDER — TRAMADOL HYDROCHLORIDE 50 MG/1
25 TABLET ORAL 2 TIMES DAILY
Qty: 15 TABLET | Refills: 1 | Status: SHIPPED | OUTPATIENT
Start: 2025-04-29

## 2025-04-29 NOTE — ASSESSMENT & PLAN NOTE
Lab Results   Component Value Date    HGBA1C 7.3 (H) 04/21/2025     See above diabetic management

## 2025-04-29 NOTE — ASSESSMENT & PLAN NOTE
Stable on present dose of tramadol  Orders:    traMADol (Ultram) 50 mg tablet; Take 0.5 tablets (25 mg total) by mouth 2 (two) times a day

## 2025-04-29 NOTE — ASSESSMENT & PLAN NOTE
Lab Results   Component Value Date    HGBA1C 7.3 (H) 04/21/2025   Advised to increase Lantus 34 units daily and continue with diabetic diet.  Also lowered the dose of Januvia 25 mg because of decrease in GFR    Orders:    sitaGLIPtin (Januvia) 25 mg tablet; Take 1 tablet (25 mg total) by mouth daily    Hemoglobin A1C; Future

## 2025-04-29 NOTE — ASSESSMENT & PLAN NOTE
Continue with present dose of levothyroxine.  Will check thyroid function test before next visit  Orders:    levothyroxine 100 mcg tablet; Take 1 tablet (100 mcg total) by mouth daily    TSH, 3rd generation with Free T4 reflex; Future

## 2025-04-29 NOTE — ASSESSMENT & PLAN NOTE
Orders:    montelukast (SINGULAIR) 10 mg tablet; Take 1 tablet (10 mg total) by mouth daily at bedtime

## 2025-04-29 NOTE — ASSESSMENT & PLAN NOTE
Lab Results   Component Value Date    HGBA1C 7.3 (H) 04/21/2025     Renal function is stable.  Will continue to monitor

## 2025-04-29 NOTE — ASSESSMENT & PLAN NOTE
Lab Results   Component Value Date    EGFR 44 04/21/2025    EGFR 67 11/14/2024    EGFR 60 11/06/2024    CREATININE 1.16 04/21/2025    CREATININE 0.82 11/14/2024    CREATININE 0.89 11/06/2024     Renal function is stable will continue to monitor.  Continue to avoid nephrotoxic meds  Orders:    Comprehensive metabolic panel; Future    CBC and Platelet; Future    PTH, intact; Future    Phosphorus; Future    Magnesium; Future    Albumin / creatinine urine ratio; Future

## 2025-04-29 NOTE — PROGRESS NOTES
Name: Porsha Hoyos      : 1945      MRN: [id number]  Encounter Provider: João Alfonso MD  Encounter Date: 2025   Encounter department: [facility]  :  Assessment & Plan  Encounter for screening for osteoporosis         Hypothyroidism, unspecified type  Continue with present dose of levothyroxine.  Will check thyroid function test before next visit  Orders:    levothyroxine 100 mcg tablet; Take 1 tablet (100 mcg total) by mouth daily    TSH, 3rd generation with Free T4 reflex; Future    Type 2 diabetes mellitus with complication, with long-term current use of insulin ([facility])    Lab Results   Component Value Date    HGBA1C 7.3 (H) 2025   Advised to increase Lantus 34 units daily and continue with diabetic diet.  Also lowered the dose of Januvia 25 mg because of decrease in GFR    Orders:    sitaGLIPtin (Januvia) 25 mg tablet; Take 1 tablet (25 mg total) by mouth daily    Hemoglobin A1C; Future    Hyperuricemia  Continue with present dose of allopurinol 100 mg daily  Orders:    allopurinol (ZYLOPRIM) 100 mg tablet; Take 1 tablet (100 mg total) by mouth 2 (two) times a day    Bilateral lower extremity edema  Continue with Bumex 2 mg daily  Orders:    bumetanide (BUMEX) 2 mg tablet; Take 1 tablet (2 mg total) by mouth daily    Hyperlipidemia, unspecified hyperlipidemia type  Continue with atorvastatin 80 mg daily.  Will check lipid profile before next visit  Orders:    atorvastatin (LIPITOR) 80 mg tablet; Take 1 tablet (80 mg total) by mouth daily    Lipid Panel with Direct LDL reflex; Future    Asthma without status asthmaticus without complication, unspecified asthma severity, unspecified whether persistent    Orders:    montelukast (SINGULAIR) 10 mg tablet; Take 1 tablet (10 mg total) by mouth daily at bedtime    Acute midline low back pain, unspecified whether sciatica present  Stable on present dose of tramadol  Orders:    traMADol (Ultram) 50 mg tablet; Take  0.5 tablets (25 mg total) by mouth 2 (two) times a day    Primary hypertension  Blood pressure is stable on present regimen       Peripheral vascular disease, unspecified (HCC)  Continue with present regimen       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[facility])  No symptoms stable       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, with long-term current use of insulin ([facility])    Lab Results   Component Value Date    HGBA1C 7.3 (H) 04/21/2025     See above diabetic management       Type 2 diabetes mellitus with diabetic polyneuropathy, with long-term current use of insulin ([facility])    Lab Results   Component Value Date    HGBA1C 7.3 (H) 04/21/2025     See above diabetic management       Type 2 diabetes mellitus with stage 3b chronic kidney disease, with long-term current use of insulin ([facility])    Lab Results   Component Value Date    HGBA1C 7.3 (H) 04/21/2025     Renal function is stable.  Will continue to monitor       Stage 3b chronic kidney disease ([facility])  Lab Results   Component Value Date    EGFR 44 04/21/2025    EGFR 67 11/14/2024    EGFR 60 11/06/2024    CREATININE 1.16 04/21/2025    CREATININE 0.82 11/14/2024    CREATININE 0.89 11/06/2024     Renal function is stable will continue to monitor.  Continue to avoid nephrotoxic meds  Orders:    Comprehensive metabolic panel; Future    CBC and Platelet; Future    PTH, intact; Future    Phosphorus; Future    Magnesium; Future    Albumin / creatinine urine ratio; Future    Pancytopenia (HCC)    Stable.  Will continue to monitor         Opioid dependence, uncomplicated (HCC)  Advised to use stool softener to avoid opioid induced constipation       Long term (current) use of insulin ([facility])  Stable will continue to monitor              History of Present Illness   Patient is here for follow-up.  Also had blood work done and would like to discuss results.  Recently completed radiation therapy for squamous cell carcinoma of scalp.      Review of Systems  "  Constitutional:  Negative for fatigue and fever.   HENT:  Negative for congestion, ear discharge, ear pain, postnasal drip, sinus pressure, sore throat, tinnitus and trouble swallowing.    Eyes:  Negative for discharge, itching and visual disturbance.   Respiratory:  Negative for cough and shortness of breath.    Cardiovascular:  Negative for chest pain and palpitations.   Gastrointestinal:  Negative for abdominal pain, diarrhea, nausea and vomiting.   Endocrine: Negative for cold intolerance and polyuria.   Genitourinary:  Negative for difficulty urinating, dysuria and urgency.   Musculoskeletal:  Positive for arthralgias. Negative for neck pain.   Skin:  Negative for rash.   Allergic/Immunologic: Negative for environmental allergies.   Neurological:  Negative for dizziness, weakness and headaches.   Psychiatric/Behavioral:  Negative for agitation. The patient is not nervous/anxious.        Objective   /74 (BP Location: Left arm, Patient Position: Sitting, Cuff Size: Standard)   Pulse 61   Temp (!) 96.8 °F (36 °C) (Temporal)   Ht 4' 11\" (1.499 m)   Wt 61.3 kg (135 lb 3.2 oz)   LMP  (LMP Unknown)   SpO2 99%   BMI 27.31 kg/m²      Physical Exam  Constitutional:       General: She is not in acute distress.     Appearance: She is well-developed. She is not ill-appearing.   HENT:      Head: Normocephalic.      Right Ear: External ear normal.      Left Ear: External ear normal. There is no impacted cerumen.      Nose: No congestion or rhinorrhea.      Mouth/Throat:      Pharynx: No oropharyngeal exudate or posterior oropharyngeal erythema.   Eyes:      General: No scleral icterus.     Pupils: Pupils are equal, round, and reactive to light.   Neck:      Thyroid: No thyromegaly.      Trachea: No tracheal deviation.   Cardiovascular:      Rate and Rhythm: Normal rate and regular rhythm.      Heart sounds: Normal heart sounds. No murmur heard.     Comments: Trace bilateral lower extremity edema " present  Pulmonary:      Effort: Pulmonary effort is normal. No respiratory distress.      Breath sounds: Normal breath sounds.   Chest:      Chest wall: No tenderness.   Abdominal:      General: Bowel sounds are normal.      Palpations: Abdomen is soft. There is no mass.      Tenderness: There is no abdominal tenderness.   Musculoskeletal:         General: Normal range of motion.      Cervical back: Normal range of motion and neck supple. No rigidity.      Right lower leg: Edema present.      Left lower leg: Edema present.   Lymphadenopathy:      Cervical: No cervical adenopathy.   Skin:     General: Skin is warm.      Findings: No erythema or rash.   Neurological:      Mental Status: She is alert and oriented to person, place, and time.      Cranial Nerves: No cranial nerve deficit.   Psychiatric:         Mood and Affect: Mood normal.         Behavior: Behavior normal.

## 2025-05-23 DIAGNOSIS — J45.909 ASTHMA WITHOUT STATUS ASTHMATICUS WITHOUT COMPLICATION, UNSPECIFIED ASTHMA SEVERITY, UNSPECIFIED WHETHER PERSISTENT: ICD-10-CM

## 2025-05-23 RX ORDER — ALBUTEROL SULFATE 90 UG/1
2 INHALANT RESPIRATORY (INHALATION) 4 TIMES DAILY
Qty: 25.5 G | Refills: 1 | Status: SHIPPED | OUTPATIENT
Start: 2025-05-23

## 2025-06-06 DIAGNOSIS — M54.50 ACUTE MIDLINE LOW BACK PAIN, UNSPECIFIED WHETHER SCIATICA PRESENT: ICD-10-CM

## 2025-06-06 DIAGNOSIS — Z79.4 TYPE 2 DIABETES MELLITUS WITH COMPLICATION, WITH LONG-TERM CURRENT USE OF INSULIN (HCC): ICD-10-CM

## 2025-06-06 DIAGNOSIS — E11.8 TYPE 2 DIABETES MELLITUS WITH COMPLICATION, WITH LONG-TERM CURRENT USE OF INSULIN (HCC): ICD-10-CM

## 2025-06-06 RX ORDER — INSULIN GLARGINE 100 [IU]/ML
INJECTION, SOLUTION SUBCUTANEOUS
Qty: 30 ML | Refills: 0 | Status: SHIPPED | OUTPATIENT
Start: 2025-06-06

## 2025-06-06 NOTE — TELEPHONE ENCOUNTER
Medication:  traMADol (Ultram) 50 mg tablet    Dose/Frequency:   Take 0.5 tablets (25 mg total) by mouth 2 (two) times a day        Quantity: 15    Pharmacy: [facility] [place] [address]     Office:   [x] PCP/Provider -   [] Speciality/Provider -     Does the patient have enough for 3 days?   [] Yes   [x] No - Send as HP to POD      Pt is requesting a full months supply

## 2025-06-09 RX ORDER — TRAMADOL HYDROCHLORIDE 50 MG/1
25 TABLET ORAL 2 TIMES DAILY
Qty: 15 TABLET | Refills: 1 | Status: SHIPPED | OUTPATIENT
Start: 2025-06-09

## 2025-06-10 RX ORDER — TRAMADOL HYDROCHLORIDE 50 MG/1
TABLET ORAL
Qty: 15 TABLET | Refills: 0 | OUTPATIENT
Start: 2025-06-10

## 2025-06-17 ENCOUNTER — DOCUMENTATION (OUTPATIENT)
Dept: RADIATION ONCOLOGY | Facility: HOSPITAL | Age: 80
End: 2025-06-17

## 2025-06-17 NOTE — PROGRESS NOTES
Sent letter to patient to call to reschedule their appt with Dr. Lo on 8/4 as he will be out of the office.

## 2025-06-26 ENCOUNTER — TELEPHONE (OUTPATIENT)
Age: 80
End: 2025-06-26

## 2025-07-01 ENCOUNTER — PROCEDURE VISIT (OUTPATIENT)
Dept: PODIATRY | Facility: CLINIC | Age: 80
End: 2025-07-01
Payer: COMMERCIAL

## 2025-07-01 VITALS — RESPIRATION RATE: 18 BRPM | BODY MASS INDEX: 26.21 KG/M2 | HEIGHT: 59 IN | WEIGHT: 130 LBS

## 2025-07-01 DIAGNOSIS — E11.42 DIABETIC POLYNEUROPATHY ASSOCIATED WITH TYPE 2 DIABETES MELLITUS (HCC): Primary | ICD-10-CM

## 2025-07-01 PROCEDURE — RECHECK: Performed by: PODIATRIST

## 2025-07-01 PROCEDURE — 11719 TRIM NAIL(S) ANY NUMBER: CPT | Performed by: PODIATRIST

## 2025-07-01 NOTE — PROGRESS NOTES
"Name: Porsha Hoyos      : 1945      MRN: [id number]  Encounter Provider: Virgilio Buenrostro DPM  Encounter Date: 2025   Encounter department: [facility] [place]    Treatment consists of nail trimming.  :  Assessment & Plan  Diabetic polyneuropathy associated with type 2 diabetes mellitus (HCC)    Lab Results   Component Value Date    HGBA1C 7.3 (H) 2025   Nail trimming             History of Present Illness   HPI  Porsha Hoyos is a 79 y.o. female who presents for palliative nail care.      Review of Systems       Objective   Resp 18   Ht 4' 11\" (1.499 m)   Wt 59 kg (130 lb)   LMP  (LMP Unknown)   BMI 26.26 kg/m²      Physical Exam    Cardiovascular:      Comments: No palpable pedal pulses bilateral    Skin:     Comments: All toenails are elongated.     Nail removal    Date/Time: 2025 5:00 PM    Performed by: Virgilio Buenrostro DPM  Authorized by: Virgilio Buenrostro DPM    Nails trimmed:     Number of nails trimmed:  10            "

## 2025-07-07 DIAGNOSIS — M54.50 ACUTE MIDLINE LOW BACK PAIN, UNSPECIFIED WHETHER SCIATICA PRESENT: ICD-10-CM

## 2025-07-07 DIAGNOSIS — J45.909 ASTHMA WITHOUT STATUS ASTHMATICUS WITHOUT COMPLICATION, UNSPECIFIED ASTHMA SEVERITY, UNSPECIFIED WHETHER PERSISTENT: ICD-10-CM

## 2025-07-09 ENCOUNTER — TELEPHONE (OUTPATIENT)
Age: 80
End: 2025-07-09

## 2025-07-09 DIAGNOSIS — M54.50 ACUTE MIDLINE LOW BACK PAIN, UNSPECIFIED WHETHER SCIATICA PRESENT: ICD-10-CM

## 2025-07-09 RX ORDER — MONTELUKAST SODIUM 10 MG/1
10 TABLET ORAL
Qty: 30 TABLET | Refills: 5 | Status: SHIPPED | OUTPATIENT
Start: 2025-07-09

## 2025-07-09 NOTE — TELEPHONE ENCOUNTER
Patient called Monday to request a refill of her Tramadol 50 mg tablets to be sent to [facility], she has taken the last tablet today, error took place when patient called Monday, routing directly to office for provider to review/approve and send as patient reports now being out of her medication. Thank you

## 2025-07-10 RX ORDER — TRAMADOL HYDROCHLORIDE 25 MG/1
25 TABLET, COATED ORAL EVERY 8 HOURS PRN
Qty: 90 TABLET | Refills: 0 | Status: SHIPPED | OUTPATIENT
Start: 2025-07-10

## 2025-07-10 RX ORDER — TRAMADOL HYDROCHLORIDE 50 MG/1
25 TABLET ORAL 2 TIMES DAILY
Qty: 15 TABLET | Refills: 1 | Status: SHIPPED | OUTPATIENT
Start: 2025-07-10

## 2025-07-10 NOTE — TELEPHONE ENCOUNTER
Porsha called stating that [facility] does not have the 25mg and wanted to know if she could do the 50mg and cut them in half, requesting new prescription to be sent to pharmacy.    Please let her know when new script is sent.

## 2025-07-19 DIAGNOSIS — E78.5 HYPERLIPIDEMIA, UNSPECIFIED HYPERLIPIDEMIA TYPE: ICD-10-CM

## 2025-07-19 DIAGNOSIS — E03.9 HYPOTHYROIDISM, UNSPECIFIED TYPE: ICD-10-CM

## 2025-07-21 RX ORDER — LEVOTHYROXINE SODIUM 100 UG/1
100 TABLET ORAL DAILY
Qty: 90 TABLET | Refills: 0 | OUTPATIENT
Start: 2025-07-21

## 2025-07-21 RX ORDER — ATORVASTATIN CALCIUM 80 MG/1
80 TABLET, FILM COATED ORAL DAILY
Qty: 90 TABLET | Refills: 0 | OUTPATIENT
Start: 2025-07-21

## 2025-08-06 DIAGNOSIS — M54.50 ACUTE MIDLINE LOW BACK PAIN, UNSPECIFIED WHETHER SCIATICA PRESENT: ICD-10-CM

## 2025-08-06 RX ORDER — TRAMADOL HYDROCHLORIDE 50 MG/1
25 TABLET ORAL 2 TIMES DAILY
Qty: 15 TABLET | Refills: 1 | Status: SHIPPED | OUTPATIENT
Start: 2025-08-06

## 2025-08-07 ENCOUNTER — OFFICE VISIT (OUTPATIENT)
Dept: RADIATION ONCOLOGY | Facility: HOSPITAL | Age: 80
End: 2025-08-07
Attending: RADIOLOGY
Payer: COMMERCIAL

## 2025-08-07 VITALS
TEMPERATURE: 97.1 F | SYSTOLIC BLOOD PRESSURE: 132 MMHG | WEIGHT: 130 LBS | BODY MASS INDEX: 26.21 KG/M2 | HEART RATE: 65 BPM | RESPIRATION RATE: 18 BRPM | HEIGHT: 59 IN | DIASTOLIC BLOOD PRESSURE: 82 MMHG | OXYGEN SATURATION: 98 %

## 2025-08-07 DIAGNOSIS — C44.41 BASAL CELL CARCINOMA OF SCALP: Primary | ICD-10-CM

## 2025-08-07 PROCEDURE — G0463 HOSPITAL OUTPT CLINIC VISIT: HCPCS | Performed by: RADIOLOGY

## 2025-08-07 PROCEDURE — 99213 OFFICE O/P EST LOW 20 MIN: CPT | Performed by: RADIOLOGY

## 2025-08-07 PROCEDURE — 99211 OFF/OP EST MAY X REQ PHY/QHP: CPT | Performed by: RADIOLOGY

## 2025-08-16 DIAGNOSIS — I10 PRIMARY HYPERTENSION: ICD-10-CM

## 2025-08-18 ENCOUNTER — OFFICE VISIT (OUTPATIENT)
Dept: INTERNAL MEDICINE CLINIC | Age: 80
End: 2025-08-18
Payer: COMMERCIAL

## 2025-08-18 VITALS
HEART RATE: 48 BPM | HEIGHT: 59 IN | DIASTOLIC BLOOD PRESSURE: 66 MMHG | SYSTOLIC BLOOD PRESSURE: 136 MMHG | WEIGHT: 134 LBS | TEMPERATURE: 97.9 F | OXYGEN SATURATION: 98 % | BODY MASS INDEX: 27.01 KG/M2

## 2025-08-18 DIAGNOSIS — F11.20 OPIOID DEPENDENCE, UNCOMPLICATED (HCC): ICD-10-CM

## 2025-08-18 DIAGNOSIS — R60.0 BILATERAL LOWER EXTREMITY EDEMA: ICD-10-CM

## 2025-08-18 DIAGNOSIS — N18.32 STAGE 3B CHRONIC KIDNEY DISEASE (HCC): ICD-10-CM

## 2025-08-18 DIAGNOSIS — D61.818 PANCYTOPENIA (HCC): ICD-10-CM

## 2025-08-18 DIAGNOSIS — E03.9 ACQUIRED HYPOTHYROIDISM: Primary | ICD-10-CM

## 2025-08-18 DIAGNOSIS — E03.9 HYPOTHYROIDISM, UNSPECIFIED TYPE: ICD-10-CM

## 2025-08-18 DIAGNOSIS — I10 PRIMARY HYPERTENSION: ICD-10-CM

## 2025-08-18 DIAGNOSIS — M54.50 ACUTE MIDLINE LOW BACK PAIN, UNSPECIFIED WHETHER SCIATICA PRESENT: ICD-10-CM

## 2025-08-18 PROCEDURE — G0439 PPPS, SUBSEQ VISIT: HCPCS | Performed by: INTERNAL MEDICINE

## 2025-08-18 PROCEDURE — 99214 OFFICE O/P EST MOD 30 MIN: CPT | Performed by: INTERNAL MEDICINE

## 2025-08-18 RX ORDER — DOXAZOSIN 2 MG/1
2 TABLET ORAL 2 TIMES DAILY
Qty: 180 TABLET | Refills: 1 | Status: SHIPPED | OUTPATIENT
Start: 2025-08-18 | End: 2026-02-14

## 2025-08-18 RX ORDER — LEVOTHYROXINE SODIUM 100 UG/1
100 TABLET ORAL DAILY
Qty: 90 TABLET | Refills: 1 | Status: SHIPPED | OUTPATIENT
Start: 2025-08-18

## 2025-08-18 RX ORDER — BUMETANIDE 2 MG/1
2 TABLET ORAL DAILY
Qty: 90 TABLET | Refills: 1 | Status: SHIPPED | OUTPATIENT
Start: 2025-08-18

## 2025-08-18 RX ORDER — TRAMADOL HYDROCHLORIDE 50 MG/1
25 TABLET ORAL 2 TIMES DAILY
Qty: 30 TABLET | Refills: 1 | Status: SHIPPED | OUTPATIENT
Start: 2025-08-18 | End: 2025-10-17

## 2025-08-18 RX ORDER — METHOCARBAMOL 500 MG/1
500 TABLET, FILM COATED ORAL 2 TIMES DAILY
Qty: 180 TABLET | Refills: 1 | Status: SHIPPED | OUTPATIENT
Start: 2025-08-18

## 2025-08-18 RX ORDER — DOXAZOSIN 2 MG/1
2 TABLET ORAL 2 TIMES DAILY
Qty: 60 TABLET | Refills: 4 | OUTPATIENT
Start: 2025-08-18